# Patient Record
Sex: FEMALE | Race: WHITE | NOT HISPANIC OR LATINO | Employment: UNEMPLOYED | URBAN - METROPOLITAN AREA
[De-identification: names, ages, dates, MRNs, and addresses within clinical notes are randomized per-mention and may not be internally consistent; named-entity substitution may affect disease eponyms.]

---

## 2018-01-01 ENCOUNTER — TELEPHONE (OUTPATIENT)
Dept: TRANSPLANT | Facility: CLINIC | Age: 68
End: 2018-01-01

## 2018-01-01 ENCOUNTER — HOSPITAL ENCOUNTER (OUTPATIENT)
Dept: RADIOLOGY | Facility: HOSPITAL | Age: 68
Discharge: HOME | End: 2018-09-06
Attending: INTERNAL MEDICINE
Payer: COMMERCIAL

## 2018-01-01 ENCOUNTER — PATIENT MESSAGE (OUTPATIENT)
Dept: TRANSPLANT | Facility: CLINIC | Age: 68
End: 2018-01-01

## 2018-01-01 ENCOUNTER — OFFICE VISIT (OUTPATIENT)
Dept: TRANSPLANT | Facility: CLINIC | Age: 68
End: 2018-01-01
Payer: COMMERCIAL

## 2018-01-01 ENCOUNTER — TELEPHONE (OUTPATIENT)
Dept: TRANSPLANT | Facility: HOSPITAL | Age: 68
End: 2018-01-01

## 2018-01-01 ENCOUNTER — ANESTHESIA (OUTPATIENT)
Dept: SURGERY | Facility: HOSPITAL | Age: 68
DRG: 004 | End: 2018-01-01
Payer: COMMERCIAL

## 2018-01-01 ENCOUNTER — HOSPITAL ENCOUNTER (OUTPATIENT)
Dept: RADIOLOGY | Facility: CLINIC | Age: 68
Discharge: HOME | End: 2018-10-30
Attending: INTERNAL MEDICINE
Payer: COMMERCIAL

## 2018-01-01 ENCOUNTER — CONFERENCE (OUTPATIENT)
Dept: TRANSPLANT | Facility: CLINIC | Age: 68
End: 2018-01-01

## 2018-01-01 ENCOUNTER — OFFICE VISIT (OUTPATIENT)
Dept: OBSTETRICS AND GYNECOLOGY | Facility: CLINIC | Age: 68
End: 2018-01-01
Payer: COMMERCIAL

## 2018-01-01 ENCOUNTER — HOSPITAL ENCOUNTER (INPATIENT)
Facility: HOSPITAL | Age: 68
LOS: 3 days | Discharge: HOME HEALTH | DRG: 683 | End: 2018-11-02
Attending: INTERNAL MEDICINE | Admitting: SURGERY
Payer: COMMERCIAL

## 2018-01-01 ENCOUNTER — CLINICAL SUPPORT (OUTPATIENT)
Dept: TRANSPLANT | Facility: CLINIC | Age: 68
End: 2018-01-01
Payer: MEDICARE

## 2018-01-01 ENCOUNTER — ANESTHESIA (OUTPATIENT)
Dept: TRANSPLANT | Facility: HOSPITAL | Age: 68
DRG: 004 | End: 2018-01-01
Payer: COMMERCIAL

## 2018-01-01 ENCOUNTER — ANESTHESIA EVENT (OUTPATIENT)
Dept: SURGERY | Facility: HOSPITAL | Age: 68
DRG: 004 | End: 2018-01-01
Payer: COMMERCIAL

## 2018-01-01 ENCOUNTER — NURSE TRIAGE (OUTPATIENT)
Dept: ADMINISTRATIVE | Facility: CLINIC | Age: 68
End: 2018-01-01

## 2018-01-01 ENCOUNTER — DOCUMENTATION ONLY (OUTPATIENT)
Dept: TRANSPLANT | Facility: CLINIC | Age: 68
End: 2018-01-01

## 2018-01-01 ENCOUNTER — OFFICE VISIT (OUTPATIENT)
Dept: TRANSPLANT | Facility: CLINIC | Age: 68
End: 2018-01-01
Payer: MEDICARE

## 2018-01-01 ENCOUNTER — TELEPHONE (OUTPATIENT)
Dept: HEPATOLOGY | Facility: HOSPITAL | Age: 68
End: 2018-01-01

## 2018-01-01 ENCOUNTER — COMMITTEE REVIEW (OUTPATIENT)
Dept: TRANSPLANT | Facility: CLINIC | Age: 68
End: 2018-01-01

## 2018-01-01 ENCOUNTER — HOSPITAL ENCOUNTER (INPATIENT)
Facility: HOSPITAL | Age: 68
LOS: 36 days | DRG: 004 | End: 2018-12-16
Attending: TRANSPLANT SURGERY | Admitting: TRANSPLANT SURGERY
Payer: COMMERCIAL

## 2018-01-01 ENCOUNTER — ANESTHESIA EVENT (OUTPATIENT)
Dept: TRANSPLANT | Facility: HOSPITAL | Age: 68
DRG: 004 | End: 2018-01-01
Payer: COMMERCIAL

## 2018-01-01 ENCOUNTER — HOSPITAL ENCOUNTER (OUTPATIENT)
Dept: RADIOLOGY | Facility: HOSPITAL | Age: 68
Discharge: HOME | End: 2018-09-07
Attending: INTERNAL MEDICINE
Payer: COMMERCIAL

## 2018-01-01 ENCOUNTER — HOSPITAL ENCOUNTER (OUTPATIENT)
Dept: CARDIOLOGY | Facility: CLINIC | Age: 68
Discharge: HOME | End: 2018-09-07
Attending: INTERNAL MEDICINE
Payer: COMMERCIAL

## 2018-01-01 ENCOUNTER — INITIAL CONSULT (OUTPATIENT)
Dept: TRANSPLANT | Facility: CLINIC | Age: 68
End: 2018-01-01
Payer: MEDICARE

## 2018-01-01 VITALS
DIASTOLIC BLOOD PRESSURE: 52 MMHG | OXYGEN SATURATION: 99 % | TEMPERATURE: 98 F | TEMPERATURE: 98 F | HEIGHT: 58 IN | DIASTOLIC BLOOD PRESSURE: 52 MMHG | BODY MASS INDEX: 23.24 KG/M2 | SYSTOLIC BLOOD PRESSURE: 126 MMHG | SYSTOLIC BLOOD PRESSURE: 126 MMHG | HEART RATE: 82 BPM | RESPIRATION RATE: 18 BRPM | BODY MASS INDEX: 23.24 KG/M2 | HEIGHT: 58 IN | RESPIRATION RATE: 18 BRPM | WEIGHT: 110.69 LBS | HEART RATE: 82 BPM | WEIGHT: 110.69 LBS | OXYGEN SATURATION: 99 %

## 2018-01-01 VITALS
HEIGHT: 58 IN | SYSTOLIC BLOOD PRESSURE: 112 MMHG | BODY MASS INDEX: 23.64 KG/M2 | WEIGHT: 112.63 LBS | DIASTOLIC BLOOD PRESSURE: 52 MMHG

## 2018-01-01 VITALS
OXYGEN SATURATION: 99 % | DIASTOLIC BLOOD PRESSURE: 54 MMHG | TEMPERATURE: 99 F | BODY MASS INDEX: 22.58 KG/M2 | RESPIRATION RATE: 18 BRPM | SYSTOLIC BLOOD PRESSURE: 124 MMHG | HEART RATE: 79 BPM | WEIGHT: 112 LBS | HEIGHT: 59 IN

## 2018-01-01 VITALS
BODY MASS INDEX: 25.69 KG/M2 | OXYGEN SATURATION: 45 % | TEMPERATURE: 99 F | SYSTOLIC BLOOD PRESSURE: 87 MMHG | HEIGHT: 58 IN | DIASTOLIC BLOOD PRESSURE: 47 MMHG | WEIGHT: 122.38 LBS

## 2018-01-01 VITALS
BODY MASS INDEX: 22.39 KG/M2 | RESPIRATION RATE: 18 BRPM | TEMPERATURE: 98 F | DIASTOLIC BLOOD PRESSURE: 46 MMHG | OXYGEN SATURATION: 98 % | HEART RATE: 81 BPM | WEIGHT: 106.69 LBS | SYSTOLIC BLOOD PRESSURE: 119 MMHG | HEIGHT: 58 IN

## 2018-01-01 VITALS
OXYGEN SATURATION: 99 % | SYSTOLIC BLOOD PRESSURE: 127 MMHG | BODY MASS INDEX: 20.78 KG/M2 | DIASTOLIC BLOOD PRESSURE: 70 MMHG | RESPIRATION RATE: 21 BRPM | WEIGHT: 99 LBS | TEMPERATURE: 99 F | HEART RATE: 82 BPM | HEIGHT: 58 IN

## 2018-01-01 VITALS
TEMPERATURE: 99 F | BODY MASS INDEX: 20 KG/M2 | OXYGEN SATURATION: 100 % | WEIGHT: 99.19 LBS | SYSTOLIC BLOOD PRESSURE: 112 MMHG | HEART RATE: 64 BPM | RESPIRATION RATE: 16 BRPM | DIASTOLIC BLOOD PRESSURE: 49 MMHG | HEIGHT: 59 IN

## 2018-01-01 DIAGNOSIS — K72.90 DECOMPENSATED HEPATIC CIRRHOSIS: ICD-10-CM

## 2018-01-01 DIAGNOSIS — K72.90 DECOMPENSATED HEPATIC CIRRHOSIS: Primary | ICD-10-CM

## 2018-01-01 DIAGNOSIS — Z01.818 PRE-TRANSPLANT EVALUATION FOR LIVER TRANSPLANT: Primary | ICD-10-CM

## 2018-01-01 DIAGNOSIS — Z76.82 ORGAN TRANSPLANT CANDIDATE: ICD-10-CM

## 2018-01-01 DIAGNOSIS — K74.60 DECOMPENSATED HEPATIC CIRRHOSIS: ICD-10-CM

## 2018-01-01 DIAGNOSIS — N17.9 AKI (ACUTE KIDNEY INJURY): Primary | ICD-10-CM

## 2018-01-01 DIAGNOSIS — Z99.11: ICD-10-CM

## 2018-01-01 DIAGNOSIS — E44.0 MODERATE PROTEIN-CALORIE MALNUTRITION: ICD-10-CM

## 2018-01-01 DIAGNOSIS — K76.9 CHRONIC LIVER DISEASE: ICD-10-CM

## 2018-01-01 DIAGNOSIS — E44.0 PROTEIN-CALORIE MALNUTRITION, MODERATE: ICD-10-CM

## 2018-01-01 DIAGNOSIS — Z76.89 ENCOUNTER FOR EVALUATION OF ASCITES: ICD-10-CM

## 2018-01-01 DIAGNOSIS — R00.0 TACHYCARDIA: ICD-10-CM

## 2018-01-01 DIAGNOSIS — J18.9 PNEUMONIA OF RIGHT LUNG DUE TO INFECTIOUS ORGANISM, UNSPECIFIED PART OF LUNG: ICD-10-CM

## 2018-01-01 DIAGNOSIS — K72.10 END STAGE LIVER DISEASE: ICD-10-CM

## 2018-01-01 DIAGNOSIS — I48.91 A-FIB: ICD-10-CM

## 2018-01-01 DIAGNOSIS — R53.81 PHYSICAL DECONDITIONING: ICD-10-CM

## 2018-01-01 DIAGNOSIS — D69.59 THROMBOCYTOPENIA DUE TO HYPERSPLENISM: ICD-10-CM

## 2018-01-01 DIAGNOSIS — D73.1 THROMBOCYTOPENIA DUE TO HYPERSPLENISM: ICD-10-CM

## 2018-01-01 DIAGNOSIS — K42.9 UMBILICAL HERNIA WITHOUT OBSTRUCTION AND WITHOUT GANGRENE: ICD-10-CM

## 2018-01-01 DIAGNOSIS — Z79.4 TYPE 2 DIABETES MELLITUS WITH HYPERGLYCEMIA, WITH LONG-TERM CURRENT USE OF INSULIN: ICD-10-CM

## 2018-01-01 DIAGNOSIS — K74.60 DECOMPENSATED HEPATIC CIRRHOSIS: Primary | ICD-10-CM

## 2018-01-01 DIAGNOSIS — I48.91 ATRIAL FIBRILLATION WITH RAPID VENTRICULAR RESPONSE: ICD-10-CM

## 2018-01-01 DIAGNOSIS — R06.02 SHORTNESS OF BREATH: ICD-10-CM

## 2018-01-01 DIAGNOSIS — Z76.82 ORGAN TRANSPLANT CANDIDATE: Primary | ICD-10-CM

## 2018-01-01 DIAGNOSIS — I48.0 PAROXYSMAL ATRIAL FIBRILLATION: ICD-10-CM

## 2018-01-01 DIAGNOSIS — D72.819 LEUKOPENIA, UNSPECIFIED TYPE: ICD-10-CM

## 2018-01-01 DIAGNOSIS — D63.8 ANEMIA OF CHRONIC DISEASE: ICD-10-CM

## 2018-01-01 DIAGNOSIS — I48.91 ATRIAL FIBRILLATION: ICD-10-CM

## 2018-01-01 DIAGNOSIS — N17.9 ACUTE KIDNEY INJURY SUPERIMPOSED ON CKD: ICD-10-CM

## 2018-01-01 DIAGNOSIS — Z85.3 PERSONAL HISTORY OF BREAST CANCER: ICD-10-CM

## 2018-01-01 DIAGNOSIS — Z78.9 ON ENTERAL NUTRITION: ICD-10-CM

## 2018-01-01 DIAGNOSIS — J96.01 ACUTE RESPIRATORY FAILURE WITH HYPOXIA: ICD-10-CM

## 2018-01-01 DIAGNOSIS — D69.6 THROMBOCYTOPENIA, UNSPECIFIED: ICD-10-CM

## 2018-01-01 DIAGNOSIS — K76.82 HEPATIC ENCEPHALOPATHY: ICD-10-CM

## 2018-01-01 DIAGNOSIS — N18.9 ACUTE KIDNEY INJURY SUPERIMPOSED ON CKD: ICD-10-CM

## 2018-01-01 DIAGNOSIS — K76.6 PORTAL HYPERTENSION: ICD-10-CM

## 2018-01-01 DIAGNOSIS — I48.91 NEW ONSET A-FIB: ICD-10-CM

## 2018-01-01 DIAGNOSIS — R00.0 SINUS TACHYCARDIA: ICD-10-CM

## 2018-01-01 DIAGNOSIS — R16.0 LIVER MASS: ICD-10-CM

## 2018-01-01 DIAGNOSIS — H92.09 OTALGIA, UNSPECIFIED LATERALITY: ICD-10-CM

## 2018-01-01 DIAGNOSIS — K72.90 LIVER FAILURE: ICD-10-CM

## 2018-01-01 DIAGNOSIS — D64.9 ANEMIA REQUIRING TRANSFUSIONS: ICD-10-CM

## 2018-01-01 DIAGNOSIS — M79.89 SWELLING IN RIGHT ARMPIT: ICD-10-CM

## 2018-01-01 DIAGNOSIS — J96.02 ACUTE HYPERCAPNIC RESPIRATORY FAILURE: Primary | ICD-10-CM

## 2018-01-01 DIAGNOSIS — Z01.818 ENCOUNTER FOR PRE-TRANSPLANT EVALUATION FOR CHRONIC LIVER DISEASE: Primary | ICD-10-CM

## 2018-01-01 DIAGNOSIS — E87.1 HYPONATREMIA WITH DECREASED SERUM OSMOLALITY: ICD-10-CM

## 2018-01-01 DIAGNOSIS — I48.91 ATRIAL FIBRILLATION, UNSPECIFIED TYPE: ICD-10-CM

## 2018-01-01 DIAGNOSIS — Z01.419 ENCOUNTER FOR ANNUAL ROUTINE GYNECOLOGICAL EXAMINATION: Primary | ICD-10-CM

## 2018-01-01 DIAGNOSIS — K74.60 LIVER CIRRHOSIS SECONDARY TO NASH: ICD-10-CM

## 2018-01-01 DIAGNOSIS — N17.0 ACUTE KIDNEY FAILURE WITH LESION OF TUBULAR NECROSIS: ICD-10-CM

## 2018-01-01 DIAGNOSIS — N18.30 CKD (CHRONIC KIDNEY DISEASE) STAGE 3, GFR 30-59 ML/MIN: ICD-10-CM

## 2018-01-01 DIAGNOSIS — E11.65 TYPE 2 DIABETES MELLITUS WITH HYPERGLYCEMIA, WITH LONG-TERM CURRENT USE OF INSULIN: ICD-10-CM

## 2018-01-01 DIAGNOSIS — E43 SEVERE MALNUTRITION: ICD-10-CM

## 2018-01-01 DIAGNOSIS — E87.5 HYPERKALEMIA: ICD-10-CM

## 2018-01-01 DIAGNOSIS — I35.9 NONRHEUMATIC AORTIC VALVE DISORDER: ICD-10-CM

## 2018-01-01 DIAGNOSIS — E87.6 HYPOKALEMIA: ICD-10-CM

## 2018-01-01 DIAGNOSIS — K75.81 LIVER CIRRHOSIS SECONDARY TO NASH: ICD-10-CM

## 2018-01-01 DIAGNOSIS — E87.70 HYPERVOLEMIA, UNSPECIFIED HYPERVOLEMIA TYPE: ICD-10-CM

## 2018-01-01 DIAGNOSIS — M62.50 MUSCULAR ATROPHY, UNSPECIFIED SITE: ICD-10-CM

## 2018-01-01 DIAGNOSIS — E87.70 HYPERVOLEMIA: ICD-10-CM

## 2018-01-01 DIAGNOSIS — Z12.4 PAP SMEAR FOR CERVICAL CANCER SCREENING: ICD-10-CM

## 2018-01-01 DIAGNOSIS — M25.421 SWELLING OF JOINT OF UPPER ARM, RIGHT: ICD-10-CM

## 2018-01-01 LAB
ABO + RH BLD: NORMAL
ALBUMIN SERPL BCP-MCNC: 1.4 G/DL
ALBUMIN SERPL BCP-MCNC: 1.4 G/DL
ALBUMIN SERPL BCP-MCNC: 1.5 G/DL
ALBUMIN SERPL BCP-MCNC: 1.5 G/DL
ALBUMIN SERPL BCP-MCNC: 1.6 G/DL
ALBUMIN SERPL BCP-MCNC: 1.7 G/DL
ALBUMIN SERPL BCP-MCNC: 1.8 G/DL
ALBUMIN SERPL BCP-MCNC: 1.9 G/DL
ALBUMIN SERPL BCP-MCNC: 2 G/DL
ALBUMIN SERPL BCP-MCNC: 2.1 G/DL
ALBUMIN SERPL BCP-MCNC: 2.2 G/DL
ALBUMIN SERPL BCP-MCNC: 2.3 G/DL
ALBUMIN SERPL BCP-MCNC: 2.4 G/DL
ALBUMIN SERPL BCP-MCNC: 2.5 G/DL
ALBUMIN SERPL BCP-MCNC: 2.6 G/DL
ALBUMIN SERPL BCP-MCNC: 2.7 G/DL
ALBUMIN SERPL BCP-MCNC: 2.7 G/DL
ALBUMIN SERPL BCP-MCNC: 2.8 G/DL
ALBUMIN SERPL BCP-MCNC: 2.9 G/DL
ALBUMIN SERPL BCP-MCNC: 3 G/DL
ALBUMIN SERPL BCP-MCNC: 3 G/DL
ALBUMIN SERPL BCP-MCNC: 3.1 G/DL
ALBUMIN SERPL BCP-MCNC: 3.2 G/DL
ALBUMIN SERPL BCP-MCNC: 3.2 G/DL
ALBUMIN SERPL BCP-MCNC: 3.4 G/DL
ALBUMIN SERPL BCP-MCNC: 3.4 G/DL
ALBUMIN SERPL BCP-MCNC: 3.5 G/DL
ALBUMIN SERPL BCP-MCNC: 3.6 G/DL
ALBUMIN SERPL BCP-MCNC: 3.6 G/DL
ALBUMIN SERPL BCP-MCNC: 3.7 G/DL
ALBUMIN SERPL BCP-MCNC: 3.7 G/DL
ALBUMIN SERPL BCP-MCNC: 3.8 G/DL
ALBUMIN SERPL BCP-MCNC: 3.9 G/DL
ALBUMIN SERPL BCP-MCNC: 4 G/DL
ALLENS TEST: ABNORMAL
ALLENS TEST: NORMAL
ALLENS TEST: NORMAL
ALP SERPL-CCNC: 107 U/L
ALP SERPL-CCNC: 110 U/L
ALP SERPL-CCNC: 112 U/L
ALP SERPL-CCNC: 115 U/L
ALP SERPL-CCNC: 121 U/L
ALP SERPL-CCNC: 123 U/L
ALP SERPL-CCNC: 124 U/L
ALP SERPL-CCNC: 124 U/L
ALP SERPL-CCNC: 125 U/L
ALP SERPL-CCNC: 126 U/L
ALP SERPL-CCNC: 128 U/L
ALP SERPL-CCNC: 130 U/L
ALP SERPL-CCNC: 132 U/L
ALP SERPL-CCNC: 132 U/L
ALP SERPL-CCNC: 138 U/L
ALP SERPL-CCNC: 143 U/L
ALP SERPL-CCNC: 144 U/L
ALP SERPL-CCNC: 145 U/L
ALP SERPL-CCNC: 146 U/L
ALP SERPL-CCNC: 147 U/L
ALP SERPL-CCNC: 148 U/L
ALP SERPL-CCNC: 149 U/L
ALP SERPL-CCNC: 152 U/L
ALP SERPL-CCNC: 155 U/L
ALP SERPL-CCNC: 157 U/L
ALP SERPL-CCNC: 162 U/L
ALP SERPL-CCNC: 163 U/L
ALP SERPL-CCNC: 166 U/L
ALP SERPL-CCNC: 166 U/L
ALP SERPL-CCNC: 176 U/L
ALP SERPL-CCNC: 176 U/L
ALP SERPL-CCNC: 182 U/L
ALP SERPL-CCNC: 187 U/L
ALP SERPL-CCNC: 205 U/L
ALP SERPL-CCNC: 209 U/L
ALP SERPL-CCNC: 210 U/L
ALP SERPL-CCNC: 217 U/L
ALP SERPL-CCNC: 219 U/L
ALP SERPL-CCNC: 226 U/L
ALP SERPL-CCNC: 246 U/L
ALP SERPL-CCNC: 247 U/L
ALP SERPL-CCNC: 252 U/L
ALP SERPL-CCNC: 256 U/L
ALP SERPL-CCNC: 268 U/L
ALP SERPL-CCNC: 97 U/L
ALT SERPL W/O P-5'-P-CCNC: 16 U/L
ALT SERPL W/O P-5'-P-CCNC: 16 U/L
ALT SERPL W/O P-5'-P-CCNC: 17 U/L
ALT SERPL W/O P-5'-P-CCNC: 18 U/L
ALT SERPL W/O P-5'-P-CCNC: 19 U/L
ALT SERPL W/O P-5'-P-CCNC: 20 U/L
ALT SERPL W/O P-5'-P-CCNC: 20 U/L
ALT SERPL W/O P-5'-P-CCNC: 21 U/L
ALT SERPL W/O P-5'-P-CCNC: 21 U/L
ALT SERPL W/O P-5'-P-CCNC: 22 U/L
ALT SERPL W/O P-5'-P-CCNC: 22 U/L
ALT SERPL W/O P-5'-P-CCNC: 23 U/L
ALT SERPL W/O P-5'-P-CCNC: 24 U/L
ALT SERPL W/O P-5'-P-CCNC: 25 U/L
ALT SERPL W/O P-5'-P-CCNC: 25 U/L
ALT SERPL W/O P-5'-P-CCNC: 26 U/L
ALT SERPL W/O P-5'-P-CCNC: 29 U/L
ALT SERPL W/O P-5'-P-CCNC: 29 U/L
ALT SERPL W/O P-5'-P-CCNC: 31 U/L
ALT SERPL W/O P-5'-P-CCNC: 31 U/L
ALT SERPL W/O P-5'-P-CCNC: 32 U/L
ALT SERPL W/O P-5'-P-CCNC: 32 U/L
ALT SERPL W/O P-5'-P-CCNC: 33 U/L
ALT SERPL W/O P-5'-P-CCNC: 34 U/L
ALT SERPL W/O P-5'-P-CCNC: 35 U/L
ALT SERPL W/O P-5'-P-CCNC: 36 U/L
ALT SERPL W/O P-5'-P-CCNC: 37 U/L
ALT SERPL W/O P-5'-P-CCNC: 38 U/L
ALT SERPL W/O P-5'-P-CCNC: 38 U/L
ALT SERPL W/O P-5'-P-CCNC: 39 U/L
ALT SERPL W/O P-5'-P-CCNC: 41 U/L
ALT SERPL W/O P-5'-P-CCNC: 44 U/L
ALT SERPL W/O P-5'-P-CCNC: 49 U/L
ALT SERPL W/O P-5'-P-CCNC: 57 U/L
ALT SERPL W/O P-5'-P-CCNC: 59 U/L
ALT SERPL W/O P-5'-P-CCNC: 66 U/L
ALT SERPL W/O P-5'-P-CCNC: 66 U/L
ALT SERPL W/O P-5'-P-CCNC: 73 U/L
AMMONIA PLAS-SCNC: 32 UMOL/L
AMMONIA PLAS-SCNC: 42 UMOL/L
AMMONIA PLAS-SCNC: 44 UMOL/L
AMMONIA PLAS-SCNC: 50 UMOL/L
AMMONIA PLAS-SCNC: 51 UMOL/L
AMMONIA PLAS-SCNC: 61 UMOL/L
AMMONIA PLAS-SCNC: 61 UMOL/L
AMMONIA PLAS-SCNC: 65 UMOL/L
AMMONIA PLAS-SCNC: 71 UMOL/L
AMMONIA PLAS-SCNC: 79 UMOL/L
AMMONIA PLAS-SCNC: 89 UMOL/L
AMMONIA PLAS-SCNC: 93 UMOL/L
AMPHET+METHAMPHET UR QL: NEGATIVE
AMPHET+METHAMPHET UR QL: NEGATIVE
ANION GAP SERPL CALC-SCNC: 10 MMOL/L
ANION GAP SERPL CALC-SCNC: 11 MMOL/L
ANION GAP SERPL CALC-SCNC: 12 MMOL/L
ANION GAP SERPL CALC-SCNC: 13 MMOL/L
ANION GAP SERPL CALC-SCNC: 14 MMOL/L
ANION GAP SERPL CALC-SCNC: 14 MMOL/L
ANION GAP SERPL CALC-SCNC: 15 MMOL/L
ANION GAP SERPL CALC-SCNC: 3 MMOL/L
ANION GAP SERPL CALC-SCNC: 4 MMOL/L
ANION GAP SERPL CALC-SCNC: 5 MMOL/L
ANION GAP SERPL CALC-SCNC: 6 MMOL/L
ANION GAP SERPL CALC-SCNC: 7 MMOL/L
ANION GAP SERPL CALC-SCNC: 8 MMOL/L
ANION GAP SERPL CALC-SCNC: 9 MMOL/L
ANISOCYTOSIS BLD QL SMEAR: ABNORMAL
ANISOCYTOSIS BLD QL SMEAR: SLIGHT
APTT BLDCRRT: 26.4 SEC
APTT BLDCRRT: 27.8 SEC
APTT BLDCRRT: 29.5 SEC
APTT BLDCRRT: 30.7 SEC
APTT BLDCRRT: 31.1 SEC
ASCENDING AORTA: 2.49 CM
ASCENDING AORTA: 2.51 CM
AST SERPL-CCNC: 110 U/L
AST SERPL-CCNC: 31 U/L
AST SERPL-CCNC: 34 U/L
AST SERPL-CCNC: 35 U/L
AST SERPL-CCNC: 35 U/L
AST SERPL-CCNC: 36 U/L
AST SERPL-CCNC: 36 U/L
AST SERPL-CCNC: 37 U/L
AST SERPL-CCNC: 38 U/L
AST SERPL-CCNC: 39 U/L
AST SERPL-CCNC: 39 U/L
AST SERPL-CCNC: 40 U/L
AST SERPL-CCNC: 40 U/L
AST SERPL-CCNC: 41 U/L
AST SERPL-CCNC: 41 U/L
AST SERPL-CCNC: 42 U/L
AST SERPL-CCNC: 44 U/L
AST SERPL-CCNC: 47 U/L
AST SERPL-CCNC: 48 U/L
AST SERPL-CCNC: 48 U/L
AST SERPL-CCNC: 49 U/L
AST SERPL-CCNC: 49 U/L
AST SERPL-CCNC: 51 U/L
AST SERPL-CCNC: 51 U/L
AST SERPL-CCNC: 52 U/L
AST SERPL-CCNC: 54 U/L
AST SERPL-CCNC: 54 U/L
AST SERPL-CCNC: 56 U/L
AST SERPL-CCNC: 57 U/L
AST SERPL-CCNC: 57 U/L
AST SERPL-CCNC: 63 U/L
AST SERPL-CCNC: 64 U/L
AST SERPL-CCNC: 65 U/L
AST SERPL-CCNC: 65 U/L
AST SERPL-CCNC: 68 U/L
AST SERPL-CCNC: 69 U/L
AST SERPL-CCNC: 72 U/L
AST SERPL-CCNC: 72 U/L
AST SERPL-CCNC: 73 U/L
AST SERPL-CCNC: 73 U/L
AST SERPL-CCNC: 74 U/L
AST SERPL-CCNC: 74 U/L
AST SERPL-CCNC: 75 U/L
AST SERPL-CCNC: 80 U/L
AST SERPL-CCNC: 82 U/L
AST SERPL-CCNC: 94 U/L
AST SERPL-CCNC: 95 U/L
AV MEAN GRADIENT: 2.01 MMHG
AV MEAN GRADIENT: 3.21 MMHG
AV PEAK GRADIENT: 4.58 MMHG
AV PEAK GRADIENT: 5.95 MMHG
AV VALVE AREA: 2.19 CM2
AV VALVE AREA: 2.86 CM2
BACTERIA #/AREA URNS AUTO: ABNORMAL /HPF
BACTERIA BLD CULT: NORMAL
BACTERIA SPEC AEROBE CULT: NORMAL
BACTERIA UR CULT: NO GROWTH
BACTERIA UR CULT: NO GROWTH
BACTERIA UR CULT: NORMAL
BACTERIA UR CULT: NORMAL
BARBITURATES UR QL SCN>200 NG/ML: NEGATIVE
BARBITURATES UR QL SCN>200 NG/ML: NEGATIVE
BASO STIPL BLD QL SMEAR: ABNORMAL
BASOPHILS # BLD AUTO: 0 K/UL
BASOPHILS # BLD AUTO: 0.01 K/UL
BASOPHILS # BLD AUTO: 0.02 K/UL
BASOPHILS # BLD AUTO: 0.03 K/UL
BASOPHILS # BLD AUTO: 0.04 K/UL
BASOPHILS NFR BLD: 0 %
BASOPHILS NFR BLD: 0.1 %
BASOPHILS NFR BLD: 0.2 %
BASOPHILS NFR BLD: 0.3 %
BASOPHILS NFR BLD: 0.4 %
BASOPHILS NFR BLD: 0.5 %
BASOPHILS NFR BLD: 0.6 %
BENZODIAZ UR QL SCN>200 NG/ML: NEGATIVE
BENZODIAZ UR QL SCN>200 NG/ML: NEGATIVE
BILIRUB SERPL-MCNC: 1.7 MG/DL
BILIRUB SERPL-MCNC: 1.8 MG/DL
BILIRUB SERPL-MCNC: 10.1 MG/DL
BILIRUB SERPL-MCNC: 10.3 MG/DL
BILIRUB SERPL-MCNC: 10.4 MG/DL
BILIRUB SERPL-MCNC: 10.4 MG/DL
BILIRUB SERPL-MCNC: 10.5 MG/DL
BILIRUB SERPL-MCNC: 10.5 MG/DL
BILIRUB SERPL-MCNC: 10.7 MG/DL
BILIRUB SERPL-MCNC: 11.4 MG/DL
BILIRUB SERPL-MCNC: 11.4 MG/DL
BILIRUB SERPL-MCNC: 11.5 MG/DL
BILIRUB SERPL-MCNC: 11.9 MG/DL
BILIRUB SERPL-MCNC: 12.1 MG/DL
BILIRUB SERPL-MCNC: 12.2 MG/DL
BILIRUB SERPL-MCNC: 12.3 MG/DL
BILIRUB SERPL-MCNC: 15.3 MG/DL
BILIRUB SERPL-MCNC: 2.5 MG/DL
BILIRUB SERPL-MCNC: 2.5 MG/DL
BILIRUB SERPL-MCNC: 2.6 MG/DL
BILIRUB SERPL-MCNC: 2.6 MG/DL
BILIRUB SERPL-MCNC: 2.8 MG/DL
BILIRUB SERPL-MCNC: 3.1 MG/DL
BILIRUB SERPL-MCNC: 3.9 MG/DL
BILIRUB SERPL-MCNC: 4.2 MG/DL
BILIRUB SERPL-MCNC: 4.3 MG/DL
BILIRUB SERPL-MCNC: 4.4 MG/DL
BILIRUB SERPL-MCNC: 4.6 MG/DL
BILIRUB SERPL-MCNC: 5.5 MG/DL
BILIRUB SERPL-MCNC: 5.9 MG/DL
BILIRUB SERPL-MCNC: 6 MG/DL
BILIRUB SERPL-MCNC: 6.1 MG/DL
BILIRUB SERPL-MCNC: 6.2 MG/DL
BILIRUB SERPL-MCNC: 6.4 MG/DL
BILIRUB SERPL-MCNC: 6.7 MG/DL
BILIRUB SERPL-MCNC: 7.2 MG/DL
BILIRUB SERPL-MCNC: 7.3 MG/DL
BILIRUB SERPL-MCNC: 7.4 MG/DL
BILIRUB SERPL-MCNC: 7.9 MG/DL
BILIRUB SERPL-MCNC: 8.1 MG/DL
BILIRUB SERPL-MCNC: 8.1 MG/DL
BILIRUB SERPL-MCNC: 8.3 MG/DL
BILIRUB SERPL-MCNC: 8.3 MG/DL
BILIRUB SERPL-MCNC: 8.9 MG/DL
BILIRUB SERPL-MCNC: 9 MG/DL
BILIRUB SERPL-MCNC: 9.2 MG/DL
BILIRUB SERPL-MCNC: 9.8 MG/DL
BILIRUB UR QL STRIP: NEGATIVE
BLD GP AB SCN CELLS X3 SERPL QL: NORMAL
BLD PROD TYP BPU: NORMAL
BLOOD UNIT EXPIRATION DATE: NORMAL
BLOOD UNIT TYPE CODE: 5100
BLOOD UNIT TYPE CODE: 6200
BLOOD UNIT TYPE: NORMAL
BNP SERPL-MCNC: 1892 PG/ML
BNP SERPL-MCNC: 2152 PG/ML
BSA FOR ECHO PROCEDURE: 1.49 M2
BSA FOR ECHO PROCEDURE: 1.49 M2
BUN SERPL-MCNC: 10 MG/DL
BUN SERPL-MCNC: 12 MG/DL
BUN SERPL-MCNC: 13 MG/DL
BUN SERPL-MCNC: 14 MG/DL
BUN SERPL-MCNC: 15 MG/DL
BUN SERPL-MCNC: 15 MG/DL
BUN SERPL-MCNC: 16 MG/DL
BUN SERPL-MCNC: 17 MG/DL
BUN SERPL-MCNC: 18 MG/DL
BUN SERPL-MCNC: 18 MG/DL
BUN SERPL-MCNC: 19 MG/DL
BUN SERPL-MCNC: 20 MG/DL
BUN SERPL-MCNC: 20 MG/DL
BUN SERPL-MCNC: 21 MG/DL
BUN SERPL-MCNC: 22 MG/DL
BUN SERPL-MCNC: 22 MG/DL
BUN SERPL-MCNC: 23 MG/DL
BUN SERPL-MCNC: 23 MG/DL
BUN SERPL-MCNC: 25 MG/DL
BUN SERPL-MCNC: 25 MG/DL
BUN SERPL-MCNC: 26 MG/DL
BUN SERPL-MCNC: 27 MG/DL
BUN SERPL-MCNC: 28 MG/DL
BUN SERPL-MCNC: 28 MG/DL
BUN SERPL-MCNC: 3 MG/DL
BUN SERPL-MCNC: 31 MG/DL
BUN SERPL-MCNC: 32 MG/DL
BUN SERPL-MCNC: 32 MG/DL
BUN SERPL-MCNC: 34 MG/DL
BUN SERPL-MCNC: 34 MG/DL
BUN SERPL-MCNC: 35 MG/DL
BUN SERPL-MCNC: 39 MG/DL
BUN SERPL-MCNC: 4 MG/DL
BUN SERPL-MCNC: 46 MG/DL
BUN SERPL-MCNC: 47 MG/DL
BUN SERPL-MCNC: 50 MG/DL
BUN SERPL-MCNC: 52 MG/DL
BUN SERPL-MCNC: 53 MG/DL
BUN SERPL-MCNC: 53 MG/DL
BUN SERPL-MCNC: 54 MG/DL
BUN SERPL-MCNC: 58 MG/DL
BUN SERPL-MCNC: 6 MG/DL
BUN SERPL-MCNC: 61 MG/DL
BUN SERPL-MCNC: 62 MG/DL
BUN SERPL-MCNC: 63 MG/DL
BUN SERPL-MCNC: 69 MG/DL
BUN SERPL-MCNC: 7 MG/DL
BUN SERPL-MCNC: 72 MG/DL
BUN SERPL-MCNC: 72 MG/DL
BUN SERPL-MCNC: 73 MG/DL
BUN SERPL-MCNC: 77 MG/DL
BUN SERPL-MCNC: 8 MG/DL
BUN SERPL-MCNC: 81 MG/DL
BUN SERPL-MCNC: 82 MG/DL
BUN SERPL-MCNC: 84 MG/DL
BUN SERPL-MCNC: 84 MG/DL
BUN SERPL-MCNC: 85 MG/DL
BUN SERPL-MCNC: 85 MG/DL
BUN SERPL-MCNC: 87 MG/DL
BUN SERPL-MCNC: 89 MG/DL
BUN SERPL-MCNC: 90 MG/DL
BUN SERPL-MCNC: 92 MG/DL
BUN SERPL-MCNC: 93 MG/DL
BURR CELLS BLD QL SMEAR: ABNORMAL
BZE UR QL SCN: NEGATIVE
BZE UR QL SCN: NEGATIVE
CA-I BLDV-SCNC: 1.08 MMOL/L
CALCIUM SERPL-MCNC: 7.2 MG/DL
CALCIUM SERPL-MCNC: 7.3 MG/DL
CALCIUM SERPL-MCNC: 7.4 MG/DL
CALCIUM SERPL-MCNC: 7.5 MG/DL
CALCIUM SERPL-MCNC: 7.6 MG/DL
CALCIUM SERPL-MCNC: 7.7 MG/DL
CALCIUM SERPL-MCNC: 7.8 MG/DL
CALCIUM SERPL-MCNC: 7.9 MG/DL
CALCIUM SERPL-MCNC: 8 MG/DL
CALCIUM SERPL-MCNC: 8.1 MG/DL
CALCIUM SERPL-MCNC: 8.2 MG/DL
CALCIUM SERPL-MCNC: 8.3 MG/DL
CALCIUM SERPL-MCNC: 8.4 MG/DL
CALCIUM SERPL-MCNC: 8.5 MG/DL
CALCIUM SERPL-MCNC: 8.6 MG/DL
CALCIUM SERPL-MCNC: 8.7 MG/DL
CALCIUM SERPL-MCNC: 8.8 MG/DL
CALCIUM SERPL-MCNC: 8.9 MG/DL
CALCIUM SERPL-MCNC: 9 MG/DL
CALCIUM SERPL-MCNC: 9 MG/DL
CALCIUM SERPL-MCNC: 9.1 MG/DL
CALCIUM SERPL-MCNC: 9.1 MG/DL
CALCIUM SERPL-MCNC: 9.3 MG/DL
CALCIUM SERPL-MCNC: 9.4 MG/DL
CALCIUM SERPL-MCNC: 9.4 MG/DL
CANNABINOIDS UR QL SCN: NEGATIVE
CANNABINOIDS UR QL SCN: NEGATIVE
CHLORIDE SERPL-SCNC: 100 MMOL/L
CHLORIDE SERPL-SCNC: 101 MMOL/L
CHLORIDE SERPL-SCNC: 102 MMOL/L
CHLORIDE SERPL-SCNC: 103 MMOL/L
CHLORIDE SERPL-SCNC: 104 MMOL/L
CHLORIDE SERPL-SCNC: 105 MMOL/L
CHLORIDE SERPL-SCNC: 106 MMOL/L
CHLORIDE SERPL-SCNC: 107 MMOL/L
CHLORIDE SERPL-SCNC: 108 MMOL/L
CHLORIDE SERPL-SCNC: 109 MMOL/L
CHLORIDE SERPL-SCNC: 110 MMOL/L
CHLORIDE SERPL-SCNC: 111 MMOL/L
CHLORIDE SERPL-SCNC: 112 MMOL/L
CHLORIDE SERPL-SCNC: 113 MMOL/L
CHLORIDE UR-SCNC: 20 MMOL/L
CHLORIDE UR-SCNC: <20 MMOL/L
CLARITY UR REFRACT.AUTO: ABNORMAL
CLARITY UR REFRACT.AUTO: CLEAR
CLARITY UR REFRACT.AUTO: CLEAR
CO2 SERPL-SCNC: 16 MMOL/L
CO2 SERPL-SCNC: 17 MMOL/L
CO2 SERPL-SCNC: 17 MMOL/L
CO2 SERPL-SCNC: 18 MMOL/L
CO2 SERPL-SCNC: 19 MMOL/L
CO2 SERPL-SCNC: 19 MMOL/L
CO2 SERPL-SCNC: 20 MMOL/L
CO2 SERPL-SCNC: 21 MMOL/L
CO2 SERPL-SCNC: 22 MMOL/L
CO2 SERPL-SCNC: 23 MMOL/L
CO2 SERPL-SCNC: 24 MMOL/L
CO2 SERPL-SCNC: 25 MMOL/L
CO2 SERPL-SCNC: 26 MMOL/L
CO2 SERPL-SCNC: 27 MMOL/L
CODING SYSTEM: NORMAL
COLOR UR AUTO: ABNORMAL
COLOR UR AUTO: ABNORMAL
COLOR UR AUTO: YELLOW
CREAT SERPL-MCNC: 0.4 MG/DL
CREAT SERPL-MCNC: 0.5 MG/DL
CREAT SERPL-MCNC: 0.6 MG/DL
CREAT SERPL-MCNC: 0.7 MG/DL
CREAT SERPL-MCNC: 0.8 MG/DL
CREAT SERPL-MCNC: 0.9 MG/DL
CREAT SERPL-MCNC: 1 MG/DL
CREAT SERPL-MCNC: 1.1 MG/DL
CREAT SERPL-MCNC: 1.2 MG/DL
CREAT SERPL-MCNC: 1.3 MG/DL
CREAT SERPL-MCNC: 1.4 MG/DL
CREAT SERPL-MCNC: 1.5 MG/DL
CREAT SERPL-MCNC: 1.6 MG/DL
CREAT SERPL-MCNC: 1.7 MG/DL
CREAT SERPL-MCNC: 1.8 MG/DL
CREAT SERPL-MCNC: 1.9 MG/DL
CREAT SERPL-MCNC: 2.1 MG/DL
CREAT SERPL-MCNC: 2.1 MG/DL
CREAT SERPL-MCNC: 2.2 MG/DL
CREAT SERPL-MCNC: 2.5 MG/DL
CREAT SERPL-MCNC: 2.6 MG/DL
CREAT SERPL-MCNC: 2.7 MG/DL
CREAT SERPL-MCNC: 2.8 MG/DL
CREAT UR-MCNC: 141 MG/DL
CREAT UR-MCNC: 29 MG/DL
CREAT UR-MCNC: 79 MG/DL
CV ECHO LV RWT: 0.32 CM
CV ECHO LV RWT: 0.41 CM
DACRYOCYTES BLD QL SMEAR: ABNORMAL
DELSYS: ABNORMAL
DELSYS: NORMAL
DELSYS: NORMAL
DIFFERENTIAL METHOD: ABNORMAL
DISPENSE STATUS: NORMAL
DOP CALC AO PEAK VEL: 1.07 M/S
DOP CALC AO PEAK VEL: 1.22 M/S
DOP CALC AO VTI: 22.35 CM
DOP CALC AO VTI: 27.13 CM
DOP CALC LVOT AREA: 2.35 CM2
DOP CALC LVOT AREA: 2.92 CM2
DOP CALC LVOT DIAMETER: 1.73 CM
DOP CALC LVOT DIAMETER: 1.93 CM
DOP CALC LVOT STROKE VOLUME: 59.3 CM3
DOP CALC LVOT STROKE VOLUME: 63.83 CM3
DOP CALCLVOT PEAK VEL VTI: 21.83 CM
DOP CALCLVOT PEAK VEL VTI: 25.24 CM
E WAVE DECELERATION TIME: 152.8 MSEC
E WAVE DECELERATION TIME: 185.88 MSEC
E/A RATIO: 1.9
E/A RATIO: 2.1
E/E' RATIO: 19.47
E/E' RATIO: 25.5
ECHO LV POSTERIOR WALL: 0.62 CM (ref 0.6–1.1)
ECHO LV POSTERIOR WALL: 0.82 CM (ref 0.6–1.1)
ENTEROVIRUS: NOT DETECTED
EOSINOPHIL # BLD AUTO: 0 K/UL
EOSINOPHIL # BLD AUTO: 0.1 K/UL
EOSINOPHIL # BLD AUTO: 0.2 K/UL
EOSINOPHIL # BLD AUTO: 0.3 K/UL
EOSINOPHIL # BLD AUTO: 0.4 K/UL
EOSINOPHIL NFR BLD: 0 %
EOSINOPHIL NFR BLD: 0 %
EOSINOPHIL NFR BLD: 0.2 %
EOSINOPHIL NFR BLD: 0.4 %
EOSINOPHIL NFR BLD: 0.4 %
EOSINOPHIL NFR BLD: 0.6 %
EOSINOPHIL NFR BLD: 0.7 %
EOSINOPHIL NFR BLD: 0.7 %
EOSINOPHIL NFR BLD: 0.8 %
EOSINOPHIL NFR BLD: 0.8 %
EOSINOPHIL NFR BLD: 0.9 %
EOSINOPHIL NFR BLD: 0.9 %
EOSINOPHIL NFR BLD: 1 %
EOSINOPHIL NFR BLD: 1.1 %
EOSINOPHIL NFR BLD: 1.2 %
EOSINOPHIL NFR BLD: 1.2 %
EOSINOPHIL NFR BLD: 1.3 %
EOSINOPHIL NFR BLD: 1.5 %
EOSINOPHIL NFR BLD: 1.6 %
EOSINOPHIL NFR BLD: 1.7 %
EOSINOPHIL NFR BLD: 1.7 %
EOSINOPHIL NFR BLD: 1.8 %
EOSINOPHIL NFR BLD: 1.8 %
EOSINOPHIL NFR BLD: 1.9 %
EOSINOPHIL NFR BLD: 2 %
EOSINOPHIL NFR BLD: 2 %
EOSINOPHIL NFR BLD: 2.1 %
EOSINOPHIL NFR BLD: 2.1 %
EOSINOPHIL NFR BLD: 2.2 %
EOSINOPHIL NFR BLD: 2.3 %
EOSINOPHIL NFR BLD: 2.4 %
EOSINOPHIL NFR BLD: 2.5 %
EOSINOPHIL NFR BLD: 2.6 %
EOSINOPHIL NFR BLD: 2.6 %
EOSINOPHIL NFR BLD: 2.7 %
EOSINOPHIL NFR BLD: 2.7 %
EOSINOPHIL NFR BLD: 2.8 %
EOSINOPHIL NFR BLD: 2.9 %
EOSINOPHIL NFR BLD: 2.9 %
EOSINOPHIL NFR BLD: 3 %
EOSINOPHIL NFR BLD: 3.2 %
EOSINOPHIL NFR BLD: 3.2 %
EOSINOPHIL NFR BLD: 3.3 %
EOSINOPHIL NFR BLD: 3.4 %
EOSINOPHIL NFR BLD: 3.4 %
EOSINOPHIL NFR BLD: 3.5 %
EOSINOPHIL NFR BLD: 3.5 %
EOSINOPHIL NFR BLD: 3.7 %
EOSINOPHIL NFR BLD: 3.8 %
EOSINOPHIL NFR BLD: 3.9 %
EOSINOPHIL NFR BLD: 4 %
EOSINOPHIL NFR BLD: 4.2 %
ERYTHROCYTE [DISTWIDTH] IN BLOOD BY AUTOMATED COUNT: 14.6 %
ERYTHROCYTE [DISTWIDTH] IN BLOOD BY AUTOMATED COUNT: 14.9 %
ERYTHROCYTE [DISTWIDTH] IN BLOOD BY AUTOMATED COUNT: 15.5 %
ERYTHROCYTE [DISTWIDTH] IN BLOOD BY AUTOMATED COUNT: 15.9 %
ERYTHROCYTE [DISTWIDTH] IN BLOOD BY AUTOMATED COUNT: 15.9 %
ERYTHROCYTE [DISTWIDTH] IN BLOOD BY AUTOMATED COUNT: 16 %
ERYTHROCYTE [DISTWIDTH] IN BLOOD BY AUTOMATED COUNT: 16 %
ERYTHROCYTE [DISTWIDTH] IN BLOOD BY AUTOMATED COUNT: 16.3 %
ERYTHROCYTE [DISTWIDTH] IN BLOOD BY AUTOMATED COUNT: 16.7 %
ERYTHROCYTE [DISTWIDTH] IN BLOOD BY AUTOMATED COUNT: 17 %
ERYTHROCYTE [DISTWIDTH] IN BLOOD BY AUTOMATED COUNT: 17.2 %
ERYTHROCYTE [DISTWIDTH] IN BLOOD BY AUTOMATED COUNT: 17.4 %
ERYTHROCYTE [DISTWIDTH] IN BLOOD BY AUTOMATED COUNT: 17.8 %
ERYTHROCYTE [DISTWIDTH] IN BLOOD BY AUTOMATED COUNT: 18.4 %
ERYTHROCYTE [DISTWIDTH] IN BLOOD BY AUTOMATED COUNT: 18.7 %
ERYTHROCYTE [DISTWIDTH] IN BLOOD BY AUTOMATED COUNT: 19.5 %
ERYTHROCYTE [DISTWIDTH] IN BLOOD BY AUTOMATED COUNT: 21.2 %
ERYTHROCYTE [DISTWIDTH] IN BLOOD BY AUTOMATED COUNT: 21.2 %
ERYTHROCYTE [DISTWIDTH] IN BLOOD BY AUTOMATED COUNT: 21.5 %
ERYTHROCYTE [DISTWIDTH] IN BLOOD BY AUTOMATED COUNT: 22.2 %
ERYTHROCYTE [DISTWIDTH] IN BLOOD BY AUTOMATED COUNT: 22.5 %
ERYTHROCYTE [DISTWIDTH] IN BLOOD BY AUTOMATED COUNT: 22.5 %
ERYTHROCYTE [DISTWIDTH] IN BLOOD BY AUTOMATED COUNT: 22.7 %
ERYTHROCYTE [DISTWIDTH] IN BLOOD BY AUTOMATED COUNT: 22.8 %
ERYTHROCYTE [DISTWIDTH] IN BLOOD BY AUTOMATED COUNT: 22.9 %
ERYTHROCYTE [DISTWIDTH] IN BLOOD BY AUTOMATED COUNT: 23 %
ERYTHROCYTE [DISTWIDTH] IN BLOOD BY AUTOMATED COUNT: 23.1 %
ERYTHROCYTE [DISTWIDTH] IN BLOOD BY AUTOMATED COUNT: 23.4 %
ERYTHROCYTE [DISTWIDTH] IN BLOOD BY AUTOMATED COUNT: 23.7 %
ERYTHROCYTE [DISTWIDTH] IN BLOOD BY AUTOMATED COUNT: 23.8 %
ERYTHROCYTE [DISTWIDTH] IN BLOOD BY AUTOMATED COUNT: 23.9 %
ERYTHROCYTE [DISTWIDTH] IN BLOOD BY AUTOMATED COUNT: 23.9 %
ERYTHROCYTE [DISTWIDTH] IN BLOOD BY AUTOMATED COUNT: 24.1 %
ERYTHROCYTE [DISTWIDTH] IN BLOOD BY AUTOMATED COUNT: 24.2 %
ERYTHROCYTE [DISTWIDTH] IN BLOOD BY AUTOMATED COUNT: 24.4 %
ERYTHROCYTE [DISTWIDTH] IN BLOOD BY AUTOMATED COUNT: 24.6 %
ERYTHROCYTE [DISTWIDTH] IN BLOOD BY AUTOMATED COUNT: 24.6 %
ERYTHROCYTE [DISTWIDTH] IN BLOOD BY AUTOMATED COUNT: 24.7 %
ERYTHROCYTE [DISTWIDTH] IN BLOOD BY AUTOMATED COUNT: 24.8 %
ERYTHROCYTE [DISTWIDTH] IN BLOOD BY AUTOMATED COUNT: 24.9 %
ERYTHROCYTE [DISTWIDTH] IN BLOOD BY AUTOMATED COUNT: 24.9 %
ERYTHROCYTE [DISTWIDTH] IN BLOOD BY AUTOMATED COUNT: 25 %
ERYTHROCYTE [DISTWIDTH] IN BLOOD BY AUTOMATED COUNT: 25 %
ERYTHROCYTE [DISTWIDTH] IN BLOOD BY AUTOMATED COUNT: 25.1 %
ERYTHROCYTE [DISTWIDTH] IN BLOOD BY AUTOMATED COUNT: 25.1 %
ERYTHROCYTE [DISTWIDTH] IN BLOOD BY AUTOMATED COUNT: 25.2 %
ERYTHROCYTE [DISTWIDTH] IN BLOOD BY AUTOMATED COUNT: 25.3 %
ERYTHROCYTE [DISTWIDTH] IN BLOOD BY AUTOMATED COUNT: 25.3 %
ERYTHROCYTE [DISTWIDTH] IN BLOOD BY AUTOMATED COUNT: 25.4 %
ERYTHROCYTE [DISTWIDTH] IN BLOOD BY AUTOMATED COUNT: 25.4 %
ERYTHROCYTE [DISTWIDTH] IN BLOOD BY AUTOMATED COUNT: 25.5 %
ERYTHROCYTE [DISTWIDTH] IN BLOOD BY AUTOMATED COUNT: 25.6 %
ERYTHROCYTE [DISTWIDTH] IN BLOOD BY AUTOMATED COUNT: 25.6 %
ERYTHROCYTE [DISTWIDTH] IN BLOOD BY AUTOMATED COUNT: 25.8 %
ERYTHROCYTE [DISTWIDTH] IN BLOOD BY AUTOMATED COUNT: 25.9 %
ERYTHROCYTE [DISTWIDTH] IN BLOOD BY AUTOMATED COUNT: 26 %
ERYTHROCYTE [DISTWIDTH] IN BLOOD BY AUTOMATED COUNT: 26.5 %
ERYTHROCYTE [DISTWIDTH] IN BLOOD BY AUTOMATED COUNT: 26.7 %
ERYTHROCYTE [DISTWIDTH] IN BLOOD BY AUTOMATED COUNT: 26.9 %
ERYTHROCYTE [DISTWIDTH] IN BLOOD BY AUTOMATED COUNT: 26.9 %
ERYTHROCYTE [DISTWIDTH] IN BLOOD BY AUTOMATED COUNT: 27.3 %
ERYTHROCYTE [DISTWIDTH] IN BLOOD BY AUTOMATED COUNT: 27.5 %
ERYTHROCYTE [DISTWIDTH] IN BLOOD BY AUTOMATED COUNT: 27.8 %
ERYTHROCYTE [DISTWIDTH] IN BLOOD BY AUTOMATED COUNT: 27.8 %
ERYTHROCYTE [DISTWIDTH] IN BLOOD BY AUTOMATED COUNT: 27.9 %
ERYTHROCYTE [DISTWIDTH] IN BLOOD BY AUTOMATED COUNT: 28.3 %
ERYTHROCYTE [DISTWIDTH] IN BLOOD BY AUTOMATED COUNT: ABNORMAL %
ERYTHROCYTE [SEDIMENTATION RATE] IN BLOOD BY WESTERGREN METHOD: 10 MM/H
ERYTHROCYTE [SEDIMENTATION RATE] IN BLOOD BY WESTERGREN METHOD: 12 MM/H
ERYTHROCYTE [SEDIMENTATION RATE] IN BLOOD BY WESTERGREN METHOD: 12 MM/H
ERYTHROCYTE [SEDIMENTATION RATE] IN BLOOD BY WESTERGREN METHOD: 14 MM/H
ERYTHROCYTE [SEDIMENTATION RATE] IN BLOOD BY WESTERGREN METHOD: 16 MM/H
ERYTHROCYTE [SEDIMENTATION RATE] IN BLOOD BY WESTERGREN METHOD: 17 MM/H
ERYTHROCYTE [SEDIMENTATION RATE] IN BLOOD BY WESTERGREN METHOD: 18 MM/H
ERYTHROCYTE [SEDIMENTATION RATE] IN BLOOD BY WESTERGREN METHOD: 22 MM/H
EST. GFR  (AFRICAN AMERICAN): 19.4 ML/MIN/1.73 M^2
EST. GFR  (AFRICAN AMERICAN): 20.3 ML/MIN/1.73 M^2
EST. GFR  (AFRICAN AMERICAN): 21.2 ML/MIN/1.73 M^2
EST. GFR  (AFRICAN AMERICAN): 22.2 ML/MIN/1.73 M^2
EST. GFR  (AFRICAN AMERICAN): 25.8 ML/MIN/1.73 M^2
EST. GFR  (AFRICAN AMERICAN): 26 ML/MIN/1.73 M^2
EST. GFR  (AFRICAN AMERICAN): 26 ML/MIN/1.73 M^2
EST. GFR  (AFRICAN AMERICAN): 27.5 ML/MIN/1.73 M^2
EST. GFR  (AFRICAN AMERICAN): 27.5 ML/MIN/1.73 M^2
EST. GFR  (AFRICAN AMERICAN): 30.8 ML/MIN/1.73 M^2
EST. GFR  (AFRICAN AMERICAN): 31 ML/MIN/1.73 M^2
EST. GFR  (AFRICAN AMERICAN): 31 ML/MIN/1.73 M^2
EST. GFR  (AFRICAN AMERICAN): 32.9 ML/MIN/1.73 M^2
EST. GFR  (AFRICAN AMERICAN): 33.1 ML/MIN/1.73 M^2
EST. GFR  (AFRICAN AMERICAN): 35.5 ML/MIN/1.73 M^2
EST. GFR  (AFRICAN AMERICAN): 37.9 ML/MIN/1.73 M^2
EST. GFR  (AFRICAN AMERICAN): 38.2 ML/MIN/1.73 M^2
EST. GFR  (AFRICAN AMERICAN): 41.3 ML/MIN/1.73 M^2
EST. GFR  (AFRICAN AMERICAN): 44.5 ML/MIN/1.73 M^2
EST. GFR  (AFRICAN AMERICAN): 44.9 ML/MIN/1.73 M^2
EST. GFR  (AFRICAN AMERICAN): 44.9 ML/MIN/1.73 M^2
EST. GFR  (AFRICAN AMERICAN): 48.7 ML/MIN/1.73 M^2
EST. GFR  (AFRICAN AMERICAN): 49.1 ML/MIN/1.73 M^2
EST. GFR  (AFRICAN AMERICAN): 53.7 ML/MIN/1.73 M^2
EST. GFR  (AFRICAN AMERICAN): 54 ML/MIN/1.73 M^2
EST. GFR  (AFRICAN AMERICAN): 59.6 ML/MIN/1.73 M^2
EST. GFR  (AFRICAN AMERICAN): >60 ML/MIN/1.73 M^2
EST. GFR  (NON AFRICAN AMERICAN): 16.8 ML/MIN/1.73 M^2
EST. GFR  (NON AFRICAN AMERICAN): 17.6 ML/MIN/1.73 M^2
EST. GFR  (NON AFRICAN AMERICAN): 18.4 ML/MIN/1.73 M^2
EST. GFR  (NON AFRICAN AMERICAN): 19.3 ML/MIN/1.73 M^2
EST. GFR  (NON AFRICAN AMERICAN): 22.4 ML/MIN/1.73 M^2
EST. GFR  (NON AFRICAN AMERICAN): 22.5 ML/MIN/1.73 M^2
EST. GFR  (NON AFRICAN AMERICAN): 22.5 ML/MIN/1.73 M^2
EST. GFR  (NON AFRICAN AMERICAN): 23.8 ML/MIN/1.73 M^2
EST. GFR  (NON AFRICAN AMERICAN): 23.8 ML/MIN/1.73 M^2
EST. GFR  (NON AFRICAN AMERICAN): 26.7 ML/MIN/1.73 M^2
EST. GFR  (NON AFRICAN AMERICAN): 26.9 ML/MIN/1.73 M^2
EST. GFR  (NON AFRICAN AMERICAN): 26.9 ML/MIN/1.73 M^2
EST. GFR  (NON AFRICAN AMERICAN): 28.5 ML/MIN/1.73 M^2
EST. GFR  (NON AFRICAN AMERICAN): 28.7 ML/MIN/1.73 M^2
EST. GFR  (NON AFRICAN AMERICAN): 30.8 ML/MIN/1.73 M^2
EST. GFR  (NON AFRICAN AMERICAN): 32.9 ML/MIN/1.73 M^2
EST. GFR  (NON AFRICAN AMERICAN): 33.1 ML/MIN/1.73 M^2
EST. GFR  (NON AFRICAN AMERICAN): 35.8 ML/MIN/1.73 M^2
EST. GFR  (NON AFRICAN AMERICAN): 38.6 ML/MIN/1.73 M^2
EST. GFR  (NON AFRICAN AMERICAN): 38.9 ML/MIN/1.73 M^2
EST. GFR  (NON AFRICAN AMERICAN): 38.9 ML/MIN/1.73 M^2
EST. GFR  (NON AFRICAN AMERICAN): 42.3 ML/MIN/1.73 M^2
EST. GFR  (NON AFRICAN AMERICAN): 42.6 ML/MIN/1.73 M^2
EST. GFR  (NON AFRICAN AMERICAN): 46.5 ML/MIN/1.73 M^2
EST. GFR  (NON AFRICAN AMERICAN): 46.9 ML/MIN/1.73 M^2
EST. GFR  (NON AFRICAN AMERICAN): 51.7 ML/MIN/1.73 M^2
EST. GFR  (NON AFRICAN AMERICAN): 58 ML/MIN/1.73 M^2
EST. GFR  (NON AFRICAN AMERICAN): >60 ML/MIN/1.73 M^2
ESTIMATED AVG GLUCOSE: 120 MG/DL
ESTIMATED PA SYSTOLIC PRESSURE: 29.01
ETHANOL UR-MCNC: <10 MG/DL
ETHANOL UR-MCNC: <10 MG/DL
EXT ALBUMIN: 3.2
EXT ALKALINE PHOSPHATASE: 138
EXT ALT: 24
EXT AST: 55
EXT BASOPHIL%: 0.6
EXT BILIRUBIN DIRECT: 0.9 MG/DL
EXT BILIRUBIN TOTAL: 2.9
EXT BUN: 37
EXT CALCIUM: 8.9
EXT CHLORIDE: 110
EXT CO2: 19
EXT CREATININE: 1.4 MG/DL
EXT EOSINOPHIL%: 2.6
EXT GLUCOSE: 190 (ref 70–99)
EXT HEMATOCRIT: 28.5 (ref 35–45)
EXT HEMOGLOBIN: 9.7 (ref 12–15)
EXT INR: 1.5
EXT LYMPH%: 14.2
EXT MONOCYTES%: 8.2
EXT PLATELETS: 116 (ref 150–400)
EXT POTASSIUM: 5.3
EXT PROTEIN TOTAL: 6.4
EXT PT: 18.6
EXT SEGS%: 74.4
EXT SODIUM: 137 MMOL/L
EXT WBC: 4.2
FIO2: 100
FIO2: 30
FIO2: 35
FIO2: 35
FIO2: 40
FIO2: 50
FLOW: 4
FRACTIONAL SHORTENING: 31 % (ref 28–44)
FRACTIONAL SHORTENING: 32 % (ref 28–44)
GLUCOSE SERPL-MCNC: 100 MG/DL
GLUCOSE SERPL-MCNC: 102 MG/DL
GLUCOSE SERPL-MCNC: 110 MG/DL
GLUCOSE SERPL-MCNC: 113 MG/DL
GLUCOSE SERPL-MCNC: 113 MG/DL
GLUCOSE SERPL-MCNC: 123 MG/DL
GLUCOSE SERPL-MCNC: 127 MG/DL
GLUCOSE SERPL-MCNC: 128 MG/DL
GLUCOSE SERPL-MCNC: 141 MG/DL
GLUCOSE SERPL-MCNC: 146 MG/DL
GLUCOSE SERPL-MCNC: 147 MG/DL
GLUCOSE SERPL-MCNC: 150 MG/DL
GLUCOSE SERPL-MCNC: 152 MG/DL
GLUCOSE SERPL-MCNC: 159 MG/DL
GLUCOSE SERPL-MCNC: 159 MG/DL
GLUCOSE SERPL-MCNC: 161 MG/DL
GLUCOSE SERPL-MCNC: 162 MG/DL
GLUCOSE SERPL-MCNC: 163 MG/DL
GLUCOSE SERPL-MCNC: 163 MG/DL
GLUCOSE SERPL-MCNC: 166 MG/DL
GLUCOSE SERPL-MCNC: 166 MG/DL
GLUCOSE SERPL-MCNC: 170 MG/DL
GLUCOSE SERPL-MCNC: 171 MG/DL
GLUCOSE SERPL-MCNC: 172 MG/DL
GLUCOSE SERPL-MCNC: 173 MG/DL
GLUCOSE SERPL-MCNC: 174 MG/DL
GLUCOSE SERPL-MCNC: 174 MG/DL
GLUCOSE SERPL-MCNC: 175 MG/DL
GLUCOSE SERPL-MCNC: 175 MG/DL
GLUCOSE SERPL-MCNC: 177 MG/DL
GLUCOSE SERPL-MCNC: 178 MG/DL
GLUCOSE SERPL-MCNC: 180 MG/DL
GLUCOSE SERPL-MCNC: 181 MG/DL
GLUCOSE SERPL-MCNC: 182 MG/DL
GLUCOSE SERPL-MCNC: 183 MG/DL
GLUCOSE SERPL-MCNC: 183 MG/DL
GLUCOSE SERPL-MCNC: 184 MG/DL
GLUCOSE SERPL-MCNC: 187 MG/DL
GLUCOSE SERPL-MCNC: 191 MG/DL
GLUCOSE SERPL-MCNC: 195 MG/DL
GLUCOSE SERPL-MCNC: 197 MG/DL
GLUCOSE SERPL-MCNC: 198 MG/DL
GLUCOSE SERPL-MCNC: 198 MG/DL
GLUCOSE SERPL-MCNC: 201 MG/DL
GLUCOSE SERPL-MCNC: 201 MG/DL
GLUCOSE SERPL-MCNC: 202 MG/DL
GLUCOSE SERPL-MCNC: 203 MG/DL
GLUCOSE SERPL-MCNC: 205 MG/DL
GLUCOSE SERPL-MCNC: 205 MG/DL
GLUCOSE SERPL-MCNC: 207 MG/DL
GLUCOSE SERPL-MCNC: 211 MG/DL
GLUCOSE SERPL-MCNC: 214 MG/DL
GLUCOSE SERPL-MCNC: 215 MG/DL
GLUCOSE SERPL-MCNC: 218 MG/DL
GLUCOSE SERPL-MCNC: 222 MG/DL
GLUCOSE SERPL-MCNC: 224 MG/DL
GLUCOSE SERPL-MCNC: 224 MG/DL
GLUCOSE SERPL-MCNC: 226 MG/DL
GLUCOSE SERPL-MCNC: 226 MG/DL
GLUCOSE SERPL-MCNC: 227 MG/DL
GLUCOSE SERPL-MCNC: 231 MG/DL
GLUCOSE SERPL-MCNC: 232 MG/DL
GLUCOSE SERPL-MCNC: 238 MG/DL
GLUCOSE SERPL-MCNC: 244 MG/DL
GLUCOSE SERPL-MCNC: 244 MG/DL
GLUCOSE SERPL-MCNC: 247 MG/DL
GLUCOSE SERPL-MCNC: 250 MG/DL
GLUCOSE SERPL-MCNC: 254 MG/DL
GLUCOSE SERPL-MCNC: 254 MG/DL
GLUCOSE SERPL-MCNC: 257 MG/DL
GLUCOSE SERPL-MCNC: 265 MG/DL
GLUCOSE SERPL-MCNC: 268 MG/DL
GLUCOSE SERPL-MCNC: 269 MG/DL
GLUCOSE SERPL-MCNC: 272 MG/DL
GLUCOSE SERPL-MCNC: 286 MG/DL
GLUCOSE SERPL-MCNC: 289 MG/DL
GLUCOSE SERPL-MCNC: 308 MG/DL
GLUCOSE SERPL-MCNC: 313 MG/DL
GLUCOSE SERPL-MCNC: 313 MG/DL
GLUCOSE SERPL-MCNC: 336 MG/DL
GLUCOSE SERPL-MCNC: 404 MG/DL
GLUCOSE SERPL-MCNC: 72 MG/DL
GLUCOSE SERPL-MCNC: 8 MG/DL
GLUCOSE SERPL-MCNC: 86 MG/DL
GLUCOSE UR QL STRIP: ABNORMAL
GLUCOSE UR QL STRIP: NEGATIVE
GRAM STN SPEC: NORMAL
HBA1C MFR BLD HPLC: 5.8 %
HCO3 UR-SCNC: 19.8 MMOL/L (ref 24–28)
HCO3 UR-SCNC: 20.2 MMOL/L (ref 24–28)
HCO3 UR-SCNC: 20.3 MMOL/L (ref 24–28)
HCO3 UR-SCNC: 20.6 MMOL/L (ref 24–28)
HCO3 UR-SCNC: 20.8 MMOL/L (ref 24–28)
HCO3 UR-SCNC: 21.2 MMOL/L (ref 24–28)
HCO3 UR-SCNC: 21.3 MMOL/L (ref 24–28)
HCO3 UR-SCNC: 21.4 MMOL/L (ref 24–28)
HCO3 UR-SCNC: 21.5 MMOL/L (ref 24–28)
HCO3 UR-SCNC: 21.8 MMOL/L (ref 24–28)
HCO3 UR-SCNC: 21.9 MMOL/L (ref 24–28)
HCO3 UR-SCNC: 22 MMOL/L (ref 24–28)
HCO3 UR-SCNC: 22.2 MMOL/L (ref 24–28)
HCO3 UR-SCNC: 23.6 MMOL/L (ref 24–28)
HCO3 UR-SCNC: 23.8 MMOL/L (ref 24–28)
HCO3 UR-SCNC: 23.8 MMOL/L (ref 24–28)
HCO3 UR-SCNC: 23.9 MMOL/L (ref 24–28)
HCO3 UR-SCNC: 24.2 MMOL/L (ref 24–28)
HCO3 UR-SCNC: 24.6 MMOL/L (ref 24–28)
HCO3 UR-SCNC: 24.7 MMOL/L (ref 24–28)
HCO3 UR-SCNC: 24.8 MMOL/L (ref 24–28)
HCO3 UR-SCNC: 25.1 MMOL/L (ref 24–28)
HCO3 UR-SCNC: 25.2 MMOL/L (ref 24–28)
HCO3 UR-SCNC: 25.3 MMOL/L (ref 24–28)
HCO3 UR-SCNC: 26.1 MMOL/L (ref 24–28)
HCO3 UR-SCNC: 26.2 MMOL/L (ref 24–28)
HCO3 UR-SCNC: 26.7 MMOL/L (ref 24–28)
HCO3 UR-SCNC: 26.9 MMOL/L (ref 24–28)
HCO3 UR-SCNC: 26.9 MMOL/L (ref 24–28)
HCO3 UR-SCNC: 27.4 MMOL/L (ref 24–28)
HCO3 UR-SCNC: 27.5 MMOL/L (ref 24–28)
HCO3 UR-SCNC: 28.4 MMOL/L (ref 24–28)
HCT VFR BLD AUTO: 19.8 %
HCT VFR BLD AUTO: 20.3 %
HCT VFR BLD AUTO: 20.4 %
HCT VFR BLD AUTO: 20.5 %
HCT VFR BLD AUTO: 20.5 %
HCT VFR BLD AUTO: 21 %
HCT VFR BLD AUTO: 21.3 %
HCT VFR BLD AUTO: 21.8 %
HCT VFR BLD AUTO: 21.9 %
HCT VFR BLD AUTO: 22.2 %
HCT VFR BLD AUTO: 22.4 %
HCT VFR BLD AUTO: 22.5 %
HCT VFR BLD AUTO: 22.6 %
HCT VFR BLD AUTO: 22.6 %
HCT VFR BLD AUTO: 22.7 %
HCT VFR BLD AUTO: 22.7 %
HCT VFR BLD AUTO: 22.9 %
HCT VFR BLD AUTO: 23 %
HCT VFR BLD AUTO: 23.1 %
HCT VFR BLD AUTO: 23.1 %
HCT VFR BLD AUTO: 23.2 %
HCT VFR BLD AUTO: 23.4 %
HCT VFR BLD AUTO: 23.5 %
HCT VFR BLD AUTO: 23.6 %
HCT VFR BLD AUTO: 23.6 %
HCT VFR BLD AUTO: 23.7 %
HCT VFR BLD AUTO: 23.7 %
HCT VFR BLD AUTO: 23.8 %
HCT VFR BLD AUTO: 23.9 %
HCT VFR BLD AUTO: 23.9 %
HCT VFR BLD AUTO: 24.1 %
HCT VFR BLD AUTO: 24.1 %
HCT VFR BLD AUTO: 24.2 %
HCT VFR BLD AUTO: 24.3 %
HCT VFR BLD AUTO: 24.4 %
HCT VFR BLD AUTO: 24.5 %
HCT VFR BLD AUTO: 24.7 %
HCT VFR BLD AUTO: 24.9 %
HCT VFR BLD AUTO: 25 %
HCT VFR BLD AUTO: 25.1 %
HCT VFR BLD AUTO: 25.1 %
HCT VFR BLD AUTO: 25.2 %
HCT VFR BLD AUTO: 25.2 %
HCT VFR BLD AUTO: 25.3 %
HCT VFR BLD AUTO: 25.3 %
HCT VFR BLD AUTO: 25.4 %
HCT VFR BLD AUTO: 25.6 %
HCT VFR BLD AUTO: 25.7 %
HCT VFR BLD AUTO: 25.9 %
HCT VFR BLD AUTO: 25.9 %
HCT VFR BLD AUTO: 26 %
HCT VFR BLD AUTO: 26.4 %
HCT VFR BLD AUTO: 26.4 %
HCT VFR BLD AUTO: 26.5 %
HCT VFR BLD AUTO: 26.6 %
HCT VFR BLD AUTO: 26.7 %
HCT VFR BLD AUTO: 26.7 %
HCT VFR BLD AUTO: 26.8 %
HCT VFR BLD AUTO: 27.1 %
HCT VFR BLD AUTO: 27.4 %
HCT VFR BLD AUTO: 27.5 %
HCT VFR BLD AUTO: 27.7 %
HCT VFR BLD AUTO: 28.3 %
HCT VFR BLD AUTO: 28.9 %
HCT VFR BLD AUTO: 29.1 %
HCT VFR BLD AUTO: 29.6 %
HCT VFR BLD AUTO: 31 %
HCT VFR BLD AUTO: 32.3 %
HCT VFR BLD AUTO: 32.5 %
HCT VFR BLD AUTO: 32.6 %
HGB BLD-MCNC: 10 G/DL
HGB BLD-MCNC: 10.6 G/DL
HGB BLD-MCNC: 10.8 G/DL
HGB BLD-MCNC: 6.4 G/DL
HGB BLD-MCNC: 6.5 G/DL
HGB BLD-MCNC: 6.5 G/DL
HGB BLD-MCNC: 6.6 G/DL
HGB BLD-MCNC: 6.7 G/DL
HGB BLD-MCNC: 6.7 G/DL
HGB BLD-MCNC: 6.9 G/DL
HGB BLD-MCNC: 6.9 G/DL
HGB BLD-MCNC: 7 G/DL
HGB BLD-MCNC: 7 G/DL
HGB BLD-MCNC: 7.1 G/DL
HGB BLD-MCNC: 7.2 G/DL
HGB BLD-MCNC: 7.3 G/DL
HGB BLD-MCNC: 7.4 G/DL
HGB BLD-MCNC: 7.5 G/DL
HGB BLD-MCNC: 7.6 G/DL
HGB BLD-MCNC: 7.7 G/DL
HGB BLD-MCNC: 7.8 G/DL
HGB BLD-MCNC: 7.9 G/DL
HGB BLD-MCNC: 8 G/DL
HGB BLD-MCNC: 8.1 G/DL
HGB BLD-MCNC: 8.2 G/DL
HGB BLD-MCNC: 8.3 G/DL
HGB BLD-MCNC: 8.4 G/DL
HGB BLD-MCNC: 8.4 G/DL
HGB BLD-MCNC: 8.5 G/DL
HGB BLD-MCNC: 8.6 G/DL
HGB BLD-MCNC: 8.7 G/DL
HGB BLD-MCNC: 8.8 G/DL
HGB BLD-MCNC: 8.9 G/DL
HGB BLD-MCNC: 9 G/DL
HGB BLD-MCNC: 9.1 G/DL
HGB BLD-MCNC: 9.5 G/DL
HGB BLD-MCNC: 9.6 G/DL
HGB BLD-MCNC: 9.7 G/DL
HGB BLD-MCNC: 9.8 G/DL
HGB UR QL STRIP: ABNORMAL
HGB UR QL STRIP: NEGATIVE
HGB UR QL STRIP: NEGATIVE
HPV HR 12 DNA CVX QL NAA+PROBE: NEGATIVE
HPV16 AG SPEC QL: NEGATIVE
HPV18 DNA SPEC QL NAA+PROBE: NEGATIVE
HUMAN BOCAVIRUS: NOT DETECTED
HUMAN CORONAVIRUS, COMMON COLD VIRUS: NOT DETECTED
HYALINE CASTS UR QL AUTO: 0 /LPF
HYALINE CASTS UR QL AUTO: 2 /LPF
HYALINE CASTS UR QL AUTO: 5 /LPF
HYALINE CASTS UR QL AUTO: 56 /LPF
HYPOCHROMIA BLD QL SMEAR: ABNORMAL
IMM GRANULOCYTES # BLD AUTO: 0.01 K/UL
IMM GRANULOCYTES # BLD AUTO: 0.01 K/UL
IMM GRANULOCYTES # BLD AUTO: 0.02 K/UL
IMM GRANULOCYTES # BLD AUTO: 0.02 K/UL
IMM GRANULOCYTES # BLD AUTO: 0.03 K/UL
IMM GRANULOCYTES # BLD AUTO: 0.03 K/UL
IMM GRANULOCYTES # BLD AUTO: 0.04 K/UL
IMM GRANULOCYTES # BLD AUTO: 0.05 K/UL
IMM GRANULOCYTES # BLD AUTO: 0.06 K/UL
IMM GRANULOCYTES # BLD AUTO: 0.07 K/UL
IMM GRANULOCYTES # BLD AUTO: 0.08 K/UL
IMM GRANULOCYTES # BLD AUTO: 0.09 K/UL
IMM GRANULOCYTES # BLD AUTO: 0.1 K/UL
IMM GRANULOCYTES # BLD AUTO: 0.11 K/UL
IMM GRANULOCYTES # BLD AUTO: 0.12 K/UL
IMM GRANULOCYTES # BLD AUTO: 0.12 K/UL
IMM GRANULOCYTES # BLD AUTO: 0.13 K/UL
IMM GRANULOCYTES # BLD AUTO: 0.14 K/UL
IMM GRANULOCYTES # BLD AUTO: 0.14 K/UL
IMM GRANULOCYTES # BLD AUTO: 0.15 K/UL
IMM GRANULOCYTES # BLD AUTO: 0.15 K/UL
IMM GRANULOCYTES # BLD AUTO: 0.16 K/UL
IMM GRANULOCYTES # BLD AUTO: 0.17 K/UL
IMM GRANULOCYTES # BLD AUTO: 0.17 K/UL
IMM GRANULOCYTES # BLD AUTO: 0.25 K/UL
IMM GRANULOCYTES # BLD AUTO: 0.28 K/UL
IMM GRANULOCYTES # BLD AUTO: 0.3 K/UL
IMM GRANULOCYTES # BLD AUTO: 0.32 K/UL
IMM GRANULOCYTES # BLD AUTO: 0.35 K/UL
IMM GRANULOCYTES # BLD AUTO: 0.36 K/UL
IMM GRANULOCYTES # BLD AUTO: 0.38 K/UL
IMM GRANULOCYTES # BLD AUTO: 0.4 K/UL
IMM GRANULOCYTES # BLD AUTO: 0.54 K/UL
IMM GRANULOCYTES # BLD AUTO: 0.56 K/UL
IMM GRANULOCYTES # BLD AUTO: ABNORMAL K/UL
IMM GRANULOCYTES NFR BLD AUTO: 0.2 %
IMM GRANULOCYTES NFR BLD AUTO: 0.3 %
IMM GRANULOCYTES NFR BLD AUTO: 0.3 %
IMM GRANULOCYTES NFR BLD AUTO: 0.4 %
IMM GRANULOCYTES NFR BLD AUTO: 0.4 %
IMM GRANULOCYTES NFR BLD AUTO: 0.5 %
IMM GRANULOCYTES NFR BLD AUTO: 0.5 %
IMM GRANULOCYTES NFR BLD AUTO: 0.6 %
IMM GRANULOCYTES NFR BLD AUTO: 0.7 %
IMM GRANULOCYTES NFR BLD AUTO: 0.8 %
IMM GRANULOCYTES NFR BLD AUTO: 0.9 %
IMM GRANULOCYTES NFR BLD AUTO: 1 %
IMM GRANULOCYTES NFR BLD AUTO: 1.1 %
IMM GRANULOCYTES NFR BLD AUTO: 1.2 %
IMM GRANULOCYTES NFR BLD AUTO: 1.3 %
IMM GRANULOCYTES NFR BLD AUTO: 1.4 %
IMM GRANULOCYTES NFR BLD AUTO: 1.4 %
IMM GRANULOCYTES NFR BLD AUTO: 1.5 %
IMM GRANULOCYTES NFR BLD AUTO: 1.9 %
IMM GRANULOCYTES NFR BLD AUTO: 2 %
IMM GRANULOCYTES NFR BLD AUTO: 2 %
IMM GRANULOCYTES NFR BLD AUTO: 2.2 %
IMM GRANULOCYTES NFR BLD AUTO: 2.5 %
IMM GRANULOCYTES NFR BLD AUTO: 2.5 %
IMM GRANULOCYTES NFR BLD AUTO: 2.7 %
IMM GRANULOCYTES NFR BLD AUTO: 3 %
IMM GRANULOCYTES NFR BLD AUTO: 3.4 %
IMM GRANULOCYTES NFR BLD AUTO: ABNORMAL %
INFLUENZA A - H1N1-09: NOT DETECTED
INR PPP: 1.1
INR PPP: 1.2
INR PPP: 1.3
INR PPP: 1.4
INR PPP: 1.5
INR PPP: 1.6
INR PPP: 1.7
INR PPP: 1.8
INR PPP: 1.9
INR PPP: 2
INR PPP: 2.1
INR PPP: 2.2
INR PPP: 2.2
INR PPP: 2.3
INR PPP: 2.3
INR PPP: 2.4
INR PPP: 2.4
INR PPP: 2.7
INR PPP: 3.2
INTERVENTRICULAR SEPTUM: 0.61 CM (ref 0.6–1.1)
INTERVENTRICULAR SEPTUM: 0.87 CM (ref 0.6–1.1)
IP: 15
IT: 0.85
IVRT: 0.05 MSEC
IVRT: 0.08 MSEC
KETONES UR QL STRIP: ABNORMAL
KETONES UR QL STRIP: NEGATIVE
LA MAJOR: 4.18 CM
LA MAJOR: 4.7 CM
LA MINOR: 3.93 CM
LA MINOR: 5.2 CM
LA WIDTH: 3.5 CM
LA WIDTH: 4.13 CM
LACTATE SERPL-SCNC: 0.9 MMOL/L
LACTATE SERPL-SCNC: 1 MMOL/L
LACTATE SERPL-SCNC: 1.1 MMOL/L
LACTATE SERPL-SCNC: 1.2 MMOL/L
LACTATE SERPL-SCNC: 1.2 MMOL/L
LACTATE SERPL-SCNC: 1.3 MMOL/L
LACTATE SERPL-SCNC: 1.4 MMOL/L
LACTATE SERPL-SCNC: 1.5 MMOL/L
LACTATE SERPL-SCNC: 1.6 MMOL/L
LACTATE SERPL-SCNC: 1.7 MMOL/L
LACTATE SERPL-SCNC: 1.8 MMOL/L
LACTATE SERPL-SCNC: 1.9 MMOL/L
LACTATE SERPL-SCNC: 2 MMOL/L
LACTATE SERPL-SCNC: 2 MMOL/L
LACTATE SERPL-SCNC: 2.1 MMOL/L
LACTATE SERPL-SCNC: 2.4 MMOL/L
LACTATE SERPL-SCNC: 2.4 MMOL/L
LACTATE SERPL-SCNC: 2.5 MMOL/L
LACTATE SERPL-SCNC: 2.7 MMOL/L
LACTATE SERPL-SCNC: 2.7 MMOL/L
LACTATE SERPL-SCNC: 2.9 MMOL/L
LACTATE SERPL-SCNC: 3.3 MMOL/L
LACTATE SERPL-SCNC: 3.7 MMOL/L
LEFT ATRIUM SIZE: 3.28 CM
LEFT ATRIUM SIZE: 3.8 CM
LEFT ATRIUM VOLUME INDEX: 31.3 ML/M2
LEFT ATRIUM VOLUME INDEX: 37.5 ML/M2
LEFT ATRIUM VOLUME: 46.65 CM3
LEFT ATRIUM VOLUME: 55.82 CM3
LEFT INTERNAL DIMENSION IN SYSTOLE: 2.68 CM (ref 2.1–4)
LEFT INTERNAL DIMENSION IN SYSTOLE: 2.74 CM (ref 2.1–4)
LEFT VENTRICLE DIASTOLIC VOLUME INDEX: 43.33 ML/M2
LEFT VENTRICLE DIASTOLIC VOLUME INDEX: 47.15 ML/M2
LEFT VENTRICLE DIASTOLIC VOLUME: 64.56 ML
LEFT VENTRICLE DIASTOLIC VOLUME: 70.26 ML
LEFT VENTRICLE MASS INDEX: 42.1 G/M2
LEFT VENTRICLE MASS INDEX: 67.8 G/M2
LEFT VENTRICLE SYSTOLIC VOLUME INDEX: 17.8 ML/M2
LEFT VENTRICLE SYSTOLIC VOLUME INDEX: 18.9 ML/M2
LEFT VENTRICLE SYSTOLIC VOLUME: 26.56 ML
LEFT VENTRICLE SYSTOLIC VOLUME: 28.1 ML
LEFT VENTRICULAR INTERNAL DIMENSION IN DIASTOLE: 3.87 CM (ref 3.5–6)
LEFT VENTRICULAR INTERNAL DIMENSION IN DIASTOLE: 4.01 CM (ref 3.5–6)
LEFT VENTRICULAR MASS: 101.03 G
LEFT VENTRICULAR MASS: 62.74 G
LEUKOCYTE ESTERASE UR QL STRIP: ABNORMAL
LEUKOCYTE ESTERASE UR QL STRIP: NEGATIVE
LEUKOCYTE ESTERASE UR QL STRIP: NEGATIVE
LV LATERAL E/E' RATIO: 20.86
LV LATERAL E/E' RATIO: 21.86
LV SEPTAL E/E' RATIO: 18.25
LV SEPTAL E/E' RATIO: 30.6
LYMPHOCYTES # BLD AUTO: 0.5 K/UL
LYMPHOCYTES # BLD AUTO: 0.5 K/UL
LYMPHOCYTES # BLD AUTO: 0.6 K/UL
LYMPHOCYTES # BLD AUTO: 0.7 K/UL
LYMPHOCYTES # BLD AUTO: 0.8 K/UL
LYMPHOCYTES # BLD AUTO: 0.9 K/UL
LYMPHOCYTES # BLD AUTO: 1 K/UL
LYMPHOCYTES # BLD AUTO: 1.1 K/UL
LYMPHOCYTES # BLD AUTO: 1.2 K/UL
LYMPHOCYTES # BLD AUTO: 1.6 K/UL
LYMPHOCYTES # BLD AUTO: 1.9 K/UL
LYMPHOCYTES NFR BLD: 0 %
LYMPHOCYTES NFR BLD: 10 %
LYMPHOCYTES NFR BLD: 10 %
LYMPHOCYTES NFR BLD: 10.1 %
LYMPHOCYTES NFR BLD: 10.3 %
LYMPHOCYTES NFR BLD: 11.2 %
LYMPHOCYTES NFR BLD: 11.4 %
LYMPHOCYTES NFR BLD: 11.4 %
LYMPHOCYTES NFR BLD: 11.5 %
LYMPHOCYTES NFR BLD: 11.6 %
LYMPHOCYTES NFR BLD: 11.9 %
LYMPHOCYTES NFR BLD: 12.3 %
LYMPHOCYTES NFR BLD: 12.8 %
LYMPHOCYTES NFR BLD: 12.9 %
LYMPHOCYTES NFR BLD: 13 %
LYMPHOCYTES NFR BLD: 14.2 %
LYMPHOCYTES NFR BLD: 15.2 %
LYMPHOCYTES NFR BLD: 16.5 %
LYMPHOCYTES NFR BLD: 24.1 %
LYMPHOCYTES NFR BLD: 26.1 %
LYMPHOCYTES NFR BLD: 26.9 %
LYMPHOCYTES NFR BLD: 29.7 %
LYMPHOCYTES NFR BLD: 4.4 %
LYMPHOCYTES NFR BLD: 4.8 %
LYMPHOCYTES NFR BLD: 4.9 %
LYMPHOCYTES NFR BLD: 4.9 %
LYMPHOCYTES NFR BLD: 5 %
LYMPHOCYTES NFR BLD: 5.1 %
LYMPHOCYTES NFR BLD: 5.2 %
LYMPHOCYTES NFR BLD: 5.4 %
LYMPHOCYTES NFR BLD: 5.7 %
LYMPHOCYTES NFR BLD: 5.7 %
LYMPHOCYTES NFR BLD: 5.8 %
LYMPHOCYTES NFR BLD: 5.9 %
LYMPHOCYTES NFR BLD: 5.9 %
LYMPHOCYTES NFR BLD: 6.1 %
LYMPHOCYTES NFR BLD: 6.5 %
LYMPHOCYTES NFR BLD: 6.7 %
LYMPHOCYTES NFR BLD: 6.8 %
LYMPHOCYTES NFR BLD: 7 %
LYMPHOCYTES NFR BLD: 7.3 %
LYMPHOCYTES NFR BLD: 7.4 %
LYMPHOCYTES NFR BLD: 7.6 %
LYMPHOCYTES NFR BLD: 7.7 %
LYMPHOCYTES NFR BLD: 7.8 %
LYMPHOCYTES NFR BLD: 7.9 %
LYMPHOCYTES NFR BLD: 8.2 %
LYMPHOCYTES NFR BLD: 8.3 %
LYMPHOCYTES NFR BLD: 8.4 %
LYMPHOCYTES NFR BLD: 8.5 %
LYMPHOCYTES NFR BLD: 8.5 %
LYMPHOCYTES NFR BLD: 8.6 %
LYMPHOCYTES NFR BLD: 8.6 %
LYMPHOCYTES NFR BLD: 8.7 %
LYMPHOCYTES NFR BLD: 8.8 %
LYMPHOCYTES NFR BLD: 9.2 %
LYMPHOCYTES NFR BLD: 9.3 %
LYMPHOCYTES NFR BLD: 9.6 %
LYMPHOCYTES NFR BLD: 9.8 %
LYMPHOCYTES NFR BLD: 9.9 %
MAGNESIUM SERPL-MCNC: 1.4 MG/DL
MAGNESIUM SERPL-MCNC: 1.5 MG/DL
MAGNESIUM SERPL-MCNC: 1.5 MG/DL
MAGNESIUM SERPL-MCNC: 1.6 MG/DL
MAGNESIUM SERPL-MCNC: 1.7 MG/DL
MAGNESIUM SERPL-MCNC: 1.8 MG/DL
MAGNESIUM SERPL-MCNC: 1.9 MG/DL
MAGNESIUM SERPL-MCNC: 2 MG/DL
MAGNESIUM SERPL-MCNC: 2.1 MG/DL
MAGNESIUM SERPL-MCNC: 2.2 MG/DL
MAGNESIUM SERPL-MCNC: 2.3 MG/DL
MAGNESIUM SERPL-MCNC: 2.4 MG/DL
MAGNESIUM SERPL-MCNC: 2.5 MG/DL
MAGNESIUM SERPL-MCNC: 2.6 MG/DL
MAGNESIUM SERPL-MCNC: 2.7 MG/DL
MAGNESIUM SERPL-MCNC: 2.8 MG/DL
MAGNESIUM SERPL-MCNC: 2.9 MG/DL
MAGNESIUM SERPL-MCNC: 3 MG/DL
MAP: 9.7
MCH RBC QN AUTO: 29.5 PG
MCH RBC QN AUTO: 29.6 PG
MCH RBC QN AUTO: 29.7 PG
MCH RBC QN AUTO: 29.8 PG
MCH RBC QN AUTO: 30 PG
MCH RBC QN AUTO: 30.1 PG
MCH RBC QN AUTO: 30.2 PG
MCH RBC QN AUTO: 30.2 PG
MCH RBC QN AUTO: 30.3 PG
MCH RBC QN AUTO: 30.4 PG
MCH RBC QN AUTO: 30.5 PG
MCH RBC QN AUTO: 30.5 PG
MCH RBC QN AUTO: 30.6 PG
MCH RBC QN AUTO: 30.7 PG
MCH RBC QN AUTO: 30.7 PG
MCH RBC QN AUTO: 30.8 PG
MCH RBC QN AUTO: 30.9 PG
MCH RBC QN AUTO: 31 PG
MCH RBC QN AUTO: 31.1 PG
MCH RBC QN AUTO: 31.2 PG
MCH RBC QN AUTO: 31.2 PG
MCH RBC QN AUTO: 31.3 PG
MCH RBC QN AUTO: 31.3 PG
MCH RBC QN AUTO: 31.4 PG
MCH RBC QN AUTO: 31.5 PG
MCH RBC QN AUTO: 31.6 PG
MCH RBC QN AUTO: 31.7 PG
MCH RBC QN AUTO: 31.8 PG
MCH RBC QN AUTO: 31.9 PG
MCH RBC QN AUTO: 32 PG
MCH RBC QN AUTO: 32 PG
MCH RBC QN AUTO: 32.2 PG
MCH RBC QN AUTO: 32.2 PG
MCH RBC QN AUTO: 32.3 PG
MCH RBC QN AUTO: 32.3 PG
MCH RBC QN AUTO: 32.4 PG
MCH RBC QN AUTO: 32.5 PG
MCHC RBC AUTO-ENTMCNC: 30.1 G/DL
MCHC RBC AUTO-ENTMCNC: 30.5 G/DL
MCHC RBC AUTO-ENTMCNC: 30.5 G/DL
MCHC RBC AUTO-ENTMCNC: 30.6 G/DL
MCHC RBC AUTO-ENTMCNC: 30.6 G/DL
MCHC RBC AUTO-ENTMCNC: 30.7 G/DL
MCHC RBC AUTO-ENTMCNC: 30.7 G/DL
MCHC RBC AUTO-ENTMCNC: 30.8 G/DL
MCHC RBC AUTO-ENTMCNC: 30.9 G/DL
MCHC RBC AUTO-ENTMCNC: 31.1 G/DL
MCHC RBC AUTO-ENTMCNC: 31.2 G/DL
MCHC RBC AUTO-ENTMCNC: 31.3 G/DL
MCHC RBC AUTO-ENTMCNC: 31.3 G/DL
MCHC RBC AUTO-ENTMCNC: 31.4 G/DL
MCHC RBC AUTO-ENTMCNC: 31.5 G/DL
MCHC RBC AUTO-ENTMCNC: 31.6 G/DL
MCHC RBC AUTO-ENTMCNC: 31.7 G/DL
MCHC RBC AUTO-ENTMCNC: 31.8 G/DL
MCHC RBC AUTO-ENTMCNC: 31.9 G/DL
MCHC RBC AUTO-ENTMCNC: 32 G/DL
MCHC RBC AUTO-ENTMCNC: 32.1 G/DL
MCHC RBC AUTO-ENTMCNC: 32.1 G/DL
MCHC RBC AUTO-ENTMCNC: 32.2 G/DL
MCHC RBC AUTO-ENTMCNC: 32.3 G/DL
MCHC RBC AUTO-ENTMCNC: 32.3 G/DL
MCHC RBC AUTO-ENTMCNC: 32.4 G/DL
MCHC RBC AUTO-ENTMCNC: 32.5 G/DL
MCHC RBC AUTO-ENTMCNC: 32.5 G/DL
MCHC RBC AUTO-ENTMCNC: 32.6 G/DL
MCHC RBC AUTO-ENTMCNC: 32.7 G/DL
MCHC RBC AUTO-ENTMCNC: 32.8 G/DL
MCHC RBC AUTO-ENTMCNC: 32.9 G/DL
MCHC RBC AUTO-ENTMCNC: 33 G/DL
MCHC RBC AUTO-ENTMCNC: 33.1 G/DL
MCHC RBC AUTO-ENTMCNC: 33.1 G/DL
MCHC RBC AUTO-ENTMCNC: 33.2 G/DL
MCHC RBC AUTO-ENTMCNC: 33.3 G/DL
MCHC RBC AUTO-ENTMCNC: 34 G/DL
MCV RBC AUTO: 100 FL
MCV RBC AUTO: 101 FL
MCV RBC AUTO: 102 FL
MCV RBC AUTO: 103 FL
MCV RBC AUTO: 92 FL
MCV RBC AUTO: 93 FL
MCV RBC AUTO: 94 FL
MCV RBC AUTO: 95 FL
MCV RBC AUTO: 96 FL
MCV RBC AUTO: 97 FL
MCV RBC AUTO: 98 FL
MCV RBC AUTO: 99 FL
METHADONE UR QL SCN>300 NG/ML: NEGATIVE
METHADONE UR QL SCN>300 NG/ML: NEGATIVE
MICROSCOPIC COMMENT: ABNORMAL
MIN VOL: 10.8
MIN VOL: 12
MIN VOL: 5.32
MIN VOL: 5.6
MIN VOL: 6.45
MIN VOL: 6.51
MIN VOL: 6.8
MIN VOL: 7
MIN VOL: 7
MIN VOL: 7.25
MIN VOL: 7.5
MIN VOL: 7.79
MIN VOL: 7.84
MIN VOL: 8
MIN VOL: 8.02
MIN VOL: 8.3
MIN VOL: 8.81
MIN VOL: 9.2
MITRAL VALVE MOBILITY: NORMAL
MODE: ABNORMAL
MODE: NORMAL
MODE: NORMAL
MONOCYTES # BLD AUTO: 0.3 K/UL
MONOCYTES # BLD AUTO: 0.3 K/UL
MONOCYTES # BLD AUTO: 0.4 K/UL
MONOCYTES # BLD AUTO: 0.4 K/UL
MONOCYTES # BLD AUTO: 0.5 K/UL
MONOCYTES # BLD AUTO: 0.6 K/UL
MONOCYTES # BLD AUTO: 0.7 K/UL
MONOCYTES # BLD AUTO: 0.8 K/UL
MONOCYTES # BLD AUTO: 0.9 K/UL
MONOCYTES # BLD AUTO: 1 K/UL
MONOCYTES # BLD AUTO: 1 K/UL
MONOCYTES # BLD AUTO: 1.1 K/UL
MONOCYTES # BLD AUTO: 1.2 K/UL
MONOCYTES # BLD AUTO: 1.3 K/UL
MONOCYTES # BLD AUTO: 1.4 K/UL
MONOCYTES # BLD AUTO: 1.5 K/UL
MONOCYTES # BLD AUTO: 1.6 K/UL
MONOCYTES # BLD AUTO: 1.7 K/UL
MONOCYTES # BLD AUTO: 1.8 K/UL
MONOCYTES # BLD AUTO: 2 K/UL
MONOCYTES # BLD AUTO: 2.2 K/UL
MONOCYTES # BLD AUTO: 2.2 K/UL
MONOCYTES # BLD AUTO: 2.4 K/UL
MONOCYTES # BLD AUTO: 2.5 K/UL
MONOCYTES # BLD AUTO: 2.6 K/UL
MONOCYTES # BLD AUTO: 3.1 K/UL
MONOCYTES NFR BLD: 0 %
MONOCYTES NFR BLD: 10.2 %
MONOCYTES NFR BLD: 10.3 %
MONOCYTES NFR BLD: 10.3 %
MONOCYTES NFR BLD: 10.5 %
MONOCYTES NFR BLD: 11.1 %
MONOCYTES NFR BLD: 11.1 %
MONOCYTES NFR BLD: 11.3 %
MONOCYTES NFR BLD: 11.6 %
MONOCYTES NFR BLD: 11.9 %
MONOCYTES NFR BLD: 12 %
MONOCYTES NFR BLD: 12.1 %
MONOCYTES NFR BLD: 12.1 %
MONOCYTES NFR BLD: 12.3 %
MONOCYTES NFR BLD: 12.3 %
MONOCYTES NFR BLD: 12.4 %
MONOCYTES NFR BLD: 12.7 %
MONOCYTES NFR BLD: 12.9 %
MONOCYTES NFR BLD: 13 %
MONOCYTES NFR BLD: 13.1 %
MONOCYTES NFR BLD: 13.3 %
MONOCYTES NFR BLD: 13.4 %
MONOCYTES NFR BLD: 13.5 %
MONOCYTES NFR BLD: 13.5 %
MONOCYTES NFR BLD: 13.6 %
MONOCYTES NFR BLD: 13.7 %
MONOCYTES NFR BLD: 14 %
MONOCYTES NFR BLD: 14.1 %
MONOCYTES NFR BLD: 14.2 %
MONOCYTES NFR BLD: 14.2 %
MONOCYTES NFR BLD: 14.4 %
MONOCYTES NFR BLD: 14.4 %
MONOCYTES NFR BLD: 14.8 %
MONOCYTES NFR BLD: 14.9 %
MONOCYTES NFR BLD: 15.4 %
MONOCYTES NFR BLD: 15.5 %
MONOCYTES NFR BLD: 15.6 %
MONOCYTES NFR BLD: 15.7 %
MONOCYTES NFR BLD: 15.8 %
MONOCYTES NFR BLD: 15.9 %
MONOCYTES NFR BLD: 15.9 %
MONOCYTES NFR BLD: 16.1 %
MONOCYTES NFR BLD: 16.2 %
MONOCYTES NFR BLD: 16.2 %
MONOCYTES NFR BLD: 16.4 %
MONOCYTES NFR BLD: 16.5 %
MONOCYTES NFR BLD: 16.5 %
MONOCYTES NFR BLD: 16.7 %
MONOCYTES NFR BLD: 17.2 %
MONOCYTES NFR BLD: 17.2 %
MONOCYTES NFR BLD: 17.7 %
MONOCYTES NFR BLD: 18 %
MONOCYTES NFR BLD: 18.1 %
MONOCYTES NFR BLD: 5.6 %
MONOCYTES NFR BLD: 5.6 %
MONOCYTES NFR BLD: 5.9 %
MONOCYTES NFR BLD: 6 %
MONOCYTES NFR BLD: 6.1 %
MONOCYTES NFR BLD: 6.6 %
MONOCYTES NFR BLD: 6.7 %
MONOCYTES NFR BLD: 7.2 %
MONOCYTES NFR BLD: 7.5 %
MONOCYTES NFR BLD: 8.6 %
MONOCYTES NFR BLD: 8.7 %
MONOCYTES NFR BLD: 8.8 %
MONOCYTES NFR BLD: 8.8 %
MONOCYTES NFR BLD: 9 %
MONOCYTES NFR BLD: 9.1 %
MONOCYTES NFR BLD: 9.1 %
MONOCYTES NFR BLD: 9.3 %
MONOCYTES NFR BLD: 9.4 %
MONOCYTES NFR BLD: 9.6 %
MONOCYTES NFR BLD: 9.6 %
MV PEAK A VEL: 0.73 M/S
MV PEAK A VEL: 0.77 M/S
MV PEAK E VEL: 1.46 M/S
MV PEAK E VEL: 1.53 M/S
NEUTROPHILS # BLD AUTO: 1.4 K/UL
NEUTROPHILS # BLD AUTO: 10.3 K/UL
NEUTROPHILS # BLD AUTO: 10.4 K/UL
NEUTROPHILS # BLD AUTO: 10.6 K/UL
NEUTROPHILS # BLD AUTO: 10.8 K/UL
NEUTROPHILS # BLD AUTO: 11.3 K/UL
NEUTROPHILS # BLD AUTO: 11.5 K/UL
NEUTROPHILS # BLD AUTO: 11.6 K/UL
NEUTROPHILS # BLD AUTO: 12.5 K/UL
NEUTROPHILS # BLD AUTO: 13.5 K/UL
NEUTROPHILS # BLD AUTO: 14 K/UL
NEUTROPHILS # BLD AUTO: 14.2 K/UL
NEUTROPHILS # BLD AUTO: 2.1 K/UL
NEUTROPHILS # BLD AUTO: 2.6 K/UL
NEUTROPHILS # BLD AUTO: 3.3 K/UL
NEUTROPHILS # BLD AUTO: 3.3 K/UL
NEUTROPHILS # BLD AUTO: 3.9 K/UL
NEUTROPHILS # BLD AUTO: 4 K/UL
NEUTROPHILS # BLD AUTO: 4.3 K/UL
NEUTROPHILS # BLD AUTO: 4.3 K/UL
NEUTROPHILS # BLD AUTO: 4.5 K/UL
NEUTROPHILS # BLD AUTO: 4.6 K/UL
NEUTROPHILS # BLD AUTO: 4.6 K/UL
NEUTROPHILS # BLD AUTO: 4.7 K/UL
NEUTROPHILS # BLD AUTO: 4.7 K/UL
NEUTROPHILS # BLD AUTO: 4.9 K/UL
NEUTROPHILS # BLD AUTO: 5 K/UL
NEUTROPHILS # BLD AUTO: 5 K/UL
NEUTROPHILS # BLD AUTO: 5.1 K/UL
NEUTROPHILS # BLD AUTO: 5.2 K/UL
NEUTROPHILS # BLD AUTO: 5.3 K/UL
NEUTROPHILS # BLD AUTO: 6 K/UL
NEUTROPHILS # BLD AUTO: 6.2 K/UL
NEUTROPHILS # BLD AUTO: 6.2 K/UL
NEUTROPHILS # BLD AUTO: 6.7 K/UL
NEUTROPHILS # BLD AUTO: 6.7 K/UL
NEUTROPHILS # BLD AUTO: 6.8 K/UL
NEUTROPHILS # BLD AUTO: 7 K/UL
NEUTROPHILS # BLD AUTO: 7.1 K/UL
NEUTROPHILS # BLD AUTO: 7.1 K/UL
NEUTROPHILS # BLD AUTO: 7.4 K/UL
NEUTROPHILS # BLD AUTO: 7.4 K/UL
NEUTROPHILS # BLD AUTO: 7.5 K/UL
NEUTROPHILS # BLD AUTO: 7.6 K/UL
NEUTROPHILS # BLD AUTO: 7.6 K/UL
NEUTROPHILS # BLD AUTO: 7.7 K/UL
NEUTROPHILS # BLD AUTO: 7.9 K/UL
NEUTROPHILS # BLD AUTO: 7.9 K/UL
NEUTROPHILS # BLD AUTO: 8 K/UL
NEUTROPHILS # BLD AUTO: 8.1 K/UL
NEUTROPHILS # BLD AUTO: 8.2 K/UL
NEUTROPHILS # BLD AUTO: 8.3 K/UL
NEUTROPHILS # BLD AUTO: 8.4 K/UL
NEUTROPHILS # BLD AUTO: 8.4 K/UL
NEUTROPHILS # BLD AUTO: 8.5 K/UL
NEUTROPHILS # BLD AUTO: 8.8 K/UL
NEUTROPHILS # BLD AUTO: 8.9 K/UL
NEUTROPHILS # BLD AUTO: 9.2 K/UL
NEUTROPHILS # BLD AUTO: 9.2 K/UL
NEUTROPHILS # BLD AUTO: 9.3 K/UL
NEUTROPHILS # BLD AUTO: 9.3 K/UL
NEUTROPHILS # BLD AUTO: 9.4 K/UL
NEUTROPHILS # BLD AUTO: 9.5 K/UL
NEUTROPHILS # BLD AUTO: 9.5 K/UL
NEUTROPHILS # BLD AUTO: 9.7 K/UL
NEUTROPHILS # BLD AUTO: 9.9 K/UL
NEUTROPHILS NFR BLD: 100 %
NEUTROPHILS NFR BLD: 54.1 %
NEUTROPHILS NFR BLD: 56.9 %
NEUTROPHILS NFR BLD: 58.1 %
NEUTROPHILS NFR BLD: 60.3 %
NEUTROPHILS NFR BLD: 65.6 %
NEUTROPHILS NFR BLD: 66.5 %
NEUTROPHILS NFR BLD: 66.5 %
NEUTROPHILS NFR BLD: 67.5 %
NEUTROPHILS NFR BLD: 67.6 %
NEUTROPHILS NFR BLD: 68.5 %
NEUTROPHILS NFR BLD: 68.6 %
NEUTROPHILS NFR BLD: 70.2 %
NEUTROPHILS NFR BLD: 71.2 %
NEUTROPHILS NFR BLD: 71.3 %
NEUTROPHILS NFR BLD: 71.5 %
NEUTROPHILS NFR BLD: 71.7 %
NEUTROPHILS NFR BLD: 71.8 %
NEUTROPHILS NFR BLD: 71.9 %
NEUTROPHILS NFR BLD: 72.1 %
NEUTROPHILS NFR BLD: 72.2 %
NEUTROPHILS NFR BLD: 72.4 %
NEUTROPHILS NFR BLD: 72.5 %
NEUTROPHILS NFR BLD: 72.6 %
NEUTROPHILS NFR BLD: 72.7 %
NEUTROPHILS NFR BLD: 73 %
NEUTROPHILS NFR BLD: 73.2 %
NEUTROPHILS NFR BLD: 73.9 %
NEUTROPHILS NFR BLD: 74.1 %
NEUTROPHILS NFR BLD: 74.1 %
NEUTROPHILS NFR BLD: 74.3 %
NEUTROPHILS NFR BLD: 74.4 %
NEUTROPHILS NFR BLD: 74.6 %
NEUTROPHILS NFR BLD: 74.6 %
NEUTROPHILS NFR BLD: 74.7 %
NEUTROPHILS NFR BLD: 74.8 %
NEUTROPHILS NFR BLD: 74.8 %
NEUTROPHILS NFR BLD: 75.3 %
NEUTROPHILS NFR BLD: 75.5 %
NEUTROPHILS NFR BLD: 75.5 %
NEUTROPHILS NFR BLD: 75.6 %
NEUTROPHILS NFR BLD: 76.2 %
NEUTROPHILS NFR BLD: 76.2 %
NEUTROPHILS NFR BLD: 76.4 %
NEUTROPHILS NFR BLD: 76.9 %
NEUTROPHILS NFR BLD: 77.1 %
NEUTROPHILS NFR BLD: 77.2 %
NEUTROPHILS NFR BLD: 77.3 %
NEUTROPHILS NFR BLD: 77.3 %
NEUTROPHILS NFR BLD: 77.7 %
NEUTROPHILS NFR BLD: 78 %
NEUTROPHILS NFR BLD: 78.4 %
NEUTROPHILS NFR BLD: 78.6 %
NEUTROPHILS NFR BLD: 78.8 %
NEUTROPHILS NFR BLD: 78.9 %
NEUTROPHILS NFR BLD: 79.1 %
NEUTROPHILS NFR BLD: 79.1 %
NEUTROPHILS NFR BLD: 79.5 %
NEUTROPHILS NFR BLD: 79.6 %
NEUTROPHILS NFR BLD: 79.9 %
NEUTROPHILS NFR BLD: 80 %
NEUTROPHILS NFR BLD: 80.4 %
NEUTROPHILS NFR BLD: 80.8 %
NEUTROPHILS NFR BLD: 80.8 %
NEUTROPHILS NFR BLD: 80.9 %
NEUTROPHILS NFR BLD: 81 %
NEUTROPHILS NFR BLD: 81.7 %
NEUTROPHILS NFR BLD: 81.9 %
NEUTROPHILS NFR BLD: 82.6 %
NEUTROPHILS NFR BLD: 83.5 %
NEUTROPHILS NFR BLD: 84.2 %
NEUTROPHILS NFR BLD: 84.8 %
NEUTROPHILS NFR BLD: 85.3 %
NEUTROPHILS NFR BLD: 85.4 %
NEUTROPHILS NFR BLD: 88.2 %
NITRITE UR QL STRIP: NEGATIVE
NON-SQ EPI CELLS #/AREA URNS AUTO: 1 /HPF
NRBC BLD-RTO: 0 /100 WBC
NRBC BLD-RTO: 1 /100 WBC
NUM UNITS TRANS FFP: NORMAL
NUM UNITS TRANS PACKED RBC: NORMAL
OPIATES UR QL SCN: NEGATIVE
OPIATES UR QL SCN: NEGATIVE
OSMOLALITY UR: 371 MOSM/KG
OSMOLALITY UR: 480 MOSM/KG
OSMOLALITY UR: 574 MOSM/KG
OVALOCYTES BLD QL SMEAR: ABNORMAL
PARAINFLUENZA: NOT DETECTED
PCO2 BLDA: 29.1 MMHG (ref 35–45)
PCO2 BLDA: 29.7 MMHG (ref 35–45)
PCO2 BLDA: 30 MMHG (ref 35–45)
PCO2 BLDA: 31.9 MMHG (ref 35–45)
PCO2 BLDA: 32.3 MMHG (ref 35–45)
PCO2 BLDA: 32.7 MMHG (ref 35–45)
PCO2 BLDA: 33.2 MMHG (ref 35–45)
PCO2 BLDA: 34.8 MMHG (ref 35–45)
PCO2 BLDA: 35.5 MMHG (ref 35–45)
PCO2 BLDA: 35.5 MMHG (ref 35–45)
PCO2 BLDA: 36.7 MMHG (ref 35–45)
PCO2 BLDA: 36.8 MMHG (ref 35–45)
PCO2 BLDA: 37 MMHG (ref 35–45)
PCO2 BLDA: 37.2 MMHG (ref 35–45)
PCO2 BLDA: 37.7 MMHG (ref 35–45)
PCO2 BLDA: 38.2 MMHG (ref 35–45)
PCO2 BLDA: 38.8 MMHG (ref 35–45)
PCO2 BLDA: 38.9 MMHG (ref 35–45)
PCO2 BLDA: 39 MMHG (ref 35–45)
PCO2 BLDA: 39 MMHG (ref 35–45)
PCO2 BLDA: 39.6 MMHG (ref 35–45)
PCO2 BLDA: 39.8 MMHG (ref 35–45)
PCO2 BLDA: 39.8 MMHG (ref 35–45)
PCO2 BLDA: 40 MMHG (ref 35–45)
PCO2 BLDA: 40.4 MMHG (ref 35–45)
PCO2 BLDA: 42.6 MMHG (ref 35–45)
PCO2 BLDA: 43.1 MMHG (ref 35–45)
PCO2 BLDA: 43.5 MMHG (ref 35–45)
PCO2 BLDA: 44 MMHG (ref 35–45)
PCO2 BLDA: 45.1 MMHG (ref 35–45)
PCO2 BLDA: 45.8 MMHG (ref 35–45)
PCO2 BLDA: 52.8 MMHG (ref 35–45)
PCO2 BLDA: 60.1 MMHG (ref 35–45)
PCO2 BLDA: 70 MMHG (ref 35–45)
PCP UR QL SCN>25 NG/ML: NEGATIVE
PCP UR QL SCN>25 NG/ML: NEGATIVE
PEEP: 5
PEEP: 8
PEEP: 8
PH SMN: 7.14 [PH] (ref 7.35–7.45)
PH SMN: 7.22 [PH] (ref 7.35–7.45)
PH SMN: 7.26 [PH] (ref 7.35–7.45)
PH SMN: 7.3 [PH] (ref 7.35–7.45)
PH SMN: 7.31 [PH] (ref 7.35–7.45)
PH SMN: 7.31 [PH] (ref 7.35–7.45)
PH SMN: 7.33 [PH] (ref 7.35–7.45)
PH SMN: 7.33 [PH] (ref 7.35–7.45)
PH SMN: 7.34 [PH] (ref 7.35–7.45)
PH SMN: 7.37 [PH] (ref 7.35–7.45)
PH SMN: 7.38 [PH] (ref 7.35–7.45)
PH SMN: 7.4 [PH] (ref 7.35–7.45)
PH SMN: 7.41 [PH] (ref 7.35–7.45)
PH SMN: 7.41 [PH] (ref 7.35–7.45)
PH SMN: 7.42 [PH] (ref 7.35–7.45)
PH SMN: 7.44 [PH] (ref 7.35–7.45)
PH SMN: 7.45 [PH] (ref 7.35–7.45)
PH SMN: 7.45 [PH] (ref 7.35–7.45)
PH SMN: 7.46 [PH] (ref 7.35–7.45)
PH SMN: 7.46 [PH] (ref 7.35–7.45)
PH SMN: 7.47 [PH] (ref 7.35–7.45)
PH SMN: 7.48 [PH] (ref 7.35–7.45)
PH SMN: 7.5 [PH] (ref 7.35–7.45)
PH SMN: 7.52 [PH] (ref 7.35–7.45)
PH UR STRIP: 5 [PH] (ref 5–8)
PHOSPHATE SERPL-MCNC: 1.3 MG/DL
PHOSPHATE SERPL-MCNC: 1.3 MG/DL
PHOSPHATE SERPL-MCNC: 1.5 MG/DL
PHOSPHATE SERPL-MCNC: 1.6 MG/DL
PHOSPHATE SERPL-MCNC: 1.7 MG/DL
PHOSPHATE SERPL-MCNC: 1.8 MG/DL
PHOSPHATE SERPL-MCNC: 1.9 MG/DL
PHOSPHATE SERPL-MCNC: 2 MG/DL
PHOSPHATE SERPL-MCNC: 2.1 MG/DL
PHOSPHATE SERPL-MCNC: 2.2 MG/DL
PHOSPHATE SERPL-MCNC: 2.4 MG/DL
PHOSPHATE SERPL-MCNC: 2.5 MG/DL
PHOSPHATE SERPL-MCNC: 2.6 MG/DL
PHOSPHATE SERPL-MCNC: 2.7 MG/DL
PHOSPHATE SERPL-MCNC: 2.8 MG/DL
PHOSPHATE SERPL-MCNC: 2.9 MG/DL
PHOSPHATE SERPL-MCNC: 3 MG/DL
PHOSPHATE SERPL-MCNC: 3.2 MG/DL
PHOSPHATE SERPL-MCNC: 3.3 MG/DL
PHOSPHATE SERPL-MCNC: 3.4 MG/DL
PHOSPHATE SERPL-MCNC: 3.5 MG/DL
PHOSPHATE SERPL-MCNC: 3.5 MG/DL
PHOSPHATE SERPL-MCNC: 3.6 MG/DL
PHOSPHATE SERPL-MCNC: 3.7 MG/DL
PHOSPHATE SERPL-MCNC: 3.8 MG/DL
PHOSPHATE SERPL-MCNC: 4 MG/DL
PHOSPHATE SERPL-MCNC: 4.1 MG/DL
PHOSPHATE SERPL-MCNC: 4.1 MG/DL
PHOSPHATE SERPL-MCNC: 4.2 MG/DL
PHOSPHATE SERPL-MCNC: 4.2 MG/DL
PHOSPHATE SERPL-MCNC: 4.3 MG/DL
PHOSPHATE SERPL-MCNC: 4.5 MG/DL
PHOSPHATE SERPL-MCNC: 4.7 MG/DL
PHOSPHATE SERPL-MCNC: 4.8 MG/DL
PHOSPHATE SERPL-MCNC: 5 MG/DL
PHOSPHATE SERPL-MCNC: 5.1 MG/DL
PHOSPHATE SERPL-MCNC: 5.1 MG/DL
PHOSPHATE SERPL-MCNC: 5.2 MG/DL
PHOSPHATE SERPL-MCNC: 5.2 MG/DL
PHOSPHATE SERPL-MCNC: 5.3 MG/DL
PHOSPHATE SERPL-MCNC: 5.4 MG/DL
PHOSPHATE SERPL-MCNC: 5.6 MG/DL
PHOSPHATE SERPL-MCNC: 6.1 MG/DL
PHOSPHATE SERPL-MCNC: 6.8 MG/DL
PHOSPHATE SERPL-MCNC: 7 MG/DL
PHOSPHATE SERPL-MCNC: 7 MG/DL
PIP: 19
PIP: 19
PIP: 20
PIP: 21
PIP: 21
PIP: 22
PIP: 22
PIP: 23
PIP: 24
PIP: 25
PIP: 26
PIP: 26
PIP: 28
PIP: 28
PIP: 29
PIP: 31
PIP: 33
PIP: 34
PISA TR MAX VEL: 3.29 M/S
PISA TR MAX VEL: 3.7 M/S
PLATELET # BLD AUTO: 104 K/UL
PLATELET # BLD AUTO: 116 K/UL
PLATELET # BLD AUTO: 116 K/UL
PLATELET # BLD AUTO: 120 K/UL
PLATELET # BLD AUTO: 132 K/UL
PLATELET # BLD AUTO: 132 K/UL
PLATELET # BLD AUTO: 163 K/UL
PLATELET # BLD AUTO: 17 K/UL
PLATELET # BLD AUTO: 17 K/UL
PLATELET # BLD AUTO: 19 K/UL
PLATELET # BLD AUTO: 20 K/UL
PLATELET # BLD AUTO: 23 K/UL
PLATELET # BLD AUTO: 28 K/UL
PLATELET # BLD AUTO: 29 K/UL
PLATELET # BLD AUTO: 30 K/UL
PLATELET # BLD AUTO: 32 K/UL
PLATELET # BLD AUTO: 34 K/UL
PLATELET # BLD AUTO: 34 K/UL
PLATELET # BLD AUTO: 36 K/UL
PLATELET # BLD AUTO: 37 K/UL
PLATELET # BLD AUTO: 38 K/UL
PLATELET # BLD AUTO: 39 K/UL
PLATELET # BLD AUTO: 40 K/UL
PLATELET # BLD AUTO: 40 K/UL
PLATELET # BLD AUTO: 41 K/UL
PLATELET # BLD AUTO: 42 K/UL
PLATELET # BLD AUTO: 45 K/UL
PLATELET # BLD AUTO: 45 K/UL
PLATELET # BLD AUTO: 46 K/UL
PLATELET # BLD AUTO: 51 K/UL
PLATELET # BLD AUTO: 51 K/UL
PLATELET # BLD AUTO: 53 K/UL
PLATELET # BLD AUTO: 54 K/UL
PLATELET # BLD AUTO: 57 K/UL
PLATELET # BLD AUTO: 59 K/UL
PLATELET # BLD AUTO: 60 K/UL
PLATELET # BLD AUTO: 60 K/UL
PLATELET # BLD AUTO: 62 K/UL
PLATELET # BLD AUTO: 62 K/UL
PLATELET # BLD AUTO: 65 K/UL
PLATELET # BLD AUTO: 66 K/UL
PLATELET # BLD AUTO: 66 K/UL
PLATELET # BLD AUTO: 67 K/UL
PLATELET # BLD AUTO: 67 K/UL
PLATELET # BLD AUTO: 69 K/UL
PLATELET # BLD AUTO: 71 K/UL
PLATELET # BLD AUTO: 71 K/UL
PLATELET # BLD AUTO: 73 K/UL
PLATELET # BLD AUTO: 75 K/UL
PLATELET # BLD AUTO: 76 K/UL
PLATELET # BLD AUTO: 77 K/UL
PLATELET # BLD AUTO: 78 K/UL
PLATELET # BLD AUTO: 78 K/UL
PLATELET # BLD AUTO: 79 K/UL
PLATELET # BLD AUTO: 79 K/UL
PLATELET # BLD AUTO: 80 K/UL
PLATELET # BLD AUTO: 80 K/UL
PLATELET # BLD AUTO: 83 K/UL
PLATELET # BLD AUTO: 85 K/UL
PLATELET # BLD AUTO: 85 K/UL
PLATELET # BLD AUTO: 86 K/UL
PLATELET # BLD AUTO: 87 K/UL
PLATELET # BLD AUTO: 88 K/UL
PLATELET # BLD AUTO: 90 K/UL
PLATELET # BLD AUTO: 90 K/UL
PLATELET # BLD AUTO: 94 K/UL
PLATELET # BLD AUTO: 97 K/UL
PLATELET # BLD AUTO: 97 K/UL
PLATELET # BLD AUTO: 98 K/UL
PLATELET BLD QL SMEAR: ABNORMAL
PMV BLD AUTO: 10.5 FL
PMV BLD AUTO: 10.6 FL
PMV BLD AUTO: 10.8 FL
PMV BLD AUTO: 10.9 FL
PMV BLD AUTO: 11 FL
PMV BLD AUTO: 11.2 FL
PMV BLD AUTO: 11.2 FL
PMV BLD AUTO: 11.3 FL
PMV BLD AUTO: 11.4 FL
PMV BLD AUTO: 11.5 FL
PMV BLD AUTO: 11.6 FL
PMV BLD AUTO: 11.7 FL
PMV BLD AUTO: 11.8 FL
PMV BLD AUTO: 11.9 FL
PMV BLD AUTO: 12 FL
PMV BLD AUTO: 12.1 FL
PMV BLD AUTO: 12.2 FL
PMV BLD AUTO: 12.2 FL
PMV BLD AUTO: 12.3 FL
PMV BLD AUTO: 12.3 FL
PMV BLD AUTO: 12.4 FL
PMV BLD AUTO: 12.4 FL
PMV BLD AUTO: 12.5 FL
PMV BLD AUTO: 12.6 FL
PMV BLD AUTO: 12.7 FL
PMV BLD AUTO: 12.8 FL
PMV BLD AUTO: 12.9 FL
PMV BLD AUTO: 13 FL
PMV BLD AUTO: 13.1 FL
PMV BLD AUTO: 13.9 FL
PMV BLD AUTO: 14.2 FL
PMV BLD AUTO: ABNORMAL FL
PO2 BLDA: 101 MMHG (ref 80–100)
PO2 BLDA: 101 MMHG (ref 80–100)
PO2 BLDA: 103 MMHG (ref 80–100)
PO2 BLDA: 110 MMHG (ref 80–100)
PO2 BLDA: 114 MMHG (ref 80–100)
PO2 BLDA: 115 MMHG (ref 80–100)
PO2 BLDA: 115 MMHG (ref 80–100)
PO2 BLDA: 119 MMHG (ref 80–100)
PO2 BLDA: 123 MMHG (ref 80–100)
PO2 BLDA: 124 MMHG (ref 80–100)
PO2 BLDA: 133 MMHG (ref 80–100)
PO2 BLDA: 135 MMHG (ref 80–100)
PO2 BLDA: 139 MMHG (ref 80–100)
PO2 BLDA: 145 MMHG (ref 80–100)
PO2 BLDA: 35 MMHG (ref 40–60)
PO2 BLDA: 375 MMHG (ref 80–100)
PO2 BLDA: 41 MMHG (ref 80–100)
PO2 BLDA: 51 MMHG (ref 80–100)
PO2 BLDA: 54 MMHG (ref 80–100)
PO2 BLDA: 57 MMHG (ref 80–100)
PO2 BLDA: 65 MMHG (ref 80–100)
PO2 BLDA: 66 MMHG (ref 80–100)
PO2 BLDA: 68 MMHG (ref 80–100)
PO2 BLDA: 70 MMHG (ref 80–100)
PO2 BLDA: 70 MMHG (ref 80–100)
PO2 BLDA: 71 MMHG (ref 80–100)
PO2 BLDA: 74 MMHG (ref 80–100)
PO2 BLDA: 76 MMHG (ref 80–100)
PO2 BLDA: 84 MMHG (ref 80–100)
PO2 BLDA: 87 MMHG (ref 80–100)
PO2 BLDA: 90 MMHG (ref 80–100)
PO2 BLDA: 91 MMHG (ref 80–100)
PO2 BLDA: 92 MMHG (ref 80–100)
PO2 BLDA: 95 MMHG (ref 80–100)
POC BE: -1 MMOL/L
POC BE: -2 MMOL/L
POC BE: -3 MMOL/L
POC BE: -4 MMOL/L
POC BE: -5 MMOL/L
POC BE: -6 MMOL/L
POC BE: 0 MMOL/L
POC BE: 0 MMOL/L
POC BE: 1 MMOL/L
POC BE: 2 MMOL/L
POC BE: 2 MMOL/L
POC BE: 3 MMOL/L
POC BE: 3 MMOL/L
POC BE: 4 MMOL/L
POC BE: 4 MMOL/L
POC BE: 5 MMOL/L
POC SATURATED O2: 100 % (ref 95–100)
POC SATURATED O2: 64 % (ref 95–100)
POC SATURATED O2: 73 % (ref 95–100)
POC SATURATED O2: 79 % (ref 95–100)
POC SATURATED O2: 91 % (ref 95–100)
POC SATURATED O2: 92 % (ref 95–100)
POC SATURATED O2: 92 % (ref 95–100)
POC SATURATED O2: 93 % (ref 95–100)
POC SATURATED O2: 94 % (ref 95–100)
POC SATURATED O2: 95 % (ref 95–100)
POC SATURATED O2: 96 % (ref 95–100)
POC SATURATED O2: 97 % (ref 95–100)
POC SATURATED O2: 98 % (ref 95–100)
POC SATURATED O2: 99 % (ref 95–100)
POC TCO2: 21 MMOL/L (ref 23–27)
POC TCO2: 22 MMOL/L (ref 23–27)
POC TCO2: 23 MMOL/L (ref 23–27)
POC TCO2: 24 MMOL/L (ref 23–27)
POC TCO2: 25 MMOL/L (ref 23–27)
POC TCO2: 25 MMOL/L (ref 24–29)
POC TCO2: 26 MMOL/L (ref 23–27)
POC TCO2: 27 MMOL/L (ref 23–27)
POC TCO2: 27 MMOL/L (ref 23–27)
POC TCO2: 28 MMOL/L (ref 23–27)
POC TCO2: 29 MMOL/L (ref 23–27)
POCT GLUCOSE: 103 MG/DL (ref 70–110)
POCT GLUCOSE: 105 MG/DL (ref 70–110)
POCT GLUCOSE: 106 MG/DL (ref 70–110)
POCT GLUCOSE: 108 MG/DL (ref 70–110)
POCT GLUCOSE: 112 MG/DL (ref 70–110)
POCT GLUCOSE: 115 MG/DL (ref 70–110)
POCT GLUCOSE: 116 MG/DL (ref 70–110)
POCT GLUCOSE: 122 MG/DL (ref 70–110)
POCT GLUCOSE: 123 MG/DL (ref 70–110)
POCT GLUCOSE: 123 MG/DL (ref 70–110)
POCT GLUCOSE: 128 MG/DL (ref 70–110)
POCT GLUCOSE: 129 MG/DL (ref 70–110)
POCT GLUCOSE: 130 MG/DL (ref 70–110)
POCT GLUCOSE: 136 MG/DL (ref 70–110)
POCT GLUCOSE: 136 MG/DL (ref 70–110)
POCT GLUCOSE: 137 MG/DL (ref 70–110)
POCT GLUCOSE: 138 MG/DL (ref 70–110)
POCT GLUCOSE: 146 MG/DL (ref 70–110)
POCT GLUCOSE: 147 MG/DL (ref 70–110)
POCT GLUCOSE: 147 MG/DL (ref 70–110)
POCT GLUCOSE: 148 MG/DL (ref 70–110)
POCT GLUCOSE: 150 MG/DL (ref 70–110)
POCT GLUCOSE: 150 MG/DL (ref 70–110)
POCT GLUCOSE: 151 MG/DL (ref 70–110)
POCT GLUCOSE: 152 MG/DL (ref 70–110)
POCT GLUCOSE: 156 MG/DL (ref 70–110)
POCT GLUCOSE: 156 MG/DL (ref 70–110)
POCT GLUCOSE: 159 MG/DL (ref 70–110)
POCT GLUCOSE: 159 MG/DL (ref 70–110)
POCT GLUCOSE: 160 MG/DL (ref 70–110)
POCT GLUCOSE: 161 MG/DL (ref 70–110)
POCT GLUCOSE: 162 MG/DL (ref 70–110)
POCT GLUCOSE: 165 MG/DL (ref 70–110)
POCT GLUCOSE: 166 MG/DL (ref 70–110)
POCT GLUCOSE: 167 MG/DL (ref 70–110)
POCT GLUCOSE: 169 MG/DL (ref 70–110)
POCT GLUCOSE: 170 MG/DL (ref 70–110)
POCT GLUCOSE: 171 MG/DL (ref 70–110)
POCT GLUCOSE: 172 MG/DL (ref 70–110)
POCT GLUCOSE: 172 MG/DL (ref 70–110)
POCT GLUCOSE: 173 MG/DL (ref 70–110)
POCT GLUCOSE: 175 MG/DL (ref 70–110)
POCT GLUCOSE: 176 MG/DL (ref 70–110)
POCT GLUCOSE: 176 MG/DL (ref 70–110)
POCT GLUCOSE: 177 MG/DL (ref 70–110)
POCT GLUCOSE: 177 MG/DL (ref 70–110)
POCT GLUCOSE: 178 MG/DL (ref 70–110)
POCT GLUCOSE: 179 MG/DL (ref 70–110)
POCT GLUCOSE: 180 MG/DL (ref 70–110)
POCT GLUCOSE: 180 MG/DL (ref 70–110)
POCT GLUCOSE: 181 MG/DL (ref 70–110)
POCT GLUCOSE: 183 MG/DL (ref 70–110)
POCT GLUCOSE: 185 MG/DL (ref 70–110)
POCT GLUCOSE: 186 MG/DL (ref 70–110)
POCT GLUCOSE: 186 MG/DL (ref 70–110)
POCT GLUCOSE: 187 MG/DL (ref 70–110)
POCT GLUCOSE: 187 MG/DL (ref 70–110)
POCT GLUCOSE: 189 MG/DL (ref 70–110)
POCT GLUCOSE: 189 MG/DL (ref 70–110)
POCT GLUCOSE: 190 MG/DL (ref 70–110)
POCT GLUCOSE: 191 MG/DL (ref 70–110)
POCT GLUCOSE: 192 MG/DL (ref 70–110)
POCT GLUCOSE: 193 MG/DL (ref 70–110)
POCT GLUCOSE: 194 MG/DL (ref 70–110)
POCT GLUCOSE: 194 MG/DL (ref 70–110)
POCT GLUCOSE: 195 MG/DL (ref 70–110)
POCT GLUCOSE: 196 MG/DL (ref 70–110)
POCT GLUCOSE: 196 MG/DL (ref 70–110)
POCT GLUCOSE: 197 MG/DL (ref 70–110)
POCT GLUCOSE: 198 MG/DL (ref 70–110)
POCT GLUCOSE: 198 MG/DL (ref 70–110)
POCT GLUCOSE: 200 MG/DL (ref 70–110)
POCT GLUCOSE: 200 MG/DL (ref 70–110)
POCT GLUCOSE: 201 MG/DL (ref 70–110)
POCT GLUCOSE: 202 MG/DL (ref 70–110)
POCT GLUCOSE: 205 MG/DL (ref 70–110)
POCT GLUCOSE: 206 MG/DL (ref 70–110)
POCT GLUCOSE: 207 MG/DL (ref 70–110)
POCT GLUCOSE: 208 MG/DL (ref 70–110)
POCT GLUCOSE: 208 MG/DL (ref 70–110)
POCT GLUCOSE: 209 MG/DL (ref 70–110)
POCT GLUCOSE: 211 MG/DL (ref 70–110)
POCT GLUCOSE: 212 MG/DL (ref 70–110)
POCT GLUCOSE: 213 MG/DL (ref 70–110)
POCT GLUCOSE: 216 MG/DL (ref 70–110)
POCT GLUCOSE: 216 MG/DL (ref 70–110)
POCT GLUCOSE: 217 MG/DL (ref 70–110)
POCT GLUCOSE: 218 MG/DL (ref 70–110)
POCT GLUCOSE: 219 MG/DL (ref 70–110)
POCT GLUCOSE: 220 MG/DL (ref 70–110)
POCT GLUCOSE: 221 MG/DL (ref 70–110)
POCT GLUCOSE: 222 MG/DL (ref 70–110)
POCT GLUCOSE: 224 MG/DL (ref 70–110)
POCT GLUCOSE: 225 MG/DL (ref 70–110)
POCT GLUCOSE: 225 MG/DL (ref 70–110)
POCT GLUCOSE: 226 MG/DL (ref 70–110)
POCT GLUCOSE: 226 MG/DL (ref 70–110)
POCT GLUCOSE: 227 MG/DL (ref 70–110)
POCT GLUCOSE: 227 MG/DL (ref 70–110)
POCT GLUCOSE: 228 MG/DL (ref 70–110)
POCT GLUCOSE: 229 MG/DL (ref 70–110)
POCT GLUCOSE: 229 MG/DL (ref 70–110)
POCT GLUCOSE: 230 MG/DL (ref 70–110)
POCT GLUCOSE: 231 MG/DL (ref 70–110)
POCT GLUCOSE: 232 MG/DL (ref 70–110)
POCT GLUCOSE: 232 MG/DL (ref 70–110)
POCT GLUCOSE: 234 MG/DL (ref 70–110)
POCT GLUCOSE: 235 MG/DL (ref 70–110)
POCT GLUCOSE: 236 MG/DL (ref 70–110)
POCT GLUCOSE: 237 MG/DL (ref 70–110)
POCT GLUCOSE: 240 MG/DL (ref 70–110)
POCT GLUCOSE: 242 MG/DL (ref 70–110)
POCT GLUCOSE: 244 MG/DL (ref 70–110)
POCT GLUCOSE: 247 MG/DL (ref 70–110)
POCT GLUCOSE: 249 MG/DL (ref 70–110)
POCT GLUCOSE: 250 MG/DL (ref 70–110)
POCT GLUCOSE: 251 MG/DL (ref 70–110)
POCT GLUCOSE: 253 MG/DL (ref 70–110)
POCT GLUCOSE: 255 MG/DL (ref 70–110)
POCT GLUCOSE: 256 MG/DL (ref 70–110)
POCT GLUCOSE: 256 MG/DL (ref 70–110)
POCT GLUCOSE: 257 MG/DL (ref 70–110)
POCT GLUCOSE: 258 MG/DL (ref 70–110)
POCT GLUCOSE: 259 MG/DL (ref 70–110)
POCT GLUCOSE: 261 MG/DL (ref 70–110)
POCT GLUCOSE: 262 MG/DL (ref 70–110)
POCT GLUCOSE: 263 MG/DL (ref 70–110)
POCT GLUCOSE: 263 MG/DL (ref 70–110)
POCT GLUCOSE: 270 MG/DL (ref 70–110)
POCT GLUCOSE: 274 MG/DL (ref 70–110)
POCT GLUCOSE: 276 MG/DL (ref 70–110)
POCT GLUCOSE: 278 MG/DL (ref 70–110)
POCT GLUCOSE: 281 MG/DL (ref 70–110)
POCT GLUCOSE: 283 MG/DL (ref 70–110)
POCT GLUCOSE: 284 MG/DL (ref 70–110)
POCT GLUCOSE: 284 MG/DL (ref 70–110)
POCT GLUCOSE: 287 MG/DL (ref 70–110)
POCT GLUCOSE: 289 MG/DL (ref 70–110)
POCT GLUCOSE: 29 MG/DL (ref 70–110)
POCT GLUCOSE: 290 MG/DL (ref 70–110)
POCT GLUCOSE: 293 MG/DL (ref 70–110)
POCT GLUCOSE: 297 MG/DL (ref 70–110)
POCT GLUCOSE: 297 MG/DL (ref 70–110)
POCT GLUCOSE: 299 MG/DL (ref 70–110)
POCT GLUCOSE: 299 MG/DL (ref 70–110)
POCT GLUCOSE: 30 MG/DL (ref 70–110)
POCT GLUCOSE: 305 MG/DL (ref 70–110)
POCT GLUCOSE: 310 MG/DL (ref 70–110)
POCT GLUCOSE: 313 MG/DL (ref 70–110)
POCT GLUCOSE: 315 MG/DL (ref 70–110)
POCT GLUCOSE: 320 MG/DL (ref 70–110)
POCT GLUCOSE: 322 MG/DL (ref 70–110)
POCT GLUCOSE: 338 MG/DL (ref 70–110)
POCT GLUCOSE: 340 MG/DL (ref 70–110)
POCT GLUCOSE: 341 MG/DL (ref 70–110)
POCT GLUCOSE: 346 MG/DL (ref 70–110)
POCT GLUCOSE: 348 MG/DL (ref 70–110)
POCT GLUCOSE: 350 MG/DL (ref 70–110)
POCT GLUCOSE: 354 MG/DL (ref 70–110)
POCT GLUCOSE: 365 MG/DL (ref 70–110)
POCT GLUCOSE: 376 MG/DL (ref 70–110)
POCT GLUCOSE: 401 MG/DL (ref 70–110)
POCT GLUCOSE: 401 MG/DL (ref 70–110)
POCT GLUCOSE: 403 MG/DL (ref 70–110)
POCT GLUCOSE: 403 MG/DL (ref 70–110)
POCT GLUCOSE: 408 MG/DL (ref 70–110)
POCT GLUCOSE: 415 MG/DL (ref 70–110)
POCT GLUCOSE: 433 MG/DL (ref 70–110)
POCT GLUCOSE: 435 MG/DL (ref 70–110)
POCT GLUCOSE: 447 MG/DL (ref 70–110)
POCT GLUCOSE: 477 MG/DL (ref 70–110)
POCT GLUCOSE: 481 MG/DL (ref 70–110)
POCT GLUCOSE: 64 MG/DL (ref 70–110)
POCT GLUCOSE: 69 MG/DL (ref 70–110)
POCT GLUCOSE: 74 MG/DL (ref 70–110)
POCT GLUCOSE: 90 MG/DL (ref 70–110)
POCT GLUCOSE: <20 MG/DL (ref 70–110)
POCT GLUCOSE: >500 MG/DL (ref 70–110)
POIKILOCYTOSIS BLD QL SMEAR: ABNORMAL
POIKILOCYTOSIS BLD QL SMEAR: SLIGHT
POLYCHROMASIA BLD QL SMEAR: ABNORMAL
POTASSIUM SERPL-SCNC: 3.1 MMOL/L
POTASSIUM SERPL-SCNC: 3.1 MMOL/L
POTASSIUM SERPL-SCNC: 3.2 MMOL/L
POTASSIUM SERPL-SCNC: 3.2 MMOL/L
POTASSIUM SERPL-SCNC: 3.3 MMOL/L
POTASSIUM SERPL-SCNC: 3.4 MMOL/L
POTASSIUM SERPL-SCNC: 3.4 MMOL/L
POTASSIUM SERPL-SCNC: 3.5 MMOL/L
POTASSIUM SERPL-SCNC: 3.6 MMOL/L
POTASSIUM SERPL-SCNC: 3.7 MMOL/L
POTASSIUM SERPL-SCNC: 3.8 MMOL/L
POTASSIUM SERPL-SCNC: 3.9 MMOL/L
POTASSIUM SERPL-SCNC: 4 MMOL/L
POTASSIUM SERPL-SCNC: 4.1 MMOL/L
POTASSIUM SERPL-SCNC: 4.2 MMOL/L
POTASSIUM SERPL-SCNC: 4.3 MMOL/L
POTASSIUM SERPL-SCNC: 4.4 MMOL/L
POTASSIUM SERPL-SCNC: 4.5 MMOL/L
POTASSIUM SERPL-SCNC: 4.6 MMOL/L
POTASSIUM SERPL-SCNC: 4.6 MMOL/L
POTASSIUM SERPL-SCNC: 4.7 MMOL/L
POTASSIUM SERPL-SCNC: 4.8 MMOL/L
POTASSIUM SERPL-SCNC: 4.9 MMOL/L
POTASSIUM SERPL-SCNC: 5.1 MMOL/L
POTASSIUM SERPL-SCNC: 5.2 MMOL/L
POTASSIUM SERPL-SCNC: 5.2 MMOL/L
POTASSIUM SERPL-SCNC: 5.4 MMOL/L
POTASSIUM SERPL-SCNC: 5.5 MMOL/L
POTASSIUM SERPL-SCNC: 5.6 MMOL/L
POTASSIUM UR-SCNC: 33 MMOL/L
POTASSIUM UR-SCNC: 36 MMOL/L
PROCALCITONIN SERPL IA-MCNC: 0.57 NG/ML
PROT SERPL-MCNC: 4.4 G/DL
PROT SERPL-MCNC: 4.7 G/DL
PROT SERPL-MCNC: 4.8 G/DL
PROT SERPL-MCNC: 4.9 G/DL
PROT SERPL-MCNC: 5 G/DL
PROT SERPL-MCNC: 5.1 G/DL
PROT SERPL-MCNC: 5.2 G/DL
PROT SERPL-MCNC: 5.3 G/DL
PROT SERPL-MCNC: 5.4 G/DL
PROT SERPL-MCNC: 5.5 G/DL
PROT SERPL-MCNC: 5.5 G/DL
PROT SERPL-MCNC: 5.6 G/DL
PROT SERPL-MCNC: 5.6 G/DL
PROT SERPL-MCNC: 5.7 G/DL
PROT SERPL-MCNC: 5.8 G/DL
PROT SERPL-MCNC: 5.8 G/DL
PROT SERPL-MCNC: 5.9 G/DL
PROT SERPL-MCNC: 6.1 G/DL
PROT SERPL-MCNC: 6.1 G/DL
PROT SERPL-MCNC: 6.3 G/DL
PROT SERPL-MCNC: 6.7 G/DL
PROT UR QL STRIP: ABNORMAL
PROT UR QL STRIP: ABNORMAL
PROT UR QL STRIP: NEGATIVE
PROT UR-MCNC: 43 MG/DL
PROT/CREAT UR: 0.54 MG/G{CREAT}
PROTHROMBIN TIME: 11.7 SEC
PROTHROMBIN TIME: 12.1 SEC
PROTHROMBIN TIME: 13.6 SEC
PROTHROMBIN TIME: 13.8 SEC
PROTHROMBIN TIME: 15.2 SEC
PROTHROMBIN TIME: 15.7 SEC
PROTHROMBIN TIME: 15.9 SEC
PROTHROMBIN TIME: 16 SEC
PROTHROMBIN TIME: 16.1 SEC
PROTHROMBIN TIME: 16.3 SEC
PROTHROMBIN TIME: 16.4 SEC
PROTHROMBIN TIME: 16.4 SEC
PROTHROMBIN TIME: 16.5 SEC
PROTHROMBIN TIME: 16.6 SEC
PROTHROMBIN TIME: 16.7 SEC
PROTHROMBIN TIME: 16.7 SEC
PROTHROMBIN TIME: 16.9 SEC
PROTHROMBIN TIME: 17 SEC
PROTHROMBIN TIME: 17.1 SEC
PROTHROMBIN TIME: 17.2 SEC
PROTHROMBIN TIME: 17.5 SEC
PROTHROMBIN TIME: 17.6 SEC
PROTHROMBIN TIME: 18.1 SEC
PROTHROMBIN TIME: 18.3 SEC
PROTHROMBIN TIME: 18.4 SEC
PROTHROMBIN TIME: 18.4 SEC
PROTHROMBIN TIME: 18.6 SEC
PROTHROMBIN TIME: 18.6 SEC
PROTHROMBIN TIME: 19 SEC
PROTHROMBIN TIME: 19.1 SEC
PROTHROMBIN TIME: 19.5 SEC
PROTHROMBIN TIME: 19.5 SEC
PROTHROMBIN TIME: 19.7 SEC
PROTHROMBIN TIME: 19.7 SEC
PROTHROMBIN TIME: 20.1 SEC
PROTHROMBIN TIME: 21.5 SEC
PROTHROMBIN TIME: 21.6 SEC
PROTHROMBIN TIME: 22.4 SEC
PROTHROMBIN TIME: 22.6 SEC
PROTHROMBIN TIME: 23.4 SEC
PROTHROMBIN TIME: 23.6 SEC
PROTHROMBIN TIME: 25.8 SEC
PROTHROMBIN TIME: 31.3 SEC
PULM VEIN S/D RATIO: 1
PULM VEIN S/D RATIO: 1.35
PV PEAK D VEL: 0.57 M/S
PV PEAK D VEL: 0.71 M/S
PV PEAK S VEL: 0.71 M/S
PV PEAK S VEL: 0.77 M/S
RA MAJOR: 3.86 CM
RA MAJOR: 4.56 CM
RA PRESSURE: 3 MMHG
RA WIDTH: 3.86 CM
RA WIDTH: 4.3 CM
RBC # BLD AUTO: 1.98 M/UL
RBC # BLD AUTO: 2.02 M/UL
RBC # BLD AUTO: 2.05 M/UL
RBC # BLD AUTO: 2.06 M/UL
RBC # BLD AUTO: 2.09 M/UL
RBC # BLD AUTO: 2.19 M/UL
RBC # BLD AUTO: 2.2 M/UL
RBC # BLD AUTO: 2.22 M/UL
RBC # BLD AUTO: 2.26 M/UL
RBC # BLD AUTO: 2.26 M/UL
RBC # BLD AUTO: 2.29 M/UL
RBC # BLD AUTO: 2.29 M/UL
RBC # BLD AUTO: 2.3 M/UL
RBC # BLD AUTO: 2.3 M/UL
RBC # BLD AUTO: 2.32 M/UL
RBC # BLD AUTO: 2.34 M/UL
RBC # BLD AUTO: 2.35 M/UL
RBC # BLD AUTO: 2.35 M/UL
RBC # BLD AUTO: 2.37 M/UL
RBC # BLD AUTO: 2.38 M/UL
RBC # BLD AUTO: 2.38 M/UL
RBC # BLD AUTO: 2.39 M/UL
RBC # BLD AUTO: 2.41 M/UL
RBC # BLD AUTO: 2.43 M/UL
RBC # BLD AUTO: 2.43 M/UL
RBC # BLD AUTO: 2.44 M/UL
RBC # BLD AUTO: 2.45 M/UL
RBC # BLD AUTO: 2.46 M/UL
RBC # BLD AUTO: 2.48 M/UL
RBC # BLD AUTO: 2.49 M/UL
RBC # BLD AUTO: 2.5 M/UL
RBC # BLD AUTO: 2.5 M/UL
RBC # BLD AUTO: 2.51 M/UL
RBC # BLD AUTO: 2.52 M/UL
RBC # BLD AUTO: 2.53 M/UL
RBC # BLD AUTO: 2.56 M/UL
RBC # BLD AUTO: 2.57 M/UL
RBC # BLD AUTO: 2.57 M/UL
RBC # BLD AUTO: 2.58 M/UL
RBC # BLD AUTO: 2.59 M/UL
RBC # BLD AUTO: 2.6 M/UL
RBC # BLD AUTO: 2.62 M/UL
RBC # BLD AUTO: 2.62 M/UL
RBC # BLD AUTO: 2.63 M/UL
RBC # BLD AUTO: 2.65 M/UL
RBC # BLD AUTO: 2.69 M/UL
RBC # BLD AUTO: 2.7 M/UL
RBC # BLD AUTO: 2.71 M/UL
RBC # BLD AUTO: 2.72 M/UL
RBC # BLD AUTO: 2.73 M/UL
RBC # BLD AUTO: 2.76 M/UL
RBC # BLD AUTO: 2.77 M/UL
RBC # BLD AUTO: 2.78 M/UL
RBC # BLD AUTO: 2.78 M/UL
RBC # BLD AUTO: 2.79 M/UL
RBC # BLD AUTO: 2.8 M/UL
RBC # BLD AUTO: 2.8 M/UL
RBC # BLD AUTO: 2.85 M/UL
RBC # BLD AUTO: 2.86 M/UL
RBC # BLD AUTO: 2.89 M/UL
RBC # BLD AUTO: 2.92 M/UL
RBC # BLD AUTO: 3.02 M/UL
RBC # BLD AUTO: 3.12 M/UL
RBC # BLD AUTO: 3.14 M/UL
RBC # BLD AUTO: 3.26 M/UL
RBC # BLD AUTO: 3.3 M/UL
RBC # BLD AUTO: 3.39 M/UL
RBC # BLD AUTO: 3.55 M/UL
RBC #/AREA URNS AUTO: 0 /HPF (ref 0–4)
RBC #/AREA URNS AUTO: 1 /HPF (ref 0–4)
RBC #/AREA URNS AUTO: 1 /HPF (ref 0–4)
RBC #/AREA URNS AUTO: 7 /HPF (ref 0–4)
RBC #/AREA URNS AUTO: >100 /HPF (ref 0–4)
RETIRED EF AND QEF - SEE NOTES: 65 (ref 55–65)
RIGHT VENTRICULAR END-DIASTOLIC DIMENSION: 3.21 CM
RIGHT VENTRICULAR END-DIASTOLIC DIMENSION: 3.4 CM
RV TISSUE DOPPLER FREE WALL SYSTOLIC VELOCITY 1 (APICAL 4 CHAMBER VIEW): 12.41 M/S
RV TISSUE DOPPLER FREE WALL SYSTOLIC VELOCITY 1 (APICAL 4 CHAMBER VIEW): 13.76 M/S
RVP - ADENOVIRUS: NOT DETECTED
RVP - HUMAN METAPNEUMOVIRUS (HMPV): NOT DETECTED
RVP - INFLUENZA A: NOT DETECTED
RVP - INFLUENZA B: NOT DETECTED
RVP - RESPIRATORY SYNCTIAL VIRUS (RSV) A: NOT DETECTED
RVP - RESPIRATORY VIRAL PANEL, SOURCE: NORMAL
RVP - RHINOVIRUS: NOT DETECTED
SAMPLE: ABNORMAL
SAMPLE: NORMAL
SAMPLE: NORMAL
SINUS: 2.92 CM
SINUS: 3.13 CM
SITE: ABNORMAL
SITE: NORMAL
SITE: NORMAL
SODIUM SERPL-SCNC: 131 MMOL/L
SODIUM SERPL-SCNC: 131 MMOL/L
SODIUM SERPL-SCNC: 132 MMOL/L
SODIUM SERPL-SCNC: 132 MMOL/L
SODIUM SERPL-SCNC: 133 MMOL/L
SODIUM SERPL-SCNC: 134 MMOL/L
SODIUM SERPL-SCNC: 135 MMOL/L
SODIUM SERPL-SCNC: 136 MMOL/L
SODIUM SERPL-SCNC: 137 MMOL/L
SODIUM SERPL-SCNC: 138 MMOL/L
SODIUM SERPL-SCNC: 139 MMOL/L
SODIUM SERPL-SCNC: 140 MMOL/L
SODIUM SERPL-SCNC: 141 MMOL/L
SODIUM SERPL-SCNC: 141 MMOL/L
SODIUM SERPL-SCNC: 142 MMOL/L
SODIUM SERPL-SCNC: 143 MMOL/L
SODIUM SERPL-SCNC: 144 MMOL/L
SODIUM SERPL-SCNC: 145 MMOL/L
SODIUM SERPL-SCNC: 145 MMOL/L
SODIUM SERPL-SCNC: 146 MMOL/L
SODIUM UR-SCNC: 31 MMOL/L
SODIUM UR-SCNC: 34 MMOL/L
SODIUM UR-SCNC: <20 MMOL/L
SP GR UR STRIP: 1.01 (ref 1–1.03)
SP GR UR STRIP: 1.02 (ref 1–1.03)
SP02: 100
SP02: 88
SP02: 90
SP02: 93
SP02: 94
SP02: 95
SP02: 96
SP02: 97
SP02: 98
SP02: 99
SQUAMOUS #/AREA URNS AUTO: 1 /HPF
SQUAMOUS #/AREA URNS AUTO: 12 /HPF
SQUAMOUS #/AREA URNS AUTO: 3 /HPF
SQUAMOUS #/AREA URNS AUTO: 6 /HPF
STJ: 2.11 CM
STJ: 2.22 CM
T4 FREE SERPL-MCNC: 1.09 NG/DL
TARGETS BLD QL SMEAR: ABNORMAL
TDI LATERAL: 0.07
TDI LATERAL: 0.07
TDI SEPTAL: 0.05
TDI SEPTAL: 0.08
TDI: 0.06
TDI: 0.08
TOXICOLOGY INFORMATION: NORMAL
TOXICOLOGY INFORMATION: NORMAL
TR MAX PG: 43.3 MMHG
TR MAX PG: 54.76 MMHG
TRANS ERYTHROCYTES VOL PATIENT: NORMAL ML
TRANS PLATPHERESIS VOL PATIENT: NORMAL ML
TRICUSPID ANNULAR PLANE SYSTOLIC EXCURSION: 2.13 CM
TRICUSPID ANNULAR PLANE SYSTOLIC EXCURSION: 3.03 CM
TRICUSPID VALVE REGURGITATION: NORMAL
TSH SERPL DL<=0.005 MIU/L-ACNC: 0.21 UIU/ML
TV REST PULMONARY ARTERY PRESSURE: 46.3 MMHG
URN SPEC COLLECT METH UR: ABNORMAL
UROBILINOGEN UR STRIP-ACNC: NEGATIVE EU/DL
UROBILINOGEN UR STRIP-ACNC: NEGATIVE EU/DL
VANCOMYCIN SERPL-MCNC: 15.4 UG/ML
VANCOMYCIN SERPL-MCNC: 15.8 UG/ML
VANCOMYCIN SERPL-MCNC: 18.7 UG/ML
VANCOMYCIN SERPL-MCNC: 21.8 UG/ML
VANCOMYCIN SERPL-MCNC: 7.1 UG/ML
VANCOMYCIN SERPL-MCNC: 9.6 UG/ML
VANCOMYCIN SERPL-MCNC: 9.9 UG/ML
VT: 300
VT: 302
VT: 310
VT: 315
VT: 330
WBC # BLD AUTO: 10.09 K/UL
WBC # BLD AUTO: 10.1 K/UL
WBC # BLD AUTO: 10.11 K/UL
WBC # BLD AUTO: 10.13 K/UL
WBC # BLD AUTO: 10.26 K/UL
WBC # BLD AUTO: 10.31 K/UL
WBC # BLD AUTO: 10.43 K/UL
WBC # BLD AUTO: 10.54 K/UL
WBC # BLD AUTO: 10.63 K/UL
WBC # BLD AUTO: 10.7 K/UL
WBC # BLD AUTO: 10.72 K/UL
WBC # BLD AUTO: 10.82 K/UL
WBC # BLD AUTO: 10.87 K/UL
WBC # BLD AUTO: 10.96 K/UL
WBC # BLD AUTO: 11.01 K/UL
WBC # BLD AUTO: 11.08 K/UL
WBC # BLD AUTO: 11.25 K/UL
WBC # BLD AUTO: 11.3 K/UL
WBC # BLD AUTO: 11.59 K/UL
WBC # BLD AUTO: 11.83 K/UL
WBC # BLD AUTO: 11.91 K/UL
WBC # BLD AUTO: 11.96 K/UL
WBC # BLD AUTO: 12.04 K/UL
WBC # BLD AUTO: 12.53 K/UL
WBC # BLD AUTO: 12.66 K/UL
WBC # BLD AUTO: 12.76 K/UL
WBC # BLD AUTO: 12.81 K/UL
WBC # BLD AUTO: 12.82 K/UL
WBC # BLD AUTO: 13.9 K/UL
WBC # BLD AUTO: 14.04 K/UL
WBC # BLD AUTO: 14.19 K/UL
WBC # BLD AUTO: 14.27 K/UL
WBC # BLD AUTO: 14.33 K/UL
WBC # BLD AUTO: 14.45 K/UL
WBC # BLD AUTO: 15.2 K/UL
WBC # BLD AUTO: 15.78 K/UL
WBC # BLD AUTO: 16.02 K/UL
WBC # BLD AUTO: 17.95 K/UL
WBC # BLD AUTO: 17.98 K/UL
WBC # BLD AUTO: 18.79 K/UL
WBC # BLD AUTO: 2.59 K/UL
WBC # BLD AUTO: 3.4 K/UL
WBC # BLD AUTO: 4.54 K/UL
WBC # BLD AUTO: 4.9 K/UL
WBC # BLD AUTO: 4.98 K/UL
WBC # BLD AUTO: 5.14 K/UL
WBC # BLD AUTO: 5.17 K/UL
WBC # BLD AUTO: 5.34 K/UL
WBC # BLD AUTO: 5.61 K/UL
WBC # BLD AUTO: 6.04 K/UL
WBC # BLD AUTO: 6.17 K/UL
WBC # BLD AUTO: 6.26 K/UL
WBC # BLD AUTO: 6.39 K/UL
WBC # BLD AUTO: 6.55 K/UL
WBC # BLD AUTO: 6.59 K/UL
WBC # BLD AUTO: 6.65 K/UL
WBC # BLD AUTO: 6.68 K/UL
WBC # BLD AUTO: 6.79 K/UL
WBC # BLD AUTO: 6.79 K/UL
WBC # BLD AUTO: 6.98 K/UL
WBC # BLD AUTO: 7.22 K/UL
WBC # BLD AUTO: 7.42 K/UL
WBC # BLD AUTO: 7.55 K/UL
WBC # BLD AUTO: 7.66 K/UL
WBC # BLD AUTO: 8.31 K/UL
WBC # BLD AUTO: 8.35 K/UL
WBC # BLD AUTO: 8.56 K/UL
WBC # BLD AUTO: 8.57 K/UL
WBC # BLD AUTO: 9.05 K/UL
WBC # BLD AUTO: 9.05 K/UL
WBC # BLD AUTO: 9.06 K/UL
WBC # BLD AUTO: 9.19 K/UL
WBC # BLD AUTO: 9.24 K/UL
WBC # BLD AUTO: 9.4 K/UL
WBC # BLD AUTO: 9.41 K/UL
WBC # BLD AUTO: 9.46 K/UL
WBC # BLD AUTO: 9.53 K/UL
WBC #/AREA URNS AUTO: 1 /HPF (ref 0–5)
WBC #/AREA URNS AUTO: 1 /HPF (ref 0–5)
WBC #/AREA URNS AUTO: 13 /HPF (ref 0–5)
WBC #/AREA URNS AUTO: 45 /HPF (ref 0–5)
WBC #/AREA URNS AUTO: 7 /HPF (ref 0–5)
YEAST UR QL AUTO: ABNORMAL

## 2018-01-01 PROCEDURE — 25000242 PHARM REV CODE 250 ALT 637 W/ HCPCS: Mod: NTX | Performed by: TRANSPLANT SURGERY

## 2018-01-01 PROCEDURE — 83935 ASSAY OF URINE OSMOLALITY: CPT | Mod: NTX

## 2018-01-01 PROCEDURE — 85610 PROTHROMBIN TIME: CPT | Mod: NTX

## 2018-01-01 PROCEDURE — 99233 SBSQ HOSP IP/OBS HIGH 50: CPT | Mod: NTX,,, | Performed by: PHYSICIAN ASSISTANT

## 2018-01-01 PROCEDURE — 27000221 HC OXYGEN, UP TO 24 HOURS: Mod: NTX

## 2018-01-01 PROCEDURE — 80069 RENAL FUNCTION PANEL: CPT

## 2018-01-01 PROCEDURE — 83735 ASSAY OF MAGNESIUM: CPT | Mod: 91

## 2018-01-01 PROCEDURE — 25000242 PHARM REV CODE 250 ALT 637 W/ HCPCS: Mod: NTX | Performed by: STUDENT IN AN ORGANIZED HEALTH CARE EDUCATION/TRAINING PROGRAM

## 2018-01-01 PROCEDURE — 94761 N-INVAS EAR/PLS OXIMETRY MLT: CPT | Mod: NTX

## 2018-01-01 PROCEDURE — 94761 N-INVAS EAR/PLS OXIMETRY MLT: CPT

## 2018-01-01 PROCEDURE — 99900026 HC AIRWAY MAINTENANCE (STAT): Mod: NTX

## 2018-01-01 PROCEDURE — 27200188 HC TRANSDUCER, ART ADULT/PEDS: Mod: NTX

## 2018-01-01 PROCEDURE — 81001 URINALYSIS AUTO W/SCOPE: CPT | Mod: TXP

## 2018-01-01 PROCEDURE — 63600175 PHARM REV CODE 636 W HCPCS: Mod: JG | Performed by: STUDENT IN AN ORGANIZED HEALTH CARE EDUCATION/TRAINING PROGRAM

## 2018-01-01 PROCEDURE — 86920 COMPATIBILITY TEST SPIN: CPT | Mod: NTX

## 2018-01-01 PROCEDURE — 83605 ASSAY OF LACTIC ACID: CPT

## 2018-01-01 PROCEDURE — 94003 VENT MGMT INPAT SUBQ DAY: CPT | Mod: NTX

## 2018-01-01 PROCEDURE — 99232 SBSQ HOSP IP/OBS MODERATE 35: CPT | Mod: ,,, | Performed by: NURSE PRACTITIONER

## 2018-01-01 PROCEDURE — 90945 DIALYSIS ONE EVALUATION: CPT | Mod: NTX

## 2018-01-01 PROCEDURE — 85025 COMPLETE CBC W/AUTO DIFF WBC: CPT | Mod: NTX

## 2018-01-01 PROCEDURE — 85610 PROTHROMBIN TIME: CPT | Mod: 91,NTX

## 2018-01-01 PROCEDURE — 25000003 PHARM REV CODE 250: Performed by: PHYSICIAN ASSISTANT

## 2018-01-01 PROCEDURE — 99900026 HC AIRWAY MAINTENANCE (STAT)

## 2018-01-01 PROCEDURE — 85730 THROMBOPLASTIN TIME PARTIAL: CPT | Mod: NTX

## 2018-01-01 PROCEDURE — 82803 BLOOD GASES ANY COMBINATION: CPT | Mod: NTX

## 2018-01-01 PROCEDURE — 94640 AIRWAY INHALATION TREATMENT: CPT | Mod: NTX

## 2018-01-01 PROCEDURE — 83735 ASSAY OF MAGNESIUM: CPT

## 2018-01-01 PROCEDURE — 0BH17EZ INSERTION OF ENDOTRACHEAL AIRWAY INTO TRACHEA, VIA NATURAL OR ARTIFICIAL OPENING: ICD-10-PCS | Performed by: SURGERY

## 2018-01-01 PROCEDURE — 20600001 HC STEP DOWN PRIVATE ROOM: Mod: NTX

## 2018-01-01 PROCEDURE — 63600175 PHARM REV CODE 636 W HCPCS: Mod: NTX | Performed by: STUDENT IN AN ORGANIZED HEALTH CARE EDUCATION/TRAINING PROGRAM

## 2018-01-01 PROCEDURE — 86850 RBC ANTIBODY SCREEN: CPT | Mod: NTX

## 2018-01-01 PROCEDURE — 25000003 PHARM REV CODE 250: Mod: NTX | Performed by: NURSE PRACTITIONER

## 2018-01-01 PROCEDURE — 25000003 PHARM REV CODE 250: Mod: NTX | Performed by: STUDENT IN AN ORGANIZED HEALTH CARE EDUCATION/TRAINING PROGRAM

## 2018-01-01 PROCEDURE — S5571 INSULIN DISPOS PEN 3 ML: HCPCS | Mod: NTX | Performed by: NURSE PRACTITIONER

## 2018-01-01 PROCEDURE — 82140 ASSAY OF AMMONIA: CPT | Mod: 91,NTX

## 2018-01-01 PROCEDURE — 37799 UNLISTED PX VASCULAR SURGERY: CPT | Mod: NTX

## 2018-01-01 PROCEDURE — 25000003 PHARM REV CODE 250: Mod: NTX

## 2018-01-01 PROCEDURE — P9047 ALBUMIN (HUMAN), 25%, 50ML: HCPCS | Mod: JG,NTX | Performed by: INTERNAL MEDICINE

## 2018-01-01 PROCEDURE — 94668 MNPJ CHEST WALL SBSQ: CPT | Mod: NTX

## 2018-01-01 PROCEDURE — 36415 COLL VENOUS BLD VENIPUNCTURE: CPT | Mod: NTX

## 2018-01-01 PROCEDURE — 87106 FUNGI IDENTIFICATION YEAST: CPT | Mod: NTX

## 2018-01-01 PROCEDURE — 85610 PROTHROMBIN TIME: CPT

## 2018-01-01 PROCEDURE — 90945 DIALYSIS ONE EVALUATION: CPT

## 2018-01-01 PROCEDURE — 74183 MRI ABD W/O CNTR FLWD CNTR: CPT | Mod: 26,TXP,, | Performed by: RADIOLOGY

## 2018-01-01 PROCEDURE — 63600175 PHARM REV CODE 636 W HCPCS: Mod: NTX | Performed by: NURSE PRACTITIONER

## 2018-01-01 PROCEDURE — 94003 VENT MGMT INPAT SUBQ DAY: CPT

## 2018-01-01 PROCEDURE — 99232 SBSQ HOSP IP/OBS MODERATE 35: CPT | Mod: NTX,,, | Performed by: INTERNAL MEDICINE

## 2018-01-01 PROCEDURE — 99233 SBSQ HOSP IP/OBS HIGH 50: CPT | Mod: NTX,,, | Performed by: ANESTHESIOLOGY

## 2018-01-01 PROCEDURE — 99900022: Mod: NTX

## 2018-01-01 PROCEDURE — 99233 SBSQ HOSP IP/OBS HIGH 50: CPT | Mod: ,,, | Performed by: INTERNAL MEDICINE

## 2018-01-01 PROCEDURE — 99223 1ST HOSP IP/OBS HIGH 75: CPT | Mod: NTX,,, | Performed by: INTERNAL MEDICINE

## 2018-01-01 PROCEDURE — D9220A PRA ANESTHESIA: Mod: CRNA,,, | Performed by: NURSE ANESTHETIST, CERTIFIED REGISTERED

## 2018-01-01 PROCEDURE — 99232 SBSQ HOSP IP/OBS MODERATE 35: CPT | Mod: NTX,,, | Performed by: NURSE PRACTITIONER

## 2018-01-01 PROCEDURE — 85610 PROTHROMBIN TIME: CPT | Mod: 91

## 2018-01-01 PROCEDURE — 85025 COMPLETE CBC W/AUTO DIFF WBC: CPT | Mod: 91

## 2018-01-01 PROCEDURE — 94664 DEMO&/EVAL PT USE INHALER: CPT | Mod: NTX

## 2018-01-01 PROCEDURE — 20000000 HC ICU ROOM: Mod: NTX

## 2018-01-01 PROCEDURE — 99999 PR PBB SHADOW E&M-EST. PATIENT-LVL III: CPT | Mod: PBBFAC,TXP,, | Performed by: SURGERY

## 2018-01-01 PROCEDURE — 87070 CULTURE OTHR SPECIMN AEROBIC: CPT | Mod: NTX

## 2018-01-01 PROCEDURE — 86920 COMPATIBILITY TEST SPIN: CPT

## 2018-01-01 PROCEDURE — 99291 CRITICAL CARE FIRST HOUR: CPT | Mod: NTX,,, | Performed by: ANESTHESIOLOGY

## 2018-01-01 PROCEDURE — 71046 X-RAY EXAM CHEST 2 VIEWS: CPT | Mod: 26,TXP,, | Performed by: RADIOLOGY

## 2018-01-01 PROCEDURE — 99233 SBSQ HOSP IP/OBS HIGH 50: CPT | Mod: NTX,,, | Performed by: SURGERY

## 2018-01-01 PROCEDURE — 25000003 PHARM REV CODE 250: Performed by: HOSPITALIST

## 2018-01-01 PROCEDURE — 25000003 PHARM REV CODE 250: Performed by: STUDENT IN AN ORGANIZED HEALTH CARE EDUCATION/TRAINING PROGRAM

## 2018-01-01 PROCEDURE — 80100008 HC CRRT DAILY MAINTENANCE: Mod: NTX

## 2018-01-01 PROCEDURE — 80053 COMPREHEN METABOLIC PANEL: CPT | Mod: 91,NTX

## 2018-01-01 PROCEDURE — 37799 UNLISTED PX VASCULAR SURGERY: CPT

## 2018-01-01 PROCEDURE — 1101F PT FALLS ASSESS-DOCD LE1/YR: CPT | Mod: CPTII,TXP,, | Performed by: INTERNAL MEDICINE

## 2018-01-01 PROCEDURE — 27201423 OPTIME MED/SURG SUP & DEVICES STERILE SUPPLY: Performed by: SURGERY

## 2018-01-01 PROCEDURE — 82800 BLOOD PH: CPT

## 2018-01-01 PROCEDURE — 99233 SBSQ HOSP IP/OBS HIGH 50: CPT | Mod: NTX,,, | Performed by: NURSE PRACTITIONER

## 2018-01-01 PROCEDURE — 63600175 PHARM REV CODE 636 W HCPCS: Mod: JG

## 2018-01-01 PROCEDURE — 93005 ELECTROCARDIOGRAM TRACING: CPT | Mod: NTX

## 2018-01-01 PROCEDURE — 99900035 HC TECH TIME PER 15 MIN (STAT): Mod: NTX

## 2018-01-01 PROCEDURE — G8987 SELF CARE CURRENT STATUS: HCPCS | Mod: CK,NTX

## 2018-01-01 PROCEDURE — 93010 ELECTROCARDIOGRAM REPORT: CPT | Mod: ,,, | Performed by: INTERNAL MEDICINE

## 2018-01-01 PROCEDURE — 97116 GAIT TRAINING THERAPY: CPT | Mod: NTX

## 2018-01-01 PROCEDURE — 83605 ASSAY OF LACTIC ACID: CPT | Mod: 91,NTX

## 2018-01-01 PROCEDURE — 99232 SBSQ HOSP IP/OBS MODERATE 35: CPT | Mod: ,,, | Performed by: INTERNAL MEDICINE

## 2018-01-01 PROCEDURE — 99233 SBSQ HOSP IP/OBS HIGH 50: CPT | Mod: NTX,,, | Performed by: INTERNAL MEDICINE

## 2018-01-01 PROCEDURE — P9017 PLASMA 1 DONOR FRZ W/IN 8 HR: HCPCS

## 2018-01-01 PROCEDURE — 63600175 PHARM REV CODE 636 W HCPCS: Mod: JG,NTX | Performed by: STUDENT IN AN ORGANIZED HEALTH CARE EDUCATION/TRAINING PROGRAM

## 2018-01-01 PROCEDURE — P9021 RED BLOOD CELLS UNIT: HCPCS | Mod: NTX

## 2018-01-01 PROCEDURE — 84100 ASSAY OF PHOSPHORUS: CPT

## 2018-01-01 PROCEDURE — 80053 COMPREHEN METABOLIC PANEL: CPT | Mod: NTX

## 2018-01-01 PROCEDURE — 63600175 PHARM REV CODE 636 W HCPCS: Performed by: SURGERY

## 2018-01-01 PROCEDURE — 94668 MNPJ CHEST WALL SBSQ: CPT

## 2018-01-01 PROCEDURE — 83735 ASSAY OF MAGNESIUM: CPT | Mod: 91,NTX

## 2018-01-01 PROCEDURE — 84133 ASSAY OF URINE POTASSIUM: CPT | Mod: NTX

## 2018-01-01 PROCEDURE — 99999 PR PBB SHADOW E&M-EST. PATIENT-LVL IV: CPT | Mod: PBBFAC,TXP,, | Performed by: INTERNAL MEDICINE

## 2018-01-01 PROCEDURE — 25500020 PHARM REV CODE 255: Mod: NTX | Performed by: STUDENT IN AN ORGANIZED HEALTH CARE EDUCATION/TRAINING PROGRAM

## 2018-01-01 PROCEDURE — P9045 ALBUMIN (HUMAN), 5%, 250 ML: HCPCS | Mod: JG | Performed by: STUDENT IN AN ORGANIZED HEALTH CARE EDUCATION/TRAINING PROGRAM

## 2018-01-01 PROCEDURE — 82330 ASSAY OF CALCIUM: CPT

## 2018-01-01 PROCEDURE — 27000646 HC AEROBIKA DEVICE: Mod: NTX

## 2018-01-01 PROCEDURE — 84100 ASSAY OF PHOSPHORUS: CPT | Mod: NTX

## 2018-01-01 PROCEDURE — 20000000 HC ICU ROOM

## 2018-01-01 PROCEDURE — 63600175 PHARM REV CODE 636 W HCPCS: Mod: NTX

## 2018-01-01 PROCEDURE — 87040 BLOOD CULTURE FOR BACTERIA: CPT | Mod: 59,NTX

## 2018-01-01 PROCEDURE — 72197 MRI PELVIS W/O & W/DYE: CPT | Mod: 26,TXP,, | Performed by: RADIOLOGY

## 2018-01-01 PROCEDURE — 82140 ASSAY OF AMMONIA: CPT | Mod: NTX

## 2018-01-01 PROCEDURE — 93010 ELECTROCARDIOGRAM REPORT: CPT | Mod: NTX,,, | Performed by: INTERNAL MEDICINE

## 2018-01-01 PROCEDURE — 99214 OFFICE O/P EST MOD 30 MIN: CPT | Mod: PBBFAC,25,TXP | Performed by: INTERNAL MEDICINE

## 2018-01-01 PROCEDURE — 25000003 PHARM REV CODE 250: Mod: NTX | Performed by: PHYSICIAN ASSISTANT

## 2018-01-01 PROCEDURE — 80202 ASSAY OF VANCOMYCIN: CPT | Mod: NTX

## 2018-01-01 PROCEDURE — 82570 ASSAY OF URINE CREATININE: CPT | Mod: NTX

## 2018-01-01 PROCEDURE — 83735 ASSAY OF MAGNESIUM: CPT | Mod: NTX

## 2018-01-01 PROCEDURE — 25000003 PHARM REV CODE 250: Performed by: NURSE ANESTHETIST, CERTIFIED REGISTERED

## 2018-01-01 PROCEDURE — 36430 TRANSFUSION BLD/BLD COMPNT: CPT | Mod: NTX

## 2018-01-01 PROCEDURE — 85025 COMPLETE CBC W/AUTO DIFF WBC: CPT

## 2018-01-01 PROCEDURE — 36600 WITHDRAWAL OF ARTERIAL BLOOD: CPT | Mod: NTX

## 2018-01-01 PROCEDURE — 99231 SBSQ HOSP IP/OBS SF/LOW 25: CPT | Mod: ,,, | Performed by: NURSE PRACTITIONER

## 2018-01-01 PROCEDURE — 97530 THERAPEUTIC ACTIVITIES: CPT | Mod: NTX

## 2018-01-01 PROCEDURE — 63600175 PHARM REV CODE 636 W HCPCS: Mod: NTX | Performed by: HOSPITALIST

## 2018-01-01 PROCEDURE — 99204 OFFICE O/P NEW MOD 45 MIN: CPT | Mod: S$GLB,,, | Performed by: PHYSICIAN ASSISTANT

## 2018-01-01 PROCEDURE — 82140 ASSAY OF AMMONIA: CPT

## 2018-01-01 PROCEDURE — 63600175 PHARM REV CODE 636 W HCPCS: Mod: NTX | Performed by: INTERNAL MEDICINE

## 2018-01-01 PROCEDURE — 99233 SBSQ HOSP IP/OBS HIGH 50: CPT | Mod: 24,,, | Performed by: SURGERY

## 2018-01-01 PROCEDURE — 36430 TRANSFUSION BLD/BLD COMPNT: CPT

## 2018-01-01 PROCEDURE — 84132 ASSAY OF SERUM POTASSIUM: CPT | Mod: NTX

## 2018-01-01 PROCEDURE — 25000242 PHARM REV CODE 250 ALT 637 W/ HCPCS: Mod: NTX | Performed by: NURSE PRACTITIONER

## 2018-01-01 PROCEDURE — 80069 RENAL FUNCTION PANEL: CPT | Mod: 91,NTX

## 2018-01-01 PROCEDURE — P9016 RBC LEUKOCYTES REDUCED: HCPCS | Mod: NTX

## 2018-01-01 PROCEDURE — 77080 DXA BONE DENSITY AXIAL: CPT | Mod: TC,TXP

## 2018-01-01 PROCEDURE — 87632 RESP VIRUS 6-11 TARGETS: CPT | Mod: NTX

## 2018-01-01 PROCEDURE — 87205 SMEAR GRAM STAIN: CPT | Mod: NTX

## 2018-01-01 PROCEDURE — 63600175 PHARM REV CODE 636 W HCPCS: Performed by: HOSPITALIST

## 2018-01-01 PROCEDURE — P9017 PLASMA 1 DONOR FRZ W/IN 8 HR: HCPCS | Mod: NTX

## 2018-01-01 PROCEDURE — 93975 VASCULAR STUDY: CPT | Mod: 26,TXP,, | Performed by: RADIOLOGY

## 2018-01-01 PROCEDURE — 99233 SBSQ HOSP IP/OBS HIGH 50: CPT | Mod: ,,, | Performed by: SURGERY

## 2018-01-01 PROCEDURE — 99900035 HC TECH TIME PER 15 MIN (STAT)

## 2018-01-01 PROCEDURE — P9021 RED BLOOD CELLS UNIT: HCPCS

## 2018-01-01 PROCEDURE — 99233 SBSQ HOSP IP/OBS HIGH 50: CPT | Mod: GC,NTX,, | Performed by: INTERNAL MEDICINE

## 2018-01-01 PROCEDURE — 27200966 HC CLOSED SUCTION SYSTEM

## 2018-01-01 PROCEDURE — D9220A PRA ANESTHESIA: Mod: ANES,,, | Performed by: ANESTHESIOLOGY

## 2018-01-01 PROCEDURE — 80307 DRUG TEST PRSMV CHEM ANLYZR: CPT | Mod: TXP

## 2018-01-01 PROCEDURE — 80069 RENAL FUNCTION PANEL: CPT | Mod: NTX

## 2018-01-01 PROCEDURE — 80069 RENAL FUNCTION PANEL: CPT | Mod: 91

## 2018-01-01 PROCEDURE — 83605 ASSAY OF LACTIC ACID: CPT | Mod: NTX

## 2018-01-01 PROCEDURE — 27000221 HC OXYGEN, UP TO 24 HOURS

## 2018-01-01 PROCEDURE — 99999 PR PBB SHADOW E&M-EST. PATIENT-LVL III: CPT | Mod: PBBFAC,TXP,,

## 2018-01-01 PROCEDURE — 25000003 PHARM REV CODE 250: Mod: NTX | Performed by: HOSPITALIST

## 2018-01-01 PROCEDURE — 99213 OFFICE O/P EST LOW 20 MIN: CPT | Mod: PBBFAC,25,27,TXP

## 2018-01-01 PROCEDURE — 25000003 PHARM REV CODE 250: Mod: NTX | Performed by: INTERNAL MEDICINE

## 2018-01-01 PROCEDURE — 0B110F4 BYPASS TRACHEA TO CUTANEOUS WITH TRACHEOSTOMY DEVICE, OPEN APPROACH: ICD-10-PCS | Performed by: SURGERY

## 2018-01-01 PROCEDURE — 80100008 HC CRRT DAILY MAINTENANCE

## 2018-01-01 PROCEDURE — 90945 DIALYSIS ONE EVALUATION: CPT | Mod: GC,NTX,, | Performed by: INTERNAL MEDICINE

## 2018-01-01 PROCEDURE — 25000242 PHARM REV CODE 250 ALT 637 W/ HCPCS: Performed by: TRANSPLANT SURGERY

## 2018-01-01 PROCEDURE — 25000003 PHARM REV CODE 250: Mod: NTX | Performed by: SURGERY

## 2018-01-01 PROCEDURE — 63600175 PHARM REV CODE 636 W HCPCS: Mod: NTX | Performed by: PHYSICIAN ASSISTANT

## 2018-01-01 PROCEDURE — 82803 BLOOD GASES ANY COMBINATION: CPT

## 2018-01-01 PROCEDURE — S5571 INSULIN DISPOS PEN 3 ML: HCPCS | Performed by: NURSE PRACTITIONER

## 2018-01-01 PROCEDURE — G0101 CA SCREEN;PELVIC/BREAST EXAM: HCPCS | Mod: PBBFAC

## 2018-01-01 PROCEDURE — 83036 HEMOGLOBIN GLYCOSYLATED A1C: CPT | Mod: NTX

## 2018-01-01 PROCEDURE — 87086 URINE CULTURE/COLONY COUNT: CPT | Mod: NTX

## 2018-01-01 PROCEDURE — 25000003 PHARM REV CODE 250: Performed by: SURGERY

## 2018-01-01 PROCEDURE — P9045 ALBUMIN (HUMAN), 5%, 250 ML: HCPCS | Mod: JG,NTX

## 2018-01-01 PROCEDURE — 87186 SC STD MICRODIL/AGAR DIL: CPT

## 2018-01-01 PROCEDURE — 63600175 PHARM REV CODE 636 W HCPCS: Mod: JG,NTX

## 2018-01-01 PROCEDURE — 84145 PROCALCITONIN (PCT): CPT | Mod: NTX

## 2018-01-01 PROCEDURE — 85025 COMPLETE CBC W/AUTO DIFF WBC: CPT | Mod: 91,NTX

## 2018-01-01 PROCEDURE — 86901 BLOOD TYPING SEROLOGIC RH(D): CPT | Mod: NTX

## 2018-01-01 PROCEDURE — 84439 ASSAY OF FREE THYROXINE: CPT | Mod: NTX

## 2018-01-01 PROCEDURE — 87077 CULTURE AEROBIC IDENTIFY: CPT | Mod: 59,NTX

## 2018-01-01 PROCEDURE — 87040 BLOOD CULTURE FOR BACTERIA: CPT | Mod: NTX

## 2018-01-01 PROCEDURE — 80053 COMPREHEN METABOLIC PANEL: CPT

## 2018-01-01 PROCEDURE — 99239 HOSP IP/OBS DSCHRG MGMT >30: CPT | Mod: NTX,,, | Performed by: PHYSICIAN ASSISTANT

## 2018-01-01 PROCEDURE — 99999 PR PBB SHADOW E&M-EST. PATIENT-LVL IV: CPT | Mod: PBBFAC,,, | Performed by: NURSE PRACTITIONER

## 2018-01-01 PROCEDURE — 93325 DOPPLER ECHO COLOR FLOW MAPG: CPT | Mod: PBBFAC,TXP | Performed by: INTERNAL MEDICINE

## 2018-01-01 PROCEDURE — 84443 ASSAY THYROID STIM HORMONE: CPT | Mod: NTX

## 2018-01-01 PROCEDURE — 97802 MEDICAL NUTRITION INDIV IN: CPT | Mod: S$GLB,,, | Performed by: DIETITIAN, REGISTERED

## 2018-01-01 PROCEDURE — 63600175 PHARM REV CODE 636 W HCPCS: Mod: JG,NTX | Performed by: HOSPITALIST

## 2018-01-01 PROCEDURE — 99213 OFFICE O/P EST LOW 20 MIN: CPT | Mod: S$GLB,TXP,, | Performed by: SURGERY

## 2018-01-01 PROCEDURE — 25000003 PHARM REV CODE 250: Mod: NTX | Performed by: GENERAL PRACTICE

## 2018-01-01 PROCEDURE — 63600175 PHARM REV CODE 636 W HCPCS: Performed by: NURSE PRACTITIONER

## 2018-01-01 PROCEDURE — P9034 PLATELETS, PHERESIS: HCPCS

## 2018-01-01 PROCEDURE — 25000003 PHARM REV CODE 250

## 2018-01-01 PROCEDURE — 5A1945Z RESPIRATORY VENTILATION, 24-96 CONSECUTIVE HOURS: ICD-10-PCS | Performed by: SURGERY

## 2018-01-01 PROCEDURE — 25000003 PHARM REV CODE 250: Performed by: NURSE PRACTITIONER

## 2018-01-01 PROCEDURE — 82436 ASSAY OF URINE CHLORIDE: CPT | Mod: NTX

## 2018-01-01 PROCEDURE — 31500 INSERT EMERGENCY AIRWAY: CPT | Mod: ,,, | Performed by: ANESTHESIOLOGY

## 2018-01-01 PROCEDURE — 97165 OT EVAL LOW COMPLEX 30 MIN: CPT | Mod: NTX

## 2018-01-01 PROCEDURE — 83880 ASSAY OF NATRIURETIC PEPTIDE: CPT | Mod: NTX

## 2018-01-01 PROCEDURE — 25000003 PHARM REV CODE 250: Mod: NTX | Performed by: UROLOGY

## 2018-01-01 PROCEDURE — P9047 ALBUMIN (HUMAN), 25%, 50ML: HCPCS | Mod: JG,NTX | Performed by: HOSPITALIST

## 2018-01-01 PROCEDURE — 63600175 PHARM REV CODE 636 W HCPCS: Performed by: PHYSICIAN ASSISTANT

## 2018-01-01 PROCEDURE — 71046 X-RAY EXAM CHEST 2 VIEWS: CPT | Mod: TC,FY,TXP

## 2018-01-01 PROCEDURE — G8988 SELF CARE GOAL STATUS: HCPCS | Mod: CJ,NTX

## 2018-01-01 PROCEDURE — 87088 URINE BACTERIA CULTURE: CPT | Mod: NTX

## 2018-01-01 PROCEDURE — 84300 ASSAY OF URINE SODIUM: CPT | Mod: NTX

## 2018-01-01 PROCEDURE — 37000008 HC ANESTHESIA 1ST 15 MINUTES: Performed by: SURGERY

## 2018-01-01 PROCEDURE — 27201113

## 2018-01-01 PROCEDURE — P9047 ALBUMIN (HUMAN), 25%, 50ML: HCPCS | Mod: JG,NTX | Performed by: NURSE PRACTITIONER

## 2018-01-01 PROCEDURE — 99204 OFFICE O/P NEW MOD 45 MIN: CPT | Mod: S$PBB,TXP,, | Performed by: SURGERY

## 2018-01-01 PROCEDURE — 02HV33Z INSERTION OF INFUSION DEVICE INTO SUPERIOR VENA CAVA, PERCUTANEOUS APPROACH: ICD-10-PCS | Performed by: SURGERY

## 2018-01-01 PROCEDURE — 1101F PT FALLS ASSESS-DOCD LE1/YR: CPT | Mod: CPTII,TXP,, | Performed by: SURGERY

## 2018-01-01 PROCEDURE — 94640 AIRWAY INHALATION TREATMENT: CPT

## 2018-01-01 PROCEDURE — 63600175 PHARM REV CODE 636 W HCPCS: Mod: JG,NTX | Performed by: INTERNAL MEDICINE

## 2018-01-01 PROCEDURE — 88175 CYTOPATH C/V AUTO FLUID REDO: CPT

## 2018-01-01 PROCEDURE — 63600175 PHARM REV CODE 636 W HCPCS: Mod: JG,NTX | Performed by: PHYSICIAN ASSISTANT

## 2018-01-01 PROCEDURE — 36000707: Performed by: SURGERY

## 2018-01-01 PROCEDURE — 99213 OFFICE O/P EST LOW 20 MIN: CPT | Mod: PBBFAC,25,TXP

## 2018-01-01 PROCEDURE — 36415 COLL VENOUS BLD VENIPUNCTURE: CPT

## 2018-01-01 PROCEDURE — G0101 CA SCREEN;PELVIC/BREAST EXAM: HCPCS | Mod: S$PBB,,, | Performed by: NURSE PRACTITIONER

## 2018-01-01 PROCEDURE — 99213 OFFICE O/P EST LOW 20 MIN: CPT | Mod: PBBFAC,25,27,TXP | Performed by: SURGERY

## 2018-01-01 PROCEDURE — 99231 SBSQ HOSP IP/OBS SF/LOW 25: CPT | Mod: NTX,,, | Performed by: NURSE PRACTITIONER

## 2018-01-01 PROCEDURE — P9045 ALBUMIN (HUMAN), 5%, 250 ML: HCPCS | Mod: JG

## 2018-01-01 PROCEDURE — 86901 BLOOD TYPING SEROLOGIC RH(D): CPT

## 2018-01-01 PROCEDURE — 77080 DXA BONE DENSITY AXIAL: CPT | Mod: 26,TXP,, | Performed by: INTERNAL MEDICINE

## 2018-01-01 PROCEDURE — 27100081 HC BAG, FECAL MANAGEMENT: Mod: NTX

## 2018-01-01 PROCEDURE — 99214 OFFICE O/P EST MOD 30 MIN: CPT | Mod: PBBFAC,25,27 | Performed by: NURSE PRACTITIONER

## 2018-01-01 PROCEDURE — 99223 1ST HOSP IP/OBS HIGH 75: CPT | Mod: NTX,,, | Performed by: SURGERY

## 2018-01-01 PROCEDURE — P9047 ALBUMIN (HUMAN), 25%, 50ML: HCPCS | Mod: JG,NTX | Performed by: PHYSICIAN ASSISTANT

## 2018-01-01 PROCEDURE — 25000003 PHARM REV CODE 250: Performed by: INTERNAL MEDICINE

## 2018-01-01 PROCEDURE — 97164 PT RE-EVAL EST PLAN CARE: CPT

## 2018-01-01 PROCEDURE — 37000009 HC ANESTHESIA EA ADD 15 MINS: Performed by: SURGERY

## 2018-01-01 PROCEDURE — 97110 THERAPEUTIC EXERCISES: CPT

## 2018-01-01 PROCEDURE — 99233 SBSQ HOSP IP/OBS HIGH 50: CPT | Mod: 24,NTX,, | Performed by: NURSE PRACTITIONER

## 2018-01-01 PROCEDURE — 94667 MNPJ CHEST WALL 1ST: CPT | Mod: NTX

## 2018-01-01 PROCEDURE — 87624 HPV HI-RISK TYP POOLED RSLT: CPT

## 2018-01-01 PROCEDURE — 63600175 PHARM REV CODE 636 W HCPCS: Mod: JG,NTX | Performed by: NURSE PRACTITIONER

## 2018-01-01 PROCEDURE — 99232 SBSQ HOSP IP/OBS MODERATE 35: CPT | Mod: GC,NTX,, | Performed by: INTERNAL MEDICINE

## 2018-01-01 PROCEDURE — 87070 CULTURE OTHR SPECIMN AEROBIC: CPT

## 2018-01-01 PROCEDURE — 81001 URINALYSIS AUTO W/SCOPE: CPT | Mod: NTX

## 2018-01-01 PROCEDURE — 27200966 HC CLOSED SUCTION SYSTEM: Mod: NTX

## 2018-01-01 PROCEDURE — 36000706: Performed by: SURGERY

## 2018-01-01 PROCEDURE — 93351 STRESS TTE COMPLETE: CPT | Mod: 26,S$PBB,TXP, | Performed by: INTERNAL MEDICINE

## 2018-01-01 PROCEDURE — 99215 OFFICE O/P EST HI 40 MIN: CPT | Mod: S$PBB,TXP,, | Performed by: INTERNAL MEDICINE

## 2018-01-01 PROCEDURE — 87205 SMEAR GRAM STAIN: CPT

## 2018-01-01 PROCEDURE — 63600175 PHARM REV CODE 636 W HCPCS: Performed by: NURSE ANESTHETIST, CERTIFIED REGISTERED

## 2018-01-01 PROCEDURE — 97530 THERAPEUTIC ACTIVITIES: CPT

## 2018-01-01 PROCEDURE — 93005 ELECTROCARDIOGRAM TRACING: CPT

## 2018-01-01 PROCEDURE — 31600 PLANNED TRACHEOSTOMY: CPT | Mod: ,,, | Performed by: SURGERY

## 2018-01-01 PROCEDURE — 87186 SC STD MICRODIL/AGAR DIL: CPT | Mod: 59,NTX

## 2018-01-01 PROCEDURE — 87077 CULTURE AEROBIC IDENTIFY: CPT

## 2018-01-01 PROCEDURE — 76700 US EXAM ABDOM COMPLETE: CPT | Mod: 26,TXP,, | Performed by: RADIOLOGY

## 2018-01-01 PROCEDURE — 97162 PT EVAL MOD COMPLEX 30 MIN: CPT | Mod: NTX

## 2018-01-01 PROCEDURE — 72197 MRI PELVIS W/O & W/DYE: CPT | Mod: TC,TXP

## 2018-01-01 PROCEDURE — 63600175 PHARM REV CODE 636 W HCPCS: Mod: NTX | Performed by: SURGERY

## 2018-01-01 PROCEDURE — A9585 GADOBUTROL INJECTION: HCPCS | Mod: TXP | Performed by: INTERNAL MEDICINE

## 2018-01-01 PROCEDURE — 97168 OT RE-EVAL EST PLAN CARE: CPT

## 2018-01-01 PROCEDURE — 93975 VASCULAR STUDY: CPT | Mod: TC,TXP

## 2018-01-01 PROCEDURE — 25500020 PHARM REV CODE 255: Mod: TXP | Performed by: INTERNAL MEDICINE

## 2018-01-01 PROCEDURE — 97802 MEDICAL NUTRITION INDIV IN: CPT | Mod: PBBFAC,TXP | Performed by: DIETITIAN, REGISTERED

## 2018-01-01 PROCEDURE — 99254 IP/OBS CNSLTJ NEW/EST MOD 60: CPT | Mod: NTX,,, | Performed by: INTERNAL MEDICINE

## 2018-01-01 PROCEDURE — 90945 DIALYSIS ONE EVALUATION: CPT | Mod: ,,, | Performed by: INTERNAL MEDICINE

## 2018-01-01 PROCEDURE — 94002 VENT MGMT INPAT INIT DAY: CPT | Mod: NTX

## 2018-01-01 PROCEDURE — 93320 DOPPLER ECHO COMPLETE: CPT | Mod: 26,S$PBB,TXP, | Performed by: INTERNAL MEDICINE

## 2018-01-01 PROCEDURE — 99223 1ST HOSP IP/OBS HIGH 75: CPT | Mod: NTX,,, | Performed by: NURSE PRACTITIONER

## 2018-01-01 PROCEDURE — 80053 COMPREHEN METABOLIC PANEL: CPT | Mod: 91

## 2018-01-01 PROCEDURE — 63600175 PHARM REV CODE 636 W HCPCS: Mod: NTX | Performed by: TRANSPLANT SURGERY

## 2018-01-01 PROCEDURE — 99215 OFFICE O/P EST HI 40 MIN: CPT | Mod: S$GLB,TXP,, | Performed by: INTERNAL MEDICINE

## 2018-01-01 PROCEDURE — 99205 OFFICE O/P NEW HI 60 MIN: CPT | Mod: S$GLB,TXP,, | Performed by: INTERNAL MEDICINE

## 2018-01-01 PROCEDURE — 63600175 PHARM REV CODE 636 W HCPCS: Performed by: STUDENT IN AN ORGANIZED HEALTH CARE EDUCATION/TRAINING PROGRAM

## 2018-01-01 RX ORDER — SODIUM CHLORIDE 0.9 % (FLUSH) 0.9 %
3 SYRINGE (ML) INJECTION
Status: DISCONTINUED | OUTPATIENT
Start: 2018-01-01 | End: 2018-01-01 | Stop reason: HOSPADM

## 2018-01-01 RX ORDER — METOPROLOL TARTRATE 1 MG/ML
5 INJECTION, SOLUTION INTRAVENOUS ONCE
Status: COMPLETED | OUTPATIENT
Start: 2018-01-01 | End: 2018-01-01

## 2018-01-01 RX ORDER — INSULIN ASPART 100 [IU]/ML
2 INJECTION, SOLUTION INTRAVENOUS; SUBCUTANEOUS
Status: DISCONTINUED | OUTPATIENT
Start: 2018-01-01 | End: 2018-01-01

## 2018-01-01 RX ORDER — INSULIN ASPART 100 [IU]/ML
3 INJECTION, SOLUTION INTRAVENOUS; SUBCUTANEOUS
Status: DISCONTINUED | OUTPATIENT
Start: 2018-01-01 | End: 2018-01-01

## 2018-01-01 RX ORDER — CEFEPIME HYDROCHLORIDE 1 G/1
1 INJECTION, POWDER, FOR SOLUTION INTRAMUSCULAR; INTRAVENOUS
Status: DISCONTINUED | OUTPATIENT
Start: 2018-01-01 | End: 2018-01-01

## 2018-01-01 RX ORDER — INSULIN ASPART 100 [IU]/ML
10 INJECTION, SOLUTION INTRAVENOUS; SUBCUTANEOUS
Status: DISCONTINUED | OUTPATIENT
Start: 2018-01-01 | End: 2018-01-01

## 2018-01-01 RX ORDER — FLUTICASONE FUROATE AND VILANTEROL TRIFENATATE 100; 25 UG/1; UG/1
POWDER RESPIRATORY (INHALATION)
Refills: 5 | Status: ON HOLD | COMMUNITY
Start: 2018-01-01 | End: 2018-01-01 | Stop reason: HOSPADM

## 2018-01-01 RX ORDER — INSULIN ASPART 100 [IU]/ML
14 INJECTION, SOLUTION INTRAVENOUS; SUBCUTANEOUS
Status: DISCONTINUED | OUTPATIENT
Start: 2018-01-01 | End: 2018-01-01

## 2018-01-01 RX ORDER — MAGNESIUM SULFATE HEPTAHYDRATE 40 MG/ML
2 INJECTION, SOLUTION INTRAVENOUS
Status: DISCONTINUED | OUTPATIENT
Start: 2018-01-01 | End: 2018-01-01

## 2018-01-01 RX ORDER — INSULIN ASPART 100 [IU]/ML
6-12 INJECTION, SOLUTION INTRAVENOUS; SUBCUTANEOUS
Status: DISCONTINUED | OUTPATIENT
Start: 2018-01-01 | End: 2018-01-01

## 2018-01-01 RX ORDER — FUROSEMIDE 10 MG/ML
100 INJECTION INTRAMUSCULAR; INTRAVENOUS ONCE
Status: COMPLETED | OUTPATIENT
Start: 2018-01-01 | End: 2018-01-01

## 2018-01-01 RX ORDER — HYDROCODONE BITARTRATE AND ACETAMINOPHEN 500; 5 MG/1; MG/1
TABLET ORAL CONTINUOUS
Status: DISCONTINUED | OUTPATIENT
Start: 2018-01-01 | End: 2018-01-01

## 2018-01-01 RX ORDER — MAGNESIUM SULFATE HEPTAHYDRATE 40 MG/ML
2 INJECTION, SOLUTION INTRAVENOUS
Status: ACTIVE | OUTPATIENT
Start: 2018-01-01 | End: 2018-01-01

## 2018-01-01 RX ORDER — ROCURONIUM BROMIDE 10 MG/ML
INJECTION, SOLUTION INTRAVENOUS
Status: COMPLETED
Start: 2018-01-01 | End: 2018-01-01

## 2018-01-01 RX ORDER — METOPROLOL TARTRATE 25 MG/1
25 TABLET, FILM COATED ORAL EVERY 6 HOURS
Status: DISCONTINUED | OUTPATIENT
Start: 2018-01-01 | End: 2018-01-01

## 2018-01-01 RX ORDER — ALBUMIN HUMAN 250 G/1000ML
25 SOLUTION INTRAVENOUS ONCE
Status: DISCONTINUED | OUTPATIENT
Start: 2018-01-01 | End: 2018-01-01

## 2018-01-01 RX ORDER — HYDROCODONE BITARTRATE AND ACETAMINOPHEN 500; 5 MG/1; MG/1
TABLET ORAL CONTINUOUS
Status: ACTIVE | OUTPATIENT
Start: 2018-01-01 | End: 2018-01-01

## 2018-01-01 RX ORDER — ALBUMIN HUMAN 250 G/1000ML
25 SOLUTION INTRAVENOUS EVERY 8 HOURS
Status: COMPLETED | OUTPATIENT
Start: 2018-01-01 | End: 2018-01-01

## 2018-01-01 RX ORDER — FUROSEMIDE 40 MG/1
40 TABLET ORAL DAILY
Status: ON HOLD | COMMUNITY
End: 2018-01-01 | Stop reason: HOSPADM

## 2018-01-01 RX ORDER — INSULIN ASPART 100 [IU]/ML
5 INJECTION, SOLUTION INTRAVENOUS; SUBCUTANEOUS
Status: DISCONTINUED | OUTPATIENT
Start: 2018-01-01 | End: 2018-01-01

## 2018-01-01 RX ORDER — INSULIN ASPART 100 [IU]/ML
6 INJECTION, SOLUTION INTRAVENOUS; SUBCUTANEOUS
Status: DISCONTINUED | OUTPATIENT
Start: 2018-01-01 | End: 2018-01-01 | Stop reason: HOSPADM

## 2018-01-01 RX ORDER — INSULIN ASPART 100 [IU]/ML
1-10 INJECTION, SOLUTION INTRAVENOUS; SUBCUTANEOUS
Status: DISCONTINUED | OUTPATIENT
Start: 2018-01-01 | End: 2018-01-01

## 2018-01-01 RX ORDER — HYDROCODONE BITARTRATE AND ACETAMINOPHEN 500; 5 MG/1; MG/1
TABLET ORAL
Status: DISCONTINUED | OUTPATIENT
Start: 2018-01-01 | End: 2018-01-01

## 2018-01-01 RX ORDER — ALBUMIN HUMAN 250 G/1000ML
25 SOLUTION INTRAVENOUS EVERY 6 HOURS
Status: COMPLETED | OUTPATIENT
Start: 2018-01-01 | End: 2018-01-01

## 2018-01-01 RX ORDER — FUROSEMIDE 10 MG/ML
INJECTION INTRAMUSCULAR; INTRAVENOUS
Status: COMPLETED
Start: 2018-01-01 | End: 2018-01-01

## 2018-01-01 RX ORDER — ALBUMIN HUMAN 250 G/1000ML
25 SOLUTION INTRAVENOUS ONCE
Status: COMPLETED | OUTPATIENT
Start: 2018-01-01 | End: 2018-01-01

## 2018-01-01 RX ORDER — DILTIAZEM HYDROCHLORIDE 30 MG/1
30 TABLET, FILM COATED ORAL EVERY 8 HOURS
Status: DISCONTINUED | OUTPATIENT
Start: 2018-01-01 | End: 2018-01-01 | Stop reason: HOSPADM

## 2018-01-01 RX ORDER — LACTULOSE 10 G/15ML
15 SOLUTION ORAL 2 TIMES DAILY
Status: DISCONTINUED | OUTPATIENT
Start: 2018-01-01 | End: 2018-01-01

## 2018-01-01 RX ORDER — METOPROLOL TARTRATE 1 MG/ML
5 INJECTION, SOLUTION INTRAVENOUS EVERY 5 MIN PRN
Status: COMPLETED | OUTPATIENT
Start: 2018-01-01 | End: 2018-01-01

## 2018-01-01 RX ORDER — PROPOFOL 10 MG/ML
5 INJECTION, EMULSION INTRAVENOUS CONTINUOUS
Status: DISCONTINUED | OUTPATIENT
Start: 2018-01-01 | End: 2018-01-01

## 2018-01-01 RX ORDER — MIDAZOLAM HYDROCHLORIDE 1 MG/ML
INJECTION, SOLUTION INTRAMUSCULAR; INTRAVENOUS
Status: DISCONTINUED | OUTPATIENT
Start: 2018-01-01 | End: 2018-01-01

## 2018-01-01 RX ORDER — INSULIN ASPART 100 [IU]/ML
3 INJECTION, SOLUTION INTRAVENOUS; SUBCUTANEOUS ONCE
Status: COMPLETED | OUTPATIENT
Start: 2018-01-01 | End: 2018-01-01

## 2018-01-01 RX ORDER — INSULIN ASPART 100 [IU]/ML
4-6 INJECTION, SOLUTION INTRAVENOUS; SUBCUTANEOUS
Status: DISCONTINUED | OUTPATIENT
Start: 2018-01-01 | End: 2018-01-01

## 2018-01-01 RX ORDER — LEVALBUTEROL 1.25 MG/.5ML
SOLUTION, CONCENTRATE RESPIRATORY (INHALATION)
Status: DISPENSED
Start: 2018-01-01 | End: 2018-01-01

## 2018-01-01 RX ORDER — INSULIN ASPART 100 [IU]/ML
0-5 INJECTION, SOLUTION INTRAVENOUS; SUBCUTANEOUS EVERY 6 HOURS PRN
Status: DISCONTINUED | OUTPATIENT
Start: 2018-01-01 | End: 2018-01-01

## 2018-01-01 RX ORDER — ALBUMIN HUMAN 250 G/1000ML
25 SOLUTION INTRAVENOUS 2 TIMES DAILY
Status: COMPLETED | OUTPATIENT
Start: 2018-01-01 | End: 2018-01-01

## 2018-01-01 RX ORDER — LACTULOSE 10 G/15ML
200 SOLUTION ORAL; RECTAL ONCE
Status: DISCONTINUED | OUTPATIENT
Start: 2018-01-01 | End: 2018-01-01

## 2018-01-01 RX ORDER — INSULIN ASPART 100 [IU]/ML
4 INJECTION, SOLUTION INTRAVENOUS; SUBCUTANEOUS
Status: DISCONTINUED | OUTPATIENT
Start: 2018-01-01 | End: 2018-01-01

## 2018-01-01 RX ORDER — ROCURONIUM BROMIDE 10 MG/ML
INJECTION, SOLUTION INTRAVENOUS
Status: DISCONTINUED | OUTPATIENT
Start: 2018-01-01 | End: 2018-01-01

## 2018-01-01 RX ORDER — ETOMIDATE 2 MG/ML
INJECTION INTRAVENOUS
Status: COMPLETED
Start: 2018-01-01 | End: 2018-01-01

## 2018-01-01 RX ORDER — FUROSEMIDE 10 MG/ML
40 INJECTION INTRAMUSCULAR; INTRAVENOUS ONCE
Status: COMPLETED | OUTPATIENT
Start: 2018-01-01 | End: 2018-01-01

## 2018-01-01 RX ORDER — GLUCAGON 1 MG
1 KIT INJECTION
Status: DISCONTINUED | OUTPATIENT
Start: 2018-01-01 | End: 2018-01-01

## 2018-01-01 RX ORDER — OCTREOTIDE ACETATE 100 UG/ML
100 INJECTION, SOLUTION INTRAVENOUS; SUBCUTANEOUS EVERY 8 HOURS
Status: DISCONTINUED | OUTPATIENT
Start: 2018-01-01 | End: 2018-01-01

## 2018-01-01 RX ORDER — PHENYLEPHRINE HCL IN 0.9% NACL 1 MG/10 ML
SYRINGE (ML) INTRAVENOUS
Status: COMPLETED
Start: 2018-01-01 | End: 2018-01-01

## 2018-01-01 RX ORDER — HYDROCODONE BITARTRATE AND ACETAMINOPHEN 500; 5 MG/1; MG/1
TABLET ORAL
Status: DISCONTINUED | OUTPATIENT
Start: 2018-01-01 | End: 2018-01-01 | Stop reason: HOSPADM

## 2018-01-01 RX ORDER — IBUPROFEN 200 MG
24 TABLET ORAL
Status: DISCONTINUED | OUTPATIENT
Start: 2018-01-01 | End: 2018-01-01

## 2018-01-01 RX ORDER — SPIRONOLACTONE 100 MG/1
TABLET, FILM COATED ORAL
Refills: 5 | Status: ON HOLD | COMMUNITY
Start: 2018-01-01 | End: 2018-01-01 | Stop reason: HOSPADM

## 2018-01-01 RX ORDER — SUCCINYLCHOLINE CHLORIDE 20 MG/ML
INJECTION INTRAMUSCULAR; INTRAVENOUS
Status: COMPLETED
Start: 2018-01-01 | End: 2018-01-01

## 2018-01-01 RX ORDER — LACTULOSE 10 G/15ML
30 SOLUTION ORAL ONCE
Status: COMPLETED | OUTPATIENT
Start: 2018-01-01 | End: 2018-01-01

## 2018-01-01 RX ORDER — FUROSEMIDE 10 MG/ML
60 INJECTION INTRAMUSCULAR; INTRAVENOUS 2 TIMES DAILY
Status: DISCONTINUED | OUTPATIENT
Start: 2018-01-01 | End: 2018-01-01

## 2018-01-01 RX ORDER — HYDRALAZINE HYDROCHLORIDE 20 MG/ML
10 INJECTION INTRAMUSCULAR; INTRAVENOUS EVERY 6 HOURS PRN
Status: DISCONTINUED | OUTPATIENT
Start: 2018-01-01 | End: 2018-01-01

## 2018-01-01 RX ORDER — OXYCODONE HYDROCHLORIDE 5 MG/1
5 TABLET ORAL EVERY 4 HOURS PRN
Status: DISCONTINUED | OUTPATIENT
Start: 2018-01-01 | End: 2018-01-01

## 2018-01-01 RX ORDER — INSULIN LISPRO 100 [IU]/ML
INJECTION, SOLUTION INTRAVENOUS; SUBCUTANEOUS
Status: ON HOLD | COMMUNITY
End: 2018-01-01 | Stop reason: HOSPADM

## 2018-01-01 RX ORDER — GADOBUTROL 604.72 MG/ML
10 INJECTION INTRAVENOUS
Status: COMPLETED | OUTPATIENT
Start: 2018-01-01 | End: 2018-01-01

## 2018-01-01 RX ORDER — MAGNESIUM SULFATE HEPTAHYDRATE 40 MG/ML
2 INJECTION, SOLUTION INTRAVENOUS
Status: DISPENSED | OUTPATIENT
Start: 2018-01-01 | End: 2018-01-01

## 2018-01-01 RX ORDER — INSULIN ASPART 100 [IU]/ML
2 INJECTION, SOLUTION INTRAVENOUS; SUBCUTANEOUS ONCE
Status: COMPLETED | OUTPATIENT
Start: 2018-01-01 | End: 2018-01-01

## 2018-01-01 RX ORDER — INSULIN ASPART 100 [IU]/ML
3-5 INJECTION, SOLUTION INTRAVENOUS; SUBCUTANEOUS
Status: DISCONTINUED | OUTPATIENT
Start: 2018-01-01 | End: 2018-01-01

## 2018-01-01 RX ORDER — SODIUM CHLORIDE 9 MG/ML
INJECTION, SOLUTION INTRAVENOUS CONTINUOUS PRN
Status: DISCONTINUED | OUTPATIENT
Start: 2018-01-01 | End: 2018-01-01

## 2018-01-01 RX ORDER — FENTANYL CITRATE 50 UG/ML
INJECTION, SOLUTION INTRAMUSCULAR; INTRAVENOUS
Status: DISCONTINUED | OUTPATIENT
Start: 2018-01-01 | End: 2018-01-01

## 2018-01-01 RX ORDER — SUCCINYLCHOLINE CHLORIDE 20 MG/ML
120 INJECTION INTRAMUSCULAR; INTRAVENOUS ONCE
Status: COMPLETED | OUTPATIENT
Start: 2018-01-01 | End: 2018-01-01

## 2018-01-01 RX ORDER — PHENYLEPHRINE HCL IN 0.9% NACL 1 MG/10 ML
250 SYRINGE (ML) INTRAVENOUS ONCE
Status: COMPLETED | OUTPATIENT
Start: 2018-01-01 | End: 2018-01-01

## 2018-01-01 RX ORDER — FLUTICASONE FUROATE AND VILANTEROL 100; 25 UG/1; UG/1
1 POWDER RESPIRATORY (INHALATION) DAILY
Status: DISCONTINUED | OUTPATIENT
Start: 2018-01-01 | End: 2018-01-01 | Stop reason: HOSPADM

## 2018-01-01 RX ORDER — METOPROLOL TARTRATE 1 MG/ML
INJECTION, SOLUTION INTRAVENOUS
Status: COMPLETED
Start: 2018-01-01 | End: 2018-01-01

## 2018-01-01 RX ORDER — DILTIAZEM HCL 1 MG/ML
5 INJECTION, SOLUTION INTRAVENOUS CONTINUOUS
Status: DISCONTINUED | OUTPATIENT
Start: 2018-01-01 | End: 2018-01-01

## 2018-01-01 RX ORDER — INSULIN ASPART 100 [IU]/ML
0-5 INJECTION, SOLUTION INTRAVENOUS; SUBCUTANEOUS
Status: DISCONTINUED | OUTPATIENT
Start: 2018-01-01 | End: 2018-01-01

## 2018-01-01 RX ORDER — ONDANSETRON 8 MG/1
8 TABLET, ORALLY DISINTEGRATING ORAL EVERY 8 HOURS PRN
Status: DISCONTINUED | OUTPATIENT
Start: 2018-01-01 | End: 2018-01-01 | Stop reason: HOSPADM

## 2018-01-01 RX ORDER — BENZONATATE 100 MG/1
100 CAPSULE ORAL 3 TIMES DAILY PRN
Status: DISCONTINUED | OUTPATIENT
Start: 2018-01-01 | End: 2018-01-01

## 2018-01-01 RX ORDER — POTASSIUM CHLORIDE 20 MEQ/15ML
40 SOLUTION ORAL ONCE
Status: COMPLETED | OUTPATIENT
Start: 2018-01-01 | End: 2018-01-01

## 2018-01-01 RX ORDER — METOPROLOL TARTRATE 50 MG/1
50 TABLET ORAL EVERY 6 HOURS
Status: DISCONTINUED | OUTPATIENT
Start: 2018-01-01 | End: 2018-01-01

## 2018-01-01 RX ORDER — INSULIN ASPART 100 [IU]/ML
8 INJECTION, SOLUTION INTRAVENOUS; SUBCUTANEOUS
Status: DISCONTINUED | OUTPATIENT
Start: 2018-01-01 | End: 2018-01-01

## 2018-01-01 RX ORDER — LACTULOSE 10 G/15ML
200 SOLUTION ORAL; RECTAL 3 TIMES DAILY
Status: DISCONTINUED | OUTPATIENT
Start: 2018-01-01 | End: 2018-01-01

## 2018-01-01 RX ORDER — METOPROLOL TARTRATE 1 MG/ML
2.5 INJECTION, SOLUTION INTRAVENOUS EVERY 5 MIN PRN
Status: COMPLETED | OUTPATIENT
Start: 2018-01-01 | End: 2018-01-01

## 2018-01-01 RX ORDER — FLUCONAZOLE 200 MG/1
200 TABLET ORAL DAILY
Status: DISCONTINUED | OUTPATIENT
Start: 2018-01-01 | End: 2018-01-01 | Stop reason: HOSPADM

## 2018-01-01 RX ORDER — FUROSEMIDE 10 MG/ML
40 INJECTION INTRAMUSCULAR; INTRAVENOUS ONCE
Status: DISCONTINUED | OUTPATIENT
Start: 2018-01-01 | End: 2018-01-01

## 2018-01-01 RX ORDER — SODIUM BICARBONATE 650 MG/1
1300 TABLET ORAL 3 TIMES DAILY
Qty: 180 TABLET | Refills: 11 | Status: ON HOLD | OUTPATIENT
Start: 2018-01-01 | End: 2018-01-01 | Stop reason: HOSPADM

## 2018-01-01 RX ORDER — ALBUMIN HUMAN 50 G/1000ML
25 SOLUTION INTRAVENOUS ONCE
Status: COMPLETED | OUTPATIENT
Start: 2018-01-01 | End: 2018-01-01

## 2018-01-01 RX ORDER — PROPOFOL 10 MG/ML
80 VIAL (ML) INTRAVENOUS ONCE
Status: COMPLETED | OUTPATIENT
Start: 2018-01-01 | End: 2018-01-01

## 2018-01-01 RX ORDER — INSULIN ASPART 100 [IU]/ML
5-7 INJECTION, SOLUTION INTRAVENOUS; SUBCUTANEOUS
Status: DISCONTINUED | OUTPATIENT
Start: 2018-01-01 | End: 2018-01-01

## 2018-01-01 RX ORDER — MIDODRINE HYDROCHLORIDE 5 MG/1
5 TABLET ORAL 3 TIMES DAILY PRN
Status: DISCONTINUED | OUTPATIENT
Start: 2018-01-01 | End: 2018-01-01

## 2018-01-01 RX ORDER — POTASSIUM CHLORIDE 7.46 G/1000ML
10 INJECTION, SOLUTION INTRAVENOUS
Status: COMPLETED | OUTPATIENT
Start: 2018-01-01 | End: 2018-01-01

## 2018-01-01 RX ORDER — GLUCAGON 1 MG
1 KIT INJECTION
Status: DISCONTINUED | OUTPATIENT
Start: 2018-01-01 | End: 2018-01-01 | Stop reason: HOSPADM

## 2018-01-01 RX ORDER — HYDROXYZINE HYDROCHLORIDE 25 MG/1
25 TABLET, FILM COATED ORAL NIGHTLY PRN
Status: DISCONTINUED | OUTPATIENT
Start: 2018-01-01 | End: 2018-01-01

## 2018-01-01 RX ORDER — FUROSEMIDE 10 MG/ML
60 INJECTION INTRAMUSCULAR; INTRAVENOUS ONCE
Status: COMPLETED | OUTPATIENT
Start: 2018-01-01 | End: 2018-01-01

## 2018-01-01 RX ORDER — ZINC GLUCONATE 50 MG
50 TABLET ORAL DAILY
Status: ON HOLD | COMMUNITY
End: 2018-01-01 | Stop reason: HOSPADM

## 2018-01-01 RX ORDER — PANTOPRAZOLE SODIUM 40 MG/1
40 TABLET, DELAYED RELEASE ORAL DAILY
Status: DISCONTINUED | OUTPATIENT
Start: 2018-01-01 | End: 2018-01-01 | Stop reason: HOSPADM

## 2018-01-01 RX ORDER — INSULIN ASPART 100 [IU]/ML
0-5 INJECTION, SOLUTION INTRAVENOUS; SUBCUTANEOUS EVERY 4 HOURS PRN
Status: DISCONTINUED | OUTPATIENT
Start: 2018-01-01 | End: 2018-01-01

## 2018-01-01 RX ORDER — FUROSEMIDE 10 MG/ML
100 INJECTION INTRAMUSCULAR; INTRAVENOUS ONCE
Status: DISCONTINUED | OUTPATIENT
Start: 2018-01-01 | End: 2018-01-01

## 2018-01-01 RX ORDER — IPRATROPIUM BROMIDE AND ALBUTEROL SULFATE 2.5; .5 MG/3ML; MG/3ML
3 SOLUTION RESPIRATORY (INHALATION)
Status: DISCONTINUED | OUTPATIENT
Start: 2018-01-01 | End: 2018-01-01

## 2018-01-01 RX ORDER — NOREPINEPHRINE BITARTRATE/D5W 4MG/250ML
PLASTIC BAG, INJECTION (ML) INTRAVENOUS
Status: COMPLETED
Start: 2018-01-01 | End: 2018-01-01

## 2018-01-01 RX ORDER — DEXTROSE MONOHYDRATE 100 MG/ML
INJECTION, SOLUTION INTRAVENOUS CONTINUOUS
Status: DISCONTINUED | OUTPATIENT
Start: 2018-01-01 | End: 2018-01-01

## 2018-01-01 RX ORDER — HYDROXYZINE HYDROCHLORIDE 25 MG/1
25 TABLET, FILM COATED ORAL 3 TIMES DAILY PRN
Status: ON HOLD | COMMUNITY
End: 2018-01-01 | Stop reason: HOSPADM

## 2018-01-01 RX ORDER — SIMETHICONE 80 MG
1 TABLET,CHEWABLE ORAL 3 TIMES DAILY PRN
Status: DISCONTINUED | OUTPATIENT
Start: 2018-01-01 | End: 2018-01-01 | Stop reason: HOSPADM

## 2018-01-01 RX ORDER — PHENYLEPHRINE HCL IN 0.9% NACL 1 MG/10 ML
100 SYRINGE (ML) INTRAVENOUS ONCE
Status: COMPLETED | OUTPATIENT
Start: 2018-01-01 | End: 2018-01-01

## 2018-01-01 RX ORDER — SODIUM CHLORIDE 9 MG/ML
INJECTION, SOLUTION INTRAVENOUS CONTINUOUS
Status: DISCONTINUED | OUTPATIENT
Start: 2018-01-01 | End: 2018-01-01

## 2018-01-01 RX ORDER — HYDROXYZINE HYDROCHLORIDE 25 MG/1
25 TABLET, FILM COATED ORAL ONCE
Status: COMPLETED | OUTPATIENT
Start: 2018-01-01 | End: 2018-01-01

## 2018-01-01 RX ORDER — LACTULOSE 10 G/15ML
200 SOLUTION ORAL; RECTAL 3 TIMES DAILY PRN
Status: DISCONTINUED | OUTPATIENT
Start: 2018-01-01 | End: 2018-01-01 | Stop reason: HOSPADM

## 2018-01-01 RX ORDER — FUROSEMIDE 10 MG/ML
40 INJECTION INTRAMUSCULAR; INTRAVENOUS 2 TIMES DAILY
Status: DISCONTINUED | OUTPATIENT
Start: 2018-01-01 | End: 2018-01-01

## 2018-01-01 RX ORDER — HEPARIN SODIUM 5000 [USP'U]/ML
5000 INJECTION, SOLUTION INTRAVENOUS; SUBCUTANEOUS EVERY 12 HOURS
Status: DISCONTINUED | OUTPATIENT
Start: 2018-01-01 | End: 2018-01-01 | Stop reason: HOSPADM

## 2018-01-01 RX ORDER — LANOLIN ALCOHOL/MO/W.PET/CERES
400 CREAM (GRAM) TOPICAL 2 TIMES DAILY
Status: DISCONTINUED | OUTPATIENT
Start: 2018-01-01 | End: 2018-01-01

## 2018-01-01 RX ORDER — METOPROLOL TARTRATE 1 MG/ML
2.5 INJECTION, SOLUTION INTRAVENOUS EVERY 5 MIN PRN
Status: DISCONTINUED | OUTPATIENT
Start: 2018-01-01 | End: 2018-01-01 | Stop reason: HOSPADM

## 2018-01-01 RX ORDER — NOREPINEPHRINE BITARTRATE/D5W 4MG/250ML
0.02 PLASTIC BAG, INJECTION (ML) INTRAVENOUS CONTINUOUS
Status: DISCONTINUED | OUTPATIENT
Start: 2018-01-01 | End: 2018-01-01

## 2018-01-01 RX ORDER — MORPHINE SULFATE/0.9% NACL/PF 1 MG/ML
1 PLASTIC BAG, INJECTION (ML) INTRAVENOUS CONTINUOUS
Status: DISCONTINUED | OUTPATIENT
Start: 2018-01-01 | End: 2018-01-01 | Stop reason: HOSPADM

## 2018-01-01 RX ORDER — HEPARIN SODIUM 5000 [USP'U]/ML
5000 INJECTION, SOLUTION INTRAVENOUS; SUBCUTANEOUS EVERY 8 HOURS
Status: DISCONTINUED | OUTPATIENT
Start: 2018-01-01 | End: 2018-01-01

## 2018-01-01 RX ORDER — LACTULOSE 10 G/15ML
30 SOLUTION ORAL 3 TIMES DAILY
Status: DISCONTINUED | OUTPATIENT
Start: 2018-01-01 | End: 2018-01-01

## 2018-01-01 RX ORDER — PROPRANOLOL HYDROCHLORIDE 20 MG/5ML
5 SOLUTION ORAL 2 TIMES DAILY
Status: DISCONTINUED | OUTPATIENT
Start: 2018-01-01 | End: 2018-01-01 | Stop reason: HOSPADM

## 2018-01-01 RX ORDER — LACTULOSE 10 G/15ML
30 SOLUTION ORAL; RECTAL 3 TIMES DAILY
Status: ON HOLD | COMMUNITY
End: 2018-01-01 | Stop reason: SDUPTHER

## 2018-01-01 RX ORDER — INSULIN ASPART 100 [IU]/ML
0-5 INJECTION, SOLUTION INTRAVENOUS; SUBCUTANEOUS
Status: DISCONTINUED | OUTPATIENT
Start: 2018-01-01 | End: 2018-01-01 | Stop reason: HOSPADM

## 2018-01-01 RX ORDER — LACTULOSE 10 G/15ML
15 SOLUTION ORAL 3 TIMES DAILY
Status: DISCONTINUED | OUTPATIENT
Start: 2018-01-01 | End: 2018-01-01 | Stop reason: HOSPADM

## 2018-01-01 RX ORDER — ACETAMINOPHEN 325 MG/1
650 TABLET ORAL EVERY 8 HOURS PRN
Status: DISCONTINUED | OUTPATIENT
Start: 2018-01-01 | End: 2018-01-01 | Stop reason: HOSPADM

## 2018-01-01 RX ORDER — PROPOFOL 10 MG/ML
INJECTION, EMULSION INTRAVENOUS
Status: DISPENSED
Start: 2018-01-01 | End: 2018-01-01

## 2018-01-01 RX ORDER — POTASSIUM CHLORIDE 750 MG/1
30 CAPSULE, EXTENDED RELEASE ORAL ONCE
Status: COMPLETED | OUTPATIENT
Start: 2018-01-01 | End: 2018-01-01

## 2018-01-01 RX ORDER — ONDANSETRON 4 MG/1
4 TABLET, FILM COATED ORAL EVERY 8 HOURS PRN
Status: ON HOLD | COMMUNITY
End: 2018-01-01 | Stop reason: HOSPADM

## 2018-01-01 RX ORDER — ZOSTER VACCINE RECOMBINANT, ADJUVANTED 50 MCG/0.5
KIT INTRAMUSCULAR
Status: ON HOLD | COMMUNITY
Start: 2018-01-01 | End: 2018-01-01 | Stop reason: HOSPADM

## 2018-01-01 RX ORDER — FERROUS SULFATE 325(65) MG
325 TABLET ORAL DAILY
Status: ON HOLD | COMMUNITY
End: 2018-01-01 | Stop reason: HOSPADM

## 2018-01-01 RX ORDER — POTASSIUM CHLORIDE 14.9 MG/ML
20 INJECTION INTRAVENOUS ONCE
Status: COMPLETED | OUTPATIENT
Start: 2018-01-01 | End: 2018-01-01

## 2018-01-01 RX ORDER — BENZONATATE 100 MG/1
100 CAPSULE ORAL 2 TIMES DAILY PRN
Status: ON HOLD | COMMUNITY
End: 2018-01-01 | Stop reason: HOSPADM

## 2018-01-01 RX ORDER — ERGOCALCIFEROL 1.25 MG/1
50000 CAPSULE ORAL
Status: ON HOLD | COMMUNITY
End: 2018-01-01 | Stop reason: HOSPADM

## 2018-01-01 RX ORDER — GUAIFENESIN 600 MG/1
600 TABLET, EXTENDED RELEASE ORAL 2 TIMES DAILY
Status: DISCONTINUED | OUTPATIENT
Start: 2018-01-01 | End: 2018-01-01

## 2018-01-01 RX ORDER — MOXIFLOXACIN HYDROCHLORIDE 400 MG/1
400 TABLET ORAL DAILY
Status: DISCONTINUED | OUTPATIENT
Start: 2018-01-01 | End: 2018-01-01

## 2018-01-01 RX ORDER — PHENYLEPHRINE HCL IN 0.9% NACL 1 MG/10 ML
150 SYRINGE (ML) INTRAVENOUS ONCE
Status: COMPLETED | OUTPATIENT
Start: 2018-01-01 | End: 2018-01-01

## 2018-01-01 RX ORDER — MAGNESIUM SULFATE HEPTAHYDRATE 40 MG/ML
2 INJECTION, SOLUTION INTRAVENOUS ONCE
Status: COMPLETED | OUTPATIENT
Start: 2018-01-01 | End: 2018-01-01

## 2018-01-01 RX ORDER — ONDANSETRON 4 MG/1
4 TABLET, FILM COATED ORAL EVERY 6 HOURS PRN
Status: DISCONTINUED | OUTPATIENT
Start: 2018-01-01 | End: 2018-01-01 | Stop reason: HOSPADM

## 2018-01-01 RX ORDER — DILTIAZEM HCL/D5W 125 MG/125
5 PLASTIC BAG, INJECTION (ML) INTRAVENOUS CONTINUOUS
Status: DISCONTINUED | OUTPATIENT
Start: 2018-01-01 | End: 2018-01-01

## 2018-01-01 RX ORDER — VANCOMYCIN HCL IN 5 % DEXTROSE 1G/250ML
1000 PLASTIC BAG, INJECTION (ML) INTRAVENOUS ONCE
Status: COMPLETED | OUTPATIENT
Start: 2018-01-01 | End: 2018-01-01

## 2018-01-01 RX ORDER — ALBUMIN HUMAN 50 G/1000ML
SOLUTION INTRAVENOUS
Status: COMPLETED
Start: 2018-01-01 | End: 2018-01-01

## 2018-01-01 RX ORDER — ALBUMIN HUMAN 50 G/1000ML
12.5 SOLUTION INTRAVENOUS ONCE
Status: COMPLETED | OUTPATIENT
Start: 2018-01-01 | End: 2018-01-01

## 2018-01-01 RX ORDER — LEVALBUTEROL 1.25 MG/.5ML
1.25 SOLUTION, CONCENTRATE RESPIRATORY (INHALATION) EVERY 6 HOURS PRN
Status: DISCONTINUED | OUTPATIENT
Start: 2018-01-01 | End: 2018-01-01

## 2018-01-01 RX ORDER — PROPRANOLOL HYDROCHLORIDE 10 MG/1
5 TABLET ORAL 2 TIMES DAILY
Status: ON HOLD | COMMUNITY
End: 2018-01-01 | Stop reason: HOSPADM

## 2018-01-01 RX ORDER — CEFEPIME HYDROCHLORIDE 2 G/1
2 INJECTION, POWDER, FOR SOLUTION INTRAVENOUS
Status: DISCONTINUED | OUTPATIENT
Start: 2018-01-01 | End: 2018-01-01

## 2018-01-01 RX ORDER — HYDROXYZINE HYDROCHLORIDE 10 MG/1
10 TABLET, FILM COATED ORAL NIGHTLY PRN
Status: COMPLETED | OUTPATIENT
Start: 2018-01-01 | End: 2018-01-01

## 2018-01-01 RX ORDER — POTASSIUM CHLORIDE 20 MEQ/1
40 TABLET, EXTENDED RELEASE ORAL ONCE
Status: COMPLETED | OUTPATIENT
Start: 2018-01-01 | End: 2018-01-01

## 2018-01-01 RX ORDER — IBUPROFEN 200 MG
16 TABLET ORAL
Status: DISCONTINUED | OUTPATIENT
Start: 2018-01-01 | End: 2018-01-01 | Stop reason: HOSPADM

## 2018-01-01 RX ORDER — IBUPROFEN 200 MG
24 TABLET ORAL
Status: DISCONTINUED | OUTPATIENT
Start: 2018-01-01 | End: 2018-01-01 | Stop reason: HOSPADM

## 2018-01-01 RX ORDER — HYDROXYZINE HYDROCHLORIDE 10 MG/1
10 TABLET, FILM COATED ORAL ONCE
Status: COMPLETED | OUTPATIENT
Start: 2018-01-01 | End: 2018-01-01

## 2018-01-01 RX ORDER — METOPROLOL TARTRATE 25 MG/1
25 TABLET, FILM COATED ORAL 2 TIMES DAILY
Status: DISCONTINUED | OUTPATIENT
Start: 2018-01-01 | End: 2018-01-01

## 2018-01-01 RX ORDER — HYDROMORPHONE HYDROCHLORIDE 1 MG/ML
0.2 INJECTION, SOLUTION INTRAMUSCULAR; INTRAVENOUS; SUBCUTANEOUS
Status: DISCONTINUED | OUTPATIENT
Start: 2018-01-01 | End: 2018-01-01 | Stop reason: HOSPADM

## 2018-01-01 RX ORDER — FAMOTIDINE 20 MG/1
20 TABLET, FILM COATED ORAL NIGHTLY
Status: DISCONTINUED | OUTPATIENT
Start: 2018-01-01 | End: 2018-01-01 | Stop reason: HOSPADM

## 2018-01-01 RX ORDER — GUAIFENESIN 100 MG/5ML
200 SOLUTION ORAL EVERY 6 HOURS
Status: DISCONTINUED | OUTPATIENT
Start: 2018-01-01 | End: 2018-01-01 | Stop reason: HOSPADM

## 2018-01-01 RX ORDER — AZITHROMYCIN 250 MG/1
500 TABLET, FILM COATED ORAL DAILY
Status: DISCONTINUED | OUTPATIENT
Start: 2018-01-01 | End: 2018-01-01

## 2018-01-01 RX ORDER — LACTULOSE 10 G/15ML
15 SOLUTION ORAL; RECTAL 3 TIMES DAILY
Qty: 2500 ML | Refills: 1 | Status: ON HOLD | OUTPATIENT
Start: 2018-01-01 | End: 2018-01-01 | Stop reason: HOSPADM

## 2018-01-01 RX ORDER — IBUPROFEN 200 MG
16 TABLET ORAL
Status: DISCONTINUED | OUTPATIENT
Start: 2018-01-01 | End: 2018-01-01

## 2018-01-01 RX ORDER — DILTIAZEM HCL 1 MG/ML
5 INJECTION, SOLUTION INTRAVENOUS CONTINUOUS
Status: DISCONTINUED | OUTPATIENT
Start: 2018-01-01 | End: 2018-01-01 | Stop reason: HOSPADM

## 2018-01-01 RX ORDER — LACTULOSE 10 G/15ML
30 SOLUTION ORAL EVERY 6 HOURS PRN
Status: DISCONTINUED | OUTPATIENT
Start: 2018-01-01 | End: 2018-01-01 | Stop reason: HOSPADM

## 2018-01-01 RX ORDER — ALBUMIN HUMAN 250 G/1000ML
25 SOLUTION INTRAVENOUS
Status: COMPLETED | OUTPATIENT
Start: 2018-01-01 | End: 2018-01-01

## 2018-01-01 RX ORDER — LORAZEPAM 2 MG/ML
1 INJECTION INTRAMUSCULAR EVERY 30 MIN PRN
Status: DISCONTINUED | OUTPATIENT
Start: 2018-01-01 | End: 2018-01-01 | Stop reason: HOSPADM

## 2018-01-01 RX ORDER — VANCOMYCIN HCL IN 5 % DEXTROSE 1G/250ML
1000 PLASTIC BAG, INJECTION (ML) INTRAVENOUS
Status: DISCONTINUED | OUTPATIENT
Start: 2018-01-01 | End: 2018-01-01

## 2018-01-01 RX ORDER — HYDROMORPHONE HYDROCHLORIDE 1 MG/ML
0.5 INJECTION, SOLUTION INTRAMUSCULAR; INTRAVENOUS; SUBCUTANEOUS
Status: DISCONTINUED | OUTPATIENT
Start: 2018-01-01 | End: 2018-01-01

## 2018-01-01 RX ORDER — GUAIFENESIN 100 MG/5ML
200 SOLUTION ORAL EVERY 4 HOURS PRN
Status: DISCONTINUED | OUTPATIENT
Start: 2018-01-01 | End: 2018-01-01

## 2018-01-01 RX ORDER — LEVALBUTEROL 1.25 MG/.5ML
1.25 SOLUTION, CONCENTRATE RESPIRATORY (INHALATION)
Status: DISCONTINUED | OUTPATIENT
Start: 2018-01-01 | End: 2018-01-01

## 2018-01-01 RX ORDER — DILTIAZEM HCL 1 MG/ML
5 INJECTION, SOLUTION INTRAVENOUS CONTINUOUS
Status: DISCONTINUED | OUTPATIENT
Start: 2018-01-01 | End: 2018-01-01 | Stop reason: ALTCHOICE

## 2018-01-01 RX ORDER — ALBUMIN HUMAN 250 G/1000ML
25 SOLUTION INTRAVENOUS EVERY 8 HOURS
Status: DISCONTINUED | OUTPATIENT
Start: 2018-01-01 | End: 2018-01-01

## 2018-01-01 RX ORDER — FUROSEMIDE 10 MG/ML
INJECTION INTRAMUSCULAR; INTRAVENOUS
Status: DISPENSED
Start: 2018-01-01 | End: 2018-01-01

## 2018-01-01 RX ORDER — MOXIFLOXACIN HYDROCHLORIDE 400 MG/1
400 TABLET ORAL DAILY
Status: COMPLETED | OUTPATIENT
Start: 2018-01-01 | End: 2018-01-01

## 2018-01-01 RX ORDER — OMEPRAZOLE 40 MG/1
40 CAPSULE, DELAYED RELEASE ORAL EVERY MORNING
Status: ON HOLD | COMMUNITY
End: 2018-01-01 | Stop reason: HOSPADM

## 2018-01-01 RX ORDER — INSULIN ASPART 100 [IU]/ML
0-5 INJECTION, SOLUTION INTRAVENOUS; SUBCUTANEOUS EVERY 4 HOURS PRN
Status: DISCONTINUED | OUTPATIENT
Start: 2018-01-01 | End: 2018-01-01 | Stop reason: HOSPADM

## 2018-01-01 RX ORDER — IPRATROPIUM BROMIDE AND ALBUTEROL SULFATE 2.5; .5 MG/3ML; MG/3ML
3 SOLUTION RESPIRATORY (INHALATION) EVERY 6 HOURS
Status: DISCONTINUED | OUTPATIENT
Start: 2018-01-01 | End: 2018-01-01

## 2018-01-01 RX ORDER — PROPOFOL 10 MG/ML
INJECTION, EMULSION INTRAVENOUS
Status: COMPLETED
Start: 2018-01-01 | End: 2018-01-01

## 2018-01-01 RX ORDER — FLUCONAZOLE 200 MG/1
400 TABLET ORAL ONCE
Status: COMPLETED | OUTPATIENT
Start: 2018-01-01 | End: 2018-01-01

## 2018-01-01 RX ORDER — LANOLIN ALCOHOL/MO/W.PET/CERES
400 CREAM (GRAM) TOPICAL 2 TIMES DAILY
Status: CANCELLED | OUTPATIENT
Start: 2018-01-01 | End: 2018-01-01

## 2018-01-01 RX ORDER — TRAMADOL HYDROCHLORIDE 50 MG/1
50 TABLET ORAL EVERY 8 HOURS PRN
Status: DISCONTINUED | OUTPATIENT
Start: 2018-01-01 | End: 2018-01-01 | Stop reason: HOSPADM

## 2018-01-01 RX ORDER — OXYCODONE HYDROCHLORIDE 10 MG/1
10 TABLET ORAL EVERY 4 HOURS PRN
Status: DISCONTINUED | OUTPATIENT
Start: 2018-01-01 | End: 2018-01-01

## 2018-01-01 RX ORDER — SODIUM BICARBONATE 650 MG/1
1300 TABLET ORAL 3 TIMES DAILY
Status: DISCONTINUED | OUTPATIENT
Start: 2018-01-01 | End: 2018-01-01 | Stop reason: HOSPADM

## 2018-01-01 RX ORDER — LACTULOSE 10 G/15ML
15 SOLUTION ORAL 3 TIMES DAILY
Status: DISCONTINUED | OUTPATIENT
Start: 2018-01-01 | End: 2018-01-01

## 2018-01-01 RX ORDER — LACTULOSE 10 G/15ML
200 SOLUTION ORAL; RECTAL ONCE
Status: COMPLETED | OUTPATIENT
Start: 2018-01-01 | End: 2018-01-01

## 2018-01-01 RX ORDER — SODIUM,POTASSIUM PHOSPHATES 280-250MG
2 POWDER IN PACKET (EA) ORAL 4 TIMES DAILY
Status: DISCONTINUED | OUTPATIENT
Start: 2018-01-01 | End: 2018-01-01

## 2018-01-01 RX ORDER — IPRATROPIUM BROMIDE AND ALBUTEROL SULFATE 2.5; .5 MG/3ML; MG/3ML
3 SOLUTION RESPIRATORY (INHALATION) EVERY 6 HOURS PRN
Status: DISCONTINUED | OUTPATIENT
Start: 2018-01-01 | End: 2018-01-01 | Stop reason: HOSPADM

## 2018-01-01 RX ORDER — INSULIN ASPART 100 [IU]/ML
2 INJECTION, SOLUTION INTRAVENOUS; SUBCUTANEOUS
Status: DISCONTINUED | OUTPATIENT
Start: 2018-01-01 | End: 2018-01-01 | Stop reason: HOSPADM

## 2018-01-01 RX ORDER — INSULIN ASPART 100 [IU]/ML
6 INJECTION, SOLUTION INTRAVENOUS; SUBCUTANEOUS
Status: DISCONTINUED | OUTPATIENT
Start: 2018-01-01 | End: 2018-01-01

## 2018-01-01 RX ADMIN — LEVALBUTEROL 1.25 MG: 1.25 SOLUTION, CONCENTRATE RESPIRATORY (INHALATION) at 02:12

## 2018-01-01 RX ADMIN — INSULIN ASPART 3 UNITS: 100 INJECTION, SOLUTION INTRAVENOUS; SUBCUTANEOUS at 06:11

## 2018-01-01 RX ADMIN — FUROSEMIDE 60 MG: 10 INJECTION, SOLUTION INTRAMUSCULAR; INTRAVENOUS at 10:11

## 2018-01-01 RX ADMIN — ALBUMIN HUMAN 25 G: 0.25 SOLUTION INTRAVENOUS at 12:11

## 2018-01-01 RX ADMIN — RIFAXIMIN 550 MG: 550 TABLET ORAL at 09:12

## 2018-01-01 RX ADMIN — RIFAXIMIN 550 MG: 550 TABLET ORAL at 08:12

## 2018-01-01 RX ADMIN — HEPARIN SODIUM 5000 UNITS: 5000 INJECTION, SOLUTION INTRAVENOUS; SUBCUTANEOUS at 05:11

## 2018-01-01 RX ADMIN — LEVALBUTEROL 1.25 MG: 1.25 SOLUTION, CONCENTRATE RESPIRATORY (INHALATION) at 08:11

## 2018-01-01 RX ADMIN — LEVALBUTEROL 1.25 MG: 1.25 SOLUTION, CONCENTRATE RESPIRATORY (INHALATION) at 01:11

## 2018-01-01 RX ADMIN — METOPROLOL TARTRATE 25 MG: 25 TABLET ORAL at 09:11

## 2018-01-01 RX ADMIN — GUAIFENESIN 200 MG: 100 SOLUTION ORAL at 11:12

## 2018-01-01 RX ADMIN — METOPROLOL TARTRATE 5 MG: 1 INJECTION, SOLUTION INTRAVENOUS at 08:11

## 2018-01-01 RX ADMIN — RIFAXIMIN 550 MG: 550 TABLET ORAL at 08:11

## 2018-01-01 RX ADMIN — BENZONATATE 100 MG: 100 CAPSULE ORAL at 05:11

## 2018-01-01 RX ADMIN — GUAIFENESIN 200 MG: 100 SOLUTION ORAL at 05:11

## 2018-01-01 RX ADMIN — METOPROLOL TARTRATE 5 MG: 5 INJECTION, SOLUTION INTRAVENOUS at 08:12

## 2018-01-01 RX ADMIN — FAMOTIDINE 20 MG: 20 TABLET ORAL at 09:11

## 2018-01-01 RX ADMIN — DEXTROSE MONOHYDRATE 25 G: 500 INJECTION PARENTERAL at 10:11

## 2018-01-01 RX ADMIN — Medication 0.02 MCG/KG/MIN: at 06:11

## 2018-01-01 RX ADMIN — FAMOTIDINE 20 MG: 20 TABLET ORAL at 08:12

## 2018-01-01 RX ADMIN — INSULIN ASPART 10 UNITS: 100 INJECTION, SOLUTION INTRAVENOUS; SUBCUTANEOUS at 05:11

## 2018-01-01 RX ADMIN — GUAIFENESIN 200 MG: 100 SOLUTION ORAL at 05:12

## 2018-01-01 RX ADMIN — ALBUMIN HUMAN 25 G: 0.25 SOLUTION INTRAVENOUS at 11:11

## 2018-01-01 RX ADMIN — SODIUM CHLORIDE: 0.9 INJECTION, SOLUTION INTRAVENOUS at 06:12

## 2018-01-01 RX ADMIN — DILTIAZEM HYDROCHLORIDE 5 MG/HR: 5 INJECTION INTRAVENOUS at 06:12

## 2018-01-01 RX ADMIN — INSULIN ASPART 2 UNITS: 100 INJECTION, SOLUTION INTRAVENOUS; SUBCUTANEOUS at 08:12

## 2018-01-01 RX ADMIN — INSULIN ASPART 5 UNITS: 100 INJECTION, SOLUTION INTRAVENOUS; SUBCUTANEOUS at 06:11

## 2018-01-01 RX ADMIN — SODIUM CHLORIDE: 0.9 INJECTION, SOLUTION INTRAVENOUS at 08:12

## 2018-01-01 RX ADMIN — PIPERACILLIN AND TAZOBACTAM 4.5 G: 4; .5 INJECTION, POWDER, LYOPHILIZED, FOR SOLUTION INTRAVENOUS; PARENTERAL at 12:11

## 2018-01-01 RX ADMIN — IPRATROPIUM BROMIDE AND ALBUTEROL SULFATE 3 ML: .5; 3 SOLUTION RESPIRATORY (INHALATION) at 08:11

## 2018-01-01 RX ADMIN — MAGNESIUM OXIDE TAB 400 MG (241.3 MG ELEMENTAL MG) 400 MG: 400 (241.3 MG) TAB at 08:11

## 2018-01-01 RX ADMIN — HEPARIN SODIUM 5000 UNITS: 5000 INJECTION, SOLUTION INTRAVENOUS; SUBCUTANEOUS at 08:12

## 2018-01-01 RX ADMIN — GUAIFENESIN 200 MG: 100 SOLUTION ORAL at 11:11

## 2018-01-01 RX ADMIN — INSULIN ASPART 10 UNITS: 100 INJECTION, SOLUTION INTRAVENOUS; SUBCUTANEOUS at 12:11

## 2018-01-01 RX ADMIN — INSULIN ASPART 3 UNITS: 100 INJECTION, SOLUTION INTRAVENOUS; SUBCUTANEOUS at 05:12

## 2018-01-01 RX ADMIN — LACTULOSE 15 G: 20 SOLUTION ORAL at 09:12

## 2018-01-01 RX ADMIN — LACTULOSE 20 G: 20 SOLUTION ORAL at 09:11

## 2018-01-01 RX ADMIN — CEFEPIME 1 G: 1 INJECTION, POWDER, FOR SOLUTION INTRAMUSCULAR; INTRAVENOUS at 09:11

## 2018-01-01 RX ADMIN — RIFAXIMIN 550 MG: 550 TABLET ORAL at 10:11

## 2018-01-01 RX ADMIN — FUROSEMIDE 40 MG: 10 INJECTION, SOLUTION INTRAMUSCULAR; INTRAVENOUS at 04:11

## 2018-01-01 RX ADMIN — INSULIN DETEMIR 5 UNITS: 100 INJECTION, SOLUTION SUBCUTANEOUS at 08:12

## 2018-01-01 RX ADMIN — INSULIN ASPART 2 UNITS: 100 INJECTION, SOLUTION INTRAVENOUS; SUBCUTANEOUS at 05:11

## 2018-01-01 RX ADMIN — INSULIN ASPART 1 UNITS: 100 INJECTION, SOLUTION INTRAVENOUS; SUBCUTANEOUS at 09:10

## 2018-01-01 RX ADMIN — PIPERACILLIN AND TAZOBACTAM 4.5 G: 4; .5 INJECTION, POWDER, LYOPHILIZED, FOR SOLUTION INTRAVENOUS; PARENTERAL at 03:11

## 2018-01-01 RX ADMIN — HEPARIN SODIUM 5000 UNITS: 5000 INJECTION, SOLUTION INTRAVENOUS; SUBCUTANEOUS at 03:11

## 2018-01-01 RX ADMIN — GADOBUTROL 10 ML: 604.72 INJECTION INTRAVENOUS at 06:09

## 2018-01-01 RX ADMIN — SODIUM CHLORIDE: 0.9 INJECTION, SOLUTION INTRAVENOUS at 10:11

## 2018-01-01 RX ADMIN — INSULIN ASPART 4 UNITS: 100 INJECTION, SOLUTION INTRAVENOUS; SUBCUTANEOUS at 01:11

## 2018-01-01 RX ADMIN — METOPROLOL TARTRATE 25 MG: 25 TABLET ORAL at 08:11

## 2018-01-01 RX ADMIN — Medication 0.02 MCG/KG/MIN: at 02:11

## 2018-01-01 RX ADMIN — SODIUM CHLORIDE: 0.9 INJECTION, SOLUTION INTRAVENOUS at 04:11

## 2018-01-01 RX ADMIN — MIDAZOLAM HYDROCHLORIDE 2 MG: 1 INJECTION, SOLUTION INTRAMUSCULAR; INTRAVENOUS at 01:12

## 2018-01-01 RX ADMIN — GUAIFENESIN 200 MG: 100 SOLUTION ORAL at 01:12

## 2018-01-01 RX ADMIN — LACTULOSE 30 G: 20 SOLUTION ORAL at 08:11

## 2018-01-01 RX ADMIN — DIBASIC SODIUM PHOSPHATE, MONOBASIC POTASSIUM PHOSPHATE AND MONOBASIC SODIUM PHOSPHATE 2 TABLET: 852; 155; 130 TABLET ORAL at 08:12

## 2018-01-01 RX ADMIN — FLUCONAZOLE 200 MG: 200 TABLET ORAL at 09:11

## 2018-01-01 RX ADMIN — GUAIFENESIN 600 MG: 600 TABLET, EXTENDED RELEASE ORAL at 09:11

## 2018-01-01 RX ADMIN — Medication 250 MCG: at 07:11

## 2018-01-01 RX ADMIN — LEVALBUTEROL 1.25 MG: 1.25 SOLUTION, CONCENTRATE RESPIRATORY (INHALATION) at 07:11

## 2018-01-01 RX ADMIN — INSULIN DETEMIR 5 UNITS: 100 INJECTION, SOLUTION SUBCUTANEOUS at 09:12

## 2018-01-01 RX ADMIN — INSULIN ASPART 1 UNITS: 100 INJECTION, SOLUTION INTRAVENOUS; SUBCUTANEOUS at 12:12

## 2018-01-01 RX ADMIN — INSULIN ASPART 2 UNITS: 100 INJECTION, SOLUTION INTRAVENOUS; SUBCUTANEOUS at 08:11

## 2018-01-01 RX ADMIN — INSULIN ASPART 2 UNITS: 100 INJECTION, SOLUTION INTRAVENOUS; SUBCUTANEOUS at 12:12

## 2018-01-01 RX ADMIN — MAGNESIUM SULFATE HEPTAHYDRATE 3 G: 500 INJECTION, SOLUTION INTRAMUSCULAR; INTRAVENOUS at 12:11

## 2018-01-01 RX ADMIN — VANCOMYCIN HYDROCHLORIDE 1000 MG: 1 INJECTION, POWDER, LYOPHILIZED, FOR SOLUTION INTRAVENOUS at 05:11

## 2018-01-01 RX ADMIN — ONDANSETRON 8 MG: 8 TABLET, ORALLY DISINTEGRATING ORAL at 05:11

## 2018-01-01 RX ADMIN — INSULIN ASPART 2 UNITS: 100 INJECTION, SOLUTION INTRAVENOUS; SUBCUTANEOUS at 09:11

## 2018-01-01 RX ADMIN — FLUCONAZOLE 200 MG: 200 TABLET ORAL at 09:12

## 2018-01-01 RX ADMIN — LACTULOSE 30 G: 20 SOLUTION ORAL at 03:11

## 2018-01-01 RX ADMIN — HEPARIN SODIUM 5000 UNITS: 5000 INJECTION, SOLUTION INTRAVENOUS; SUBCUTANEOUS at 01:11

## 2018-01-01 RX ADMIN — HEPARIN SODIUM 5000 UNITS: 5000 INJECTION, SOLUTION INTRAVENOUS; SUBCUTANEOUS at 02:11

## 2018-01-01 RX ADMIN — MAGNESIUM SULFATE IN WATER 2 G: 40 INJECTION, SOLUTION INTRAVENOUS at 11:12

## 2018-01-01 RX ADMIN — CEFEPIME 1 G: 1 INJECTION, POWDER, FOR SOLUTION INTRAMUSCULAR; INTRAVENOUS at 11:11

## 2018-01-01 RX ADMIN — LACTULOSE 200 G: 10 SOLUTION ORAL at 09:11

## 2018-01-01 RX ADMIN — INSULIN ASPART 4 UNITS: 100 INJECTION, SOLUTION INTRAVENOUS; SUBCUTANEOUS at 04:12

## 2018-01-01 RX ADMIN — DILTIAZEM HYDROCHLORIDE 30 MG: 30 TABLET, FILM COATED ORAL at 02:12

## 2018-01-01 RX ADMIN — INSULIN ASPART 2 UNITS: 100 INJECTION, SOLUTION INTRAVENOUS; SUBCUTANEOUS at 04:11

## 2018-01-01 RX ADMIN — FUROSEMIDE 40 MG: 10 INJECTION, SOLUTION INTRAMUSCULAR; INTRAVENOUS at 01:11

## 2018-01-01 RX ADMIN — DILTIAZEM HYDROCHLORIDE 30 MG: 30 TABLET, FILM COATED ORAL at 09:12

## 2018-01-01 RX ADMIN — FAMOTIDINE 20 MG: 20 TABLET ORAL at 08:11

## 2018-01-01 RX ADMIN — HEPARIN SODIUM 5000 UNITS: 5000 INJECTION, SOLUTION INTRAVENOUS; SUBCUTANEOUS at 08:11

## 2018-01-01 RX ADMIN — LACTULOSE 15 G: 20 SOLUTION ORAL at 08:12

## 2018-01-01 RX ADMIN — LACTULOSE 15 G: 20 SOLUTION ORAL at 03:12

## 2018-01-01 RX ADMIN — INSULIN ASPART 5 UNITS: 100 INJECTION, SOLUTION INTRAVENOUS; SUBCUTANEOUS at 05:11

## 2018-01-01 RX ADMIN — FUROSEMIDE 40 MG: 10 INJECTION, SOLUTION INTRAMUSCULAR; INTRAVENOUS at 05:11

## 2018-01-01 RX ADMIN — PROPOFOL 15 MCG/KG/MIN: 10 INJECTION, EMULSION INTRAVENOUS at 01:11

## 2018-01-01 RX ADMIN — GUAIFENESIN 200 MG: 200 SOLUTION ORAL at 03:11

## 2018-01-01 RX ADMIN — LEVALBUTEROL 1.25 MG: 1.25 SOLUTION, CONCENTRATE RESPIRATORY (INHALATION) at 11:11

## 2018-01-01 RX ADMIN — BENZONATATE 100 MG: 100 CAPSULE ORAL at 11:11

## 2018-01-01 RX ADMIN — SODIUM CHLORIDE: 0.9 INJECTION, SOLUTION INTRAVENOUS at 11:12

## 2018-01-01 RX ADMIN — MAGNESIUM SULFATE IN WATER 2 G: 40 INJECTION, SOLUTION INTRAVENOUS at 01:12

## 2018-01-01 RX ADMIN — GUAIFENESIN 200 MG: 100 SOLUTION ORAL at 06:12

## 2018-01-01 RX ADMIN — METOPROLOL TARTRATE 5 MG: 5 INJECTION, SOLUTION INTRAVENOUS at 02:12

## 2018-01-01 RX ADMIN — FUROSEMIDE 40 MG: 10 INJECTION, SOLUTION INTRAMUSCULAR; INTRAVENOUS at 12:11

## 2018-01-01 RX ADMIN — SODIUM BICARBONATE 650 MG TABLET 1300 MG: at 11:11

## 2018-01-01 RX ADMIN — SODIUM CHLORIDE 1000 ML: 0.9 INJECTION, SOLUTION INTRAVENOUS at 08:10

## 2018-01-01 RX ADMIN — LEVALBUTEROL 1.25 MG: 1.25 SOLUTION, CONCENTRATE RESPIRATORY (INHALATION) at 12:11

## 2018-01-01 RX ADMIN — HEPARIN SODIUM 5000 UNITS: 5000 INJECTION, SOLUTION INTRAVENOUS; SUBCUTANEOUS at 06:11

## 2018-01-01 RX ADMIN — INSULIN ASPART 2 UNITS: 100 INJECTION, SOLUTION INTRAVENOUS; SUBCUTANEOUS at 04:12

## 2018-01-01 RX ADMIN — INSULIN ASPART 4 UNITS: 100 INJECTION, SOLUTION INTRAVENOUS; SUBCUTANEOUS at 08:12

## 2018-01-01 RX ADMIN — DILTIAZEM HYDROCHLORIDE 5 MG/HR: 5 INJECTION INTRAVENOUS at 03:11

## 2018-01-01 RX ADMIN — HEPARIN SODIUM 5000 UNITS: 5000 INJECTION, SOLUTION INTRAVENOUS; SUBCUTANEOUS at 09:11

## 2018-01-01 RX ADMIN — POTASSIUM CHLORIDE 20 MEQ: 14.9 INJECTION, SOLUTION INTRAVENOUS at 05:11

## 2018-01-01 RX ADMIN — RIFAXIMIN 550 MG: 550 TABLET ORAL at 09:11

## 2018-01-01 RX ADMIN — ALBUMIN (HUMAN) 25 G: 12.5 SOLUTION INTRAVENOUS at 12:12

## 2018-01-01 RX ADMIN — INSULIN ASPART 3 UNITS: 100 INJECTION, SOLUTION INTRAVENOUS; SUBCUTANEOUS at 09:11

## 2018-01-01 RX ADMIN — INSULIN ASPART 3 UNITS: 100 INJECTION, SOLUTION INTRAVENOUS; SUBCUTANEOUS at 04:12

## 2018-01-01 RX ADMIN — PSYLLIUM HUSK 1 PACKET: 3.4 POWDER ORAL at 09:12

## 2018-01-01 RX ADMIN — DILTIAZEM HYDROCHLORIDE 30 MG: 30 TABLET, FILM COATED ORAL at 10:12

## 2018-01-01 RX ADMIN — INSULIN ASPART 14 UNITS: 100 INJECTION, SOLUTION INTRAVENOUS; SUBCUTANEOUS at 08:11

## 2018-01-01 RX ADMIN — INSULIN DETEMIR 6 UNITS: 100 INJECTION, SOLUTION SUBCUTANEOUS at 09:11

## 2018-01-01 RX ADMIN — FLUCONAZOLE 200 MG: 200 TABLET ORAL at 08:11

## 2018-01-01 RX ADMIN — INSULIN ASPART 2 UNITS: 100 INJECTION, SOLUTION INTRAVENOUS; SUBCUTANEOUS at 12:11

## 2018-01-01 RX ADMIN — HYDROXYZINE HYDROCHLORIDE 10 MG: 10 TABLET ORAL at 08:11

## 2018-01-01 RX ADMIN — VANCOMYCIN HYDROCHLORIDE 750 MG: 750 INJECTION, POWDER, LYOPHILIZED, FOR SOLUTION INTRAVENOUS at 12:11

## 2018-01-01 RX ADMIN — GUAIFENESIN 200 MG: 100 SOLUTION ORAL at 12:12

## 2018-01-01 RX ADMIN — HYDROXYZINE HYDROCHLORIDE 25 MG: 25 TABLET, FILM COATED ORAL at 09:11

## 2018-01-01 RX ADMIN — INSULIN ASPART 2 UNITS: 100 INJECTION, SOLUTION INTRAVENOUS; SUBCUTANEOUS at 05:10

## 2018-01-01 RX ADMIN — FLUCONAZOLE 200 MG: 200 TABLET ORAL at 08:12

## 2018-01-01 RX ADMIN — RIFAXIMIN 550 MG: 550 TABLET ORAL at 08:10

## 2018-01-01 RX ADMIN — ALBUMIN HUMAN 25 G: 0.25 SOLUTION INTRAVENOUS at 08:10

## 2018-01-01 RX ADMIN — VANCOMYCIN HYDROCHLORIDE 750 MG: 750 INJECTION, POWDER, LYOPHILIZED, FOR SOLUTION INTRAVENOUS at 04:10

## 2018-01-01 RX ADMIN — ALBUMIN HUMAN 25 G: 0.25 SOLUTION INTRAVENOUS at 08:11

## 2018-01-01 RX ADMIN — BENZONATATE 100 MG: 100 CAPSULE ORAL at 01:11

## 2018-01-01 RX ADMIN — LEVALBUTEROL 1.25 MG: 1.25 SOLUTION, CONCENTRATE RESPIRATORY (INHALATION) at 07:12

## 2018-01-01 RX ADMIN — INSULIN ASPART 2 UNITS: 100 INJECTION, SOLUTION INTRAVENOUS; SUBCUTANEOUS at 01:12

## 2018-01-01 RX ADMIN — PROPOFOL 20 MCG/KG/MIN: 10 INJECTION, EMULSION INTRAVENOUS at 01:11

## 2018-01-01 RX ADMIN — INSULIN ASPART 2 UNITS: 100 INJECTION, SOLUTION INTRAVENOUS; SUBCUTANEOUS at 06:11

## 2018-01-01 RX ADMIN — ALBUMIN HUMAN 25 G: 0.25 SOLUTION INTRAVENOUS at 11:10

## 2018-01-01 RX ADMIN — HEPARIN SODIUM 5000 UNITS: 5000 INJECTION, SOLUTION INTRAVENOUS; SUBCUTANEOUS at 10:11

## 2018-01-01 RX ADMIN — INSULIN ASPART 2 UNITS: 100 INJECTION, SOLUTION INTRAVENOUS; SUBCUTANEOUS at 06:12

## 2018-01-01 RX ADMIN — INSULIN ASPART 4 UNITS: 100 INJECTION, SOLUTION INTRAVENOUS; SUBCUTANEOUS at 05:11

## 2018-01-01 RX ADMIN — INSULIN ASPART 2 UNITS: 100 INJECTION, SOLUTION INTRAVENOUS; SUBCUTANEOUS at 05:12

## 2018-01-01 RX ADMIN — FUROSEMIDE 40 MG: 10 INJECTION, SOLUTION INTRAMUSCULAR; INTRAVENOUS at 11:11

## 2018-01-01 RX ADMIN — GUAIFENESIN 200 MG: 200 SOLUTION ORAL at 02:11

## 2018-01-01 RX ADMIN — DILTIAZEM HYDROCHLORIDE 30 MG: 30 TABLET, FILM COATED ORAL at 03:12

## 2018-01-01 RX ADMIN — BENZONATATE 100 MG: 100 CAPSULE ORAL at 08:11

## 2018-01-01 RX ADMIN — INSULIN ASPART 1 UNITS: 100 INJECTION, SOLUTION INTRAVENOUS; SUBCUTANEOUS at 11:11

## 2018-01-01 RX ADMIN — INSULIN ASPART 1 UNITS: 100 INJECTION, SOLUTION INTRAVENOUS; SUBCUTANEOUS at 04:12

## 2018-01-01 RX ADMIN — DILTIAZEM HYDROCHLORIDE 30 MG: 30 TABLET, FILM COATED ORAL at 05:12

## 2018-01-01 RX ADMIN — SODIUM BICARBONATE 650 MG TABLET 1300 MG: at 08:11

## 2018-01-01 RX ADMIN — ACETAMINOPHEN 650 MG: 325 TABLET, FILM COATED ORAL at 04:11

## 2018-01-01 RX ADMIN — VANCOMYCIN 1000 MG: 1 INJECTION, SOLUTION INTRAVENOUS at 08:11

## 2018-01-01 RX ADMIN — PIPERACILLIN AND TAZOBACTAM 4.5 G: 4; .5 INJECTION, POWDER, LYOPHILIZED, FOR SOLUTION INTRAVENOUS; PARENTERAL at 04:11

## 2018-01-01 RX ADMIN — DEXTROSE: 10 SOLUTION INTRAVENOUS at 11:11

## 2018-01-01 RX ADMIN — DILTIAZEM HYDROCHLORIDE 5 MG/HR: 5 INJECTION INTRAVENOUS at 12:11

## 2018-01-01 RX ADMIN — SODIUM BICARBONATE 650 MG TABLET 1300 MG: at 03:11

## 2018-01-01 RX ADMIN — INSULIN ASPART 7 UNITS: 100 INJECTION, SOLUTION INTRAVENOUS; SUBCUTANEOUS at 02:11

## 2018-01-01 RX ADMIN — SODIUM CHLORIDE: 0.9 INJECTION, SOLUTION INTRAVENOUS at 05:12

## 2018-01-01 RX ADMIN — GUAIFENESIN 600 MG: 600 TABLET, EXTENDED RELEASE ORAL at 08:11

## 2018-01-01 RX ADMIN — INSULIN ASPART 2 UNITS: 100 INJECTION, SOLUTION INTRAVENOUS; SUBCUTANEOUS at 11:11

## 2018-01-01 RX ADMIN — INSULIN ASPART 4 UNITS: 100 INJECTION, SOLUTION INTRAVENOUS; SUBCUTANEOUS at 04:11

## 2018-01-01 RX ADMIN — Medication 0.15 MCG/KG/MIN: at 09:11

## 2018-01-01 RX ADMIN — LACTULOSE 30 G: 20 SOLUTION ORAL at 09:11

## 2018-01-01 RX ADMIN — INSULIN ASPART 1 UNITS: 100 INJECTION, SOLUTION INTRAVENOUS; SUBCUTANEOUS at 10:11

## 2018-01-01 RX ADMIN — GUAIFENESIN 200 MG: 200 SOLUTION ORAL at 08:11

## 2018-01-01 RX ADMIN — HYDROMORPHONE HYDROCHLORIDE 0.2 MG: 1 INJECTION, SOLUTION INTRAMUSCULAR; INTRAVENOUS; SUBCUTANEOUS at 02:12

## 2018-01-01 RX ADMIN — VANCOMYCIN HYDROCHLORIDE 750 MG: 750 INJECTION, POWDER, LYOPHILIZED, FOR SOLUTION INTRAVENOUS at 01:11

## 2018-01-01 RX ADMIN — HYDROMORPHONE HYDROCHLORIDE 0.2 MG: 1 INJECTION, SOLUTION INTRAMUSCULAR; INTRAVENOUS; SUBCUTANEOUS at 09:12

## 2018-01-01 RX ADMIN — DILTIAZEM HYDROCHLORIDE 5 MG/HR: 5 INJECTION INTRAVENOUS at 12:12

## 2018-01-01 RX ADMIN — SODIUM CHLORIDE: 0.9 INJECTION, SOLUTION INTRAVENOUS at 03:11

## 2018-01-01 RX ADMIN — DILTIAZEM HYDROCHLORIDE 30 MG: 30 TABLET, FILM COATED ORAL at 01:12

## 2018-01-01 RX ADMIN — DIBASIC SODIUM PHOSPHATE, MONOBASIC POTASSIUM PHOSPHATE AND MONOBASIC SODIUM PHOSPHATE 2 TABLET: 852; 155; 130 TABLET ORAL at 06:12

## 2018-01-01 RX ADMIN — DILTIAZEM HYDROCHLORIDE 5 MG/HR: 5 INJECTION INTRAVENOUS at 10:12

## 2018-01-01 RX ADMIN — Medication 1 MG/HR: at 05:12

## 2018-01-01 RX ADMIN — SODIUM CHLORIDE: 0.9 INJECTION, SOLUTION INTRAVENOUS at 02:11

## 2018-01-01 RX ADMIN — LACTULOSE 15 G: 20 SOLUTION ORAL at 01:12

## 2018-01-01 RX ADMIN — INSULIN ASPART 3 UNITS: 100 INJECTION, SOLUTION INTRAVENOUS; SUBCUTANEOUS at 11:12

## 2018-01-01 RX ADMIN — IPRATROPIUM BROMIDE AND ALBUTEROL SULFATE 3 ML: .5; 3 SOLUTION RESPIRATORY (INHALATION) at 02:11

## 2018-01-01 RX ADMIN — METOPROLOL TARTRATE 5 MG: 5 INJECTION, SOLUTION INTRAVENOUS at 10:11

## 2018-01-01 RX ADMIN — LACTULOSE 30 G: 20 SOLUTION ORAL at 02:11

## 2018-01-01 RX ADMIN — DILTIAZEM HYDROCHLORIDE 5 MG/HR: 5 INJECTION INTRAVENOUS at 10:11

## 2018-01-01 RX ADMIN — INSULIN ASPART 3 UNITS: 100 INJECTION, SOLUTION INTRAVENOUS; SUBCUTANEOUS at 09:12

## 2018-01-01 RX ADMIN — MICONAZOLE NITRATE: 20 OINTMENT TOPICAL at 09:12

## 2018-01-01 RX ADMIN — FUROSEMIDE 40 MG: 10 INJECTION, SOLUTION INTRAMUSCULAR; INTRAVENOUS at 02:11

## 2018-01-01 RX ADMIN — METOPROLOL TARTRATE 2.5 MG: 5 INJECTION, SOLUTION INTRAVENOUS at 10:12

## 2018-01-01 RX ADMIN — INSULIN ASPART 3 UNITS: 100 INJECTION, SOLUTION INTRAVENOUS; SUBCUTANEOUS at 12:12

## 2018-01-01 RX ADMIN — Medication 0.04 MCG/KG/MIN: at 09:11

## 2018-01-01 RX ADMIN — PANTOPRAZOLE SODIUM 40 MG: 40 TABLET, DELAYED RELEASE ORAL at 09:10

## 2018-01-01 RX ADMIN — INSULIN ASPART 3 UNITS: 100 INJECTION, SOLUTION INTRAVENOUS; SUBCUTANEOUS at 08:12

## 2018-01-01 RX ADMIN — INSULIN ASPART 5 UNITS: 100 INJECTION, SOLUTION INTRAVENOUS; SUBCUTANEOUS at 11:11

## 2018-01-01 RX ADMIN — ALBUMIN HUMAN 12.5 G: 0.05 INJECTION, SOLUTION INTRAVENOUS at 09:12

## 2018-01-01 RX ADMIN — METOPROLOL TARTRATE 5 MG: 1 INJECTION, SOLUTION INTRAVENOUS at 10:11

## 2018-01-01 RX ADMIN — INSULIN ASPART 5 UNITS: 100 INJECTION, SOLUTION INTRAVENOUS; SUBCUTANEOUS at 10:11

## 2018-01-01 RX ADMIN — METOPROLOL TARTRATE 5 MG: 1 INJECTION, SOLUTION INTRAVENOUS at 11:11

## 2018-01-01 RX ADMIN — MOXIFLOXACIN HYDROCHLORIDE 400 MG: 400 TABLET, FILM COATED ORAL at 08:11

## 2018-01-01 RX ADMIN — INSULIN ASPART 4 UNITS: 100 INJECTION, SOLUTION INTRAVENOUS; SUBCUTANEOUS at 12:12

## 2018-01-01 RX ADMIN — INSULIN ASPART 2 UNITS: 100 INJECTION, SOLUTION INTRAVENOUS; SUBCUTANEOUS at 11:12

## 2018-01-01 RX ADMIN — INSULIN ASPART 2 UNITS: 100 INJECTION, SOLUTION INTRAVENOUS; SUBCUTANEOUS at 01:11

## 2018-01-01 RX ADMIN — RIFAXIMIN 550 MG: 550 TABLET ORAL at 11:10

## 2018-01-01 RX ADMIN — HEPARIN SODIUM 5000 UNITS: 5000 INJECTION, SOLUTION INTRAVENOUS; SUBCUTANEOUS at 09:12

## 2018-01-01 RX ADMIN — LEVALBUTEROL 1.25 MG: 1.25 SOLUTION, CONCENTRATE RESPIRATORY (INHALATION) at 02:11

## 2018-01-01 RX ADMIN — PROPOFOL 15 MCG/KG/MIN: 10 INJECTION, EMULSION INTRAVENOUS at 02:11

## 2018-01-01 RX ADMIN — GUAIFENESIN 200 MG: 100 SOLUTION ORAL at 12:11

## 2018-01-01 RX ADMIN — SODIUM CHLORIDE: 0.9 INJECTION, SOLUTION INTRAVENOUS at 03:12

## 2018-01-01 RX ADMIN — PIPERACILLIN AND TAZOBACTAM 4.5 G: 4; .5 INJECTION, POWDER, LYOPHILIZED, FOR SOLUTION INTRAVENOUS; PARENTERAL at 08:11

## 2018-01-01 RX ADMIN — FUROSEMIDE 60 MG: 10 INJECTION, SOLUTION INTRAMUSCULAR; INTRAVENOUS at 12:11

## 2018-01-01 RX ADMIN — VANCOMYCIN HYDROCHLORIDE 500 MG: 500 INJECTION, POWDER, LYOPHILIZED, FOR SOLUTION INTRAVENOUS at 11:11

## 2018-01-01 RX ADMIN — MAGNESIUM OXIDE TAB 400 MG (241.3 MG ELEMENTAL MG) 400 MG: 400 (241.3 MG) TAB at 11:11

## 2018-01-01 RX ADMIN — INSULIN ASPART 3 UNITS: 100 INJECTION, SOLUTION INTRAVENOUS; SUBCUTANEOUS at 01:11

## 2018-01-01 RX ADMIN — METOPROLOL TARTRATE 50 MG: 50 TABLET ORAL at 11:11

## 2018-01-01 RX ADMIN — DIBASIC SODIUM PHOSPHATE, MONOBASIC POTASSIUM PHOSPHATE AND MONOBASIC SODIUM PHOSPHATE 2 TABLET: 852; 155; 130 TABLET ORAL at 05:12

## 2018-01-01 RX ADMIN — MOXIFLOXACIN HYDROCHLORIDE 400 MG: 400 TABLET, FILM COATED ORAL at 11:11

## 2018-01-01 RX ADMIN — FAMOTIDINE 20 MG: 20 TABLET ORAL at 09:12

## 2018-01-01 RX ADMIN — INSULIN DETEMIR 12 UNITS: 100 INJECTION, SOLUTION SUBCUTANEOUS at 08:11

## 2018-01-01 RX ADMIN — METOPROLOL TARTRATE 5 MG: 1 INJECTION, SOLUTION INTRAVENOUS at 06:11

## 2018-01-01 RX ADMIN — INSULIN ASPART 1 UNITS: 100 INJECTION, SOLUTION INTRAVENOUS; SUBCUTANEOUS at 01:11

## 2018-01-01 RX ADMIN — BENZONATATE 100 MG: 100 CAPSULE ORAL at 04:11

## 2018-01-01 RX ADMIN — DIBASIC SODIUM PHOSPHATE, MONOBASIC POTASSIUM PHOSPHATE AND MONOBASIC SODIUM PHOSPHATE 2 TABLET: 852; 155; 130 TABLET ORAL at 10:11

## 2018-01-01 RX ADMIN — HEPARIN SODIUM 5000 UNITS: 5000 INJECTION, SOLUTION INTRAVENOUS; SUBCUTANEOUS at 06:12

## 2018-01-01 RX ADMIN — MICONAZOLE NITRATE: 20 OINTMENT TOPICAL at 08:12

## 2018-01-01 RX ADMIN — HYDROMORPHONE HYDROCHLORIDE 0.2 MG: 1 INJECTION, SOLUTION INTRAMUSCULAR; INTRAVENOUS; SUBCUTANEOUS at 05:12

## 2018-01-01 RX ADMIN — PSYLLIUM HUSK 1 PACKET: 3.4 POWDER ORAL at 08:12

## 2018-01-01 RX ADMIN — PIPERACILLIN AND TAZOBACTAM 4.5 G: 4; .5 INJECTION, POWDER, LYOPHILIZED, FOR SOLUTION INTRAVENOUS; PARENTERAL at 02:10

## 2018-01-01 RX ADMIN — SUCCINYLCHOLINE CHLORIDE 120 MG: 20 INJECTION, SOLUTION INTRAMUSCULAR; INTRAVENOUS at 06:11

## 2018-01-01 RX ADMIN — METOPROLOL TARTRATE 2.5 MG: 5 INJECTION, SOLUTION INTRAVENOUS at 05:12

## 2018-01-01 RX ADMIN — SODIUM CHLORIDE: 0.9 INJECTION, SOLUTION INTRAVENOUS at 06:11

## 2018-01-01 RX ADMIN — PROPOFOL 80 MG: 10 INJECTION, EMULSION INTRAVENOUS at 06:11

## 2018-01-01 RX ADMIN — METOPROLOL TARTRATE 5 MG: 1 INJECTION, SOLUTION INTRAVENOUS at 09:11

## 2018-01-01 RX ADMIN — METOPROLOL TARTRATE 25 MG: 25 TABLET ORAL at 10:11

## 2018-01-01 RX ADMIN — ALBUMIN HUMAN 25 G: 50 SOLUTION INTRAVENOUS at 09:11

## 2018-01-01 RX ADMIN — INSULIN ASPART 5 UNITS: 100 INJECTION, SOLUTION INTRAVENOUS; SUBCUTANEOUS at 09:11

## 2018-01-01 RX ADMIN — POTASSIUM CHLORIDE 40 MEQ: 1500 TABLET, EXTENDED RELEASE ORAL at 10:11

## 2018-01-01 RX ADMIN — DILTIAZEM HYDROCHLORIDE 30 MG: 30 TABLET, FILM COATED ORAL at 06:12

## 2018-01-01 RX ADMIN — VANCOMYCIN HYDROCHLORIDE 1000 MG: 1 INJECTION, POWDER, LYOPHILIZED, FOR SOLUTION INTRAVENOUS at 01:11

## 2018-01-01 RX ADMIN — Medication 100 MCG: at 06:11

## 2018-01-01 RX ADMIN — HYDROMORPHONE HYDROCHLORIDE 0.2 MG: 1 INJECTION, SOLUTION INTRAMUSCULAR; INTRAVENOUS; SUBCUTANEOUS at 06:12

## 2018-01-01 RX ADMIN — INSULIN ASPART 2 UNITS: 100 INJECTION, SOLUTION INTRAVENOUS; SUBCUTANEOUS at 09:12

## 2018-01-01 RX ADMIN — Medication 0.16 MCG/KG/MIN: at 04:11

## 2018-01-01 RX ADMIN — INSULIN ASPART 3 UNITS: 100 INJECTION, SOLUTION INTRAVENOUS; SUBCUTANEOUS at 11:11

## 2018-01-01 RX ADMIN — INSULIN DETEMIR 10 UNITS: 100 INJECTION, SOLUTION SUBCUTANEOUS at 09:11

## 2018-01-01 RX ADMIN — HYDROXYZINE HYDROCHLORIDE 10 MG: 10 TABLET ORAL at 09:11

## 2018-01-01 RX ADMIN — LEVALBUTEROL 1.25 MG: 1.25 SOLUTION, CONCENTRATE RESPIRATORY (INHALATION) at 10:11

## 2018-01-01 RX ADMIN — LACTULOSE 30 G: 20 SOLUTION ORAL at 11:11

## 2018-01-01 RX ADMIN — MAGNESIUM OXIDE TAB 400 MG (241.3 MG ELEMENTAL MG) 400 MG: 400 (241.3 MG) TAB at 09:11

## 2018-01-01 RX ADMIN — CHLOROTHIAZIDE SODIUM 500 MG: 500 INJECTION, POWDER, LYOPHILIZED, FOR SOLUTION INTRAVENOUS at 07:11

## 2018-01-01 RX ADMIN — OCTREOTIDE ACETATE 100 MCG: 100 INJECTION, SOLUTION INTRAVENOUS; SUBCUTANEOUS at 02:11

## 2018-01-01 RX ADMIN — CEFEPIME 2 G: 2 INJECTION, POWDER, FOR SOLUTION INTRAVENOUS at 05:11

## 2018-01-01 RX ADMIN — ROCURONIUM BROMIDE 20 MG: 10 INJECTION, SOLUTION INTRAVENOUS at 02:12

## 2018-01-01 RX ADMIN — INSULIN ASPART 5 UNITS: 100 INJECTION, SOLUTION INTRAVENOUS; SUBCUTANEOUS at 04:11

## 2018-01-01 RX ADMIN — ALBUMIN HUMAN 25 G: 0.25 SOLUTION INTRAVENOUS at 09:11

## 2018-01-01 RX ADMIN — IPRATROPIUM BROMIDE AND ALBUTEROL SULFATE 3 ML: .5; 3 SOLUTION RESPIRATORY (INHALATION) at 07:11

## 2018-01-01 RX ADMIN — METOPROLOL TARTRATE 2.5 MG: 5 INJECTION, SOLUTION INTRAVENOUS at 08:12

## 2018-01-01 RX ADMIN — INSULIN ASPART 5 UNITS: 100 INJECTION, SOLUTION INTRAVENOUS; SUBCUTANEOUS at 08:12

## 2018-01-01 RX ADMIN — GUAIFENESIN 200 MG: 200 SOLUTION ORAL at 11:11

## 2018-01-01 RX ADMIN — GUAIFENESIN 200 MG: 100 SOLUTION ORAL at 06:11

## 2018-01-01 RX ADMIN — BENZONATATE 100 MG: 100 CAPSULE ORAL at 07:11

## 2018-01-01 RX ADMIN — BENZONATATE 100 MG: 100 CAPSULE ORAL at 09:11

## 2018-01-01 RX ADMIN — OCTREOTIDE ACETATE 100 MCG: 100 INJECTION, SOLUTION INTRAVENOUS; SUBCUTANEOUS at 06:11

## 2018-01-01 RX ADMIN — INSULIN ASPART 4 UNITS: 100 INJECTION, SOLUTION INTRAVENOUS; SUBCUTANEOUS at 08:11

## 2018-01-01 RX ADMIN — LEVALBUTEROL 1.25 MG: 1.25 SOLUTION, CONCENTRATE RESPIRATORY (INHALATION) at 03:12

## 2018-01-01 RX ADMIN — PHYTONADIONE 10 MG: 10 INJECTION, EMULSION INTRAMUSCULAR; INTRAVENOUS; SUBCUTANEOUS at 11:12

## 2018-01-01 RX ADMIN — INSULIN ASPART 3 UNITS: 100 INJECTION, SOLUTION INTRAVENOUS; SUBCUTANEOUS at 02:11

## 2018-01-01 RX ADMIN — ALBUMIN HUMAN 25 G: 0.25 SOLUTION INTRAVENOUS at 10:11

## 2018-01-01 RX ADMIN — ACETAMINOPHEN 650 MG: 325 TABLET, FILM COATED ORAL at 12:12

## 2018-01-01 RX ADMIN — DILTIAZEM HYDROCHLORIDE 10 MG/HR: 5 INJECTION INTRAVENOUS at 02:12

## 2018-01-01 RX ADMIN — SODIUM CHLORIDE: 0.9 INJECTION, SOLUTION INTRAVENOUS at 01:12

## 2018-01-01 RX ADMIN — ALBUMIN HUMAN 25 G: 0.25 SOLUTION INTRAVENOUS at 06:11

## 2018-01-01 RX ADMIN — INSULIN ASPART 2 UNITS: 100 INJECTION, SOLUTION INTRAVENOUS; SUBCUTANEOUS at 03:11

## 2018-01-01 RX ADMIN — SODIUM CHLORIDE: 0.9 INJECTION, SOLUTION INTRAVENOUS at 09:12

## 2018-01-01 RX ADMIN — INSULIN ASPART 4 UNITS: 100 INJECTION, SOLUTION INTRAVENOUS; SUBCUTANEOUS at 11:11

## 2018-01-01 RX ADMIN — PROPRANOLOL HYDROCHLORIDE 5 MG: 20 SOLUTION ORAL at 08:11

## 2018-01-01 RX ADMIN — POTASSIUM CHLORIDE 10 MEQ: 149 INJECTION, SOLUTION, CONCENTRATE INTRAVENOUS at 11:11

## 2018-01-01 RX ADMIN — INSULIN ASPART 6 UNITS: 100 INJECTION, SOLUTION INTRAVENOUS; SUBCUTANEOUS at 04:12

## 2018-01-01 RX ADMIN — INSULIN ASPART 4 UNITS: 100 INJECTION, SOLUTION INTRAVENOUS; SUBCUTANEOUS at 11:12

## 2018-01-01 RX ADMIN — DEXTROSE MONOHYDRATE 25 G: 500 INJECTION PARENTERAL at 11:11

## 2018-01-01 RX ADMIN — Medication 16 G: at 10:11

## 2018-01-01 RX ADMIN — FENTANYL CITRATE 25 MCG: 50 INJECTION, SOLUTION INTRAMUSCULAR; INTRAVENOUS at 02:12

## 2018-01-01 RX ADMIN — INSULIN ASPART 5 UNITS: 100 INJECTION, SOLUTION INTRAVENOUS; SUBCUTANEOUS at 08:11

## 2018-01-01 RX ADMIN — INSULIN ASPART 3 UNITS: 100 INJECTION, SOLUTION INTRAVENOUS; SUBCUTANEOUS at 05:11

## 2018-01-01 RX ADMIN — DIBASIC SODIUM PHOSPHATE, MONOBASIC POTASSIUM PHOSPHATE AND MONOBASIC SODIUM PHOSPHATE 2 TABLET: 852; 155; 130 TABLET ORAL at 03:11

## 2018-01-01 RX ADMIN — HEPARIN SODIUM 5000 UNITS: 5000 INJECTION, SOLUTION INTRAVENOUS; SUBCUTANEOUS at 11:11

## 2018-01-01 RX ADMIN — POTASSIUM CHLORIDE 30 MEQ: 750 CAPSULE, EXTENDED RELEASE ORAL at 11:11

## 2018-01-01 RX ADMIN — LEVALBUTEROL 1.25 MG: 1.25 SOLUTION, CONCENTRATE RESPIRATORY (INHALATION) at 09:11

## 2018-01-01 RX ADMIN — HYDROMORPHONE HYDROCHLORIDE 0.2 MG: 1 INJECTION, SOLUTION INTRAMUSCULAR; INTRAVENOUS; SUBCUTANEOUS at 11:12

## 2018-01-01 RX ADMIN — FUROSEMIDE 60 MG: 10 INJECTION, SOLUTION INTRAMUSCULAR; INTRAVENOUS at 11:11

## 2018-01-01 RX ADMIN — GUAIFENESIN 600 MG: 600 TABLET, EXTENDED RELEASE ORAL at 10:11

## 2018-01-01 RX ADMIN — SODIUM CHLORIDE: 0.9 INJECTION, SOLUTION INTRAVENOUS at 08:10

## 2018-01-01 RX ADMIN — INSULIN ASPART 8 UNITS: 100 INJECTION, SOLUTION INTRAVENOUS; SUBCUTANEOUS at 05:11

## 2018-01-01 RX ADMIN — MAGNESIUM SULFATE IN WATER 2 G: 40 INJECTION, SOLUTION INTRAVENOUS at 03:11

## 2018-01-01 RX ADMIN — HYDROMORPHONE HYDROCHLORIDE 0.2 MG: 1 INJECTION, SOLUTION INTRAMUSCULAR; INTRAVENOUS; SUBCUTANEOUS at 04:12

## 2018-01-01 RX ADMIN — LACTULOSE 30 G: 20 SOLUTION ORAL at 03:10

## 2018-01-01 RX ADMIN — INSULIN ASPART 6 UNITS: 100 INJECTION, SOLUTION INTRAVENOUS; SUBCUTANEOUS at 10:11

## 2018-01-01 RX ADMIN — PROPOFOL 15 MCG/KG/MIN: 10 INJECTION, EMULSION INTRAVENOUS at 06:11

## 2018-01-01 RX ADMIN — SIMETHICONE CHEW TAB 80 MG 80 MG: 80 TABLET ORAL at 12:11

## 2018-01-01 RX ADMIN — INSULIN ASPART 5 UNITS: 100 INJECTION, SOLUTION INTRAVENOUS; SUBCUTANEOUS at 12:12

## 2018-01-01 RX ADMIN — FUROSEMIDE 40 MG: 10 INJECTION, SOLUTION INTRAMUSCULAR; INTRAVENOUS at 10:11

## 2018-01-01 RX ADMIN — AZITHROMYCIN 500 MG: 250 TABLET, FILM COATED ORAL at 11:11

## 2018-01-01 RX ADMIN — INSULIN ASPART 3 UNITS: 100 INJECTION, SOLUTION INTRAVENOUS; SUBCUTANEOUS at 12:11

## 2018-01-01 RX ADMIN — LORAZEPAM 1 MG: 2 INJECTION INTRAMUSCULAR; INTRAVENOUS at 05:12

## 2018-01-01 RX ADMIN — INSULIN ASPART 6 UNITS: 100 INJECTION, SOLUTION INTRAVENOUS; SUBCUTANEOUS at 08:11

## 2018-01-01 RX ADMIN — DILTIAZEM HYDROCHLORIDE 5 MG/HR: 5 INJECTION INTRAVENOUS at 02:12

## 2018-01-01 RX ADMIN — ALBUMIN (HUMAN) 25 G: 12.5 SOLUTION INTRAVENOUS at 10:12

## 2018-01-01 RX ADMIN — ACETAMINOPHEN 650 MG: 325 TABLET, FILM COATED ORAL at 11:12

## 2018-01-01 RX ADMIN — INSULIN ASPART 3 UNITS: 100 INJECTION, SOLUTION INTRAVENOUS; SUBCUTANEOUS at 08:11

## 2018-01-01 RX ADMIN — LEVALBUTEROL 1.25 MG: 1.25 SOLUTION, CONCENTRATE RESPIRATORY (INHALATION) at 01:12

## 2018-01-01 RX ADMIN — DILTIAZEM HYDROCHLORIDE 7.5 MG/HR: 5 INJECTION INTRAVENOUS at 11:12

## 2018-01-01 RX ADMIN — INSULIN ASPART 2 UNITS: 100 INJECTION, SOLUTION INTRAVENOUS; SUBCUTANEOUS at 07:12

## 2018-01-01 RX ADMIN — METOPROLOL TARTRATE 2.5 MG: 5 INJECTION, SOLUTION INTRAVENOUS at 09:12

## 2018-01-01 RX ADMIN — MAGNESIUM SULFATE IN WATER 2 G: 40 INJECTION, SOLUTION INTRAVENOUS at 04:11

## 2018-01-01 RX ADMIN — FUROSEMIDE 100 MG: 10 INJECTION, SOLUTION INTRAMUSCULAR; INTRAVENOUS at 01:11

## 2018-01-01 RX ADMIN — INSULIN ASPART 6 UNITS: 100 INJECTION, SOLUTION INTRAVENOUS; SUBCUTANEOUS at 01:11

## 2018-01-01 RX ADMIN — PROPOFOL 20 MCG/KG/MIN: 10 INJECTION, EMULSION INTRAVENOUS at 04:11

## 2018-01-01 RX ADMIN — SUCCINYLCHOLINE CHLORIDE 120 MG: 20 INJECTION INTRAMUSCULAR; INTRAVENOUS at 06:11

## 2018-01-01 RX ADMIN — INSULIN ASPART 3 UNITS: 100 INJECTION, SOLUTION INTRAVENOUS; SUBCUTANEOUS at 04:11

## 2018-01-01 RX ADMIN — FUROSEMIDE 60 MG: 10 INJECTION, SOLUTION INTRAMUSCULAR; INTRAVENOUS at 07:11

## 2018-01-01 RX ADMIN — INSULIN ASPART 6 UNITS: 100 INJECTION, SOLUTION INTRAVENOUS; SUBCUTANEOUS at 04:11

## 2018-01-01 RX ADMIN — HYDROXYZINE HYDROCHLORIDE 25 MG: 25 TABLET, FILM COATED ORAL at 08:11

## 2018-01-01 RX ADMIN — DIBASIC SODIUM PHOSPHATE, MONOBASIC POTASSIUM PHOSPHATE AND MONOBASIC SODIUM PHOSPHATE 2 TABLET: 852; 155; 130 TABLET ORAL at 05:11

## 2018-01-01 RX ADMIN — INSULIN ASPART 14 UNITS: 100 INJECTION, SOLUTION INTRAVENOUS; SUBCUTANEOUS at 04:11

## 2018-01-01 RX ADMIN — ACETAMINOPHEN 650 MG: 325 TABLET, FILM COATED ORAL at 08:12

## 2018-01-01 RX ADMIN — INSULIN ASPART 1 UNITS: 100 INJECTION, SOLUTION INTRAVENOUS; SUBCUTANEOUS at 08:11

## 2018-01-01 RX ADMIN — ROCURONIUM BROMIDE 10 MG: 10 INJECTION, SOLUTION INTRAVENOUS at 02:12

## 2018-01-01 RX ADMIN — Medication 80 MG: at 06:11

## 2018-01-01 RX ADMIN — SODIUM CHLORIDE: 0.9 INJECTION, SOLUTION INTRAVENOUS at 01:11

## 2018-01-01 RX ADMIN — ACETAMINOPHEN 650 MG: 325 TABLET, FILM COATED ORAL at 01:11

## 2018-01-01 RX ADMIN — HEPARIN SODIUM 5000 UNITS: 5000 INJECTION, SOLUTION INTRAVENOUS; SUBCUTANEOUS at 10:12

## 2018-01-01 RX ADMIN — MAGNESIUM SULFATE IN WATER 2 G: 40 INJECTION, SOLUTION INTRAVENOUS at 10:11

## 2018-01-01 RX ADMIN — LACTULOSE 30 G: 20 SOLUTION ORAL at 10:11

## 2018-01-01 RX ADMIN — GUAIFENESIN 200 MG: 100 SOLUTION ORAL at 01:11

## 2018-01-01 RX ADMIN — Medication 150 MCG: at 06:11

## 2018-01-01 RX ADMIN — INSULIN DETEMIR 18 UNITS: 100 INJECTION, SOLUTION SUBCUTANEOUS at 09:11

## 2018-01-01 RX ADMIN — LACTULOSE 20 G: 20 SOLUTION ORAL at 02:11

## 2018-01-01 RX ADMIN — IOHEXOL 15 ML: 350 INJECTION, SOLUTION INTRAVENOUS at 10:10

## 2018-01-01 RX ADMIN — METOPROLOL TARTRATE 2.5 MG: 5 INJECTION, SOLUTION INTRAVENOUS at 09:11

## 2018-01-01 RX ADMIN — DIBASIC SODIUM PHOSPHATE, MONOBASIC POTASSIUM PHOSPHATE AND MONOBASIC SODIUM PHOSPHATE 2 TABLET: 852; 155; 130 TABLET ORAL at 11:12

## 2018-01-01 RX ADMIN — METOPROLOL TARTRATE 2.5 MG: 5 INJECTION, SOLUTION INTRAVENOUS at 06:12

## 2018-01-01 RX ADMIN — DIBASIC SODIUM PHOSPHATE, MONOBASIC POTASSIUM PHOSPHATE AND MONOBASIC SODIUM PHOSPHATE 2 TABLET: 852; 155; 130 TABLET ORAL at 02:12

## 2018-01-01 RX ADMIN — MAGNESIUM SULFATE IN WATER 2 G: 40 INJECTION, SOLUTION INTRAVENOUS at 02:11

## 2018-01-01 RX ADMIN — IPRATROPIUM BROMIDE AND ALBUTEROL SULFATE 3 ML: .5; 3 SOLUTION RESPIRATORY (INHALATION) at 03:11

## 2018-01-01 RX ADMIN — FLUCONAZOLE 400 MG: 200 TABLET ORAL at 11:11

## 2018-01-01 RX ADMIN — INSULIN ASPART 2 UNITS: 100 INJECTION, SOLUTION INTRAVENOUS; SUBCUTANEOUS at 03:12

## 2018-01-01 RX ADMIN — METOPROLOL TARTRATE 25 MG: 25 TABLET ORAL at 11:11

## 2018-01-01 RX ADMIN — ALBUMIN HUMAN 25 G: 0.25 SOLUTION INTRAVENOUS at 02:11

## 2018-01-01 RX ADMIN — POTASSIUM CHLORIDE 40 MEQ: 20 SOLUTION ORAL at 12:11

## 2018-01-01 RX ADMIN — ACETAMINOPHEN 650 MG: 325 TABLET, FILM COATED ORAL at 06:11

## 2018-01-01 RX ADMIN — SODIUM CHLORIDE: 0.9 INJECTION, SOLUTION INTRAVENOUS at 10:12

## 2018-01-01 RX ADMIN — POTASSIUM & SODIUM PHOSPHATES POWDER PACK 280-160-250 MG 2 PACKET: 280-160-250 PACK at 06:12

## 2018-01-01 RX ADMIN — POTASSIUM CHLORIDE 40 MEQ: 20 SOLUTION ORAL at 09:11

## 2018-01-01 RX ADMIN — METOPROLOL TARTRATE 2.5 MG: 1 INJECTION, SOLUTION INTRAVENOUS at 04:11

## 2018-01-01 RX ADMIN — INSULIN ASPART 1 UNITS: 100 INJECTION, SOLUTION INTRAVENOUS; SUBCUTANEOUS at 03:11

## 2018-01-01 RX ADMIN — LEVALBUTEROL 1.25 MG: 1.25 SOLUTION, CONCENTRATE RESPIRATORY (INHALATION) at 06:11

## 2018-01-01 RX ADMIN — DILTIAZEM HYDROCHLORIDE 5 MG/HR: 5 INJECTION INTRAVENOUS at 08:11

## 2018-01-01 RX ADMIN — POTASSIUM CHLORIDE 10 MEQ: 149 INJECTION, SOLUTION, CONCENTRATE INTRAVENOUS at 01:11

## 2018-01-01 RX ADMIN — METOPROLOL TARTRATE 2.5 MG: 5 INJECTION, SOLUTION INTRAVENOUS at 01:11

## 2018-01-01 RX ADMIN — MAGNESIUM OXIDE TAB 400 MG (241.3 MG ELEMENTAL MG) 400 MG: 400 (241.3 MG) TAB at 10:11

## 2018-01-01 RX ADMIN — INSULIN DETEMIR 10 UNITS: 100 INJECTION, SOLUTION SUBCUTANEOUS at 10:11

## 2018-01-01 RX ADMIN — INSULIN ASPART 1 UNITS: 100 INJECTION, SOLUTION INTRAVENOUS; SUBCUTANEOUS at 02:11

## 2018-01-01 RX ADMIN — METOPROLOL TARTRATE 5 MG: 5 INJECTION, SOLUTION INTRAVENOUS at 09:11

## 2018-01-01 RX ADMIN — SODIUM GLYCOLATE 30 MMOL: 216 INJECTION, SOLUTION INTRAVENOUS at 05:11

## 2018-01-01 RX ADMIN — ALBUMIN HUMAN 25 G: 0.25 SOLUTION INTRAVENOUS at 05:11

## 2018-01-01 RX ADMIN — OCTREOTIDE ACETATE 100 MCG: 100 INJECTION, SOLUTION INTRAVENOUS; SUBCUTANEOUS at 05:11

## 2018-01-01 RX ADMIN — FAMOTIDINE 20 MG: 20 TABLET ORAL at 10:11

## 2018-01-01 RX ADMIN — FUROSEMIDE 60 MG: 10 INJECTION INTRAMUSCULAR; INTRAVENOUS at 11:11

## 2018-01-01 RX ADMIN — MAGNESIUM SULFATE IN WATER 2 G: 40 INJECTION, SOLUTION INTRAVENOUS at 06:12

## 2018-01-01 RX ADMIN — METOPROLOL TARTRATE 5 MG: 5 INJECTION, SOLUTION INTRAVENOUS at 12:12

## 2018-01-01 RX ADMIN — DILTIAZEM HYDROCHLORIDE 15 MG/HR: 5 INJECTION INTRAVENOUS at 08:11

## 2018-01-01 RX ADMIN — METOPROLOL TARTRATE 25 MG: 25 TABLET ORAL at 05:11

## 2018-01-01 RX ADMIN — INSULIN ASPART 1 UNITS: 100 INJECTION, SOLUTION INTRAVENOUS; SUBCUTANEOUS at 09:11

## 2018-01-01 RX ADMIN — INSULIN ASPART 5 UNITS: 100 INJECTION, SOLUTION INTRAVENOUS; SUBCUTANEOUS at 11:12

## 2018-01-01 RX ADMIN — PANTOPRAZOLE SODIUM 40 MG: 40 TABLET, DELAYED RELEASE ORAL at 08:11

## 2018-01-01 RX ADMIN — MAGNESIUM SULFATE IN WATER 2 G: 40 INJECTION, SOLUTION INTRAVENOUS at 08:11

## 2018-01-01 RX ADMIN — PROPRANOLOL HYDROCHLORIDE 5 MG: 20 SOLUTION ORAL at 08:10

## 2018-01-01 RX ADMIN — MAGNESIUM SULFATE IN WATER 2 G: 40 INJECTION, SOLUTION INTRAVENOUS at 05:12

## 2018-01-01 RX ADMIN — INSULIN ASPART 5 UNITS: 100 INJECTION, SOLUTION INTRAVENOUS; SUBCUTANEOUS at 12:11

## 2018-01-01 RX ADMIN — METOPROLOL TARTRATE 2.5 MG: 5 INJECTION, SOLUTION INTRAVENOUS at 04:11

## 2018-01-01 RX ADMIN — PROPOFOL 10 MCG/KG/MIN: 10 INJECTION, EMULSION INTRAVENOUS at 11:11

## 2018-01-01 RX ADMIN — METOPROLOL TARTRATE 5 MG: 5 INJECTION INTRAVENOUS at 10:11

## 2018-01-01 RX ADMIN — FUROSEMIDE 60 MG: 10 INJECTION, SOLUTION INTRAMUSCULAR; INTRAVENOUS at 08:11

## 2018-01-01 RX ADMIN — ALBUMIN HUMAN 25 G: 0.05 INJECTION, SOLUTION INTRAVENOUS at 09:11

## 2018-01-01 RX ADMIN — PIPERACILLIN AND TAZOBACTAM 4.5 G: 4; .5 INJECTION, POWDER, LYOPHILIZED, FOR SOLUTION INTRAVENOUS; PARENTERAL at 01:11

## 2018-01-01 RX ADMIN — DILTIAZEM HYDROCHLORIDE 5 MG/HR: 5 INJECTION INTRAVENOUS at 05:11

## 2018-01-01 RX ADMIN — GUAIFENESIN 200 MG: 100 SOLUTION ORAL at 09:11

## 2018-01-01 RX ADMIN — FUROSEMIDE 60 MG: 10 INJECTION, SOLUTION INTRAMUSCULAR; INTRAVENOUS at 05:11

## 2018-01-01 RX ADMIN — INSULIN DETEMIR 4 UNITS: 100 INJECTION, SOLUTION SUBCUTANEOUS at 09:11

## 2018-01-01 RX ADMIN — LACTULOSE 200 G: 10 SOLUTION ORAL at 08:11

## 2018-01-01 RX ADMIN — INSULIN ASPART 14 UNITS: 100 INJECTION, SOLUTION INTRAVENOUS; SUBCUTANEOUS at 11:11

## 2018-01-01 RX ADMIN — LACTULOSE 200 G: 10 SOLUTION ORAL at 01:11

## 2018-01-01 RX ADMIN — LACTULOSE 30 G: 20 SOLUTION ORAL at 12:11

## 2018-01-01 RX ADMIN — ALTEPLASE 2 MG: 2.2 INJECTION, POWDER, LYOPHILIZED, FOR SOLUTION INTRAVENOUS at 08:12

## 2018-01-01 RX ADMIN — OCTREOTIDE ACETATE 100 MCG: 100 INJECTION, SOLUTION INTRAVENOUS; SUBCUTANEOUS at 08:11

## 2018-01-01 RX ADMIN — METOPROLOL TARTRATE 50 MG: 50 TABLET ORAL at 05:11

## 2018-01-01 RX ADMIN — INSULIN ASPART 2 UNITS: 100 INJECTION, SOLUTION INTRAVENOUS; SUBCUTANEOUS at 07:11

## 2018-01-01 RX ADMIN — Medication 0.06 MCG/KG/MIN: at 02:11

## 2018-05-31 NOTE — TELEPHONE ENCOUNTER
----- Message from Felipe Asif sent at 5/31/2018 10:58 AM CDT -----  We have the pt recorders and they are now pending review by the referral nurse.  By:Felipe Asif

## 2018-06-07 NOTE — TELEPHONE ENCOUNTER
Initial referral received from Dr Denzel Huizar  Patient with cirrhosis with MELD  17    Referred for liver transplant for EVALUATION.    Referral completed and forwarded to [facility].      Insurance: Medicare complete [employer]  SUbscriber  [id number]  [facility]  [id number]    Contact #

## 2018-06-12 NOTE — TELEPHONE ENCOUNTER
----- Message from Felipe Asif sent at 6/12/2018  2:33 PM CDT -----  Contact: pt  Returned pt call, but there was no answer. LVM for pt to call back. Will call her back in a few.     Called pt back and sp with her  and offered appt on 7/3 7AM/8:30AM, but he wanted an appt in there afternoon. Booked pt for 7/23 2:30PM/4PM. Will mail out appt info    ----- Message -----  From: Rhea Hollins  Sent: 6/12/2018   2:30 PM  To: [facility] Liver Referral Pool    Patient Returning Call from [facility]    Who Left Message for Patient: Felipe VÁSQUEZ.   Communication Preference: [phone]  Additional Information: n/a

## 2018-06-12 NOTE — TELEPHONE ENCOUNTER
----- Message from Felipe Asif sent at 6/12/2018  2:22 PM CDT -----  Called pt to greg an appt, but there was no answer. LVM for pt to call back

## 2018-07-23 NOTE — LETTER
July 25, 2018        Denzel Huizar  [address]  [address]  [facility] [address]  Phone: [phone]  Fax: [phone]             [facility]  [address]  [facility] [address]  Phone: [phone]   Patient: Krystal Hobson   MR Number: [id number]   YOB: 1950   Date of Visit: 7/23/2018       Dear Dr. Denzel Huizar    Thank you for referring Krystal Hobson to me for evaluation. Attached you will find relevant portions of my assessment and plan of care.    If you have questions, please do not hesitate to call me. I look forward to following Krystal Hobson along with you.    Sincerely,    Yovana Santos MD    Enclosure    If you would like to receive this communication electronically, please contact [email or web address] or [phone] to request [employer] Link access.    [employer] Link is a tool which provides read-only access to select patient information with whom you have a relationship. Its easy to use and provides real time access to review your patients record including encounter summaries, notes, results, and demographic information.    If you feel you have received this communication in error or would no longer like to receive these types of communications, please e-mail [email or web address]

## 2018-07-23 NOTE — PROGRESS NOTES
Transplant Hepatology (Transplant Evaluation) Consult Note    Referring provider: Denzel Huizar MD    Chief complaint:   Chief Complaint   Patient presents with    Cirrhosis       HPI:  Krystal Hobson is a 67 y.o. female that presents to Trans[facility] for consultation of had no chief complaint listed for this encounter..  She  is accompanied by her  and sister.    Background  Oct 2017 - diagnosis HE, hospitalization x 6 times for HE and 1 x dehydration.  She was told that she had cirrhosis.    Alcohol: no, occasional prior to cirrhosis diagnosis.  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  , 1 daughter 48 y/o and 1 son 39 y/o.    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    MELD-Na:                         17  Child-Coker Class:             B    Transplant status:             not under evaluation    Cirrhosis is decompensated with:    Ascites:                                                       no  Spontaneous bacterial peritonitis:              no  Hepatic Encephalopathy:                           yes  Portal bleeding:                                          no  Hepatocellular carcinoma:                          no    Hepatorenal syndrome:                              no  Hyponatremia:                                            yes  Muscle wasting:                                          yes   Portal vein thrombosis:                                no      Screening:  Last EGD:  Last imaging study:     There is no problem list on file for this patient.      Past Medical History:   Diagnosis Date    Cancer     breat Cancer    Cirrhosis     Diabetes mellitus     GERD (gastroesophageal reflux disease)     Rheumatoid arthritis        Past Surgical History:   Procedure Laterality Date    CARPAL TUNNEL RELEASE      left wrist    CHOLECYSTECTOMY      MASTECTOMY      left breast 25  years ago       Family History   Problem Relation Age of Onset    Heart disease Mother     Liver disease Father     COPD Sister     Liver disease Sister        Social History     Social History    Marital status:      Spouse name: N/A    Number of children: N/A    Years of education: N/A     Social History Main Topics    Smoking status: Never Smoker    Smokeless tobacco: Not on file      Comment: patient denies    Alcohol use No      Comment: stopped 10/17    Drug use: No      Comment: patient denies    Sexual activity: Not on file     Other Topics Concern    Not on file     Social History Narrative    No narrative on file       Current Outpatient Prescriptions   Medication Sig Dispense Refill    benzonatate (TESSALON) 100 MG capsule Take 100 mg by mouth 2 (two) times daily as needed for Cough.      ferrous sulfate 325 mg (65 mg iron) Tab tablet Take 325 mg by mouth once daily.      fluticasone/vilanterol (BREO ELLIPTA INHL) Inhale into the lungs.      furosemide (LASIX) 40 MG tablet Take 40 mg by mouth once daily.      hydrOXYzine HCl (ATARAX) 25 MG tablet Take 25 mg by mouth 3 (three) times daily as needed for Itching.      lactulose (CHRONULAC) 10 gram/15 mL solution Take 30 g by mouth 2 (two) times daily.      omeprazole (PRILOSEC) 40 MG capsule Take 40 mg by mouth every morning.      ondansetron (ZOFRAN) 4 MG tablet Take 4 mg by mouth every 8 (eight) hours as needed for Nausea.      propranolol (INDERAL) 10 MG tablet Take 10  "mg by mouth once daily.      rifAXImin (XIFAXAN) 200 mg Tab Take 550 mg by mouth 2 (two) times daily.      spironolactone (ALDACTONE) 50 MG tablet Take 50 mg by mouth once daily.      zinc gluconate 50 mg tablet Take 50 mg by mouth once daily.       No current facility-administered medications for this visit.        Review of patient's allergies indicates:  No Known Allergies         Vitals:    07/23/18 1555   BP: (!) 124/54   Pulse: 79   Resp: 18   Temp: 98.5 °F (36.9 °C)   TempSrc: Oral   SpO2: 99%   Weight: 50.8 kg (111 lb 15.9 oz)   Height: 4' 11" (1.499 m)       Physical Exam    LABS: I personally reviewed pertinent laboratory findings.    Lab Results   Component Value Date    ALT 18 07/23/2018    AST 43 (H) 07/23/2018     (H) 07/23/2018    ALKPHOS 218 (H) 07/23/2018    BILITOT 2.2 (H) 07/23/2018       Lab Results   Component Value Date    WBC 5.22 07/23/2018    HGB 9.6 (L) 07/23/2018    HCT 29.8 (L) 07/23/2018     (H) 07/23/2018     (L) 07/23/2018       Lab Results   Component Value Date     (L) 07/23/2018    K 4.8 07/23/2018     07/23/2018    CO2 20 (L) 07/23/2018    BUN 31 (H) 07/23/2018    CREATININE 1.5 (H) 07/23/2018    CALCIUM 8.5 (L) 07/23/2018    ANIONGAP 6 (L) 07/23/2018    ESTGFRAFRICA 41.3 (A) 07/23/2018    EGFRNONAA 35.8 (A) 07/23/2018       Lab Results   Component Value Date    INR 1.2 07/23/2018       No results found for: SMOOTHMUSCAB, MITOAB  No results found for: IRON, TIBC, FERRITIN, SATURATEDIRO  No results found for: HAV, HEPAIGM, HEPBIGM, HEPBCAB, HBEAG, HEPCAB  No results found for: TSH  No results found for: CADEN    No results found for: ABORH        No results found for: LABA1C, HGBA1C  No results found for: CHOL  No results found for: HDL  No results found for: LDLCALC  No results found for: TRIG  No results found for: CHOLHDL        Imaging:       I personally reviewed recent cardiology studies available on the chart and from outside medical " records.    I personally reviewed recent imaging studies available on the chart and from outside medical records.        Assessment:  67 y.o. female presenting with     1. Decompensated hepatic cirrhosis    2. Organ transplant candidate    3. Hepatic encephalopathy    4. Portal hypertension    5. Moderate protein-calorie malnutrition        MELD-Na score: 17 at 7/23/2018  1:37 PM  MELD score: 15 at 7/23/2018  1:37 PM  Calculated from:  Serum Creatinine: 1.5 mg/dL at 7/23/2018  1:37 PM  Serum Sodium: 135 mmol/L at 7/23/2018  1:37 PM  Total Bilirubin: 2.2 mg/dL at 7/23/2018  1:37 PM  INR(ratio): 1.2 at 7/23/2018  1:37 PM  Age: 67 years    Functional Status: 60% - Requires occasional assistance but is able to care for needs  Physical Capacity: No Limitations    Transplant Candidacy: Patient is a 67 y.o. female with end-stage liver disease secondary to Cirrhosis: likel HAMEED/BLAYNE with MELD-Na: 17 and Child-Coker Class:B  here for evaluation for possible OLT. Based on available information, patient is a potential liver transplant candidate. Patient will undergo liver transplant evaluation after financial approval is obtained. Patient will be presented to Liver Transplant Selection Committee after all tests and evaluations are complete.    Recommendation(s):   Your MELD Score today is 17.  [facility] will call you to schedule your appointments for liver transplant evaluation.    Cirrhosis Counseling  - strict abstinence of alcohol use  - compliance with lactulose and rifaximin if you have developed hepatic encephalopathy (confusion due to high ammonia level)  - avoid non-steroidal anti-inflammatory drugs (NSAIDs) such as ibuprofen, Motrin, naprosyn, Alleve due to the risk of kidney damage  - can take acetaminophen (Tylenol), no more than 2000 mg per day  - low sodium (salt) 2 gram per day diet  - nutrition: 25-30kcal (calorie per body body weight in kilogram) per day- 2000 calorie day  - no need to restrict  protein in diet  - high protein diet: 1.2-1.5 gram/kg (protein per body weight in kilogram) per day to prevent muscle mass loss - 50 grams of protein  - avoid fasting  - Late night snack with 50 gram of complex carbohydrates and protein  - resistance exercises for muscle strength  - avoid raw seafoods due to the risk of fatal Vibrio vulnificus infection  - ultrasound or MRI of the liver every 6 months for liver cancer screening (you are at risk of developing liver cancer due to scar tissue in the liver)  - Upper endoscopy every 1-2 years to screen for varices in the stomach and foodpipe which can burst and cause fatal bleeding      Patient was advised, in presence of caregiver, not to consume any contents which contain alcohol:  Not to use any mouthwash which contains alcohol  To avoid any alcohol-containing OTC medications use  Not to consume Non-Alcoholic Beer as there is a small amount of alcohol in those beverages  Not to cook with any wine or alcohol.  Advised if patients tested positive for alcohol, they would be denied for liver transplant.  Advised all patients are randomly screen for illegal drugs and alcohol. Failure of compliance may result in denial for liver transplant.    I have sent communication to the referring physician and/or primary care provider.    A total of 60 minutes were spent face-to-face with the patient during this encounter and over half of that time was spent on counseling and coordination of care.  We discussed in depth the nature of the patient's liver disease, the management plan and liver transplant evaluation in details. I also educated the patient about lifestyle modifications which may improve hepatic steatosis, overweight/obesity, insulin resistance and high blood pressure issues. I have provided the patient with an opportunity to ask questions and have all questions answered to his satisfaction.       Yovana Santos MD  Staff Physician  Hepatology and Liver Transplant  [facility]  [facility] [place]

## 2018-07-23 NOTE — PATIENT INSTRUCTIONS
Your MELD Score today is 17.  [facility] will call you to schedule your appointments for liver transplant evaluation.    Cirrhosis Counseling  - strict abstinence of alcohol use  - compliance with lactulose and rifaximin if you have developed hepatic encephalopathy (confusion due to high ammonia level)  - avoid non-steroidal anti-inflammatory drugs (NSAIDs) such as ibuprofen, Motrin, naprosyn, Alleve due to the risk of kidney damage  - can take acetaminophen (Tylenol), no more than 2000 mg per day  - low sodium (salt) 2 gram per day diet  - nutrition: 25-30kcal (calorie per body body weight in kilogram) per day- 2000 calorie day  - no need to restrict protein in diet  - high protein diet: 1.2-1.5 gram/kg (protein per body weight in kilogram) per day to prevent muscle mass loss - 50 grams of protein  - avoid fasting  - Late night snack with 50 gram of complex carbohydrates and protein  - resistance exercises for muscle strength  - avoid raw seafoods due to the risk of fatal Vibrio vulnificus infection  - ultrasound or MRI of the liver every 6 months for liver cancer screening (you are at risk of developing liver cancer due to scar tissue in the liver)  - Upper endoscopy every 1-2 years to screen for varices in the stomach and foodpipe which can burst and cause fatal bleeding      Patient was advised, in presence of caregiver, not to consume any contents which contain alcohol:  Not to use any mouthwash which contains alcohol  To avoid any alcohol-containing OTC medications use  Not to consume Non-Alcoholic Beer as there is a small amount of alcohol in those beverages  Not to cook with any wine or alcohol.  Advised if patients tested positive for alcohol, they would be denied for liver transplant.  Advised all patients are randomly screen for illegal drugs and alcohol. Failure of compliance may result in denial for liver transplant.

## 2018-07-29 PROBLEM — K74.60 DECOMPENSATED HEPATIC CIRRHOSIS: Status: ACTIVE | Noted: 2018-01-01

## 2018-07-29 PROBLEM — Z76.82 ORGAN TRANSPLANT CANDIDATE: Status: ACTIVE | Noted: 2018-01-01

## 2018-07-29 PROBLEM — K72.90 DECOMPENSATED HEPATIC CIRRHOSIS: Status: ACTIVE | Noted: 2018-01-01

## 2018-08-07 NOTE — TELEPHONE ENCOUNTER
Phone call returned to patient's .  Discussed liver transplant evaluation.  Informed him that evaluation will take approx 2 to 5 days to complete depending on rather standard or Fast pass evaluation scheduled.  Informed him that all testing will be done outpatient.  He voiced understanding of the need for pt to have her caregiver present for the  and surgeon consult, as well as for the patient education.  All questions/ concerns regarding liver transplant evaluation answered/ addressed.  Requesting to schedule Fast Pass evaluation.   Orders for liver transplant evaluation entered and submitted to  for scheduling.  Will schedule appts September 6 and 7 (next available Fast Pass dates).

## 2018-08-07 NOTE — TELEPHONE ENCOUNTER
Called patient to discuss liver transplant eval and her availability.  No answer at this time.  Message left on VM.  Return phone call requested.  Contact info provided.  Awaiting callback.

## 2018-08-07 NOTE — TELEPHONE ENCOUNTER
----- Message from Rhea Hollins sent at 8/7/2018  1:39 PM CDT -----  Contact: patient  Needs Advice    Reason for call:   Returning coordinator phone call  Communication Preference: [phone]  Additional Information: n/a

## 2018-09-05 NOTE — TELEPHONE ENCOUNTER
Patient called to confirm that she will be attending scheduled appointments for Liver Transplant Fast Pass Evaluation on 9/6/18 at 0730.  Patient  confirms that he will be accompanying the patient to her appointments.  Patient appointments reviewed along with special instructions.  Questions answered and concerns addressed.

## 2018-09-06 NOTE — PROGRESS NOTES
Pre Transplant Infectious Diseases Consult  Liver Transplant Recipient Evaluation    Requesting Physician: Denzel Huizar MD    Reason for Visit:  No chief complaint on file.    History of Present Illness  Krystal Hobson is a 67 y.o. year old White female with advanced Liver disease currently being evaluated for Liver transplant. 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 hepatitic encpehalopathy x 6. Last admission was in last week.  In [place]. Currently taking lactulose. Denies paracentesis.  Patient denies any recent fever, chills, or infective illnesses.    1) Do you have a history of:   YES NO   Diabetes      [x] []     Diabetic Foot Infection/Bone Infection  []        [x]     Surgical Removal of Spleen   []  [x]               2) Have you had recurrent infections involving:             YES NO  Sinus infections  []         [x]   Sore Throat   []         [x]                 Prostate Infections  []         [x]              Bladder Infections  [x]         []                     Kidney Infections  []         [x]                               Intestinal Infections  []         [x]      Skin Infections   []         [x]       Reproductive Infections          []  [x]   Periodontal Disease  []         [x] all tooth extracted.        Pneumonia 2018 in the hospital .    3)Have you ever had: YES     NO UNKNOWN      Chicken Pox   [x]         []  []   Shingles   [x]         []  [] vaccinated   Orolabial Herpes             []  [x]  []   Genital Herpes  []         [x]  []   Cytomegalovirus  []         [x]  []   Pablo-Barr Virus  []         [x]  []   Genital Warts   []         [x]  []   Hepatitis A   []         [x]  []   Hepatitis B   []         [x]  []   Hepatitis C   []         [x]  []   Syphilis   []         [x]  []   Gonorrhea   []         [x]  []   Pelvic Inflammatory   Disease   []         [x]  []   Chlamydia Infection  []         [x]  []   Intestinal parasites   or worms   []         [x]  []   Fungal  Infections  []         [x]  []   Blood Infections  []         [x]  []       Comment:     4) Have you ever been exposed   YES NO  To someone with tuberculosis?  []   [x]   If yes, what treatment did you receive:     5) What states have you lived in?  [place], [place]     6) What countries have you visited for more than 2 weeks?  none                       YES NO  7) Did you have any associated infections?  []  [x]       8) Are you planning to travel outside the    []  [x]   [place] after your transplant?    9) Household                   YES NO  Do you have pets living in your house?    [x]         []   If yes, describe:  Indoor cat, vaccinated     Do you spend time or live on a farm or     []         [x]   have livestock or other farm animals?  If yes, which ones:    Do you have a fish tank?          []  [x]       Do you have a litter box?      [x]         []     Do you fish or hunt?       []         [x]     Do you clean or skin fish or animals?    []         [x]     Do you consume raw or undercooked    []         [x]   meat, fish, or shellfish?      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           12)Do you garden or otherwise  YES NO   work in the soil?    []         [x]   13)Do you hike, camp, or spend     time in wooded areas?   []         [x]        14) The patient's immunization history was reviewed.    Have you ever received:  YES NO UNKNOWN DATES   Routine Childhood vaccines  [x]         []  []      Influenza vaccine   [x]  []  [] 2018    Pneumovax    [x]  []  []  prevnar in 2 months.     Tetanus-diptheria   [x]         []  [] 2018   Hepatitis A vaccine series       []  [x]  []    Hepatitis B vaccine series         []  [x]  []    Meningitis vaccine   []         [x]  []    Varicella vaccine   []         [x]  []        Based on the patients immunization history and serologies, immunizations were ordered:         Ordered  Not Ordered  Influenza Vaccine     []    [x]   Hepatitis A series at 0,  6  months   []    [x]   Hepatitis B seriesat 0, 1, and 6 months  []    [x]   Hepatitis B High Dose 0,1, and 6 months  []    [x]   Prevnar      []    [x]   Pneumovax      []    [x]    TDap       []    [x]    Zoster       []    [x]   Menactra      []    [x]            The patient was encouraged to contact us about any problems that may develop after immunization and possible side effects were reviewed.      Previous Transplant: no    Etiology of Liver Disease: cirrhosis     Allergies: Patient has no known allergies.    There is no immunization history on file for this patient.  Past Medical History:   Diagnosis Date    Cancer     breat Cancer    Cirrhosis     Diabetes mellitus     GERD (gastroesophageal reflux disease)     Rheumatoid arthritis      Past Surgical History:   Procedure Laterality Date    CARPAL TUNNEL RELEASE      left wrist    CHOLECYSTECTOMY      MASTECTOMY      left breast 25  years ago      Social History     Socioeconomic History    Marital status:      Spouse name: Not on file    Number of children: Not on file    Years of education: Not on file    Highest education level: Not on file   Social Needs    Financial resource strain: Not on file    Food insecurity - worry: Not on file    Food insecurity - inability: Not on file    Transportation needs - medical: Not on file    Transportation needs - non-medical: Not on file   Occupational History    Not on file   Tobacco Use    Smoking status: Never Smoker    Tobacco comment: patient denies   Substance and Sexual Activity    Alcohol use: No     Comment: stopped 10/17    Drug use: No     Comment: patient denies    Sexual activity: Not on file   Other Topics Concern    Not on file   Social History Narrative    Not on file       Review of Systems   Constitution: Negative for chills, decreased appetite, fever, weakness, malaise/fatigue, night sweats, weight gain and weight loss.   HENT: Negative for congestion, ear pain, hearing  loss, hoarse voice, sore throat and tinnitus.    Eyes: Negative for blurred vision, pain, vision loss in left eye, vision loss in right eye and visual disturbance.   Cardiovascular: Negative for chest pain, dyspnea on exertion, leg swelling and palpitations.   Respiratory: Negative for cough, shortness of breath, sputum production and wheezing.    Skin: Negative for dry skin, itching, rash and suspicious lesions.   Musculoskeletal: Negative for back pain, joint pain, myalgias and neck pain.   Gastrointestinal: Negative for abdominal pain, constipation, diarrhea, heartburn, nausea and vomiting.   Genitourinary: Negative for dysuria, flank pain, frequency, hematuria, hesitancy and urgency.   Neurological: Negative for dizziness, headaches, numbness and paresthesias.   Psychiatric/Behavioral: Negative for depression and memory loss. The patient does not have insomnia and is not nervous/anxious.      Physical Exam   Constitutional: She is oriented to person, place, and time. She appears well-developed. No distress.   HENT:   Head: Normocephalic and atraumatic.   Eyes: EOM are normal. Pupils are equal, round, and reactive to light.   Neck: Normal range of motion. Neck supple.   Cardiovascular: Normal rate, regular rhythm, normal heart sounds and intact distal pulses. Exam reveals no gallop and no friction rub.   No murmur heard.  Pulmonary/Chest: Effort normal and breath sounds normal. No respiratory distress. She has no wheezes. She has no rales. She exhibits no tenderness.   Abdominal: Soft. Bowel sounds are normal. She exhibits no distension and no mass. There is no tenderness. There is no rebound and no guarding. No hernia.   Musculoskeletal: Normal range of motion. She exhibits no edema, tenderness or deformity.   Neurological: She is alert and oriented to person, place, and time. No cranial nerve deficit. Coordination normal.   Skin: Skin is warm and dry. She is not diaphoretic. No erythema. No pallor.    Psychiatric: She has a normal mood and affect. Her behavior is normal. Thought content normal.   Nursing note and vitals reviewed.    Diagnostics: No results found for: RPR  No results found for: CMVANTIBODIE  No results found for: HIV1X2  No results found for: HTLVIIIANTIB  No results found for: HEPBSAG  No results found for: HEPBCAB  No results found for: HEPCAB  No results found for: TOXOIGG  No components found for: TOXOIGGINTER  No results found for: SCJ4FGK  No results found for: MRR8IPQ  No results found for: VARICELLAZOS  No results found for: VARICELLAINT  No results found for: STRONGANTIGG  No results found for: EPSTEINBARRV  No results found for: HEPBSAB  No results found for: QUANTIFERON  No results found for: HEPAIGM  No results found for: PPD  No results found for this or any previous visit.         Transplant Infectious Diseases - Candidacy    Assessment/Plan:     Transplant Candidacy: Based on available information, there are no identified significant barriers to transplantation from an infectious disease standpoint pending acceptable serologies.  Final determination of transplant candidacy will be made once evaluation is complete and reviewed by the Transplant Selection Committee.    Quantiferon gold, HIV, Strongyloides, and RPR pending. If positive, please refer to ID clinic.    Vaccines recommended: rx given   Hepatitis A and B series if negative for immunity. rx given to patient.     Amanda Sung PA-C         Counseling:I discussed with Krystal the risk for increased susceptibility to infections following transplantation including increased risk for infection right after transplant and if rejection should occur.  The patients has been counseled on the importance of vaccinations including but not limited to a yearly flu vaccine.  Specific guidance has been provided to the patient regarding the patients occupation, hobbies and activities to avoid future infectious complications including but  not limited to avoiding undercooked meats and seafood, proper hygiene, and contact with animals.

## 2018-09-06 NOTE — PROGRESS NOTES
Patient seen in clinic with caregiver.  Consents reviewed and signatures obtained.  Patient appointments reviewed along with special instructions.  Patient questions answered and concerns addressed.

## 2018-09-06 NOTE — PROGRESS NOTES
PHARM.D. PRE-TRANSPLANT NOTE:    This patient's medication therapy was evaluated as part of her pre-transplant evaluation.    The following pharmacologic concerns were noted: None    This patient's medication profile was reviewed for contraindications for DAA Hepatitis C therapy:    [X]  No current inducers of CYP 3A4 or PGP  [X]  No amiodarone on this patient's EMR profile in the last 24 months  [X]  No past or current atrial fibrillation on this patient's EMR profile       Current Outpatient Medications   Medication Sig Dispense Refill    ALBUTEROL INHL Inhale 1 puff into the lungs 4 (four) times daily as needed.      benzonatate (TESSALON) 100 MG capsule Take 100 mg by mouth 2 (two) times daily as needed for Cough.      ergocalciferol (ERGOCALCIFEROL) 50,000 unit Cap Take 50,000 Units by mouth every 7 days.      ferrous sulfate 325 mg (65 mg iron) Tab tablet Take 325 mg by mouth once daily.      fluticasone/vilanterol (BREO ELLIPTA INHL) Inhale into the lungs.      furosemide (LASIX) 40 MG tablet Take 40 mg by mouth once daily.      hydrOXYzine HCl (ATARAX) 25 MG tablet Take 25 mg by mouth 3 (three) times daily as needed for Itching.      insulin detemir U-100 (LEVEMIR) 100 unit/mL injection Inject 10 Units into the skin 2 (two) times daily.      insulin lispro (HUMALOG) 100 unit/mL injection Sliding scale      lactulose (CHRONULAC) 10 gram/15 mL solution Take 30 g by mouth 2 (two) times daily.      omeprazole (PRILOSEC) 40 MG capsule Take 40 mg by mouth every morning.      ondansetron (ZOFRAN) 4 MG tablet Take 4 mg by mouth every 8 (eight) hours as needed for Nausea.      propranolol (INDERAL) 10 MG tablet Take 5 mg by mouth 2 (two) times daily.       rifAXIMin (XIFAXAN) 550 mg Tab Take 550 mg by mouth 2 (two) times daily.      spironolactone (ALDACTONE) 12.5 MG tablet Take 12.5 mg by mouth once daily.       zinc gluconate 50 mg tablet Take 50 mg by mouth 2 (two) times daily.        No current  facility-administered medications for this visit.          Currently he is responsible for preparing / administering this patient's medications on a daily basis.  I am available for consultation and can be contacted, as needed by the other members of the Liver Transplant team.

## 2018-09-06 NOTE — PROGRESS NOTES
PRE EDUCATION TEACHING NOTE    Krystal Hobson was seen in clinic today.  Handbook on pre-liver transplant information (see outline below) was given to the patient and time was allowed for questions.  Patient's , Shahzad accompanied her.  Informed consent signed and written information given on selection criteria.    LIVER TRANSPLANT WORK-UP EDUCATION   I. UNDERSTANDING THE TRANSPLANT PROCESS     A. Transplant team      B. MELD score      C. Balancing urgency and outcome     D. Liver Transplant Options         1.  Donor         2. Living Donor--rationale, benefits     E. Transplant Work-up         1. Medical         2. Psychological and Social--lifetime commitment, life changes, personal plan/ goal         3. Financial--fundraising     F.  Completed work-up and Next Steps    G. Wait Time         1.  Can be listed at more than one center         2.  Can transfer wait time     H. The Call       I. Possible donor options         1. DCD         2. Hep B Core and Hep C Positive         3. Increased Risk     J.  Liver Transplant Surgery         1. Length         2. Transfusions, cell saver         3. Surgical risks         4. What to expect after sugery--Central lines, drains, Mendoza catheter, incision, endotracheal              tube, NG tube, length of stay in ICU/ TSU  II.  HOW TO BEST CARE FOR YOURSELF (Take Five To Thrive)  III. UNDERSTANDING LIFE AFTER TRANSPLANT  A. Medicines after transplant      1. Immunosuppression--infection and rejection  B. Labs   IV. ADULT LIVER SURVIVAL RATES

## 2018-09-06 NOTE — PROGRESS NOTES
TRANSPLANT NUTRITIONAL ASSESSMENT    Referring Provider: Yovana Santos MD    Reason for Visit: Pre-liver transplant work-up    Age: 67 y.o.  Sex: female    Patient Active Problem List   Diagnosis    Decompensated hepatic cirrhosis    Organ transplant candidate     Past Medical History:   Diagnosis Date    Cancer     breat Cancer    Cirrhosis     Diabetes mellitus     GERD (gastroesophageal reflux disease)     Rheumatoid arthritis      Lab Results   Component Value Date     (H) 09/06/2018    K 4.5 09/06/2018    ALBUMIN 2.3 (L) 09/06/2018    CALCIUM 8.7 09/06/2018     Other Pertinent Labs: none    Current Outpatient Medications   Medication Sig    ALBUTEROL INHL Inhale 1 puff into the lungs 4 (four) times daily as needed.    benzonatate (TESSALON) 100 MG capsule Take 100 mg by mouth 2 (two) times daily as needed for Cough.    ergocalciferol (ERGOCALCIFEROL) 50,000 unit Cap Take 50,000 Units by mouth every 7 days.    ferrous sulfate 325 mg (65 mg iron) Tab tablet Take 325 mg by mouth once daily.    fluticasone/vilanterol (BREO ELLIPTA INHL) Inhale into the lungs.    furosemide (LASIX) 40 MG tablet Take 40 mg by mouth once daily.    hydrOXYzine HCl (ATARAX) 25 MG tablet Take 25 mg by mouth 3 (three) times daily as needed for Itching.    insulin detemir U-100 (LEVEMIR) 100 unit/mL injection Inject 10 Units into the skin 2 (two) times daily.    insulin lispro (HUMALOG) 100 unit/mL injection Sliding scale    lactulose (CHRONULAC) 10 gram/15 mL solution Take 30 g by mouth 2 (two) times daily.    omeprazole (PRILOSEC) 40 MG capsule Take 40 mg by mouth every morning.    ondansetron (ZOFRAN) 4 MG tablet Take 4 mg by mouth every 8 (eight) hours as needed for Nausea.    propranolol (INDERAL) 10 MG tablet Take 5 mg by mouth 2 (two) times daily.     rifAXIMin (XIFAXAN) 550 mg Tab Take 550 mg by mouth 2 (two) times daily.    spironolactone (ALDACTONE) 12.5 MG tablet Take 12.5 mg by mouth once daily.      "zinc gluconate 50 mg tablet Take 50 mg by mouth 2 (two) times daily.      No current facility-administered medications for this visit.      Allergies: Patient has no known allergies.    Ht Readings from Last 1 Encounters:   09/06/18 4' 10.39" (1.483 m)     Wt Readings from Last 1 Encounters:   09/06/18 50.2 kg (110 lb 10.7 oz)      BMI: Body mass index is 22.83 kg/m².    Usual Weight: 106-110 lb  Weight Change/Time: 130 lb in Oct 2017  Current Diet: regular  Appetite/Current Intake: fair   Exercise/Physical Activity: functional in ADL's, no exercise, can walk through grocery store  Nutritional/Herbal Supplements: Glucerna (occsional)  Chewing/Swallowing Problems: none  Symptoms: none     Estimated Kcal Need: 7780-1097 kcal (30-35 kcal/kg)  Estimated Protein Need: 50-75 gm (1-1.5 gm/kg)    Nutritional History:   Pt present with her spouse today. Pt reports fair/poor appetite, no chronic n/v/abd pain. Pt reports and shows visible muscle loss. Pt does not use any salt in cooking for added, she and spouse use Mrs Curry. They use fresh or frozen vegetables, no canned. Pt reported the following diet recall:  B: occasional eggs, cereal w/ 2% milk, orange juice  L/snack: nachos (made at home; jar cheese + chips + jalapeno) or popcorn or 1 slice of pizza (out), low sodium meat w/ cheese (no bread) or peanut butter sandwich  D: grilled chicken/pork chops/burgers, baked chicken, various vegetables, cucumbers & tomato  Snack: banana/canteloupe/watermelon, Glucerna   Beverages: sweet & unsweetened tea, Diet Root Beer, occasional Pepsi, water    Nutritional Diagnoses  Problem: food- and nutrition-related knowledge deficit  Etiology: r/t no prior edu on adequate protein intake  Symptoms: aeb diet recall, questions    Educational Need? yes  Barriers: none identified  Discussed with: patient and spouse  Interventions: Patient taught nutrition information regarding Pre-liver transplant work-up.    Provided education on protein content " in foods, goal intake per day, suggestions for ways to reach protein intake goal; nutrition supplements, snacks.   Reviewed Low Sodium packet (low Na diet, foods recommended/not recommended, food label strategies, flavoring tips, & sample menu).   Include low carb nutrition supplements daily 2-3 per day.   Include evening snack before bed, protein + carb source, discussed options/suggestions for this.  Continue to monitor and control BG with diet and medication.   Goals/Recommendations: diet adherence, small frequent meals and snacks, consumption of whitley nutritional supplements and increase protein intake  Actions Taken: instruct/provide written information  Patient and/or family comprehend instructions: yes  Outcome: Verbalizes understanding  Monitoring: Follow up in clinic if needed. RD contact info provided.     Counseling Time: 15 minutes

## 2018-09-06 NOTE — PROGRESS NOTES
Transplant Surgery Liver Transplant Recipient Evaluation    Referring Physician: Denzel Huizar  Corresponding Physician: Denzel Huizar    Subjective:     Reason for Visit: evaluate liver transplant candidacy    History of Present Illness: Krystal Hobson is a 67 y.o. year old female who is being evaluated for liver transplantation.    Transplant History: N/A    Native Liver Disease (UNOS terminology): Cirrhosis: Alcoholic steatohepatitis  (based on medical records from referral).    Manifestations of liver disease: encephalopathy, esophageal or gastric varices and fatigue  MELD-Na score: 17 at 9/6/2018  7:59 AM  MELD score: 17 at 9/6/2018  7:59 AM  Calculated from:  Serum Creatinine: 1.6 mg/dL at 9/6/2018  7:59 AM  Serum Sodium: 137 mmol/L at 9/6/2018  7:59 AM  Total Bilirubin: 2.9 mg/dL at 9/6/2018  7:59 AM  INR(ratio): 1.2 at 9/6/2018  7:59 AM  Age: 67 years    PMH: reviewed  PSH: reviewed    Review of Systems   Constitutional: Positive for fatigue.   HENT: Negative for drooling, postnasal drip and sore throat.    Eyes: Negative for discharge and itching.   Respiratory: Negative for choking and stridor.    Gastrointestinal: Negative for rectal pain.   Endocrine: Negative for polydipsia.   Genitourinary: Negative for enuresis and genital sores.   Musculoskeletal: Positive for back pain. Negative for neck pain and neck stiffness.   Allergic/Immunologic: Negative for immunocompromised state.   Neurological: Negative for facial asymmetry and numbness.   Hematological: Negative for adenopathy.   Psychiatric/Behavioral: Negative for behavioral problems, self-injury and suicidal ideas.         Objective:     Physical Exam:  Constitutional:   Vitals reviewed: no   Well-nourished and well-groomed: no  Eyes:   Sclerae icteric: no   Extraocular movements intact: yes  GI:    Bowel sounds normal: yes   Tenderness: no    If yes, quadrant/location: not applicable   Palpable masses: no    If yes, quadrant/location: not  applicable   Hepatosplenomegaly: no   Ascites: minimal   Hernia: yes. Location: umbilical    If yes, type/location: umbilical hernia, not applicable   Surgical scars: yes    If yes, type/location: RUQ Kocher and left mastectomy scar    Resp:   Effort normal: yes   Breath sounds normal: yes    CV:   Regular rate and rhythm: yes   Heart sounds normal: yes   Femoral pulses normal: yes   Extremities edematous: no  Skin:   Rashes or lesions present: no    If yes, describe:not applicable   Jaundice:: no    Musculoskeletal:   Gait normal: yes   Strength normal: no  Psych:   Oriented to person, place, and time: yes   Affect and mood normal: yes    Additional comments: Previous Surgery - Open Cholecystectomy, left mastectomy         Transplant Surgery - Candidacy   Assessment/Plan:   I see no surgical contraindication to liver transplantation. Based on available information, Krystal Hobson is a suitable liver transplant candidate. Final determination of transplant candidacy will be made once evaluation is complete and reviewed by the Liver Transplant Selection Committee.    Cisco Lackey MD         Counseling: We provided Krystal Hobson with a group education session today.  We discussed liver transplantation at length with her, including risks, potential complications, and alternatives in the management of her liver disease.  The discussion included complications related to anesthesia, bleeding, infection, primary nonfunction, and vascular thrombosis.  I discussed the typical postoperative course, length of hospitalization, the need for long-term immunosuppression, and the need for long-term routine follow-up.  I discussed living-donor and -donor transplantation and the relative advantages and disadvantages of each.  I also discussed average waiting times for both living donation and  donation.  I discussed national and center-specific survival rates.  I also mentioned the potential benefit of multicenter  listing to candidates listed with centers within more than one organ procurement organization.  All questions were answered.    PHS: I discussed the use of organs from donors with PHS increased-risk behavior, including the testing protocols utilized, as well as data from the literature regarding the likelihood of transmission of hepatitis or HIV.  The patient that she is willing to consider such grafts.  DCD: I discussed the use of organs recovered by donation after cardiac death (DCD), including slightly decreased graft survival and greater risk of arterial and biliary complications. The potential advantage to the recipient is possibly receiving a transplant sooner by accepting such an organ. The patient that she is willing to consider such grafts.  HBcAb: I discussed the use of organs from donors with HBcAb in conjunction with long term use of HBV antiviral drugs, such as lamivudine. The small but measurable risk of hepatitis B seroconversion was discussed as well as the potentially life long need to continue antiviral drugs. The patient that she is willing to consider such grafts.  HCV Non-viremic recipient: I discussed the use of HCV-positive organs in naive recipients, including the risk of viral transmission to the patients or others, potential insurance barriers for antiviral medication coverage, risk for fibrosing cholestatic hepatitis, death or graft loss. The potential advantage to the recipient is the possibility of receiving a transplant sooner with decreased mortality risk by accepting such an organ. The patient that she is willing to consider such grafts.

## 2018-09-06 NOTE — LETTER
September 7, 2018        Denzel Huizar  [address]  [address]  [facility] [address]  Phone: [phone]  Fax: [phone]             [facility]  [address]  [facility] [address]  Phone: [phone]   Patient: Krystal Hobson   MR Number: [id number]   YOB: 1950   Date of Visit: 9/6/2018       Dear Dr. Denzel Huizar    Thank you for referring Krystal Hobson to me for evaluation. Attached you will find relevant portions of my assessment and plan of care.    If you have questions, please do not hesitate to call me. I look forward to following Krystal Hobson along with you.    Sincerely,    Gera Bryan MD    Enclosure    If you would like to receive this communication electronically, please contact [email or web address] or [phone] to request [employer] access.    [employer] Link is a tool which provides read-only access to select patient information with whom you have a relationship. Its easy to use and provides real time access to review your patients record including encounter summaries, notes, results, and demographic information.    If you feel you have received this communication in error or would no longer like to receive these types of communications, please e-mail [email or web address]

## 2018-09-06 NOTE — PROGRESS NOTES
Transplant Recipient Adult Psychosocial Assessment    Krystal Hobson  [address] [facility] [address]    Telephone Information:   Mobile [phone]   Home  [phone] (home)  Work  There is no work phone number on file.  E-mail  [email or web address]    Sex: female  YOB: 1950  Age: 67 y.o.    Encounter Date: 2018  [place] Citizen: yes  Primary Language: English   Needed: no    Emergency Contact:  Name: Shahzad Hobson  Relationship:   Address: same as patient   Phone Numbers:   [phone] (mobile)    Family/Social Support:   Number of dependents/: britta  Marital history:  for 49 years in October this year  Other family dynamics: Pt and  have two adult children who live in 's home town in [place], [place], [place] (47) and [place] (40).  Patient has one adult grandson who lives in [place], [place].  Pt has two sisters living, they are next door.  Pt parents are .  Patient and her  moved to [place] about 11-12 years ago.    Household Composition:  Name: Shahzad Hobson  Age: 68  Relationship:   Does person drive? yes    Name: Krystal Hobson  Age: 67  Relationship: patient  Does person drive? no    Do you and your caregivers have access to reliable transportation? yes  PRIMARY CAREGIVER: Shahzad Hobson, pts , will be primary caregiver, phone number [phone].      provided in-depth information to patient and caregiver regarding pre- and post-transplant caregiver role.   strongly encourages patient and caregiver to have concrete plan regarding post-transplant care giving, including back-up caregiver(s) to ensure care giving needs are met as needed.    Patient and Caregiver states understanding all aspects of caregiver role/commitment and is able/willing/committed to being caregiver to the fullest extent necessary.    Patient and Caregiver verbalizes understanding of the education provided today and  caregiver responsibilities.         remains available. Patient and Caregiver agree to contact  in a timely manner if concerns arise.      Able to take time off work without financial concerns: yes , retired.     Additional Significant Others who will Assist with Transplant:  Name: Enma Cano  Age: 64  City: [place] State: [place]  Relationship: sister  Does person drive? yes    Name: Amy Cano  Age: 68  City: [place] State: [place]  Relationship: sister  Does person drive? yes    Living Will: no  Healthcare Power of : no  Advance Directives on file: <<no information> per medical record.  Verbally reviewed LW/HCPA information.   provided patient with copy of LW/HCPA documents and provided education on completion of forms.    Living Donors: No.    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    Spouse/Significant Other Employment: Retired  at a [employer], he retired in 2017.    Disabled: no    Monthly Income:   Reitrement: $788 (patient) and 1761 ()  Able to afford all costs now and if transplanted, including medications: yes; they are worried about valcyte costs;  will look into this with insurance; they will look into fundraising.  Patient and Caregiver verbalizes understanding of personal responsibilities related to transplant costs and the importance of having a financial plan to ensure that patients transplant costs are fully covered.       provided  fundraising information/education. Patient and Caregiververbalizes understanding.   remains available.    Insurance:   Payor/Plan Subscr  Sex Relation Sub. Ins. ID Effective Group Num   1. [facility]* PENNIE HUGHES 1950 Female  [id number]                                    [address] [address]   2. [facility]* PENNIE HUGHES 1950 Female  [id number]                                   [address]     Primary Insurance (for UNOS reporting): Public Insurance - Medicare FFS (Fee For Service)  Secondary Insurance (for UNOS reporting): None  Patient and Caregiver verbalizes clear understanding that patient may experience difficulty obtaining and/or be denied insurance coverage post-surgery. This includes and is not limited to disability insurance, life insurance, health insurance, burial insurance, long term care insurance, and other insurances.      Patient and Caregiver also reports understanding that future health concerns related to or unrelated to transplantation may not be covered by patient's insurance.  Resources and information provided and reviewed.     Patient and Caregiver provides verbal permission to release any necessary information to outside resources for patient care and discharge planning.  Resources and information provided are reviewed.      Dialysis Adherence: Patient and Caregiver reports none.      Infusion Service: patient utilizing? no  Home Health: patient utilizing? no  DME: no  Pulmonary/Cardiac Rehab: none   ADLS:  Patient reports independent with all ADL's.  She is not driving because of encephalopathy episodes.  She does report trouble eating because of nausea.    Adherence:   Patient reports following all healthcare recommendations.    Adherence education and counseling provided.     Per History Section:  Past Medical History:   Diagnosis Date    Cancer     breat Cancer    Cirrhosis     Diabetes mellitus     GERD (gastroesophageal reflux  disease)     Rheumatoid arthritis      Social History     Tobacco Use    Smoking status: Never Smoker    Tobacco comment: patient denies   Substance Use Topics    Alcohol use: No     Comment: stopped 10/17     Social History     Substance and Sexual Activity   Drug Use No    Comment: patient denies     Social History     Substance and Sexual Activity   Sexual Activity Not on file       Per Today's Psychosocial:  Tobacco: none, patient denies any use.  Alcohol: none, patient denies any use.  Last use 10/2017 with cirrhosis diagnosis.  Pt reports since moving to [place] they would have margaritas 2 on Sat and 2 on Sunday.  She denies any heavy alcohol use and rarely drank when they lived in [place].  No legal issues.    Illicit Drugs/Non-prescribed Medications: none, patient denies any use.    Patient and Caregiver states clear understanding of the potential impact of substance use as it relates to transplant candidacy and is aware of possible random substance screening.  Substance abstinence/cessation counseling, education and resources provided and reviewed.     Arrests/DWI/Treatment/Rehab: patient denies    Psychiatric History:    Mental Health: Patient denies any mental health problems  Psychiatrist/Counselor: none  Medications:  none  Suicide/Homicide Issues: Patient denies any current and/or past SI/HI.    Safety at home: none identified    Knowledge: Patient and Caregiver states having clear understanding and realistic expectations regarding the potential risks and potential benefits of organ transplantation and organ donation and agrees to discuss with health care team members and support system members, as well as to utilize available resources and express questions and/or concerns in order to further facilitate the pt informed decision-making.  Resources and information provided and reviewed.    Patient and Caregiver is aware of [facility]'s affiliation and/or partnership with agencies in home health care, LTAC, SNF,  [facility], and other hospitals and clinics.    Understanding: Patient and Caregiver reports having a clear understanding of the many lifetime commitments involved with being a transplant recipient, including costs, compliance, medications, lab work, procedures, appointments, concrete and financial planning, preparedness, timely and appropriate communication of concerns, abstinence (ETOH, tobacco, illicit non-prescribed drugs), adherence to all health care team recommendations, support system and caregiver involvement, appropriate and timely resource utilization and follow-through, mental health counseling as needed/recommended, and patient and caregiver responsibilities.  Social Service Handbook, resources and detailed educational information provided and reviewed.  Educational information provided.    Patient and Caregiver also reports current and expected compliance with health care regime and states having a clear understanding of the importance of compliance.      Patient and Caregiver reports a clear understanding that risks and benefits may be involved with organ transplantation and with organ donation.       Patient and Caregiver also reports clear understanding that psychosocial risk factors may affect patient, and include but are not limited to feelings of depression, generalized anxiety, anxiety regarding dependence on others, post traumatic stress disorder, feelings of guilt and other emotional and/or mental concerns, and/or exacerbation of existing mental health concerns.  Detailed resources provided and discussed.      Patient and Caregiver agrees to access appropriate resources in a timely manner as needed and/or as recommended, and to communicate concerns appropriately.  Patient and Caregiver also reports a clear understanding of treatment options available.     Patient and Caregiver received education in a group setting.   reviewed education, provided additional information, and answered  questions.    Feelings or Concerns: Patient worried about transplant, finds it scary, but wants to feel better    Coping: Patient reports some coping issues of moodiness that are manageable with prayer.    Goals: Work outside in yard; travel; see family in [place].  Patient referred to Vocational Rehabilitation.    Interview Behavior: Patient and Caregiver presents as alert and oriented x 4, pleasant, good eye contact, well groomed, recall good, concentration/judgement good, average intelligence, calm, communicative, cooperative and asking and answering questions appropriately.          Transplant Social Work - Candidacy  Assessment/Plan:     Psychosocial Suitability: Patient presents as a suitable candidate for liver transplant at this time. Based on psychosocial risk factors, patient presents as low risk, due to adequate caregiver plan/support system; adequate insurance and fiances and willing to fundraise for potential valcyte costs; lack of substance abuse issues or mental health concerns. .    Recommendations/Additional Comments:   -local lodging post transplant at [facility] [address]  -fundraising    Sade Anna

## 2018-09-07 PROBLEM — R16.0 LIVER MASS: Status: ACTIVE | Noted: 2018-01-01

## 2018-09-07 PROBLEM — K76.6 PORTAL HYPERTENSION: Status: ACTIVE | Noted: 2018-01-01

## 2018-09-07 PROBLEM — E87.1 HYPONATREMIA WITH DECREASED SERUM OSMOLALITY: Status: ACTIVE | Noted: 2018-01-01

## 2018-09-07 PROBLEM — D73.1 THROMBOCYTOPENIA DUE TO HYPERSPLENISM: Status: ACTIVE | Noted: 2018-01-01

## 2018-09-07 PROBLEM — E44.0 PROTEIN-CALORIE MALNUTRITION, MODERATE: Status: ACTIVE | Noted: 2018-01-01

## 2018-09-07 PROBLEM — Z01.818 ENCOUNTER FOR PRE-TRANSPLANT EVALUATION FOR CHRONIC LIVER DISEASE: Status: ACTIVE | Noted: 2018-01-01

## 2018-09-07 PROBLEM — Z85.3 PERSONAL HISTORY OF BREAST CANCER: Status: ACTIVE | Noted: 2018-01-01

## 2018-09-07 PROBLEM — D69.59 THROMBOCYTOPENIA DUE TO HYPERSPLENISM: Status: ACTIVE | Noted: 2018-01-01

## 2018-09-07 NOTE — PROGRESS NOTES
Transplant Hepatology (Transplant Evaluation) Consult Note    Referring provider: Denzel Huizar MD    Chief complaint:   Chief Complaint   Patient presents with    Liver Transplant Pre-evaluation       HPI:  Krystal Hobson is a 67 y.o. female that presents to Transplant Hepatology Clinic for consultation of had concerns including Liver Transplant Pre-evaluation.  She  is accompanied by her .    9/7/18: Feels ok today except fatigue, feeling cold.     8/28-29/18: admitted to [facility] in [place], [place] with lethargy - hepatic encephalopathy.. Lactulose increased and resolved.          MELD-Na score: 17 at 9/6/2018  7:59 AM  MELD score: 17 at 9/6/2018  7:59 AM  Calculated from:  Serum Creatinine: 1.6 mg/dL at 9/6/2018  7:59 AM  Serum Sodium: 137 mmol/L at 9/6/2018  7:59 AM  Total Bilirubin: 2.9 mg/dL at 9/6/2018  7:59 AM  INR(ratio): 1.2 at 9/6/2018  7:59 AM  Age: 67 years    Liver disease:                   cryptogenic, likely HAMEED    MELD-Na:                         17  Child-Coker Class:             B    Transplant status:             not under evaluation    Cirrhosis is decompensated with:    Ascites:                                                       no  Spontaneous bacterial peritonitis:              no  Hepatic Encephalopathy:                           Yes, grade 2  Portal bleeding:                                          no  Hepatocellular carcinoma:                          no    Hepatorenal syndrome:                              no  Hyponatremia:                                            yes  Muscle wasting:                                          yes   Portal vein thrombosis:                               no      Screening:  Last EGD:  Recent May 2018 (Dr. Santiago)    Colonoscopy 2017 - [place].    Last imaging study:   MRI scheduled 9/7/18    Background  Oct 2017 - diagnosis HE, hospitalization x 6 times for HE and 1 x dehydration.  She was told that she had cirrhosis.    Alcohol: no,  occasional prior to cirrhosis diagnosis.  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  , 1 daughter 46 y/o and 1 son 39 y/o.    Patient Active Problem List   Diagnosis    Decompensated hepatic cirrhosis    Organ transplant candidate       Past Medical History:   Diagnosis Date    Cancer     breat Cancer    Cirrhosis     Diabetes mellitus     GERD (gastroesophageal reflux disease)     Rheumatoid arthritis        Past Surgical History:   Procedure Laterality Date    CARPAL TUNNEL RELEASE      left wrist    CHOLECYSTECTOMY      MASTECTOMY      left breast 25  years ago       Family History   Problem Relation Age of Onset    Heart disease Mother     Liver disease Father     COPD Sister     Liver disease Sister        Social History     Socioeconomic History    Marital status:      Spouse name: None    Number of children: None    Years of education: None    Highest education level: None   Social Needs    Financial resource strain: None    Food insecurity - worry: None    Food insecurity - inability: None    Transportation needs - medical: None    Transportation needs - non-medical: None   Occupational History    None   Tobacco Use    Smoking status: Never Smoker    Tobacco comment: patient denies   Substance and Sexual Activity    Alcohol use: No     Comment: stopped 10/17    Drug use: No     Comment: patient denies    Sexual activity: None   Other Topics Concern    None   Social History Narrative    None       Current Outpatient Medications   Medication Sig Dispense Refill    ALBUTEROL INHL Inhale 1 puff into the lungs 4 (four) times daily as needed.      benzonatate (TESSALON) 100 MG capsule Take 100 mg by mouth 2 (two) times  daily as needed for Cough.      ergocalciferol (ERGOCALCIFEROL) 50,000 unit Cap Take 50,000 Units by mouth every 7 days.      ferrous sulfate 325 mg (65 mg iron) Tab tablet Take 325 mg by mouth once daily.      fluticasone/vilanterol (BREO ELLIPTA INHL) Inhale into the lungs.      furosemide (LASIX) 40 MG tablet Take 40 mg by mouth once daily.      hydrOXYzine HCl (ATARAX) 25 MG tablet Take 25 mg by mouth 3 (three) times daily as needed for Itching.      insulin detemir U-100 (LEVEMIR) 100 unit/mL injection Inject 10 Units into the skin 2 (two) times daily.      insulin lispro (HUMALOG) 100 unit/mL injection Sliding scale      lactulose (CHRONULAC) 10 gram/15 mL solution Take 30 g by mouth 2 (two) times daily.      omeprazole (PRILOSEC) 40 MG capsule Take 40 mg by mouth every morning.      ondansetron (ZOFRAN) 4 MG tablet Take 4 mg by mouth every 8 (eight) hours as needed for Nausea.      propranolol (INDERAL) 10 MG tablet Take 5 mg by mouth 2 (two) times daily.       rifAXIMin (XIFAXAN) 550 mg Tab Take 550 mg by mouth 2 (two) times daily.      spironolactone (ALDACTONE) 12.5 MG tablet Take 12.5 mg by mouth once daily.       zinc gluconate 50 mg tablet Take 50 mg by mouth 2 (two) times daily.        No current facility-administered medications for this visit.        Review of patient's allergies indicates:  No Known Allergies    Review of Systems   Constitutional: Positive for malaise/fatigue. Negative for chills, fever and weight loss.   HENT: Negative for congestion, nosebleeds and sore throat.    Eyes: Negative for blurred vision, double vision and photophobia.   Respiratory: Negative for cough and shortness of breath.    Cardiovascular: Negative for chest pain, palpitations, orthopnea and leg swelling.   Gastrointestinal: Negative for abdominal pain, blood in stool, constipation, diarrhea, melena and vomiting.   Genitourinary: Negative for dysuria.   Musculoskeletal: Negative for falls and joint  "pain.   Skin: Negative for itching and rash.   Neurological: Negative for dizziness, tremors and weakness.   Endo/Heme/Allergies: Does not bruise/bleed easily.   Psychiatric/Behavioral: Negative for depression and substance abuse. The patient has insomnia. The patient is not nervous/anxious.        Vitals:    09/07/18 1116   BP: (!) 119/46   Pulse: 81   Resp: 18   Temp: 98.3 °F (36.8 °C)   TempSrc: Oral   SpO2: 98%   Weight: 48.4 kg (106 lb 11.2 oz)   Height: 4' 10.27" (1.48 m)       Physical Exam   Constitutional: She is oriented to person, place, and time. She appears well-developed.   Chronically ill-appearing. Malnourished. Temporal wasting.     HENT:   Head: Normocephalic and atraumatic.   Mouth/Throat: Oropharynx is clear and moist.   Eyes: Conjunctivae are normal. Scleral icterus is present.   Neck: Normal range of motion.   Cardiovascular: Normal rate, regular rhythm and normal heart sounds.   Pulmonary/Chest: Effort normal and breath sounds normal. No respiratory distress. She has no rales.   Abdominal: Soft. Bowel sounds are normal. She exhibits no distension. There is no tenderness. There is no guarding. No hernia.   Musculoskeletal: She exhibits no edema.   Lymphadenopathy:     She has no cervical adenopathy.   Neurological: She is alert and oriented to person, place, and time.   Skin: Skin is warm and dry. No rash noted.   Scattered spider angiomata on upper chest     Psychiatric: She has a normal mood and affect. Her behavior is normal. Her mood appears not anxious.   Nursing note and vitals reviewed.      LABS: I personally reviewed pertinent laboratory findings.    Lab Results   Component Value Date    ALT 24 09/06/2018    AST 56 (H) 09/06/2018     (H) 07/23/2018    ALKPHOS 204 (H) 09/06/2018    BILITOT 2.9 (H) 09/06/2018       Lab Results   Component Value Date    WBC 4.86 09/06/2018    HGB 9.1 (L) 09/06/2018    HCT 28.2 (L) 09/06/2018     (H) 09/06/2018     (L) 09/06/2018 "       Lab Results   Component Value Date     09/06/2018    K 4.5 09/06/2018     09/06/2018    CO2 22 (L) 09/06/2018    BUN 34 (H) 09/06/2018    CREATININE 1.6 (H) 09/06/2018    CALCIUM 8.7 09/06/2018    ANIONGAP 6 (L) 09/06/2018    ESTGFRAFRICA 38.2 (A) 09/06/2018    EGFRNONAA 33.1 (A) 09/06/2018       Lab Results   Component Value Date    INR 1.2 09/06/2018    INR 1.2 07/23/2018       No results found for: SMOOTHMUSCAB, MITOAB  Lab Results   Component Value Date    IRON 180 (H) 09/06/2018    TIBC 283 09/06/2018    FERRITIN 126 09/06/2018     Lab Results   Component Value Date    HEPBCAB Negative 09/06/2018    HEPCAB Negative 09/06/2018     Lab Results   Component Value Date    TSH 3.581 09/06/2018     No results found for: CADEN    No results found for: ABORH        No results found for: LABA1C, HGBA1C  No results found for: CHOL  No results found for: HDL  No results found for: LDLCALC  No results found for: TRIG  No results found for: CHOLHDL        Imaging:   I personally reviewed recent cardiology studies available on the chart and from outside medical records.    I personally reviewed recent imaging studies available on the chart and from outside medical records.        Assessment:  67 y.o. female presenting with     1. Encounter for pre-transplant evaluation for chronic liver disease    2. Decompensated hepatic cirrhosis    3. Liver mass    4. Hyponatremia with decreased serum osmolality    5. Thrombocytopenia due to hypersplenism    6. Portal hypertension    7. Protein-calorie malnutrition, moderate        MELD-Na score: 17 at 9/6/2018  7:59 AM  MELD score: 17 at 9/6/2018  7:59 AM  Calculated from:  Serum Creatinine: 1.6 mg/dL at 9/6/2018  7:59 AM  Serum Sodium: 137 mmol/L at 9/6/2018  7:59 AM  Total Bilirubin: 2.9 mg/dL at 9/6/2018  7:59 AM  INR(ratio): 1.2 at 9/6/2018  7:59 AM  Age: 67 years    CPT: C    Functional Status: 60% - Requires occasional assistance but is able to care for  needs  Physical Capacity: No Limitations    Transplant Candidacy: Patient is a 67 y.o. female with end-stage liver disease secondary to Cirrhosis: likel HAMEED/BLAYNE with MELD-Na: 17 and Child-Coker Class:B  here for evaluation for possible OLT. Based on available information, patient is a potential liver transplant candidate. Patient will undergo liver transplant evaluation after financial approval is obtained. Patient will be presented to Liver Transplant Selection Committee after all tests and evaluations are complete.    Recommendation(s):   - complete OLT eval  - no change in medications  - will discuss at LT Selection Committee    Cirrhosis Counseling  - strict abstinence of alcohol use  - compliance with lactulose and rifaximin if you have developed hepatic encephalopathy (confusion due to high ammonia level)  - avoid non-steroidal anti-inflammatory drugs (NSAIDs) such as ibuprofen, Motrin, naprosyn, Alleve due to the risk of kidney damage  - can take acetaminophen (Tylenol), no more than 2000 mg per day  - low sodium (salt) 2 gram per day diet  - nutrition: 25-30kcal (calorie per body body weight in kilogram) per day- 2000 calorie day  - no need to restrict protein in diet  - high protein diet: 1.2-1.5 gram/kg (protein per body weight in kilogram) per day to prevent muscle mass loss - 50 grams of protein  - avoid fasting  - Late night snack with 50 gram of complex carbohydrates and protein  - resistance exercises for muscle strength  - avoid raw seafoods due to the risk of fatal Vibrio vulnificus infection  - ultrasound or MRI of the liver every 6 months for liver cancer screening (you are at risk of developing liver cancer due to scar tissue in the liver)  - Upper endoscopy every 1-2 years to screen for varices in the stomach and foodpipe which can burst and cause fatal bleeding    I have sent communication to the referring physician and/or primary care provider.    A total of 40 minutes were spent face-to-face  with the patient during this encounter and over half of that time was spent on counseling and coordination of care.  We discussed in depth the nature of the patient's liver disease, the management plan and liver transplant evaluation in details. I also educated the patient about lifestyle modifications which may improve hepatic steatosis, overweight/obesity, insulin resistance and high blood pressure issues. I have provided the patient with an opportunity to ask questions and have all questions answered to his satisfaction.       Yovana Santos MD  Staff Physician  Hepatology and Liver Transplant  [facility]  [facility]

## 2018-09-07 NOTE — PROGRESS NOTES
CC: Annual  HPI: Pt is a 67 y.o.  female who presents for routine annual exam. She is a new patient to me. She has not been to a GYN in years. She is currently on the liver transplant list and needs a pap smear. She is postmenopausal. Personal hx of breast cancer with a left mastectomy. Reports being cancer free for 27 years now. Last mmg was 3-4 months ago and WNL. She does not smoke cigarettes. Denies family hx of breast cancer. Her son recently got  but she was unable to attend d/t health issues. She has an MRI scheduled today after this visit.    ROS:  GENERAL: Feeling well overall. Denies fever or chills.   SKIN: Denies rash or lesions.   HEAD: Denies head injury or headache.   NODES: Denies enlarged lymph nodes.   CHEST: Denies chest pain or shortness of breath.   CARDIOVASCULAR: Denies palpitations or left sided chest pain.   ABDOMEN: No abdominal pain, constipation, diarrhea, nausea, vomiting or rectal bleeding.   URINARY: No dysuria, hematuria, or burning on urination.  REPRODUCTIVE: See HPI.   BREASTS: Denies pain, lumps, or nipple discharge.   HEMATOLOGIC: No easy bruisability or excessive bleeding.   MUSCULOSKELETAL: Denies joint pain or swelling.   NEUROLOGIC: Denies syncope or weakness.   PSYCHIATRIC: Denies depression, anxiety or mood swings.    PE:   APPEARANCE: Well nourished, well developed, White female in no acute distress.  NODES: no cervical, supraclavicular, or inguinal lymphadenopathy  BREASTS: left breast s/p mastectomy without reconstruction. no skin changes or visible lesions. No palpable masses, nipple discharge or adenopathy bilaterally.  ABDOMEN: Soft. No tenderness or masses. No distention. No hernias palpated. No CVA tenderness.  VULVA: No lesions. Normal external female genitalia.  URETHRAL MEATUS: Normal size and location, no lesions, no prolapse.  URETHRA: No masses, tenderness, or prolapse.  VAGINA: atrophic. No lesions or lacerations noted. No abnormal discharge present.  No odor present.   CERVIX: No lesions or discharge. No cervical motion tenderness.   UTERUS: Normal size, regular shape, mobile, non-tender.  ADNEXA: No tenderness. No fullness or masses palpated in the adnexal regions.   ANUS PERINEUM: Normal.      Diagnosis:  1. Encounter for annual routine gynecological examination    2. Pap smear for cervical cancer screening    3. Personal history of breast cancer        Plan:     Orders Placed This Encounter    HPV High Risk Genotypes, PCR    Liquid-based pap smear, screening     Pap updated, d/w pt if normal she no longer requires cervical cancer screening  MMG current    Patient was counseled today on the new ACS guidelines for cervical cytology screening as well as the current recommendations for breast cancer screening. She was counseled to follow up with her PCP for other routine health maintenance. Counseling session lasted approximately 10 minutes, and all her questions were answered.    Follow-up with me in 1 year for routine exam; pap in 3 years.

## 2018-09-07 NOTE — LETTER
September 7, 2018        Denzel Huizar  [address]  [address]  [facility] [address]  Phone: [phone]  Fax: [phone]             [facility]  [address]  [facility] [address]  Phone: [phone]   Patient: Krystal Hobson   MR Number: [id number]   YOB: 1950   Date of Visit: 9/7/2018       Dear Dr. Denzel Huizar    Thank you for referring Krystal Hobson to me for evaluation. Attached you will find relevant portions of my assessment and plan of care.    If you have questions, please do not hesitate to call me. I look forward to following Krystal Hobson along with you.    Sincerely,    Yovana Santos MD    Enclosure    If you would like to receive this communication electronically, please contact [email or web address] or [phone] to request [employer] [facility] Link access.    [employer] Link is a tool which provides read-only access to select patient information with whom you have a relationship. Its easy to use and provides real time access to review your patients record including encounter summaries, notes, results, and demographic information.    If you feel you have received this communication in error or would no longer like to receive these types of communications, please e-mail [email or web address]

## 2018-09-07 NOTE — LETTER
September 7, 2018      Yovana Santos MD  [address]  [facility] [address]           [facility] [address]  [address]  [facility] [address]  Phone: [phone]  Fax: [phone]          Patient: Krystal Hobson   MR Number: [id number]   YOB: 1950   Date of Visit: 9/7/2018       Dear Dr. Yovana Santos:    Thank you for referring Krystal Hobson to me for evaluation. Attached you will find relevant portions of my assessment and plan of care.    If you have questions, please do not hesitate to call me. I look forward to following Krystal Hobson along with you.    Sincerely,    Louise Rachel, NP    Enclosure  CC:  No Recipients    If you would like to receive this communication electronically, please contact [email or web address] or [phone] to request more information on [email or web address] Link access.    For providers and/or their staff who would like to refer a patient to [facility], please contact us through our one-stop-shop provider referral line, [facility], at [phone].    If you feel you have received this communication in error or would no longer like to receive these types of communications, please e-mail [email or web address]

## 2018-09-12 NOTE — COMMITTEE REVIEW
Krystal Hobson's case presented to selection committee.  Patient has been accepted for liver transplant due to Cirrhosis: Cryptogenic (Idiopathic) and complications of end stage liver disease including hyperbilirubinemia, hypoalbuminemia, ascites, severe malnutrition, encephalopathy, portal hypertension, splenomegaly, thrombocytopenia and esophageal varices with a MELD score of 17.  Patient has no absolute contraindications for liver transplant.  Patient will be listed pending financial approval.    Patient will accept HBcAb positive livers.  Patient will accept HCVAB positive livers.  Patient will accept DCD livers.  Patient will accept HCV MARY positive livers  Patient will accept HBV MARY positive livers  I was present and agree with the committee's conclusions.

## 2018-09-12 NOTE — TELEPHONE ENCOUNTER
Called patient to inform her that her case was presented in liver selection com meeting on today.  Spoke w/ her .  Informed him that she was approved for transplant and will be listed once financial clearance obtained from insurance company.  He voiced understanding.  He denies any questions/ concerns at this time.

## 2018-09-17 NOTE — TELEPHONE ENCOUNTER
Called patient to inform her that she has been placed on the liver transplant waiting list effective Friday, 9/14 w/ a MELD score of 17.  Spoke w/ her .  He voiced understanding.  He denies any questions/ concerns at this time.

## 2018-10-30 PROBLEM — N17.9 AKI (ACUTE KIDNEY INJURY): Status: ACTIVE | Noted: 2018-01-01

## 2018-10-30 NOTE — LETTER
October 30, 2018        Denzel Huizar  [address]  [address]  [facility] [address]  Phone: [phone]  Fax: [phone]             [facility]  [address]  [facility] [address]  Phone: [phone]   Patient: Krystal Hobson   MR Number: [id number]   YOB: 1950   Date of Visit: 10/30/2018       Dear Dr. Denzel Huizar    Thank you for referring Krystal Hobson to me for evaluation. Attached you will find relevant portions of my assessment and plan of care.    If you have questions, please do not hesitate to call me. I look forward to following Krystal Hobson along with you.    Sincerely,    Yovana Santos MD    Enclosure    If you would like to receive this communication electronically, please contact [email or web address] or [phone] to request [email or web address] Link access.    [email or web address] Link is a tool which provides read-only access to select patient information with whom you have a relationship. Its easy to use and provides real time access to review your patients record including encounter summaries, notes, results, and demographic information.    If you feel you have received this communication in error or would no longer like to receive these types of communications, please e-mail [email or web address]

## 2018-10-30 NOTE — TELEPHONE ENCOUNTER
PATIENT NAME: Krystal Hobson  [facility] #: [id number]    Lab Results   Component Value Date    CREATININE 3.0 (H) 10/30/2018     (L) 10/30/2018    BILITOT 1.8 (H) 10/30/2018    ALBUMIN 2.6 (L) 10/30/2018    INR 1.1 10/30/2018       Encephalopathy: 1 - 2  Ascites: slight  Dialysis: no    MELD 23    Recertification requestor: Hazel Mondragon

## 2018-10-30 NOTE — PROGRESS NOTES
Transplant Hepatology Consult Note    Referring provider: Denzel Huizar MD    Chief complaint:   Chief Complaint   Patient presents with    Cirrhosis       HPI:  Krystal Hobson is a 67 y.o. female that presents to Transplant Hepatology Clinic for consultation of had concerns including Cirrhosis.  She  is accompanied by her .    Follow up:  10/30/18: feels tired and fatigued. No confusion, bleeding or ascites. Sometimes pain from umbilical hernia. MELD: 23, Cr: jumped to 3 from 1.6. Will admit to LTS for ENZO.    9/17/18: placed on LT wait list.    9/7/18: Feels ok today except fatigue, feeling cold.     8/28-29/18: admitted to [facility] in [place], [place] with lethargy - hepatic encephalopathy.. Lactulose increased and resolved.      MELD-Na score: 23 at 10/30/2018  9:19 AM  MELD score: 20 at 10/30/2018  9:19 AM  Calculated from:  Serum Creatinine: 3 mg/dL at 10/30/2018  9:19 AM  Serum Sodium: 133 mmol/L at 10/30/2018  9:19 AM  Total Bilirubin: 1.8 mg/dL at 10/30/2018  9:19 AM  INR(ratio): 1.1 at 10/30/2018  9:19 AM  Age: 67 years    Liver disease:                   cryptogenic, likely HAMEED    MELD-Na:                         23  Child-Coker Class:             B    Transplant status:             listed    Cirrhosis is decompensated with:    Ascites:                                                       no  Spontaneous bacterial peritonitis:              no  Hepatic Encephalopathy:                           Yes, grade 2  Portal bleeding:                                          no  Hepatocellular carcinoma:                          no    Hepatorenal syndrome:                              no  Hyponatremia:                                            yes  Muscle wasting:                                          yes   Portal vein thrombosis:                               no      Screening:  Last EGD:  Recent May 2018 (Dr. Santiago)    Colonoscopy 2017 - [place].    Last imaging study:   MRI scheduled  9/7/18    Background  Oct 2017 - diagnosis HE, hospitalization x 6 times for HE and 1 x dehydration.  She was told that she had cirrhosis.    Alcohol: no, occasional prior to cirrhosis diagnosis.  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  , 1 daughter 46 y/o and 1 son 39 y/o.    Patient Active Problem List   Diagnosis    Decompensated hepatic cirrhosis    Organ transplant candidate    Protein-calorie malnutrition, moderate    Thrombocytopenia due to hypersplenism    Hyponatremia with decreased serum osmolality    Portal hypertension    Encounter for pre-transplant evaluation for chronic liver disease    Liver mass    Personal history of breast cancer       Past Medical History:   Diagnosis Date    Cancer     breat Cancer    Cirrhosis     Diabetes mellitus     GERD (gastroesophageal reflux disease)     Rheumatoid arthritis        Past Surgical History:   Procedure Laterality Date    CARPAL TUNNEL RELEASE      left wrist    CHOLECYSTECTOMY      MASTECTOMY      left breast 25  years ago       Family History   Problem Relation Age of Onset    Heart disease Mother     Liver disease Father     COPD Sister     Liver disease Sister        Social History     Socioeconomic History    Marital status:      Spouse name: None    Number of children: None    Years of education: None    Highest education level: None   Social Needs    Financial resource strain: None    Food insecurity - worry: None    Food insecurity - inability: None    Transportation needs - medical: None    Transportation needs - non-medical: None   Occupational History    None   Tobacco Use    Smoking status: Never Smoker    Tobacco comment: patient denies   Substance  and Sexual Activity    Alcohol use: No     Comment: stopped 10/17    Drug use: No     Comment: patient denies    Sexual activity: None   Other Topics Concern    None   Social History Narrative    None       Current Outpatient Medications   Medication Sig Dispense Refill    benzonatate (TESSALON) 100 MG capsule Take 100 mg by mouth 2 (two) times daily as needed for Cough.      BREO ELLIPTA 100-25 mcg/dose diskus inhaler   5    ergocalciferol (ERGOCALCIFEROL) 50,000 unit Cap Take 50,000 Units by mouth every 7 days.      furosemide (LASIX) 40 MG tablet Take 40 mg by mouth once daily.      hydrOXYzine HCl (ATARAX) 25 MG tablet Take 25 mg by mouth 3 (three) times daily as needed for Itching.      insulin detemir U-100 (LEVEMIR) 100 unit/mL injection Inject 12 Units into the skin 2 (two) times daily.       insulin lispro (HUMALOG) 100 unit/mL injection Sliding scale       lactulose (CHRONULAC) 10 gram/15 mL solution Take 30 g by mouth 3 (three) times daily.       omeprazole (PRILOSEC) 40 MG capsule Take 40 mg by mouth every morning.      ondansetron (ZOFRAN) 4 MG tablet Take 4 mg by mouth every 8 (eight) hours as needed for Nausea.      ONETOUCH ULTRA BLUE TEST STRIP Strp       propranolol (INDERAL) 10 MG tablet Take 5 mg by mouth 2 (two) times daily.       rifAXIMin (XIFAXAN) 550 mg Tab Take 550 mg by mouth 2 (two) times daily.      SHINGRIX, PF, 50 mcg/0.5 mL injection       spironolactone (ALDACTONE) 100 MG tablet TK 1 T PO D  5    zinc gluconate 50 mg tablet Take 50 mg by mouth once daily.       ALBUTEROL INHL Inhale 1 puff into the lungs 4 (four) times daily as needed.      ferrous sulfate 325 mg (65 mg iron) Tab tablet Take 325 mg by mouth once daily.      fluticasone/vilanterol (BREO ELLIPTA INHL) Inhale into the lungs.      spironolactone (ALDACTONE) 12.5 MG tablet Take 12.5 mg by mouth once daily.        No current facility-administered medications for this visit.        Review of  "patient's allergies indicates:  No Known Allergies    Review of Systems   Constitutional: Positive for malaise/fatigue. Negative for chills, fever and weight loss.   HENT: Negative for congestion, nosebleeds and sore throat.    Eyes: Negative for blurred vision, double vision and photophobia.   Respiratory: Negative for cough and shortness of breath.    Cardiovascular: Negative for chest pain, palpitations, orthopnea and leg swelling.   Gastrointestinal: Negative for abdominal pain, blood in stool, constipation, diarrhea, melena and vomiting.   Genitourinary: Negative for dysuria.   Musculoskeletal: Negative for falls and joint pain.   Skin: Negative for itching and rash.   Neurological: Negative for dizziness, tremors and weakness.   Endo/Heme/Allergies: Does not bruise/bleed easily.   Psychiatric/Behavioral: Negative for depression and substance abuse. The patient has insomnia. The patient is not nervous/anxious.        Vitals:    10/30/18 1041   BP: (!) 112/49   Pulse: 64   Resp: 16   Temp: 98.8 °F (37.1 °C)   TempSrc: Oral   SpO2: 100%   Weight: 45 kg (99 lb 3.3 oz)   Height: 4' 10.5" (1.486 m)       Physical Exam   Constitutional: She is oriented to person, place, and time. She appears well-developed.   Chronically ill-appearing. Malnourished. Temporal wasting.     HENT:   Head: Normocephalic and atraumatic.   Mouth/Throat: Oropharynx is clear and moist.   Eyes: Conjunctivae are normal. Scleral icterus is present.   Neck: Normal range of motion.   Cardiovascular: Normal rate, regular rhythm and normal heart sounds.   Pulmonary/Chest: Effort normal and breath sounds normal. No stridor. No respiratory distress. She has no rales.   Abdominal: Soft. Bowel sounds are normal. She exhibits no distension. There is no tenderness. There is no guarding. A hernia is present.   Musculoskeletal: She exhibits no edema.   Lymphadenopathy:     She has no cervical adenopathy.   Neurological: She is alert and oriented to person, " place, and time.   Skin: Skin is warm and dry. No rash noted.   Scattered spider angiomata on upper chest     Psychiatric: She has a normal mood and affect. Her behavior is normal. Her mood appears not anxious.   Nursing note and vitals reviewed.      LABS: I personally reviewed pertinent laboratory findings.    Lab Results   Component Value Date    ALT 29 10/30/2018    AST 54 (H) 10/30/2018     (H) 07/23/2018    ALKPHOS 214 (H) 10/30/2018    BILITOT 1.8 (H) 10/30/2018       Lab Results   Component Value Date    WBC 5.98 10/30/2018    HGB 9.4 (L) 10/30/2018    HCT 29.1 (L) 10/30/2018    MCV 96 10/30/2018     (L) 10/30/2018       Lab Results   Component Value Date     (L) 10/30/2018    K 5.6 (H) 10/30/2018     10/30/2018    CO2 21 (L) 10/30/2018    BUN 51 (H) 10/30/2018    CREATININE 3.0 (H) 10/30/2018    CALCIUM 9.3 10/30/2018    ANIONGAP 6 (L) 10/30/2018    ESTGFRAFRICA 17.8 (A) 10/30/2018    EGFRNONAA 15.5 (A) 10/30/2018       Lab Results   Component Value Date    INR 1.1 10/30/2018    INR 1.2 09/06/2018    INR 1.2 07/23/2018       No results found for: SMOOTHMUSCAB, MITOAB  Lab Results   Component Value Date    IRON 180 (H) 09/06/2018    TIBC 283 09/06/2018    FERRITIN 126 09/06/2018     Lab Results   Component Value Date    HEPBCAB Negative 09/06/2018    HEPCAB Negative 09/06/2018     Lab Results   Component Value Date    TSH 3.581 09/06/2018     No results found for: CADEN    No results found for: ABORH        No results found for: LABA1C, HGBA1C  No results found for: CHOL  No results found for: HDL  No results found for: LDLCALC  No results found for: TRIG  No results found for: CHOLHDL        Imaging:   I personally reviewed recent cardiology studies available on the chart and from outside medical records.    I personally reviewed recent imaging studies available on the chart and from outside medical records.        Assessment:  67 y.o. female presenting with     1. ENZO (acute kidney  injury)    2. Liver cirrhosis secondary to HAMEED    3. Portal hypertension    4. Thrombocytopenia due to hypersplenism    5. Hyponatremia with decreased serum osmolality    6. Protein-calorie malnutrition, moderate        MELD-Na score: 23 at 10/30/2018  9:19 AM  MELD score: 20 at 10/30/2018  9:19 AM  Calculated from:  Serum Creatinine: 3 mg/dL at 10/30/2018  9:19 AM  Serum Sodium: 133 mmol/L at 10/30/2018  9:19 AM  Total Bilirubin: 1.8 mg/dL at 10/30/2018  9:19 AM  INR(ratio): 1.1 at 10/30/2018  9:19 AM  Age: 67 years    CPT: C    Functional Status: 60% - Requires occasional assistance but is able to care for needs  Physical Capacity: No Limitations    Transplant Candidacy: listed    ENZO - Cr: 3 (jumped from 1.6) eGFR 16.     Recommendation(s):   - will admit to LTS  - will need to hold diuretics/propranolol  - urine lytes, renal ultrasound  - hydration with albumin, BP optimization with midodrine, if no improvement - consider HRS protocol with octreotide  - Nephrology consult      Cirrhosis Counseling  - strict abstinence of alcohol use  - compliance with lactulose and rifaximin if you have developed hepatic encephalopathy (confusion due to high ammonia level)  - avoid non-steroidal anti-inflammatory drugs (NSAIDs) such as ibuprofen, Motrin, naprosyn, Alleve due to the risk of kidney damage  - can take acetaminophen (Tylenol), no more than 2000 mg per day  - low sodium (salt) 2 gram per day diet  - nutrition: 25-30kcal (calorie per body body weight in kilogram) per day- 2000 calorie day  - no need to restrict protein in diet  - high protein diet: 1.2-1.5 gram/kg (protein per body weight in kilogram) per day to prevent muscle mass loss - 50 grams of protein  - avoid fasting  - Late night snack with 50 gram of complex carbohydrates and protein  - resistance exercises for muscle strength  - avoid raw seafoods due to the risk of fatal Vibrio vulnificus infection  - ultrasound or MRI of the liver every 6 months for  liver cancer screening (you are at risk of developing liver cancer due to scar tissue in the liver)  - Upper endoscopy every 1-2 years to screen for varices in the stomach and foodpipe which can burst and cause fatal bleeding    I have sent communication to the referring physician and/or primary care provider.    A total of 40 minutes were spent face-to-face with the patient during this encounter and over half of that time was spent on counseling and coordination of care.  We discussed in depth the nature of the patient's liver disease, the management plan and liver transplant evaluation in details. I also educated the patient about lifestyle modifications which may improve hepatic steatosis, overweight/obesity, insulin resistance and high blood pressure issues. I have provided the patient with an opportunity to ask questions and have all questions answered to his satisfaction.       Yovana Santos MD  Staff Physician  Hepatology and Liver Transplant  [facility]  [facility] [place]

## 2018-10-31 PROBLEM — K42.9 UMBILICAL HERNIA: Status: ACTIVE | Noted: 2018-01-01

## 2018-10-31 PROBLEM — E87.5 HYPERKALEMIA: Status: ACTIVE | Noted: 2018-01-01

## 2018-10-31 PROBLEM — K76.82 HEPATIC ENCEPHALOPATHY: Status: ACTIVE | Noted: 2018-01-01

## 2018-10-31 PROBLEM — K72.10 END STAGE LIVER DISEASE: Status: ACTIVE | Noted: 2018-01-01

## 2018-10-31 NOTE — HPI
Mrs. Hobson is a 67 yr F w/ hx of cryptogenic cirrhosis, listed for liver tx 9/14/18 (MELD 23), who was admitted from outpatient clinic visit on 10/30 after found to have elevated Cr (3.0, baseline 1.6) in addition to umbilical hernia pain. She reported 1 episode of emesis 2 days prior to presentation. Denied fever, chills, sick contacts. Concerns for incarcerated hernia. Pt started on empiric IV Zosyn/Vanc on admit (dcd 10/31). No incarceration based on cross sectional imaging. CT scan with findings of abnormal thickening of the cecum.  Low concern for colonic malignancy based on recent colonoscopy. Diuretics held at this time for ENZO. She was hydrated w/ IVF and albumin, w/ noted improvements in Cr levels. Urine lytes WNL. Relatively stable UOP. On day of discharge Cr 1.7. In addition, On H/H dropped (6.4/19.8) on 11/1, requiring transfusion 1u pRBC. Pt responded appropriately with H/H stable at 8.1/25.3 on discharge.     Ms. Hobson is stable for discharge today. Pt to cont on lactulose/rifaximin, with goal of 3-4 BM/day. Home diuretics not restarted at discharge. Will reassess further need in clinic. She will have  nursing weekly, orders placed. Pt returning to [place] where she will get labs next Tuesday 11/6 at [place]. She will follow-up in clinic on 11/16. Pt verbalized understanding to all discharge instructions.

## 2018-10-31 NOTE — PLAN OF CARE
Problem: Spiritual Distress, Risk/Actual (Adult,Obstetrics,Pediatric)  Intervention: Facilitate Personal Exploration/Expression of Spirituality  Provided follow-up visit. Referred by  who visited patient previously. Introduced and offered pastoral support with reflective listening. Pt and family ( present) made aware of 's presence as needed. No further spiritual needs expressed at this time.

## 2018-10-31 NOTE — ASSESSMENT & PLAN NOTE
-Presented to clinic 10/30 w/ Cr of 3.0  -Diuretics held  -Given IVF, Cr trending down  -Albumin x 3

## 2018-10-31 NOTE — ASSESSMENT & PLAN NOTE
hx of cryptogenic cirrhosis, listed for liver tx 9/14/18    MELD-Na score: 23 at 10/31/2018  7:29 AM  MELD score: 19 at 10/31/2018  7:29 AM  Calculated from:  Serum Creatinine: 2.2 mg/dL at 10/31/2018  7:29 AM  Serum Sodium: 131 mmol/L at 10/31/2018  7:29 AM  Total Bilirubin: 2.5 mg/dL at 10/31/2018  7:29 AM  INR(ratio): 1.2 at 10/31/2018  7:29 AM  Age: 67 years

## 2018-10-31 NOTE — ASSESSMENT & PLAN NOTE
Patient is 66 yo F with history of cirrhosis and is listed for liver txp who presents today with new ENZO and concerns for incarcerated hernia.    Admit to transplant  CT abd/pelv non con ordered stat.  Initial labs suggest she does not have ischemic bowel  NPO  IVF bolus and maintenance  PRN pain meds  Holding diuretics  Starting vanc and zosyn

## 2018-10-31 NOTE — SUBJECTIVE & OBJECTIVE
Past Medical History:   Diagnosis Date    Cancer     breat Cancer    Cirrhosis     Diabetes mellitus     GERD (gastroesophageal reflux disease)     Rheumatoid arthritis        Past Surgical History:   Procedure Laterality Date    CARPAL TUNNEL RELEASE      left wrist    CHOLECYSTECTOMY      MASTECTOMY      left breast 25  years ago       Review of patient's allergies indicates:  No Known Allergies    Family History     Problem Relation (Age of Onset)    COPD Sister    Heart disease Mother    Liver disease Father, Sister        Tobacco Use    Smoking status: Never Smoker    Tobacco comment: patient denies   Substance and Sexual Activity    Alcohol use: No     Comment: stopped 10/17    Drug use: No     Comment: patient denies    Sexual activity: Not on file       PTA Medications   Medication Sig    ALBUTEROL INHL Inhale 1 puff into the lungs 4 (four) times daily as needed.    benzonatate (TESSALON) 100 MG capsule Take 100 mg by mouth 2 (two) times daily as needed for Cough.    BREO ELLIPTA 100-25 mcg/dose diskus inhaler     ergocalciferol (ERGOCALCIFEROL) 50,000 unit Cap Take 50,000 Units by mouth every 7 days.    ferrous sulfate 325 mg (65 mg iron) Tab tablet Take 325 mg by mouth once daily.    fluticasone/vilanterol (BREO ELLIPTA INHL) Inhale into the lungs.    furosemide (LASIX) 40 MG tablet Take 40 mg by mouth once daily.    hydrOXYzine HCl (ATARAX) 25 MG tablet Take 25 mg by mouth 3 (three) times daily as needed for Itching.    insulin detemir U-100 (LEVEMIR) 100 unit/mL injection Inject 12 Units into the skin 2 (two) times daily.     insulin lispro (HUMALOG) 100 unit/mL injection Sliding scale     lactulose (CHRONULAC) 10 gram/15 mL solution Take 30 g by mouth 3 (three) times daily.     omeprazole (PRILOSEC) 40 MG capsule Take 40 mg by mouth every morning.    ondansetron (ZOFRAN) 4 MG tablet Take 4 mg by mouth every 8 (eight) hours as needed for Nausea.    ONETOUCH ULTRA BLUE TEST  STRIP Strp     propranolol (INDERAL) 10 MG tablet Take 5 mg by mouth 2 (two) times daily.     rifAXIMin (XIFAXAN) 550 mg Tab Take 550 mg by mouth 2 (two) times daily.    SHINGRIX, PF, 50 mcg/0.5 mL injection     spironolactone (ALDACTONE) 100 MG tablet TK 1 T PO D    spironolactone (ALDACTONE) 12.5 MG tablet Take 12.5 mg by mouth once daily.     zinc gluconate 50 mg tablet Take 50 mg by mouth once daily.        Review of Systems   Constitutional: Positive for appetite change. Negative for activity change, chills and fever.   HENT: Negative for trouble swallowing.    Respiratory: Negative for cough, chest tightness and shortness of breath.    Cardiovascular: Negative for chest pain.   Gastrointestinal: Positive for abdominal pain, nausea and vomiting. Negative for abdominal distention, blood in stool, constipation and diarrhea.   Genitourinary: Negative for difficulty urinating.   Musculoskeletal: Negative for arthralgias and back pain.   Skin: Negative for color change.   Allergic/Immunologic: Negative for immunocompromised state.   Hematological: Negative for adenopathy.     Objective:     Vital Signs (Most Recent):  Temp: 98.1 °F (36.7 °C) (10/30/18 1915)  Pulse: 72 (10/31/18 0015)  Resp: 14 (10/31/18 0015)  BP: 132/74 (10/31/18 0015)  SpO2: 100 % (10/31/18 0015) Vital Signs (24h Range):  Temp:  [98.1 °F (36.7 °C)-98.8 °F (37.1 °C)] 98.1 °F (36.7 °C)  Pulse:  [64-72] 72  Resp:  [14-17] 14  SpO2:  [100 %] 100 %  BP: (112-137)/(49-74) 132/74        Body mass index is 20.73 kg/m².    No intake or output data in the 24 hours ending 10/31/18 0022    Physical Exam   Constitutional: She is oriented to person, place, and time. She appears well-developed and well-nourished. No distress.   HENT:   Head: Normocephalic and atraumatic.   Eyes: EOM are normal. Pupils are equal, round, and reactive to light.   Neck: Normal range of motion. Neck supple.   Cardiovascular: Normal rate and regular rhythm.   Pulmonary/Chest:  Effort normal. No respiratory distress.   Abdominal: Soft. She exhibits no distension. There is tenderness. There is no guarding.   Small 3 cm umbilical hernia present, hard, mildly tender, non-reducible  Some overlying skin changes including erythema and swelling   Musculoskeletal: Normal range of motion.   Neurological: She is alert and oriented to person, place, and time.   Skin: Skin is warm and dry. She is not diaphoretic.   Psychiatric: She has a normal mood and affect. Her behavior is normal. Judgment and thought content normal.   Nursing note and vitals reviewed.      Laboratory:  CBC:   Recent Labs   Lab 10/30/18  1928   WBC 7.42   RBC 2.92*   HGB 9.1*   HCT 28.3*      MCV 97   MCH 31.2*   MCHC 32.2     CMP:   Recent Labs   Lab 10/30/18  1928   GLU 86   CALCIUM 9.4   ALBUMIN 2.5*   PROT 6.7   *   K 5.5*   CO2 23      BUN 58*   CREATININE 2.8*   ALKPHOS 219*   ALT 29   AST 57*   BILITOT 1.8*     Coagulation:   Recent Labs   Lab 10/30/18  1928   INR 1.1   APTT 26.4     Labs within the past 24 hours have been reviewed.    Diagnostic Results:  I have personally reviewed all pertinent imaging studies.

## 2018-10-31 NOTE — PLAN OF CARE
"Problem: Patient Care Overview  Goal: Plan of Care Review  Outcome: Ongoing (interventions implemented as appropriate)    Pt AAOx4. Pt spouse initially at the bedside but left for the night to get some sleep at a hotel. Will return this AM.  Pt is from [place] [employer], [place]. Was admitted from clinic visit when was found to have elevated Cr of 3 and umbilical hernia pain.  Resident on call for LTS concerned for ischemic bowel. Pt made NPO. Abdominal xray was negative. CT of abdomen showed no bowel involvement, but did show "abnormal thickening of the cecum worrisome for primary colonic malignancy.". Labs also showed no evidence of ischemic bowel. Pt started on empiric IV Zosyn and IV Vanc. Blood cultures drawn before hanging antibiotics.  Pt given 1L bolus of NS and started on continuous NS @ 100 mL/hr.   VSS. Pt satting 100% on room air. BP 1teens-130's/60's-70's. HR 60's-70's.  Accuchecks Q6. BG this .  Pt steady on her feet but needs assistance with IV pole. Non skid socks when oob.  Pt remained free from falls or injury thus far. Bed is in low/ locked position, side rails are up x 2, call light is in reach.   Will continue to monitor.              "

## 2018-10-31 NOTE — TELEPHONE ENCOUNTER
PATIENT NAME: Krystal Hobson  [facility] #: [id number]    Lab Results   Component Value Date    CREATININE 2.2 (H) 10/31/2018     (L) 10/31/2018    BILITOT 2.5 (H) 10/31/2018    ALBUMIN 2.2 (L) 10/31/2018    INR 1.2 10/31/2018     MELD 23  Encephalopathy: 1 - 2  Ascites: slight  Dialysis: no     Recertification requestor: Julia Herrera

## 2018-10-31 NOTE — ASSESSMENT & PLAN NOTE
-Concern for incarceration   -Abd XR and CT w/ no evidence of obstruction or ischemic bowel  -Started on Vanc/Zosyn on admit--> dc'd 10/31

## 2018-10-31 NOTE — SUBJECTIVE & OBJECTIVE
Scheduled Meds:   fluticasone-vilanterol  1 puff Inhalation Daily    pantoprazole  40 mg Oral Daily    piperacillin-tazobactam (ZOSYN) IVPB  4.5 g Intravenous Q12H    propranolol  5 mg Oral BID    vancomycin (VANCOCIN) IVPB  750 mg Intravenous Q48H     Continuous Infusions:   sodium chloride 0.9% 75 mL/hr at 10/31/18 0756     PRN Meds:albuterol-ipratropium, dextrose 50%, glucagon (human recombinant), insulin aspart U-100, omnipaque, ondansetron, ondansetron, oxyCODONE, oxyCODONE, sodium chloride 0.9%    Review of Systems   Constitutional: Positive for activity change and appetite change. Negative for fever.   Respiratory: Negative for cough and shortness of breath.    Cardiovascular: Negative for chest pain and leg swelling.   Gastrointestinal: Positive for abdominal distention and abdominal pain. Negative for constipation, diarrhea and vomiting.   Genitourinary: Negative for decreased urine volume, difficulty urinating and dysuria.   Musculoskeletal: Negative for arthralgias and back pain.   Allergic/Immunologic: Positive for immunocompromised state.   Neurological: Positive for weakness. Negative for dizziness and tremors.   Psychiatric/Behavioral: Negative for confusion.     Objective:     Vital Signs (Most Recent):  Temp: 98.1 °F (36.7 °C) (10/30/18 1915)  Pulse: 71 (10/31/18 0430)  Resp: 17 (10/31/18 0430)  BP: (!) 118/59 (10/31/18 0430)  SpO2: 100 % (10/31/18 0430) Vital Signs (24h Range):  Temp:  [98.1 °F (36.7 °C)-98.8 °F (37.1 °C)] 98.1 °F (36.7 °C)  Pulse:  [64-72] 71  Resp:  [14-17] 17  SpO2:  [100 %] 100 %  BP: (112-137)/(49-74) 118/59     Weight: 45 kg (99 lb 3.3 oz)  Body mass index is 20.73 kg/m².    Intake/Output - Last 3 Shifts       10/29 0700 - 10/30 0659 10/30 0700 - 10/31 0659 10/31 0700 - 11/01 0659    P.O.  900     I.V. (mL/kg)  1000 (22.2)     Total Intake(mL/kg)  1900 (42.2)     Urine (mL/kg/hr)  500     Stool  0     Total Output  500     Net  +1400            Stool Occurrence  0 x            Physical Exam   Constitutional: She is oriented to person, place, and time.   Temporal and distal extremity muscle wasting   Eyes: EOM are normal. Pupils are equal, round, and reactive to light.   Neck: Normal range of motion.   Cardiovascular: Normal rate and regular rhythm.   No murmur heard.  Pulmonary/Chest: Effort normal and breath sounds normal. No respiratory distress. She has no wheezes.   Abdominal: Soft. Bowel sounds are normal. She exhibits distension. There is tenderness. There is no guarding. A hernia (umbilical) is present.   Small 3 cm umbilical hernia present, hard, mildly tender, non-reducible  Some overlying skin changes including erythema and swelling      Musculoskeletal: Normal range of motion. She exhibits edema (minimal).   Neurological: She is alert and oriented to person, place, and time.   Skin: Skin is warm and dry.   Nursing note and vitals reviewed.      Laboratory:  Immunosuppressants     None        CBC:   Recent Labs   Lab 10/31/18  0729   WBC 4.54   RBC 2.62*   HGB 8.3*   HCT 25.6*   *   MCV 98   MCH 31.7*   MCHC 32.4     CMP:   Recent Labs   Lab 10/31/18  0729   *   CALCIUM 8.0*   ALBUMIN 2.2*   PROT 5.8*   *   K 5.2*   CO2 19*      BUN 50*   CREATININE 2.2*   ALKPHOS 176*   ALT 25   AST 52*   BILITOT 2.5*     Labs within the past 24 hours have been reviewed.    Diagnostic Results:  I have personally reviewed all pertinent imaging studies.

## 2018-10-31 NOTE — PROGRESS NOTES
Admit Note     Met with patient and spouse to assess needs. Patient is a 67 y.o.  female, admitted for acute kidney injury.      Patient admitted from ED on 10/30/2018 .  At this time, patient presents as alert and oriented x 4, pleasant, good eye contact, well groomed, recall good, concentration/judgement good, average intelligence, calm, communicative, cooperative and asking and answering questions appropriately.  At this time, patients caregiver presents as alert and oriented x 4, pleasant, good eye contact, well groomed, recall good, concentration/judgement good, average intelligence, calm, communicative, cooperative and asking and answering questions appropriately.    Household/Family Systems     Patient [facility]'s [facility] [address] [address].  Support system includes patient's spouse, and two sisters.  Patient does not have dependents that are need of being cared for.     Patients primary caregiver is Shahzad Hobson, patients , phone number [phone].  Confirmed patients contact information is [phone] (home);   Telephone Information:   Mobile [phone]       During admission, patient's caregiver plans to stay in patient's room.  Confirmed patient and patients caregivers do have access to reliable transportation.    Cognitive Status/Learning     Patient reports reading ability as 12th grade and states patient does have difficulty with seeing, hearing, comprehension and memory.  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 Patient reports patient learns best by written and demonstration.   Needed: No.   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    Adherence     Patient reports a high level of adherence to  patients health care regimen.  Adherence counseling and education provided. Patient verbalizes understanding.    Substance Use    Patient reports the following substance usage.    Tobacco: none, patient denies any use.  Alcohol: none, patient denies any use. Last ETOH was 10/2017 due to cirrhosis diagnosis.    Illicit Drugs/Non-prescribed Medications: none, patient denies any use.  Patient states clear understanding of the potential impact of substance use.  Substance abstinence/cessation counseling, education and resources provided and reviewed.     Services Utilizing/ADLS    Infusion Service: Prior to admission, patient utilizing? no  Home Health: Prior to admission, patient utilizing? yes; patient had recently started HH services with [facility] in [place]. Patient was seen once a week for vital check.  DME: Prior to admission, no  Pulmonary/Cardiac Rehab: Prior to admission, no  Dialysis:  Prior to admission, no  Transplant Specialty Pharmacy:  Prior to admission, no.    Prior to admission, patient reports patient was independent with ADLS and was not driving. Patient has not driven in a year.  Patient reports patient is able to care for self at this time.  Patient indicates a willingness to care for self once medically cleared to do so.    Insurance/Medications    Insured by   Payor/Plan Subscr  Sex Relation Sub. Ins. ID Effective Group Num   1. [facility] PENNIE HUGHES 1950 Female  [id number]                                    [address] [address]   2. [facility]* PENNIE HUGHES 1950 Female  [id number]                                   [address]      Primary Insurance (for UNOS reporting): Public Insurance - Medicare FFS (Fee For Service)  Secondary Insurance (for UNOS reporting): None    Patient reports patient is able to obtain and afford medications at this time and at time of discharge.    Living Will/Healthcare Power of     Patient states patient does not  have a LW and/or HCPA.   provided education regarding LW and HCPA and the completion of forms.    Coping/Mental Health    Patient is coping adequately with the aid of  family members and friends. Patient denies mental health difficulties.     Discharge Planning    At time of discharge, patient plans to return to patient's home under the care of spouse, Shahzad.  Patients spouse will transport patient.  Per rounds today, expected discharge date has not been medically determined at this time. Patient and patients caregiver  verbalize understanding and are involved in treatment planning and discharge process.    Additional Concerns    Patient's caretaker denies additional needs and/or concerns at this time. Patient is being followed for needs, education, resources, information, emotional support, supportive counseling, and for supportive and skilled discharge plan of care.  providing ongoing psychosocial support, education, resources and d/c planning as needed.  SW remains available.  provided resource list, patient choice, psychosocial and supportive counseling, resources, education, assistance and discharge planning with patient and caregiver involvement, ongoing SW availability and services as appropriate. Patient's caregiver verbalizes understanding and agreement with information reviewed,  availability and how to access available resources as needed. Patient denies additional needs and/or concerns at this time. Patient verbalizes understanding and agreement with information reviewed, social work availability, and how to access available resources as needed.

## 2018-10-31 NOTE — PROGRESS NOTES
[facility]  Liver Transplant  Progress Note    Patient Name: Krystal Hobson  MRN: [id number]  Admission Date: 10/30/2018  Hospital Length of Stay: 1 days  Code Status: Full Code  Primary Care Provider: Courtney Joe MD    Subjective:     History of Present Illness:  Mrs. Hobson is a 67 yr F w/ hx of cryptogenic cirrhosis, listed for liver tx 9/14/18, who was admitted from outpatient clinic visit on 10/30 after found to have elevated Cr (3.0, baseline 1.6) in addition to umbilical hernia pain. She reported 1 episode of emesis 2 days prior to presentation. Denied fever, chills, sick contacts. Concerns for incarcerated hernia. Abdominal X ray and lab work w/ no evidence of obstruction or ischemic bowel. CT scan showed no bowel involvement but had findings of abnormal thickening of the cecum, likely 2/2 cirrhosis. Previous colonoscopy in August 2018 w/ no concerned findings. On admit to LTS, pt placed NPO and started on empiric IV Zosyn/Vanc (10/30) due to concern for incarcerated hernia. Diuretics held at this time for ENZO         Hospital Course:  No acute events overnight. No evidence for incarcerated hernia or obstruction. Antibiotics dc'd. Elevated Cr likely 2/2 dehydration. Cr trended down today w/ overnight IVF. Pt to receive Albumin x 3.  Cont lactulose/rifaximin. Tolerating diet. No BM this am. Passing gas. Afebrile, VSS. No further complaints at this time. Cont to monitor.     Scheduled Meds:   fluticasone-vilanterol  1 puff Inhalation Daily    pantoprazole  40 mg Oral Daily    piperacillin-tazobactam (ZOSYN) IVPB  4.5 g Intravenous Q12H    propranolol  5 mg Oral BID    vancomycin (VANCOCIN) IVPB  750 mg Intravenous Q48H     Continuous Infusions:   sodium chloride 0.9% 75 mL/hr at 10/31/18 0756     PRN Meds:albuterol-ipratropium, dextrose 50%, glucagon (human recombinant), insulin aspart U-100, omnipaque, ondansetron, ondansetron, oxyCODONE, oxyCODONE, sodium chloride 0.9%    Review of  Systems   Constitutional: Positive for activity change and appetite change. Negative for fever.   Respiratory: Negative for cough and shortness of breath.    Cardiovascular: Negative for chest pain and leg swelling.   Gastrointestinal: Positive for abdominal distention and abdominal pain. Negative for constipation, diarrhea and vomiting.   Genitourinary: Negative for decreased urine volume, difficulty urinating and dysuria.   Musculoskeletal: Negative for arthralgias and back pain.   Allergic/Immunologic: Positive for immunocompromised state.   Neurological: Positive for weakness. Negative for dizziness and tremors.   Psychiatric/Behavioral: Negative for confusion.     Objective:     Vital Signs (Most Recent):  Temp: 98.1 °F (36.7 °C) (10/30/18 1915)  Pulse: 71 (10/31/18 0430)  Resp: 17 (10/31/18 0430)  BP: (!) 118/59 (10/31/18 0430)  SpO2: 100 % (10/31/18 0430) Vital Signs (24h Range):  Temp:  [98.1 °F (36.7 °C)-98.8 °F (37.1 °C)] 98.1 °F (36.7 °C)  Pulse:  [64-72] 71  Resp:  [14-17] 17  SpO2:  [100 %] 100 %  BP: (112-137)/(49-74) 118/59     Weight: 45 kg (99 lb 3.3 oz)  Body mass index is 20.73 kg/m².    Intake/Output - Last 3 Shifts       10/29 0700 - 10/30 0659 10/30 0700 - 10/31 0659 10/31 0700 - 11/01 0659    P.O.  900     I.V. (mL/kg)  1000 (22.2)     Total Intake(mL/kg)  1900 (42.2)     Urine (mL/kg/hr)  500     Stool  0     Total Output  500     Net  +1400            Stool Occurrence  0 x           Physical Exam   Constitutional: She is oriented to person, place, and time.   Temporal and distal extremity muscle wasting   Eyes: EOM are normal. Pupils are equal, round, and reactive to light.   Neck: Normal range of motion.   Cardiovascular: Normal rate and regular rhythm.   No murmur heard.  Pulmonary/Chest: Effort normal and breath sounds normal. No respiratory distress. She has no wheezes.   Abdominal: Soft. Bowel sounds are normal. She exhibits distension. There is tenderness. There is no guarding. A  hernia (umbilical) is present.   Small 3 cm umbilical hernia present, hard, mildly tender, non-reducible  Some overlying skin changes including erythema and swelling      Musculoskeletal: Normal range of motion. She exhibits edema (minimal).   Neurological: She is alert and oriented to person, place, and time.   Skin: Skin is warm and dry.   Nursing note and vitals reviewed.      Laboratory:  Immunosuppressants     None        CBC:   Recent Labs   Lab 10/31/18  0729   WBC 4.54   RBC 2.62*   HGB 8.3*   HCT 25.6*   *   MCV 98   MCH 31.7*   MCHC 32.4     CMP:   Recent Labs   Lab 10/31/18  0729   *   CALCIUM 8.0*   ALBUMIN 2.2*   PROT 5.8*   *   K 5.2*   CO2 19*      BUN 50*   CREATININE 2.2*   ALKPHOS 176*   ALT 25   AST 52*   BILITOT 2.5*     Labs within the past 24 hours have been reviewed.    Diagnostic Results:  I have personally reviewed all pertinent imaging studies.    Assessment/Plan:     * ENZO (acute kidney injury)    -Presented to clinic 10/30 w/ Cr of 3.0  -Diuretics held  -Given IVF, Cr trending down  -Albumin x 3       Hyperkalemia    Stable. Cont to monitor       Umbilical hernia    -Concern for incarceration   -Abd XR and CT w/ no evidence of obstruction or ischemic bowel  -Started on Vanc/Zosyn on admit--> dc'd 10/31       Hepatic encephalopathy    Stable  Remains on lactulose/rifaximin       Organ transplant candidate    hx of cryptogenic cirrhosis, listed for liver tx 9/14/18    MELD-Na score: 23 at 10/31/2018  7:29 AM  MELD score: 19 at 10/31/2018  7:29 AM  Calculated from:  Serum Creatinine: 2.2 mg/dL at 10/31/2018  7:29 AM  Serum Sodium: 131 mmol/L at 10/31/2018  7:29 AM  Total Bilirubin: 2.5 mg/dL at 10/31/2018  7:29 AM  INR(ratio): 1.2 at 10/31/2018  7:29 AM  Age: 67 years         VTE Risk Mitigation (From admission, onward)        Ordered     IP VTE LOW RISK PATIENT  Once      10/30/18 1912     Place sequential compression device  Until discontinued      10/30/18 1912           The patients clinical status was discussed at multidisplinary rounds, involving transplant surgery, transplant medicine, pharmacy, nursing, nutrition, and social work    Discharge Planning:  No Patient Care Coordination Note on file.      Kourtney Mancia PA-C  Liver Transplant  [facility]

## 2018-10-31 NOTE — H&P
[facility]  Liver Transplant  History & Physical    Patient Name: Krystal Hobson  MRN: [id number]  Admission Date: 10/30/2018  Code Status: Full Code  Primary Care Provider: Courtney Joe MD    Subjective:     History of Present Illness:  Krystal Hobson is a 67 y.o. female that presents to Transplant [facility] Clinic for consultation of had concerns including Cirrhosis.  She  is accompanied by her .    Interval hx:  Patient was seen in outpatient clinic today and sent to [facility] for concerns about her creatinine that doubled. She denies any fever, chills, or sick contacts. She reports one episode of emesis on Sunday, and only her baseline nausea and diarrhea 2/2 lactulose. She reports some new abd pain at her umbilicus that started yesterday.     Follow up:  10/30/18: feels tired and fatigued. No confusion, bleeding or ascites. Sometimes pain from umbilical hernia. MELD: 23, Cr: jumped to 3 from 1.6. Will admit to LTS for ENZO.     9/17/18: placed on LT wait list.     9/7/18: Feels ok today except fatigue, feeling cold.      8/28-29/18: admitted to [facility] in [place], [place] with lethargy - hepatic encephalopathy.. Lactulose increased and resolved.      MELD-Na score: 23 at 10/30/2018  9:19 AM  MELD score: 20 at 10/30/2018  9:19 AM  Calculated from:  Serum Creatinine: 3 mg/dL at 10/30/2018  9:19 AM  Serum Sodium: 133 mmol/L at 10/30/2018  9:19 AM  Total Bilirubin: 1.8 mg/dL at 10/30/2018  9:19 AM  INR(ratio): 1.1 at 10/30/2018  9:19 AM  Age: 67 years     Liver disease:                   cryptogenic, likely HAMEED     MELD-Na:                         23  Child-Coker Class:             B     Transplant status:             listed     Cirrhosis is decompensated with:     Ascites:                                                       no  Spontaneous bacterial peritonitis:              no  Hepatic Encephalopathy:                           Yes, grade 2  Portal bleeding:                                           no  Hepatocellular carcinoma:                          no    Hepatorenal syndrome:                              no  Hyponatremia:                                            yes  Muscle wasting:                                          yes   Portal vein thrombosis:                               no        Screening:  Last EGD:  Recent May 2018 (Dr. Santiago)     Colonoscopy 2017 - [place].     Last imaging study:   MRI scheduled 9/7/18     Background  Oct 2017 - diagnosis HE, hospitalization x 6 times for HE and 1 x dehydration.  She was told that she had cirrhosis.     Alcohol: no, occasional prior to cirrhosis diagnosis.  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  , 1 daughter 48 y/o and 1 son 39 y/o.    Past Medical History:   Diagnosis Date    Cancer     breat Cancer    Cirrhosis     Diabetes mellitus     GERD (gastroesophageal reflux disease)     Rheumatoid arthritis        Past Surgical History:   Procedure Laterality Date    CARPAL TUNNEL RELEASE      left wrist    CHOLECYSTECTOMY      MASTECTOMY      left breast 25  years ago       Review of patient's allergies indicates:  No Known Allergies    Family History     Problem Relation (Age of Onset)    COPD Sister    Heart disease Mother    Liver disease Father, Sister        Tobacco Use    Smoking status: Never Smoker    Tobacco comment: patient denies   Substance and Sexual Activity    Alcohol use: No     Comment: stopped 10/17    Drug use: No     Comment: patient denies    Sexual activity: Not on file       PTA Medications   Medication Sig    ALBUTEROL INHL Inhale 1 puff into the lungs 4 (four) times daily as needed.    benzonatate (TESSALON) 100 MG capsule Take 100 mg by  mouth 2 (two) times daily as needed for Cough.    BREO ELLIPTA 100-25 mcg/dose diskus inhaler     ergocalciferol (ERGOCALCIFEROL) 50,000 unit Cap Take 50,000 Units by mouth every 7 days.    ferrous sulfate 325 mg (65 mg iron) Tab tablet Take 325 mg by mouth once daily.    fluticasone/vilanterol (BREO ELLIPTA INHL) Inhale into the lungs.    furosemide (LASIX) 40 MG tablet Take 40 mg by mouth once daily.    hydrOXYzine HCl (ATARAX) 25 MG tablet Take 25 mg by mouth 3 (three) times daily as needed for Itching.    insulin detemir U-100 (LEVEMIR) 100 unit/mL injection Inject 12 Units into the skin 2 (two) times daily.     insulin lispro (HUMALOG) 100 unit/mL injection Sliding scale     lactulose (CHRONULAC) 10 gram/15 mL solution Take 30 g by mouth 3 (three) times daily.     omeprazole (PRILOSEC) 40 MG capsule Take 40 mg by mouth every morning.    ondansetron (ZOFRAN) 4 MG tablet Take 4 mg by mouth every 8 (eight) hours as needed for Nausea.    ONETOUCH ULTRA BLUE TEST STRIP Strp     propranolol (INDERAL) 10 MG tablet Take 5 mg by mouth 2 (two) times daily.     rifAXIMin (XIFAXAN) 550 mg Tab Take 550 mg by mouth 2 (two) times daily.    SHINGRIX, PF, 50 mcg/0.5 mL injection     spironolactone (ALDACTONE) 100 MG tablet TK 1 T PO D    spironolactone (ALDACTONE) 12.5 MG tablet Take 12.5 mg by mouth once daily.     zinc gluconate 50 mg tablet Take 50 mg by mouth once daily.        Review of Systems   Constitutional: Positive for appetite change. Negative for activity change, chills and fever.   HENT: Negative for trouble swallowing.    Respiratory: Negative for cough, chest tightness and shortness of breath.    Cardiovascular: Negative for chest pain.   Gastrointestinal: Positive for abdominal pain, nausea and vomiting. Negative for abdominal distention, blood in stool, constipation and diarrhea.   Genitourinary: Negative for difficulty urinating.   Musculoskeletal: Negative for arthralgias and back pain.    Skin: Negative for color change.   Allergic/Immunologic: Negative for immunocompromised state.   Hematological: Negative for adenopathy.     Objective:     Vital Signs (Most Recent):  Temp: 98.1 °F (36.7 °C) (10/30/18 1915)  Pulse: 72 (10/31/18 0015)  Resp: 14 (10/31/18 0015)  BP: 132/74 (10/31/18 0015)  SpO2: 100 % (10/31/18 0015) Vital Signs (24h Range):  Temp:  [98.1 °F (36.7 °C)-98.8 °F (37.1 °C)] 98.1 °F (36.7 °C)  Pulse:  [64-72] 72  Resp:  [14-17] 14  SpO2:  [100 %] 100 %  BP: (112-137)/(49-74) 132/74        Body mass index is 20.73 kg/m².    No intake or output data in the 24 hours ending 10/31/18 0022    Physical Exam   Constitutional: She is oriented to person, place, and time. She appears well-developed and well-nourished. No distress.   HENT:   Head: Normocephalic and atraumatic.   Eyes: EOM are normal. Pupils are equal, round, and reactive to light.   Neck: Normal range of motion. Neck supple.   Cardiovascular: Normal rate and regular rhythm.   Pulmonary/Chest: Effort normal. No respiratory distress.   Abdominal: Soft. She exhibits no distension. There is tenderness. There is no guarding.   Small 3 cm umbilical hernia present, hard, mildly tender, non-reducible  Some overlying skin changes including erythema and swelling   Musculoskeletal: Normal range of motion.   Neurological: She is alert and oriented to person, place, and time.   Skin: Skin is warm and dry. She is not diaphoretic.   Psychiatric: She has a normal mood and affect. Her behavior is normal. Judgment and thought content normal.   Nursing note and vitals reviewed.      Laboratory:  CBC:   Recent Labs   Lab 10/30/18  1928   WBC 7.42   RBC 2.92*   HGB 9.1*   HCT 28.3*      MCV 97   MCH 31.2*   MCHC 32.2     CMP:   Recent Labs   Lab 10/30/18  1928   GLU 86   CALCIUM 9.4   ALBUMIN 2.5*   PROT 6.7   *   K 5.5*   CO2 23      BUN 58*   CREATININE 2.8*   ALKPHOS 219*   ALT 29   AST 57*   BILITOT 1.8*     Coagulation:   Recent  Labs   Lab 10/30/18  1928   INR 1.1   APTT 26.4     Labs within the past 24 hours have been reviewed.    Diagnostic Results:  I have personally reviewed all pertinent imaging studies.    Assessment/Plan:     * ENZO (acute kidney injury)    Patient is 66 yo F with history of cirrhosis and is listed for liver txp who presents today with new ENZO and concerns for incarcerated hernia.    Admit to transplant  CT abd/pelv non con ordered stat.  Initial labs suggest she does not have ischemic bowel  NPO  IVF bolus and maintenance  PRN pain meds  Holding diuretics  Starting vanc and zosyn           MELD-Na score: 22 at 10/30/2018  7:28 PM  MELD score: 20 at 10/30/2018  7:28 PM  Calculated from:  Serum Creatinine: 2.8 mg/dL at 10/30/2018  7:28 PM  Serum Sodium: 134 mmol/L at 10/30/2018  7:28 PM  Total Bilirubin: 1.8 mg/dL at 10/30/2018  7:28 PM  INR(ratio): 1.1 at 10/30/2018  7:28 PM  Age: 67 years      A complete discussion of the transplant procedure, including risks, complications, and alternatives, as well as any donor-specific risk factors requiring specific disclosure, will be carried out by the responsible staff surgeon prior to the procedure.     Discharge Planning:  No Patient Care Coordination Note on file.      Sergio Ferreira MD  Liver Transplant  [facility]

## 2018-11-01 PROBLEM — D69.6 THROMBOCYTOPENIA, UNSPECIFIED: Status: ACTIVE | Noted: 2018-01-01

## 2018-11-01 PROBLEM — Z79.4 TYPE 2 DIABETES MELLITUS WITH HYPERGLYCEMIA, WITH LONG-TERM CURRENT USE OF INSULIN: Status: ACTIVE | Noted: 2018-01-01

## 2018-11-01 PROBLEM — E11.65 TYPE 2 DIABETES MELLITUS WITH HYPERGLYCEMIA, WITH LONG-TERM CURRENT USE OF INSULIN: Status: ACTIVE | Noted: 2018-01-01

## 2018-11-01 PROBLEM — D72.819 LEUKOPENIA: Status: ACTIVE | Noted: 2018-01-01

## 2018-11-01 PROBLEM — D64.9 ANEMIA REQUIRING TRANSFUSIONS: Status: ACTIVE | Noted: 2018-01-01

## 2018-11-01 PROBLEM — D63.8 ANEMIA OF CHRONIC DISEASE: Status: ACTIVE | Noted: 2018-01-01

## 2018-11-01 NOTE — SUBJECTIVE & OBJECTIVE
Scheduled Meds:   fluticasone-vilanterol  1 puff Inhalation Daily    lactulose  30 g Oral TID    pantoprazole  40 mg Oral Daily    propranolol  5 mg Oral BID    rifAXImin  550 mg Oral BID    sodium bicarbonate  1,300 mg Oral TID     Continuous Infusions:  PRN Meds:sodium chloride, albuterol-ipratropium, dextrose 50%, glucagon (human recombinant), insulin aspart U-100, omnipaque, ondansetron, ondansetron, sodium chloride 0.9%, traMADol    Review of Systems   Constitutional: Positive for activity change and appetite change. Negative for fever.   Respiratory: Negative for cough and shortness of breath.    Cardiovascular: Negative for chest pain and leg swelling.   Gastrointestinal: Positive for abdominal distention. Negative for abdominal pain, constipation, diarrhea and vomiting.   Genitourinary: Negative for decreased urine volume, difficulty urinating and dysuria.   Musculoskeletal: Negative for arthralgias and back pain.   Allergic/Immunologic: Positive for immunocompromised state.   Neurological: Positive for weakness. Negative for dizziness and tremors.   Psychiatric/Behavioral: Negative for confusion.     Objective:     Vital Signs (Most Recent):  Temp: 98.2 °F (36.8 °C) (11/01/18 1147)  Pulse: 81 (11/01/18 0800)  Resp: 16 (11/01/18 0800)  BP: 119/63 (11/01/18 0800)  SpO2: 100 % (11/01/18 0800) Vital Signs (24h Range):  Temp:  [98.2 °F (36.8 °C)-98.7 °F (37.1 °C)] 98.2 °F (36.8 °C)  Pulse:  [79-88] 81  Resp:  [14-20] 16  SpO2:  [97 %-100 %] 100 %  BP: (118-135)/(63-67) 119/63     Weight: 44.9 kg (98 lb 15.8 oz)  Body mass index is 20.69 kg/m².    Intake/Output - Last 3 Shifts       10/30 0700 - 10/31 0659 10/31 0700 - 11/01 0659 11/01 0700 - 11/02 0659    P.O. 900 690     I.V. (mL/kg) 1000 (22.2)      Total Intake(mL/kg) 1900 (42.2) 690 (15.4)     Urine (mL/kg/hr) 500 600 (0.6)     Stool 0 0     Total Output 500 600     Net +1400 +90            Urine Occurrence  1 x     Stool Occurrence 0 x 1 x            Physical Exam   Constitutional: She is oriented to person, place, and time.   Temporal and distal extremity muscle wasting   Eyes: EOM are normal. Pupils are equal, round, and reactive to light.   Neck: Normal range of motion.   Cardiovascular: Normal rate and regular rhythm.   No murmur heard.  Pulmonary/Chest: Effort normal and breath sounds normal. No respiratory distress. She has no wheezes.   Abdominal: Soft. Bowel sounds are normal. She exhibits distension. There is no tenderness. There is no guarding. A hernia (umbilical) is present.   Small 3 cm umbilical hernia present, hard, mildly tender, non-reducible  Some overlying skin changes including erythema and swelling      Musculoskeletal: Normal range of motion. She exhibits edema (minimal).   Neurological: She is alert and oriented to person, place, and time.   Skin: Skin is warm and dry.   Nursing note and vitals reviewed.      Laboratory:  Immunosuppressants     None        CBC:   Recent Labs   Lab 11/01/18  0646   WBC 2.59*   RBC 1.98*   HGB 6.4*   HCT 19.8*   PLT 71*   *   MCH 32.3*   MCHC 32.3     CMP:   Recent Labs   Lab 11/01/18  0646   *   CALCIUM 8.1*   ALBUMIN 3.2*   PROT 5.5*      K 4.7   CO2 16*   *   BUN 50*   CREATININE 2.2*   ALKPHOS 144*   ALT 18   AST 38   BILITOT 1.7*     Labs within the past 24 hours have been reviewed.    Diagnostic Results:  I have personally reviewed all pertinent imaging studies.

## 2018-11-01 NOTE — ASSESSMENT & PLAN NOTE
hx of cryptogenic cirrhosis, listed for liver tx 9/14/18    MELD-Na score: 19 at 11/1/2018  6:46 AM  MELD score: 19 at 11/1/2018  6:46 AM  Calculated from:  Serum Creatinine: 2.2 mg/dL at 11/1/2018  6:46 AM  Serum Sodium: 137 mmol/L at 11/1/2018  6:46 AM  Total Bilirubin: 1.7 mg/dL at 11/1/2018  6:46 AM  INR(ratio): 1.3 at 11/1/2018  6:46 AM  Age: 67 years

## 2018-11-01 NOTE — ASSESSMENT & PLAN NOTE
WBC on admit:  Decreased to 2.59 today  Pt receiving blood transfusion, will cont to monitor and assess in the a.m.

## 2018-11-01 NOTE — ASSESSMENT & PLAN NOTE
-Presented to clinic 10/30 w/ Cr of 3.0  -Diuretics held. Hydration w/ IVF on admit and Albumin x 3 on 10/31  -Cr trending down  -urine lytes pending

## 2018-11-01 NOTE — PROGRESS NOTES
[facility]  Liver Transplant  Progress Note    Patient Name: Krystal Hobson  MRN: [id number]  Admission Date: 10/30/2018  Hospital Length of Stay: 2 days  Code Status: Full Code  Primary Care Provider: Courtney Joe MD    Subjective:     History of Present Illness:  Mrs. Hobson is a 67 yr F w/ hx of cryptogenic cirrhosis, listed for liver tx 9/14/18, who was admitted from outpatient clinic visit on 10/30 after found to have elevated Cr (3.0, baseline 1.6) in addition to umbilical hernia pain. She reported 1 episode of emesis 2 days prior to presentation. Denied fever, chills, sick contacts. Concerns for incarcerated hernia. No incarceration based on cross sectional imaging. CT scan with findings of abnormal thickening of the cecum.  Low concern for colonic malignancy based on recent colonoscopy. On admit to LTS, pt placed NPO and started on empiric IV Zosyn/Vanc (10/30) due to concern for incarcerated hernia. Diuretics held at this time for ENZO. MELD 23 on admit.         Hospital Course:  No acute events overnight. Hydrated pt on admit w/ IVF and yesterday w/ Albumin x 3. Immediate trend down in Cr but no changes noted this morning from yesterday. Urine lytes pending.  H/H dropped (6.4/19.8). Transfusing 1u pRBC, consent obtained. Cont lactulose/rifaximin. +BM, passing gas, tolerating diet. Afebrile, VSS. No further complaints at this time. MELD today 19. Cont to monitor.     No new subjective & objective note has been filed under this hospital service since the last note was generated.    Assessment/Plan:     * ENZO (acute kidney injury)    -Presented to clinic 10/30 w/ Cr of 3.0  -Diuretics held. Hydration w/ IVF on admit and Albumin x 3 on 10/31  -Cr trending down  -urine lytes pending         Leukopenia    WBC on admit:  Decreased to 2.59 today  Pt receiving blood transfusion, will cont to monitor and assess in the a.m.       Anemia of chronic disease    Pt w/ hx of ESLD 2/2 cryptogenic  cirrhosis  H/H stable on admit (9.1/28.3)  H/H decreased 11/1 (6.4/19.8), requiring 1 upRBC       Anemia requiring transfusions    H/H decreased (6.4/19.8)   1 u pRBC transfused 11/1  Monitor       Hyperkalemia    Stable. Cont to monitor       Umbilical hernia    -Concern for incarceration   -Abd XR and CT w/ no evidence of obstruction or ischemic bowel  -Started on Vanc/Zosyn on admit--> dc'd 10/31       Hepatic encephalopathy    Chronic. Stable at this time.  Remains on lactulose/rifaximin, w/ goal of 3-4 BM/day       Organ transplant candidate    hx of cryptogenic cirrhosis, listed for liver tx 9/14/18    MELD-Na score: 19 at 11/1/2018  6:46 AM  MELD score: 19 at 11/1/2018  6:46 AM  Calculated from:  Serum Creatinine: 2.2 mg/dL at 11/1/2018  6:46 AM  Serum Sodium: 137 mmol/L at 11/1/2018  6:46 AM  Total Bilirubin: 1.7 mg/dL at 11/1/2018  6:46 AM  INR(ratio): 1.3 at 11/1/2018  6:46 AM  Age: 67 years         VTE Risk Mitigation (From admission, onward)        Ordered     IP VTE LOW RISK PATIENT  Once      10/30/18 1912     Place sequential compression device  Until discontinued      10/30/18 1912          The patients clinical status was discussed at multidisplinary rounds, involving transplant surgery, transplant medicine, pharmacy, nursing, nutrition, and social work    Discharge Planning: Possible d/c tomorrow      Kourtney Mancia PA-C  Liver Transplant  [facility]

## 2018-11-01 NOTE — SUBJECTIVE & OBJECTIVE
Interval HPI:   Overnight events: Remains on TSU, NAEON.  BG trending up throughout yesterday, requiring correction scale novolog.  To receive 1 unit PRBCs today.  Eatin% - admits to snacking on sugary snacks  Nausea: No  Hypoglycemia and intervention: No  Fever: No  TPN and/or TF: No      PMH, PSH, FH, SH reviewed       Review of Systems     Constitutional: 9 lb weight loss over past month  Eyes: Negative for visual disturbance, wears reading glasses  Respiratory: Positive for cough, recently finished antibiotic course for bronchitis  Cardiovascular: Negative for chest pain.  Gastrointestinal: Positive for diarrhea, takes lactulose  Endocrine: Negative for polyuria, polydipsia.  Musculoskeletal: Arthritic pain in bilateral shoulders  Skin: Negative for rash.  Neurological: Negative for syncope.  Psychiatric/Behavioral: Negative for depression.      Current Medications and/or Treatments Impacting Glycemic Control  Immunotherapy:    Immunosuppressants     None        Steroids:   Hormones (From admission, onward)    None        Pressors:    Autonomic Drugs (From admission, onward)    None        Hyperglycemia/Diabetes Medications:   Antihyperglycemics (From admission, onward)    Start     Stop Route Frequency Ordered    10/31/18 0134  insulin aspart U-100 pen 0-5 Units      -- SubQ Every 6 hours PRN 10/31/18 0037             PHYSICAL EXAMINATION:  Vitals:    18 1330   BP: 120/64   Pulse: 76   Resp: 19   Temp: 98.4 °F (36.9 °C)     Body mass index is 20.69 kg/m².    Physical Exam     Constitutional: Well developed, well nourished, NAD.  ENT: External ears no masses with nose patent; normal hearing.  Neck: Supple; trachea midline  Cardiovascular: Normal heart sounds, 1+ LE edema bilaterally. DP +2 bilaterally.  Lungs: Normal effort; lungs anterior bilaterally clear to auscultation.  Abdomen: Soft, hyperactive BS noted.  Umbilical hernia present.  MS: No clubbing or cyanosis of nails noted; unable to assess  gait.  Skin: No rashes, lesions, or ulcers; no nodules. Lipo hypertropthy to RLE.  Psychiatric: Good judgement and insight; normal mood and affect.  Neurological: Cranial nerves are grossly intact.  Peripheral neuropathy in bilateral lower extremities noted.  Foot: Nails in good condition, no amputations noted.

## 2018-11-01 NOTE — ASSESSMENT & PLAN NOTE
Pt w/ hx of ESLD 2/2 cryptogenic cirrhosis  H/H stable on admit (9.1/28.3)  H/H decreased 11/1 (6.4/19.8), requiring 1 upRBC

## 2018-11-01 NOTE — PLAN OF CARE
Problem: Patient Care Overview  Goal: Plan of Care Review  Outcome: Ongoing (interventions implemented as appropriate)  Pt AAOx4. Pt free from falls. Pt wears non slip socks when ambulating. Pt bed low and locked position. Pt afebrile. Pt IV site without redness or edema. Educated pt on importance of hand washing. Pt has denied any pain or discomfort this shift.

## 2018-11-01 NOTE — CONSULTS
[facility]  Endocrinology  Diabetes Consult Note    Consult Requested by: Abrahan Montelongo MD   Reason for admit: ENZO (acute kidney injury)    HISTORY OF PRESENT ILLNESS:  Reason for Consult: Management of T2DM, Hyperglycemia     Surgical Procedure and Date: N/A    Diabetes diagnosis year:     Home Diabetes Medications: Levemir (vial) 12 units BID, Humalog (pen) moderate correction scale    How often checking glucose at home? 4x day   BG readings on regimen: AM - 150s-220s, pre meal 200s-300s  Hypoglycemia on the regimen?  Yes - 2 within the past month.  One for BG of 27 which required hospitalization  Missed doses on regimen?  Yes about once weekly    Diabetes Complications include:     Hyperglycemia, Hypoglycemia  and Diabetic peripheral neuropathy     Complicating diabetes co morbidities:   ESLD    HPI:   Patient is a 67 y.o. female with a diagnosis of GERD, cirrhosis, RA, and DM who is admitted on 10/30/18 for ENZO.  No family history of DM.  Patient has had DM for past 17 years, insulin for the past 6 years.  Managed by her PCP.  No A1C on file at time of consult and patient receiving PRBCs.  Endocrine consulted for DM management.             Interval HPI:   Overnight events: Remains on TSU, NAEON.  BG trending up throughout yesterday, requiring correction scale novolog.  To receive 1 unit PRBCs today.  Eatin% - admits to snacking on sugary snacks  Nausea: No  Hypoglycemia and intervention: No  Fever: No  TPN and/or TF: No      PMH, PSH, FH, SH reviewed       Review of Systems     Constitutional: 9 lb weight loss over past month  Eyes: Negative for visual disturbance, wears reading glasses  Respiratory: Positive for cough, recently finished antibiotic course for bronchitis  Cardiovascular: Negative for chest pain.  Gastrointestinal: Positive for diarrhea, takes lactulose  Endocrine: Negative for polyuria, polydipsia.  Musculoskeletal: Arthritic pain in bilateral shoulders  Skin:  Negative for rash.  Neurological: Negative for syncope.  Psychiatric/Behavioral: Negative for depression.      Current Medications and/or Treatments Impacting Glycemic Control  Immunotherapy:    Immunosuppressants     None        Steroids:   Hormones (From admission, onward)    None        Pressors:    Autonomic Drugs (From admission, onward)    None        Hyperglycemia/Diabetes Medications:   Antihyperglycemics (From admission, onward)    Start     Stop Route Frequency Ordered    10/31/18 0134  insulin aspart U-100 pen 0-5 Units      -- SubQ Every 6 hours PRN 10/31/18 0037             PHYSICAL EXAMINATION:  Vitals:    11/01/18 1330   BP: 120/64   Pulse: 76   Resp: 19   Temp: 98.4 °F (36.9 °C)     Body mass index is 20.69 kg/m².    Physical Exam     Constitutional: Well developed, well nourished, NAD.  ENT: External ears no masses with nose patent; normal hearing.  Neck: Supple; trachea midline  Cardiovascular: Normal heart sounds, 1+ LE edema bilaterally. DP +2 bilaterally.  Lungs: Normal effort; lungs anterior bilaterally clear to auscultation.  Abdomen: Soft, hyperactive BS noted.  Umbilical hernia present.  MS: No clubbing or cyanosis of nails noted; unable to assess gait.  Skin: No rashes, lesions, or ulcers; no nodules. Lipo hypertropthy to RLE.  Psychiatric: Good judgement and insight; normal mood and affect.  Neurological: Cranial nerves are grossly intact.  Peripheral neuropathy in bilateral lower extremities noted.  Foot: Nails in good condition, no amputations noted.      Labs Reviewed and Include   Recent Labs   Lab 11/01/18  0646   *   CALCIUM 8.1*   ALBUMIN 3.2*   PROT 5.5*      K 4.7   CO2 16*   *   BUN 50*   CREATININE 2.2*   ALKPHOS 144*   ALT 18   AST 38   BILITOT 1.7*     Lab Results   Component Value Date    WBC 2.59 (L) 11/01/2018    HGB 6.4 (L) 11/01/2018    HCT 19.8 (LL) 11/01/2018     (H) 11/01/2018    PLT 71 (L) 11/01/2018     No results for input(s): TSH, FREET4  in the last 168 hours.  No results found for: HGBA1C    Nutritional status:   Body mass index is 20.69 kg/m².  Lab Results   Component Value Date    ALBUMIN 3.2 (L) 11/01/2018    ALBUMIN 2.2 (L) 10/31/2018    ALBUMIN 2.5 (L) 10/30/2018     No results found for: PREALBUMIN    Estimated Creatinine Clearance: 17.6 mL/min (A) (based on SCr of 2.2 mg/dL (H)).    Accu-Checks  Recent Labs     10/31/18  0453 10/31/18  1126 10/31/18  1737 10/31/18  2117 11/01/18  0832   POCTGLUCOSE 108 137* 205* 231* 192*        ASSESSMENT and PLAN    * ENZO (acute kidney injury)    Caution with insulin stacking  Estimated Creatinine Clearance: 17.6 mL/min (A) (based on SCr of 2.2 mg/dL (H)).         Type 2 diabetes mellitus with hyperglycemia, with long-term current use of insulin    BG goal 140-180    Novolog 2 units with meals  Low dose correction scale  BG monitoring AC/HS    Discharge planning: TBD       Decompensated hepatic cirrhosis    Liver listed  Managed per primary team  Avoid hypoglycemia           Plan discussed with patient, family, and RN at bedside.     Tristan Hwang NP  Endocrinology  [facility]

## 2018-11-01 NOTE — ASSESSMENT & PLAN NOTE
BG goal 140-180    Novolog 2 units with meals  Low dose correction scale  BG monitoring AC/HS    Discharge planning: TBD

## 2018-11-01 NOTE — ASSESSMENT & PLAN NOTE
Caution with insulin stacking  Estimated Creatinine Clearance: 17.6 mL/min (A) (based on SCr of 2.2 mg/dL (H)).

## 2018-11-01 NOTE — PLAN OF CARE
Pt AAOx4, independent,  at bedside. On renal diet, good appetite, 1 BM today. Albumin given today. No c/o pain. Cr trending down.

## 2018-11-01 NOTE — HPI
Reason for Consult: Management of T2DM, Hyperglycemia     Surgical Procedure and Date: N/A    Diabetes diagnosis year: 2001    Home Diabetes Medications: Levemir (vial) 12 units BID, Humalog (pen) moderate correction scale    How often checking glucose at home? 4x day   BG readings on regimen: AM - 150s-220s, pre meal 200s-300s  Hypoglycemia on the regimen?  Yes - 2 within the past month.  One for BG of 27 which required hospitalization  Missed doses on regimen?  Yes about once weekly    Diabetes Complications include:     Hyperglycemia, Hypoglycemia  and Diabetic peripheral neuropathy     Complicating diabetes co morbidities:   ESLD    HPI:   Patient is a 67 y.o. female with a diagnosis of GERD, cirrhosis, RA, and DM who is admitted on 10/30/18 for ENZO.  No family history of DM.  Patient has had DM for past 17 years, insulin for the past 6 years.  Managed by her PCP.  No A1C on file at time of consult and patient receiving PRBCs.  Endocrine consulted for DM management.

## 2018-11-01 NOTE — PLAN OF CARE
Pt AAOx4, independent, VSS, no c/o pain today. H/H 6.4 and 19.8 this AM, 1 unit of blood given today. Blood sugar this afternoon taken post-prandial by ERICA lobo NP aware. Cr 2.2 today, fluids encouraged. Urine unmeasured with BMs.  at bedside and attentive to pt and participating in care.

## 2018-11-01 NOTE — PHYSICIAN QUERY
PT Name: Krystal Hobson  MR #: [id number]     Physician Query Form - Documentation Clarification      CDS/: Ally Samson               Contact information: [email or web address]     This form is a permanent document in the medical record.     Query Date: November 1, 2018    By submitting this query, we are merely seeking further clarification of documentation. Please utilize your independent clinical judgment when addressing the question(s) below.    The Medical record reflects the following:    Supporting Clinical Findings Location in Medical Record   Hepatic encephalopathy  Stable  Remains on lactulose/rifaximin    Psychiatric/Behavioral: Negative for confusion           Transplant PN 10/31        Transplant PN 10/31                                                                                      Doctor, Please specify diagnosis or diagnoses associated with above clinical findings.    Provider Use Only    ( x ) Chronic hepatic encephalopathy     (  ) Other____________________                                                                                                            Clinically Undetermined

## 2018-11-02 PROBLEM — E87.5 HYPERKALEMIA: Status: RESOLVED | Noted: 2018-01-01 | Resolved: 2018-01-01

## 2018-11-02 PROBLEM — D64.9 ANEMIA REQUIRING TRANSFUSIONS: Status: RESOLVED | Noted: 2018-01-01 | Resolved: 2018-01-01

## 2018-11-02 NOTE — ASSESSMENT & PLAN NOTE
Caution with insulin stacking  Estimated Creatinine Clearance: 22.8 mL/min (A) (based on SCr of 1.7 mg/dL (H)).

## 2018-11-02 NOTE — ASSESSMENT & PLAN NOTE
BG goal 140-180    Start Levemir 4 units BID  Novolog 2 units with meals  Low dose correction scale  BG monitoring AC/HS    Discharge planning: Given improving kidney function, increasing insulin requirements, and BG control prior to admission - recommend patient resume home medication reduced by approximately 20% (Levemir 8 units BID plus Novolog correction scale).  If BG above goal, resume home dosing regimen of Levemir 12 units BID plus Novolog correction and follow up with PCP.  Patient to send BG log to PCP within 1 week.  Discussed plan in detail with patient and spouse at bedside, in agreement.

## 2018-11-02 NOTE — PROGRESS NOTES
Met with patient and her her  in preparation for discharge today.  Reviewed [facility] booklet and outpatient appointments.  Allowed time for questions and answers.

## 2018-11-02 NOTE — DISCHARGE SUMMARY
[facility]  Liver Transplant  Discharge Summary      Patient Name: Krystal Hobson  MRN: [id number]  Admission Date: 10/30/2018  Hospital Length of Stay: 3 days  Discharge Date and Time:  11/02/2018 1:35 PM  Attending Physician: Arbahan Montelongo MD   Discharging Provider: Kourtney Mancia PA-C  Primary Care Provider: Courtney Joe MD  HPI:     Mrs. Hobson is a 67 yr F w/ hx of cryptogenic cirrhosis, listed for liver tx 9/14/18 (MELD 23), who was admitted from outpatient clinic visit on 10/30 after found to have elevated Cr (3.0, baseline 1.6) in addition to umbilical hernia pain. She reported 1 episode of emesis 2 days prior to presentation. Denied fever, chills, sick contacts. Concerns for incarcerated hernia. Pt started on empiric IV Zosyn/Vanc on admit (dcd 10/31). No incarceration based on cross sectional imaging. CT scan with findings of abnormal thickening of the cecum.  Low concern for colonic malignancy based on recent colonoscopy. Diuretics held at this time for ENZO. She was hydrated w/ IVF and albumin, w/ noted improvements in Cr levels. Urine lytes WNL. Relatively stable UOP. On day of discharge Cr 1.7. In addition, On H/H dropped (6.4/19.8) on 11/1, requiring transfusion 1u pRBC. Pt responded appropriately with H/H stable at 8.1/25.3 on discharge.     Ms. Hobson is stable for discharge today. Pt to cont on lactulose/rifaximin, with goal of 3-4 BM/day. Home diuretics not restarted at discharge. Will reassess further need in clinic. She will have  nursing weekly, orders placed. Pt returning to [place] where she will get labs next Tuesday 11/6 at [place]. She will follow-up in clinic on 11/16. Pt verbalized understanding to all discharge instructions.         * No surgery found *     Hospital Course:    No notes on file    Final Active Diagnoses:    Diagnosis Date Noted POA    PRINCIPAL PROBLEM:  ENZO (acute kidney injury) [N17.9] 10/30/2018 Yes    Anemia of chronic disease [D63.8]  11/01/2018 Yes    Leukopenia [D72.819] 11/01/2018 No    Thrombocytopenia, unspecified [D69.6] 11/01/2018 No    Type 2 diabetes mellitus with hyperglycemia, with long-term current use of insulin [E11.65, Z79.4] 11/01/2018 Not Applicable    Hepatic encephalopathy [K72.90] 10/31/2018 No    End stage liver disease [K72.90] 10/31/2018 Yes    Umbilical hernia [K42.9] 10/31/2018 Yes    Hyponatremia with decreased serum osmolality [E87.1] 09/07/2018 Yes    Organ transplant candidate [Z76.82] 07/29/2018 Not Applicable    Decompensated hepatic cirrhosis [K72.90] 07/29/2018 Yes      Problems Resolved During this Admission:    Diagnosis Date Noted Date Resolved POA    Anemia requiring transfusions [D64.9] 11/01/2018 11/02/2018 No    Hyperkalemia [E87.5] 10/31/2018 11/02/2018 Yes       Consults (From admission, onward)        Status Ordering Provider     Inpatient consult to Endocrinology  Once     Provider:  (Not yet assigned)    Completed ZORAIDA ISABEL          Pending Diagnostic Studies:     None        Significant Diagnostic Studies: Labs:   CMP   Recent Labs   Lab 11/01/18  0646 11/02/18  0700    134*   K 4.7 4.5   * 110   CO2 16* 18*   * 218*   BUN 50* 47*   CREATININE 2.2* 1.7*   CALCIUM 8.1* 8.2*   PROT 5.5* 5.7*   ALBUMIN 3.2* 3.1*   BILITOT 1.7* 2.6*   ALKPHOS 144* 148*   AST 38 40   ALT 18 19   ANIONGAP 8 6*   ESTGFRAFRICA 26.0* 35.5*   EGFRNONAA 22.5* 30.8*   , CBC   Recent Labs   Lab 11/01/18  0646 11/02/18  0700   WBC 2.59* 3.40*   HGB 6.4* 8.1*   HCT 19.8* 25.3*   PLT 71* 78*    and All labs within the past 24 hours have been reviewed    The patients clinical status was discussed at multidisplinary rounds, involving transplant surgery, transplant medicine, pharmacy, nursing, nutrition, and social work    Discharged Condition: good    Disposition: Home or Self Care    Follow Up:    Patient Instructions:      Referral to Home health   Referral Priority: Routine Referral Type:  Home Health   Referral Reason: Specialty Services Required   Requested Specialty: Home Health Services   Number of Visits Requested: 1     Diet diabetic     Notify your health care provider if you experience any of the following:  temperature >100.4     Notify your health care provider if you experience any of the following:  persistent nausea and vomiting or diarrhea     Notify your health care provider if you experience any of the following:  severe uncontrolled pain     Notify your health care provider if you experience any of the following:  redness, tenderness, or signs of infection (pain, swelling, redness, odor or green/yellow discharge around incision site)     Notify your health care provider if you experience any of the following:  difficulty breathing or increased cough     Notify your health care provider if you experience any of the following:  severe persistent headache     Notify your health care provider if you experience any of the following:  worsening rash     Notify your health care provider if you experience any of the following:  persistent dizziness, light-headedness, or visual disturbances     Notify your health care provider if you experience any of the following:  increased confusion or weakness     Activity as tolerated     Medications:  Reconciled Home Medications:      Medication List      START taking these medications    sodium bicarbonate 650 MG tablet  Take 2 tablets (1,300 mg total) by mouth 3 (three) times daily.        CHANGE how you take these medications    BREO ELLIPTA INHL  Inhale into the lungs.  What changed:  Another medication with the same name was removed. Continue taking this medication, and follow the directions you see here.     lactulose 10 gram/15 mL solution  Commonly known as:  CHRONULAC  Take 23 mLs (15.3333 g total) by mouth 3 (three) times daily. Titrate to 3 BM daily  What changed:    · how much to take  · additional instructions        CONTINUE taking these  medications    ALBUTEROL INHL  Inhale 1 puff into the lungs 4 (four) times daily as needed.     ergocalciferol 50,000 unit Cap  Commonly known as:  ERGOCALCIFEROL  Take 50,000 Units by mouth every 7 days.     hydrOXYzine HCl 25 MG tablet  Commonly known as:  ATARAX  Take 25 mg by mouth 3 (three) times daily as needed for Itching.     insulin detemir U-100 100 unit/mL injection  Commonly known as:  LEVEMIR  Inject 12 Units into the skin 2 (two) times daily.     insulin lispro 100 unit/mL injection  Commonly known as:  HUMALOG  Sliding scale     omeprazole 40 MG capsule  Commonly known as:  PRILOSEC  Take 40 mg by mouth every morning.     ondansetron 4 MG tablet  Commonly known as:  ZOFRAN  Take 4 mg by mouth every 8 (eight) hours as needed for Nausea.     ONETOUCH ULTRA BLUE TEST STRIP Strp  Generic drug:  blood sugar diagnostic     propranolol 10 MG tablet  Commonly known as:  INDERAL  Take 5 mg by mouth 2 (two) times daily.     rifAXIMin 550 mg Tab  Commonly known as:  XIFAXAN  Take 550 mg by mouth 2 (two) times daily.     SHINGRIX (PF) 50 mcg/0.5 mL injection  Generic drug:  varicella-zoster [id number] (PF)     zinc gluconate 50 mg tablet  Take 50 mg by mouth once daily.        STOP taking these medications    benzonatate 100 MG capsule  Commonly known as:  TESSALON     ferrous sulfate 325 mg (65 mg iron) Tab tablet  Commonly known as:  FEOSOL     furosemide 40 MG tablet  Commonly known as:  LASIX     spironolactone 100 MG tablet  Commonly known as:  ALDACTONE     spironolactone 12.5 MG tablet  Commonly known as:  ALDACTONE          Time spent caring for patient (Greater than 1/2 spent in direct face-to-face contact): > 30 minutes    Kourtney Mancia PA-C  Liver Transplant  [facility]

## 2018-11-02 NOTE — PROGRESS NOTES
Discharge Medication Note:    Hospital Course:  Ms Hobson is a pre transplant patient admitted for hernia pain.  Patient started on zosyn/vanc x1 day, found no evidence of incarceration and abx were stopped.  At time of discharge lactulose was decreased and patient was instructed to titrate to 3 BM/day.  Home diuretics were also held - will reassess outpatient.    Met with Krystal Hobson at discharge to review discharge medications and to update the blue medication card.       Krystal Hobson   Home Medication Instructions PROSPER:[id number]    Printed on:11/02/18 6616   Medication Information                      ALBUTEROL INHL  Inhale 1 puff into the lungs 4 (four) times daily as needed.             ergocalciferol (ERGOCALCIFEROL) 50,000 unit Cap  Take 50,000 Units by mouth every 7 days.             fluticasone/vilanterol (BREO ELLIPTA INHL)  Inhale into the lungs.             hydrOXYzine HCl (ATARAX) 25 MG tablet  Take 25 mg by mouth 3 (three) times daily as needed for Itching.             insulin detemir U-100 (LEVEMIR) 100 unit/mL injection  Inject 12 Units into the skin 2 (two) times daily.              insulin lispro (HUMALOG) 100 unit/mL injection  Sliding scale              lactulose (CHRONULAC) 10 gram/15 mL solution  Take 23 mLs (15.3333 g total) by mouth 3 (three) times daily. Titrate to 3 BM daily             omeprazole (PRILOSEC) 40 MG capsule  Take 40 mg by mouth every morning.             ondansetron (ZOFRAN) 4 MG tablet  Take 4 mg by mouth every 8 (eight) hours as needed for Nausea.             ONETOUCH ULTRA BLUE TEST STRIP Strp               propranolol (INDERAL) 10 MG tablet  Take 5 mg by mouth 2 (two) times daily.              rifAXIMin (XIFAXAN) 550 mg Tab  Take 550 mg by mouth 2 (two) times daily.             SHINGRIX, PF, 50 mcg/0.5 mL injection               sodium bicarbonate 650 MG tablet  Take 2 tablets (1,300 mg total) by mouth 3 (three) times daily.             zinc gluconate 50 mg  tablet  Take 50 mg by mouth once daily.                  Pt was provided with prescriptions for the following meds:  E-rx: sodium bicarb  Printed rx: none  Phone-in or faxed rx: none to none pharmacy per pt request.    The following medications have been placed on HOLD and should be restarted in the outpatient setting (when appropriate): home diuretics (lasix/spironolactone)    Krystal Hobson verbalized understanding and had the opportunity to ask questions.

## 2018-11-02 NOTE — PROGRESS NOTES
Discharge:    Patient scheduled to discharge today, 11/2/18. SW met with patient and patient's spouse/caregiver, Shahzad, in patient's room. Patient was observed sitting upright in bed. Patient and caregiver were AAOx4, and communicative. Patient will discharge via car to home, and will resume [facility] health needs through [facility] for vital checks. The patient reported adequate coping and denies any current mental health difficulties. SW will continue to follow patient for resources, support and discharge planning as appropriate. SW remains available.

## 2018-11-02 NOTE — PROGRESS NOTES
"[facility]  Endocrinology  Progress Note    Admit Date: 10/30/2018     Reason for Consult: Management of T2DM, Hyperglycemia     Surgical Procedure and Date: N/A    Diabetes diagnosis year:     Home Diabetes Medications: Levemir (vial) 12 units BID, Humalog (pen) moderate correction scale    How often checking glucose at home? 4x day   BG readings on regimen: AM - 150s-220s, pre meal 200s-300s  Hypoglycemia on the regimen?  Yes - 2 within the past month.  One for BG of 27 which required hospitalization  Missed doses on regimen?  Yes about once weekly    Diabetes Complications include:     Hyperglycemia, Hypoglycemia  and Diabetic peripheral neuropathy     Complicating diabetes co morbidities:   ESLD    HPI:   Patient is a 67 y.o. female with a diagnosis of GERD, cirrhosis, RA, and DM who is admitted on 10/30/18 for ENZO.  No family history of DM.  Patient has had DM for past 17 years, insulin for the past 6 years.  Managed by her PCP.  No A1C on file at time of consult and patient receiving PRBCs.  Endocrine consulted for DM management.             Interval HPI:   Overnight events: Remains on TSU, NAEON.  BG trending down since yesterday.  Received 1 unit PRBCs yesterday for low H&H.  Eatin%  Nausea: No  Hypoglycemia and intervention: No  Fever: No  TPN and/or TF: No    /72   Pulse 76   Temp 98.7 °F (37.1 °C) (Oral)   Resp 20   Ht 4' 10" (1.473 m)   Wt 44.9 kg (98 lb 15.8 oz)   SpO2 100%   Breastfeeding? No   BMI 20.69 kg/m²      Labs Reviewed and Include    Recent Labs   Lab 18  0700   *   CALCIUM 8.2*   ALBUMIN 3.1*   PROT 5.7*   *   K 4.5   CO2 18*      BUN 47*   CREATININE 1.7*   ALKPHOS 148*   ALT 19   AST 40   BILITOT 2.6*     Lab Results   Component Value Date    WBC 2.59 (L) 2018    HGB 6.4 (L) 2018    HCT 19.8 (LL) 2018     (H) 2018    PLT 71 (L) 2018     No results for input(s): TSH, FREET4 in the " last 168 hours.  No results found for: HGBA1C    Nutritional status:   Body mass index is 20.69 kg/m².  Lab Results   Component Value Date    ALBUMIN 3.1 (L) 11/02/2018    ALBUMIN 3.2 (L) 11/01/2018    ALBUMIN 2.2 (L) 10/31/2018     No results found for: PREALBUMIN    Estimated Creatinine Clearance: 22.8 mL/min (A) (based on SCr of 1.7 mg/dL (H)).    Accu-Checks  Recent Labs     10/31/18  0453 10/31/18  1126 10/31/18  1737 10/31/18  2117 11/01/18  0832 11/01/18  1321 11/01/18  1717 11/01/18  2137   POCTGLUCOSE 108 137* 205* 231* 192* 305* 299* 219*       Current Medications and/or Treatments Impacting Glycemic Control  Immunotherapy:    Immunosuppressants     None        Steroids:   Hormones (From admission, onward)    None        Pressors:    Autonomic Drugs (From admission, onward)    None        Hyperglycemia/Diabetes Medications:   Antihyperglycemics (From admission, onward)    Start     Stop Route Frequency Ordered    11/01/18 1645  insulin aspart U-100 pen 2 Units      -- SubQ 3 times daily with meals 11/01/18 1435    11/01/18 1535  insulin aspart U-100 pen 0-5 Units      -- SubQ Before meals & nightly PRN 11/01/18 1435          ASSESSMENT and PLAN    * ENZO (acute kidney injury)    Caution with insulin stacking  Estimated Creatinine Clearance: 22.8 mL/min (A) (based on SCr of 1.7 mg/dL (H)).         Type 2 diabetes mellitus with hyperglycemia, with long-term current use of insulin    BG goal 140-180    Start Levemir 4 units BID  Novolog 2 units with meals  Low dose correction scale  BG monitoring AC/HS    Discharge planning: Given improving kidney function, increasing insulin requirements, and BG control prior to admission - recommend patient resume home medication reduced by approximately 20% (Levemir 8 units BID plus Novolog correction scale).  If BG above goal, resume home dosing regimen of Levemir 12 units BID plus Novolog correction and follow up with PCP.  Patient to send BG log to PCP within 1 week.   Discussed plan in detail with patient and spouse at bedside, in agreement.       Decompensated hepatic cirrhosis    Liver listed  Managed per primary team  Avoid hypoglycemia           Tristan Hwang NP  Endocrinology  [facility]

## 2018-11-02 NOTE — SUBJECTIVE & OBJECTIVE
"Interval HPI:   Overnight events: Remains on TSU, NAHERMINIOON.  BG trending down since yesterday.  Received 1 unit PRBCs yesterday for low H&H.  Eatin%  Nausea: No  Hypoglycemia and intervention: No  Fever: No  TPN and/or TF: No    /72   Pulse 76   Temp 98.7 °F (37.1 °C) (Oral)   Resp 20   Ht 4' 10" (1.473 m)   Wt 44.9 kg (98 lb 15.8 oz)   SpO2 100%   Breastfeeding? No   BMI 20.69 kg/m²     Labs Reviewed and Include    Recent Labs   Lab 18  0700   *   CALCIUM 8.2*   ALBUMIN 3.1*   PROT 5.7*   *   K 4.5   CO2 18*      BUN 47*   CREATININE 1.7*   ALKPHOS 148*   ALT 19   AST 40   BILITOT 2.6*     Lab Results   Component Value Date    WBC 2.59 (L) 2018    HGB 6.4 (L) 2018    HCT 19.8 (LL) 2018     (H) 2018    PLT 71 (L) 2018     No results for input(s): TSH, FREET4 in the last 168 hours.  No results found for: HGBA1C    Nutritional status:   Body mass index is 20.69 kg/m².  Lab Results   Component Value Date    ALBUMIN 3.1 (L) 2018    ALBUMIN 3.2 (L) 2018    ALBUMIN 2.2 (L) 10/31/2018     No results found for: PREALBUMIN    Estimated Creatinine Clearance: 22.8 mL/min (A) (based on SCr of 1.7 mg/dL (H)).    Accu-Checks  Recent Labs     10/31/18  0453 10/31/18  1126 10/31/18  1737 10/31/18  2117 18  0832 18  1321 18  1717 18  2137   POCTGLUCOSE 108 137* 205* 231* 192* 305* 299* 219*       Current Medications and/or Treatments Impacting Glycemic Control  Immunotherapy:    Immunosuppressants     None        Steroids:   Hormones (From admission, onward)    None        Pressors:    Autonomic Drugs (From admission, onward)    None        Hyperglycemia/Diabetes Medications:   Antihyperglycemics (From admission, onward)    Start     Stop Route Frequency Ordered    18 1645  insulin aspart U-100 pen 2 Units      -- SubQ 3 times daily with meals 18 1435    18 1535  insulin aspart U-100 pen 0-5 Units   "    -- SubQ Before meals & nightly PRN 11/01/18 3820

## 2018-11-04 NOTE — TELEPHONE ENCOUNTER
Called to check on patient post discharge from the hospital 11/2/18.   reports patient is doing okay.  No questions re: meds or f/u appointments.

## 2018-11-05 NOTE — TELEPHONE ENCOUNTER
"Listed for liver 9/2018  BPA 16   CC; admitted at [facility] tues this week - DC'd fri throwing up all day today.     Reason for Disposition   [1] MODERATE vomiting (e.g., 3 - 5 times/day) AND [2] age > 60    Answer Assessment - Initial Assessment Questions  1. VOMITING SEVERITY: "How many times have you vomited in the past 24 hours?"      - MILD:  1 - 2 times/day     - MODERATE: 3 - 5 times/day, decreased oral intake without significant weight loss or symptoms of dehydration     - SEVERE: 6 or more times/day, vomits everything or nearly everything, with significant weight loss, symptoms of dehydration      Vx4 since 12 noon, no blood , T99, no diarrhea. Feels like she needs to pass gas. Ate lunch at noon, had glucernia this evening then vomited that   2. ONSET: "When did the vomiting begin?"       12 noon   3. FLUIDS: "What fluids or food have you vomited up today?" "Have you been able to keep any fluids down?"     Normal reg diet until 12 noon. Last uop 8pm - clear yellow  4. ABDOMINAL PAIN: "Are your having any abdominal pain?" If yes : "How bad is it and what does it feel like?" (e.g., crampy, dull, intermittent, constant)     Last BM at 0900, also vomited up lactulose this evening. abd pain getting better. Gas easing off  5. DIARRHEA: "Is there any diarrhea?" If so, ask: "How many times today?"      Denies   6. CONTACTS: "Is there anyone else in the family with the same symptoms?"      No   7. CAUSE: "What do you think is causing your vomiting?"     sodium bicab 1300 mg dose started wed or thurs   8. HYDRATION STATUS: "Any signs of dehydration?" (e.g., dry mouth [not only dry lips], too weak to stand) "When did you last urinate?"     Not too weak to stand.   9. OTHER SYMPTOMS: "Do you have any other symptoms?" (e.g., fever, headache, vertigo, vomiting blood or coffee grounds)     No HA , cont nausea last vomit at 8pm    Protocols used: ST VOMITING-A-AH  BS at 6pm 339 - normal range for pt. Taking SSI - no insulin " this evening   Had levimir this evening  Spoke with dr ki Tobar sodium bicarb and in am and this evening, sips of fluids. [place] rec OV in am with hep or PCP. Call coord in am. Family notified. Call back with questions.

## 2018-11-07 NOTE — TELEPHONE ENCOUNTER
Received message from patient's  w/ reports that patient seen in the ER at [facility] x2 for gas pains and nausea.  She was unable to tolerate any fluids, food, or meds. Patient dc'd from ED the first visit but admitted the second time. She had a severe cough and ammonia level 159.....combative and argumentative. Called [facility] to get additional information.  Patient admitted to  537.  Spoke w/  her nurse.  She reports that patient has been admitted to Dr. Skinner for HE.  VSS, but BP and HR slightly elevated.  Imaging performed.  Right pleural effusion noted.  Thoracentesis not yet recommended.  Unable to take oral medications at this time d/t combative behavior.  Spoke w/ Susanne Calvo regarding above.  She reports above, but also states that patient has a SBO and right sided abdominal pain.    Dr. Santos informed of above and will contact MD @ [phone]

## 2018-11-09 PROBLEM — K76.9 CHRONIC LIVER DISEASE: Status: ACTIVE | Noted: 2018-01-01

## 2018-11-09 NOTE — TELEPHONE ENCOUNTER
Krystal Hobson is a 67 year old patient who has been active on liver transplant list at [facility]. The patient has been admitted at [facility] in [place], [place] with ENZO and hepatic encephalopathy.     After discussion with Dr. Skinner, Hospital Medicine from [place], we decided to transfer [place] [facility].    Plan:  - please admit to Liver Transplant Service (LTS)  - Dr. Jabier Fritz, LTS attending has been notified.  - please obtain pan-culture, CXR, urine electrolytes  - please start her on HRS protocol: octreotide, albumin, may need midodrine if her MAP < 65  - monitor  - I will be seeing her over the weekends 11/10-11/18 if she gets transferred.    Transfer center has been notified and they are working on the transfer process.    Yovana Santos MD  Staff Physician  Hepatology and Liver Transplant  [facility]  [facility]

## 2018-11-10 PROBLEM — I48.91 NEW ONSET A-FIB: Status: ACTIVE | Noted: 2018-01-01

## 2018-11-10 PROBLEM — N18.9 ACUTE KIDNEY INJURY SUPERIMPOSED ON CKD: Status: ACTIVE | Noted: 2018-01-01

## 2018-11-10 PROBLEM — R00.0 SINUS TACHYCARDIA: Status: ACTIVE | Noted: 2018-01-01

## 2018-11-10 PROBLEM — N18.30 CKD (CHRONIC KIDNEY DISEASE) STAGE 3, GFR 30-59 ML/MIN: Status: ACTIVE | Noted: 2018-01-01

## 2018-11-10 NOTE — ASSESSMENT & PLAN NOTE
Listed for liver transplant with MELD 23. Per hepatologist, will place on internal hold. Continue to monitor.

## 2018-11-10 NOTE — ASSESSMENT & PLAN NOTE
"Admitted to [facility] as transfer from [facility] for ENZO and worsening hepatic encephalopathy    Per Dr Santos's note from today  "Plan:  - please admit to Liver Transplant Service (LTS)  - Dr. Jabier Fritz, LTS attending has been notified.  - please obtain pan-culture, CXR, urine electrolytes  - please start her on HRS protocol: octreotide, albumin, may need midodrine if her MAP < 65  - monitor  - I will be seeing her over the weekends 11/10-11/18 if she gets transferred."    Will add respiratory treatments for cough  Continue Dilt for now  Lactulose enemas     D/w Dr Fritz       "

## 2018-11-10 NOTE — HPI
Reason for Consult: Management of type 2 DM, Hyperglycemia     Surgical Procedure and Date: n/a    Diabetes diagnosis year: 2001    Home Diabetes Medications: Levemir 12 units BID (vial) and Humalog SSI with meals   Lab Results   Component Value Date    HGBA1C 5.8 (H) 11/10/2018         How often checking glucose at home? 1-3  BG readings on regimen: 150-300  Hypoglycemia on the regimen? once 27; required visit to ED; gave Levemir and humalog and patient did not eat  Missed doses on regimen?  No    Diabetes Complications include:     Hyperglycemia, Hypoglycemia  and Diabetic peripheral neuropathy     Complicating diabetes co morbidities:   CIRRHOSIS      HPI:   Patient is a 67 y.o. female with a diagnosis of type 2 DM; well controlled on MDI. Also with   Cryptogenic cirrhosis, rheumatoid arthritis, and GERD. Patient was listed for liver transplant on 9/14/18. Patient presented to ED of [facility] in [place] with AMS and ENZO. Endocrinology consulted for BG/ DM management.   Of note, profound hypoglycemic event (BG < 20) the night of 11/14/18.

## 2018-11-10 NOTE — ASSESSMENT & PLAN NOTE
Patient with episode of Afib with RVR at OSH and was placed on diltiazem gtt which she remains on.   Cardiology consulted for further help with management, awaiting final recommendations. Appreciate Cardiology assistance.   Per hepatologist, will likely need repeat 2D Echo on Monday. Monitor.

## 2018-11-10 NOTE — ASSESSMENT & PLAN NOTE
Acute on chronic  Lactulose enema this AM  PO lactulose ordered, continue rifaximin.   PRN lactulose enema TID for worsening confusion  Titrate for 3-4 BM a day.

## 2018-11-10 NOTE — PROGRESS NOTES
[facility]  Liver Transplant  Progress Note    Patient Name: Krystal Hobson  MRN: [id number]  Admission Date: 11/10/2018  Hospital Length of Stay: 0 days  Code Status: Full Code  Primary Care Provider: Courtney Joe MD    Subjective:     History of Present Illness:  Ms. Krystal Hobson is a 67 yr F w/ hx of cryptogenic cirrhosis, listed for liver tx 9/14/18 (MELD 23) who presented to ER at outside hospital in [place], [place] with AMS and ENZO.  Per the , she was extremely disoriented a few days ago and her ammonia got as high as 200.  Her Cr increased to 2.0 at the OSH. She also went into A fib with RVR and was started on a diltiazem infusion at OSH.  She remains confused on admission here with complaint of a cough which she feels is worsening.  She was transferred to [facility] for escalation in care.            Hospital Course:  Interval History: Patient admitted overnight from OSH with HE, ENZO. Infectious work up initiated this AM. She was placed on albumin and octreotide for HRS protocol. This AM, she remains encephalopathic, able to state name but otherwise not oriented. Lactulose enema to be given this AM. Oral lactulose reordered. Of significant, patient with episode of Afib with RVR at OSH and was placed on diltiazem gtt which she remains on. Cardiology consulted for further help with management, awaiting final recommendations. Per hepatologist, will likely need repeat 2D Echo on Monday. Monitor.     Scheduled Meds:   albumin human 25%  25 g Intravenous Q8H    albuterol-ipratropium  3 mL Nebulization Q6H    heparin (porcine)  5,000 Units Subcutaneous Q8H    lactulose  30 g Oral TID    octreotide  100 mcg Subcutaneous Q8H    rifAXImin  550 mg Oral BID     Continuous Infusions:   dilTIAZem 5 mg/hr (11/10/18 0540)     PRN Meds:acetaminophen, dextrose 50%, dextrose 50%, glucagon (human recombinant), glucose, glucose, insulin aspart U-100, lactulose, ondansetron, sodium chloride 0.9%    Review of  Systems   Constitutional: Positive for activity change. Negative for chills and fever.   Respiratory: Positive for cough. Negative for choking and wheezing.    Cardiovascular: Negative for leg swelling.   Gastrointestinal: Positive for nausea. Negative for abdominal distention and abdominal pain.   Genitourinary: Negative for hematuria.   Neurological: Positive for weakness.   Psychiatric/Behavioral: Positive for confusion and decreased concentration.     Objective:     Vital Signs (Most Recent):  Temp: 97.8 °F (36.6 °C) (11/10/18 0745)  Pulse: 70 (11/10/18 0837)  Resp: 17 (11/10/18 0837)  BP: 116/67 (11/10/18 0745)  SpO2: 95 % (11/10/18 0837) Vital Signs (24h Range):  Temp:  [97 °F (36.1 °C)-98.1 °F (36.7 °C)] 97.8 °F (36.6 °C)  Pulse:  [] 70  Resp:  [12-18] 17  SpO2:  [95 %-99 %] 95 %  BP: (116-154)/(61-77) 116/67     Weight: 51.1 kg (112 lb 10.5 oz)  Body mass index is 23.54 kg/m².    Intake/Output - Last 3 Shifts       11/08 0700 - 11/09 0659 11/09 0700 - 11/10 0659 11/10 0700 - 11/11 0659    P.O.  0     I.V. (mL/kg)  0 (0)     Blood  100     Total Intake(mL/kg)  100 (2)     Urine (mL/kg/hr)  75     Total Output  75     Net  +25                  Physical Exam   Constitutional: She is oriented to person, place, and time. No distress.   Hand and temporal muscle wasting noted   Cardiovascular: Normal rate. Exam reveals no friction rub.   No murmur heard.  Pulmonary/Chest: Effort normal. She has decreased breath sounds in the right middle field and the right lower field.   Abdominal: Soft. She exhibits no distension. There is no tenderness. There is no rebound and no guarding.   Neurological: She is alert and oriented to person, place, and time.   Skin: She is not diaphoretic.   Psychiatric: She has a normal mood and affect. Her behavior is normal. Judgment and thought content normal.   Nursing note and vitals reviewed.      Laboratory:  Immunosuppressants     None        CBC:   Recent Labs   Lab  11/10/18  0800   WBC 5.17  5.14   RBC 2.39*  2.38*   HGB 7.7*  7.7*   HCT 24.4*  24.2*   PLT 79*  77*   *  102*   MCH 32.2*  32.4*   MCHC 31.6*  31.8*     CMP:   Recent Labs   Lab 11/10/18  0801   *   CALCIUM 8.9   ALBUMIN 3.5   PROT 6.1      K 3.7   CO2 24      BUN 52*   CREATININE 1.8*   ALKPHOS 112   ALT 22   AST 39   BILITOT 2.6*     Labs within the past 24 hours have been reviewed.    Diagnostic Results:  None    Assessment/Plan:     * Hepatic encephalopathy    Acute on chronic  Lactulose enema this AM  PO lactulose ordered, continue rifaximin.   PRN lactulose enema TID for worsening confusion  Titrate for 3-4 BM a day.        New onset a-fib    Patient with episode of Afib with RVR at OSH and was placed on diltiazem gtt which she remains on.   Cardiology consulted for further help with management, awaiting final recommendations. Appreciate Cardiology assistance.   Per hepatologist, will likely need repeat 2D Echo on Monday. Monitor.          CKD (chronic kidney disease) stage 3, GFR 30-59 ml/min    See ENZO.       Chronic liver disease           Type 2 diabetes mellitus with hyperglycemia, with long-term current use of insulin    Sliding scale insulin for now. Continue to monitor.        Anemia of chronic disease    H/H stable. Continue to monitor with daily cbc.        End stage liver disease    Listed for liver transplant with MELD 23. Per hepatologist, will place on internal hold. Continue to monitor.        Acute kidney injury superimposed on CKD    Appears to have CKD3/4 at baseline creatinine  Urine electrolytes ordered  Etiology possibly due to hepatorenal syndrome, awaiting final urine electrolytes    HRS protocol started, albumin and octreotide. No midodrine as with Afib.        Thrombocytopenia due to hypersplenism    No s/s of bleeding.  Continue to monitor.            VTE Risk Mitigation (From admission, onward)        Ordered     IP VTE HIGH RISK PATIENT  Once       11/10/18 0213     heparin (porcine) injection 5,000 Units  Every 8 hours      11/10/18 0213          The patients clinical status was discussed at multidisplinary rounds, involving transplant surgery, transplant medicine, pharmacy, nursing, nutrition, and social work    Discharge Planning: Not a candidate for discharge at this time.   No Patient Care Coordination Note on file.      Ashley Quesada PA-C  Liver Transplant  [facility]

## 2018-11-10 NOTE — TELEPHONE ENCOUNTER
MELD-Na score: 23 at 11/10/2018  8:01 AM  MELD score: 23 at 11/10/2018  8:01 AM  Calculated from:  Serum Creatinine: 1.8 mg/dL at 11/10/2018  8:01 AM  Serum Sodium: 145 mmol/L (Rounded to 137 mmol/L) at 11/10/2018  8:01 AM  Total Bilirubin: 2.6 mg/dL at 11/10/2018  8:01 AM  INR(ratio): 2 at 11/10/2018  8:01 AM  Age: 67 years    Encephalopathy: yes, grade 3-4  Ascites: yes, slight  Dialysis:no      Functional Status (Karnofsky Score): 30% - Severely disabled: hospitalization is indicated, death not imminent  Physical Capacity: No Limitations      Recertification form sent on 11/10/2018.    Recertification requestor: Yovana Santos MD

## 2018-11-10 NOTE — SUBJECTIVE & OBJECTIVE
Past Medical History:   Diagnosis Date    Cancer     breat Cancer    Cirrhosis     Diabetes mellitus     GERD (gastroesophageal reflux disease)     Rheumatoid arthritis        Past Surgical History:   Procedure Laterality Date    CARPAL TUNNEL RELEASE      left wrist    CHOLECYSTECTOMY      MASTECTOMY      left breast 25  years ago       Review of patient's allergies indicates:  No Known Allergies    Family History     Problem Relation (Age of Onset)    COPD Sister    Heart disease Mother    Liver disease Father, Sister        Tobacco Use    Smoking status: Never Smoker    Tobacco comment: patient denies   Substance and Sexual Activity    Alcohol use: No     Comment: stopped 10/17    Drug use: No     Comment: patient denies    Sexual activity: Not on file       PTA Medications   Medication Sig    ALBUTEROL INHL Inhale 1 puff into the lungs 4 (four) times daily as needed.    ergocalciferol (ERGOCALCIFEROL) 50,000 unit Cap Take 50,000 Units by mouth every 7 days.    fluticasone/vilanterol (BREO ELLIPTA INHL) Inhale into the lungs.    hydrOXYzine HCl (ATARAX) 25 MG tablet Take 25 mg by mouth 3 (three) times daily as needed for Itching.    insulin detemir U-100 (LEVEMIR) 100 unit/mL injection Inject 12 Units into the skin 2 (two) times daily.     insulin lispro (HUMALOG) 100 unit/mL injection Sliding scale     lactulose (CHRONULAC) 10 gram/15 mL solution Take 23 mLs (15.3333 g total) by mouth 3 (three) times daily. Titrate to 3 BM daily    omeprazole (PRILOSEC) 40 MG capsule Take 40 mg by mouth every morning.    ondansetron (ZOFRAN) 4 MG tablet Take 4 mg by mouth every 8 (eight) hours as needed for Nausea.    ONETOUCH ULTRA BLUE TEST STRIP Strp     propranolol (INDERAL) 10 MG tablet Take 5 mg by mouth 2 (two) times daily.     rifAXIMin (XIFAXAN) 550 mg Tab Take 550 mg by mouth 2 (two) times daily.    SHINGRIX, PF, 50 mcg/0.5 mL injection     sodium bicarbonate 650 MG tablet Take 2 tablets  (1,300 mg total) by mouth 3 (three) times daily.    zinc gluconate 50 mg tablet Take 50 mg by mouth once daily.        Review of Systems   Constitutional: Negative for chills and fever.   Respiratory: Positive for cough.    Cardiovascular: Negative for chest pain, palpitations and leg swelling.   Gastrointestinal: Positive for diarrhea. Negative for abdominal distention, abdominal pain, blood in stool, nausea and vomiting.   Genitourinary: Negative for difficulty urinating and dysuria.   Neurological: Negative for syncope and light-headedness.   Psychiatric/Behavioral: Positive for confusion.     Objective:     Vital Signs (Most Recent):  Temp: 98.1 °F (36.7 °C) (11/10/18 0229)  Pulse: 66 (11/10/18 0230)  Resp: 16 (11/10/18 0230)  BP: (!) 150/75 (11/10/18 0230)  SpO2: 99 % (11/10/18 0230) Vital Signs (24h Range):  Temp:  [97 °F (36.1 °C)-98.1 °F (36.7 °C)] 98.1 °F (36.7 °C)  Pulse:  [] 66  Resp:  [16-17] 16  SpO2:  [99 %] 99 %  BP: (140-150)/(61-75) 150/75     Weight: 51.1 kg (112 lb 10.5 oz)  Body mass index is 23.54 kg/m².    No intake or output data in the 24 hours ending 11/10/18 0310    Physical Exam   Constitutional: No distress.   Pleasant, cooperative    Eyes: Scleral icterus is present.   Cardiovascular: Normal rate and intact distal pulses.   Pulmonary/Chest: Effort normal.   Coarse breath sounds bilaterally, coughing periodically    Abdominal: Soft. She exhibits no distension. There is no tenderness.   Genitourinary:   Genitourinary Comments: Mendoza and BMS in place    Neurological:   Alert, Grade 1-2 encephalopathy        Laboratory:  Admission labs pending     Diagnostic Results:  CXR pending

## 2018-11-10 NOTE — HPI
Ms. Krystal Hobson is a 67 yr F w/ hx of cryptogenic cirrhosis, listed for liver tx 9/14/18 who presented to ER at outside hospital in [place], [place] with increased abdominal pain and then noted to have AMS on arrival and ENZO on lab work. Per the , she was extremely disoriented and her ammonia reached as high as 200. Her Cr level increased to 2.0 at the OSH. She also went into A fib with RVR and was started on a diltiazem infusion at OSH. She was then transferred to [facility] for further care. On arrival pt was noted with confusion which has now resolved with lactulose. She also was noted with a cough and fatigue.

## 2018-11-10 NOTE — PLAN OF CARE
Problem: Patient Care Overview  Goal: Plan of Care Review  Outcome: Ongoing (interventions implemented as appropriate)  Pt is awake, alert, and oriented to person, time, and place but not situation. Spouse present at bedside and involved in plan of care.  SSI administered. FMS d/c, transitioned to PO Lactulose instead of enemas. Mendoza d/c, purewick placed, pt is incontinent of stool and urine. Pt has frequent productive cough with sputum. PRN Tessalon pearls given which provide temporary relief. This RN instructed the pt on the importance of hand hygiene, pt verbalized understanding. Pt turns self in bed independently no signs of skin breakdown noted. Fall precautions in place, bed alarm on, side rails upx3, non slip footwear applied, pt instructed to call for assistance before attempting to ambulate, pt verbalized understanding. Call bell within reach, family present at bedside.

## 2018-11-10 NOTE — H&P
[facility]  Liver Transplant  History & Physical    Patient Name: Krystal Hobson  MRN: [id number]  Admission Date: 11/10/2018  Code Status: Full Code  Primary Care Provider: Courtney Joe MD    Subjective:     History of Present Illness:  Krystal Hobson is a 67 yr F w/ hx of cryptogenic cirrhosis, listed for liver tx 9/14/18 (MELD 23) who presented to ER at outside hospital in [place], [place] with AMS and ENZO.  Per the , she was extremely disoriented a few days ago and her ammonia got as high as 200, but was in the 50s today with lactulose enemas.  Her Cr bumped at 2.0 at the OSH.  Earlier today she went into A fib with RVR and was started on a diltiazem infusion.  She currently has no complaints other than a cough which she feels is worsening.  She was transferred to [facility] for escalation in care.            Past Medical History:   Diagnosis Date    Cancer     breat Cancer    Cirrhosis     Diabetes mellitus     GERD (gastroesophageal reflux disease)     Rheumatoid arthritis        Past Surgical History:   Procedure Laterality Date    CARPAL TUNNEL RELEASE      left wrist    CHOLECYSTECTOMY      MASTECTOMY      left breast 25  years ago       Review of patient's allergies indicates:  No Known Allergies    Family History     Problem Relation (Age of Onset)    COPD Sister    Heart disease Mother    Liver disease Father, Sister        Tobacco Use    Smoking status: Never Smoker    Tobacco comment: patient denies   Substance and Sexual Activity    Alcohol use: No     Comment: stopped 10/17    Drug use: No     Comment: patient denies    Sexual activity: Not on file       PTA Medications   Medication Sig    ALBUTEROL INHL Inhale 1 puff into the lungs 4 (four) times daily as needed.    ergocalciferol (ERGOCALCIFEROL) 50,000 unit Cap Take 50,000 Units by mouth every 7 days.    fluticasone/vilanterol (BREO ELLIPTA INHL) Inhale into the lungs.    hydrOXYzine HCl (ATARAX) 25 MG tablet Take 25  mg by mouth 3 (three) times daily as needed for Itching.    insulin detemir U-100 (LEVEMIR) 100 unit/mL injection Inject 12 Units into the skin 2 (two) times daily.     insulin lispro (HUMALOG) 100 unit/mL injection Sliding scale     lactulose (CHRONULAC) 10 gram/15 mL solution Take 23 mLs (15.3333 g total) by mouth 3 (three) times daily. Titrate to 3 BM daily    omeprazole (PRILOSEC) 40 MG capsule Take 40 mg by mouth every morning.    ondansetron (ZOFRAN) 4 MG tablet Take 4 mg by mouth every 8 (eight) hours as needed for Nausea.    ONETOUCH ULTRA BLUE TEST STRIP Strp     propranolol (INDERAL) 10 MG tablet Take 5 mg by mouth 2 (two) times daily.     rifAXIMin (XIFAXAN) 550 mg Tab Take 550 mg by mouth 2 (two) times daily.    SHINGRIX, PF, 50 mcg/0.5 mL injection     sodium bicarbonate 650 MG tablet Take 2 tablets (1,300 mg total) by mouth 3 (three) times daily.    zinc gluconate 50 mg tablet Take 50 mg by mouth once daily.        Review of Systems   Constitutional: Negative for chills and fever.   Respiratory: Positive for cough.    Cardiovascular: Negative for chest pain, palpitations and leg swelling.   Gastrointestinal: Positive for diarrhea. Negative for abdominal distention, abdominal pain, blood in stool, nausea and vomiting.   Genitourinary: Negative for difficulty urinating and dysuria.   Neurological: Negative for syncope and light-headedness.   Psychiatric/Behavioral: Positive for confusion.     Objective:     Vital Signs (Most Recent):  Temp: 98.1 °F (36.7 °C) (11/10/18 0229)  Pulse: 66 (11/10/18 0230)  Resp: 16 (11/10/18 0230)  BP: (!) 150/75 (11/10/18 0230)  SpO2: 99 % (11/10/18 0230) Vital Signs (24h Range):  Temp:  [97 °F (36.1 °C)-98.1 °F (36.7 °C)] 98.1 °F (36.7 °C)  Pulse:  [] 66  Resp:  [16-17] 16  SpO2:  [99 %] 99 %  BP: (140-150)/(61-75) 150/75     Weight: 51.1 kg (112 lb 10.5 oz)  Body mass index is 23.54 kg/m².    No intake or output data in the 24 hours ending 11/10/18  "0310    Physical Exam   Constitutional: No distress.   Pleasant, cooperative    Eyes: Scleral icterus is present.   Cardiovascular: Normal rate and intact distal pulses.   Pulmonary/Chest: Effort normal.   Coarse breath sounds bilaterally, coughing periodically    Abdominal: Soft. She exhibits no distension. There is no tenderness.   Genitourinary:   Genitourinary Comments: Mendoza and BMS in place    Neurological:   Alert, Grade 1-2 encephalopathy        Laboratory:  Admission labs pending     Diagnostic Results:  CXR pending     Assessment/Plan:     Chronic liver disease    Admitted to [facility] [place] for ENZO and worsening hepatic encephalopathy    Per Dr Santos's note from today  "Plan:  - please admit to Liver Transplant Service (LTS)  - Dr. Jabier Fritz, LTS attending has been notified.  - please obtain pan-culture, CXR, urine electrolytes  - please start her on HRS protocol: octreotide, albumin, may need midodrine if her MAP < 65  - monitor  - I will be seeing her over the weekends 11/10-11/18 if she gets transferred."    Will add respiratory treatments for cough  Continue Dilt for now  Lactulose enemas     D/w Dr Fritz                  MELD-Na score: 21 at 11/2/2018  7:00 AM  MELD score: 19 at 11/2/2018  7:00 AM  Calculated from:  Serum Creatinine: 1.7 mg/dL at 11/2/2018  7:00 AM  Serum Sodium: 134 mmol/L at 11/2/2018  7:00 AM  Total Bilirubin: 2.6 mg/dL at 11/2/2018  7:00 AM  INR(ratio): 1.4 at 11/2/2018  7:00 AM  Age: 67 years      Discharge Planning:  No Patient Care Coordination Note on file.      Dakota Sol MD  Liver Transplant  [facility]      "

## 2018-11-10 NOTE — SUBJECTIVE & OBJECTIVE
Interval HPI:   Admitted to TSU. Afib today. BG above goal overnight; ate outside food and drink for lunch. BG below goal this AM. Difficulty voiding overnight; creatinine remains elevated at 1.8. Family at bedside.  Eatin-100%   Nausea: No  Hypoglycemia and intervention: No  Fever: No  TPN and/or TF: No    PMH, PSH, FH, SH reviewed       Review of Systems   Constitutional: + for weight changes.  Eyes: Negative for visual disturbance.  Respiratory: + for cough.   Cardiovascular: Negative for chest pain.  Gastrointestinal: Negative for nausea.  Endocrine: Negative for polyuria, polydipsia.  Musculoskeletal: Negative for back pain.  Skin: Negative for rash.  Neurological: Negative for syncope.  Psychiatric/Behavioral: Negative for depression.      Current Medications and/or Treatments Impacting Glycemic Control  Immunotherapy:    Immunosuppressants     None        Steroids:   Hormones (From admission, onward)    None        Pressors:    Autonomic Drugs (From admission, onward)    None        Hyperglycemia/Diabetes Medications:   Antihyperglycemics (From admission, onward)    Start     Stop Route Frequency Ordered    11/10/18 2100  insulin detemir U-100 pen 10 Units      -- SubQ Nightly 11/10/18 1708    11/10/18 0948  insulin aspart U-100 pen 0-5 Units      -- SubQ Before meals & nightly PRN 11/10/18 0848             PHYSICAL EXAMINATION:  Vitals:    18 0855   BP: (!) 142/67   Pulse:    Resp:    Temp:      Body mass index is 23.82 kg/m².    Physical Exam   Constitutional: Well developed, well nourished, NAD.  ENT: External ears no masses with nose patent; normal hearing.  Neck: Supple; trachea midline.  Cardiovascular: Normal heart sounds, no LE edema. DP +2 bilaterally.  Lungs: Normal effort; lungs anterior bilaterally clear to auscultation.  Abdomen:  no masses, no hernias. + distension.   MS: No clubbing or cyanosis of nails noted; unable to assess gait.  Skin: No rashes, lesions, or ulcers; no nodules.  Injection sites are ok. No lipo hypertropthy or atrophy.  Psychiatric: Good judgement and insight; normal mood and affect.  Neurological: Cranial nerves are grossly intact.  Foot: nails in good condition, no amputations noted.

## 2018-11-10 NOTE — HOSPITAL COURSE
Interval History: no acute events overnight. More lethargic in early evening yest, responded well to lactulose enema. 2D echo yest with PA pressure 46, HTS consulted for RHC end of the week at the earliest. BNP 1892. Plan to diurese, dose lasix daily based on Cr as just recovering from ENZO, lasix 40mg IV x 2 doses today. UOP decreased yesterday, Cr mildly elevated today. Cough and breathing continue to improve, repeat CXR this AM as required 2L O2 overnight, persistent JUAN opacification. Pulmonology consulted. Start treatment for HCAP with vanc/zosyn. RUE edema improving. Remains on internal hold 2/2 frailty and elevated PA pressure. MELD 30 today. Continue lactulose to titrate for 3-4 stools/day. Repeat labs 4pm.

## 2018-11-10 NOTE — ASSESSMENT & PLAN NOTE
BG goal 140-180    Hold Levemir. Will re-evaluate in AM. Likely Levemir 4 units BID.   Will given in AM tomorrow if needed given kidney function.   BG monitoring ac/hs and low  dose correction scale.   Patient only eating broth and some juice currently; does not want other food at this time.

## 2018-11-10 NOTE — ASSESSMENT & PLAN NOTE
Avoid insulin stacking and hypoglycemia.  Lab Results   Component Value Date    CREATININE 1.8 (H) 11/10/2018

## 2018-11-10 NOTE — SUBJECTIVE & OBJECTIVE
Scheduled Meds:   albumin human 25%  25 g Intravenous Q8H    albuterol-ipratropium  3 mL Nebulization Q6H    heparin (porcine)  5,000 Units Subcutaneous Q8H    lactulose  30 g Oral TID    octreotide  100 mcg Subcutaneous Q8H    rifAXImin  550 mg Oral BID     Continuous Infusions:   dilTIAZem 5 mg/hr (11/10/18 0540)     PRN Meds:acetaminophen, dextrose 50%, dextrose 50%, glucagon (human recombinant), glucose, glucose, insulin aspart U-100, lactulose, ondansetron, sodium chloride 0.9%    Review of Systems   Constitutional: Positive for activity change. Negative for chills and fever.   Respiratory: Positive for cough. Negative for choking and wheezing.    Cardiovascular: Negative for leg swelling.   Gastrointestinal: Positive for nausea. Negative for abdominal distention and abdominal pain.   Genitourinary: Negative for hematuria.   Neurological: Positive for weakness.   Psychiatric/Behavioral: Positive for confusion and decreased concentration.     Objective:     Vital Signs (Most Recent):  Temp: 97.8 °F (36.6 °C) (11/10/18 0745)  Pulse: 70 (11/10/18 0837)  Resp: 17 (11/10/18 0837)  BP: 116/67 (11/10/18 0745)  SpO2: 95 % (11/10/18 0837) Vital Signs (24h Range):  Temp:  [97 °F (36.1 °C)-98.1 °F (36.7 °C)] 97.8 °F (36.6 °C)  Pulse:  [] 70  Resp:  [12-18] 17  SpO2:  [95 %-99 %] 95 %  BP: (116-154)/(61-77) 116/67     Weight: 51.1 kg (112 lb 10.5 oz)  Body mass index is 23.54 kg/m².    Intake/Output - Last 3 Shifts       11/08 0700 - 11/09 0659 11/09 0700 - 11/10 0659 11/10 0700 - 11/11 0659    P.O.  0     I.V. (mL/kg)  0 (0)     Blood  100     Total Intake(mL/kg)  100 (2)     Urine (mL/kg/hr)  75     Total Output  75     Net  +25                  Physical Exam   Constitutional: She is oriented to person, place, and time. No distress.   Hand and temporal muscle wasting noted   Cardiovascular: Normal rate. Exam reveals no friction rub.   No murmur heard.  Pulmonary/Chest: Effort normal. She has decreased breath  sounds in the right middle field and the right lower field.   Abdominal: Soft. She exhibits no distension. There is no tenderness. There is no rebound and no guarding.   Neurological: She is alert and oriented to person, place, and time.   Skin: She is not diaphoretic.   Psychiatric: She has a normal mood and affect. Her behavior is normal. Judgment and thought content normal.   Nursing note and vitals reviewed.      Laboratory:  Immunosuppressants     None        CBC:   Recent Labs   Lab 11/10/18  0800   WBC 5.17  5.14   RBC 2.39*  2.38*   HGB 7.7*  7.7*   HCT 24.4*  24.2*   PLT 79*  77*   *  102*   MCH 32.2*  32.4*   MCHC 31.6*  31.8*     CMP:   Recent Labs   Lab 11/10/18  0801   *   CALCIUM 8.9   ALBUMIN 3.5   PROT 6.1      K 3.7   CO2 24      BUN 52*   CREATININE 1.8*   ALKPHOS 112   ALT 22   AST 39   BILITOT 2.6*     Labs within the past 24 hours have been reviewed.    Diagnostic Results:  None

## 2018-11-10 NOTE — NURSING
Pt arrived to unit via EMS from [facility] in [place], [place] on Cardizem gtt at 5 ml/hr and Na+ acetate at 100 ml/hr. Cardizem gtt continued. Mendoza and FMS in place.   No skin breakdown noted during assessment.   Telemetry placed d/t report of a-fib with RVR during transport and cardiac gtt.   Oriented to self only.   Called previous hospital for report. Notified MD Sol of pt arrival.   Fall precautions maintained. Bed wheels locked, bed in lowest position, upper SR up x 2, call light in reach, non-skid socks when OOB. Instructed pt to call for assistance as needed. Bed alarm ON. Will cont to monitor.

## 2018-11-11 PROBLEM — E43 SEVERE MALNUTRITION: Status: ACTIVE | Noted: 2018-01-01

## 2018-11-11 NOTE — ASSESSMENT & PLAN NOTE
Severe Hand and temporal muscle wasting  Decreased energy  Frail appearing  Boost supplements ordered  Dietician consulted.

## 2018-11-11 NOTE — NURSING
Started 1 unit of blood during Labs downtime. Notified lab of the blood not scanning. They advised to keep track with paper charting during this time. Confirmation was given by charge nurse. Rate time and vitals were hand recorded. Blood was triple checked with 3 RN's. Blood administered with no complications. Downtime form is in the chart.

## 2018-11-11 NOTE — PROGRESS NOTES
Notified Dr. Ferreira on call for LTS of patient's difficulty urinating. Patient had very small amount of unmeasured urine at very beginning of shift but has been unable to urinate since. Bladder scan shows 250cc in bladder but patient states she does not have urge to urinate and when placed on bed pan was unable to urinate. Instructed to straight cath patient.

## 2018-11-11 NOTE — CONSULTS
[facility]  Endocrinology  Diabetes Consult Note    Consult Requested by: Jabier Fritz MD   Reason for admit: Hepatic encephalopathy    HISTORY OF PRESENT ILLNESS:  Reason for Consult: Management of type 2 DM, Hyperglycemia     Surgical Procedure and Date: n/a    Diabetes diagnosis year:     Home Diabetes Medications: Levemir 12 units BID (vial) and Humalog SSI with meals   Lab Results   Component Value Date    HGBA1C 5.8 (H) 11/10/2018         How often checking glucose at home? 1-3  BG readings on regimen: 150-300  Hypoglycemia on the regimen? once 27; required visit to ED; gave Levemir and humalog and patient did not eat  Missed doses on regimen?  No    Diabetes Complications include:     Hyperglycemia, Hypoglycemia  and Diabetic peripheral neuropathy     Complicating diabetes co morbidities:   CIRRHOSIS      HPI:   Patient is a 67 y.o. female with a diagnosis of type 2 DM; well controlled on MDI. Also with   Cryptogenic cirrhosis, rheumatoid arthritis, and GERD. Patient was listed for liver transplant on 18. Patient presented to ED of hospital in [place] with AMS and ENZO. Endocrinology consulted for BG/ DM management.             Interval HPI:   Admitted to TSU. Afib today. BG above goal overnight; ate outside food and drink for lunch. BG below goal this AM. Difficulty voiding overnight; creatinine remains elevated at 1.8. Family at bedside.  Eatin-100%   Nausea: No  Hypoglycemia and intervention: No  Fever: No  TPN and/or TF: No    PMH, PSH, FH, SH reviewed       Review of Systems   Constitutional: + for weight changes.  Eyes: Negative for visual disturbance.  Respiratory: + for cough.   Cardiovascular: Negative for chest pain.  Gastrointestinal: Negative for nausea.  Endocrine: Negative for polyuria, polydipsia.  Musculoskeletal: Negative for back pain.  Skin: Negative for rash.  Neurological: Negative for syncope.  Psychiatric/Behavioral: Negative for  depression.      Current Medications and/or Treatments Impacting Glycemic Control  Immunotherapy:    Immunosuppressants     None        Steroids:   Hormones (From admission, onward)    None        Pressors:    Autonomic Drugs (From admission, onward)    None        Hyperglycemia/Diabetes Medications:   Antihyperglycemics (From admission, onward)    Start     Stop Route Frequency Ordered    11/10/18 2100  insulin detemir U-100 pen 10 Units      -- SubQ Nightly 11/10/18 1708    11/10/18 0948  insulin aspart U-100 pen 0-5 Units      -- SubQ Before meals & nightly PRN 11/10/18 0848             PHYSICAL EXAMINATION:  Vitals:    11/11/18 0855   BP: (!) 142/67   Pulse:    Resp:    Temp:      Body mass index is 23.82 kg/m².    Physical Exam   Constitutional: Well developed, well nourished, NAD.  ENT: External ears no masses with nose patent; normal hearing.  Neck: Supple; trachea midline.  Cardiovascular: Normal heart sounds, no LE edema. DP +2 bilaterally.  Lungs: Normal effort; lungs anterior bilaterally clear to auscultation.  Abdomen:  no masses, no hernias. + distension.   MS: No clubbing or cyanosis of nails noted; unable to assess gait.  Skin: No rashes, lesions, or ulcers; no nodules. Injection sites are ok. No lipo hypertropthy or atrophy.  Psychiatric: Good judgement and insight; normal mood and affect.  Neurological: Cranial nerves are grossly intact.  Foot: nails in good condition, no amputations noted.          Labs Reviewed and Include   Recent Labs   Lab 11/11/18  0622   GLU 72   CALCIUM 8.9   ALBUMIN 3.7   PROT 5.8*      K 3.5   CO2 25      BUN 50*   CREATININE 1.8*   ALKPHOS 97   ALT 19   AST 36   BILITOT 2.8*     Lab Results   Component Value Date    WBC 4.98 11/11/2018    HGB 7.1 (L) 11/11/2018    HCT 22.6 (L) 11/11/2018     (H) 11/11/2018    PLT 66 (L) 11/11/2018     No results for input(s): TSH, FREET4 in the last 168 hours.  Lab Results   Component Value Date    HGBA1C 5.8 (H)  11/10/2018       Nutritional status:   Body mass index is 23.82 kg/m².  Lab Results   Component Value Date    ALBUMIN 3.7 11/11/2018    ALBUMIN 3.5 11/10/2018    ALBUMIN 3.4 (L) 11/10/2018     No results found for: PREALBUMIN    Estimated Creatinine Clearance: 24.8 mL/min (A) (based on SCr of 1.8 mg/dL (H)).    Accu-Checks  Recent Labs     11/10/18  0747 11/10/18  1138 11/10/18  1647 11/10/18  1651 11/10/18  2103 11/11/18  0738 11/11/18  1120   POCTGLUCOSE 219* 236* 401* 435* 354* 74 179*        ASSESSMENT and PLAN    * Hepatic encephalopathy    Managed per primary.        Type 2 diabetes mellitus with hyperglycemia, with long-term current use of insulin    BG goal 140-180    Hold Levemir. Will re-evaluate in AM. Likely Levemir 4 units BID.   Will given in AM tomorrow if needed given kidney function.   BG monitoring ac/hs and low  dose correction scale.   Patient only eating broth and some juice currently; does not want other food at this time.        Decompensated hepatic cirrhosis    Managed per primary.   Listed for liver transplant.  May impact BG readings       Acute kidney injury superimposed on CKD    Avoid insulin stacking and hypoglycemia.  Lab Results   Component Value Date    CREATININE 1.8 (H) 11/10/2018                Plan discussed with patient, family, and RN at bedside.     Jadyn Hall NP  Endocrinology  [facility]

## 2018-11-11 NOTE — CONSULTS
"[facility]  Cardiology  Consult Note    Patient Name: Krystal Hobson  MRN: [id number]  Admission Date: 11/10/2018  Hospital Length of Stay: 0 days  Code Status: Full Code   Attending Provider: Jabier Fritz MD   Consulting Provider: Angel Gunn MD  Primary Care Physician: Courtney Joe MD  Principal Problem:Hepatic encephalopathy    Patient information was obtained from patient, relative(s), past medical records and ER records.     Inpatient consult to Cardiology  Consult performed by: Angel Gunn MD  Consult ordered by: Ashley Quesada PA-C        Subjective:     Chief Complaint:  Encephalopathy and tachycardia     HPI:   Reason for Consult: "Newly diagnosed Afib"    HPI:  66 YO F w hx of cryogenic cirrhosis, liver transplant candidate who presented to OS in [place], [place] for encephalopathy and acute renal dysfunction, found to have ammonia on the 200s. Received lactulose enemas and was agitated during her initial stay there. Per family member, her HR got to 200 at some point and was diagnosed w Afib w RVR and started on diltiazem drip. She was then transferred to [facility].    Here, her HR has been mostly under 100s, and on telemetry we evidenced a regular sinus rhythm with very frequent PACs followed by a delay on the sinus beat. We did not evidence Afib. She also got an EKG earlier today that showed regular R-R intervals reported by our attending as sinus rhythm as well. She has been hemodynamically stable, although presumably hypovolemic, reason why primary team has been administering albumin.    Has been coughing frequently today.    Denies cardiovascular diagnosis in the past, also denies angina, dyspnea, or lightheadedness.    Had recent stress TTE in September:    1 - Normal left ventricular systolic function (EF 60-65%).     2 - No wall motion abnormalities.     3 - Indeterminate LV diastolic function.     4 - Normal right ventricular systolic function . "     5 - Moderate left atrial enlargement.     6 - Trivial tricuspid regurgitation.     7 - Trivial to mild pulmonic regurgitation.     8 - The estimated PA systolic pressure is 29 mmHg.     No evidence of stress induced myocardial ischemia.     Past Medical History:   Diagnosis Date    Cancer     breat Cancer    Cirrhosis     Diabetes mellitus     GERD (gastroesophageal reflux disease)     Rheumatoid arthritis        Past Surgical History:   Procedure Laterality Date    CARPAL TUNNEL RELEASE      left wrist    CHOLECYSTECTOMY      MASTECTOMY      left breast 25  years ago       Review of patient's allergies indicates:  No Known Allergies    No current facility-administered medications on file prior to encounter.      Current Outpatient Medications on File Prior to Encounter   Medication Sig    ALBUTEROL INHL Inhale 1 puff into the lungs 4 (four) times daily as needed.    ergocalciferol (ERGOCALCIFEROL) 50,000 unit Cap Take 50,000 Units by mouth every 7 days.    fluticasone/vilanterol (BREO ELLIPTA INHL) Inhale into the lungs.    hydrOXYzine HCl (ATARAX) 25 MG tablet Take 25 mg by mouth 3 (three) times daily as needed for Itching.    insulin detemir U-100 (LEVEMIR) 100 unit/mL injection Inject 12 Units into the skin 2 (two) times daily.     insulin lispro (HUMALOG) 100 unit/mL injection Sliding scale     lactulose (CHRONULAC) 10 gram/15 mL solution Take 23 mLs (15.3333 g total) by mouth 3 (three) times daily. Titrate to 3 BM daily    omeprazole (PRILOSEC) 40 MG capsule Take 40 mg by mouth every morning.    ondansetron (ZOFRAN) 4 MG tablet Take 4 mg by mouth every 8 (eight) hours as needed for Nausea.    ONETOUCH ULTRA BLUE TEST STRIP Strp     propranolol (INDERAL) 10 MG tablet Take 5 mg by mouth 2 (two) times daily.     rifAXIMin (XIFAXAN) 550 mg Tab Take 550 mg by mouth 2 (two) times daily.    SHINGRIX, PF, 50 mcg/0.5 mL injection     sodium bicarbonate 650 MG tablet Take 2 tablets (1,300 mg  total) by mouth 3 (three) times daily.    zinc gluconate 50 mg tablet Take 50 mg by mouth once daily.      Family History     Problem Relation (Age of Onset)    COPD Sister    Heart disease Mother    Liver disease Father, Sister        Tobacco Use    Smoking status: Never Smoker    Tobacco comment: patient denies   Substance and Sexual Activity    Alcohol use: No     Comment: stopped 10/17    Drug use: No     Comment: patient denies    Sexual activity: Not on file     Review of Systems   Constitution: Negative for chills and decreased appetite.   HENT: Negative for congestion, ear discharge and ear pain.    Eyes: Negative for blurred vision and discharge.   Cardiovascular: Positive for irregular heartbeat. Negative for chest pain, claudication, cyanosis and dyspnea on exertion.   Respiratory: Positive for cough. Negative for hemoptysis.    Endocrine: Negative for cold intolerance and heat intolerance.   Skin: Negative for color change and nail changes.   Musculoskeletal: Negative for arthritis and back pain.   Gastrointestinal: Negative for bloating and abdominal pain.   Genitourinary: Negative for bladder incontinence and decreased libido.   Neurological: Negative for aphonia and brief paralysis.   Psychiatric/Behavioral: Positive for altered mental status.     Objective:     Vital Signs (Most Recent):  Temp: 98.4 °F (36.9 °C) (11/10/18 1627)  Pulse: 69 (11/10/18 1500)  Resp: 19 (11/10/18 1413)  BP: 116/67 (11/10/18 0745)  SpO2: 97 % (11/10/18 1413) Vital Signs (24h Range):  Temp:  [97.8 °F (36.6 °C)-98.4 °F (36.9 °C)] 98.4 °F (36.9 °C)  Pulse:  [62-72] 69  Resp:  [12-19] 19  SpO2:  [95 %-99 %] 97 %  BP: (116-154)/(67-77) 116/67     Weight: 51.1 kg (112 lb 10.5 oz)  Body mass index is 23.54 kg/m².    SpO2: 97 %  O2 Device (Oxygen Therapy): room air      Intake/Output Summary (Last 24 hours) at 11/10/2018 1950  Last data filed at 11/10/2018 1506  Gross per 24 hour   Intake 920 ml   Output 500 ml   Net 420 ml        Lines/Drains/Airways     Peripheral Intravenous Line                 Midline Catheter Insertion/Assessment  - Single Lumen Right median cubital vein (antecubital fossa) -- days         Peripheral IV - Single Lumen Left;Posterior Forearm -- days                Physical Exam   Constitutional: She is oriented to person, place, and time. She appears well-developed and well-nourished.   HENT:   Head: Normocephalic and atraumatic.   Nose: Nose normal.   Mouth/Throat: No oropharyngeal exudate.   Eyes: Right eye exhibits no discharge. Left eye exhibits no discharge. No scleral icterus.   Neck: Normal range of motion. Neck supple. No JVD present.   Cardiovascular: Normal rate, regular rhythm, S1 normal and S2 normal. Exam reveals no gallop, no S3, no S4, no distant heart sounds, no friction rub, no midsystolic click and no opening snap.   No murmur heard.  Pulses:       Radial pulses are 2+ on the right side, and 2+ on the left side.        Femoral pulses are 2+ on the right side, and 2+ on the left side.  Pulmonary/Chest: Effort normal and breath sounds normal. No respiratory distress. She has no wheezes. She has no rales. She exhibits no tenderness.   Abdominal: Soft. Bowel sounds are normal. She exhibits distension. There is no tenderness. There is no rebound.   Musculoskeletal: Normal range of motion. She exhibits no edema, tenderness or deformity.   Lymphadenopathy:     She has no cervical adenopathy.   Neurological: She is alert and oriented to person, place, and time. No cranial nerve deficit.   Skin: Skin is warm and dry.   Psychiatric: She has a normal mood and affect. Her behavior is normal.       Significant Labs:   BMP:   Recent Labs   Lab 11/10/18  0800 11/10/18  0801   * 211*   * 145   K 3.9 3.7    108   CO2 23 24   BUN 54* 52*   CREATININE 1.8* 1.8*   CALCIUM 8.9 8.9   MG 2.6 2.7*   , CMP   Recent Labs   Lab 11/10/18  0800 11/10/18  0801   * 145   K 3.9 3.7    108   CO2 23  24   * 211*   BUN 54* 52*   CREATININE 1.8* 1.8*   CALCIUM 8.9 8.9   PROT 5.9* 6.1   ALBUMIN 3.4* 3.5   BILITOT 2.5* 2.6*   ALKPHOS 107 112   AST 39 39   ALT 20 22   ANIONGAP 14 13   ESTGFRAFRICA 33.1* 33.1*   EGFRNONAA 28.7* 28.7*    and CBC   Recent Labs   Lab 11/10/18  0800   WBC 5.17  5.14   HGB 7.7*  7.7*   HCT 24.4*  24.2*   PLT 79*  77*       Significant Imaging: EKGs, Telemetry    Assessment and Plan:     Sinus tachycardia    Sinus tachycardia w very frequent PACs w/o evidence of Afib  Recent encephalopathy, anemia, coughing, hypovolemia, renal dysfunction  -Discontinue diltiazem drip (communicated to team earlier during rounds)  -Treat underlying pathophysiologic stressors of sinus tachycardia    We will sign off.    Case discussed w Dr Abbott         VTE Risk Mitigation (From admission, onward)        Ordered     IP VTE HIGH RISK PATIENT  Once      11/10/18 0213     heparin (porcine) injection 5,000 Units  Every 8 hours      11/10/18 0213          Thank you for your consult. I will sign off. Please contact us if you have any additional questions.    Angel Pineda MD  Cardiology   [facility]

## 2018-11-11 NOTE — HPI
"Reason for Consult: "Newly diagnosed Afib"    HPI:  66 YO F w hx of cryogenic cirrhosis, liver transplant candidate who presented to OSH in [place], [place] for encephalopathy and acute renal dysfunction, found to have ammonia on the 200s. Received lactulose enemas and was agitated during her initial stay there. Per family member, her HR got to 200 at some point and was diagnosed w Afib w RVR and started on diltiazem drip. She was then transferred to [facility].    Here, her HR has been mostly under 100s, and on telemetry we evidenced a regular sinus rhythm with very frequent PACs followed by a delay on the sinus beat. We did not evidence Afib. She also got an EKG earlier today that showed regular R-R intervals reported by our attending as sinus rhythm as well. She has been hemodynamically stable, although presumably hypovolemic, reason why primary team has been administering albumin.    Has been coughing frequently today.    Denies cardiovascular diagnosis in the past, also denies angina, dyspnea, or lightheadedness.    Had recent stress TTE in September:    1 - Normal left ventricular systolic function (EF 60-65%).     2 - No wall motion abnormalities.     3 - Indeterminate LV diastolic function.     4 - Normal right ventricular systolic function .     5 - Moderate left atrial enlargement.     6 - Trivial tricuspid regurgitation.     7 - Trivial to mild pulmonic regurgitation.     8 - The estimated PA systolic pressure is 29 mmHg.     No evidence of stress induced myocardial ischemia.   "

## 2018-11-11 NOTE — SUBJECTIVE & OBJECTIVE
Past Medical History:   Diagnosis Date    Cancer     breat Cancer    Cirrhosis     Diabetes mellitus     GERD (gastroesophageal reflux disease)     Rheumatoid arthritis        Past Surgical History:   Procedure Laterality Date    CARPAL TUNNEL RELEASE      left wrist    CHOLECYSTECTOMY      MASTECTOMY      left breast 25  years ago       Review of patient's allergies indicates:  No Known Allergies    No current facility-administered medications on file prior to encounter.      Current Outpatient Medications on File Prior to Encounter   Medication Sig    ALBUTEROL INHL Inhale 1 puff into the lungs 4 (four) times daily as needed.    ergocalciferol (ERGOCALCIFEROL) 50,000 unit Cap Take 50,000 Units by mouth every 7 days.    fluticasone/vilanterol (BREO ELLIPTA INHL) Inhale into the lungs.    hydrOXYzine HCl (ATARAX) 25 MG tablet Take 25 mg by mouth 3 (three) times daily as needed for Itching.    insulin detemir U-100 (LEVEMIR) 100 unit/mL injection Inject 12 Units into the skin 2 (two) times daily.     insulin lispro (HUMALOG) 100 unit/mL injection Sliding scale     lactulose (CHRONULAC) 10 gram/15 mL solution Take 23 mLs (15.3333 g total) by mouth 3 (three) times daily. Titrate to 3 BM daily    omeprazole (PRILOSEC) 40 MG capsule Take 40 mg by mouth every morning.    ondansetron (ZOFRAN) 4 MG tablet Take 4 mg by mouth every 8 (eight) hours as needed for Nausea.    ONETOUCH ULTRA BLUE TEST STRIP Strp     propranolol (INDERAL) 10 MG tablet Take 5 mg by mouth 2 (two) times daily.     rifAXIMin (XIFAXAN) 550 mg Tab Take 550 mg by mouth 2 (two) times daily.    SHINGRIX, PF, 50 mcg/0.5 mL injection     sodium bicarbonate 650 MG tablet Take 2 tablets (1,300 mg total) by mouth 3 (three) times daily.    zinc gluconate 50 mg tablet Take 50 mg by mouth once daily.      Family History     Problem Relation (Age of Onset)    COPD Sister    Heart disease Mother    Liver disease Father, Sister        Tobacco  Use    Smoking status: Never Smoker    Tobacco comment: patient denies   Substance and Sexual Activity    Alcohol use: No     Comment: stopped 10/17    Drug use: No     Comment: patient denies    Sexual activity: Not on file     Review of Systems   Constitution: Negative for chills and decreased appetite.   HENT: Negative for congestion, ear discharge and ear pain.    Eyes: Negative for blurred vision and discharge.   Cardiovascular: Positive for irregular heartbeat. Negative for chest pain, claudication, cyanosis and dyspnea on exertion.   Respiratory: Positive for cough. Negative for hemoptysis.    Endocrine: Negative for cold intolerance and heat intolerance.   Skin: Negative for color change and nail changes.   Musculoskeletal: Negative for arthritis and back pain.   Gastrointestinal: Negative for bloating and abdominal pain.   Genitourinary: Negative for bladder incontinence and decreased libido.   Neurological: Negative for aphonia and brief paralysis.   Psychiatric/Behavioral: Positive for altered mental status.     Objective:     Vital Signs (Most Recent):  Temp: 98.4 °F (36.9 °C) (11/10/18 1627)  Pulse: 69 (11/10/18 1500)  Resp: 19 (11/10/18 1413)  BP: 116/67 (11/10/18 0745)  SpO2: 97 % (11/10/18 1413) Vital Signs (24h Range):  Temp:  [97.8 °F (36.6 °C)-98.4 °F (36.9 °C)] 98.4 °F (36.9 °C)  Pulse:  [62-72] 69  Resp:  [12-19] 19  SpO2:  [95 %-99 %] 97 %  BP: (116-154)/(67-77) 116/67     Weight: 51.1 kg (112 lb 10.5 oz)  Body mass index is 23.54 kg/m².    SpO2: 97 %  O2 Device (Oxygen Therapy): room air      Intake/Output Summary (Last 24 hours) at 11/10/2018 1950  Last data filed at 11/10/2018 1506  Gross per 24 hour   Intake 920 ml   Output 500 ml   Net 420 ml       Lines/Drains/Airways     Peripheral Intravenous Line                 Midline Catheter Insertion/Assessment  - Single Lumen Right median cubital vein (antecubital fossa) -- days         Peripheral IV - Single Lumen Left;Posterior Forearm --  days                Physical Exam   Constitutional: She is oriented to person, place, and time. She appears well-developed and well-nourished.   HENT:   Head: Normocephalic and atraumatic.   Nose: Nose normal.   Mouth/Throat: No oropharyngeal exudate.   Eyes: Right eye exhibits no discharge. Left eye exhibits no discharge. No scleral icterus.   Neck: Normal range of motion. Neck supple. No JVD present.   Cardiovascular: Normal rate, regular rhythm, S1 normal and S2 normal. Exam reveals no gallop, no S3, no S4, no distant heart sounds, no friction rub, no midsystolic click and no opening snap.   No murmur heard.  Pulses:       Radial pulses are 2+ on the right side, and 2+ on the left side.        Femoral pulses are 2+ on the right side, and 2+ on the left side.  Pulmonary/Chest: Effort normal and breath sounds normal. No respiratory distress. She has no wheezes. She has no rales. She exhibits no tenderness.   Abdominal: Soft. Bowel sounds are normal. She exhibits distension. There is no tenderness. There is no rebound.   Musculoskeletal: Normal range of motion. She exhibits no edema, tenderness or deformity.   Lymphadenopathy:     She has no cervical adenopathy.   Neurological: She is alert and oriented to person, place, and time. No cranial nerve deficit.   Skin: Skin is warm and dry.   Psychiatric: She has a normal mood and affect. Her behavior is normal.       Significant Labs:   BMP:   Recent Labs   Lab 11/10/18  0800 11/10/18  0801   * 211*   * 145   K 3.9 3.7    108   CO2 23 24   BUN 54* 52*   CREATININE 1.8* 1.8*   CALCIUM 8.9 8.9   MG 2.6 2.7*   , CMP   Recent Labs   Lab 11/10/18  0800 11/10/18  0801   * 145   K 3.9 3.7    108   CO2 23 24   * 211*   BUN 54* 52*   CREATININE 1.8* 1.8*   CALCIUM 8.9 8.9   PROT 5.9* 6.1   ALBUMIN 3.4* 3.5   BILITOT 2.5* 2.6*   ALKPHOS 107 112   AST 39 39   ALT 20 22   ANIONGAP 14 13   ESTGFRAFRICA 33.1* 33.1*   EGFRNONAA 28.7* 28.7*    and  CBC   Recent Labs   Lab 11/10/18  0800   WBC 5.17  5.14   HGB 7.7*  7.7*   HCT 24.4*  24.2*   PLT 79*  77*       Significant Imaging: EKGs, Telemetry

## 2018-11-11 NOTE — CARE UPDATE
..  RN Proactive Rounding Note  Time of Visit: 1415    Admit Date: 11/10/2018  LOS: 1  Code Status: Full Code   Date of Visit: 2018  : 1950  Age: 67 y.o.  Sex: female  Race: White  Bed: 64502/87887 A:   MRN: [id number]  Was the patient discharged from an ICU this admission? no   Was the patient discharged from a PACU within last 24 hours?  no  Did the patient receive conscious sedation/general anesthesia in last 24 hours?  no  Was the patient in the ED within the past 24 hours?  no  Was the patient started on NIPPV within the past 24 hours?  no  Attending Physician: Jabier Fritz MD  Primary Service: [facility] LIVER TRANSPLANT      ASSESSMENT:     Abnormal Vital Signs: Nurse concerned about pt  Clinical Issues: Liver Failure awaiting Liver Tx/ Afib     INTERVENTIONS/ RECOMMENDATIONS:     Proactive rounding done on the pt per charge RN request. Pt w/ hx of cryptogenic cirrhosis, listed for liver tx 18 (MELD 23) who presented to ER at outside hospital in [place], [place] with AMS and ENZO.  The pt was transferred to [facility] yesterday in Afib and on a cardizem drip. Currently pt in NSR in the 70's and the cardizem drip is at 5mg/hr. The pt is more alert today and able to appropriately answer questions. Her ammonia was 42 on admit. She is receiving 1 unit of PRBC for an H&H of 22.6/7.1. VS are stable at this time. Will continue to monitor pt and follow up.     Discussed plan of care with Polina MEJÍA    PHYSICIAN ESCALATION:     Yes/No  no    Case discussed with charge ALFONZO Thomas and Bs ALFONZO Fish    Disposition: Remain in room 18734.    FOLLOW-UP/CONTINGENCY:     Call back the Rapid Response Nurse at [phone] for additional questions or concerns.

## 2018-11-11 NOTE — PROGRESS NOTES
[facility]  Liver Transplant  Progress Note    Patient Name: Krystal Hobson  MRN: [id number]  Admission Date: 11/10/2018  Hospital Length of Stay: 1 days  Code Status: Full Code  Primary Care Provider: Courtney Joe MD    Subjective:     History of Present Illness:  Ms. Krystal Hobson is a 67 yr F w/ hx of cryptogenic cirrhosis, listed for liver tx 9/14/18 (MELD 23) who presented to ER at outside hospital in [place], [place] with AMS and ENZO.  Per the , she was extremely disoriented a few days ago and her ammonia got as high as 200.  Her Cr increased to 2.0 at the OSH. She also went into A fib with RVR and was started on a diltiazem infusion at OSH.  She remains confused on admission here with complaint of a cough which she feels is worsening.  She was transferred to [facility] for escalation in care.            Hospital Course:  Interval History: Patient developed atrial fibrillation with RV, HR in 130s today. Diltiazem drip was stopped yesterday as she was in NSR yesterday. Cardiology consult appreciated - await final recs, high SHY-VASc but due to coagulopathy - would not give anticoagulation, can start on aspirin. Will need repeat 2-d Echocardiogram. MELD 24, will recertify today, but on hold and we need to discuss her with Transplant team and Anesthesiology from cardiac risk perspective. She appears frail as well, continue nutrition and aggressive PT.    Scheduled Meds:   albuterol-ipratropium  3 mL Nebulization Q6H    heparin (porcine)  5,000 Units Subcutaneous Q8H    lactulose  30 g Oral TID    metoprolol  5 mg Intravenous Once    rifAXImin  550 mg Oral BID     Continuous Infusions:  PRN Meds:sodium chloride, acetaminophen, benzonatate, dextrose 50%, dextrose 50%, glucagon (human recombinant), glucose, glucose, insulin aspart U-100, lactulose, ondansetron, sodium chloride 0.9%    Review of Systems   Constitutional: Positive for activity change, appetite change and fatigue. Negative for chills  and fever.   HENT: Negative for congestion, ear discharge, ear pain and sinus pressure.    Respiratory: Negative for apnea, cough, chest tightness and shortness of breath.    Cardiovascular: Positive for leg swelling. Negative for chest pain and palpitations.   Gastrointestinal: Negative for abdominal distention, abdominal pain, anal bleeding, blood in stool, diarrhea, nausea and vomiting.   Endocrine: Negative for cold intolerance and heat intolerance.   Genitourinary: Positive for decreased urine volume. Negative for dysuria, menstrual problem and urgency.   Musculoskeletal: Negative for arthralgias and back pain.   Skin: Negative for pallor.   Neurological: Positive for weakness. Negative for dizziness and headaches.   Hematological: Bruises/bleeds easily.   Psychiatric/Behavioral: Positive for confusion and decreased concentration. Negative for agitation, behavioral problems and sleep disturbance.     Objective:     Vital Signs (Most Recent):  Temp: 99.4 °F (37.4 °C) (11/11/18 0735)  Pulse: (!) 134 (11/11/18 0845)  Resp: (!) 23 (11/11/18 0845)  BP: (!) 142/67 (11/11/18 0855)  SpO2: (!) 92 % (11/11/18 0845) Vital Signs (24h Range):  Temp:  [97.9 °F (36.6 °C)-99.8 °F (37.7 °C)] 99.4 °F (37.4 °C)  Pulse:  [] 134  Resp:  [14-23] 23  SpO2:  [90 %-97 %] 92 %  BP: (117-158)/(64-70) 142/67     Weight: 51.7 kg (113 lb 15.7 oz)  Body mass index is 23.82 kg/m².    Intake/Output - Last 3 Shifts       11/09 0700 - 11/10 0659 11/10 0700 - 11/11 0659 11/11 0700 - 11/12 0659    P.O. 0 720     I.V. (mL/kg) 0 (0) 100 (1.9)     Blood 100      Total Intake(mL/kg) 100 (2) 820 (15.9)     Urine (mL/kg/hr) 75 675 (0.5)     Stool  0     Total Output 75 675     Net +25 +145            Urine Occurrence  1 x     Stool Occurrence  9 x           Physical Exam   Constitutional: She is oriented to person, place, and time. She appears well-developed.   Chronically ill-appearing. Malnourished. Temporal wasting.     HENT:   Head:  CARDIOLOGY ATTENDING    Patient seen and examined. Agree with above. 63 year old Male w/ hx of CAD s/p CABG in 2015, s/p  PCI 2016, who just had a prox LAD LEE ANN 4 days ago now readmitted with recurrent chest pain. EKG without any changes, and cardiac markers negative. VQ scan negative    -continue aspirin and plavix  -GI consult pending Normocephalic and atraumatic.   Mouth/Throat: Oropharynx is clear and moist.   Eyes: Conjunctivae are normal. No scleral icterus.   Neck: Normal range of motion.   Cardiovascular: Normal rate, regular rhythm and normal heart sounds.   Pulmonary/Chest: Effort normal and breath sounds normal. No respiratory distress. She has no rales.   Abdominal: Soft. Bowel sounds are normal. She exhibits distension. There is no tenderness. No hernia.   Small ascites   Musculoskeletal: She exhibits edema (trace).   Lymphadenopathy:     She has no cervical adenopathy.   Neurological: She is alert and oriented to person, place, and time.   Mild asterixis   Skin: Skin is warm and dry. No rash noted.   Psychiatric: She has a normal mood and affect. Her behavior is normal. Her mood appears not anxious.   Nursing note and vitals reviewed.      Laboratory:  Immunosuppressants     None        CBC:   Recent Labs   Lab 11/11/18  0622   WBC 4.98   RBC 2.26*   HGB 7.1*   HCT 22.6*   PLT 66*   *   MCH 31.4*   MCHC 31.4*     CMP:   Recent Labs   Lab 11/11/18  0622   GLU 72   CALCIUM 8.9   ALBUMIN 3.7   PROT 5.8*      K 3.5   CO2 25      BUN 50*   CREATININE 1.8*   ALKPHOS 97   ALT 19   AST 36   BILITOT 2.8*     Coagulation:   Recent Labs   Lab 11/10/18  0800  11/11/18  0622   INR 2.0*   < > 2.0*   APTT 30.7  --   --     < > = values in this interval not displayed.     Labs within the past 24 hours have been reviewed.    Diagnostic Results:  I have personally reviewed all pertinent imaging studies.    Assessment/Plan:     * Hepatic encephalopathy    Acute on chronic  Lactulose enema this AM  PO lactulose ordered, continue rifaximin.   PRN lactulose enema TID for worsening confusion  Titrate for 3-4 BM a day.        Severe malnutrition    Severe Hand and temporal muscle wasting  Decreased energy  Frail appearing  Boost supplements ordered  Dietician consulted.     New onset a-fib    Patient with episode of Afib with RVR at OSH  Patient seen and examined.  Agree with above.   -Admitted with non-anginal cp  -gi eval appreciated  -check tte given recent intervention    Gary Grace MD Patient seen and examined.  Agree with above.   -check tte  -gi follow up    Gary Grace MD Patient seen and examined.  Agree with above.   -admitted with non-anginal cp  -gi follow up for GERD - pt. describes pain as burning worse with eating  -rib xrays given point tenderness on exam  -dc planning pending above    Gary Grace MD and was placed on diltiazem gtt which was discontinued 11/10 as she reverted to NSR.  Cardiology consulted for further help with management, awaiting final recommendations. Appreciate Cardiology assistance.   Will need Echocardiogram and discussion with Transplant Surgery and Anesthesiology for cardiac risk.         CKD (chronic kidney disease) stage 3, GFR 30-59 ml/min    See ENZO.       Chronic liver disease           Type 2 diabetes mellitus with hyperglycemia, with long-term current use of insulin    Sliding scale insulin for now. Continue to monitor.        Anemia of chronic disease    H/H stable. Continue to monitor with daily cbc.        End stage liver disease    Listed for liver transplant with MELD 23. Per hepatologist, will place on internal hold. Continue to monitor.        Acute kidney injury superimposed on CKD    Appears to have CKD3/4 at baseline creatinine  Urine electrolytes ordered  Etiology possibly due to hepatorenal syndrome, awaiting final urine electrolytes    HRS protocol started, albumin and octreotide. No midodrine as with Afib.        Thrombocytopenia due to hypersplenism    No s/s of bleeding.  Continue to monitor.            VTE Risk Mitigation (From admission, onward)        Ordered     IP VTE HIGH RISK PATIENT  Once      11/10/18 0213     heparin (porcine) injection 5,000 Units  Every 8 hours      11/10/18 0213          The patients clinical status was discussed at multidisplinary rounds, involving transplant surgery, transplant medicine, pharmacy, nursing, nutrition, and social work    Discharge Planning:  No Patient Care Coordination Note on file.      Yovana Santos MD  Liver Transplant  [facility]   Advanced care planning was discussed with patient and family.  Advanced care planning forms were reviewed and discussed.  Risks, benefits and alternatives of gastroenterologic procedures were discussed in detail and all questions were answered.    25 minutes spent.

## 2018-11-11 NOTE — ASSESSMENT & PLAN NOTE
Patient with episode of Afib with RVR at OSH and was placed on diltiazem gtt which was discontinued 11/10 as she reverted to NSR.  Cardiology consulted for further help with management, awaiting final recommendations. Appreciate Cardiology assistance.   Will need Echocardiogram and discussion with Transplant Surgery and Anesthesiology for cardiac risk.

## 2018-11-11 NOTE — SUBJECTIVE & OBJECTIVE
Scheduled Meds:   albuterol-ipratropium  3 mL Nebulization Q6H    heparin (porcine)  5,000 Units Subcutaneous Q8H    lactulose  30 g Oral TID    metoprolol  5 mg Intravenous Once    rifAXImin  550 mg Oral BID     Continuous Infusions:  PRN Meds:sodium chloride, acetaminophen, benzonatate, dextrose 50%, dextrose 50%, glucagon (human recombinant), glucose, glucose, insulin aspart U-100, lactulose, ondansetron, sodium chloride 0.9%    Review of Systems   Constitutional: Positive for activity change, appetite change and fatigue. Negative for chills and fever.   HENT: Negative for congestion, ear discharge, ear pain and sinus pressure.    Respiratory: Negative for apnea, cough, chest tightness and shortness of breath.    Cardiovascular: Positive for leg swelling. Negative for chest pain and palpitations.   Gastrointestinal: Negative for abdominal distention, abdominal pain, anal bleeding, blood in stool, diarrhea, nausea and vomiting.   Endocrine: Negative for cold intolerance and heat intolerance.   Genitourinary: Positive for decreased urine volume. Negative for dysuria, menstrual problem and urgency.   Musculoskeletal: Negative for arthralgias and back pain.   Skin: Negative for pallor.   Neurological: Positive for weakness. Negative for dizziness and headaches.   Hematological: Bruises/bleeds easily.   Psychiatric/Behavioral: Positive for confusion and decreased concentration. Negative for agitation, behavioral problems and sleep disturbance.     Objective:     Vital Signs (Most Recent):  Temp: 99.4 °F (37.4 °C) (11/11/18 0735)  Pulse: (!) 134 (11/11/18 0845)  Resp: (!) 23 (11/11/18 0845)  BP: (!) 142/67 (11/11/18 0855)  SpO2: (!) 92 % (11/11/18 0845) Vital Signs (24h Range):  Temp:  [97.9 °F (36.6 °C)-99.8 °F (37.7 °C)] 99.4 °F (37.4 °C)  Pulse:  [] 134  Resp:  [14-23] 23  SpO2:  [90 %-97 %] 92 %  BP: (117-158)/(64-70) 142/67     Weight: 51.7 kg (113 lb 15.7 oz)  Body mass index is 23.82  kg/m².    Intake/Output - Last 3 Shifts       11/09 0700 - 11/10 0659 11/10 0700 - 11/11 0659 11/11 0700 - 11/12 0659    P.O. 0 720     I.V. (mL/kg) 0 (0) 100 (1.9)     Blood 100      Total Intake(mL/kg) 100 (2) 820 (15.9)     Urine (mL/kg/hr) 75 675 (0.5)     Stool  0     Total Output 75 675     Net +25 +145            Urine Occurrence  1 x     Stool Occurrence  9 x           Physical Exam   Constitutional: She is oriented to person, place, and time. She appears well-developed.   Chronically ill-appearing. Malnourished. Temporal wasting.     HENT:   Head: Normocephalic and atraumatic.   Mouth/Throat: Oropharynx is clear and moist.   Eyes: Conjunctivae are normal. No scleral icterus.   Neck: Normal range of motion.   Cardiovascular: Normal rate, regular rhythm and normal heart sounds.   Pulmonary/Chest: Effort normal and breath sounds normal. No respiratory distress. She has no rales.   Abdominal: Soft. Bowel sounds are normal. She exhibits distension. There is no tenderness. No hernia.   Small ascites   Musculoskeletal: She exhibits edema (trace).   Lymphadenopathy:     She has no cervical adenopathy.   Neurological: She is alert and oriented to person, place, and time.   Mild asterixis   Skin: Skin is warm and dry. No rash noted.   Psychiatric: She has a normal mood and affect. Her behavior is normal. Her mood appears not anxious.   Nursing note and vitals reviewed.      Laboratory:  Immunosuppressants     None        CBC:   Recent Labs   Lab 11/11/18 0622   WBC 4.98   RBC 2.26*   HGB 7.1*   HCT 22.6*   PLT 66*   *   MCH 31.4*   MCHC 31.4*     CMP:   Recent Labs   Lab 11/11/18 0622   GLU 72   CALCIUM 8.9   ALBUMIN 3.7   PROT 5.8*      K 3.5   CO2 25      BUN 50*   CREATININE 1.8*   ALKPHOS 97   ALT 19   AST 36   BILITOT 2.8*     Coagulation:   Recent Labs   Lab 11/10/18  0800  11/11/18 0622   INR 2.0*   < > 2.0*   APTT 30.7  --   --     < > = values in this interval not displayed.     Labs  within the past 24 hours have been reviewed.    Diagnostic Results:  I have personally reviewed all pertinent imaging studies.

## 2018-11-11 NOTE — ASSESSMENT & PLAN NOTE
Sinus tachycardia w very frequent PACs w/o evidence of Afib  Recent encephalopathy, anemia, coughing, hypovolemia, renal dysfunction  -Discontinue diltiazem drip (communicated to team earlier during rounds)  -Treat underlying pathophysiologic stressors of sinus tachycardia    We will sign off.    Case discussed w Dr Abbott

## 2018-11-11 NOTE — ASSESSMENT & PLAN NOTE
Appears to have CKD3/4 at baseline creatinine  Urine electrolytes ordered  Etiology possibly due to hepatorenal syndrome, awaiting final urine electrolytes    HRS protocol started, albumin and octreotide. No midodrine as with Afib.

## 2018-11-11 NOTE — TELEPHONE ENCOUNTER
MELD-Na score: 24 at 11/11/2018  6:22 AM  MELD score: 24 at 11/11/2018  6:22 AM  Calculated from:  Serum Creatinine: 1.8 mg/dL at 11/11/2018  6:22 AM  Serum Sodium: 141 mmol/L (Rounded to 137 mmol/L) at 11/11/2018  6:22 AM  Total Bilirubin: 2.8 mg/dL at 11/11/2018  6:22 AM  INR(ratio): 2 at 11/11/2018  6:22 AM  Age: 67 years    Lab Results   Component Value Date    ALBUMIN 3.7 11/11/2018       Encephalopathy: yes - grade grad 3-4  Ascites: yes - slight   Dialysis: no      Functional Status (Karnofsky Score): 30% - Severely disabled: hospitalization is indicated, death not imminent  Physical Capacity: No Limitations      Recertification form sent on 11/11/2018 to  on-call for [facility] recertification.    Recertification requestor: Yovana Santos MD

## 2018-11-11 NOTE — PLAN OF CARE
Problem: Patient Care Overview  Goal: Plan of Care Review  Outcome: Ongoing (interventions implemented as appropriate)  Pt is oriented to person and place but intermittently disoriented to time and situation. Patient reoriented as needed. Patient is in bed wearing non-skid footwear, bed in low/locked position and with call bell within reach. Pt reminded to use call bell to call for assistance, pt verbalizes understanding.  at bedside. Bed alarm in use. Pt is afebrile at this time. Proper hand hygiene performed before and after pt care activities. Unable to utilize purewick for accurate urine output due to large volumes of liquid stool being sucked up by purewick suction. Patient has had 4 large liquid BMs so far this shift. Skin is CDI with no signs of breakdown noted. Able to turn and move in bed independently. Bedtime BG of 354, scheduled Levemir and sliding scale Novolog administered. Denies any pain or discomfort at this time.

## 2018-11-12 PROBLEM — R06.02 SHORTNESS OF BREATH: Status: ACTIVE | Noted: 2018-01-01

## 2018-11-12 NOTE — SUBJECTIVE & OBJECTIVE
Interval History: Doing well rate controlled after diltiazem ggt and Metoprolol PO    Review of Systems   Constitution: Negative for chills, decreased appetite and diaphoresis.   HENT: Positive for congestion. Negative for ear discharge.    Eyes: Negative for blurred vision and discharge.   Cardiovascular: Negative for chest pain, dyspnea on exertion, irregular heartbeat, leg swelling and paroxysmal nocturnal dyspnea.   Respiratory: Positive for cough and shortness of breath. Negative for hemoptysis.    Gastrointestinal: Negative for abdominal pain.     Objective:     Vital Signs (Most Recent):  Temp: 98 °F (36.7 °C) (11/11/18 2310)  Pulse: (!) 57 (11/12/18 0055)  Resp: 19 (11/12/18 0055)  BP: (!) 151/67 (11/11/18 2310)  SpO2: 96 % (11/12/18 0055) Vital Signs (24h Range):  Temp:  [98 °F (36.7 °C)-100 °F (37.8 °C)] 98 °F (36.7 °C)  Pulse:  [] 57  Resp:  [14-23] 19  SpO2:  [88 %-96 %] 96 %  BP: (114-158)/(60-78) 151/67     Weight: 51.7 kg (113 lb 15.7 oz)  Body mass index is 23.82 kg/m².     SpO2: 96 %  O2 Device (Oxygen Therapy): nasal cannula w/ humidification      Intake/Output Summary (Last 24 hours) at 11/12/2018 0158  Last data filed at 11/11/2018 1718  Gross per 24 hour   Intake 521.67 ml   Output 500 ml   Net 21.67 ml       Lines/Drains/Airways     Drain                 Urethral Catheter 11/11/18 1842 less than 1 day          Peripheral Intravenous Line                 Midline Catheter Insertion/Assessment  - Single Lumen Right median cubital vein (antecubital fossa) -- days         Peripheral IV - Single Lumen 11/11/18 1617 Anterior;Left Forearm less than 1 day                Physical Exam   Constitutional: She is oriented to person, place, and time. She appears well-developed and well-nourished. No distress.   Eyes: Conjunctivae are normal. Pupils are equal, round, and reactive to light.   Neck: No tracheal deviation present. No thyromegaly present.   Cardiovascular: Normal rate, normal heart sounds  and intact distal pulses. An irregularly irregular rhythm present. Exam reveals no gallop and no friction rub.   No murmur heard.  Pulmonary/Chest: Effort normal. No respiratory distress. She has wheezes. She has no rales.   Abdominal: Soft. Bowel sounds are normal. She exhibits no distension. There is no tenderness.   Musculoskeletal: She exhibits no edema or deformity.   Neurological: She is alert and oriented to person, place, and time. No cranial nerve deficit. Coordination normal.   Skin: Skin is warm and dry. She is not diaphoretic.   Psychiatric: She has a normal mood and affect. Her behavior is normal.       Significant Labs:   BMP:   Recent Labs   Lab 11/10/18  0800 11/10/18  0801 11/11/18 0622   * 211* 72   * 145 141   K 3.9 3.7 3.5    108 104   CO2 23 24 25   BUN 54* 52* 50*   CREATININE 1.8* 1.8* 1.8*   CALCIUM 8.9 8.9 8.9   MG 2.6 2.7* 2.5   , CMP   Recent Labs   Lab 11/10/18  0800 11/10/18  0801 11/11/18  0622   * 145 141   K 3.9 3.7 3.5    108 104   CO2 23 24 25   * 211* 72   BUN 54* 52* 50*   CREATININE 1.8* 1.8* 1.8*   CALCIUM 8.9 8.9 8.9   PROT 5.9* 6.1 5.8*   ALBUMIN 3.4* 3.5 3.7   BILITOT 2.5* 2.6* 2.8*   ALKPHOS 107 112 97   AST 39 39 36   ALT 20 22 19   ANIONGAP 14 13 12   ESTGFRAFRICA 33.1* 33.1* 33.1*   EGFRNONAA 28.7* 28.7* 28.7*   , CBC   Recent Labs   Lab 11/10/18  0800 11/11/18  0622   WBC 5.17  5.14 4.98   HGB 7.7*  7.7* 7.1*   HCT 24.4*  24.2* 22.6*   PLT 79*  77* 66*    and Lipid Panel No results for input(s): CHOL, HDL, LDLCALC, TRIG, CHOLHDL in the last 48 hours.    Significant Imaging: Echocardiogram: 2D echo with color flow doppler: No results found for this or any previous visit. and EKG:

## 2018-11-12 NOTE — TELEPHONE ENCOUNTER
PATIENT NAME: Krystal Hobson  [facility] #: [id number]    Lab Results   Component Value Date    CREATININE 1.6 (H) 11/12/2018     (L) 11/12/2018    BILITOT 5.5 (H) 11/12/2018    ALBUMIN 3.6 11/12/2018    INR 1.9 (H) 11/12/2018     MELD 26  cephalopathy: 3 - 4  Ascites: slight  Dialysis: no     Recertification requestor: Julia Herrera

## 2018-11-12 NOTE — CARE UPDATE
RN Proactive Rounding Note  Time of Visit: 823    Admit Date: 11/10/2018  LOS: 2  Code Status: Full Code   Date of Visit: 2018  : 1950  Age: 67 y.o.  Sex: female  Race: White  Bed: 96676/76672 A:   MRN: [id number]  Was the patient discharged from an ICU this admission? no   Was the patient discharged from a PACU within last 24 hours?  no  Did the patient receive conscious sedation/general anesthesia in last 24 hours?  no  Was the patient in the ED within the past 24 hours?  no  Was the patient started on NIPPV within the past 24 hours?  no  Attending Physician: Jabier Fritz MD  Primary Service: [facility]      ASSESSMENT:     Abnormal Vital Signs:  Clinical Issues: Respiratory     INTERVENTIONS/ RECOMMENDATIONS:     Upon arrival to bedside, pt noted to be awake, alert, and confused. Pt confused at baseline per charge RN. 4L NC noted sats 93-94%. Pt breathing noted to be slightly labored, audible wheezes heard bilaterally upon auscultation. 40 IV lasix given earlier resulted in 500 mL urine output. BNP >2000. Plan for echo today. No complaints of SOB per pt. VSS. Will continue to follow closely.    Discussed plan of care with RNRadha [id number].    PHYSICIAN ESCALATION:     Yes/No  no    Orders received and case discussed with Dr. GAMBLE/TRUE .    Disposition: Remain in room 96408.    FOLLOW-UP/CONTINGENCY:     Call back the Rapid Response Nurse at [phone] for additional questions or concerns.

## 2018-11-12 NOTE — SUBJECTIVE & OBJECTIVE
Scheduled Meds:   ceFEPime (MAXIPIME) IVPB  2 g Intravenous Q24H    heparin (porcine)  5,000 Units Subcutaneous Q8H    insulin aspart U-100  4 Units Subcutaneous TIDWM    [START ON 11/13/2018] insulin detemir U-100  6 Units Subcutaneous Daily    lactulose  30 g Oral TID    metoprolol tartrate  25 mg Oral Q6H    rifAXImin  550 mg Oral BID     Continuous Infusions:  PRN Meds:sodium chloride, acetaminophen, benzonatate, dextrose 50%, dextrose 50%, glucagon (human recombinant), glucose, glucose, guaifenesin 100 mg/5 ml, insulin aspart U-100, lactulose, levalbuterol, ondansetron, sodium chloride 0.9%    Review of Systems   Constitutional: Positive for activity change, appetite change and fatigue. Negative for chills and fever.   HENT: Negative for congestion, ear discharge, ear pain and sinus pressure.    Respiratory: Negative for apnea, cough, chest tightness and shortness of breath.    Cardiovascular: Positive for leg swelling. Negative for chest pain and palpitations.   Gastrointestinal: Negative for abdominal distention, abdominal pain, anal bleeding, blood in stool, diarrhea, nausea and vomiting.   Endocrine: Negative for cold intolerance and heat intolerance.   Genitourinary: Positive for decreased urine volume. Negative for dysuria, menstrual problem and urgency.   Musculoskeletal: Negative for arthralgias and back pain.   Skin: Negative for pallor.   Neurological: Positive for weakness. Negative for dizziness and headaches.   Hematological: Bruises/bleeds easily.   Psychiatric/Behavioral: Positive for confusion and decreased concentration. Negative for agitation, behavioral problems and sleep disturbance.     Objective:     Vital Signs (Most Recent):  Temp: 98.3 °F (36.8 °C) (11/12/18 0759)  Pulse: (!) 57 (11/12/18 0759)  Resp: 18 (11/12/18 0759)  BP: (!) 155/68 (11/12/18 0759)  SpO2: 96 % (11/12/18 0759) Vital Signs (24h Range):  Temp:  [98 °F (36.7 °C)-100 °F (37.8 °C)] 98.3 °F (36.8 °C)  Pulse:   [] 57  Resp:  [18-23] 18  SpO2:  [88 %-96 %] 96 %  BP: (114-155)/(60-78) 155/68     Weight: 54.4 kg (119 lb 14.9 oz)  Body mass index is 25.07 kg/m².    Intake/Output - Last 3 Shifts       11/10 0700 - 11/11 0659 11/11 0700 - 11/12 0659 11/12 0700 - 11/13 0659    P.O. 720 420     I.V. (mL/kg) 100 (1.9) 109.3 (2)     Blood  101.7     Total Intake(mL/kg) 820 (15.9) 630.9 (11.6)     Urine (mL/kg/hr) 675 (0.5) 775 (0.6)     Stool 0 0     Total Output 675 775     Net +145 -144.1            Urine Occurrence 1 x      Stool Occurrence 9 x 10 x           Physical Exam   Constitutional: She is oriented to person, place, and time. She appears well-developed.   Chronically ill-appearing. Malnourished. Temporal wasting.     HENT:   Head: Normocephalic and atraumatic.   Mouth/Throat: Oropharynx is clear and moist.   Eyes: Conjunctivae are normal. No scleral icterus.   Neck: Normal range of motion.   Cardiovascular: Normal rate, regular rhythm and normal heart sounds.   Pulmonary/Chest: Effort normal and breath sounds normal. No respiratory distress. She has no rales.   Abdominal: Soft. Bowel sounds are normal. She exhibits distension. There is no tenderness. No hernia.   Small ascites   Musculoskeletal: She exhibits edema (trace).   Lymphadenopathy:     She has no cervical adenopathy.   Neurological: She is alert and oriented to person, place, and time.   Mild asterixis   Skin: Skin is warm and dry. No rash noted.   Psychiatric: She has a normal mood and affect. Her behavior is normal. Her mood appears not anxious.   Nursing note and vitals reviewed.      Laboratory:  Immunosuppressants     None        CBC:   Recent Labs   Lab 11/12/18  0619   WBC 8.57   RBC 3.30*   HGB 10.0*   HCT 32.6*   PLT 90*   MCV 99*   MCH 30.3   MCHC 30.7*     CMP:   Recent Labs   Lab 11/12/18  0619   *   CALCIUM 8.4*   ALBUMIN 3.6   PROT 6.1   *   K 4.0   CO2 20*      BUN 50*   CREATININE 1.6*   ALKPHOS 121   ALT 24   AST 42*    BILITOT 5.5*     Coagulation:   Recent Labs   Lab 11/10/18  0800  11/12/18  0619   INR 2.0*   < > 1.9*   APTT 30.7  --   --     < > = values in this interval not displayed.     Labs within the past 24 hours have been reviewed.    Diagnostic Results:  I have personally reviewed all pertinent imaging studies.

## 2018-11-12 NOTE — ASSESSMENT & PLAN NOTE
- Converted back to NSR yesterday afternoon  - Continue Lopressor  - Repeat TTE, noted normal EF few months ago  - CHADS VASC 3 (or 4 if HTN added) hence patient is a candidate for AC however with thrombocytopenia and liver failure would need to have thorough discussion of risks and benefits   - Cardiology to sign off  - Please have patient follow-up with us in clinic to determine need for long-term AF management

## 2018-11-12 NOTE — SUBJECTIVE & OBJECTIVE
"Interval HPI:   Overnight events: Remains in TSU. BG labile but markedly elevated yesterday and throughout the night.  Creatinine trending down, 1.4 this am.  On IV antibiotics.  Eatin%  Nausea: No  Hypoglycemia and intervention: No  Fever: No  TPN and/or TF: No    BP (!) 144/68   Pulse 67   Temp 98.8 °F (37.1 °C) (Oral)   Resp 20   Ht 4' 10" (1.473 m)   Wt 54.4 kg (120 lb)   SpO2 (!) 94%   Breastfeeding? No   BMI 25.08 kg/m²     Labs Reviewed and Include    Recent Labs   Lab 18  0619   *   CALCIUM 8.4*   ALBUMIN 3.6   PROT 6.1   *   K 4.0   CO2 20*      BUN 50*   CREATININE 1.6*   ALKPHOS 121   ALT 24   AST 42*   BILITOT 5.5*     Lab Results   Component Value Date    WBC 8.57 2018    HGB 10.0 (L) 2018    HCT 32.6 (L) 2018    MCV 99 (H) 2018    PLT 90 (L) 2018     Recent Labs   Lab 18  0901   TSH 0.212*   FREET4 1.09     Lab Results   Component Value Date    HGBA1C 5.8 (H) 11/10/2018       Nutritional status:   Body mass index is 25.08 kg/m².  Lab Results   Component Value Date    ALBUMIN 3.6 2018    ALBUMIN 3.7 2018    ALBUMIN 3.5 11/10/2018     No results found for: PREALBUMIN    Estimated Creatinine Clearance: 29.3 mL/min (A) (based on SCr of 1.6 mg/dL (H)).    Accu-Checks  Recent Labs     11/10/18  1138 11/10/18  1647 11/10/18  1651 11/10/18  2103 18  0738 18  1120 18  1613 18  2048 18  0755 18  1141   POCTGLUCOSE 236* 401* 435* 354* 74 179* 225* 341* 263* 408*       Current Medications and/or Treatments Impacting Glycemic Control  Immunotherapy:    Immunosuppressants     None        Steroids:   Hormones (From admission, onward)    None        Pressors:    Autonomic Drugs (From admission, onward)    None        Hyperglycemia/Diabetes Medications:   Antihyperglycemics (From admission, onward)    Start     Stop Route Frequency Ordered    18 2100  insulin detemir U-100 pen 6 Units      " -- SubQ Nightly 11/12/18 1452    11/12/18 1645  insulin aspart U-100 pen 6 Units      -- SubQ 3 times daily with meals 11/12/18 1642    11/10/18 0948  insulin aspart U-100 pen 0-5 Units      -- SubQ Before meals & nightly PRN 11/10/18 0848

## 2018-11-12 NOTE — ASSESSMENT & PLAN NOTE
Appears to have CKD3/4 at baseline creatinine  Urine electrolytes ordered  Etiology possibly due to hepatorenal syndrome, awaiting final urine electrolytes    HRS protocol started, albumin and octreotide. No midodrine as with Afib.   Cr level slowly improving at this time.

## 2018-11-12 NOTE — CARE UPDATE
Rapid Response Follow-up Note    Followed up with patient for proactive rounding.   Attempted to call Valeriy NP to discuss continued wheezes and work of breathing observed upon assessment, waiting for callback. Reviewed plan of care with primary RN, Radha [phone]. Please call Rapid Response RN with any questions or concerns at  [phone].

## 2018-11-12 NOTE — PROGRESS NOTES
"[facility]  Endocrinology  Progress Note    Admit Date: 11/10/2018     Reason for Consult: Management of type 2 DM, Hyperglycemia     Surgical Procedure and Date: n/a    Diabetes diagnosis year:     Home Diabetes Medications: Levemir 12 units BID (vial) and Humalog SSI with meals   Lab Results   Component Value Date    HGBA1C 5.8 (H) 11/10/2018         How often checking glucose at home? 1-3  BG readings on regimen: 150-300  Hypoglycemia on the regimen? once 27; required visit to ED; gave Levemir and humalog and patient did not eat  Missed doses on regimen?  No    Diabetes Complications include:     Hyperglycemia, Hypoglycemia  and Diabetic peripheral neuropathy     Complicating diabetes co morbidities:   CIRRHOSIS      HPI:   Patient is a 67 y.o. female with a diagnosis of type 2 DM; well controlled on MDI. Also with   Cryptogenic cirrhosis, rheumatoid arthritis, and GERD. Patient was listed for liver transplant on 18. Patient presented to ED of hospital in [place] with AMS and ENZO. Endocrinology consulted for BG/ DM management.             Interval HPI:   Overnight events: remains in TSU. BG variable yesterday; trending up; no correction scale given until evening. Creatinine remains elevated in 1.6.    Eatin -50%; eating more than broth and juice today per patient  Nausea: No  Hypoglycemia and intervention: No  Fever: No  TPN and/or TF: No    BP (!) 155/68 (BP Location: Left arm, Patient Position: Lying)   Pulse 61   Temp 98.3 °F (36.8 °C) (Oral)   Resp 18   Ht 4' 10" (1.473 m)   Wt 54.4 kg (119 lb 14.9 oz)   SpO2 96%   Breastfeeding? No   BMI 25.07 kg/m²      Labs Reviewed and Include    Recent Labs   Lab 18  0619   *   CALCIUM 8.4*   ALBUMIN 3.6   PROT 6.1   *   K 4.0   CO2 20*      BUN 50*   CREATININE 1.6*   ALKPHOS 121   ALT 24   AST 42*   BILITOT 5.5*     Lab Results   Component Value Date    WBC 8.57 2018    HGB 10.0 (L) 2018 "    HCT 32.6 (L) 11/12/2018    MCV 99 (H) 11/12/2018    PLT 90 (L) 11/12/2018     Recent Labs   Lab 11/11/18  0901   TSH 0.212*   FREET4 1.09     Lab Results   Component Value Date    HGBA1C 5.8 (H) 11/10/2018       Nutritional status:   Body mass index is 25.07 kg/m².  Lab Results   Component Value Date    ALBUMIN 3.6 11/12/2018    ALBUMIN 3.7 11/11/2018    ALBUMIN 3.5 11/10/2018     No results found for: PREALBUMIN    Estimated Creatinine Clearance: 29.3 mL/min (A) (based on SCr of 1.6 mg/dL (H)).    Accu-Checks  Recent Labs     11/10/18  0747 11/10/18  1138 11/10/18  1647 11/10/18  1651 11/10/18  2103 11/11/18  0738 11/11/18  1120 11/11/18  1613 11/11/18  2048 11/12/18  0755   POCTGLUCOSE 219* 236* 401* 435* 354* 74 179* 225* 341* 263*       Current Medications and/or Treatments Impacting Glycemic Control  Immunotherapy:    Immunosuppressants     None        Steroids:   Hormones (From admission, onward)    None        Pressors:    Autonomic Drugs (From admission, onward)    None        Hyperglycemia/Diabetes Medications:   Antihyperglycemics (From admission, onward)    Start     Stop Route Frequency Ordered    11/12/18 1130  insulin aspart U-100 pen 4 Units      -- SubQ 3 times daily with meals 11/12/18 0829    11/12/18 0930  insulin detemir U-100 pen 8 Units      -- SubQ Daily 11/12/18 0827    11/10/18 0948  insulin aspart U-100 pen 0-5 Units      -- SubQ Before meals & nightly PRN 11/10/18 0848          ASSESSMENT and PLAN    * Hepatic encephalopathy    Managed per primary.        Type 2 diabetes mellitus with hyperglycemia, with long-term current use of insulin    BG goal 140-180    Levemir 6 units HS; will evaluate in AM for BID dosing.   BG monitoring ac/hs and low dose correction scale.   novolog 4 units with meals.        Decompensated hepatic cirrhosis    Managed per primary.   Listed for liver transplant.  May impact BG readings       Acute kidney injury superimposed on CKD    Avoid insulin stacking and  hypoglycemia.  Lab Results   Component Value Date    CREATININE 1.6 (H) 11/12/2018                Jadyn Hall NP  Endocrinology  [facility]

## 2018-11-12 NOTE — PT/OT/SLP EVAL
Physical Therapy Evaluation    Patient Name:  Krystal Hobson   MRN:  [id number]    Recommendations:     Discharge Recommendations:  home health PT   Discharge Equipment Recommendations: (TBD closer to d/c)   Barriers to discharge: Inaccessible home    Assessment:     Krystal Hobson is a 67 y.o. female admitted with a medical diagnosis of Hepatic encephalopathy.  She presents with the following impairments/functional limitations:  weakness, gait instability, impaired endurance, impaired balance, impaired cardiopulmonary response to activity, impaired self care skills, impaired functional mobilty. Pt completed functional mobility with Messi. Unable to ambulate this date due to generalized weakness and multiple incontinent bowel episodes. Tolerated sitting unsupported EOB and multiple sit<>stand trials for hygiene to be performed. Pt would benefit from skilled acute PT in order to address current deficits and progress functional mobility.      Rehab Prognosis:  good; patient would benefit from acute skilled PT services to address these deficits and reach maximum level of function.      Recent Surgery: * No surgery found *      Plan:     During this hospitalization, patient to be seen 4 x/week to address the above listed problems via gait training, therapeutic activities, therapeutic exercises, neuromuscular re-education  · Plan of Care Expires:  12/11/18   Plan of Care Reviewed with: patient, spouse    Subjective     Communicated with RN prior to session.  Patient found supine upon PT entry to room, agreeable to evaluation.      Chief Complaint: none noted   Patient comments/goals: return home   Pain/Comfort:  · Pain Rating 1: 0/10    Patients cultural, spiritual, Judaism conflicts given the current situation: none noted     Living Environment:  Pt lives with her  in a mobile home with 7 ANTHON[address], B handrails.   Prior to admission, patients level of function was indep.  Patient has the following equipment: none.  DME  owned (not currently used): none.  Upon discharge, patient will have assistance from .    Objective:     Patient found with: telemetry, pulse ox (continuous), cuadra catheter, oxygen     General Precautions: Standard, fall   Orthopedic Precautions:N/A   Braces: N/A     Exams:  · Cognitive Exam:  Patient is oriented to Person, Place, Time and Situation  · Sensation:    · -       Intact  · RUE shoulder flexion limited to 90 deg  · B  strength WFL   · RLE ROM: WFL  · RLE Strength: WFL  · LLE ROM: WFL  · LLE Strength: WFL    Functional Mobility:  · Bed Mobility:     · Scooting: moderate assistance and of 2 persons (drawsheet to HOB)  · Supine to Sit: minimum assistance  · Sit to Supine: contact guard assistance  · Transfers:     · Sit to Stand:  minimum assistance with hand-held assist and x5 reps  · Toilet Transfer: minimum assistance with BSC and hand-held assist  using  Stand Pivot  · Gait: limited to stand pivot transfers to/from BSC 2* incontinent bowel episodes    AM-PAC 6 CLICK MOBILITY  Total Score:16        Therapeutic Activities and Exercises:   Pt and  educated on role of PT and PT POC. Pt and  verbalized understanding.   Upon standing, pt found to be soiled. Returned to sit EOB to allow PT to prepare for andrews-care. Upon standing for andrews care, pt then with another incontinent bowel episode. RNs to bedside to assist with hygiene and changing bed linens. Pt transferred to BSC to void again. Bed linens, gowns, socks changed. RN and PCT notified.   Pt encouraged to use call bell when needing to void to avoid sitting in stool. Pt verbalized understanding. Oriented to call bell with pt able to correctly identify call button.       Patient left supine with all lines intact, call button in reach, RN notified and pt's  present.    GOALS:   Multidisciplinary Problems     Physical Therapy Goals        Problem: Physical Therapy Goal    Goal Priority Disciplines Outcome Goal Variances  Interventions   Physical Therapy Goal     PT, PT/OT Ongoing (interventions implemented as appropriate)     Description:  Goals to be met by: 18     Patient will increase functional independence with mobility by performin. Supine to sit with Stand-by Assistance  2. Sit to stand transfer with Stand-by Assistance  3. Gait  x 150 feet with Contact Guard Assistance using AD as needed.   4. Lower extremity exercise program x15 reps, with supervision, in order to increase LE strength and (I) with functional mobility.                       History:     Past Medical History:   Diagnosis Date    Cancer     breat Cancer    Cirrhosis     Diabetes mellitus     GERD (gastroesophageal reflux disease)     Rheumatoid arthritis        Past Surgical History:   Procedure Laterality Date    CARPAL TUNNEL RELEASE      left wrist    CHOLECYSTECTOMY      MASTECTOMY      left breast 25  years ago       Clinical Decision Making:     History  Co-morbidities and personal factors that may impact the plan of care Examination  Body Structures and Functions, activity limitations and participation restrictions that may impact the plan of care Clinical Presentation   Decision Making/ Complexity Score   Co-morbidities:   [x] Time since onset of injury / illness / exacerbation  [x] Status of current condition  []Patient's cognitive status and safety concerns    [] Multiple Medical Problems (see med hx)  Personal Factors:   [x] Patient's age  [] Prior Level of function   [] Patient's home situation (environment and family support)  [] Patient's level of motivation  [] Expected progression of patient      HISTORY:(criteria)    [] 73528 - no personal factors/history    [] 82609 - has 1-2 personal factor/comorbidity     [x] 22648 - has >3 personal factor/comorbidity     Body Regions:  [x] Objective examination findings  [] Head     []  Neck  [] Trunk   [] Upper Extremity  [] Lower Extremity    Body Systems:  [] For communication  ability, affect, cognition, language, and learning style: the assessment of the ability to make needs known, consciousness, orientation (person, place, and time), expected emotional /behavioral responses, and learning preferences (eg, learning barriers, education  needs)  [x] For the neuromuscular system: a general assessment of gross coordinated movement (eg, balance, gait, locomotion, transfers, and transitions) and motor function  (motor control and motor learning)  [x] For the musculoskeletal system: the assessment of gross symmetry, gross range of motion, gross strength, height, and weight  [] For the integumentary system: the assessment of pliability(texture), presence of scar formation, skin color, and skin integrity  [x] For cardiovascular/pulmonary system: the assessment of heart rate, respiratory rate, blood pressure, and edema     Activity limitations:    [] Patient's cognitive status and saf ety concerns          [x] Status of current condition      [] Weight bearing restriction  [x] Cardiopulmunary Restriction    Participation Restrictions:   [] Goals and goal agreement with the patient     [] Rehab potential (prognosis) and probable outcome      Examination of Body System: (criteria)    [] 09194 - addressing 1-2 elements    [] 36813 - addressing a total of 3 or more elements     [x] 51729 -  Addressing a total of 4 or more elements         Clinical Presentation: (criteria)  Evolving - 08684     On examination of body system using standardized tests and measures patient presents with 4 or more elements from any of the following: body structures and functions, activity limitations, and/or participation restrictions.  Leading to a clinical presentation that is considered evolving with changing characteristics                              Clinical Decision Making  (Eval Complexity):  Moderate - 86420     Time Tracking:     PT Received On: 11/12/18  PT Start Time: 1432     PT Stop Time: 1510  PT Total Time  (min): 38 min     Billable Minutes: Evaluation 20 and Therapeutic Activity 18    Citlali Mallory, PT, DPT   11/12/2018  [phone]

## 2018-11-12 NOTE — ASSESSMENT & PLAN NOTE
BG goal 140-180    Levemir 6 units HS; will evaluate in AM for BID dosing.   BG monitoring ac/hs and low dose correction scale.   novolog 4 units with meals.

## 2018-11-12 NOTE — SUBJECTIVE & OBJECTIVE
"Interval HPI:   Overnight events: remains in TSU. BG variable yesterday; trending up; no correction scale given until evening. Creatinine remains elevated in 1.6.    Eatin -50%; eating more than broth and juice today per patient  Nausea: No  Hypoglycemia and intervention: No  Fever: No  TPN and/or TF: No    BP (!) 155/68 (BP Location: Left arm, Patient Position: Lying)   Pulse 61   Temp 98.3 °F (36.8 °C) (Oral)   Resp 18   Ht 4' 10" (1.473 m)   Wt 54.4 kg (119 lb 14.9 oz)   SpO2 96%   Breastfeeding? No   BMI 25.07 kg/m²     Labs Reviewed and Include    Recent Labs   Lab 18  0619   *   CALCIUM 8.4*   ALBUMIN 3.6   PROT 6.1   *   K 4.0   CO2 20*      BUN 50*   CREATININE 1.6*   ALKPHOS 121   ALT 24   AST 42*   BILITOT 5.5*     Lab Results   Component Value Date    WBC 8.57 2018    HGB 10.0 (L) 2018    HCT 32.6 (L) 2018    MCV 99 (H) 2018    PLT 90 (L) 2018     Recent Labs   Lab 18  0901   TSH 0.212*   FREET4 1.09     Lab Results   Component Value Date    HGBA1C 5.8 (H) 11/10/2018       Nutritional status:   Body mass index is 25.07 kg/m².  Lab Results   Component Value Date    ALBUMIN 3.6 2018    ALBUMIN 3.7 2018    ALBUMIN 3.5 11/10/2018     No results found for: PREALBUMIN    Estimated Creatinine Clearance: 29.3 mL/min (A) (based on SCr of 1.6 mg/dL (H)).    Accu-Checks  Recent Labs     11/10/18  0747 11/10/18  1138 11/10/18  1647 11/10/18  1651 11/10/18  2103 18  0738 18  1120 18  1613 18  2048 18  0755   POCTGLUCOSE 219* 236* 401* 435* 354* 74 179* 225* 341* 263*       Current Medications and/or Treatments Impacting Glycemic Control  Immunotherapy:    Immunosuppressants     None        Steroids:   Hormones (From admission, onward)    None        Pressors:    Autonomic Drugs (From admission, onward)    None        Hyperglycemia/Diabetes Medications:   Antihyperglycemics (From admission, onward)    " Start     Stop Route Frequency Ordered    11/12/18 1130  insulin aspart U-100 pen 4 Units      -- SubQ 3 times daily with meals 11/12/18 0829    11/12/18 0930  insulin detemir U-100 pen 8 Units      -- SubQ Daily 11/12/18 0827    11/10/18 0948  insulin aspart U-100 pen 0-5 Units      -- SubQ Before meals & nightly PRN 11/10/18 0848

## 2018-11-12 NOTE — NURSING
Rapid Response Follow-up Note    Followed up with patient for proactive rounding.   Spoke with charge RN. Results of CXR flagged for cardiology per radiology. 40 mg lasix ordered without appropriate diuresis. BNP grossly elevated. Anticipate 2D ECHO this AM per cardiology. Suggested nephrology consult.   Reviewed plan of care with primary RN, Latoya and Maritza charge RN.  Will continue to monitor patient closely.    Please call Rapid Response RN with any questions or concerns at  [phone].

## 2018-11-12 NOTE — NURSING
Pt reports shortness of breath. Audible wheezing noted and incessant cough unimproved after PRN antitussives. SpO2 87% on 1L NC, humidification added and O2 increased to 6L for noticeable improvement in SpO2.     RRT RN at bedside. MD Goldberg notified of above. New order for Xopenex PRN and STAT CXR.     CPT performed and Xopenex given by RT. Improvement in SOB reported by pt, cough persists. MD and Charge RN aware. Will cont to monitor.

## 2018-11-12 NOTE — NURSING
NP said to leave in the cuadra if BS was >or equal to  250ml. Bladder scan showed 250ml. Cuadra was placed and 250ML was left in for retention.

## 2018-11-12 NOTE — ASSESSMENT & PLAN NOTE
Acute on chronic  PO lactulose ordered, continue rifaximin.   PRN lactulose enema TID for worsening confusion  Titrate for 3-4 BM a day.   AAOx3 and reports feeling well.

## 2018-11-12 NOTE — NURSING
RN Proactive Rounding Note  Time of Visit: 5    Admit Date: 11/10/2018  LOS: 2  Code Status: Full Code   Date of Visit: 2018  : 1950  Age: 67 y.o.  Sex: female  Race: White  Bed: 08233/01135 A:   MRN: [id number]  Was the patient discharged from an ICU this admission? no   Was the patient discharged from a PACU within last 24 hours?  no  Did the patient receive conscious sedation/general anesthesia in last 24 hours?  no  Was the patient in the ED within the past 24 hours?  no  Was the patient started on NIPPV within the past 24 hours?  no  Attending Physician: Jabier Fritz MD  Primary Service: [facility] LIVER TRANSPLANT      ASSESSMENT:     Abnormal Vital Signs:  Clinical Issues: Dysrythmia: on nicardipine gtt for Afib/ cough wheezing     INTERVENTIONS/ RECOMMENDATIONS:   Patient seen during proactive rounds per charge RN request. Patient was transferred from outside facility for AMS, and ENZO. Patient is listed for liver transplant. Currently on cardizem gtt for A.fib. HR now controlled with rate in the 60s. BP pressure stable. O2 sats > 92% on NC. Patient seen on previous shift by Rapid nurse. Now being seen for respiratory status.  Upon assessment patient is oriented to self, place and year.  at bedside. Patient has audible wheezing and frequent cough. Denies SOB. Intermittently restless. RN gave dose of guaifenesin. Patient states cough is better but symptoms persist. Neb txtms discontinued per MD s/t concerns of worsening a. Fib.  Patient is afebrile.   Will suggest Xopemex treatments and Xray. Respiratory cx if able to get sputum sample.  Continue to monitor patient and follow up as necessary.    Discussed plan of care with Latoya MEJÍA.    PHYSICIAN ESCALATION:     Yes/No  no    Orders received from Dr. Goldberg per nurse for CXR and Xopemex txmts    Disposition: Remain in room 52778.    FOLLOW-UP/CONTINGENCY:     Call back the Rapid Response Nurse at [phone] for additional questions or  concerns.

## 2018-11-12 NOTE — ASSESSMENT & PLAN NOTE
- Patient went into Afib today on floor -150s  - Started on Diltiazem ggt @ 5 + 50 q6 metoprolol after failed to control with 5 IV X 2 Lopressor  - Now rate controlled and slightly sinan, reduce BB to 25 q6 lopressor, stop Dilt.  - Repeat TTE, noted normal EF few months ago  - CHADS VASC 3 (or 4 if HTN added) hence patient is a candidate for AC however with thrombocytopenia and liver failure would need to have thorough discussion of risks and benefits with us, liver team and family  - Will continue to follow

## 2018-11-12 NOTE — NURSING
Call received from MD Zarco (cardiac fellow) and new orders placed after discussing chart with MD and events from this shift.   Order from MD Zarco carried out and this RN notified MD Goldberg of updates. No new orders, will cont to monitor.

## 2018-11-12 NOTE — PROGRESS NOTES
[facility]  Cardiology  Progress Note    Patient Name: Krystal Hobson  MRN: [id number]  Admission Date: 11/10/2018  Hospital Length of Stay: 2 days  Code Status: Full Code   Attending Physician: Jabier Fritz MD   Primary Care Physician: Courtney Joe MD  Expected Discharge Date:   Principal Problem:Hepatic encephalopathy    Subjective:     Hospital Course:   11/11: Re consulted for Afib with -150s, otherwise asymptomatic. No chest pain or SOB.    Interval History: Doing well rate controlled after diltiazem ggt and Metoprolol PO    Review of Systems   Constitution: Negative for chills, decreased appetite and diaphoresis.   HENT: Positive for congestion. Negative for ear discharge.    Eyes: Negative for blurred vision and discharge.   Cardiovascular: Negative for chest pain, dyspnea on exertion, irregular heartbeat, leg swelling and paroxysmal nocturnal dyspnea.   Respiratory: Positive for cough and shortness of breath. Negative for hemoptysis.    Gastrointestinal: Negative for abdominal pain.     Objective:     Vital Signs (Most Recent):  Temp: 98 °F (36.7 °C) (11/11/18 2310)  Pulse: (!) 57 (11/12/18 0055)  Resp: 19 (11/12/18 0055)  BP: (!) 151/67 (11/11/18 2310)  SpO2: 96 % (11/12/18 0055) Vital Signs (24h Range):  Temp:  [98 °F (36.7 °C)-100 °F (37.8 °C)] 98 °F (36.7 °C)  Pulse:  [] 57  Resp:  [14-23] 19  SpO2:  [88 %-96 %] 96 %  BP: (114-158)/(60-78) 151/67     Weight: 51.7 kg (113 lb 15.7 oz)  Body mass index is 23.82 kg/m².     SpO2: 96 %  O2 Device (Oxygen Therapy): nasal cannula w/ humidification      Intake/Output Summary (Last 24 hours) at 11/12/2018 0158  Last data filed at 11/11/2018 1718  Gross per 24 hour   Intake 521.67 ml   Output 500 ml   Net 21.67 ml       Lines/Drains/Airways     Drain                 Urethral Catheter 11/11/18 1842 less than 1 day          Peripheral Intravenous Line                 Midline Catheter Insertion/Assessment  - Single Lumen Right median  cubital vein (antecubital fossa) -- days         Peripheral IV - Single Lumen 11/11/18 1617 Anterior;Left Forearm less than 1 day                Physical Exam   Constitutional: She is oriented to person, place, and time. She appears well-developed and well-nourished. No distress.   Eyes: Conjunctivae are normal. Pupils are equal, round, and reactive to light.   Neck: No tracheal deviation present. No thyromegaly present.   Cardiovascular: Normal rate, normal heart sounds and intact distal pulses. An irregularly irregular rhythm present. Exam reveals no gallop and no friction rub.   No murmur heard.  Pulmonary/Chest: Effort normal. No respiratory distress. She has wheezes. She has no rales.   Abdominal: Soft. Bowel sounds are normal. She exhibits no distension. There is no tenderness.   Musculoskeletal: She exhibits no edema or deformity.   Neurological: She is alert and oriented to person, place, and time. No cranial nerve deficit. Coordination normal.   Skin: Skin is warm and dry. She is not diaphoretic.   Psychiatric: She has a normal mood and affect. Her behavior is normal.       Significant Labs:   BMP:   Recent Labs   Lab 11/10/18  0800 11/10/18  0801 11/11/18  0622   * 211* 72   * 145 141   K 3.9 3.7 3.5    108 104   CO2 23 24 25   BUN 54* 52* 50*   CREATININE 1.8* 1.8* 1.8*   CALCIUM 8.9 8.9 8.9   MG 2.6 2.7* 2.5   , CMP   Recent Labs   Lab 11/10/18  0800 11/10/18  0801 11/11/18  0622   * 145 141   K 3.9 3.7 3.5    108 104   CO2 23 24 25   * 211* 72   BUN 54* 52* 50*   CREATININE 1.8* 1.8* 1.8*   CALCIUM 8.9 8.9 8.9   PROT 5.9* 6.1 5.8*   ALBUMIN 3.4* 3.5 3.7   BILITOT 2.5* 2.6* 2.8*   ALKPHOS 107 112 97   AST 39 39 36   ALT 20 22 19   ANIONGAP 14 13 12   ESTGFRAFRICA 33.1* 33.1* 33.1*   EGFRNONAA 28.7* 28.7* 28.7*   , CBC   Recent Labs   Lab 11/10/18  0800 11/11/18  0622   WBC 5.17  5.14 4.98   HGB 7.7*  7.7* 7.1*   HCT 24.4*  24.2* 22.6*   PLT 79*  77* 66*    and  Lipid Panel No results for input(s): CHOL, HDL, LDLCALC, TRIG, CHOLHDL in the last 48 hours.    Significant Imaging: Echocardiogram: 2D echo with color flow doppler: No results found for this or any previous visit. and EKG:     Assessment and Plan:     Brief HPI: 66 YO F w hx of cryptogenic cirrhosis+ alcohlic cirrhosis p/w encephalopathy and now in Afib w RVR    Shortness of breath    - Worsening SOB, exam with rales > wheezing now  - CXR with diffuse infiltrates and worsening L pleural effusion  - Await TTE, Get BNP  - Give 40 IV Lasix              Atrial fibrillation with rapid ventricular response    - Patient went into Afib today on floor -150s  - Started on Diltiazem ggt @ 5 + 50 q6 metoprolol after failed to control with 5 IV X 2 Lopressor  - Now rate controlled and slightly sinan, reduce BB to 25 q6 lopressor, stop Dilt.  - Keep K>4, Mg >2  - Repeat TTE, noted normal EF few months ago  - CHADS VASC 3 (or 4 if HTN added) hence patient is a candidate for AC however with thrombocytopenia and liver failure would need to have thorough discussion of risks and benefits with us, liver team and family  - Will continue to follow           VTE Risk Mitigation (From admission, onward)        Ordered     IP VTE HIGH RISK PATIENT  Once      11/10/18 0213     heparin (porcine) injection 5,000 Units  Every 8 hours      11/10/18 0213          Marcia Zarco MD  Cardiology  [facility]

## 2018-11-12 NOTE — PROGRESS NOTES
[facility]  Cardiology  Progress Note    Patient Name: Krystal Hobson  MRN: [id number]  Admission Date: 11/10/2018  Hospital Length of Stay: 2 days  Code Status: Full Code   Attending Physician: Jabier Fritz MD   Primary Care Physician: Courtney Joe MD  Expected Discharge Date: 11/30/2018  Principal Problem:Hepatic encephalopathy    Subjective:     Hospital Course:   Patient converted to NSR yesterday afternoon. HR in the 60s overnight.    Interval History: Converted to NSR yesterday afternnon. HRs in the 60s.    Review of Systems   Constitution: Negative for chills, decreased appetite and diaphoresis.   HENT: Positive for congestion. Negative for ear discharge.    Eyes: Negative for blurred vision and discharge.   Cardiovascular: Negative for chest pain, dyspnea on exertion, irregular heartbeat, leg swelling and paroxysmal nocturnal dyspnea.   Respiratory: Positive for cough and shortness of breath. Negative for hemoptysis.    Gastrointestinal: Negative for abdominal pain.     Objective:     Vital Signs (Most Recent):  Temp: 98.3 °F (36.8 °C) (11/12/18 0759)  Pulse: 61 (11/12/18 1112)  Resp: 18 (11/12/18 0759)  BP: (!) 144/68 (11/12/18 1112)  SpO2: 96 % (11/12/18 0759) Vital Signs (24h Range):  Temp:  [98 °F (36.7 °C)-100 °F (37.8 °C)] 98.3 °F (36.8 °C)  Pulse:  [57-80] 61  Resp:  [18-23] 18  SpO2:  [88 %-96 %] 96 %  BP: (115-155)/(60-78) 144/68     Weight: 54.4 kg (120 lb)  Body mass index is 25.08 kg/m².     SpO2: 96 %  O2 Device (Oxygen Therapy): nasal cannula      Intake/Output Summary (Last 24 hours) at 11/12/2018 1138  Last data filed at 11/12/2018 0550  Gross per 24 hour   Intake 530.92 ml   Output 775 ml   Net -244.08 ml       Lines/Drains/Airways     Drain                 Urethral Catheter 11/11/18 1842 less than 1 day          Peripheral Intravenous Line                 Midline Catheter Insertion/Assessment  - Single Lumen Right median cubital vein (antecubital fossa) -- days          Peripheral IV - Single Lumen 11/11/18 1617 Anterior;Left Forearm less than 1 day                Physical Exam   Constitutional: She is oriented to person, place, and time. She appears well-developed and well-nourished. No distress.   Eyes: Conjunctivae are normal. Pupils are equal, round, and reactive to light.   Neck: No tracheal deviation present. No thyromegaly present.   Cardiovascular: Normal rate, regular rhythm, normal heart sounds and intact distal pulses. Exam reveals no gallop and no friction rub.   No murmur heard.  Pulmonary/Chest: Effort normal. No respiratory distress. She has wheezes. She has no rales.   Abdominal: Soft. Bowel sounds are normal. She exhibits no distension. There is no tenderness.   Musculoskeletal: She exhibits no edema or deformity.   Neurological: She is alert and oriented to person, place, and time. No cranial nerve deficit. Coordination normal.   Skin: Skin is warm and dry. She is not diaphoretic.   Psychiatric: She has a normal mood and affect. Her behavior is normal.       Significant Labs:   CMP   Recent Labs   Lab 11/11/18 0622 11/12/18  0619    135*   K 3.5 4.0    103   CO2 25 20*   GLU 72 289*   BUN 50* 50*   CREATININE 1.8* 1.6*   CALCIUM 8.9 8.4*   PROT 5.8* 6.1   ALBUMIN 3.7 3.6   BILITOT 2.8* 5.5*   ALKPHOS 97 121   AST 36 42*   ALT 19 24   ANIONGAP 12 12   ESTGFRAFRICA 33.1* 38.2*   EGFRNONAA 28.7* 33.1*    and CBC   Recent Labs   Lab 11/11/18 0622 11/12/18  0619   WBC 4.98 8.57   HGB 7.1* 10.0*   HCT 22.6* 32.6*   PLT 66* 90*           Assessment and Plan:     Atrial fibrillation with rapid ventricular response    - Converted back to NSR yesterday afternoon  - Continue Lopressor  - Repeat TTE, noted normal EF few months ago  - CHADS VASC 3 (or 4 if HTN added) hence patient is a candidate for AC however with thrombocytopenia and liver failure would need to have thorough discussion of risks and benefits   - Cardiology to sign off  - Please have patient  follow-up with us in clinic to determine need for long-term AF management           VTE Risk Mitigation (From admission, onward)        Ordered     IP VTE HIGH RISK PATIENT  Once      11/10/18 0213     heparin (porcine) injection 5,000 Units  Every 8 hours      11/10/18 0213          Terry Funez MD  Cardiology  [facility]

## 2018-11-12 NOTE — ASSESSMENT & PLAN NOTE
Avoid insulin stacking and hypoglycemia.  Lab Results   Component Value Date    CREATININE 1.6 (H) 11/12/2018

## 2018-11-12 NOTE — ASSESSMENT & PLAN NOTE
- Worsening SOB, exam with rales > wheezing now  - CXR with diffuse infiltrates and worsening L pleural effusion  - Await TTE, Get BNP  - Give 40 IV Lasix

## 2018-11-12 NOTE — PROGRESS NOTES
[facility]  Liver Transplant  Progress Note    Patient Name: Krystal Hobson  MRN: [id number]  Admission Date: 11/10/2018  Hospital Length of Stay: 2 days  Code Status: Full Code  Primary Care Provider: Courtney Joe MD    Subjective:     History of Present Illness:  Ms. Krystal Hobson is a 67 yr F w/ hx of cryptogenic cirrhosis, listed for liver tx 9/14/18 who presented to ER at outside hospital in [place], [place] with increased abdominal pain and then noted to have AMS on arrival and ENZO on lab work. Per the , she was extremely disoriented and her ammonia reached as high as 200. Her Cr level increased to 2.0 at the OSH. She also went into A fib with RVR and was started on a diltiazem infusion at OSH. [place] [facility] [place] On arrival pt was noted with confusion which has now resolved with lactulose. She also was noted with a cough and fatigue.             Hospital Course:  Interval History: Pt developed A fib with RVR again yesterday afternoon. Pt placed back on Diltiazem gtt which has now been d/c'd since around 2AM today. Pt is rate controlled with lopressor at this time. Pt is AAOx3 and reports feeling well. Cardiology consulted for management and IV lasix given for diuresis overnight per cards recs. BNP significantly elevated and chest xray reviewed this am. High SHY-VASc but due to coagulopathy - would not give anticoagulation, can start on aspirin. Plan for 2D echo today to assess cardiac function in the setting of possible liver transplant. Currently on internal hold and will need to discuss when pt may become active again. MELD 26 today. She appears frail and will need to be aggressive with nutrition and PT.     Scheduled Meds:   ceFEPime (MAXIPIME) IVPB  2 g Intravenous Q24H    heparin (porcine)  5,000 Units Subcutaneous Q8H    insulin aspart U-100  4 Units Subcutaneous TIDWM    [START ON 11/13/2018] insulin detemir U-100  6 Units Subcutaneous Daily     lactulose  30 g Oral TID    metoprolol tartrate  25 mg Oral Q6H    rifAXImin  550 mg Oral BID     Continuous Infusions:  PRN Meds:sodium chloride, acetaminophen, benzonatate, dextrose 50%, dextrose 50%, glucagon (human recombinant), glucose, glucose, guaifenesin 100 mg/5 ml, insulin aspart U-100, lactulose, levalbuterol, ondansetron, sodium chloride 0.9%    Review of Systems   Constitutional: Positive for activity change, appetite change and fatigue. Negative for chills and fever.   HENT: Negative for congestion, ear discharge, ear pain and sinus pressure.    Respiratory: Negative for apnea, cough, chest tightness and shortness of breath.    Cardiovascular: Positive for leg swelling. Negative for chest pain and palpitations.   Gastrointestinal: Negative for abdominal distention, abdominal pain, anal bleeding, blood in stool, diarrhea, nausea and vomiting.   Endocrine: Negative for cold intolerance and heat intolerance.   Genitourinary: Positive for decreased urine volume. Negative for dysuria, menstrual problem and urgency.   Musculoskeletal: Negative for arthralgias and back pain.   Skin: Negative for pallor.   Neurological: Positive for weakness. Negative for dizziness and headaches.   Hematological: Bruises/bleeds easily.   Psychiatric/Behavioral: Positive for confusion and decreased concentration. Negative for agitation, behavioral problems and sleep disturbance.     Objective:     Vital Signs (Most Recent):  Temp: 98.3 °F (36.8 °C) (11/12/18 0759)  Pulse: (!) 57 (11/12/18 0759)  Resp: 18 (11/12/18 0759)  BP: (!) 155/68 (11/12/18 0759)  SpO2: 96 % (11/12/18 0759) Vital Signs (24h Range):  Temp:  [98 °F (36.7 °C)-100 °F (37.8 °C)] 98.3 °F (36.8 °C)  Pulse:  [] 57  Resp:  [18-23] 18  SpO2:  [88 %-96 %] 96 %  BP: (114-155)/(60-78) 155/68     Weight: 54.4 kg (119 lb 14.9 oz)  Body mass index is 25.07 kg/m².    Intake/Output - Last 3 Shifts       11/10 0700 - 11/11 0659 11/11 0700 - 11/12 0659 11/12 0700 -  11/13 0659    P.O. 720 420     I.V. (mL/kg) 100 (1.9) 109.3 (2)     Blood  101.7     Total Intake(mL/kg) 820 (15.9) 630.9 (11.6)     Urine (mL/kg/hr) 675 (0.5) 775 (0.6)     Stool 0 0     Total Output 675 775     Net +145 -144.1            Urine Occurrence 1 x      Stool Occurrence 9 x 10 x           Physical Exam   Constitutional: She is oriented to person, place, and time. She appears well-developed.   Chronically ill-appearing. Malnourished. Temporal wasting.     HENT:   Head: Normocephalic and atraumatic.   Mouth/Throat: Oropharynx is clear and moist.   Eyes: Conjunctivae are normal. No scleral icterus.   Neck: Normal range of motion.   Cardiovascular: Normal rate, regular rhythm and normal heart sounds.   Pulmonary/Chest: increased effort. No respiratory distress. She has rales and rhonchi. Decreased breath sounds at bases bilateral.   Abdominal: Soft. Bowel sounds are normal. She exhibits distension. There is no tenderness. No hernia.   Small ascites   Musculoskeletal: She exhibits edema (trace).   Lymphadenopathy:     She has no cervical adenopathy.   Neurological: She is alert and oriented to person, place, and time.   Mild asterixis   Skin: Skin is warm and dry. No rash noted.   Psychiatric: She has a normal mood and affect. Her behavior is normal. Her mood appears not anxious.   Nursing note and vitals reviewed.      Laboratory:  Immunosuppressants     None        CBC:   Recent Labs   Lab 11/12/18  0619   WBC 8.57   RBC 3.30*   HGB 10.0*   HCT 32.6*   PLT 90*   MCV 99*   MCH 30.3   MCHC 30.7*     CMP:   Recent Labs   Lab 11/12/18  0619   *   CALCIUM 8.4*   ALBUMIN 3.6   PROT 6.1   *   K 4.0   CO2 20*      BUN 50*   CREATININE 1.6*   ALKPHOS 121   ALT 24   AST 42*   BILITOT 5.5*     Coagulation:   Recent Labs   Lab 11/10/18  0800  11/12/18  0619   INR 2.0*   < > 1.9*   APTT 30.7  --   --     < > = values in this interval not displayed.     Labs within the past 24 hours have been  reviewed.    Diagnostic Results:  I have personally reviewed all pertinent imaging studies.    Assessment/Plan:     * Hepatic encephalopathy    Acute on chronic  PO lactulose ordered, continue rifaximin.   PRN lactulose enema TID for worsening confusion  Titrate for 3-4 BM a day.   AAOx3 and reports feeling well.        End stage liver disease    Listed for liver transplant with MELD 26. Per hepatologist, will place on internal hold. Continue to monitor.   Plan to discuss pt this week about candidacy     MELD-Na score: 26 at 11/12/2018  6:19 AM  MELD score: 25 at 11/12/2018  6:19 AM  Calculated from:  Serum Creatinine: 1.6 mg/dL at 11/12/2018  6:19 AM  Serum Sodium: 135 mmol/L at 11/12/2018  6:19 AM  Total Bilirubin: 5.5 mg/dL at 11/12/2018  6:19 AM  INR(ratio): 1.9 at 11/12/2018  6:19 AM  Age: 67 years         Acute kidney injury superimposed on CKD    Appears to have CKD3/4 at baseline creatinine  Urine electrolytes ordered  Etiology possibly due to hepatorenal syndrome, awaiting final urine electrolytes    HRS protocol started, albumin and octreotide. No midodrine as with Afib.   Cr level slowly improving at this time.      Atrial fibrillation with rapid ventricular response    Patient with episode of Afib with RVR at OSH and was placed on diltiazem gtt which was discontinued 11/10 as she reverted to NSR.  Cardiology consulted for management.   Pt then returned back into Afib w/ RVR on the afternoon of 11/11 and pt placed back on diltiazem for rate control.   Pt now rated controlled and diltiazem d/c'd on 11/12 at 0200. Cont with lopressor at this time.   2D echo performed today to assess cardiac risk and for discussion of candidacy with Transplant Surgery and Anesthesiology.   PA pressure on 2D echo noted elevated. Plan to diuresis.          Severe malnutrition    Severe Hand and temporal muscle wasting  Decreased energy  Frail appearing  Boost supplements ordered  Dietician consulted.     Sinus tachycardia    -  Improved with BB.        CKD (chronic kidney disease) stage 3, GFR 30-59 ml/min    See ENZO.       Chronic liver disease           Type 2 diabetes mellitus with hyperglycemia, with long-term current use of insulin    Sliding scale insulin  Consult endocrine for management.        Anemia of chronic disease    H/H stable. Continue to monitor with daily cbc.        Thrombocytopenia due to hypersplenism    No s/s of bleeding.  Continue to monitor.        Decompensated hepatic cirrhosis               VTE Risk Mitigation (From admission, onward)        Ordered     IP VTE HIGH RISK PATIENT  Once      11/10/18 0213     heparin (porcine) injection 5,000 Units  Every 8 hours      11/10/18 0213          The patients clinical status was discussed at multidisplinary rounds, involving transplant surgery, transplant medicine, pharmacy, nursing, nutrition, and social work    Discharge Planning:  Not a candidate for d/c at this time.       Jacky Crooks NP  Liver Transplant  [facility]

## 2018-11-12 NOTE — ASSESSMENT & PLAN NOTE
Patient with episode of Afib with RVR at OSH and was placed on diltiazem gtt which was discontinued 11/10 as she reverted to NSR.  Cardiology consulted for management.   Pt then returned back into Afib w/ RVR on the afternoon of 11/11 and pt placed back on diltiazem for rate control.   Pt now rated controlled and diltiazem d/c'd on 11/12 at 0200. Cont with lopressor at this time.   2D echo performed today to assess cardiac risk and for discussion of candidacy with Transplant Surgery and Anesthesiology.   PA pressure on 2D echo noted elevated. Plan to diuresis.

## 2018-11-12 NOTE — PLAN OF CARE
Problem: Patient Care Overview  Goal: Plan of Care Review  Outcome: Ongoing (interventions implemented as appropriate)  Patient acknowledges understanding with pain scale. Appropriate pain medications have been dispensed. Non slip footwear in place. Bed lowered. Rails up x 2. Call bell in easy reach. Pt is afebrile. Hand washing per nursing guidelines. No pressure sores detected, pt encouraged to turn frequently. Pt sat up to chair for 4 hours. Non-productive cough noted. Pt has prn cough suppressants. Sputum sample still needed. Lungs are coarse with crackles, wheezing is noted. Pt on 2L O2. Poor appetite noted. Pt encouraged to eat and/or to drink supplements. Pt is incontinent to watery stool x 5 today. Clear yellow urine noted via cuadra. Echo is complete. Per cxr shows pneumonia, abx started. Pt is on hold for liver transplant at this time.  remains at bedside. Daughter drove back home to [place] to today.

## 2018-11-12 NOTE — SUBJECTIVE & OBJECTIVE
Interval History: Converted to NSR yesterday afternnon. HRs in the 60s.    Review of Systems   Constitution: Negative for chills, decreased appetite and diaphoresis.   HENT: Positive for congestion. Negative for ear discharge.    Eyes: Negative for blurred vision and discharge.   Cardiovascular: Negative for chest pain, dyspnea on exertion, irregular heartbeat, leg swelling and paroxysmal nocturnal dyspnea.   Respiratory: Positive for cough and shortness of breath. Negative for hemoptysis.    Gastrointestinal: Negative for abdominal pain.     Objective:     Vital Signs (Most Recent):  Temp: 98.3 °F (36.8 °C) (11/12/18 0759)  Pulse: 61 (11/12/18 1112)  Resp: 18 (11/12/18 0759)  BP: (!) 144/68 (11/12/18 1112)  SpO2: 96 % (11/12/18 0759) Vital Signs (24h Range):  Temp:  [98 °F (36.7 °C)-100 °F (37.8 °C)] 98.3 °F (36.8 °C)  Pulse:  [57-80] 61  Resp:  [18-23] 18  SpO2:  [88 %-96 %] 96 %  BP: (115-155)/(60-78) 144/68     Weight: 54.4 kg (120 lb)  Body mass index is 25.08 kg/m².     SpO2: 96 %  O2 Device (Oxygen Therapy): nasal cannula      Intake/Output Summary (Last 24 hours) at 11/12/2018 1138  Last data filed at 11/12/2018 0550  Gross per 24 hour   Intake 530.92 ml   Output 775 ml   Net -244.08 ml       Lines/Drains/Airways     Drain                 Urethral Catheter 11/11/18 1842 less than 1 day          Peripheral Intravenous Line                 Midline Catheter Insertion/Assessment  - Single Lumen Right median cubital vein (antecubital fossa) -- days         Peripheral IV - Single Lumen 11/11/18 1617 Anterior;Left Forearm less than 1 day                Physical Exam   Constitutional: She is oriented to person, place, and time. She appears well-developed and well-nourished. No distress.   Eyes: Conjunctivae are normal. Pupils are equal, round, and reactive to light.   Neck: No tracheal deviation present. No thyromegaly present.   Cardiovascular: Normal rate, regular rhythm, normal heart sounds and intact distal  pulses. Exam reveals no gallop and no friction rub.   No murmur heard.  Pulmonary/Chest: Effort normal. No respiratory distress. She has wheezes. She has no rales.   Abdominal: Soft. Bowel sounds are normal. She exhibits no distension. There is no tenderness.   Musculoskeletal: She exhibits no edema or deformity.   Neurological: She is alert and oriented to person, place, and time. No cranial nerve deficit. Coordination normal.   Skin: Skin is warm and dry. She is not diaphoretic.   Psychiatric: She has a normal mood and affect. Her behavior is normal.       Significant Labs:   CMP   Recent Labs   Lab 11/11/18  0622 11/12/18  0619    135*   K 3.5 4.0    103   CO2 25 20*   GLU 72 289*   BUN 50* 50*   CREATININE 1.8* 1.6*   CALCIUM 8.9 8.4*   PROT 5.8* 6.1   ALBUMIN 3.7 3.6   BILITOT 2.8* 5.5*   ALKPHOS 97 121   AST 36 42*   ALT 19 24   ANIONGAP 12 12   ESTGFRAFRICA 33.1* 38.2*   EGFRNONAA 28.7* 33.1*    and CBC   Recent Labs   Lab 11/11/18 0622 11/12/18  0619   WBC 4.98 8.57   HGB 7.1* 10.0*   HCT 22.6* 32.6*   PLT 66* 90*

## 2018-11-12 NOTE — PROGRESS NOTES
Admit Note     Met with patient and  to assess needs. Patient is a 67 y.o.  female, admitted for altered mental status and acute kidney injury.      Patient admitted from home/ER at outside hospital in [place] on 11/10/2018 .  At this time, patient presents as alert and oriented x 4, pleasant, good eye contact, recall good, concentration/judgement good, average intelligence, calm, communicative, cooperative and asking and answering questions appropriately.  At this time, patients caregiver presents as alert and oriented x 4, pleasant, good eye contact, well groomed, recall good, concentration/judgement good, average intelligence, calm, communicative, cooperative and asking and answering questions appropriately.    Household/Family Systems     Patient resides with patient's , at [address] [place] [address].  Support system includes  (Shahzad Hobson) and two sisters (Enma and Amy).  Patient does not have dependents that are need of being cared for.     Patients primary caregiver is Shahzad Hobson, patients , phone number [phone].  Confirmed patients contact information is [phone] (home);   Telephone Information:   Mobile [phone]   .    During admission, patient's caregiver plans to stay in patient's room.  Confirmed patient and patients caregivers do have access to reliable transportation.    Cognitive Status/Learning     Patient reports reading ability as 12th grade and states patient does have difficulty with seeing, hearing, comprehension and memory.  Patient reports patient learns best by written and demonstration.   Needed: No.   Highest education level: High School (9-12) or GED    Vocation/Disability   .  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    Adherence     Patient reports a high level of adherence  to patients health care regimen.  Adherence counseling and education provided. Patient verbalizes understanding.    Substance Use    Patient reports the following substance usage.    Tobacco: none, patient denies any use.  Alcohol: none, patient denies any use.  Illicit Drugs/Non-prescribed Medications: none, patient denies any use.  Patient states clear understanding of the potential impact of substance use.  Substance abstinence/cessation counseling, education and resources provided and reviewed.     Services Utilizing/ADLS    Infusion Service: Prior to admission, patient utilizing? no  Home Health: Prior to admission, patient utilizing? yes - Pt recently began receiving home health services through [facility] in [place].  Pt was being seen 2x/week and was seen for eval by PT/OT prior to hospital admission.  DME: Prior to admission, no  Pulmonary/Cardiac Rehab: Prior to admission, no  Dialysis:  Prior to admission, no  Transplant Specialty Pharmacy:  Prior to admission, no.    Prior to admission, patient reports patient was independent with ADLS and was not driving.  Patient reports patient is able to care for self at this time..  Patient indicates a willingness to care for self once medically cleared to do so.    Insurance/Medications    Insured by   Payor/Plan Subscr  Sex Relation Sub. Ins. ID Effective Group Num   1. [facility]* PENNIE HUGHES 1950 Female  [id number]                                    [address] [address]   2. [facility]* PENNIE HUGHES 1950 Female  [id number]                                   [address]      Primary Insurance (for UNOS reporting): Public Insurance - Medicare & Choice  Secondary Insurance (for UNOS reporting): None    Patient reports patient is able to obtain and afford medications at this time and at time of discharge.    Living Will/Healthcare Power of     Patient states patient has a LW and/or HCPA.   provided  education regarding LW and HCPA and the completion of forms.  Pt/spouse report having submitted LW and HCPA paperwork to Release of Information office upon current admission.    Coping/Mental Health    Patient is coping adequately with the aid of  family members. Pt denies any issues or concerns regarding coping at this time.  Patient denies mental health difficulties.    Discharge Planning    At time of discharge, patient plans to return to patient's home under the care of spouse/primary caregiver (Shahzad Hobson).  Patients  will transport patient.  Per rounds today, expected discharge date has not been medically determined at this time. Patient and patients caregiver  verbalize understanding and are involved in treatment planning and discharge process.    Additional Concerns  Pt's spouse/primary caregiver Shahzad Hobson present in room during transplant SW admit visit.  Shahzad reports difficulty with pt's dietary recommendations and limited food options.  Shahzad requested consult for dietician to discuss and identify additional/more desirable food options for pt.  Dietician (Elsa) not present on unit at time of SW visit.  SW notified [facility] nurse  Beatris of request for dietician consult.  Patient is being followed for needs, education, resources, information, emotional support, supportive counseling, and for supportive and skilled discharge plan of care.  providing ongoing psychosocial support, education, resources and d/c planning as needed.  SW remains available.  provided resource list, patient choice, psychosocial and supportive counseling, resources, education, assistance and discharge planning with patient and caregiver involvement, ongoing SW availability and services as appropriate.  remains available. Patient's caregiver verbalizes understanding and agreement with information reviewed,  availability and how to access available  resources as needed. Patient denies additional needs and/or concerns at this time.

## 2018-11-13 PROBLEM — J18.9 PNEUMONIA OF RIGHT LUNG DUE TO INFECTIOUS ORGANISM: Status: ACTIVE | Noted: 2018-01-01

## 2018-11-13 NOTE — ASSESSMENT & PLAN NOTE
- Noted on recent chest xray.   - Placed on BS antibxs for treatment.   - Transition to PO Moxi (11/13) to complete a 7 day course.

## 2018-11-13 NOTE — PROGRESS NOTES
"[facility]  Endocrinology  Progress Note    Admit Date: 11/10/2018     Reason for Consult: Management of type 2 DM, Hyperglycemia     Surgical Procedure and Date: n/a    Diabetes diagnosis year:     Home Diabetes Medications: Levemir 12 units BID (vial) and Humalog SSI with meals   Lab Results   Component Value Date    HGBA1C 5.8 (H) 11/10/2018         How often checking glucose at home? 1-3  BG readings on regimen: 150-300  Hypoglycemia on the regimen? once 27; required visit to ED; gave Levemir and humalog and patient did not eat  Missed doses on regimen?  No    Diabetes Complications include:     Hyperglycemia, Hypoglycemia  and Diabetic peripheral neuropathy     Complicating diabetes co morbidities:   CIRRHOSIS      HPI:   Patient is a 67 y.o. female with a diagnosis of type 2 DM; well controlled on MDI. Also with   Cryptogenic cirrhosis, rheumatoid arthritis, and GERD. Patient was listed for liver transplant on 18. Patient presented to ED of hospital in [place] with AMS and ENZO. Endocrinology consulted for BG/ DM management.             Interval HPI:   Overnight events: Remains in TSU. BG labile but markedly elevated yesterday and throughout the night.  Creatinine trending down, 1.4 this am.  On IV antibiotics.  Eatin%  Nausea: No  Hypoglycemia and intervention: No  Fever: No  TPN and/or TF: No    BP (!) 144/68   Pulse 67   Temp 98.8 °F (37.1 °C) (Oral)   Resp 20   Ht 4' 10" (1.473 m)   Wt 54.4 kg (120 lb)   SpO2 (!) 94%   Breastfeeding? No   BMI 25.08 kg/m²      Labs Reviewed and Include    Recent Labs   Lab 18  0619   *   CALCIUM 8.4*   ALBUMIN 3.6   PROT 6.1   *   K 4.0   CO2 20*      BUN 50*   CREATININE 1.6*   ALKPHOS 121   ALT 24   AST 42*   BILITOT 5.5*     Lab Results   Component Value Date    WBC 8.57 2018    HGB 10.0 (L) 2018    HCT 32.6 (L) 2018    MCV 99 (H) 2018    PLT 90 (L) 2018     Recent Labs "   Lab 11/11/18  0901   TSH 0.212*   FREET4 1.09     Lab Results   Component Value Date    HGBA1C 5.8 (H) 11/10/2018       Nutritional status:   Body mass index is 25.08 kg/m².  Lab Results   Component Value Date    ALBUMIN 3.6 11/12/2018    ALBUMIN 3.7 11/11/2018    ALBUMIN 3.5 11/10/2018     No results found for: PREALBUMIN    Estimated Creatinine Clearance: 29.3 mL/min (A) (based on SCr of 1.6 mg/dL (H)).    Accu-Checks  Recent Labs     11/10/18  1138 11/10/18  1647 11/10/18  1651 11/10/18  2103 11/11/18  0738 11/11/18  1120 11/11/18  1613 11/11/18  2048 11/12/18  0755 11/12/18  1141   POCTGLUCOSE 236* 401* 435* 354* 74 179* 225* 341* 263* 408*       Current Medications and/or Treatments Impacting Glycemic Control  Immunotherapy:    Immunosuppressants     None        Steroids:   Hormones (From admission, onward)    None        Pressors:    Autonomic Drugs (From admission, onward)    None        Hyperglycemia/Diabetes Medications:   Antihyperglycemics (From admission, onward)    Start     Stop Route Frequency Ordered    11/12/18 2100  insulin detemir U-100 pen 6 Units      -- SubQ Nightly 11/12/18 1452    11/12/18 1645  insulin aspart U-100 pen 6 Units      -- SubQ 3 times daily with meals 11/12/18 1642    11/10/18 0948  insulin aspart U-100 pen 0-5 Units      -- SubQ Before meals & nightly PRN 11/10/18 0848          ASSESSMENT and PLAN    * Hepatic encephalopathy    Managed per primary.        Type 2 diabetes mellitus with hyperglycemia, with long-term current use of insulin    BG goal 140-180    Increase Levemir to 6 units BID  Increase Novolog to 10 units with meals  Low dose correction scale given previous renal function  BG monitoring AC/HS    Discharge planning: TBD       Acute kidney injury superimposed on CKD    Avoid insulin stacking and hypoglycemia.  Lab Results   Component Value Date    CREATININE 1.4 11/13/2018            Decompensated hepatic cirrhosis    Managed per primary.   Listed for liver  transplant.  May impact BG readings           Tristan Hwang NP  Endocrinology  [facility]

## 2018-11-13 NOTE — ASSESSMENT & PLAN NOTE
Nutrition Problem  Malnutrition in the context of Chronic Illness/Injury    Related to (etiology):  Cryptogenic cirrhosis    Signs and Symptoms (as evidenced by):  Energy Intake: <50% of estimated energy requirement for at least 1 month  Body Fat Depletion: moderate and severe depletion of orbitals, triceps and thoracic and lumbar region   Muscle Mass Depletion: moderate and severe depletion of temples, clavicle region, interosseous muscle and lower extremities   Weight Loss: unable to determine if any weight loss 2/2 fluid present  Fluid Accumulation: mild and moderate    Interventions/Recommendations (treatment strategy):  Full assessment completed, see RD Note 11/27/2018.    Nutrition Diagnosis Status:  New

## 2018-11-13 NOTE — PROGRESS NOTES
[facility]  Liver Transplant  Progress Note    Patient Name: Krystal Hobson  MRN: [id number]  Admission Date: 11/10/2018  Hospital Length of Stay: 3 days  Code Status: Full Code  Primary Care Provider: Courtney Joe MD    Subjective:     History of Present Illness:  Ms. Krystal Hobson is a 67 yr F w/ hx of cryptogenic cirrhosis, listed for liver tx 9/14/18 who presented to ER at outside hospital in [place], [place] with increased abdominal pain and then noted to have AMS on arrival and ENZO on lab work. Per the , she was extremely disoriented and her ammonia reached as high as 200. Her Cr level increased to 2.0 at the OSH. She also went into A fib with RVR and was started on a diltiazem infusion at OSH. She was then transferred [place] [facility] [place]. On arrival pt was noted with confusion which has now resolved with lactulose. She also was noted with a cough and fatigue.             Hospital Course:  Interval History: no acute events overnight. Pt reports feeling better this am and resp status noted with some improvement. Pt recently with Afib w/ RVR but now controlled with lopressor. Pt is AAOx3 and looks more alert this am. Cardiology consulted for management and IV lasix given for diuresis yesterday. Will plan to cont with IV lasix again today. BNP significantly elevated and chest xray reviewed. Plan to repeat chest xray in am. High SYH-VASc but due to coagulopathy - would not give anticoagulation, can start on aspirin. 2D echo with 65% EF but elevated PA pressure. Currently on internal hold and will need to discuss when pt may become active again. MELD 26 today. She appears frail and will need to be aggressive with nutrition and PT.     Scheduled Meds:   furosemide  60 mg Intravenous BID    guaiFENesin  600 mg Oral BID    heparin (porcine)  5,000 Units Subcutaneous Q8H    insulin aspart U-100  10 Units Subcutaneous TIDWM    insulin detemir U-100  6 Units Subcutaneous BID    lactulose   30 g Oral TID    levalbuterol  1.25 mg Nebulization Q6H WAKE    metoprolol tartrate  25 mg Oral BID    moxifloxacin  400 mg Oral Daily    rifAXImin  550 mg Oral BID     Continuous Infusions:  PRN Meds:sodium chloride, acetaminophen, benzonatate, dextrose 50%, dextrose 50%, glucagon (human recombinant), glucose, glucose, guaifenesin 100 mg/5 ml, insulin aspart U-100, lactulose, ondansetron, sodium chloride 0.9%    Review of Systems   Constitutional: Positive for activity change, appetite change and fatigue. Negative for chills and fever.   HENT: Negative for congestion, ear discharge, ear pain and sinus pressure.    Respiratory: Positive for cough, shortness of breath and wheezing. Negative for apnea and chest tightness.    Cardiovascular: Positive for leg swelling. Negative for chest pain and palpitations.   Gastrointestinal: Negative for abdominal distention, abdominal pain, anal bleeding, blood in stool, diarrhea, nausea and vomiting.   Endocrine: Negative for cold intolerance and heat intolerance.   Genitourinary: Positive for decreased urine volume. Negative for dysuria, menstrual problem and urgency.   Musculoskeletal: Negative for arthralgias and back pain.   Skin: Negative for pallor.   Neurological: Positive for weakness. Negative for dizziness and headaches.   Hematological: Bruises/bleeds easily.   Psychiatric/Behavioral: Positive for confusion and decreased concentration. Negative for agitation, behavioral problems and sleep disturbance.     Objective:     Vital Signs (Most Recent):  Temp: 98.5 °F (36.9 °C) (11/13/18 1138)  Pulse: 71 (11/13/18 1340)  Resp: (!) 21 (11/13/18 1340)  BP: 122/60 (11/13/18 1230)  SpO2: 100 % (11/13/18 1340) Vital Signs (24h Range):  Temp:  [98.5 °F (36.9 °C)-98.8 °F (37.1 °C)] 98.5 °F (36.9 °C)  Pulse:  [59-73] 71  Resp:  [18-22] 21  SpO2:  [94 %-100 %] 100 %  BP: (122-152)/(60-72) 122/60     Weight: 54 kg (119 lb 0.8 oz)  Body mass index is 24.88 kg/m².    Intake/Output -  Last 3 Shifts       11/11 0700 - 11/12 0659 11/12 0700 - 11/13 0659 11/13 0700 - 11/14 0659    P.O. 420 900     I.V. (mL/kg) 109.3 (2)      Blood 101.7      Total Intake(mL/kg) 630.9 (11.6) 900 (16.5)     Urine (mL/kg/hr) 775 (0.6) 1450 (1.1) 175 (0.4)    Stool 0 0 0    Total Output 775 1450 175    Net -144.1 -550 -175           Stool Occurrence 10 x 10 x 4 x          Physical Exam   Constitutional: She is oriented to person, place, and time. She appears well-developed.   Chronically ill-appearing. Malnourished. Temporal wasting.     HENT:   Head: Normocephalic and atraumatic.   Mouth/Throat: Oropharynx is clear and moist.   Eyes: Conjunctivae are normal. No scleral icterus.   Neck: Normal range of motion.   Cardiovascular: Normal rate, regular rhythm and normal heart sounds.   Pulmonary/Chest: Effort normal. No respiratory distress. She has decreased breath sounds in the right lower field and the left lower field. She has wheezes. She has rales.   Abdominal: Soft. Bowel sounds are normal. She exhibits distension. There is no tenderness. No hernia.   Small ascites   Musculoskeletal: She exhibits edema (trace).   Lymphadenopathy:     She has no cervical adenopathy.   Neurological: She is alert and oriented to person, place, and time.   Mild asterixis   Skin: Skin is warm and dry. No rash noted.   Psychiatric: She has a normal mood and affect. Her behavior is normal. Her mood appears not anxious.   Nursing note and vitals reviewed.      Laboratory:  Immunosuppressants     None        CBC:   Recent Labs   Lab 11/13/18  0629   WBC 9.53   RBC 3.39*   HGB 10.6*   HCT 32.3*   PLT 69*   MCV 95   MCH 31.3*   MCHC 32.8     CMP:   Recent Labs   Lab 11/13/18  0629   *   CALCIUM 8.4*   ALBUMIN 2.8*   PROT 5.3*   *   K 3.6   CO2 23      BUN 53*   CREATININE 1.4   ALKPHOS 110   ALT 18   AST 31   BILITOT 3.9*     Coagulation:   Recent Labs   Lab 11/10/18  0800  11/13/18  0629   INR 2.0*   < > 1.9*   APTT 30.7  --    --     < > = values in this interval not displayed.     Labs within the past 24 hours have been reviewed.    Diagnostic Results:  I have personally reviewed all pertinent imaging studies.    Assessment/Plan:     * Hepatic encephalopathy    Acute on chronic  PO lactulose ordered, continue rifaximin.   PRN lactulose enema TID for worsening confusion  Titrate for 3-4 BM a day.   AAOx3 and reports feeling well.        End stage liver disease    Listed for liver transplant with MELD 26. Per hepatologist, will place on internal hold. Continue to monitor.   Plan to discuss pt this week about candidacy     MELD-Na score: 26 at 11/13/2018  6:29 AM  MELD score: 22 at 11/13/2018  6:29 AM  Calculated from:  Serum Creatinine: 1.4 mg/dL at 11/13/2018  6:29 AM  Serum Sodium: 131 mmol/L at 11/13/2018  6:29 AM  Total Bilirubin: 3.9 mg/dL at 11/13/2018  6:29 AM  INR(ratio): 1.9 at 11/13/2018  6:29 AM  Age: 67 years         Acute kidney injury superimposed on CKD    Appears to have CKD3/4 at baseline creatinine  Urine electrolytes ordered  Etiology possibly due to hepatorenal syndrome.   HRS protocol started, albumin and octreotide. No midodrine as with Afib.   Cr level slowly improving at this time.      Atrial fibrillation    Patient with episode of Afib with RVR at OSH and was placed on diltiazem gtt which was discontinued 11/10 as she reverted to NSR.  Cardiology consulted for management.   Pt then returned back into Afib w/ RVR on the afternoon of 11/11 and pt placed back on diltiazem for rate control.   Pt now rated controlled and diltiazem d/c'd on 11/12 at 0200. Cont with lopressor at this time.   2D echo performed today to assess cardiac risk and for discussion of candidacy with Transplant Surgery and Anesthesiology.   PA pressure on 2D echo noted elevated. Plan to diuresis.          Severe malnutrition    Severe Hand and temporal muscle wasting  Decreased energy  Frail appearing  Boost supplements ordered  Dietician  consulted.     Pneumonia of right lung due to infectious organism    - Noted on recent chest xray.   - Placed on BS antibxs for treatment.   - Transition to PO Moxi (11/13) to complete a 7 day course.        Sinus tachycardia    - Improved with BB.        CKD (chronic kidney disease) stage 3, GFR 30-59 ml/min    See ENZO.       Chronic liver disease           Type 2 diabetes mellitus with hyperglycemia, with long-term current use of insulin    Sliding scale insulin  Consult endocrine for management.        Anemia of chronic disease    H/H stable. Continue to monitor with daily cbc.        Thrombocytopenia due to hypersplenism    No s/s of bleeding.  Continue to monitor.        Decompensated hepatic cirrhosis               VTE Risk Mitigation (From admission, onward)        Ordered     IP VTE HIGH RISK PATIENT  Once      11/10/18 0213     heparin (porcine) injection 5,000 Units  Every 8 hours      11/10/18 0213          The patients clinical status was discussed at multidisplinary rounds, involving transplant surgery, transplant medicine, pharmacy, nursing, nutrition, and social work    Discharge Planning:  Not a candidate for d/c at this time.       Jacky Crooks NP  Liver Transplant  [facility]

## 2018-11-13 NOTE — PROGRESS NOTES
2nd Note:  CYNDEE met with pt and pt's  (Shahzad Hobson) in room this morning to f/u on previous visit yesterday for continuity of care.  Pt found seated in recliner at bedside with O2 NC in use.  Shahzad reports pt was not comfortable lying in bed and got better rest sleeping in recliner last night.  Pt and Shahzad deny any coping issues at this time.  CYNDEE informed pt and Shahzad of caregiver support group meeting being held tomorrow in transplant clinic.  Shahzad expressed understanding and stated plans of attending.  Shahzad inquired about any pre-transplant local lodging options available for pt/caregiver.  CYNDEE informed Shahzad that [place] transplant apartments are only available to pts/caregivers post-transplant and discussed other extended local stay options such as hotels and Air B&B.  Shahzad expressed understanding and stated plans of researching options further.  CYNDEE offered Shahzad list of local hotels near the hospital but Shahzad stated that he already has list to refer to if needed.  Pt and Shahzad deny having any further questions or concerns at this time.  CYNDEE remains available for further psychosocial support and stated plans of f/u as needed.

## 2018-11-13 NOTE — ASSESSMENT & PLAN NOTE
Appears to have CKD3/4 at baseline creatinine  Urine electrolytes ordered  Etiology possibly due to hepatorenal syndrome.   HRS protocol started, albumin and octreotide. No midodrine as with Afib.   Cr level slowly improving at this time.

## 2018-11-13 NOTE — ASSESSMENT & PLAN NOTE
BG goal 140-180    Increase Levemir to 6 units BID  Increase Novolog to 10 units with meals  Low dose correction scale given previous renal function  BG monitoring AC/HS    Discharge planning: VEGA

## 2018-11-13 NOTE — TELEPHONE ENCOUNTER
PATIENT NAME: rKystal Hobson  [facility] #: [id number]    Lab Results   Component Value Date    CREATININE 1.4 11/13/2018     (L) 11/13/2018    BILITOT 3.9 (H) 11/13/2018    ALBUMIN 2.8 (L) 11/13/2018    INR 1.9 (H) 11/13/2018     MELD 26  Encephalopathy: 3 - 4  Ascites: slight  Dialysis: no     Recertification requestor: Julia Herrera

## 2018-11-13 NOTE — PLAN OF CARE
Problem: Patient Care Overview  Goal: Plan of Care Review  Outcome: Ongoing (interventions implemented as appropriate)  Recommendation/Intervention:   1. Continue calorie count per order   2. Change Boost Glucose Control flavor to chocolate only related to patient preference   3. Encourage increased PO intake and OS intake as tolerated.   4. Dietitian Following    Goals:   Patient to meet >85% EEN/EPN  Nutrition Goal Status: new    NFPE 11/13/18  Severe Malnutrition in the context of Chronic Illness/Injury    Related to (etiology):  cryptogenic cirrhosis    Signs and Symptoms (as evidenced by):  Energy Intake: <50% of estimated energy requirement for greater than 1 month  Body Fat Depletion: severe depletion of orbitals and triceps   Muscle Mass Depletion: severe depletion of temples, clavicle region and interosseous muscle     Nutrition Diagnosis Status:  New    Full assessment completed, see Dietitian Note 11/13/2018.

## 2018-11-13 NOTE — CONSULTS
" [facility]  Adult Nutrition  Progress Note     SUMMARY       Recommendations  Recommendation/Intervention:   1. Continue calorie count per order   2. Change Boost Glucose Control flavor to chocolate only related to patient preference   3. Encourage increased PO intake and OS intake as tolerated.   4. Dietitian Following  Patient with Severe Malnutrition in the context of Chronic Illness/Injury    Goals:   Patient to meet >85% EEN/EPN  Nutrition Goal Status: new  Communication of RD Recs: (POC)    General Information Comments: Consult received- "severe malnutrition". Patient with Hepatic encephalopathy, hx of cryptogenic cirrhosis listed for liver. Caregiver present. Reports decreased intake for over a month. States she seems disinterested in food an requires much encouragement with meals. Until about 6 months ago they ate alot of fast food but now they eat home cooked meals (chicken, pork and sometimes beef, no canned vegetables only frozen). Consumes Glucerna, Chocolate, 4-5 x week. States weight dropped to 98lbs last month but has sense gained it back. Chart endorses weight of 99lbs 10/30/18. Discussed options to increase caloric and protein intake. 5-6 small frequent meals, supplement options and recipes with supplements such as Glucerna milk shakes. Patient with good po intake while inpatient, calorie count started 11/12/18. Caregiver discussed preparing meals like the food she ate while in the hospital. NFPE completed- Noted significant muscle and fat depletions to upper extremities.     11/12/18 Calorie Count   Percent Intake  Calories   Breakfast  No Breakfast recorded  Lunch  Pan Seared Fish   [place] Red Bean (1/2 cup)  White Rice (1/2 cup)  Steamed Broccoli (1 cup)  Novasource Renal  Cornbread  [place] Short Cake   Grape Juice (2-4oz)  Dinner  Rotisserie Chicken  Chicken Gravy  Mashed Potatoes (1/2 cup)  Steamed Carrots (1 cup)  Novasource Renal  Garden Salad (1/2 cup)  Light " "Italian Dressing (1 oz)  Applesauce (1/2 cup)  Grape Juice (2-4oz)       100%  50%  50%  75%  0%  25%  0%  100%    90%  100%  75%  50%  0%  25%    100%  100%       155  44  59  35  0  49  0  152    233  9  100  67  0  3.5    49  152     Nutrition Discharge Planning: Patient to meet optimal nutrition     Reason for Assessment  Reason for Assessment: consult  Diagnosis: transplant/postoperative complications(Hepatic encephalopathy, Pre- Liver tx)  Relevant Medical History: cryptogenic cirrhosis, DM, GERD  Interdisciplinary Rounds: attended    Nutrition Risk Screen  Nutrition Risk Screen: no indicators present    Nutrition/Diet History  Patient Reported Diet/Restrictions/Preferences: general, low salt  Typical Food/Fluid Intake: Decreased po intake  Food Preferences: noted  Do you have any cultural, spiritual, Christianity conflicts, given your current situation?: none  Supplemental Drinks or Food Habits: Glucerna(4-5/week)  Food Allergies: NKFA  Factors Affecting Nutritional Intake: impaired cognitive status/motor control, diarrhea    Anthropometrics  Temp: 98.5 °F (36.9 °C)  Height Method: Stated  Height: 4' 10" (147.3 cm)  Height (inches): 58 in  Weight Method: Standard Scale  Weight: 54 kg (119 lb 0.8 oz)  Weight (lb): 119.05 lb  Ideal Body Weight (IBW), Female: 90 lb  % Ideal Body Weight, Female (lb): 133.33 lb  BMI (Calculated): 25.1  BMI Grade: 18.5-24.9 - normal     Lab/Procedures/Meds  Labs: Reviewed    (L) 11/13/2018    K 3.6 11/13/2018    BUN 53 (H) 11/13/2018    CREATININE 1.4 11/13/2018    CALCIUM 8.4 (L) 11/13/2018    PHOS 3.6 11/10/2018    MG 2.0 11/13/2018    EGFRNONAA 38.9 (A) 11/13/2018    ALBUMIN 2.8 (L) 11/13/2018      ALT 18 11/13/2018    AST 31 11/13/2018    ALKPHOS 110 11/13/2018      HGBA1C 5.8 (H) 11/10/2018     Meds: Reviewed  Scheduled Meds:   ceFEPime (MAXIPIME) IVPB  1 g Intravenous Q12H    guaiFENesin  600 mg Oral BID    heparin (porcine)  5,000 Units Subcutaneous Q8H    insulin " aspart U-100  6 Units Subcutaneous TIDWM    insulin detemir U-100  6 Units Subcutaneous QHS    lactulose  30 g Oral TID    levalbuterol  1.25 mg Nebulization Q6H WAKE    metoprolol tartrate  25 mg Oral BID    rifAXImin  550 mg Oral BID     Physical Findings/Assessment  Overall Physical Appearance: loss of subcutaneous fat, loss of muscle mass, on oxygen therapy   Oral/Mouth Cavity: tooth/teeth missing  Skin: intact    Estimated/Assessed Needs  Weight Used For Calorie Calculations: 54.4 kg (119 lb 14.9 oz)  Energy Calorie Requirements (kcal): 9318-7808  Energy Need Method: Kcal/kg(30-35 kcal/kg)  Protein Requirements: 54(1.0 gm/kg)  Weight Used For Protein Calculations: 54.4 kg (119 lb 14.9 oz)  Fluid Requirements (mL): 1mL/kcal or per MD  Fluid Need Method: RDA Method(1mL/kcal or per MD)  RDA Method (mL): 1632     Nutrition Prescription Ordered  Current Diet Order: Low Sodium, 2gm   Oral Nutrition Supplement: Boost Glucose Control    Evaluation of Received Nutrient/Fluid Intake in last 24h  I/O: Noted  Comments: [facility] 11/13/18  % Intake of Estimated Energy Needs: 50 - 75 %  % Meal Intake: 50 - 75 %    Nutrition Risk  Level of Risk/Frequency of Follow-up: high(2x week)     Assessment and Plan  [facility] 11/13/18  Severe Malnutrition in the context of Chronic Illness/Injury    Related to (etiology):  cryptogenic cirrhosis    Signs and Symptoms (as evidenced by):  Energy Intake: <50% of estimated energy requirement for greater than 1 month  Body Fat Depletion: severe depletion of orbitals and triceps   Muscle Mass Depletion: severe depletion of temples, clavicle region and interosseous muscle     Nutrition Diagnosis Status:  New    Monitor and Evaluation  Food and Nutrient Intake: energy intake, food and beverage intake  Food and Nutrient Adminstration: diet order  Anthropometric Measurements: weight, weight change  Biochemical Data, Medical Tests and Procedures: electrolyte and renal panel, gastrointestinal profile,  glucose/endocrine profile, inflammatory profile, lipid profile  Nutrition-Focused Physical Findings: overall appearance     Nutrition Follow-Up  RD Follow-up?: Yes

## 2018-11-13 NOTE — ASSESSMENT & PLAN NOTE
Avoid insulin stacking and hypoglycemia.  Lab Results   Component Value Date    CREATININE 1.4 11/13/2018

## 2018-11-13 NOTE — ASSESSMENT & PLAN NOTE
Listed for liver transplant with MELD 26. Per hepatologist, will place on internal hold. Continue to monitor.   Plan to discuss pt this week about candidacy     MELD-Na score: 26 at 11/13/2018  6:29 AM  MELD score: 22 at 11/13/2018  6:29 AM  Calculated from:  Serum Creatinine: 1.4 mg/dL at 11/13/2018  6:29 AM  Serum Sodium: 131 mmol/L at 11/13/2018  6:29 AM  Total Bilirubin: 3.9 mg/dL at 11/13/2018  6:29 AM  INR(ratio): 1.9 at 11/13/2018  6:29 AM  Age: 67 years

## 2018-11-13 NOTE — SUBJECTIVE & OBJECTIVE
Scheduled Meds:   furosemide  60 mg Intravenous BID    guaiFENesin  600 mg Oral BID    heparin (porcine)  5,000 Units Subcutaneous Q8H    insulin aspart U-100  10 Units Subcutaneous TIDWM    insulin detemir U-100  6 Units Subcutaneous BID    lactulose  30 g Oral TID    levalbuterol  1.25 mg Nebulization Q6H WAKE    metoprolol tartrate  25 mg Oral BID    moxifloxacin  400 mg Oral Daily    rifAXImin  550 mg Oral BID     Continuous Infusions:  PRN Meds:sodium chloride, acetaminophen, benzonatate, dextrose 50%, dextrose 50%, glucagon (human recombinant), glucose, glucose, guaifenesin 100 mg/5 ml, insulin aspart U-100, lactulose, ondansetron, sodium chloride 0.9%    Review of Systems   Constitutional: Positive for activity change, appetite change and fatigue. Negative for chills and fever.   HENT: Negative for congestion, ear discharge, ear pain and sinus pressure.    Respiratory: Positive for cough, shortness of breath and wheezing. Negative for apnea and chest tightness.    Cardiovascular: Positive for leg swelling. Negative for chest pain and palpitations.   Gastrointestinal: Negative for abdominal distention, abdominal pain, anal bleeding, blood in stool, diarrhea, nausea and vomiting.   Endocrine: Negative for cold intolerance and heat intolerance.   Genitourinary: Positive for decreased urine volume. Negative for dysuria, menstrual problem and urgency.   Musculoskeletal: Negative for arthralgias and back pain.   Skin: Negative for pallor.   Neurological: Positive for weakness. Negative for dizziness and headaches.   Hematological: Bruises/bleeds easily.   Psychiatric/Behavioral: Positive for confusion and decreased concentration. Negative for agitation, behavioral problems and sleep disturbance.     Objective:     Vital Signs (Most Recent):  Temp: 98.5 °F (36.9 °C) (11/13/18 1138)  Pulse: 71 (11/13/18 1340)  Resp: (!) 21 (11/13/18 1340)  BP: 122/60 (11/13/18 1230)  SpO2: 100 % (11/13/18 1340) Vital Signs  (24h Range):  Temp:  [98.5 °F (36.9 °C)-98.8 °F (37.1 °C)] 98.5 °F (36.9 °C)  Pulse:  [59-73] 71  Resp:  [18-22] 21  SpO2:  [94 %-100 %] 100 %  BP: (122-152)/(60-72) 122/60     Weight: 54 kg (119 lb 0.8 oz)  Body mass index is 24.88 kg/m².    Intake/Output - Last 3 Shifts       11/11 0700 - 11/12 0659 11/12 0700 - 11/13 0659 11/13 0700 - 11/14 0659    P.O. 420 900     I.V. (mL/kg) 109.3 (2)      Blood 101.7      Total Intake(mL/kg) 630.9 (11.6) 900 (16.5)     Urine (mL/kg/hr) 775 (0.6) 1450 (1.1) 175 (0.4)    Stool 0 0 0    Total Output 775 1450 175    Net -144.1 -550 -175           Stool Occurrence 10 x 10 x 4 x          Physical Exam   Constitutional: She is oriented to person, place, and time. She appears well-developed.   Chronically ill-appearing. Malnourished. Temporal wasting.     HENT:   Head: Normocephalic and atraumatic.   Mouth/Throat: Oropharynx is clear and moist.   Eyes: Conjunctivae are normal. No scleral icterus.   Neck: Normal range of motion.   Cardiovascular: Normal rate, regular rhythm and normal heart sounds.   Pulmonary/Chest: Effort normal. No respiratory distress. She has decreased breath sounds in the right lower field and the left lower field. She has wheezes. She has rales.   Abdominal: Soft. Bowel sounds are normal. She exhibits distension. There is no tenderness. No hernia.   Small ascites   Musculoskeletal: She exhibits edema (trace).   Lymphadenopathy:     She has no cervical adenopathy.   Neurological: She is alert and oriented to person, place, and time.   Mild asterixis   Skin: Skin is warm and dry. No rash noted.   Psychiatric: She has a normal mood and affect. Her behavior is normal. Her mood appears not anxious.   Nursing note and vitals reviewed.      Laboratory:  Immunosuppressants     None        CBC:   Recent Labs   Lab 11/13/18  0629   WBC 9.53   RBC 3.39*   HGB 10.6*   HCT 32.3*   PLT 69*   MCV 95   MCH 31.3*   MCHC 32.8     CMP:   Recent Labs   Lab 11/13/18  0629   GLU  336*   CALCIUM 8.4*   ALBUMIN 2.8*   PROT 5.3*   *   K 3.6   CO2 23      BUN 53*   CREATININE 1.4   ALKPHOS 110   ALT 18   AST 31   BILITOT 3.9*     Coagulation:   Recent Labs   Lab 11/10/18  0800  11/13/18  0629   INR 2.0*   < > 1.9*   APTT 30.7  --   --     < > = values in this interval not displayed.     Labs within the past 24 hours have been reviewed.    Diagnostic Results:  I have personally reviewed all pertinent imaging studies.

## 2018-11-14 NOTE — ASSESSMENT & PLAN NOTE
Listed for liver transplant with MELD 26. Per hepatologist, remains on internal hold. Continue to monitor.   Plan to discuss pt candidacy once improves with resp status and nutrition.      MELD-Na score: 26 at 11/14/2018  6:46 AM  MELD score: 23 at 11/14/2018  6:46 AM  Calculated from:  Serum Creatinine: 1.9 mg/dL at 11/14/2018  6:46 AM  Serum Sodium: 132 mmol/L at 11/14/2018  6:46 AM  Total Bilirubin: 3.1 mg/dL at 11/14/2018  6:46 AM  INR(ratio): 1.8 at 11/14/2018  6:46 AM  Age: 67 years

## 2018-11-14 NOTE — PROGRESS NOTES
Noted Supplement order Epic but not on ticket. Discussed with Eloina in call center.    Day 2: Calorie Count  11/13/18    Percent Intake  Calories   Breakfast  Scrambled Eggs (1/2cup)  Grits (1/2 cup)  [place] Sausage Links (1 each)  [place] Sections (1/2 orange)  Skim Milk (8oz)  Lunch  No Lunch Recorded  Dinner  Chicken Salad (1 cup)  Strawberry Shortcake  Grape Juice (2-4oz)   100%  100%  100%  100%  100%        75%  50%  100%   76  65  34  42  83        376  122  152     Full assessment completed, see Dietitian Note 11/13/2018.

## 2018-11-14 NOTE — PLAN OF CARE
Problem: Physical Therapy Goal  Goal: Physical Therapy Goal  Goals to be met by: 18     Patient will increase functional independence with mobility by performin. Supine to sit with Stand-by Assistance  2. Sit to stand transfer with Stand-by Assistance  3. Gait  x 150 feet with Contact Guard Assistance using AD as needed.   4. Lower extremity exercise program x15 reps, with supervision, in order to increase LE strength and (I) with functional mobility.      Outcome: Ongoing (interventions implemented as appropriate)  Goals remain appropriate.

## 2018-11-14 NOTE — PROGRESS NOTES
[facility]  Liver Transplant  Progress Note    Patient Name: Krystal Hobson  MRN: [id number]  Admission Date: 11/10/2018  Hospital Length of Stay: 4 days  Code Status: Full Code  Primary Care Provider: Courtney Joe MD    Subjective:     History of Present Illness:  Ms. Krystal Hobson is a 67 yr F w/ hx of cryptogenic cirrhosis, listed for liver tx 9/14/18 who presented to ER at outside hospital in [place], [place] with increased abdominal pain and then noted to have AMS on arrival and ENZO on lab work. Per the , she was extremely disoriented and her ammonia reached as high as 200. Her Cr level increased to 2.0 at the OSH. She also went into A fib with RVR and was started on a diltiazem infusion at OSH. She was then transferred to [facility] for further care. On arrival pt was noted with confusion which has now resolved with lactulose. She also was noted with a cough and fatigue.             Hospital Course:  Interval History: no acute events overnight. AAOx3. Pt reports feeling about the same this am. With complaints of throat pain this am. chloraseptic spray ordered for pain relief. Pt able to take thin liquids with ease and no coughing noted. Chest xray noted with improvement this am. Pt recently with Afib w/ RVR but now controlled with lopressor. Cardiology consulted for management and IV lasix given for diuresis yesterday. Plan to decrease lasix today given pt's increase in Cr level. High SHY-VASc but due to coagulopathy - would not give anticoagulation, can start on aspirin. 2D echo with 65% EF but elevated PA pressure. Currently on internal hold and will need to discuss when pt may become active again. MELD 26 today. She appears frail and will need to be aggressive with nutrition and PT.     Scheduled Meds:   guaiFENesin  600 mg Oral BID    heparin (porcine)  5,000 Units Subcutaneous Q8H    insulin aspart U-100  14 Units Subcutaneous TIDWM    insulin detemir U-100  10 Units Subcutaneous BID     lactulose  30 g Oral TID    levalbuterol  1.25 mg Nebulization Q6H WAKE    metoprolol tartrate  25 mg Oral BID    moxifloxacin  400 mg Oral Daily    rifAXImin  550 mg Oral BID     Continuous Infusions:  PRN Meds:sodium chloride, acetaminophen, benzonatate, dextrose 50%, dextrose 50%, glucagon (human recombinant), glucose, glucose, guaifenesin 100 mg/5 ml, insulin aspart U-100, lactulose, ondansetron, simethicone, sodium chloride 0.9%    Review of Systems   Constitutional: Positive for activity change, appetite change and fatigue. Negative for chills and fever.   HENT: Negative for congestion, ear discharge, ear pain and sinus pressure.    Respiratory: Positive for cough, shortness of breath and wheezing. Negative for apnea and chest tightness.    Cardiovascular: Positive for leg swelling. Negative for chest pain and palpitations.   Gastrointestinal: Negative for abdominal distention, abdominal pain, anal bleeding, blood in stool, diarrhea, nausea and vomiting.   Endocrine: Negative for cold intolerance and heat intolerance.   Genitourinary: Positive for decreased urine volume. Negative for dysuria, menstrual problem and urgency.   Musculoskeletal: Negative for arthralgias and back pain.   Skin: Negative for pallor.   Neurological: Positive for weakness. Negative for dizziness and headaches.   Hematological: Bruises/bleeds easily.   Psychiatric/Behavioral: Negative for agitation, behavioral problems, confusion, decreased concentration and sleep disturbance.     Objective:     Vital Signs (Most Recent):  Temp: 98 °F (36.7 °C) (11/14/18 1152)  Pulse: 79 (11/14/18 1238)  Resp: 20 (11/14/18 1238)  BP: 117/66 (11/14/18 1200)  SpO2: 98 % (11/14/18 1238) Vital Signs (24h Range):  Temp:  [98 °F (36.7 °C)-98.8 °F (37.1 °C)] 98 °F (36.7 °C)  Pulse:  [52-79] 79  Resp:  [15-28] 20  SpO2:  [94 %-100 %] 98 %  BP: (110-120)/(55-66) 117/66     Weight: 52.4 kg (115 lb 10.1 oz)  Body mass index is 24.17 kg/m².    Intake/Output -  Last 3 Shifts       11/12 0700 - 11/13 0659 11/13 0700 - 11/14 0659 11/14 0700 - 11/15 0659    P.O. 900 480 780    I.V. (mL/kg)   100 (1.9)    Blood       Total Intake(mL/kg) 900 (16.5) 480 (9.2) 880 (16.8)    Urine (mL/kg/hr) 1450 (1.1) 580 (0.5) 250 (0.7)    Emesis/NG output  0     Stool 0 0 0    Total Output 1450 580 250    Net -550 -100 +630           Urine Occurrence  1 x     Stool Occurrence 10 x 7 x 1 x    Emesis Occurrence  3 x           Physical Exam   Constitutional: She is oriented to person, place, and time. She appears well-developed.   Chronically ill-appearing. Malnourished. Temporal wasting.     HENT:   Head: Normocephalic and atraumatic.   Mouth/Throat: Oropharynx is clear and moist.   Eyes: Conjunctivae are normal. No scleral icterus.   Neck: Normal range of motion.   Cardiovascular: Normal rate, regular rhythm and normal heart sounds.   Pulmonary/Chest: Effort normal. No respiratory distress. She has decreased breath sounds in the right lower field and the left lower field. She has wheezes. She has rales.   Abdominal: Soft. Bowel sounds are normal. She exhibits distension. There is no tenderness. No hernia.   Small ascites   Musculoskeletal: She exhibits edema (trace).   Lymphadenopathy:     She has no cervical adenopathy.   Neurological: She is alert and oriented to person, place, and time.   Mild asterixis   Skin: Skin is warm and dry. No rash noted.   Psychiatric: She has a normal mood and affect. Her behavior is normal. Her mood appears not anxious.   Nursing note and vitals reviewed.      Laboratory:  Immunosuppressants     None        CBC:   Recent Labs   Lab 11/14/18  0646   WBC 6.39   RBC 3.12*   HGB 9.7*   HCT 29.6*   PLT 59*   MCV 95   MCH 31.1*   MCHC 32.8     CMP:   Recent Labs   Lab 11/14/18  0646   *   CALCIUM 8.0*   ALBUMIN 2.5*   PROT 4.7*   *   K 3.1*   CO2 22*      BUN 63*   CREATININE 1.9*   ALKPHOS 124   ALT 19   AST 34   BILITOT 3.1*     Coagulation:    Recent Labs   Lab 11/10/18  0800  11/14/18  0646   INR 2.0*   < > 1.8*   APTT 30.7  --   --     < > = values in this interval not displayed.     Labs within the past 24 hours have been reviewed.    Diagnostic Results:  I have personally reviewed all pertinent imaging studies.    Assessment/Plan:     * Hepatic encephalopathy    Acute on chronic  PO lactulose ordered, continue rifaximin.   PRN lactulose enema TID for worsening confusion  Titrate for 3-4 BM a day.   AAOx3 and reports feeling well.        End stage liver disease    Listed for liver transplant with MELD 26. Per hepatologist, remains on internal hold. Continue to monitor.   Plan to discuss pt candidacy once improves with resp status and nutrition.      MELD-Na score: 26 at 11/14/2018  6:46 AM  MELD score: 23 at 11/14/2018  6:46 AM  Calculated from:  Serum Creatinine: 1.9 mg/dL at 11/14/2018  6:46 AM  Serum Sodium: 132 mmol/L at 11/14/2018  6:46 AM  Total Bilirubin: 3.1 mg/dL at 11/14/2018  6:46 AM  INR(ratio): 1.8 at 11/14/2018  6:46 AM  Age: 67 years         Acute kidney injury superimposed on CKD    Appears to have CKD3/4 at baseline creatinine  Urine electrolytes ordered  Etiology possibly due to hepatorenal syndrome.   HRS protocol started, albumin and octreotide. No midodrine as with Afib.   Cr level improved but then noted with slight increase today (11/14).   Will plan to decrease lasix today given increase in Cr level.   Plan for albumin today as well.      Atrial fibrillation with rapid ventricular response    Patient with episode of Afib with RVR at OSH and was placed on diltiazem gtt which was discontinued 11/10 as she reverted to NSR.  Cardiology consulted for management.   Pt then returned back into Afib w/ RVR on the afternoon of 11/11 and pt placed back on diltiazem for rate control.   Pt now rated controlled and diltiazem d/c'd on 11/12 at 0200. Cont with lopressor at this time as pt remains rate controlled.    2D echo performed today to  assess cardiac risk and for discussion of candidacy with Transplant Surgery and Anesthesiology.   PA pressure on 2D echo noted elevated. Plan to diuresis.          Severe malnutrition    Severe Hand and temporal muscle wasting  Decreased energy  Frail appearing  Boost supplements ordered  Dietician consulted.     Pneumonia of right lung due to infectious organism    - Noted on recent chest xray.   - Placed on BS antibxs for treatment.   - Transition to PO Moxi (11/13) to complete a 7 day course.        Sinus tachycardia    - Improved with BB.        CKD (chronic kidney disease) stage 3, GFR 30-59 ml/min    See ENZO.       Chronic liver disease           Type 2 diabetes mellitus with hyperglycemia, with long-term current use of insulin    Sliding scale insulin  Consult endocrine for management.        Anemia of chronic disease    H/H stable. Continue to monitor with daily cbc.        Thrombocytopenia due to hypersplenism    No s/s of bleeding.  Continue to monitor.        Decompensated hepatic cirrhosis    - See hepatic encephalopathy.            VTE Risk Mitigation (From admission, onward)        Ordered     IP VTE HIGH RISK PATIENT  Once      11/10/18 0213     heparin (porcine) injection 5,000 Units  Every 8 hours      11/10/18 0213          The patients clinical status was discussed at multidisplinary rounds, involving transplant surgery, transplant medicine, pharmacy, nursing, nutrition, and social work    Discharge Planning:  Not a candidate for d/c at this time.     Jacky Crooks NP  Liver Transplant  [facility]

## 2018-11-14 NOTE — ASSESSMENT & PLAN NOTE
BG goal 140-180    Increase Levemir to 10 units BID  Increase Novolog to 14 units with meals  Moderate dose correction scale  BG monitoring AC/HS    Discharge planning: VEGA

## 2018-11-14 NOTE — ASSESSMENT & PLAN NOTE
Patient with episode of Afib with RVR at OSH and was placed on diltiazem gtt which was discontinued 11/10 as she reverted to NSR.  Cardiology consulted for management.   Pt then returned back into Afib w/ RVR on the afternoon of 11/11 and pt placed back on diltiazem for rate control.   Pt now rated controlled and diltiazem d/c'd on 11/12 at 0200. Cont with lopressor at this time as pt remains rate controlled.    2D echo performed today to assess cardiac risk and for discussion of candidacy with Transplant Surgery and Anesthesiology.   PA pressure on 2D echo noted elevated. Plan to diuresis.

## 2018-11-14 NOTE — ASSESSMENT & PLAN NOTE
Appears to have CKD3/4 at baseline creatinine  Urine electrolytes ordered  Etiology possibly due to hepatorenal syndrome.   HRS protocol started, albumin and octreotide. No midodrine as with Afib.   Cr level improved but then noted with slight increase today (11/14).   Will plan to decrease lasix today given increase in Cr level.   Plan for albumin today as well.

## 2018-11-14 NOTE — PLAN OF CARE
Pt AAOx4, VSS, afebrile. Telemetry monitoring continued, showing SB - SR.  Pt maintained SpO2>94% on 3L NC throughout shift.  Pt denies c/o pain or n/v overnight.  Fall risk precautions initiated.  Pt in chair with alarm set, lighting adjusted, pt to wear nonskid socks when ambulating.  Pt slept in chair as per preference.  Caregiver at bedside. Pt remain free from falls during shift.    Call light within reach. Will continue to monitor.

## 2018-11-14 NOTE — PLAN OF CARE
Problem: Patient Care Overview  Goal: Plan of Care Review  Outcome: Ongoing (interventions implemented as appropriate)  Patient is AAOx3, requires 1 person for assistance. Patient denies any pain or nausea. Non-slip socks are in use. PT is working with the patient. Patient received lasix and Albumin today. Patient also received PO K replacement and simethicone for gas pain. Patient remains NSR on telemetry. Monitoring the patient's blood sugar AC&HS. Patient has been sitting up in the chair all day today. Reminded the patient and her  to call for assistance. Call light and personal items are within reach.

## 2018-11-14 NOTE — SUBJECTIVE & OBJECTIVE
"Interval HPI:   Overnight events:  Remains in TSU, NAEON. BG markedly elevated yesterday and throughout the night, despite aggressive changes in insulin regimen.  Improved BG this am of 224.  Creatinine trending up, 1.9 this am. Converted from IV antibiotics to PO yesterday.  Eatin%  Nausea: No  Hypoglycemia and intervention: No  Fever: No  TPN and/or TF: No    BP (!) 115/55   Pulse 65   Temp 98.8 °F (37.1 °C) (Oral)   Resp (!) 22   Ht 4' 10" (1.473 m)   Wt 52.4 kg (115 lb 10.1 oz)   SpO2 96%   Breastfeeding? No   BMI 24.17 kg/m²     Labs Reviewed and Include    Recent Labs   Lab 18  0646   *   CALCIUM 8.0*   ALBUMIN 2.5*   PROT 4.7*   *   K 3.1*   CO2 22*      BUN 63*   CREATININE 1.9*   ALKPHOS 124   ALT 19   AST 34   BILITOT 3.1*     Lab Results   Component Value Date    WBC 6.39 2018    HGB 9.7 (L) 2018    HCT 29.6 (L) 2018    MCV 95 2018    PLT 59 (L) 2018     Recent Labs   Lab 18  0901   TSH 0.212*   FREET4 1.09     Lab Results   Component Value Date    HGBA1C 5.8 (H) 11/10/2018       Nutritional status:   Body mass index is 24.17 kg/m².  Lab Results   Component Value Date    ALBUMIN 2.5 (L) 2018    ALBUMIN 2.8 (L) 2018    ALBUMIN 3.6 2018     No results found for: PREALBUMIN    Estimated Creatinine Clearance: 23.8 mL/min (A) (based on SCr of 1.9 mg/dL (H)).    Accu-Checks  Recent Labs     18  2048 18  0755 18  1141 18  1641 18  2132 18  0748 18  1152 18  1154 18  1706 18  2058   POCTGLUCOSE 341* 263* 408* 401* 481* 376* >500* 433* 346* 447*       Current Medications and/or Treatments Impacting Glycemic Control  Immunotherapy:    Immunosuppressants     None        Steroids:   Hormones (From admission, onward)    None        Pressors:    Autonomic Drugs (From admission, onward)    None        Hyperglycemia/Diabetes Medications:   Antihyperglycemics (From " admission, onward)    Start     Stop Route Frequency Ordered    11/13/18 2100  insulin detemir U-100 pen 8 Units      -- SubQ 2 times daily 11/13/18 1713    11/13/18 1813  insulin aspart U-100 pen 1-10 Units      -- SubQ Before meals & nightly PRN 11/13/18 1713    11/13/18 1230  insulin aspart U-100 pen 10 Units      -- SubQ 3 times daily with meals 11/13/18 1222

## 2018-11-14 NOTE — TELEPHONE ENCOUNTER
PATIENT NAME: Krystal Hobson  [facility] #: [id number]    Lab Results   Component Value Date    CREATININE 1.9 (H) 11/14/2018     (L) 11/14/2018    BILITOT 3.1 (H) 11/14/2018    ALBUMIN 2.5 (L) 11/14/2018    INR 1.8 (H) 11/14/2018     MELD 26  Encephalopathy: 3 - 4  Ascites: slight  Dialysis: no     Recertification requestor: Julia Herrera

## 2018-11-14 NOTE — SUBJECTIVE & OBJECTIVE
Scheduled Meds:   guaiFENesin  600 mg Oral BID    heparin (porcine)  5,000 Units Subcutaneous Q8H    insulin aspart U-100  14 Units Subcutaneous TIDWM    insulin detemir U-100  10 Units Subcutaneous BID    lactulose  30 g Oral TID    levalbuterol  1.25 mg Nebulization Q6H WAKE    metoprolol tartrate  25 mg Oral BID    moxifloxacin  400 mg Oral Daily    rifAXImin  550 mg Oral BID     Continuous Infusions:  PRN Meds:sodium chloride, acetaminophen, benzonatate, dextrose 50%, dextrose 50%, glucagon (human recombinant), glucose, glucose, guaifenesin 100 mg/5 ml, insulin aspart U-100, lactulose, ondansetron, simethicone, sodium chloride 0.9%    Review of Systems   Constitutional: Positive for activity change, appetite change and fatigue. Negative for chills and fever.   HENT: Negative for congestion, ear discharge, ear pain and sinus pressure.    Respiratory: Positive for cough, shortness of breath and wheezing. Negative for apnea and chest tightness.    Cardiovascular: Positive for leg swelling. Negative for chest pain and palpitations.   Gastrointestinal: Negative for abdominal distention, abdominal pain, anal bleeding, blood in stool, diarrhea, nausea and vomiting.   Endocrine: Negative for cold intolerance and heat intolerance.   Genitourinary: Positive for decreased urine volume. Negative for dysuria, menstrual problem and urgency.   Musculoskeletal: Negative for arthralgias and back pain.   Skin: Negative for pallor.   Neurological: Positive for weakness. Negative for dizziness and headaches.   Hematological: Bruises/bleeds easily.   Psychiatric/Behavioral: Negative for agitation, behavioral problems, confusion, decreased concentration and sleep disturbance.     Objective:     Vital Signs (Most Recent):  Temp: 98 °F (36.7 °C) (11/14/18 1152)  Pulse: 79 (11/14/18 1238)  Resp: 20 (11/14/18 1238)  BP: 117/66 (11/14/18 1200)  SpO2: 98 % (11/14/18 1238) Vital Signs (24h Range):  Temp:  [98 °F (36.7 °C)-98.8 °F  (37.1 °C)] 98 °F (36.7 °C)  Pulse:  [52-79] 79  Resp:  [15-28] 20  SpO2:  [94 %-100 %] 98 %  BP: (110-120)/(55-66) 117/66     Weight: 52.4 kg (115 lb 10.1 oz)  Body mass index is 24.17 kg/m².    Intake/Output - Last 3 Shifts       11/12 0700 - 11/13 0659 11/13 0700 - 11/14 0659 11/14 0700 - 11/15 0659    P.O. 900 480 780    I.V. (mL/kg)   100 (1.9)    Blood       Total Intake(mL/kg) 900 (16.5) 480 (9.2) 880 (16.8)    Urine (mL/kg/hr) 1450 (1.1) 580 (0.5) 250 (0.7)    Emesis/NG output  0     Stool 0 0 0    Total Output 1450 580 250    Net -550 -100 +630           Urine Occurrence  1 x     Stool Occurrence 10 x 7 x 1 x    Emesis Occurrence  3 x           Physical Exam   Constitutional: She is oriented to person, place, and time. She appears well-developed.   Chronically ill-appearing. Malnourished. Temporal wasting.     HENT:   Head: Normocephalic and atraumatic.   Mouth/Throat: Oropharynx is clear and moist.   Eyes: Conjunctivae are normal. No scleral icterus.   Neck: Normal range of motion.   Cardiovascular: Normal rate, regular rhythm and normal heart sounds.   Pulmonary/Chest: Effort normal. No respiratory distress. She has decreased breath sounds in the right lower field and the left lower field. She has wheezes. She has rales.   Abdominal: Soft. Bowel sounds are normal. She exhibits distension. There is no tenderness. No hernia.   Small ascites   Musculoskeletal: She exhibits edema (trace).   Lymphadenopathy:     She has no cervical adenopathy.   Neurological: She is alert and oriented to person, place, and time.   Mild asterixis   Skin: Skin is warm and dry. No rash noted.   Psychiatric: She has a normal mood and affect. Her behavior is normal. Her mood appears not anxious.   Nursing note and vitals reviewed.      Laboratory:  Immunosuppressants     None        CBC:   Recent Labs   Lab 11/14/18  0646   WBC 6.39   RBC 3.12*   HGB 9.7*   HCT 29.6*   PLT 59*   MCV 95   MCH 31.1*   MCHC 32.8     CMP:   Recent Labs    Lab 11/14/18  0646   *   CALCIUM 8.0*   ALBUMIN 2.5*   PROT 4.7*   *   K 3.1*   CO2 22*      BUN 63*   CREATININE 1.9*   ALKPHOS 124   ALT 19   AST 34   BILITOT 3.1*     Coagulation:   Recent Labs   Lab 11/10/18  0800  11/14/18  0646   INR 2.0*   < > 1.8*   APTT 30.7  --   --     < > = values in this interval not displayed.     Labs within the past 24 hours have been reviewed.    Diagnostic Results:  I have personally reviewed all pertinent imaging studies.

## 2018-11-14 NOTE — ASSESSMENT & PLAN NOTE
Avoid insulin stacking and hypoglycemia.  Lab Results   Component Value Date    CREATININE 1.9 (H) 11/14/2018

## 2018-11-14 NOTE — PT/OT/SLP PROGRESS
Physical Therapy Treatment    Patient Name:  Krystal Hobson   MRN:  [id number]    Recommendations:     Discharge Recommendations:  home health PT   Discharge Equipment Recommendations: (TBD)   Barriers to discharge: Inaccessible home- 7 steps to enter (unable to practice today)    Assessment:     Krystal Hobson is a 67 y.o. female admitted with a medical diagnosis of Hepatic encephalopathy.  She presents with the following impairments/functional limitations:  impaired endurance, gait instability, impaired balance, impaired self care skills, impaired cardiopulmonary response to activity.  Ms. Hobson ambulated 60 feet today and declined any further activity such as exercises. She fatigues pretty quickly but was able to ambulate the full 60 feet with no seated rest break in between using a rolling walker and requiring CGA. In addition, she required Max A for donning a brief prior to standing activity.    Rehab Prognosis:  fair; patient would benefit from acute skilled PT services to address these deficits and reach maximum level of function.      Recent Surgery: * No surgery found *      Plan:     During this hospitalization, patient to be seen 4 x/week to address the above listed problems via gait training, therapeutic activities, therapeutic exercises, neuromuscular re-education  · Plan of Care Expires:  12/11/18   Plan of Care Reviewed with: patient, spouse    Subjective     Communicated with nurse prior to session.  Patient found seated on BSC upon PT entry to room, agreeable to treatment.      Chief Complaint: none  Patient comments/goals: none  Pain/Comfort:  · Pain Rating 1: 0/10    Patients cultural, spiritual, Sabianist conflicts given the current situation: n/a    Objective:     Patient found with: telemetry     General Precautions: Standard, fall   Orthopedic Precautions:N/A   Braces: N/A     Functional Mobility:  · Transfers:  Sit to Stand:  Contact guard assistance with rolling walker  · Gait: 60 feet with  rolling walker, swing-through gait pattern, decreased katina- CGA.  · Balance: Needs UE support via stable surface and/or rolling walker while standing for stability- CGA.      AM-PAC 6 CLICK MOBILITY  Turning over in bed (including adjusting bedclothes, sheets and blankets)?: 4  Sitting down on and standing up from a chair with arms (e.g., wheelchair, bedside commode, etc.): 3  Moving from lying on back to sitting on the side of the bed?: 3  Moving to and from a bed to a chair (including a wheelchair)?: 3  Need to walk in hospital room?: 3  Climbing 3-5 steps with a railing?: 3  Basic Mobility Total Score: 19       Therapeutic Activities and Exercises:  · Pt was assisted with donning her brief today with Total A.  · She also was assisted with toileting (total A).   · She declined any further activity after her gait trial due to fatigue. Pt's  was present throughout her session.    Patient left up in chair with call button in reach and  present..    GOALS:   Multidisciplinary Problems     Physical Therapy Goals        Problem: Physical Therapy Goal    Goal Priority Disciplines Outcome Goal Variances Interventions   Physical Therapy Goal     PT, PT/OT Ongoing (interventions implemented as appropriate)     Description:  Goals to be met by: 18     Patient will increase functional independence with mobility by performin. Supine to sit with Stand-by Assistance  2. Sit to stand transfer with Stand-by Assistance  3. Gait  x 150 feet with Contact Guard Assistance using AD as needed.   4. Lower extremity exercise program x15 reps, with supervision, in order to increase LE strength and (I) with functional mobility.                       Time Tracking:     PT Received On: 18  PT Start Time: 1210     PT Stop Time: 1233  PT Total Time (min): 23 min     Billable Minutes: Gait Training 15 and Therapeutic Activity 8    Treatment Type: Treatment  PT/PTA: PT     PTA Visit Number: 0     Evangelina VÁSQUEZ  Roel, PT  11/14/2018

## 2018-11-14 NOTE — PROGRESS NOTES
"[facility]  Endocrinology  Progress Note    Admit Date: 11/10/2018     Reason for Consult: Management of type 2 DM, Hyperglycemia     Surgical Procedure and Date: n/a    Diabetes diagnosis year:     Home Diabetes Medications: Levemir 12 units BID (vial) and Humalog SSI with meals   Lab Results   Component Value Date    HGBA1C 5.8 (H) 11/10/2018         How often checking glucose at home? 1-3  BG readings on regimen: 150-300  Hypoglycemia on the regimen? once 27; required visit to ED; gave Levemir and humalog and patient did not eat  Missed doses on regimen?  No    Diabetes Complications include:     Hyperglycemia, Hypoglycemia  and Diabetic peripheral neuropathy     Complicating diabetes co morbidities:   CIRRHOSIS      HPI:   Patient is a 67 y.o. female with a diagnosis of type 2 DM; well controlled on MDI. Also with   Cryptogenic cirrhosis, rheumatoid arthritis, and GERD. Patient was listed for liver transplant on 18. Patient presented to ED of hospital in [place] with AMS and ENZO. Endocrinology consulted for BG/ DM management.             Interval HPI:   Overnight events:  Remains in TSU, NAEON. BG markedly elevated yesterday and throughout the night, despite aggressive changes in insulin regimen.  Improved BG this am of 224.  Creatinine trending up, 1.9 this am. Converted from IV antibiotics to PO yesterday.  Eatin%  Nausea: No  Hypoglycemia and intervention: No  Fever: No  TPN and/or TF: No    BP (!) 115/55   Pulse 65   Temp 98.8 °F (37.1 °C) (Oral)   Resp (!) 22   Ht 4' 10" (1.473 m)   Wt 52.4 kg (115 lb 10.1 oz)   SpO2 96%   Breastfeeding? No   BMI 24.17 kg/m²      Labs Reviewed and Include    Recent Labs   Lab 18  0646   *   CALCIUM 8.0*   ALBUMIN 2.5*   PROT 4.7*   *   K 3.1*   CO2 22*      BUN 63*   CREATININE 1.9*   ALKPHOS 124   ALT 19   AST 34   BILITOT 3.1*     Lab Results   Component Value Date    WBC 6.39 2018    HGB 9.7 " (L) 11/14/2018    HCT 29.6 (L) 11/14/2018    MCV 95 11/14/2018    PLT 59 (L) 11/14/2018     Recent Labs   Lab 11/11/18  0901   TSH 0.212*   FREET4 1.09     Lab Results   Component Value Date    HGBA1C 5.8 (H) 11/10/2018       Nutritional status:   Body mass index is 24.17 kg/m².  Lab Results   Component Value Date    ALBUMIN 2.5 (L) 11/14/2018    ALBUMIN 2.8 (L) 11/13/2018    ALBUMIN 3.6 11/12/2018     No results found for: PREALBUMIN    Estimated Creatinine Clearance: 23.8 mL/min (A) (based on SCr of 1.9 mg/dL (H)).    Accu-Checks  Recent Labs     11/11/18  2048 11/12/18  0755 11/12/18  1141 11/12/18  1641 11/12/18  2132 11/13/18  0748 11/13/18  1152 11/13/18  1154 11/13/18  1706 11/13/18  2058   POCTGLUCOSE 341* 263* 408* 401* 481* 376* >500* 433* 346* 447*       Current Medications and/or Treatments Impacting Glycemic Control  Immunotherapy:    Immunosuppressants     None        Steroids:   Hormones (From admission, onward)    None        Pressors:    Autonomic Drugs (From admission, onward)    None        Hyperglycemia/Diabetes Medications:   Antihyperglycemics (From admission, onward)    Start     Stop Route Frequency Ordered    11/13/18 2100  insulin detemir U-100 pen 8 Units      -- SubQ 2 times daily 11/13/18 1713    11/13/18 1813  insulin aspart U-100 pen 1-10 Units      -- SubQ Before meals & nightly PRN 11/13/18 1713    11/13/18 1230  insulin aspart U-100 pen 10 Units      -- SubQ 3 times daily with meals 11/13/18 1222          ASSESSMENT and PLAN    * Hepatic encephalopathy    Managed per primary.        Type 2 diabetes mellitus with hyperglycemia, with long-term current use of insulin    BG goal 140-180    Increase Levemir to 10 units BID  Increase Novolog to 14 units with meals  Moderate dose correction scale  BG monitoring AC/HS    Discharge planning: TBD       Acute kidney injury superimposed on CKD    Avoid insulin stacking and hypoglycemia.  Lab Results   Component Value Date    CREATININE 1.9 (H)  11/14/2018            Decompensated hepatic cirrhosis    Managed per primary.   Listed for liver transplant.  May impact BG readings           Tristan Hwang NP  Endocrinology  [facility]

## 2018-11-14 NOTE — PLAN OF CARE
Problem: Patient Care Overview  Goal: Plan of Care Review  Outcome: Outcome(s) achieved Date Met: 11/13/18  Pt resting comfortably in chair throughout day. Pt took several naps and used her call light to call for assistance to bedside commode. Pt was oriented throughout day.  at bedside. Lasix 60mg iv bid. Low uop today but frequent loose stools. Lactulose given once today due to 5 bms today. cxr in am. Pt did well with meals today 75% with each meal. Will continue to increase po intake. Pt denies pain. Chair alarm is on while pt remains in chair. Seat cushion ordered due to preference to chair.

## 2018-11-15 NOTE — ASSESSMENT & PLAN NOTE
- Noted on recent chest xray.   - Initially placed on BS antibxs for treatment.    - Transitioned to PO Moxi (11/13) to complete a 7 day course.   - Pt with elevated WBC on lab work 11/14 and placed back on BS antibxs at that time.   - WBC then with quick improvement. Now transitioned back to Moxi.

## 2018-11-15 NOTE — ASSESSMENT & PLAN NOTE
Caution with insulin stacking  Estimated Creatinine Clearance: 28.3 mL/min (A) (based on SCr of 1.6 mg/dL (H)).

## 2018-11-15 NOTE — SUBJECTIVE & OBJECTIVE
"Interval HPI:   Overnight events: Remains in TSU, profound hypoglycemic event overnight (BG < 20).  BG elevated this am (315) after D50 administration for low BG.  On IV antibiotics.  Eatin-100% - intake varies greatly affected insulin requirements  Nausea: No  Hypoglycemia and intervention: Yes - D50 25 g x 4 (3 amps given to infiltrated PIV)  Fever: No  TPN and/or TF: No    BP (!) 160/79   Pulse 61   Temp 98.4 °F (36.9 °C)   Resp 20   Ht 4' 10" (1.473 m)   Wt 52.4 kg (115 lb 10.1 oz)   SpO2 98%   Breastfeeding? No   BMI 24.17 kg/m²     Labs Reviewed and Include    Recent Labs   Lab 11/15/18  0726   *   CALCIUM 8.1*   ALBUMIN 2.4*   PROT 4.7*   *   K 3.5   CO2 21*      BUN 61*   CREATININE 1.6*   ALKPHOS 124   ALT 29   AST 63*   BILITOT 4.2*     Lab Results   Component Value Date    WBC 5.61 11/15/2018    HGB 9.8 (L) 11/15/2018    HCT 31.0 (L) 11/15/2018    MCV 95 11/15/2018    PLT 62 (L) 11/15/2018     Recent Labs   Lab 18  0901   TSH 0.212*   FREET4 1.09     Lab Results   Component Value Date    HGBA1C 5.8 (H) 11/10/2018       Nutritional status:   Body mass index is 24.17 kg/m².  Lab Results   Component Value Date    ALBUMIN 2.4 (L) 11/15/2018    ALBUMIN 2.8 (L) 2018    ALBUMIN 2.5 (L) 2018     No results found for: PREALBUMIN    Estimated Creatinine Clearance: 28.3 mL/min (A) (based on SCr of 1.6 mg/dL (H)).    Accu-Checks  Recent Labs     18  2250 18  2303 18  2309 18  2328 18  2357 11/15/18  0209 11/15/18  0315 11/15/18  0511 11/15/18  0611 11/15/18  1000   POCTGLUCOSE 29* 30* 64* 227* 313* 403* 415* 403* 365* 315*       Current Medications and/or Treatments Impacting Glycemic Control  Immunotherapy:    Immunosuppressants     None        Steroids:   Hormones (From admission, onward)    None        Pressors:    Autonomic Drugs (From admission, onward)    None        Hyperglycemia/Diabetes Medications:   Antihyperglycemics (From " admission, onward)    Start     Stop Route Frequency Ordered    11/15/18 0900  insulin detemir U-100 pen 10 Units      -- SubQ 2 times daily 11/15/18 0758    11/15/18 0800  insulin aspart U-100 pen 6-12 Units      -- SubQ 3 times daily with meals 11/15/18 0758    11/15/18 0041  insulin aspart U-100 pen 0-5 Units      -- SubQ Before meals & nightly PRN 11/14/18 1304

## 2018-11-15 NOTE — ASSESSMENT & PLAN NOTE
Appears to have CKD3/4 at baseline creatinine  Urine electrolytes ordered  Etiology possibly due to hepatorenal syndrome.   HRS protocol started, albumin and octreotide. No midodrine as with Afib.   Cr level improved but then noted with slight increase today (11/14).   Will plan to diurese with lasix and albumin today. Good UOP.   Strict I&Os.

## 2018-11-15 NOTE — ASSESSMENT & PLAN NOTE
BG goal 140-180    Restart Levemir 10 units BID  Decrease Novolog to 6-12 units with meals given variable meal intake  Low dose correction scale  BG monitoring AC/HS/0200    ** Please call Endocrine for any BG related issues **    Discharge planning: VEGA

## 2018-11-15 NOTE — PROGRESS NOTES
"[facility]  Endocrinology  Progress Note    Admit Date: 11/10/2018     Reason for Consult: Management of type 2 DM, Hyperglycemia     Surgical Procedure and Date: n/a    Diabetes diagnosis year:     Home Diabetes Medications: Levemir 12 units BID (vial) and Humalog SSI with meals   Lab Results   Component Value Date    HGBA1C 5.8 (H) 11/10/2018         How often checking glucose at home? 1-3  BG readings on regimen: 150-300  Hypoglycemia on the regimen? once 27; required visit to ED; gave Levemir and humalog and patient did not eat  Missed doses on regimen?  No    Diabetes Complications include:     Hyperglycemia, Hypoglycemia  and Diabetic peripheral neuropathy     Complicating diabetes co morbidities:   CIRRHOSIS      HPI:   Patient is a 67 y.o. female with a diagnosis of type 2 DM; well controlled on MDI. Also with   Cryptogenic cirrhosis, rheumatoid arthritis, and GERD. Patient was listed for liver transplant on 18. Patient presented to ED of hospital in [place] with AMS and ENZO. Endocrinology consulted for BG/ DM management.   Of note, profound hypoglycemic event (BG < 20) the night of 18.            Interval HPI:   Overnight events: Remains in TSU, profound hypoglycemic event overnight (BG < 20).  BG elevated this am (315) after D50 administration for low BG.  On IV antibiotics.  Eatin-100% - intake varies greatly affected insulin requirements  Nausea: No  Hypoglycemia and intervention: Yes - D50 25 g x 4 (3 amps given to infiltrated PIV)  Fever: No  TPN and/or TF: No    BP (!) 160/79   Pulse 61   Temp 98.4 °F (36.9 °C)   Resp 20   Ht 4' 10" (1.473 m)   Wt 52.4 kg (115 lb 10.1 oz)   SpO2 98%   Breastfeeding? No   BMI 24.17 kg/m²      Labs Reviewed and Include    Recent Labs   Lab 11/15/18  0726   *   CALCIUM 8.1*   ALBUMIN 2.4*   PROT 4.7*   *   K 3.5   CO2 21*      BUN 61*   CREATININE 1.6*   ALKPHOS 124   ALT 29   AST 63*   BILITOT 4.2* "     Lab Results   Component Value Date    WBC 5.61 11/15/2018    HGB 9.8 (L) 11/15/2018    HCT 31.0 (L) 11/15/2018    MCV 95 11/15/2018    PLT 62 (L) 11/15/2018     Recent Labs   Lab 11/11/18  0901   TSH 0.212*   FREET4 1.09     Lab Results   Component Value Date    HGBA1C 5.8 (H) 11/10/2018       Nutritional status:   Body mass index is 24.17 kg/m².  Lab Results   Component Value Date    ALBUMIN 2.4 (L) 11/15/2018    ALBUMIN 2.8 (L) 11/14/2018    ALBUMIN 2.5 (L) 11/14/2018     No results found for: PREALBUMIN    Estimated Creatinine Clearance: 28.3 mL/min (A) (based on SCr of 1.6 mg/dL (H)).    Accu-Checks  Recent Labs     11/14/18  2250 11/14/18  2303 11/14/18  2309 11/14/18  2328 11/14/18  2357 11/15/18  0209 11/15/18  0315 11/15/18  0511 11/15/18  0611 11/15/18  1000   POCTGLUCOSE 29* 30* 64* 227* 313* 403* 415* 403* 365* 315*       Current Medications and/or Treatments Impacting Glycemic Control  Immunotherapy:    Immunosuppressants     None        Steroids:   Hormones (From admission, onward)    None        Pressors:    Autonomic Drugs (From admission, onward)    None        Hyperglycemia/Diabetes Medications:   Antihyperglycemics (From admission, onward)    Start     Stop Route Frequency Ordered    11/15/18 0900  insulin detemir U-100 pen 10 Units      -- SubQ 2 times daily 11/15/18 0758    11/15/18 0800  insulin aspart U-100 pen 6-12 Units      -- SubQ 3 times daily with meals 11/15/18 0758    11/15/18 0041  insulin aspart U-100 pen 0-5 Units      -- SubQ Before meals & nightly PRN 11/14/18 2341          ASSESSMENT and PLAN    * Hepatic encephalopathy    Managed per primary.        Type 2 diabetes mellitus with hyperglycemia, with long-term current use of insulin    BG goal 140-180    Restart Levemir 10 units BID  Decrease Novolog to 6-12 units with meals given variable meal intake  Low dose correction scale  BG monitoring AC/HS/0200    ** Please call Endocrine for any BG related issues **    Discharge  planning: TBD       Acute kidney injury superimposed on CKD    Avoid insulin stacking and hypoglycemia.  Lab Results   Component Value Date    CREATININE 1.6 (H) 11/15/2018            Decompensated hepatic cirrhosis    Managed per primary.   Listed for liver transplant.  May impact BG readings       Pneumonia of right lung due to infectious organism    Infection may elevate BG readings  On IV antibiotics       CKD (chronic kidney disease) stage 3, GFR 30-59 ml/min    Caution with insulin stacking  Estimated Creatinine Clearance: 28.3 mL/min (A) (based on SCr of 1.6 mg/dL (H)).             Tristan Hwang, SHANNON  Endocrinology  [facility]

## 2018-11-15 NOTE — SUBJECTIVE & OBJECTIVE
Scheduled Meds:   guaiFENesin  600 mg Oral BID    heparin (porcine)  5,000 Units Subcutaneous Q8H    insulin aspart U-100  6-12 Units Subcutaneous TIDWM    insulin detemir U-100  10 Units Subcutaneous BID    lactulose  30 g Oral TID    levalbuterol  1.25 mg Nebulization Q6H WAKE    magnesium oxide  400 mg Oral BID    metoprolol tartrate  25 mg Oral BID    moxifloxacin  400 mg Oral Daily    rifAXImin  550 mg Oral BID     Continuous Infusions:  PRN Meds:sodium chloride, acetaminophen, benzonatate, dextrose 50%, dextrose 50%, glucagon (human recombinant), glucose, glucose, guaifenesin 100 mg/5 ml, insulin aspart U-100, lactulose, ondansetron, simethicone, sodium chloride 0.9%    Review of Systems   Constitutional: Positive for activity change, appetite change and fatigue. Negative for chills and fever.   HENT: Negative for congestion, ear discharge, ear pain and sinus pressure.    Respiratory: Positive for cough and wheezing. Negative for apnea, chest tightness and shortness of breath.    Cardiovascular: Positive for leg swelling. Negative for chest pain and palpitations.   Gastrointestinal: Negative for abdominal distention, abdominal pain, anal bleeding, blood in stool, diarrhea, nausea and vomiting.   Endocrine: Negative for cold intolerance and heat intolerance.   Genitourinary: Positive for decreased urine volume. Negative for dysuria, menstrual problem and urgency.   Musculoskeletal: Negative for arthralgias and back pain.   Skin: Negative for pallor.   Neurological: Positive for weakness. Negative for dizziness and headaches.   Hematological: Bruises/bleeds easily.   Psychiatric/Behavioral: Negative for agitation, behavioral problems, confusion, decreased concentration and sleep disturbance.     Objective:     Vital Signs (Most Recent):  Temp: 98.3 °F (36.8 °C) (11/15/18 1200)  Pulse: 78 (11/15/18 1355)  Resp: (!) 22 (11/15/18 1355)  BP: (!) 160/79 (11/15/18 0800)  SpO2: (!) 94 % (11/15/18 1355) Vital  Signs (24h Range):  Temp:  [98.3 °F (36.8 °C)-99.8 °F (37.7 °C)] 98.3 °F (36.8 °C)  Pulse:  [55-88] 78  Resp:  [15-27] 22  SpO2:  [92 %-98 %] 94 %  BP: (108-160)/(60-79) 160/79     Weight: 52.4 kg (115 lb 10.1 oz)  Body mass index is 24.17 kg/m².    Intake/Output - Last 3 Shifts       11/13 0700 - 11/14 0659 11/14 0700 - 11/15 0659 11/15 0700 - 11/16 0659    P.O. 480 780     I.V. (mL/kg)  100 (1.9)     Total Intake(mL/kg) 480 (9.2) 880 (16.8)     Urine (mL/kg/hr) 580 (0.5) 550 (0.4)     Emesis/NG output 0      Stool 0 0     Total Output 580 550     Net -100 +330            Urine Occurrence 1 x 1 x     Stool Occurrence 7 x 6 x     Emesis Occurrence 3 x            Physical Exam   Constitutional: She is oriented to person, place, and time. She appears well-developed.   Chronically ill-appearing. Malnourished. Temporal wasting.     HENT:   Head: Normocephalic and atraumatic.   Mouth/Throat: Oropharynx is clear and moist.   Eyes: Conjunctivae are normal. No scleral icterus.   Neck: Normal range of motion.   Cardiovascular: Normal rate, regular rhythm and normal heart sounds.   Pulmonary/Chest: Effort normal. No respiratory distress. She has decreased breath sounds in the right lower field and the left lower field. She has wheezes. She has rales.   Abdominal: Soft. Bowel sounds are normal. She exhibits distension. There is no tenderness. No hernia.   Small ascites   Musculoskeletal: She exhibits edema (trace).   Lymphadenopathy:     She has no cervical adenopathy.   Neurological: She is alert and oriented to person, place, and time.   Mild asterixis   Skin: Skin is warm and dry. No rash noted.   Psychiatric: She has a normal mood and affect. Her behavior is normal. Her mood appears not anxious.   Nursing note and vitals reviewed.      Laboratory:  Immunosuppressants     None        CBC:   Recent Labs   Lab 11/15/18  0726   WBC 5.61   RBC 3.26*   HGB 9.8*   HCT 31.0*   PLT 62*   MCV 95   MCH 30.1   MCHC 31.6*     CMP:    Recent Labs   Lab 11/15/18  0726   *   CALCIUM 8.1*   ALBUMIN 2.4*   PROT 4.7*   *   K 3.5   CO2 21*      BUN 61*   CREATININE 1.6*   ALKPHOS 124   ALT 29   AST 63*   BILITOT 4.2*     Coagulation:   Recent Labs   Lab 11/10/18  0800  11/15/18  0726   INR 2.0*   < > 1.7*   APTT 30.7  --   --     < > = values in this interval not displayed.     Labs within the past 24 hours have been reviewed.    Diagnostic Results:  I have personally reviewed all pertinent imaging studies.

## 2018-11-15 NOTE — SIGNIFICANT EVENT
Received call from bedside RN.  Patient experiencing profound hypoglycemia (BG < 20) with altered mental status.  Patient had received a total of 3 doses of D50 25 g with no improvement.  Verbal instruction given to administer an additional amp of D50 and to start a D10 infusion at 50 cc/hr to maintain BG > 100.  Previous BG throughout the day in the 200s.  Of note last insulin administration, received 18 units of Novolog with dinner (14 units scheduled plus 4 units correction).  Patient had also received Levemir 10 units this morning.  Scheduled insulin discontinued.  Hourly BG monitoring ordered.  Awaiting 30 minute re-check following D50 administration.    2146 - Spoke with bedside RN.  Last dose of D50 administered to alternate peripheral IV.  Patient improved immediately - awake and alert as well as BG improved from 64 to 227.  Correction scale Novolog changed from moderate to low dose.  RN to resume hourly BG monitoring at 0000 and administer low dose correction scale novolog for BG > 350, no closer than q 4 hours.  Hold D10 infusion for now as rebound hyperglycemia is likely.

## 2018-11-15 NOTE — PROGRESS NOTES
[facility]  Liver Transplant  Progress Note    Patient Name: Krystal Hobson  MRN: [id number]  Admission Date: 11/10/2018  Hospital Length of Stay: 5 days  Code Status: Full Code  Primary Care Provider: Courtney Joe MD  Post-Operative Day:     ORGAN:     Disease Etiology: Cirrhosis: Cryptogenic (Idiopathic)  Donor Type:     CDC High Risk:     Donor CMV Status:   Donor CMV Status:   Donor HBcAB:     Donor HCV Status:     Donor HBV MARY: Organ record is missing.  Donor HCV MARY: Organ record is missing.  Whole or Partial:   Biliary Anastomosis:   Arterial Anatomy:   Subjective:     History of Present Illness:  Ms. Krystal Hobson is a 67 yr F w/ hx of cryptogenic cirrhosis, listed for liver tx 9/14/18 who presented to ER at outside hospital in [place], [place] with increased abdominal pain and then noted to have AMS on arrival and ENZO on lab work. Per the , she was extremely disoriented and her ammonia reached as high as 200. Her Cr level increased to 2.0 at the OSH. She also went into A fib with RVR and was started on a diltiazem infusion at OSH. She was then transferred to [facility] for further care. On arrival pt was noted with confusion which has now resolved with lactulose. She also was noted with a cough and fatigue.             Hospital Course:  Interval History: Pt with significant hypoglycemic event yesterday evening. Pt improved with IV dextrose and endocrine called at that time. Midline noted with infiltration and RUE with edema this am. Insulin also adjusted per endocrine this am. She reports feeling well this am and AAOx3. WBC was also noted elevated on lab work yesterday and pt was placed on BS antibxs. WBC now improved and will transition back to PO Moxi for continued treatment of PNA seen on chest xray. Cont with chloraseptic spray for pain relief. Chest xray improving. Recent A fib now well controlled with lopressor. Cardiology consulted and cont w/ lasix for diuresis. High SHY-VASc but due  to coagulopathy - would not give anticoagulation but Asprin started. 2D echo with 65% EF but elevated PA pressure. Currently on internal hold and will need to discuss when pt may become active again. MELD 24 today. She appears frail and will need to be aggressive with nutrition and PT.     Scheduled Meds:   guaiFENesin  600 mg Oral BID    heparin (porcine)  5,000 Units Subcutaneous Q8H    insulin aspart U-100  6-12 Units Subcutaneous TIDWM    insulin detemir U-100  10 Units Subcutaneous BID    lactulose  30 g Oral TID    levalbuterol  1.25 mg Nebulization Q6H WAKE    magnesium oxide  400 mg Oral BID    metoprolol tartrate  25 mg Oral BID    moxifloxacin  400 mg Oral Daily    rifAXImin  550 mg Oral BID     Continuous Infusions:  PRN Meds:sodium chloride, acetaminophen, benzonatate, dextrose 50%, dextrose 50%, glucagon (human recombinant), glucose, glucose, guaifenesin 100 mg/5 ml, insulin aspart U-100, lactulose, ondansetron, simethicone, sodium chloride 0.9%    Review of Systems   Constitutional: Positive for activity change, appetite change and fatigue. Negative for chills and fever.   HENT: Negative for congestion, ear discharge, ear pain and sinus pressure.    Respiratory: Positive for cough and wheezing. Negative for apnea, chest tightness and shortness of breath.    Cardiovascular: Positive for leg swelling. Negative for chest pain and palpitations.   Gastrointestinal: Negative for abdominal distention, abdominal pain, anal bleeding, blood in stool, diarrhea, nausea and vomiting.   Endocrine: Negative for cold intolerance and heat intolerance.   Genitourinary: Positive for decreased urine volume. Negative for dysuria, menstrual problem and urgency.   Musculoskeletal: Negative for arthralgias and back pain.   Skin: Negative for pallor.   Neurological: Positive for weakness. Negative for dizziness and headaches.   Hematological: Bruises/bleeds easily.   Psychiatric/Behavioral: Negative for agitation,  behavioral problems, confusion, decreased concentration and sleep disturbance.     Objective:     Vital Signs (Most Recent):  Temp: 98.3 °F (36.8 °C) (11/15/18 1200)  Pulse: 78 (11/15/18 1355)  Resp: (!) 22 (11/15/18 1355)  BP: (!) 160/79 (11/15/18 0800)  SpO2: (!) 94 % (11/15/18 1355) Vital Signs (24h Range):  Temp:  [98.3 °F (36.8 °C)-99.8 °F (37.7 °C)] 98.3 °F (36.8 °C)  Pulse:  [55-88] 78  Resp:  [15-27] 22  SpO2:  [92 %-98 %] 94 %  BP: (108-160)/(60-79) 160/79     Weight: 52.4 kg (115 lb 10.1 oz)  Body mass index is 24.17 kg/m².    Intake/Output - Last 3 Shifts       11/13 0700 - 11/14 0659 11/14 0700 - 11/15 0659 11/15 0700 - 11/16 0659    P.O. 480 780     I.V. (mL/kg)  100 (1.9)     Total Intake(mL/kg) 480 (9.2) 880 (16.8)     Urine (mL/kg/hr) 580 (0.5) 550 (0.4)     Emesis/NG output 0      Stool 0 0     Total Output 580 550     Net -100 +330            Urine Occurrence 1 x 1 x     Stool Occurrence 7 x 6 x     Emesis Occurrence 3 x            Physical Exam   Constitutional: She is oriented to person, place, and time. She appears well-developed.   Chronically ill-appearing. Malnourished. Temporal wasting.     HENT:   Head: Normocephalic and atraumatic.   Mouth/Throat: Oropharynx is clear and moist.   Eyes: Conjunctivae are normal. No scleral icterus.   Neck: Normal range of motion.   Cardiovascular: Normal rate, regular rhythm and normal heart sounds.   Pulmonary/Chest: Effort normal. No respiratory distress. She has decreased breath sounds in the right lower field and the left lower field. She has wheezes. She has rales.   Abdominal: Soft. Bowel sounds are normal. She exhibits distension. There is no tenderness. No hernia.   Small ascites   Musculoskeletal: She exhibits edema (trace).   Lymphadenopathy:     She has no cervical adenopathy.   Neurological: She is alert and oriented to person, place, and time.   Mild asterixis   Skin: Skin is warm and dry. No rash noted.   Psychiatric: She has a normal mood and  affect. Her behavior is normal. Her mood appears not anxious.   Nursing note and vitals reviewed.      Laboratory:  Immunosuppressants     None        CBC:   Recent Labs   Lab 11/15/18  0726   WBC 5.61   RBC 3.26*   HGB 9.8*   HCT 31.0*   PLT 62*   MCV 95   MCH 30.1   MCHC 31.6*     CMP:   Recent Labs   Lab 11/15/18  0726   *   CALCIUM 8.1*   ALBUMIN 2.4*   PROT 4.7*   *   K 3.5   CO2 21*      BUN 61*   CREATININE 1.6*   ALKPHOS 124   ALT 29   AST 63*   BILITOT 4.2*     Coagulation:   Recent Labs   Lab 11/10/18  0800  11/15/18  0726   INR 2.0*   < > 1.7*   APTT 30.7  --   --     < > = values in this interval not displayed.     Labs within the past 24 hours have been reviewed.    Diagnostic Results:  I have personally reviewed all pertinent imaging studies.    Assessment/Plan:     * Hepatic encephalopathy    Acute on chronic  PO lactulose ordered, continue rifaximin.   PRN lactulose enema TID for worsening confusion  Titrate for 3-4 BM a day.   AAOx3 and reports feeling well.        End stage liver disease    Listed for liver transplant with MELD 26. Per hepatologist, remains on internal hold. Continue to monitor.   Plan to discuss pt candidacy once resp status and nutrition improve.       MELD-Na score: 24 at 11/15/2018  7:26 AM  MELD score: 22 at 11/15/2018  7:26 AM  Calculated from:  Serum Creatinine: 1.6 mg/dL at 11/15/2018  7:26 AM  Serum Sodium: 133 mmol/L at 11/15/2018  7:26 AM  Total Bilirubin: 4.2 mg/dL at 11/15/2018  7:26 AM  INR(ratio): 1.7 at 11/15/2018  7:26 AM  Age: 67 years         Acute kidney injury superimposed on CKD    Appears to have CKD3/4 at baseline creatinine  Urine electrolytes ordered  Etiology possibly due to hepatorenal syndrome.   HRS protocol started, albumin and octreotide. No midodrine as with Afib.   Cr level improved but then noted with slight increase today (11/14).   Will plan to diurese with lasix and albumin today. Good UOP.   Strict I&Os.      Atrial  fibrillation with rapid ventricular response    Patient with episode of Afib with RVR at OSH and was placed on diltiazem gtt which was discontinued 11/10 as she reverted to NSR.  Cardiology consulted for management.   Pt then returned back into Afib w/ RVR on the afternoon of 11/11 and pt placed back on diltiazem for rate control.   Pt now rated controlled and diltiazem d/c'd on 11/12 at 0200. Cont with lopressor at this time as pt remains rate controlled.    2D echo performed today to assess cardiac risk and for discussion of candidacy with Transplant Surgery and Anesthesiology.   PA pressure on 2D echo noted elevated. Plan to diuresis w/ lasix.           Severe malnutrition    Severe Hand and temporal muscle wasting  Decreased energy  Frail appearing  Boost supplements ordered  Dietician consulted.     Pneumonia of right lung due to infectious organism    - Noted on recent chest xray.   - Initially placed on BS antibxs for treatment.    - Transitioned to PO Moxi (11/13) to complete a 7 day course.   - Pt with elevated WBC on lab work 11/14 and placed back on BS antibxs at that time.   - WBC then with quick improvement. Now transitioned back to Moxi.      Shortness of breath    - Slowly improving.        Sinus tachycardia    - Improved with BB.        CKD (chronic kidney disease) stage 3, GFR 30-59 ml/min    See ENZO.       Chronic liver disease           Type 2 diabetes mellitus with hyperglycemia, with long-term current use of insulin    Sliding scale insulin  Endocrine consulted for management.   Pt noted with significant hypoglycemic event the evening of 11/14.   Pt responded well to IV dextrose.        Anemia of chronic disease    H/H stable. Continue to monitor with daily cbc.        Thrombocytopenia due to hypersplenism    No s/s of bleeding.  Continue to monitor.        Decompensated hepatic cirrhosis    - See hepatic encephalopathy.            VTE Risk Mitigation (From admission, onward)        Ordered      IP VTE HIGH RISK PATIENT  Once      11/10/18 0213     heparin (porcine) injection 5,000 Units  Every 8 hours      11/10/18 0213          The patients clinical status was discussed at multidisplinary rounds, involving transplant surgery, transplant medicine, pharmacy, nursing, nutrition, and social work    Discharge Planning:  Not a candidate for d/c at this time.     Jacky Crooks NP  Liver Transplant  [facility]

## 2018-11-15 NOTE — PLAN OF CARE
Problem: Patient Care Overview  Goal: Plan of Care Review  Outcome: Ongoing (interventions implemented as appropriate)  VSS. Call light and personal items in reach. Chest x-ray completed. Pt up in chair and using bed side commode. No issues today. WCTM.

## 2018-11-15 NOTE — ASSESSMENT & PLAN NOTE
Sliding scale insulin  Endocrine consulted for management.   Pt noted with significant hypoglycemic event the evening of 11/14.   Pt responded well to IV dextrose.

## 2018-11-15 NOTE — PROGRESS NOTES
Day 3: Calorie Count  11/14/18    Percent Intake  Calories   Breakfast  Chicken Broth (6oz)  Grits (1/2 cup)  Grape Juice (2-4oz)  Lunch  Mustard (1pkt)  Mayonnaise (1pkt)  Ketchup (1pkt)  Baked French Fries (4oz)  Turkey [place] on White with Swiss Cheese  Boost Glucose Control  Peach Slices (1/2 cup)  Grape Juice (2-4oz)  Dinner  Chicken Broth (6oz)  Boost Glucose Control    100%  50%  100%    100%  100%  100%  50%  50%   50%  75%  100%    100%  25%    13  32  152    42  83  5  85  232  175  41  152    13  62     Full assessment completed, see Dietitian Note 11/13/2018.

## 2018-11-15 NOTE — ASSESSMENT & PLAN NOTE
Avoid insulin stacking and hypoglycemia.  Lab Results   Component Value Date    CREATININE 1.6 (H) 11/15/2018

## 2018-11-15 NOTE — SIGNIFICANT EVENT
At approximately, 9:30pm, RN notified this writer that the patient was significantly confused and lethargic upon initial assessment at 7pm, unable to follow commands or take meds. Upon assessment, patient with eyes open, blankly staring off in room. Unresponsive to verbal stimuli, withdrew from pain, not following commands or talking, only moaning. She would not turn her head to this writer, continued to blankly stare off. Vitals signs stable, spo2 97% on 3L NC. STAT CBC, CMP, Lactic acid, and ammonia ordered. CT Head ordered for AMS changes. RN ordered to give lactulose enema.     22:45, mentation worsened after lactulose enema. Somnolent, minimally responsive to tactile stimulation. Eyes closed. Labs largely stable other than WBC increased to 14 from 6 and hypokalemia (20Meq IV ordered). Restarted cefepime and vanc. RN checked accu check, BG <20. Glucose on CMP was 8. RN instructed to push 2 amps D50W and re-check BG in 5-10 minutes. Repeated BG was 30, ordered to give 3rd amp D50W. Endocrine notified by RN's of severe hypoglycemia. Instructed RN to give lasix for pending D10W gtt. Patient more awake and trying to get out the bed, moaning.     Shortly after pushing lasix, RUE noted to be severely edematous and tight, midline noted to be leaking, midline was infiltrated. Midline removed at bedside and copious serous fluid drained, likely D50W injections. 4th D50W given to L wrist PIV and patient immediately patient opened her eyes and was conversing, oriented x 3. Endocrine to place further orders for hypoglycemia. Canceled CT head and chest for now. Ordered to repeat lasix 60mg IV since first dose infiltrated into arm, and patient with significant crackles throughout lungs and will need D10W gtt in addition to IV antibiotics. Patient did not receive insulin tonight, last novolog administration around 4pm and levemir this AM.

## 2018-11-15 NOTE — ASSESSMENT & PLAN NOTE
Patient with episode of Afib with RVR at OSH and was placed on diltiazem gtt which was discontinued 11/10 as she reverted to NSR.  Cardiology consulted for management.   Pt then returned back into Afib w/ RVR on the afternoon of 11/11 and pt placed back on diltiazem for rate control.   Pt now rated controlled and diltiazem d/c'd on 11/12 at 0200. Cont with lopressor at this time as pt remains rate controlled.    2D echo performed today to assess cardiac risk and for discussion of candidacy with Transplant Surgery and Anesthesiology.   PA pressure on 2D echo noted elevated. Plan to diuresis w/ lasix.

## 2018-11-15 NOTE — ASSESSMENT & PLAN NOTE
Listed for liver transplant with MELD 26. Per hepatologist, remains on internal hold. Continue to monitor.   Plan to discuss pt candidacy once resp status and nutrition improve.       MELD-Na score: 24 at 11/15/2018  7:26 AM  MELD score: 22 at 11/15/2018  7:26 AM  Calculated from:  Serum Creatinine: 1.6 mg/dL at 11/15/2018  7:26 AM  Serum Sodium: 133 mmol/L at 11/15/2018  7:26 AM  Total Bilirubin: 4.2 mg/dL at 11/15/2018  7:26 AM  INR(ratio): 1.7 at 11/15/2018  7:26 AM  Age: 67 years

## 2018-11-16 PROBLEM — M25.421 SWELLING OF JOINT OF UPPER ARM, RIGHT: Status: ACTIVE | Noted: 2018-01-01

## 2018-11-16 PROBLEM — E43 SEVERE MALNUTRITION: Status: RESOLVED | Noted: 2018-01-01 | Resolved: 2018-01-01

## 2018-11-16 PROBLEM — M79.89 SWELLING IN RIGHT ARMPIT: Status: ACTIVE | Noted: 2018-01-01

## 2018-11-16 PROBLEM — M79.89 SWELLING IN RIGHT ARMPIT: Status: RESOLVED | Noted: 2018-01-01 | Resolved: 2018-01-01

## 2018-11-16 NOTE — ASSESSMENT & PLAN NOTE
-  Listed for liver transplant with MELD 26 thru 11/20/18. Per hepatologist, remains on internal hold 2/2 PNA- tx complete 11/18 and physical deconditioned. Continue to monitor.   -  Plan to discuss pt candidacy once resp status, nutrition and physical mobility improved.       MELD-Na score: 23 at 11/16/2018  6:34 AM  MELD score: 22 at 11/16/2018  6:34 AM  Calculated from:  Serum Creatinine: 1.6 mg/dL at 11/16/2018  6:34 AM  Serum Sodium: 135 mmol/L at 11/16/2018  6:34 AM  Total Bilirubin: 4.3 mg/dL at 11/16/2018  6:34 AM  INR(ratio): 1.6 at 11/16/2018  6:34 AM  Age: 67 years

## 2018-11-16 NOTE — ASSESSMENT & PLAN NOTE
-  Sliding scale insulin.  Endocrine consulted for management.  Apprec recs.   -  Pt noted with significant hypoglycemic event the evening of 11/14. Pt responded well to IV dextrose.

## 2018-11-16 NOTE — SUBJECTIVE & OBJECTIVE
Scheduled Meds:   furosemide  40 mg Intravenous Once    guaiFENesin  600 mg Oral BID    heparin (porcine)  5,000 Units Subcutaneous Q8H    insulin aspart U-100  3-5 Units Subcutaneous TIDWM    insulin detemir U-100  12 Units Subcutaneous Daily    lactulose  30 g Oral TID    levalbuterol  1.25 mg Nebulization Q6H WAKE    magnesium oxide  400 mg Oral BID    metoprolol tartrate  25 mg Oral BID    [START ON 11/17/2018] moxifloxacin  400 mg Oral Daily    rifAXImin  550 mg Oral BID     Continuous Infusions:  PRN Meds:sodium chloride, acetaminophen, benzonatate, dextrose 50%, dextrose 50%, glucagon (human recombinant), glucose, glucose, guaifenesin 100 mg/5 ml, insulin aspart U-100, lactulose, ondansetron, simethicone, sodium chloride 0.9%    Review of Systems   Constitutional: Positive for activity change, appetite change and fatigue. Negative for chills and fever.   HENT: Negative for congestion, ear discharge, ear pain and sinus pressure.    Respiratory: Positive for cough. Negative for apnea, chest tightness, shortness of breath and wheezing.    Cardiovascular: Positive for leg swelling. Negative for chest pain and palpitations.   Gastrointestinal: Negative for abdominal distention, abdominal pain, anal bleeding, blood in stool, diarrhea, nausea and vomiting.   Endocrine: Negative for cold intolerance and heat intolerance.   Genitourinary: Positive for decreased urine volume. Negative for dysuria, menstrual problem and urgency.   Musculoskeletal: Negative for arthralgias and back pain.   Skin: Negative for pallor.   Neurological: Positive for weakness. Negative for dizziness and headaches.   Hematological: Bruises/bleeds easily.   Psychiatric/Behavioral: Negative for agitation, behavioral problems, confusion, decreased concentration and sleep disturbance.     Objective:     Vital Signs (Most Recent):  Temp: 99.1 °F (37.3 °C) (11/16/18 1111)  Pulse: 65 (11/16/18 0800)  Resp: 18 (11/16/18 0800)  BP: 136/71  (11/16/18 0800)  SpO2: 98 % (11/16/18 0800) Vital Signs (24h Range):  Temp:  [98.3 °F (36.8 °C)-99.1 °F (37.3 °C)] 99.1 °F (37.3 °C)  Pulse:  [62-81] 65  Resp:  [10-22] 18  SpO2:  [94 %-100 %] 98 %  BP: (126-149)/(54-71) 136/71     Weight: 54.4 kg (119 lb 14.9 oz)  Body mass index is 25.07 kg/m².    Intake/Output - Last 3 Shifts       11/14 0700 - 11/15 0659 11/15 0700 - 11/16 0659 11/16 0700 - 11/17 0659    P.O. 780      I.V. (mL/kg) 100 (1.9)      Total Intake(mL/kg) 880 (16.8)      Urine (mL/kg/hr) 800 (0.6) 300 (0.2)     Emesis/NG output       Stool 0 0     Total Output 800 300     Net +80 -300            Urine Occurrence 1 x      Stool Occurrence 6 x 4 x 1 x          Physical Exam   Constitutional: She is oriented to person, place, and time. She appears well-developed.   Chronically ill-appearing. Malnourished. Temporal wasting.     HENT:   Head: Normocephalic and atraumatic.   Mouth/Throat: Oropharynx is clear and moist.   Eyes: Conjunctivae are normal. Scleral icterus is present.   Neck: Normal range of motion.   Cardiovascular: Normal rate, regular rhythm and normal heart sounds.   Pulmonary/Chest: Effort normal. No respiratory distress. She has decreased breath sounds in the right lower field and the left lower field. She has no wheezes. She has rales (fine sly bases).   Abdominal: Soft. Bowel sounds are normal. She exhibits distension. There is no tenderness. No hernia.   Small ascites   Musculoskeletal: She exhibits edema (1+ BLE).   Lymphadenopathy:     She has no cervical adenopathy.   Neurological: She is alert and oriented to person, place, and time.   Mild asterixis   Skin: Skin is warm and dry. No rash noted.   Psychiatric: She has a normal mood and affect. Her behavior is normal. Her mood appears not anxious.   Nursing note and vitals reviewed.      Laboratory:  Immunosuppressants     None        CBC:   Recent Labs   Lab 11/16/18  0634   WBC 6.55   RBC 2.86*   HGB 8.6*   HCT 27.1*   PLT 60*   MCV  95   MCH 30.1   MCHC 31.7*     CMP:   Recent Labs   Lab 11/16/18  0634   *   CALCIUM 8.2*   ALBUMIN 2.7*   PROT 4.7*   *   K 3.7   CO2 22*      BUN 69*   CREATININE 1.6*   ALKPHOS 176*   ALT 32   AST 75*   BILITOT 4.3*     Coagulation:   Recent Labs   Lab 11/10/18  0800  11/16/18  0634   INR 2.0*   < > 1.6*   APTT 30.7  --   --     < > = values in this interval not displayed.     Labs within the past 24 hours have been reviewed.    Diagnostic Results:  I have personally reviewed all pertinent imaging studies.

## 2018-11-16 NOTE — ASSESSMENT & PLAN NOTE
- Dietician completed calorie count.  Patient eating % of trays.  Will d/c calorie count.  Encourage supplements.

## 2018-11-16 NOTE — ASSESSMENT & PLAN NOTE
Avoid insulin stacking and hypoglycemia.  Lab Results   Component Value Date    CREATININE 1.6 (H) 11/16/2018

## 2018-11-16 NOTE — PROGRESS NOTES
"[facility]  Endocrinology  Progress Note    Admit Date: 11/10/2018     Reason for Consult: Management of type 2 DM, Hyperglycemia     Surgical Procedure and Date: n/a    Diabetes diagnosis year: 2001    Home Diabetes Medications: Levemir 12 units BID (vial) and Humalog SSI with meals   Lab Results   Component Value Date    HGBA1C 5.8 (H) 11/10/2018         How often checking glucose at home? 1-3  BG readings on regimen: 150-300  Hypoglycemia on the regimen? once 27; required visit to ED; gave Levemir and humalog and patient did not eat  Missed doses on regimen?  No    Diabetes Complications include:     Hyperglycemia, Hypoglycemia  and Diabetic peripheral neuropathy     Complicating diabetes co morbidities:   CIRRHOSIS      HPI:   Patient is a 67 y.o. female with a diagnosis of type 2 DM; well controlled on MDI. Also with   Cryptogenic cirrhosis, rheumatoid arthritis, and GERD. Patient was listed for liver transplant on 9/14/18. Patient presented to ED of hospital in [place] with AMS and ENZO. Endocrinology consulted for BG/ DM management.   Of note, profound hypoglycemic event (BG < 20) the night of 11/14/18.            Interval HPI:   Overnight events: Remains in TSU, BG mildly elevated overnight.  Appetite fair.  Eating:   <25%  Nausea: No  Hypoglycemia and intervention: No  Fever: No  TPN and/or TF: No    BP (!) 126/54   Pulse 64   Temp 98.7 °F (37.1 °C) (Oral)   Resp (!) 21   Ht 4' 10" (1.473 m)   Wt 52.4 kg (115 lb 10.1 oz)   SpO2 100%   Breastfeeding? No   BMI 24.17 kg/m²      Labs Reviewed and Include    No results for input(s): GLU, CALCIUM, ALBUMIN, PROT, NA, K, CO2, CL, BUN, CREATININE, ALKPHOS, ALT, AST, BILITOT in the last 24 hours.  Lab Results   Component Value Date    WBC 6.55 11/16/2018    HGB 8.6 (L) 11/16/2018    HCT 27.1 (L) 11/16/2018    MCV 95 11/16/2018    PLT 60 (L) 11/16/2018     Recent Labs   Lab 11/11/18  0901   TSH 0.212*   FREET4 1.09     Lab Results "   Component Value Date    HGBA1C 5.8 (H) 11/10/2018       Nutritional status:   Body mass index is 24.17 kg/m².  Lab Results   Component Value Date    ALBUMIN 2.4 (L) 11/15/2018    ALBUMIN 2.8 (L) 11/14/2018    ALBUMIN 2.5 (L) 11/14/2018     No results found for: PREALBUMIN    Estimated Creatinine Clearance: 28.3 mL/min (A) (based on SCr of 1.6 mg/dL (H)).    Accu-Checks  Recent Labs     11/15/18  0209 11/15/18  0315 11/15/18  0511 11/15/18  0611 11/15/18  1000 11/15/18  1327 11/15/18  1809 11/15/18  2203 11/16/18  0250 11/16/18  0722   POCTGLUCOSE 403* 415* 403* 365* 315* 310* 189* 226* 229* 216*       Current Medications and/or Treatments Impacting Glycemic Control  Immunotherapy:    Immunosuppressants     None        Steroids:   Hormones (From admission, onward)    None        Pressors:    Autonomic Drugs (From admission, onward)    None        Hyperglycemia/Diabetes Medications:   Antihyperglycemics (From admission, onward)    Start     Stop Route Frequency Ordered    11/16/18 0900  insulin detemir U-100 pen 10 Units      -- SubQ Daily 11/15/18 1816    11/15/18 0041  insulin aspart U-100 pen 0-5 Units      -- SubQ Before meals & nightly PRN 11/14/18 2341          ASSESSMENT and PLAN    * Hepatic encephalopathy    Managed per primary.        Type 2 diabetes mellitus with hyperglycemia, with long-term current use of insulin    BG goal 140-180    Increase Levemir to 12 units in AM  Novolog to 3-5 units with meals given variable meal intake  Low dose correction scale  BG monitoring AC/HS/0200    ** Please call Endocrine for any BG related issues **    Discharge planning: TBD       Acute kidney injury superimposed on CKD    Avoid insulin stacking and hypoglycemia.  Lab Results   Component Value Date    CREATININE 1.6 (H) 11/16/2018            Decompensated hepatic cirrhosis    Managed per primary.   Listed for liver transplant.  May impact BG readings       Pneumonia of right lung due to infectious organism     Infection may elevate BG readings  On IV antibiotics       CKD (chronic kidney disease) stage 3, GFR 30-59 ml/min    Caution with insulin stacking  Estimated Creatinine Clearance: 29.3 mL/min (A) (based on SCr of 1.6 mg/dL (H)).             Tristan Hwang NP  Endocrinology  [facility]

## 2018-11-16 NOTE — PROGRESS NOTES
[facility]  Liver Transplant  Progress Note    Patient Name: Krystal Hobson  MRN: [id number]  Admission Date: 11/10/2018  Hospital Length of Stay: 6 days  Code Status: Full Code  Primary Care Provider: Courtney Joe MD    Subjective:     History of Present Illness:  Ms. Krystal Hobson is a 67 yr F w/ hx of cryptogenic cirrhosis, listed for liver tx 9/14/18 who presented to ER at outside hospital in [place], [place] with increased abdominal pain and then noted to have AMS on arrival and ENZO on lab work. Per the , she was extremely disoriented and her ammonia reached as high as 200. Her Cr level increased to 2.0 at the OSH. She also went into A fib with RVR and was started on a diltiazem infusion at OSH. She was then transferred to [facility] for further care. On arrival pt was noted with confusion which has now resolved with lactulose. She also was noted with a cough and fatigue.           Hospital Course:  Interval History: blood glucose stable past 24 hours on Levemir 12u qd and Aspart 3-5u ac & hs.  Right arm remains edematous, tender and cool to touch.  Plan for US r/o DVT.  Patient AAOx4, no signs of encephalopathy.  She reports having 3-4 BMs on Lactulose.  She remains on Moxifloxacin for PNA.  Pulse ox % on 2-3L.  RN to wean O2 to maintain sat >92%.  CXR 11/15 shows improvement.  Remains in NSR.  Diuresed w Lasix yesterday.  Still w swelling to right arm and 1+ BLE edema.  Plan for Lasix 40 mg x1.  Dosing Lasix daily.  She was seen by Cardiology when she was in a fib w RVR.  High SHY-VASc but due to coagulopathy - would not give anticoagulation.  Asprin started. 2D echo with 65% EF with elevated PA pressure (55).  Will need to discuss when to repeat echo. Currently on internal hold 2/2 PNA (plan to complete 11/18) and frailty.  PT/OT following. MELD 23 today, listed w MELD 26 thru 11/20.  Monitor.    Scheduled Meds:   furosemide  40 mg Intravenous Once    guaiFENesin  600 mg Oral BID     heparin (porcine)  5,000 Units Subcutaneous Q8H    insulin aspart U-100  3-5 Units Subcutaneous TIDWM    insulin detemir U-100  12 Units Subcutaneous Daily    lactulose  30 g Oral TID    levalbuterol  1.25 mg Nebulization Q6H WAKE    magnesium oxide  400 mg Oral BID    metoprolol tartrate  25 mg Oral BID    [START ON 11/17/2018] moxifloxacin  400 mg Oral Daily    rifAXImin  550 mg Oral BID     Continuous Infusions:  PRN Meds:sodium chloride, acetaminophen, benzonatate, dextrose 50%, dextrose 50%, glucagon (human recombinant), glucose, glucose, guaifenesin 100 mg/5 ml, insulin aspart U-100, lactulose, ondansetron, simethicone, sodium chloride 0.9%    Review of Systems   Constitutional: Positive for activity change, appetite change and fatigue. Negative for chills and fever.   HENT: Negative for congestion, ear discharge, ear pain and sinus pressure.    Respiratory: Positive for cough. Negative for apnea, chest tightness, shortness of breath and wheezing.    Cardiovascular: Positive for leg swelling. Negative for chest pain and palpitations.   Gastrointestinal: Negative for abdominal distention, abdominal pain, anal bleeding, blood in stool, diarrhea, nausea and vomiting.   Endocrine: Negative for cold intolerance and heat intolerance.   Genitourinary: Positive for decreased urine volume. Negative for dysuria, menstrual problem and urgency.   Musculoskeletal: Negative for arthralgias and back pain.   Skin: Negative for pallor.   Neurological: Positive for weakness. Negative for dizziness and headaches.   Hematological: Bruises/bleeds easily.   Psychiatric/Behavioral: Negative for agitation, behavioral problems, confusion, decreased concentration and sleep disturbance.     Objective:     Vital Signs (Most Recent):  Temp: 99.1 °F (37.3 °C) (11/16/18 1111)  Pulse: 65 (11/16/18 0800)  Resp: 18 (11/16/18 0800)  BP: 136/71 (11/16/18 0800)  SpO2: 98 % (11/16/18 0800) Vital Signs (24h Range):  Temp:  [98.3 °F (36.8  °C)-99.1 °F (37.3 °C)] 99.1 °F (37.3 °C)  Pulse:  [62-81] 65  Resp:  [10-22] 18  SpO2:  [94 %-100 %] 98 %  BP: (126-149)/(54-71) 136/71     Weight: 54.4 kg (119 lb 14.9 oz)  Body mass index is 25.07 kg/m².    Intake/Output - Last 3 Shifts       11/14 0700 - 11/15 0659 11/15 0700 - 11/16 0659 11/16 0700 - 11/17 0659    P.O. 780      I.V. (mL/kg) 100 (1.9)      Total Intake(mL/kg) 880 (16.8)      Urine (mL/kg/hr) 800 (0.6) 300 (0.2)     Emesis/NG output       Stool 0 0     Total Output 800 300     Net +80 -300            Urine Occurrence 1 x      Stool Occurrence 6 x 4 x 1 x          Physical Exam   Constitutional: She is oriented to person, place, and time. She appears well-developed.   Chronically ill-appearing. Malnourished. Temporal wasting.     HENT:   Head: Normocephalic and atraumatic.   Mouth/Throat: Oropharynx is clear and moist.   Eyes: Conjunctivae are normal. Scleral icterus is present.   Neck: Normal range of motion.   Cardiovascular: Normal rate, regular rhythm and normal heart sounds.   Pulmonary/Chest: Effort normal. No respiratory distress. She has decreased breath sounds in the right lower field and the left lower field. She has no wheezes. She has rales (fine sly bases).   Abdominal: Soft. Bowel sounds are normal. She exhibits distension. There is no tenderness. No hernia.   Small ascites   Musculoskeletal: She exhibits edema (1+ BLE).   Lymphadenopathy:     She has no cervical adenopathy.   Neurological: She is alert and oriented to person, place, and time.   Mild asterixis   Skin: Skin is warm and dry. No rash noted.   Psychiatric: She has a normal mood and affect. Her behavior is normal. Her mood appears not anxious.   Nursing note and vitals reviewed.      Laboratory:  Immunosuppressants     None        CBC:   Recent Labs   Lab 11/16/18  0634   WBC 6.55   RBC 2.86*   HGB 8.6*   HCT 27.1*   PLT 60*   MCV 95   MCH 30.1   MCHC 31.7*     CMP:   Recent Labs   Lab 11/16/18  0634   *   CALCIUM  8.2*   ALBUMIN 2.7*   PROT 4.7*   *   K 3.7   CO2 22*      BUN 69*   CREATININE 1.6*   ALKPHOS 176*   ALT 32   AST 75*   BILITOT 4.3*     Coagulation:   Recent Labs   Lab 11/10/18  0800  11/16/18  0634   INR 2.0*   < > 1.6*   APTT 30.7  --   --     < > = values in this interval not displayed.     Labs within the past 24 hours have been reviewed.    Diagnostic Results:  I have personally reviewed all pertinent imaging studies.    Assessment/Plan:     * Hepatic encephalopathy    - Acute on chronic. PO lactulose ordered, continue rifaximin. Titrate Lactulose to maintain 3-4 BM a day. PRN lactulose enema TID for worsening confusion.  - currently pt is AAOx4, no encephalopathy noted.       Swelling of joint of upper arm, right    - infiltrated midline overnight 11/14-11/15.  Cont to have increased swelling, pain and cold right arm despite elevating and using PRN heat packs.  Will order US right upper extremity to r/o DVT.  Pt on ASA.  Monitor.         Physical deconditioning    - PT/OT following.  Patient need aggressive therapy.  She will also need to be able to climb steps prior to discharge.  She has at least 7 steps to climb at home.         Pneumonia of right lung due to infectious organism    - Noted on recent chest xray.  Initially placed on BS antibxs for treatment.    - Transitioned to PO Moxi (11/13) to complete a 7 day course (11/18).   - Pt with elevated WBC on lab work 11/14 and placed back on BS antibxs at that time.   - WBC then with quick improvement. Now transitioned back to Moxi stop date 11/18.      Shortness of breath    - Improving.  Wean O2 to maintain oxygen saturation >92%.           Sinus tachycardia    - Improved with BB.        Atrial fibrillation    -  Patient with episode of Afib with RVR at OSH and was placed on diltiazem gtt which was discontinued 11/10 as she reverted to NSR.  Cardiology consulted for management.   -  Pt then returned back into Afib w/ RVR on the afternoon of  11/11 and pt placed back on diltiazem for rate control.   -  Pt now rated controlled and diltiazem d/c'd on 11/12 at 0200. Cont with lopressor at this time as pt remains rate controlled.    - 2D echo performed today to assess cardiac risk and for discussion of candidacy with Transplant Surgery and Anesthesiology.  PA pressure on 2D echo noted elevated.   - dosing diuretics on daily basis.  Plan for Lasix 40 mg today.           CKD (chronic kidney disease) stage 3, GFR 30-59 ml/min    - See ENZO.  Stable.       Chronic liver disease           Type 2 diabetes mellitus with hyperglycemia, with long-term current use of insulin    -  Sliding scale insulin.  Endocrine consulted for management.  Apprec recs.   -  Pt noted with significant hypoglycemic event the evening of 11/14. Pt responded well to IV dextrose.        Anemia of chronic disease    - H/H stable. Continue to monitor with daily cbc.        End stage liver disease    -  Listed for liver transplant with MELD 26 thru 11/20/18. Per hepatologist, remains on internal hold 2/2 PNA- tx complete 11/18 and physical deconditioned. Continue to monitor.   -  Plan to discuss pt candidacy once resp status, nutrition and physical mobility improved.       MELD-Na score: 23 at 11/16/2018  6:34 AM  MELD score: 22 at 11/16/2018  6:34 AM  Calculated from:  Serum Creatinine: 1.6 mg/dL at 11/16/2018  6:34 AM  Serum Sodium: 135 mmol/L at 11/16/2018  6:34 AM  Total Bilirubin: 4.3 mg/dL at 11/16/2018  6:34 AM  INR(ratio): 1.6 at 11/16/2018  6:34 AM  Age: 67 years         Acute kidney injury superimposed on CKD    - CKD3/4 at baseline creatinine. Urine electrolytes ordered. Etiology possibly due to hepatorenal syndrome.   -  HRS protocol started, albumin and octreotide. No midodrine as with Afib.   -  Cr level improved but then noted with slight increase (11/14)- could be from diuresis.   -  Diuresed well w Lasix and albumin yesterday.  Pt not recording I/O.  Weight stable 119 but not  down yet- admit weight 112.    -  plan for lasix 40 mg x1 today.  Must weigh daily and cont strict I&Os.      Thrombocytopenia due to hypersplenism    - No overt s/s of bleeding.  Continue to monitor.        Protein-calorie malnutrition, moderate    - Dietician completed calorie count.  Patient eating % of trays.  Will d/c calorie count.  Encourage supplements.        Decompensated hepatic cirrhosis    - See hepatic encephalopathy.            VTE Risk Mitigation (From admission, onward)        Ordered     IP VTE HIGH RISK PATIENT  Once      11/10/18 0213     heparin (porcine) injection 5,000 Units  Every 8 hours      11/10/18 0213          The patients clinical status was discussed at multidisplinary rounds, involving transplant surgery, transplant medicine, pharmacy, nursing, nutrition, and social work    Discharge Planning:  No plan for discharge at this time.      Genie Smith, NP  Liver Transplant  [facility]

## 2018-11-16 NOTE — PT/OT/SLP PROGRESS
Physical Therapy      Patient Name:  Krystal Hobson   MRN:  [id number]    Patient not seen today secondary to Other (Comment). PT attempted to see pt x 2 on today. In AM, pt fatigued and not up to ambulating at current time. In PM, pt eating.  Will follow-up on next scheduled visit.     Marisa Ace, PT, DPT  11/15/2018

## 2018-11-16 NOTE — ASSESSMENT & PLAN NOTE
- Acute on chronic. PO lactulose ordered, continue rifaximin. Titrate Lactulose to maintain 3-4 BM a day. PRN lactulose enema TID for worsening confusion.  - currently pt is AAOx4, no encephalopathy noted.

## 2018-11-16 NOTE — ASSESSMENT & PLAN NOTE
Caution with insulin stacking  Estimated Creatinine Clearance: 29.3 mL/min (A) (based on SCr of 1.6 mg/dL (H)).

## 2018-11-16 NOTE — PLAN OF CARE
Problem: Patient Care Overview  Goal: Plan of Care Review  Outcome: Ongoing (interventions implemented as appropriate)  VSS. Call light in reach.  @ bedside. Pt is on RA 98%. U/S of right arm completed. Pt had 4 BMs. BS in the 200's. No issues toady. WCTM.

## 2018-11-16 NOTE — ASSESSMENT & PLAN NOTE
- CKD3/4 at baseline creatinine. Urine electrolytes ordered. Etiology possibly due to hepatorenal syndrome.   -  HRS protocol started, albumin and octreotide. No midodrine as with Afib.   -  Cr level improved but then noted with slight increase (11/14)- could be from diuresis.   -  Diuresed well w Lasix and albumin yesterday.  Pt not recording I/O.  Weight stable 119 but not down yet- admit weight 112.    -  plan for lasix 40 mg x1 today.  Must weigh daily and cont strict I&Os.

## 2018-11-16 NOTE — ASSESSMENT & PLAN NOTE
- PT/OT following.  Patient need aggressive therapy.  She will also need to be able to climb steps prior to discharge.  She has at least 7 steps to climb at home.

## 2018-11-16 NOTE — ASSESSMENT & PLAN NOTE
- Noted on recent chest xray.  Initially placed on BS antibxs for treatment.    - Transitioned to PO Moxi (11/13) to complete a 7 day course (11/18).   - Pt with elevated WBC on lab work 11/14 and placed back on BS antibxs at that time.   - WBC then with quick improvement. Now transitioned back to Moxi stop date 11/18.

## 2018-11-16 NOTE — SUBJECTIVE & OBJECTIVE
"Interval HPI:   Overnight events: Remains in TSU, BG mildly elevated overnight.  Appetite fair.  Eating:   <25%  Nausea: No  Hypoglycemia and intervention: No  Fever: No  TPN and/or TF: No    BP (!) 126/54   Pulse 64   Temp 98.7 °F (37.1 °C) (Oral)   Resp (!) 21   Ht 4' 10" (1.473 m)   Wt 52.4 kg (115 lb 10.1 oz)   SpO2 100%   Breastfeeding? No   BMI 24.17 kg/m²     Labs Reviewed and Include    No results for input(s): GLU, CALCIUM, ALBUMIN, PROT, NA, K, CO2, CL, BUN, CREATININE, ALKPHOS, ALT, AST, BILITOT in the last 24 hours.  Lab Results   Component Value Date    WBC 6.55 11/16/2018    HGB 8.6 (L) 11/16/2018    HCT 27.1 (L) 11/16/2018    MCV 95 11/16/2018    PLT 60 (L) 11/16/2018     Recent Labs   Lab 11/11/18  0901   TSH 0.212*   FREET4 1.09     Lab Results   Component Value Date    HGBA1C 5.8 (H) 11/10/2018       Nutritional status:   Body mass index is 24.17 kg/m².  Lab Results   Component Value Date    ALBUMIN 2.4 (L) 11/15/2018    ALBUMIN 2.8 (L) 11/14/2018    ALBUMIN 2.5 (L) 11/14/2018     No results found for: PREALBUMIN    Estimated Creatinine Clearance: 28.3 mL/min (A) (based on SCr of 1.6 mg/dL (H)).    Accu-Checks  Recent Labs     11/15/18  0209 11/15/18  0315 11/15/18  0511 11/15/18  0611 11/15/18  1000 11/15/18  1327 11/15/18  1809 11/15/18  2203 11/16/18  0250 11/16/18  0722   POCTGLUCOSE 403* 415* 403* 365* 315* 310* 189* 226* 229* 216*       Current Medications and/or Treatments Impacting Glycemic Control  Immunotherapy:    Immunosuppressants     None        Steroids:   Hormones (From admission, onward)    None        Pressors:    Autonomic Drugs (From admission, onward)    None        Hyperglycemia/Diabetes Medications:   Antihyperglycemics (From admission, onward)    Start     Stop Route Frequency Ordered    11/16/18 0900  insulin detemir U-100 pen 10 Units      -- SubQ Daily 11/15/18 1816    11/15/18 0041  insulin aspart U-100 pen 0-5 Units      -- SubQ Before meals & nightly PRN " 11/14/18 5219

## 2018-11-16 NOTE — ASSESSMENT & PLAN NOTE
BG goal 140-180    Increase Levemir to 12 units in AM  Novolog to 3-5 units with meals given variable meal intake  Low dose correction scale  BG monitoring AC/HS/0200    ** Please call Endocrine for any BG related issues **    Discharge planning: [facility]

## 2018-11-16 NOTE — ASSESSMENT & PLAN NOTE
- infiltrated midline overnight 11/14-11/15.  Cont to have increased swelling, pain and cold right arm despite elevating and using PRN heat packs.  Will order US right upper extremity to r/o DVT.  Pt on ASA.  Monitor.

## 2018-11-16 NOTE — ASSESSMENT & PLAN NOTE
-  Patient with episode of Afib with RVR at OSH and was placed on diltiazem gtt which was discontinued 11/10 as she reverted to NSR.  Cardiology consulted for management.   -  Pt then returned back into Afib w/ RVR on the afternoon of 11/11 and pt placed back on diltiazem for rate control.   -  Pt now rated controlled and diltiazem d/c'd on 11/12 at 0200. Cont with lopressor at this time as pt remains rate controlled.    - 2D echo performed today to assess cardiac risk and for discussion of candidacy with Transplant Surgery and Anesthesiology.  PA pressure on 2D echo noted elevated.   - dosing diuretics on daily basis.  Plan for Lasix 40 mg today.

## 2018-11-17 NOTE — ASSESSMENT & PLAN NOTE
Avoid insulin stacking and hypoglycemia.  Lab Results   Component Value Date    CREATININE 1.5 (H) 11/17/2018

## 2018-11-17 NOTE — PROGRESS NOTES
"[facility]  Endocrinology  Progress Note    Admit Date: 11/10/2018     Reason for Consult: Management of type 2 DM, Hyperglycemia     Surgical Procedure and Date: n/a    Diabetes diagnosis year:     Home Diabetes Medications: Levemir 12 units BID (vial) and Humalog SSI with meals   Lab Results   Component Value Date    HGBA1C 5.8 (H) 11/10/2018         How often checking glucose at home? 1-3  BG readings on regimen: 150-300  Hypoglycemia on the regimen? once 27; required visit to ED; gave Levemir and humalog and patient did not eat  Missed doses on regimen?  No    Diabetes Complications include:     Hyperglycemia, Hypoglycemia  and Diabetic peripheral neuropathy     Complicating diabetes co morbidities:   CIRRHOSIS      HPI:   Patient is a 67 y.o. female with a diagnosis of type 2 DM; well controlled on MDI. Also with   Cryptogenic cirrhosis, rheumatoid arthritis, and GERD. Patient was listed for liver transplant on 18. Patient presented to ED of hospital in [place] with AMS and ENZO. Endocrinology consulted for BG/ DM management.   Of note, profound hypoglycemic event (BG < 20) the night of 18.            Interval HPI:   Overnight events:  Remains in TSU. NAEON. BG above goal with scheduled basal insulin and correction scale.   Eatin%  Nausea: yes with lunch   Hypoglycemia and intervention: No  Fever: No  TPN and/or TF: No      /62   Pulse 65   Temp 98.8 °F (37.1 °C) (Oral)   Resp 20   Ht 4' 10" (1.473 m)   Wt 54.4 kg (119 lb 14.9 oz)   SpO2 95%   Breastfeeding? No   BMI 25.07 kg/m²      Labs Reviewed and Include    Recent Labs   Lab 18  0537   *   CALCIUM 8.4*   ALBUMIN 2.6*   PROT 4.7*   *   K 3.7   CO2 22*      BUN 72*   CREATININE 1.5*   ALKPHOS 187*   ALT 38   AST 80*   BILITOT 4.4*     Lab Results   Component Value Date    WBC 6.98 2018    HGB 8.5 (L) 2018    HCT 25.7 (L) 2018    MCV 92 2018    PLT 60 (L) " 11/17/2018     Recent Labs   Lab 11/11/18  0901   TSH 0.212*   FREET4 1.09     Lab Results   Component Value Date    HGBA1C 5.8 (H) 11/10/2018       Nutritional status:   Body mass index is 25.07 kg/m².  Lab Results   Component Value Date    ALBUMIN 2.6 (L) 11/17/2018    ALBUMIN 2.7 (L) 11/16/2018    ALBUMIN 2.4 (L) 11/15/2018     No results found for: PREALBUMIN    Estimated Creatinine Clearance: 31.3 mL/min (A) (based on SCr of 1.5 mg/dL (H)).    Accu-Checks  Recent Labs     11/15/18  1327 11/15/18  1809 11/15/18  2203 11/16/18  0250 11/16/18  0722 11/16/18  1243 11/16/18  1715 11/16/18  2044 11/17/18  0207 11/17/18  0759   POCTGLUCOSE 310* 189* 226* 229* 216* 226* 274* 283* 253* 198*       Current Medications and/or Treatments Impacting Glycemic Control  Immunotherapy:    Immunosuppressants     None        Steroids:   Hormones (From admission, onward)    None        Pressors:    Autonomic Drugs (From admission, onward)    None        Hyperglycemia/Diabetes Medications:   Antihyperglycemics (From admission, onward)    Start     Stop Route Frequency Ordered    11/16/18 1130  insulin aspart U-100 pen 3-5 Units      -- SubQ 3 times daily with meals 11/16/18 0743    11/16/18 0900  insulin detemir U-100 pen 12 Units      -- SubQ Daily 11/16/18 0740    11/15/18 0041  insulin aspart U-100 pen 0-5 Units      -- SubQ Before meals & nightly PRN 11/14/18 2341          ASSESSMENT and PLAN    * Hepatic encephalopathy    Managed per primary.        Type 2 diabetes mellitus with hyperglycemia, with long-term current use of insulin    BG goal 140-180    increase Levemir to 14 units in AM  Increase Novolog to 4-6 units with meals given variable meal intake  Low dose correction scale  BG monitoring AC/HS/0200    ** Please call Endocrine for any BG related issues **    Discharge planning: TBD       Decompensated hepatic cirrhosis    Managed per primary.   Listed for liver transplant.  May impact BG readings       CKD (chronic kidney  disease) stage 3, GFR 30-59 ml/min    Caution with insulin stacking  Estimated Creatinine Clearance: 31.3 mL/min (A) (based on SCr of 1.5 mg/dL (H)).         Acute kidney injury superimposed on CKD    Avoid insulin stacking and hypoglycemia.  Lab Results   Component Value Date    CREATININE 1.5 (H) 11/17/2018            Pneumonia of right lung due to infectious organism    Infection may elevate BG readings  On IV antibiotics           Jadyn Hall NP  Endocrinology  [facility]

## 2018-11-17 NOTE — SUBJECTIVE & OBJECTIVE
Scheduled Meds:   albumin human 25%  25 g Intravenous BID    guaiFENesin  600 mg Oral BID    heparin (porcine)  5,000 Units Subcutaneous Q8H    insulin aspart U-100  3-5 Units Subcutaneous TIDWM    insulin detemir U-100  12 Units Subcutaneous Daily    lactulose  30 g Oral TID    levalbuterol  1.25 mg Nebulization Q6H WAKE    magnesium oxide  400 mg Oral BID    metoprolol tartrate  25 mg Oral BID    moxifloxacin  400 mg Oral Daily    rifAXImin  550 mg Oral BID     Continuous Infusions:  PRN Meds:sodium chloride, acetaminophen, benzonatate, dextrose 50%, dextrose 50%, glucagon (human recombinant), glucose, glucose, guaifenesin 100 mg/5 ml, insulin aspart U-100, lactulose, ondansetron, simethicone, sodium chloride 0.9%    Review of Systems   Constitutional: Negative.    HENT: Negative.    Eyes: Negative.    Respiratory: Negative.    Cardiovascular: Negative.    Gastrointestinal: Negative.    Genitourinary: Negative.    Musculoskeletal: Negative.    Skin: Negative.    Neurological: Negative.    Psychiatric/Behavioral: Negative.      Objective:     Vital Signs (Most Recent):  Temp: 98.8 °F (37.1 °C) (11/17/18 0802)  Pulse: 65 (11/17/18 0802)  Resp: 20 (11/17/18 0802)  BP: 139/62 (11/17/18 0802)  SpO2: 95 % (11/17/18 0802) Vital Signs (24h Range):  Temp:  [98.7 °F (37.1 °C)-99.2 °F (37.3 °C)] 98.8 °F (37.1 °C)  Pulse:  [61-76] 65  Resp:  [15-24] 20  SpO2:  [94 %-99 %] 95 %  BP: (123-139)/(57-62) 139/62     Weight: 54.4 kg (119 lb 14.9 oz)  Body mass index is 25.07 kg/m².    Intake/Output - Last 3 Shifts       11/15 0700 - 11/16 0659 11/16 0700 - 11/17 0659 11/17 0700 - 11/18 0659    P.O.  240     I.V. (mL/kg)  0 (0)     Other  0     Total Intake(mL/kg)  240 (4.4)     Urine (mL/kg/hr) 300 (0.2) 485 (0.4)     Emesis/NG output  0     Other  0     Stool 0 0     Blood  0     Total Output 300 485     Net -300 -245            Urine Occurrence  5 x     Stool Occurrence 4 x 5 x     Emesis Occurrence  0 x            Physical Exam   Constitutional: She is oriented to person, place, and time. She appears well-developed and well-nourished.   HENT:   Head: Normocephalic and atraumatic.   Eyes: Conjunctivae and EOM are normal. Pupils are equal, round, and reactive to light. No scleral icterus.   Neck: Normal range of motion. Neck supple. No thyromegaly present.   Cardiovascular: Normal rate, regular rhythm and normal heart sounds.   Pulmonary/Chest: Effort normal and breath sounds normal. She has no rales.   Abdominal: Soft. Bowel sounds are normal. She exhibits no distension and no mass. There is no tenderness.   Musculoskeletal: Normal range of motion. She exhibits no edema.   Neurological: She is alert and oriented to person, place, and time.   Skin: Skin is warm and dry. No rash noted.   Psychiatric: She has a normal mood and affect.   Vitals reviewed.      Laboratory:  Immunosuppressants     None        CBC:   Recent Labs   Lab 11/17/18  0536   WBC 6.98   RBC 2.78*   HGB 8.5*   HCT 25.7*   PLT 60*   MCV 92   MCH 30.6   MCHC 33.1     CMP:   Recent Labs   Lab 11/17/18  0537   *   CALCIUM 8.4*   ALBUMIN 2.6*   PROT 4.7*   *   K 3.7   CO2 22*      BUN 72*   CREATININE 1.5*   ALKPHOS 187*   ALT 38   AST 80*   BILITOT 4.4*     Coagulation:   Recent Labs   Lab 11/17/18  0537   INR 1.5*     Labs within the past 24 hours have been reviewed.    Diagnostic Results:  I have personally reviewed all pertinent imaging studies.

## 2018-11-17 NOTE — PLAN OF CARE
Problem: Patient Care Overview  Goal: Plan of Care Review  Outcome: Ongoing (interventions implemented as appropriate)  AOx4, VSS, denies pain. Accuchecks ACHS + 2AM.  and 253. Coverage given at QHS of 1 unit for BCG of 283. Remains on tele - NS (60s). Refuse lactulose d/t multiple BMs (>5).  remains present at bedside and active in care. Remains free from falls. Pt asleep in chair throughout the night. Chair remains locked in place. Personal items and call light w/in reach. NAD, WCTM.

## 2018-11-17 NOTE — ASSESSMENT & PLAN NOTE
BG goal 140-180    increase Levemir to 14 units in AM  Increase Novolog to 4-6 units with meals given variable meal intake  Low dose correction scale  BG monitoring AC/HS/0200    ** Please call Endocrine for any BG related issues **    Discharge planning: VEGA

## 2018-11-17 NOTE — ASSESSMENT & PLAN NOTE
Caution with insulin stacking  Estimated Creatinine Clearance: 31.3 mL/min (A) (based on SCr of 1.5 mg/dL (H)).

## 2018-11-17 NOTE — PLAN OF CARE
Problem: Patient Care Overview  Goal: Plan of Care Review  - Patient is AAOx4, ambulates with 1 person assistance. Patient's  at bedside attentive to patient's needs.   - Afebrile, WBC 6.98    - Telemetry monitoring SR 60's   - Patient on room air - sats > 95 %   - Accuchecks AC/HS/0200 - mealtime and SSI administered per order   - Patient received albumin this morning - CR 1.5 - urine output 300 cc so far this shift   - Continuing scheduled lactulose - patient had 2 bowel movements so far this shift   - No complaints of pain   - See flow sheet for complete assessment details

## 2018-11-17 NOTE — SUBJECTIVE & OBJECTIVE
"Interval HPI:   Overnight events:  Remains in TSU. NAEON. BG above goal with scheduled basal insulin and correction scale.   Eatin%  Nausea: yes with lunch   Hypoglycemia and intervention: No  Fever: No  TPN and/or TF: No      /62   Pulse 65   Temp 98.8 °F (37.1 °C) (Oral)   Resp 20   Ht 4' 10" (1.473 m)   Wt 54.4 kg (119 lb 14.9 oz)   SpO2 95%   Breastfeeding? No   BMI 25.07 kg/m²     Labs Reviewed and Include    Recent Labs   Lab 18  0537   *   CALCIUM 8.4*   ALBUMIN 2.6*   PROT 4.7*   *   K 3.7   CO2 22*      BUN 72*   CREATININE 1.5*   ALKPHOS 187*   ALT 38   AST 80*   BILITOT 4.4*     Lab Results   Component Value Date    WBC 6.98 2018    HGB 8.5 (L) 2018    HCT 25.7 (L) 2018    MCV 92 2018    PLT 60 (L) 2018     Recent Labs   Lab 18  0901   TSH 0.212*   FREET4 1.09     Lab Results   Component Value Date    HGBA1C 5.8 (H) 11/10/2018       Nutritional status:   Body mass index is 25.07 kg/m².  Lab Results   Component Value Date    ALBUMIN 2.6 (L) 2018    ALBUMIN 2.7 (L) 2018    ALBUMIN 2.4 (L) 11/15/2018     No results found for: PREALBUMIN    Estimated Creatinine Clearance: 31.3 mL/min (A) (based on SCr of 1.5 mg/dL (H)).    Accu-Checks  Recent Labs     11/15/18  1327 11/15/18  1809 11/15/18  2203 18  0250 18  0722 18  1243 18  1715 18  2044 18  0207 18  0759   POCTGLUCOSE 310* 189* 226* 229* 216* 226* 274* 283* 253* 198*       Current Medications and/or Treatments Impacting Glycemic Control  Immunotherapy:    Immunosuppressants     None        Steroids:   Hormones (From admission, onward)    None        Pressors:    Autonomic Drugs (From admission, onward)    None        Hyperglycemia/Diabetes Medications:   Antihyperglycemics (From admission, onward)    Start     Stop Route Frequency Ordered    18 1130  insulin aspart U-100 pen 3-5 Units      -- SubQ 3 times daily with " meals 11/16/18 0743    11/16/18 0900  insulin detemir U-100 pen 12 Units      -- SubQ Daily 11/16/18 0740    11/15/18 0041  insulin aspart U-100 pen 0-5 Units      -- SubQ Before meals & nightly PRN 11/14/18 2130

## 2018-11-17 NOTE — ASSESSMENT & PLAN NOTE
-  Listed for liver transplant with MELD 26 thru 11/20/18. Per hepatologist, remains on internal hold 2/2 PNA- tx complete 11/18 and physical deconditioned. Continue to monitor.   -  Plan to discuss pt candidacy once resp status, nutrition and physical mobility improved.       MELD-Na score: 21 at 11/17/2018  5:37 AM  MELD score: 20 at 11/17/2018  5:37 AM  Calculated from:  Serum Creatinine: 1.5 mg/dL at 11/17/2018  5:37 AM  Serum Sodium: 135 mmol/L at 11/17/2018  5:37 AM  Total Bilirubin: 4.4 mg/dL at 11/17/2018  5:37 AM  INR(ratio): 1.5 at 11/17/2018  5:37 AM  Age: 67 years

## 2018-11-18 NOTE — ASSESSMENT & PLAN NOTE
Avoid insulin stacking and hypoglycemia.  Lab Results   Component Value Date    CREATININE 1.5 (H) 11/18/2018

## 2018-11-18 NOTE — ASSESSMENT & PLAN NOTE
BG goal 140-180    continue Levemir to 14 units in AM  decrease Novolog to 4-6 units with meals given variable meal intake; hold scheduled insulin when having broth as meal.   Low dose correction scale  BG monitoring AC/HS/0200    ** Please call Endocrine for any BG related issues **    Discharge planning: TBD

## 2018-11-18 NOTE — SUBJECTIVE & OBJECTIVE
Scheduled Meds:   albumin human 25%  25 g Intravenous BID    furosemide  40 mg Intravenous Once    guaiFENesin  600 mg Oral BID    heparin (porcine)  5,000 Units Subcutaneous Q8H    insulin aspart U-100  4-6 Units Subcutaneous TIDWM    insulin detemir U-100  14 Units Subcutaneous Daily    lactulose  30 g Oral TID    levalbuterol  1.25 mg Nebulization Q6H WAKE    magnesium oxide  400 mg Oral BID    metoprolol tartrate  25 mg Oral BID    rifAXImin  550 mg Oral BID     Continuous Infusions:  PRN Meds:sodium chloride, acetaminophen, benzonatate, dextrose 50%, dextrose 50%, glucagon (human recombinant), glucose, glucose, guaifenesin 100 mg/5 ml, insulin aspart U-100, lactulose, ondansetron, simethicone, sodium chloride 0.9%    Review of Systems   Constitutional: Negative.    HENT: Negative.    Eyes: Negative.    Respiratory: Negative.    Cardiovascular: Negative.    Gastrointestinal: Negative.    Genitourinary: Negative.    Musculoskeletal: Negative.    Skin: Negative.    Neurological: Negative.    Psychiatric/Behavioral: Negative.      Objective:     Vital Signs (Most Recent):  Temp: 98.2 °F (36.8 °C) (11/18/18 0737)  Pulse: 72 (11/18/18 0852)  Resp: 16 (11/18/18 0852)  BP: 130/61 (11/18/18 0737)  SpO2: 97 % (11/18/18 0852) Vital Signs (24h Range):  Temp:  [98.2 °F (36.8 °C)-98.5 °F (36.9 °C)] 98.2 °F (36.8 °C)  Pulse:  [62-73] 72  Resp:  [16-25] 16  SpO2:  [91 %-99 %] 97 %  BP: (126-148)/(59-65) 130/61     Weight: 54.4 kg (119 lb 14.9 oz)  Body mass index is 25.07 kg/m².    Intake/Output - Last 3 Shifts       11/16 0700 - 11/17 0659 11/17 0700 - 11/18 0659 11/18 0700 - 11/19 0659    P.O. 240 840     I.V. (mL/kg) 0 (0)      Other 0      Total Intake(mL/kg) 240 (4.4) 840 (15.4)     Urine (mL/kg/hr) 485 (0.4) 700 (0.5)     Emesis/NG output 0 0     Other 0 0     Stool 0 0     Blood 0 0     Total Output 485 700     Net -245 +140            Urine Occurrence 5 x 0 x     Stool Occurrence 5 x 6 x     Emesis  Occurrence 0 x 0 x           Physical Exam   Constitutional: She is oriented to person, place, and time. She appears well-developed and well-nourished.   HENT:   Head: Normocephalic and atraumatic.   Eyes: Conjunctivae and EOM are normal. Pupils are equal, round, and reactive to light. No scleral icterus.   Neck: Normal range of motion. Neck supple. No thyromegaly present.   Cardiovascular: Normal rate, regular rhythm and normal heart sounds.   Pulmonary/Chest: Effort normal and breath sounds normal. She has no rales.   Abdominal: Soft. Bowel sounds are normal. She exhibits no distension and no mass. There is no tenderness.   Musculoskeletal: Normal range of motion. She exhibits no edema.   Neurological: She is alert and oriented to person, place, and time.   Skin: Skin is warm and dry. No rash noted.   Psychiatric: She has a normal mood and affect.   Vitals reviewed.      Laboratory:  Immunosuppressants     None        CBC:   Recent Labs   Lab 11/18/18  0544   WBC 9.05   RBC 2.65*   HGB 8.2*   HCT 25.3*   PLT 77*   MCV 96   MCH 30.9   MCHC 32.4     CMP:   Recent Labs   Lab 11/18/18  0544   *   CALCIUM 8.7   ALBUMIN 3.6   PROT 5.3*   *   K 3.4*   CO2 21*      BUN 73*   CREATININE 1.5*   ALKPHOS 166*   ALT 35   AST 69*   BILITOT 6.0*     Coagulation:   Recent Labs   Lab 11/18/18  0544   INR 1.7*     Labs within the past 24 hours have been reviewed.    Diagnostic Results:  I have personally reviewed all pertinent imaging studies.

## 2018-11-18 NOTE — ASSESSMENT & PLAN NOTE
-  Listed for liver transplant with MELD 26 thru 11/20/18. Per hepatologist, remains on internal hold 2/2 PNA- tx complete 11/18 and physical deconditioned. Continue to monitor.   -  Plan to discuss pt candidacy once resp status, nutrition and physical mobility improved.       MELD-Na score: 24 at 11/18/2018  5:44 AM  MELD score: 23 at 11/18/2018  5:44 AM  Calculated from:  Serum Creatinine: 1.5 mg/dL at 11/18/2018  5:44 AM  Serum Sodium: 135 mmol/L at 11/18/2018  5:44 AM  Total Bilirubin: 6 mg/dL at 11/18/2018  5:44 AM  INR(ratio): 1.7 at 11/18/2018  5:44 AM  Age: 67 years

## 2018-11-18 NOTE — ASSESSMENT & PLAN NOTE
BG goal 140-180    increase Levemir to 14 units in AM  Increase Novolog to 5-7 units with meals given variable meal intake  Low dose correction scale  BG monitoring AC/HS/0200    ** Please call Endocrine for any BG related issues **    Discharge planning: VEGA

## 2018-11-18 NOTE — ASSESSMENT & PLAN NOTE
- CKD3/4 at baseline creatinine. Urine electrolytes ordered. Etiology possibly due to hepatorenal syndrome.   -  HRS protocol started, albumin and octreotide. No midodrine as with Afib.   -  Cr level improved but then noted with slight increase (11/14)- could be from diuresis.   -   plan for lasix 40 mg x1 today after IV albumin.  Must weigh daily and cont strict I&Os.

## 2018-11-18 NOTE — SUBJECTIVE & OBJECTIVE
"Interval HPI:   Overnight events: remains in TSU. NAEON. BG at or above goal with scheduled insulin; requiring correction scale coverage.   Eatin%  Nausea: Yes  Hypoglycemia and intervention: No  Fever: No  TPN and/or TF: No    /61   Pulse 67   Temp 98.3 °F (36.8 °C) (Oral)   Resp (!) 21   Ht 4' 10" (1.473 m)   Wt 54.4 kg (119 lb 14.9 oz)   SpO2 98%   Breastfeeding? No   BMI 25.07 kg/m²     Labs Reviewed and Include    Recent Labs   Lab 18  0544   *   CALCIUM 8.7   ALBUMIN 3.6   PROT 5.3*   *   K 3.4*   CO2 21*      BUN 73*   CREATININE 1.5*   ALKPHOS 166*   ALT 35   AST 69*   BILITOT 6.0*     Lab Results   Component Value Date    WBC 9.05 2018    HGB 8.2 (L) 2018    HCT 25.3 (L) 2018    MCV 96 2018    PLT 77 (L) 2018     Recent Labs   Lab 18  0901   TSH 0.212*   FREET4 1.09     Lab Results   Component Value Date    HGBA1C 5.8 (H) 11/10/2018       Nutritional status:   Body mass index is 25.07 kg/m².  Lab Results   Component Value Date    ALBUMIN 3.6 2018    ALBUMIN 2.6 (L) 2018    ALBUMIN 2.7 (L) 2018     No results found for: PREALBUMIN    Estimated Creatinine Clearance: 31.3 mL/min (A) (based on SCr of 1.5 mg/dL (H)).    Accu-Checks  Recent Labs     18  0250 18  0722 18  1243 18  1715 18  2044 18  0207 18  0759 18  1142 18  1701 18  2148   POCTGLUCOSE 229* 216* 226* 274* 283* 253* 198* 281* 229* 196*       Current Medications and/or Treatments Impacting Glycemic Control  Immunotherapy:    Immunosuppressants     None        Steroids:   Hormones (From admission, onward)    None        Pressors:    Autonomic Drugs (From admission, onward)    None        Hyperglycemia/Diabetes Medications:   Antihyperglycemics (From admission, onward)    Start     Stop Route Frequency Ordered    18 0900  insulin detemir U-100 pen 14 Units      -- SubQ Daily 18 0912 "    11/17/18 1645  insulin aspart U-100 pen 4-6 Units      -- SubQ 3 times daily with meals 11/17/18 1254    11/15/18 0041  insulin aspart U-100 pen 0-5 Units      -- SubQ Before meals & nightly PRN 11/14/18 2070

## 2018-11-18 NOTE — PLAN OF CARE
Problem: Patient Care Overview  Goal: Plan of Care Review  Outcome: Ongoing (interventions implemented as appropriate)  Pt AAOx4, VSS, in bed with upper siderails raised x2, bed in lowest/locked position, call light/personal belongings within reach. Pt instructed to call for assistance. Pt verbalizes understanding. Pt afebrile at this time.  Proper hand hygiene performed before and after pt care.      Held PM lactulose per pt refusal. AAOx4. 4 bm thru day shift, 1 so far this shift.  PRN Perle administered for cough. Pt encouraged to cough up sputum if able. IS encouraged.   Scheduled albumin administered.  Pt on tele, NSR 60's.  'Groaning' noted during sleep. VSS. Pt appears to be in no distress, denies SOB or trouble breathing.   Monitoring blood glucose ACHS + 0200. SSI coverage administered PRN bg>200.   at bedside attentive to pt's needs.  UOP measured via hat.     Will continue to monitor patient.

## 2018-11-18 NOTE — PLAN OF CARE
Problem: Patient Care Overview  Goal: Plan of Care Review  - Patient is AAOx4, ambulates with 1 person assistance. Patient's  at bedside attentive to patient's needs.   - Afebrile, WBC 9.05  - Telemetry monitoring SR 60's   - Patient on room air - sats > 95 %. Patient with frequent cough, administered PRN guafenisin x1 and PRN tessalon Perles x2 with no releif noted.   - Accuchecks AC/HS/0200 - mealtime and SSI administered per order   - Patient received albumin and lasix this morning - CR 1.5 - 4 unmeasured urine occurrences   - Continuing scheduled lactulose - patient had 4 bowel movements so far this shift   - No complaints of pain   - See flow sheet for complete assessment details

## 2018-11-18 NOTE — PROGRESS NOTES
"[facility]  Endocrinology  Progress Note    Admit Date: 11/10/2018     Reason for Consult: Management of type 2 DM, Hyperglycemia     Surgical Procedure and Date: n/a    Diabetes diagnosis year:     Home Diabetes Medications: Levemir 12 units BID (vial) and Humalog SSI with meals   Lab Results   Component Value Date    HGBA1C 5.8 (H) 11/10/2018         How often checking glucose at home? 1-3  BG readings on regimen: 150-300  Hypoglycemia on the regimen? once 27; required visit to ED; gave Levemir and humalog and patient did not eat  Missed doses on regimen?  No    Diabetes Complications include:     Hyperglycemia, Hypoglycemia  and Diabetic peripheral neuropathy     Complicating diabetes co morbidities:   CIRRHOSIS      HPI:   Patient is a 67 y.o. female with a diagnosis of type 2 DM; well controlled on MDI. Also with   Cryptogenic cirrhosis, rheumatoid arthritis, and GERD. Patient was listed for liver transplant on 18. Patient presented to ED of hospital in [place] with AMS and ENZO. Endocrinology consulted for BG/ DM management.   Of note, profound hypoglycemic event (BG < 20) the night of 18.            Interval HPI:   Overnight events: remains in TSU. NAEON. BG at or above goal with scheduled insulin; requiring correction scale coverage.   Eatin%  Nausea: Yes  Hypoglycemia and intervention: No  Fever: No  TPN and/or TF: No    /61   Pulse 67   Temp 98.3 °F (36.8 °C) (Oral)   Resp (!) 21   Ht 4' 10" (1.473 m)   Wt 54.4 kg (119 lb 14.9 oz)   SpO2 98%   Breastfeeding? No   BMI 25.07 kg/m²      Labs Reviewed and Include    Recent Labs   Lab 18  0544   *   CALCIUM 8.7   ALBUMIN 3.6   PROT 5.3*   *   K 3.4*   CO2 21*      BUN 73*   CREATININE 1.5*   ALKPHOS 166*   ALT 35   AST 69*   BILITOT 6.0*     Lab Results   Component Value Date    WBC 9.05 2018    HGB 8.2 (L) 2018    HCT 25.3 (L) 2018    MCV 96 2018    PLT 77 " (L) 11/18/2018     Recent Labs   Lab 11/11/18  0901   TSH 0.212*   FREET4 1.09     Lab Results   Component Value Date    HGBA1C 5.8 (H) 11/10/2018       Nutritional status:   Body mass index is 25.07 kg/m².  Lab Results   Component Value Date    ALBUMIN 3.6 11/18/2018    ALBUMIN 2.6 (L) 11/17/2018    ALBUMIN 2.7 (L) 11/16/2018     No results found for: PREALBUMIN    Estimated Creatinine Clearance: 31.3 mL/min (A) (based on SCr of 1.5 mg/dL (H)).    Accu-Checks  Recent Labs     11/16/18  0250 11/16/18  0722 11/16/18  1243 11/16/18  1715 11/16/18  2044 11/17/18  0207 11/17/18  0759 11/17/18  1142 11/17/18  1701 11/17/18  2148   POCTGLUCOSE 229* 216* 226* 274* 283* 253* 198* 281* 229* 196*       Current Medications and/or Treatments Impacting Glycemic Control  Immunotherapy:    Immunosuppressants     None        Steroids:   Hormones (From admission, onward)    None        Pressors:    Autonomic Drugs (From admission, onward)    None        Hyperglycemia/Diabetes Medications:   Antihyperglycemics (From admission, onward)    Start     Stop Route Frequency Ordered    11/18/18 0900  insulin detemir U-100 pen 14 Units      -- SubQ Daily 11/17/18 0912    11/17/18 1645  insulin aspart U-100 pen 4-6 Units      -- SubQ 3 times daily with meals 11/17/18 1254    11/15/18 0041  insulin aspart U-100 pen 0-5 Units      -- SubQ Before meals & nightly PRN 11/14/18 2341          ASSESSMENT and PLAN    * Hepatic encephalopathy    Managed per primary.        Type 2 diabetes mellitus with hyperglycemia, with long-term current use of insulin    BG goal 140-180    increase Levemir to 14 units in AM  Increase Novolog to 5-7 units with meals given variable meal intake  Low dose correction scale  BG monitoring AC/HS/0200    ** Please call Endocrine for any BG related issues **    Discharge planning: TBD       Decompensated hepatic cirrhosis    Managed per primary.   Listed for liver transplant.  May impact BG readings       CKD (chronic kidney  disease) stage 3, GFR 30-59 ml/min    Caution with insulin stacking  Estimated Creatinine Clearance: 31.3 mL/min (A) (based on SCr of 1.5 mg/dL (H)).         Acute kidney injury superimposed on CKD    Avoid insulin stacking and hypoglycemia.  Lab Results   Component Value Date    CREATININE 1.5 (H) 11/18/2018            Pneumonia of right lung due to infectious organism    Infection may elevate BG readings  On IV antibiotics           Jadyn Hall NP  Endocrinology  [facility]

## 2018-11-18 NOTE — PROGRESS NOTES
[facility]  Liver Transplant  Progress Note    Patient Name: Krystal Hobson  MRN: [id number]  Admission Date: 11/10/2018  Hospital Length of Stay: 8 days  Code Status: Full Code  Primary Care Provider: Courtney Joe MD    Subjective:     History of Present Illness:  Ms. Krsytal Hobson is a 67 yr F w/ hx of cryptogenic cirrhosis, listed for liver tx 9/14/18 who presented to ER at outside hospital in [place], [place] with increased abdominal pain and then noted to have AMS on arrival and ENZO on lab work. Per the , she was extremely disoriented and her ammonia reached as high as 200. Her Cr level increased to 2.0 at the OSH. She also went into A fib with RVR and was started on a diltiazem infusion at OSH. She was then transferred to [facility] for further care. On arrival pt was noted with confusion which has now resolved with lactulose. She also was noted with a cough and fatigue.             Hospital Course:  Interval History: blood glucose stable past 24 hours on Levemir 12u qd and Aspart 3-5u ac & hs.  Right arm remains edematous, but no DVT on US. Patient AAOx4, no signs of encephalopathy.  She reports having 3-4 BMs on Lactulose.  She remains on Moxifloxacin for PNA.  Now on room air.  CXR 11/15 shows improvement.  Remains in NSR.  Received lasix 2 days ago. BUN 70 today with creat 1.5. Urine output improved with IV albumin.  Still w swelling to right arm and 1+ BLE edema. Dosing Lasix daily.  She was seen by Cardiology when she was in a fib w RVR.  High SHY-VASc but due to coagulopathy - would not give anticoagulation.  Asprin started. 2D echo with 65% EF with elevated PA pressure (55).  Will need to discuss when to repeat echo. Currently on internal hold 2/2 PNA (plan to complete 11/18) and frailty.  PT/OT following. MELD 24 today, listed w MELD 26 thru 11/20.  Monitor.    Scheduled Meds:   albumin human 25%  25 g Intravenous BID    furosemide  40 mg Intravenous Once    guaiFENesin  600 mg Oral  BID    heparin (porcine)  5,000 Units Subcutaneous Q8H    insulin aspart U-100  4-6 Units Subcutaneous TIDWM    insulin detemir U-100  14 Units Subcutaneous Daily    lactulose  30 g Oral TID    levalbuterol  1.25 mg Nebulization Q6H WAKE    magnesium oxide  400 mg Oral BID    metoprolol tartrate  25 mg Oral BID    rifAXImin  550 mg Oral BID     Continuous Infusions:  PRN Meds:sodium chloride, acetaminophen, benzonatate, dextrose 50%, dextrose 50%, glucagon (human recombinant), glucose, glucose, guaifenesin 100 mg/5 ml, insulin aspart U-100, lactulose, ondansetron, simethicone, sodium chloride 0.9%    Review of Systems   Constitutional: Negative.    HENT: Negative.    Eyes: Negative.    Respiratory: Negative.    Cardiovascular: Negative.    Gastrointestinal: Negative.    Genitourinary: Negative.    Musculoskeletal: Negative.    Skin: Negative.    Neurological: Negative.    Psychiatric/Behavioral: Negative.      Objective:     Vital Signs (Most Recent):  Temp: 98.2 °F (36.8 °C) (11/18/18 0737)  Pulse: 72 (11/18/18 0852)  Resp: 16 (11/18/18 0852)  BP: 130/61 (11/18/18 0737)  SpO2: 97 % (11/18/18 0852) Vital Signs (24h Range):  Temp:  [98.2 °F (36.8 °C)-98.5 °F (36.9 °C)] 98.2 °F (36.8 °C)  Pulse:  [62-73] 72  Resp:  [16-25] 16  SpO2:  [91 %-99 %] 97 %  BP: (126-148)/(59-65) 130/61     Weight: 54.4 kg (119 lb 14.9 oz)  Body mass index is 25.07 kg/m².    Intake/Output - Last 3 Shifts       11/16 0700 - 11/17 0659 11/17 0700 - 11/18 0659 11/18 0700 - 11/19 0659    P.O. 240 840     I.V. (mL/kg) 0 (0)      Other 0      Total Intake(mL/kg) 240 (4.4) 840 (15.4)     Urine (mL/kg/hr) 485 (0.4) 700 (0.5)     Emesis/NG output 0 0     Other 0 0     Stool 0 0     Blood 0 0     Total Output 485 700     Net -245 +140            Urine Occurrence 5 x 0 x     Stool Occurrence 5 x 6 x     Emesis Occurrence 0 x 0 x           Physical Exam   Constitutional: She is oriented to person, place, and time. She appears well-developed and  well-nourished.   HENT:   Head: Normocephalic and atraumatic.   Eyes: Conjunctivae and EOM are normal. Pupils are equal, round, and reactive to light. No scleral icterus.   Neck: Normal range of motion. Neck supple. No thyromegaly present.   Cardiovascular: Normal rate, regular rhythm and normal heart sounds.   Pulmonary/Chest: Effort normal and breath sounds normal. She has no rales.   Abdominal: Soft. Bowel sounds are normal. She exhibits no distension and no mass. There is no tenderness.   Musculoskeletal: Normal range of motion. She exhibits no edema.   Neurological: She is alert and oriented to person, place, and time.   Skin: Skin is warm and dry. No rash noted.   Psychiatric: She has a normal mood and affect.   Vitals reviewed.      Laboratory:  Immunosuppressants     None        CBC:   Recent Labs   Lab 11/18/18  0544   WBC 9.05   RBC 2.65*   HGB 8.2*   HCT 25.3*   PLT 77*   MCV 96   MCH 30.9   MCHC 32.4     CMP:   Recent Labs   Lab 11/18/18  0544   *   CALCIUM 8.7   ALBUMIN 3.6   PROT 5.3*   *   K 3.4*   CO2 21*      BUN 73*   CREATININE 1.5*   ALKPHOS 166*   ALT 35   AST 69*   BILITOT 6.0*     Coagulation:   Recent Labs   Lab 11/18/18  0544   INR 1.7*     Labs within the past 24 hours have been reviewed.    Diagnostic Results:  I have personally reviewed all pertinent imaging studies.    Assessment/Plan:     * Hepatic encephalopathy    - Acute on chronic. PO lactulose ordered, continue rifaximin. Titrate Lactulose to maintain 3-4 BM a day. PRN lactulose enema TID for worsening confusion.  - currently pt is AAOx4, no encephalopathy noted.       Swelling of joint of upper arm, right    - infiltrated midline overnight 11/14-11/15.  Cont to have increased swelling, pain and cold right arm despite elevating and using PRN heat packs.  Will order US right upper extremity to r/o DVT.  Pt on ASA.  Monitor.         Physical deconditioning    - PT/OT following.  Patient need aggressive therapy.   She will also need to be able to climb steps prior to discharge.  She has at least 7 steps to climb at home.         Pneumonia of right lung due to infectious organism    - Noted on recent chest xray.  Initially placed on BS antibxs for treatment.    - Transitioned to PO Moxi (11/13) to complete a 7 day course (11/18).   - Pt with elevated WBC on lab work 11/14 and placed back on BS antibxs at that time.   - WBC then with quick improvement. Now transitioned back to Moxi stop date 11/18.      Shortness of breath    - Improving.  off oxygen     Sinus tachycardia    - Improved with BB.        Atrial fibrillation    -  Patient with episode of Afib with RVR at OSH and was placed on diltiazem gtt which was discontinued 11/10 as she reverted to NSR.  Cardiology consulted for management.   -  Pt then returned back into Afib w/ RVR on the afternoon of 11/11 and pt placed back on diltiazem for rate control.   -  Pt now rated controlled and diltiazem d/c'd on 11/12 at 0200. Cont with lopressor at this time as pt remains rate controlled.    - 2D echo performed today to assess cardiac risk and for discussion of candidacy with Transplant Surgery and Anesthesiology.  PA pressure on 2D echo noted elevated.   - dosing diuretics on daily basis.  Plan for Lasix 40 mg today.           CKD (chronic kidney disease) stage 3, GFR 30-59 ml/min    - See ENZO.  Stable.       Chronic liver disease           Type 2 diabetes mellitus with hyperglycemia, with long-term current use of insulin    -  Sliding scale insulin.  Endocrine consulted for management.  Apprec recs.   -  Pt noted with significant hypoglycemic event the evening of 11/14. Pt responded well to IV dextrose.        Anemia of chronic disease    - H/H stable. Continue to monitor with daily cbc.        End stage liver disease    -  Listed for liver transplant with MELD 26 thru 11/20/18. Per hepatologist, remains on internal hold 2/2 PNA- tx complete 11/18 and physical deconditioned.  Continue to monitor.   -  Plan to discuss pt candidacy once resp status, nutrition and physical mobility improved.       MELD-Na score: 24 at 11/18/2018  5:44 AM  MELD score: 23 at 11/18/2018  5:44 AM  Calculated from:  Serum Creatinine: 1.5 mg/dL at 11/18/2018  5:44 AM  Serum Sodium: 135 mmol/L at 11/18/2018  5:44 AM  Total Bilirubin: 6 mg/dL at 11/18/2018  5:44 AM  INR(ratio): 1.7 at 11/18/2018  5:44 AM  Age: 67 years         Acute kidney injury superimposed on CKD    - CKD3/4 at baseline creatinine. Urine electrolytes ordered. Etiology possibly due to hepatorenal syndrome.   -  HRS protocol started, albumin and octreotide. No midodrine as with Afib.   -  Cr level improved but then noted with slight increase (11/14)- could be from diuresis.   -   plan for lasix 40 mg x1 today after IV albumin.  Must weigh daily and cont strict I&Os.      Hyponatremia with decreased serum osmolality           Thrombocytopenia due to hypersplenism    - No overt s/s of bleeding.  Continue to monitor.        Protein-calorie malnutrition, moderate    - Dietician completed calorie count.  Patient eating % of trays.  Will d/c calorie count.  Encourage supplements.        Decompensated hepatic cirrhosis    - See hepatic encephalopathy.            VTE Risk Mitigation (From admission, onward)        Ordered     IP VTE HIGH RISK PATIENT  Once      11/10/18 0213     heparin (porcine) injection 5,000 Units  Every 8 hours      11/10/18 0213          The patients clinical status was discussed at multidisplinary rounds, involving transplant surgery, transplant medicine, pharmacy, nursing, nutrition, and social work    Discharge Planning:  No Patient Care Coordination Note on file.      Louise Flower MD  Liver Transplant  [facility]

## 2018-11-19 PROBLEM — H92.09 EAR PAIN: Status: ACTIVE | Noted: 2018-01-01

## 2018-11-19 PROBLEM — E87.1 HYPONATREMIA WITH DECREASED SERUM OSMOLALITY: Status: RESOLVED | Noted: 2018-01-01 | Resolved: 2018-01-01

## 2018-11-19 PROBLEM — E87.70 HYPERVOLEMIA: Status: ACTIVE | Noted: 2018-01-01

## 2018-11-19 NOTE — PLAN OF CARE
Problem: Physical Therapy Goal  Goal: Physical Therapy Goal  Goals to be met by: 18     Patient will increase functional independence with mobility by performin. Supine to sit with Stand-by Assistance  2. Sit to stand transfer with Stand-by Assistance  3. Gait  x 150 feet with Contact Guard Assistance using AD as needed.   4. Lower extremity exercise program x15 reps, with supervision, in order to increase LE strength and (I) with functional mobility.      Outcome: Ongoing (interventions implemented as appropriate)  Goals reviewed and remain appropriate. Pt progressing towards goals.    Citlali Mallory, PT, DPT   2018  [phone]

## 2018-11-19 NOTE — PT/OT/SLP PROGRESS
Physical Therapy Treatment    Patient Name:  Krystal Hobson   MRN:  [id number]    Recommendations:     Discharge Recommendations:  home health PT   Discharge Equipment Recommendations: (TBD)   Barriers to discharge: Inaccessible home    Assessment:     Krystal Hobson is a 67 y.o. female admitted with a medical diagnosis of Hepatic encephalopathy.  She presents with the following impairments/functional limitations:  weakness, gait instability, impaired endurance, impaired balance, impaired self care skills, impaired functional mobilty, impaired cardiopulmonary response to activity. Pt progressing functional mobility, as she was able to increase ambulation distance this date. However, continues to demo impaired endurance and generalized weakness, limiting further (I) with mobility. Pt with mild SOB and fatigue noted following gait. Pt would continue to benefit from skilled acute PT in order to address current deficits and progress functional mobility.     Rehab Prognosis:  good; patient would benefit from acute skilled PT services to address these deficits and reach maximum level of function.      Recent Surgery: * No surgery found *      Plan:     During this hospitalization, patient to be seen 4 x/week to address the above listed problems via gait training, therapeutic activities, therapeutic exercises, neuromuscular re-education  · Plan of Care Expires:  12/11/18   Plan of Care Reviewed with: patient, spouse    Subjective     Communicated with RN prior to session.  Patient found UIC upon PT entry to room, agreeable to treatment.       Chief Complaint: none noted   Patient comments/goals: Pt pleasant and agreeable throughout session.   Pain/Comfort:  · Pain Rating 1: 0/10    Patients cultural, spiritual, Anabaptist conflicts given the current situation: none noted     Objective:     Patient found with: pulse ox (continuous), telemetry, oxygen     General Precautions: Standard, fall   Orthopedic Precautions:N/A    Braces: N/A     Functional Mobility:  · Transfers:     · Sit to Stand:  contact guard assistance with rolling walker  · Cues for hand placement and transfer technique with RW   · Gait: 140 ft. with RW and CGA  · Decreased step length, increased forward flexion, decreased toe-floor clearance, impaired weight-shifting ability  · spO2 100% pre- and post-gait  · Cues for upright posture, forward gaze, and RW management       AM-PAC 6 CLICK MOBILITY  Turning over in bed (including adjusting bedclothes, sheets and blankets)?: 4  Sitting down on and standing up from a chair with arms (e.g., wheelchair, bedside commode, etc.): 3  Moving from lying on back to sitting on the side of the bed?: 3  Moving to and from a bed to a chair (including a wheelchair)?: 3  Need to walk in hospital room?: 3  Climbing 3-5 steps with a railing?: 3  Basic Mobility Total Score: 19       Therapeutic Activities and Exercises:   Completed functional mobility as described above.   Pt education provided on safe mobility techniques with RW.   RW adjust for proper pt height    Patient left up in chair with all lines intact, call button in reach and pt's  present..    GOALS:   Multidisciplinary Problems     Physical Therapy Goals        Problem: Physical Therapy Goal    Goal Priority Disciplines Outcome Goal Variances Interventions   Physical Therapy Goal     PT, PT/OT Ongoing (interventions implemented as appropriate)     Description:  Goals to be met by: 18     Patient will increase functional independence with mobility by performin. Supine to sit with Stand-by Assistance  2. Sit to stand transfer with Stand-by Assistance  3. Gait  x 150 feet with Contact Guard Assistance using AD as needed.   4. Lower extremity exercise program x15 reps, with supervision, in order to increase LE strength and (I) with functional mobility.                       Time Tracking:     PT Received On: 18  PT Start Time: 1040     PT Stop Time:  1054  PT Total Time (min): 14 min     Billable Minutes: Gait Training 14    Treatment Type: Treatment  PT/PTA: PT     PTA Visit Number: 0     Citlali Mallory PT, DPT   11/19/2018  [phone]

## 2018-11-19 NOTE — ASSESSMENT & PLAN NOTE
- Dietician completed calorie count.  Patient eating % of trays.  Will d/c calorie count.  Pt drinking 2-3 supplement drinks daily.

## 2018-11-19 NOTE — SUBJECTIVE & OBJECTIVE
Scheduled Meds:   guaiFENesin  600 mg Oral BID    heparin (porcine)  5,000 Units Subcutaneous Q8H    insulin aspart U-100  5-7 Units Subcutaneous TIDWM    insulin detemir U-100  14 Units Subcutaneous Daily    lactulose  30 g Oral TID    levalbuterol  1.25 mg Nebulization Q6H WAKE    magnesium oxide  400 mg Oral BID    metoprolol tartrate  25 mg Oral BID    rifAXImin  550 mg Oral BID     Continuous Infusions:  PRN Meds:acetaminophen, benzonatate, dextrose 50%, dextrose 50%, glucagon (human recombinant), glucose, glucose, guaifenesin 100 mg/5 ml, insulin aspart U-100, lactulose, ondansetron, simethicone, sodium chloride 0.9%    Review of Systems   Constitutional: Positive for activity change, appetite change and fatigue. Negative for chills and fever.   HENT: Positive for ear pain. Negative for congestion, ear discharge and sinus pressure.    Respiratory: Negative for apnea, cough, chest tightness, shortness of breath and wheezing.    Cardiovascular: Negative for chest pain, palpitations and leg swelling.   Gastrointestinal: Negative for abdominal distention, abdominal pain, anal bleeding, blood in stool, diarrhea, nausea and vomiting.   Endocrine: Negative for cold intolerance and heat intolerance.   Genitourinary: Positive for decreased urine volume (incontinent of urine). Negative for dysuria, menstrual problem and urgency.   Musculoskeletal: Negative for arthralgias and back pain.   Skin: Positive for wound. Negative for pallor.   Allergic/Immunologic: Positive for immunocompromised state.   Neurological: Positive for weakness. Negative for dizziness and headaches.   Hematological: Bruises/bleeds easily.   Psychiatric/Behavioral: Negative for agitation, behavioral problems, confusion, decreased concentration and sleep disturbance.     Objective:     Vital Signs (Most Recent):  Temp: 98.4 °F (36.9 °C) (11/19/18 1140)  Pulse: 71 (11/19/18 1146)  Resp: 17 (11/19/18 1131)  BP: 131/67 (11/19/18 0810)  SpO2: 97  % (11/19/18 1140) Vital Signs (24h Range):  Temp:  [98 °F (36.7 °C)-98.9 °F (37.2 °C)] 98.4 °F (36.9 °C)  Pulse:  [64-83] 71  Resp:  [15-25] 17  SpO2:  [95 %-100 %] 97 %  BP: (119-131)/(54-67) 131/67     Weight: 55.3 kg (121 lb 14.6 oz)  Body mass index is 25.48 kg/m².    Intake/Output - Last 3 Shifts       11/17 0700 - 11/18 0659 11/18 0700 - 11/19 0659 11/19 0700 - 11/20 0659    P.O. 840 890     I.V. (mL/kg)       Other       Total Intake(mL/kg) 840 (15.4) 890 (16.4)     Urine (mL/kg/hr) 700 (0.5) 450 (0.3) 150 (0.4)    Emesis/NG output 0      Other 0      Stool 0 0     Blood 0      Total Output 700 450 150    Net +140 +440 -150           Urine Occurrence 0 x 5 x     Stool Occurrence 6 x 5 x     Emesis Occurrence 0 x 0 x           Physical Exam   Constitutional: She is oriented to person, place, and time. She appears well-developed.   Chronically ill-appearing. Malnourished. Temporal wasting.   HENT:   Head: Normocephalic and atraumatic.   Mouth/Throat: Oropharynx is clear and moist.   Eyes: Conjunctivae are normal. Scleral icterus is present.   Neck: Normal range of motion.   Cardiovascular: Normal rate, regular rhythm and normal heart sounds.   Pulmonary/Chest: Effort normal. No respiratory distress. She has no decreased breath sounds. She has no wheezes. She has no rales.   Abdominal: Soft. Bowel sounds are normal. She exhibits distension. There is no tenderness. No hernia.   Small ascites   Musculoskeletal: She exhibits edema (1+ BLE, +2 RUE).   Lymphadenopathy:     She has no cervical adenopathy.   Neurological: She is alert and oriented to person, place, and time.   Skin: Skin is warm and dry. No rash noted.   Psychiatric: She has a normal mood and affect. Her behavior is normal. Her mood appears not anxious.   Nursing note and vitals reviewed.      Laboratory:  Immunosuppressants     None        CBC:   Recent Labs   Lab 11/19/18  1040   WBC 11.01   RBC 2.58*   HGB 8.0*   HCT 25.2*   PLT 90*   MCV 98   MCH  31.0   MCHC 31.7*     CMP:   Recent Labs   Lab 11/19/18  0711   *   CALCIUM 9.0   ALBUMIN 3.9   PROT 5.6*   *   K 3.5   CO2 21*      BUN 72*   CREATININE 1.5*   ALKPHOS 157*   ALT 31   AST 57*   BILITOT 7.2*     Coagulation:   Recent Labs   Lab 11/19/18  0711   INR 1.9*     Labs within the past 24 hours have been reviewed.    Diagnostic Results:  I have personally reviewed all pertinent imaging studies.

## 2018-11-19 NOTE — ASSESSMENT & PLAN NOTE
- Was on 2-3 L O2 11/16.  Weaned off over the weekend.  O2 sat on RA this am was 91%.  Pt c/o feeling SOB.  O2 at 1L applied.  Pt w non productive wet cough.  Cont CPT, IS and Guaifenesin.  Monitor.   - Lasix 60 mg ordered.

## 2018-11-19 NOTE — ASSESSMENT & PLAN NOTE
- H/H stable. Continue to monitor with daily cbc.   - h/h decreased 11/19- repeat h/h at baseline.  No overt signs of bleeding.

## 2018-11-19 NOTE — ASSESSMENT & PLAN NOTE
- infiltrated midline overnight 11/14-11/15.  Cont to have increased swelling, pain and cold right arm despite elevating and using PRN heat packs.    - US right upper extremity 11/16 negative for DVT.  Pt on ASA.  Monitor.

## 2018-11-19 NOTE — PROGRESS NOTES
[facility]  Liver Transplant  Progress Note     Patient Name: Krystal Hobson  MRN: [id number]  Admission Date: 11/10/2018  Hospital Length of Stay: 6 days  Code Status: Full Code  Primary Care Provider: Courtney Joe MD     Subjective:      History of Present Illness:  Ms. Krystal Hobson is a 67 yr F w/ hx of cryptogenic cirrhosis, listed for liver tx 9/14/18 who presented to ER at outside hospital in [place], [place] with increased abdominal pain and then noted to have AMS on arrival and ENZO on lab work. Per the , she was extremely disoriented and her ammonia reached as high as 200. Her Cr level increased to 2.0 at the OSH. She also went into A fib with RVR and was started on a diltiazem infusion at OSH. She was then transferred to [facility] for further care. On arrival pt was noted with confusion which has now resolved with lactulose. She also was noted with a cough and fatigue.            Hospital Course:  Interval History: blood glucose stable past 24 hours on Levemir 12u qd and Aspart 3-5u ac & hs.  Right arm remains edematous, tender and cool to touch.  Plan for US r/o DVT.  Patient AAOx4, no signs of encephalopathy.  She reports having 3-4 BMs on Lactulose.  She remains on Moxifloxacin for PNA.  Pulse ox % on 2-3L.  RN to wean O2 to maintain sat >92%.  CXR 11/15 shows improvement.  Remains in NSR.  Diuresed w Lasix yesterday.  Still w swelling to right arm and 1+ BLE edema.  Plan for Lasix 40 mg x1.  Dosing Lasix daily.  She was seen by Cardiology when she was in a fib w RVR.  High SHY-VASc but due to coagulopathy - would not give anticoagulation.  Asprin started. 2D echo with 65% EF with elevated PA pressure (55).  Will need to discuss when to repeat echo. Currently on internal hold 2/2 PNA (plan to complete 11/18) and frailty.  PT/OT following. MELD 23 today, listed w MELD 26 thru 11/20.  Monitor.     Scheduled Meds:   furosemide  40 mg Intravenous Once    guaiFENesin  600 mg Oral BID     heparin (porcine)  5,000 Units Subcutaneous Q8H    insulin aspart U-100  3-5 Units Subcutaneous TIDWM    insulin detemir U-100  12 Units Subcutaneous Daily    lactulose  30 g Oral TID    levalbuterol  1.25 mg Nebulization Q6H WAKE    magnesium oxide  400 mg Oral BID    metoprolol tartrate  25 mg Oral BID    [START ON 11/17/2018] moxifloxacin  400 mg Oral Daily    rifAXImin  550 mg Oral BID      Continuous Infusions:  PRN Meds:sodium chloride, acetaminophen, benzonatate, dextrose 50%, dextrose 50%, glucagon (human recombinant), glucose, glucose, guaifenesin 100 mg/5 ml, insulin aspart U-100, lactulose, ondansetron, simethicone, sodium chloride 0.9%     Review of Systems   Constitutional: Positive for activity change, appetite change and fatigue. Negative for chills and fever.   HENT: Negative for congestion, ear discharge, ear pain and sinus pressure.    Respiratory: Positive for cough. Negative for apnea, chest tightness, shortness of breath and wheezing.    Cardiovascular: Positive for leg swelling. Negative for chest pain and palpitations.   Gastrointestinal: Negative for abdominal distention, abdominal pain, anal bleeding, blood in stool, diarrhea, nausea and vomiting.   Endocrine: Negative for cold intolerance and heat intolerance.   Genitourinary: Positive for decreased urine volume. Negative for dysuria, menstrual problem and urgency.   Musculoskeletal: Negative for arthralgias and back pain.   Skin: Negative for pallor.   Neurological: Positive for weakness. Negative for dizziness and headaches.   Hematological: Bruises/bleeds easily.   Psychiatric/Behavioral: Negative for agitation, behavioral problems, confusion, decreased concentration and sleep disturbance.      Objective:      Vital Signs (Most Recent):  Temp: 99.1 °F (37.3 °C) (11/16/18 1111)  Pulse: 65 (11/16/18 0800)  Resp: 18 (11/16/18 0800)  BP: 136/71 (11/16/18 0800)  SpO2: 98 % (11/16/18 0800) Vital Signs (24h Range):  Temp:  [98.3 °F  (36.8 °C)-99.1 °F (37.3 °C)] 99.1 °F (37.3 °C)  Pulse:  [62-81] 65  Resp:  [10-22] 18  SpO2:  [94 %-100 %] 98 %  BP: (126-149)/(54-71) 136/71      Weight: 54.4 kg (119 lb 14.9 oz)  Body mass index is 25.07 kg/m².            Intake/Output - Last 3 Shifts        11/14 0700 - 11/15 0659 11/15 0700 - 11/16 0659 11/16 0700 - 11/17 0659     P.O. 780         I.V. (mL/kg) 100 (1.9)         Total Intake(mL/kg) 880 (16.8)         Urine (mL/kg/hr) 800 (0.6) 300 (0.2)       Emesis/NG output           Stool 0 0       Total Output 800 300       Net +80 -300                   Urine Occurrence 1 x         Stool Occurrence 6 x 4 x 1 x             Physical Exam   Constitutional: She is oriented to person, place, and time. She appears well-developed.   Chronically ill-appearing. Malnourished. Temporal wasting.     HENT:   Head: Normocephalic and atraumatic.   Mouth/Throat: Oropharynx is clear and moist.   Eyes: Conjunctivae are normal. Scleral icterus is present.   Neck: Normal range of motion.   Cardiovascular: Normal rate, regular rhythm and normal heart sounds.   Pulmonary/Chest: Effort normal. No respiratory distress. She has decreased breath sounds in the right lower field and the left lower field. She has no wheezes. She has rales (fine sly bases).   Abdominal: Soft. Bowel sounds are normal. She exhibits distension. There is no tenderness. No hernia.   Small ascites   Musculoskeletal: She exhibits edema (1+ BLE).   Lymphadenopathy:     She has no cervical adenopathy.   Neurological: She is alert and oriented to person, place, and time.   Mild asterixis   Skin: Skin is warm and dry. No rash noted.   Psychiatric: She has a normal mood and affect. Her behavior is normal. Her mood appears not anxious.   Nursing note and vitals reviewed.        Laboratory:      Immunosuppressants      None          CBC:       Recent Labs   Lab 11/16/18  0634   WBC 6.55   RBC 2.86*   HGB 8.6*   HCT 27.1*   PLT 60*   MCV 95   MCH 30.1   MCHC 31.7*       CMP:       Recent Labs   Lab 11/16/18  0634   *   CALCIUM 8.2*   ALBUMIN 2.7*   PROT 4.7*   *   K 3.7   CO2 22*      BUN 69*   CREATININE 1.6*   ALKPHOS 176*   ALT 32   AST 75*   BILITOT 4.3*      Coagulation:         Recent Labs   Lab 11/10/18  0800   11/16/18  0634   INR 2.0*   < > 1.6*   APTT 30.7  --   --     < > = values in this interval not displayed.      Labs within the past 24 hours have been reviewed.     Diagnostic Results:  I have personally reviewed all pertinent imaging studies.     Assessment/Plan:          * Hepatic encephalopathy     - Acute on chronic. PO lactulose ordered, continue rifaximin. Titrate Lactulose to maintain 3-4 BM a day. PRN lactulose enema TID for worsening confusion.  - currently pt is AAOx4, no encephalopathy noted.        Swelling of joint of upper arm, right     - infiltrated midline overnight 11/14-11/15.  Cont to have increased swelling, pain and cold right arm despite elevating and using PRN heat packs.  Will order US right upper extremity to r/o DVT.  Pt on ASA.  Monitor.           Physical deconditioning     - PT/OT following.  Patient need aggressive therapy.  She will also need to be able to climb steps prior to discharge.  She has at least 7 steps to climb at home.           Pneumonia of right lung due to infectious organism     - Noted on recent chest xray.  Initially placed on BS antibxs for treatment.    - Transitioned to PO Moxi (11/13) to complete a 7 day course (11/18).   - Pt with elevated WBC on lab work 11/14 and placed back on BS antibxs at that time.   - WBC then with quick improvement. Now transitioned back to Moxi stop date 11/18.       Shortness of breath     - Improving.  Wean O2 to maintain oxygen saturation >92%.             Sinus tachycardia     - Improved with BB.          Atrial fibrillation     -  Patient with episode of Afib with RVR at OSH and was placed on diltiazem gtt which was discontinued 11/10 as she reverted to NSR.   Cardiology consulted for management.   -  Pt then returned back into Afib w/ RVR on the afternoon of 11/11 and pt placed back on diltiazem for rate control.   -  Pt now rated controlled and diltiazem d/c'd on 11/12 at 0200. Cont with lopressor at this time as pt remains rate controlled.    - 2D echo performed today to assess cardiac risk and for discussion of candidacy with Transplant Surgery and Anesthesiology.  PA pressure on 2D echo noted elevated.   - dosing diuretics on daily basis.  Plan for Lasix 40 mg today.              CKD (chronic kidney disease) stage 3, GFR 30-59 ml/min     - See ENZO.  Stable.         Chronic liver disease               Type 2 diabetes mellitus with hyperglycemia, with long-term current use of insulin     -  Sliding scale insulin.  Endocrine consulted for management.  Apprec recs.   -  Pt noted with significant hypoglycemic event the evening of 11/14. Pt responded well to IV dextrose.          Anemia of chronic disease     - H/H stable. Continue to monitor with daily cbc.          End stage liver disease     -  Listed for liver transplant with MELD 26 thru 11/20/18. Per hepatologist, remains on internal hold 2/2 PNA- tx complete 11/18 and physical deconditioned. Continue to monitor.   -  Plan to discuss pt candidacy once resp status, nutrition and physical mobility improved.        MELD-Na score: 23 at 11/16/2018  6:34 AM  MELD score: 22 at 11/16/2018  6:34 AM  Calculated from:  Serum Creatinine: 1.6 mg/dL at 11/16/2018  6:34 AM  Serum Sodium: 135 mmol/L at 11/16/2018  6:34 AM  Total Bilirubin: 4.3 mg/dL at 11/16/2018  6:34 AM  INR(ratio): 1.6 at 11/16/2018  6:34 AM  Age: 67 years            Acute kidney injury superimposed on CKD     - CKD3/4 at baseline creatinine. Urine electrolytes ordered. Etiology possibly due to hepatorenal syndrome.   -  HRS protocol started, albumin and octreotide. No midodrine as with Afib.   -  Cr level improved but then noted with slight increase (11/14)-  could be from diuresis.   -  Diuresed well w Lasix and albumin yesterday.  Pt not recording I/O.  Weight stable 119 but not down yet- admit weight 112.    -  plan for lasix 40 mg x1 today.  Must weigh daily and cont strict I&Os.       Thrombocytopenia due to hypersplenism     - No overt s/s of bleeding.  Continue to monitor.          Protein-calorie malnutrition, moderate     - Dietician completed calorie count.  Patient eating % of trays.  Will d/c calorie count.  Encourage supplements.          Decompensated hepatic cirrhosis     - See hepatic encephalopathy.                       VTE Risk Mitigation (From admission, onward)         Ordered       IP VTE HIGH RISK PATIENT  Once      11/10/18 0213       heparin (porcine) injection 5,000 Units  Every 8 hours      11/10/18 0213             The patients clinical status was discussed at multidisplinary rounds, involving transplant surgery, transplant medicine, pharmacy, nursing, nutrition, and social work     Discharge Planning:  No plan for discharge at this time.       Genie Smith NP  Liver Transplant  [facility]

## 2018-11-19 NOTE — HPI
Ms. Hobson is a 66 yo female admitted for AMS in the setting of cirrhosis. ENT consulted for left ear pain. Ms. Hobson states that her ear pain has developed over the past couple of days. It is associated with coughing. It does not hurt when she is not coughing. She denies otorrhea, aural fullness, vertigo. Endorses bilateral tinnitus. She notes that she had a perforation of her left ear drum at one time. Had tympanostomy tubes as a kid. No recent surgery or trauma to the ears.

## 2018-11-19 NOTE — ASSESSMENT & PLAN NOTE
- Acute on chronic. PO lactulose ordered, continue rifaximin. Titrate Lactulose to maintain 3-4 BM a day. PRN lactulose enema TID for worsening confusion.  - currently pt is AAOx4, no encephalopathy noted.  Mentation is delayed.

## 2018-11-19 NOTE — ASSESSMENT & PLAN NOTE
-  Patient with episode of Afib with RVR at OSH and was placed on diltiazem gtt which was discontinued 11/10 as she reverted to NSR.  Cardiology consulted for management.   -  Pt then returned back into Afib w/ RVR on the afternoon of 11/11 and pt placed back on diltiazem for rate control.   -  Pt now rated controlled and diltiazem d/c'd on 11/12 at 0200. Cont with lopressor at this time as pt remains rate controlled.    - 2D echo performed today to assess cardiac risk and for discussion of candidacy with Transplant Surgery and Anesthesiology.  PA pressure on 2D echo noted elevated.   - dosing diuretics on daily basis.  Received 40 mg 11/16 and 11/8.  Pt incontinent of urine making strict I/O difficult to record accurately.    - Plan for lasix 60 mg x1 now.

## 2018-11-19 NOTE — ASSESSMENT & PLAN NOTE
- TTE on admit 11/12 showed RV systolic pressure >55.   - dose diuretics on daily basis.  - plan to repeat echo tomorrow to reassess PA pressure.    - weight trending up.  Cont strict I/O's.  Weight daily.

## 2018-11-19 NOTE — PLAN OF CARE
Problem: Patient Care Overview  Goal: Plan of Care Review  Outcome: Ongoing (interventions implemented as appropriate)  Pt AAOx4, VSS, in bed with upper siderails raised x2, bed in lowest/locked position, call light/personal belongings within reach. Pt instructed to call for assistance. Pt verbalizes understanding. Pt afebrile at this time.  Proper hand hygiene performed before and after pt care.      Moxifloxacin continued for pneumonia. CXR (11/15) showed improvement.   R arm remains edematous from IV infiltration. US neg for DVT.   Held PM lactulose per pt refusal. AAOx4. 4 bm thru day shift, 1 so far this shift.  PRN Perle administered for cough.   Scheduled albumin administered.  Pt on tele, NSR 60's. Metoprolol continued for hx of afib.  Monitoring blood glucose ACHS + 0200. SSI coverage administered PRN bg>200.   at bedside attentive to pt's needs.  UOP measured via hat.      Will continue to monitor patient.

## 2018-11-19 NOTE — ASSESSMENT & PLAN NOTE
66 yo female with left ear pain. Left TM appears thickened and opacified with possible middle ear effusion. Suspicion for acute infection is low.    -recommend flonase daily, autoinsufflation 5 times per day  -recommend outpatient audiogram  -patient can follow up in ear clinic as an outpatient  -please call ENT with any questions or concerns

## 2018-11-19 NOTE — CARE UPDATE
BG goal 140-180    Continue Levemir to 14 units in AM  continue Novolog to 5-7 units with meals given variable meal intake  Low dose correction scale  BG monitoring AC/HS/0200      ** Please call Endocrine for any BG related issues **

## 2018-11-19 NOTE — SUBJECTIVE & OBJECTIVE
Scheduled Meds:   guaiFENesin  600 mg Oral BID    heparin (porcine)  5,000 Units Subcutaneous Q8H    insulin aspart U-100  5-7 Units Subcutaneous TIDWM    insulin detemir U-100  14 Units Subcutaneous Daily    lactulose  30 g Oral TID    levalbuterol  1.25 mg Nebulization Q6H WAKE    magnesium oxide  400 mg Oral BID    metoprolol tartrate  25 mg Oral BID    rifAXImin  550 mg Oral BID     Continuous Infusions:  PRN Meds:acetaminophen, benzonatate, dextrose 50%, dextrose 50%, glucagon (human recombinant), glucose, glucose, guaifenesin 100 mg/5 ml, insulin aspart U-100, lactulose, ondansetron, simethicone, sodium chloride 0.9%    Review of Systems   Constitutional: Positive for activity change, appetite change and fatigue. Negative for chills and fever.   HENT: Positive for ear pain. Negative for congestion, ear discharge and sinus pressure.    Respiratory: Positive for cough. Negative for apnea, chest tightness, shortness of breath and wheezing.    Cardiovascular: Positive for leg swelling. Negative for chest pain and palpitations.   Gastrointestinal: Negative for abdominal distention, abdominal pain, anal bleeding, blood in stool, diarrhea, nausea and vomiting.   Endocrine: Negative for cold intolerance and heat intolerance.   Genitourinary: Positive for decreased urine volume (incontinent of urine). Negative for dysuria, menstrual problem and urgency.   Musculoskeletal: Negative for arthralgias and back pain.   Skin: Negative for pallor.   Neurological: Positive for weakness. Negative for dizziness and headaches.   Hematological: Bruises/bleeds easily.   Psychiatric/Behavioral: Negative for agitation, behavioral problems, confusion, decreased concentration and sleep disturbance.     Objective:     Vital Signs (Most Recent):  Temp: 98.4 °F (36.9 °C) (11/19/18 1140)  Pulse: 71 (11/19/18 1146)  Resp: 17 (11/19/18 1131)  BP: 131/67 (11/19/18 0810)  SpO2: 97 % (11/19/18 1140) Vital Signs (24h Range):  Temp:  [98  °F (36.7 °C)-98.9 °F (37.2 °C)] 98.4 °F (36.9 °C)  Pulse:  [64-83] 71  Resp:  [15-25] 17  SpO2:  [95 %-100 %] 97 %  BP: (119-131)/(54-67) 131/67     Weight: 54.4 kg (119 lb 14.9 oz)  Body mass index is 25.07 kg/m².    Intake/Output - Last 3 Shifts       11/17 0700 - 11/18 0659 11/18 0700 - 11/19 0659 11/19 0700 - 11/20 0659    P.O. 840 890     I.V. (mL/kg)       Other       Total Intake(mL/kg) 840 (15.4) 890 (16.4)     Urine (mL/kg/hr) 700 (0.5) 450 (0.3)     Emesis/NG output 0      Other 0      Stool 0 0     Blood 0      Total Output 700 450     Net +140 +440            Urine Occurrence 0 x 5 x     Stool Occurrence 6 x 5 x     Emesis Occurrence 0 x 0 x           Physical Exam   Constitutional: She is oriented to person, place, and time. She appears well-developed.   Chronically ill-appearing. Malnourished. Temporal wasting.     HENT:   Head: Normocephalic and atraumatic.   Mouth/Throat: Oropharynx is clear and moist.   Eyes: Conjunctivae are normal. Scleral icterus is present.   Neck: Normal range of motion.   Cardiovascular: Normal rate, regular rhythm and normal heart sounds.   Pulmonary/Chest: Effort normal. No respiratory distress. She has decreased breath sounds in the right lower field and the left lower field. She has no wheezes. She has rales (fine sly bases).   Abdominal: Soft. Bowel sounds are normal. She exhibits distension. There is no tenderness. No hernia.   Small ascites   Musculoskeletal: She exhibits edema (1+ BLE).   Lymphadenopathy:     She has no cervical adenopathy.   Neurological: She is alert and oriented to person, place, and time.   Mild asterixis   Skin: Skin is warm and dry. No rash noted.   Psychiatric: She has a normal mood and affect. Her behavior is normal. Her mood appears not anxious.   Nursing note and vitals reviewed.      Laboratory:  Immunosuppressants     None        CBC:   Recent Labs   Lab 11/19/18  1040   WBC 11.01   RBC 2.58*   HGB 8.0*   HCT 25.2*   PLT 90*   MCV 98   MCH  31.0   MCHC 31.7*     CMP:   Recent Labs   Lab 11/19/18  0711   *   CALCIUM 9.0   ALBUMIN 3.9   PROT 5.6*   *   K 3.5   CO2 21*      BUN 72*   CREATININE 1.5*   ALKPHOS 157*   ALT 31   AST 57*   BILITOT 7.2*     Coagulation:   Recent Labs   Lab 11/19/18  0711   INR 1.9*     Labs within the past 24 hours have been reviewed.    Diagnostic Results:  I have personally reviewed all pertinent imaging studies.

## 2018-11-19 NOTE — ASSESSMENT & PLAN NOTE
-  Listed for liver transplant with MELD 26 thru 11/20/18. Per hepatologist, remains on internal hold 2/2 PNA- tx complete 11/18 and physical deconditioned. Continue to monitor. PT/OT following.  -  Plan to discuss pt candidacy once resp status, nutrition and physical mobility improved.       MELD-Na score: 26 at 11/19/2018  7:11 AM  MELD score: 25 at 11/19/2018  7:11 AM  Calculated from:  Serum Creatinine: 1.5 mg/dL at 11/19/2018  7:11 AM  Serum Sodium: 135 mmol/L at 11/19/2018  7:11 AM  Total Bilirubin: 7.2 mg/dL at 11/19/2018  7:11 AM  INR(ratio): 1.9 at 11/19/2018  7:11 AM  Age: 67 years

## 2018-11-19 NOTE — SUBJECTIVE & OBJECTIVE
Medications:  Continuous Infusions:  Scheduled Meds:   guaiFENesin  600 mg Oral BID    heparin (porcine)  5,000 Units Subcutaneous Q8H    insulin aspart U-100  5-7 Units Subcutaneous TIDWM    insulin detemir U-100  14 Units Subcutaneous Daily    lactulose  30 g Oral TID    levalbuterol  1.25 mg Nebulization Q6H WAKE    magnesium oxide  400 mg Oral BID    metoprolol tartrate  25 mg Oral BID    rifAXImin  550 mg Oral BID     PRN Meds:acetaminophen, benzonatate, dextrose 50%, dextrose 50%, glucagon (human recombinant), glucose, glucose, guaifenesin 100 mg/5 ml, insulin aspart U-100, lactulose, ondansetron, simethicone, sodium chloride 0.9%     No current facility-administered medications on file prior to encounter.      Current Outpatient Medications on File Prior to Encounter   Medication Sig    ALBUTEROL INHL Inhale 1 puff into the lungs 4 (four) times daily as needed.    ergocalciferol (ERGOCALCIFEROL) 50,000 unit Cap Take 50,000 Units by mouth every 7 days.    fluticasone/vilanterol (BREO ELLIPTA INHL) Inhale into the lungs.    hydrOXYzine HCl (ATARAX) 25 MG tablet Take 25 mg by mouth 3 (three) times daily as needed for Itching.    insulin detemir U-100 (LEVEMIR) 100 unit/mL injection Inject 12 Units into the skin 2 (two) times daily.     insulin lispro (HUMALOG) 100 unit/mL injection Sliding scale     lactulose (CHRONULAC) 10 gram/15 mL solution Take 23 mLs (15.3333 g total) by mouth 3 (three) times daily. Titrate to 3 BM daily    omeprazole (PRILOSEC) 40 MG capsule Take 40 mg by mouth every morning.    ondansetron (ZOFRAN) 4 MG tablet Take 4 mg by mouth every 8 (eight) hours as needed for Nausea.    ONETOUCH ULTRA BLUE TEST STRIP Strp     propranolol (INDERAL) 10 MG tablet Take 5 mg by mouth 2 (two) times daily.     rifAXIMin (XIFAXAN) 550 mg Tab Take 550 mg by mouth 2 (two) times daily.    SHINGRIX, PF, 50 mcg/0.5 mL injection     sodium bicarbonate 650 MG tablet Take 2 tablets (1,300 mg  total) by mouth 3 (three) times daily.    zinc gluconate 50 mg tablet Take 50 mg by mouth once daily.        Review of patient's allergies indicates:  No Known Allergies    Past Medical History:   Diagnosis Date    Cancer     breat Cancer    Cirrhosis     Diabetes mellitus     GERD (gastroesophageal reflux disease)     Rheumatoid arthritis      Past Surgical History:   Procedure Laterality Date    CARPAL TUNNEL RELEASE      left wrist    CHOLECYSTECTOMY      MASTECTOMY      left breast 25  years ago     Family History     Problem Relation (Age of Onset)    COPD Sister    Heart disease Mother    Liver disease Father, Sister        Tobacco Use    Smoking status: Never Smoker    Tobacco comment: patient denies   Substance and Sexual Activity    Alcohol use: No     Comment: stopped 10/17    Drug use: No     Comment: patient denies    Sexual activity: Not on file     Review of Systems   Constitutional: Negative for chills and fever.   HENT: Positive for ear pain, hearing loss (chronic) and tinnitus. Negative for congestion, ear discharge, facial swelling, rhinorrhea, sinus pressure, sore throat and voice change.    Eyes: Negative for visual disturbance.   Musculoskeletal: Negative for neck pain.   Neurological: Negative for dizziness.     Objective:     Vital Signs (Most Recent):  Temp: 98.4 °F (36.9 °C) (11/19/18 1140)  Pulse: 66 (11/19/18 1536)  Resp: 14 (11/19/18 1220)  BP: 131/67 (11/19/18 1220)  SpO2: 95 % (11/19/18 1220) Vital Signs (24h Range):  Temp:  [98 °F (36.7 °C)-98.4 °F (36.9 °C)] 98.4 °F (36.9 °C)  Pulse:  [64-83] 66  Resp:  [14-25] 14  SpO2:  [95 %-100 %] 95 %  BP: (119-131)/(54-67) 131/67     Weight: 55.3 kg (121 lb 14.6 oz)  Body mass index is 25.48 kg/m².    Date 11/19/18 0700 - 11/20/18 0659   Shift 9700-9988 8266-8790 0995-6601 24 Hour Total   INTAKE   Shift Total(mL/kg)       OUTPUT   Urine(mL/kg/hr) 150(0.3)   150   Shift Total(mL/kg) 150(2.7)   150(2.7)   Weight (kg) 55.3 55.3 55.3  55.3       Physical Exam  Constitutional: Well appearing / communicating.  NAD.  Eyes: EOM I Bilaterally. Scleral icterus  Head/Face: Normocephalic.  Negative paranasal sinus pressure/tenderness.  Salivary glands WNL.  House Brackmann I Bilaterally.  Right Ear: External Auditory Canal WNL,TM w/ tympanosclerosis.  Auricle WNL.  Left Ear: External Auditory Canal WNL, TM is thickened and opacified. It does not appear inflamed. There appears to be a middle ear effusion. Auricle WNL, nontender. No mastoid tenderness.  Nose: No gross nasal septal deviation. Inferior Turbinates 2+ bilaterally. No septal perforation. No masses/lesions. External nasal skin without masses/lesions.  Oral Cavity: Edentulous. Gingiva/lips WNL.  FOM Soft, no masses palpated. Oral Tongue mobile. Hard Palate WNL.   Oropharynx: BOT WNL. No masses/lesions noted. Tonsillar fossa/pharyngeal wall without lesions. Posterior oropharynx WNL.  Soft palate without masses. Midline uvula.   Neck/Lymphatic: No LAD I-VI bilaterally.  No thyromegaly.  No masses noted on exam.  Neuro/Psychiatric: AOx3.  Normal mood and affect.   Cardiovascular: Normal carotid pulses bilaterally, no increasing jugular venous distention noted at cervical region bilaterally.    Respiratory: Normal respiratory effort, no stridor, no retractions noted.    CBC  Recent Labs   Lab 11/19/18  1040   WBC 11.01   HGB 8.0*   HCT 25.2*   MCV 98   PLT 90*     BMP  Recent Labs   Lab 11/19/18  0711   *   *   K 3.5      CO2 21*   BUN 72*   CREATININE 1.5*   CALCIUM 9.0   MG 2.2

## 2018-11-19 NOTE — ASSESSMENT & PLAN NOTE
- CKD 3-4 at baseline creatinine. Urine electrolytes ordered. Etiology possibly due to hepatorenal syndrome.   - HRS protocol started, albumin and octreotide. No midodrine as with Afib.   - Cr level improved but then noted with slight increase (11/14)- could be from diuresis.   - Dosing diuresis on daily basis.  Lasix 40 mg x 11/16 and 11/18.  Plan for Lasix 60 mg today.  - Must weigh daily and cont strict I&Os.

## 2018-11-19 NOTE — ASSESSMENT & PLAN NOTE
-  Sliding scale insulin.  Endocrine consulted for management.  Apprec recs.   -  Pt noted with significant hypoglycemic event the evening of 11/14. Pt responded well to IV dextrose.   - blood glucose has remained stable and diabetic current regimen.

## 2018-11-19 NOTE — TELEPHONE ENCOUNTER
PATIENT NAME: Krystal Hobson  [facility] #: [id number]    Lab Results   Component Value Date    CREATININE 1.5 (H) 11/19/2018     (L) 11/19/2018    BILITOT 7.2 (H) 11/19/2018    ALBUMIN 3.9 11/19/2018    INR 1.9 (H) 11/19/2018     MELD 26  Encephalopathy: 3 - 4  Ascites: slight  Dialysis: no     Recertification requestor: Julia Herrera

## 2018-11-19 NOTE — CONSULTS
[facility]  Otorhinolaryngology-Head & Neck Surgery  Consult Note    Patient Name: Krystal Hobson  MRN: [id number]  Code Status: Full Code  Admission Date: 11/10/2018  Hospital Length of Stay: 9 days  Attending Physician: Jabier Fritz MD  Primary Care Provider: Courtney Joe MD    Patient information was obtained from patient.     Inpatient consult to ENT  Consult performed by: Cal Hurtado Jr., MD  Consult ordered by: Genie Smith NP        Subjective:     Chief Complaint/Reason for Admission: otalgia    History of Present Illness: Ms. Hobson is a 68 yo female admitted for AMS in the setting of cirrhosis. ENT consulted for left ear pain. Ms. Hobson states that her ear pain has developed over the past couple of days. It is associated with coughing. It does not hurt when she is not coughing. She denies otorrhea, aural fullness, vertigo. Endorses bilateral tinnitus. She notes that she had a perforation of her left ear drum at one time. Had tympanostomy tubes as a kid. No recent surgery or trauma to the ears.     Medications:  Continuous Infusions:  Scheduled Meds:   guaiFENesin  600 mg Oral BID    heparin (porcine)  5,000 Units Subcutaneous Q8H    insulin aspart U-100  5-7 Units Subcutaneous TIDWM    insulin detemir U-100  14 Units Subcutaneous Daily    lactulose  30 g Oral TID    levalbuterol  1.25 mg Nebulization Q6H WAKE    magnesium oxide  400 mg Oral BID    metoprolol tartrate  25 mg Oral BID    rifAXImin  550 mg Oral BID     PRN Meds:acetaminophen, benzonatate, dextrose 50%, dextrose 50%, glucagon (human recombinant), glucose, glucose, guaifenesin 100 mg/5 ml, insulin aspart U-100, lactulose, ondansetron, simethicone, sodium chloride 0.9%     No current facility-administered medications on file prior to encounter.      Current Outpatient Medications on File Prior to Encounter   Medication Sig    ALBUTEROL INHL Inhale 1 puff into the lungs 4 (four) times daily as needed.     ergocalciferol (ERGOCALCIFEROL) 50,000 unit Cap Take 50,000 Units by mouth every 7 days.    fluticasone/vilanterol (BREO ELLIPTA INHL) Inhale into the lungs.    hydrOXYzine HCl (ATARAX) 25 MG tablet Take 25 mg by mouth 3 (three) times daily as needed for Itching.    insulin detemir U-100 (LEVEMIR) 100 unit/mL injection Inject 12 Units into the skin 2 (two) times daily.     insulin lispro (HUMALOG) 100 unit/mL injection Sliding scale     lactulose (CHRONULAC) 10 gram/15 mL solution Take 23 mLs (15.3333 g total) by mouth 3 (three) times daily. Titrate to 3 BM daily    omeprazole (PRILOSEC) 40 MG capsule Take 40 mg by mouth every morning.    ondansetron (ZOFRAN) 4 MG tablet Take 4 mg by mouth every 8 (eight) hours as needed for Nausea.    ONETOUCH ULTRA BLUE TEST STRIP Strp     propranolol (INDERAL) 10 MG tablet Take 5 mg by mouth 2 (two) times daily.     rifAXIMin (XIFAXAN) 550 mg Tab Take 550 mg by mouth 2 (two) times daily.    SHINGRIX, PF, 50 mcg/0.5 mL injection     sodium bicarbonate 650 MG tablet Take 2 tablets (1,300 mg total) by mouth 3 (three) times daily.    zinc gluconate 50 mg tablet Take 50 mg by mouth once daily.        Review of patient's allergies indicates:  No Known Allergies    Past Medical History:   Diagnosis Date    Cancer     breat Cancer    Cirrhosis     Diabetes mellitus     GERD (gastroesophageal reflux disease)     Rheumatoid arthritis      Past Surgical History:   Procedure Laterality Date    CARPAL TUNNEL RELEASE      left wrist    CHOLECYSTECTOMY      MASTECTOMY      left breast 25  years ago     Family History     Problem Relation (Age of Onset)    COPD Sister    Heart disease Mother    Liver disease Father, Sister        Tobacco Use    Smoking status: Never Smoker    Tobacco comment: patient denies   Substance and Sexual Activity    Alcohol use: No     Comment: stopped 10/17    Drug use: No     Comment: patient denies    Sexual activity: Not on file      Review of Systems   Constitutional: Negative for chills and fever.   HENT: Positive for ear pain, hearing loss (chronic) and tinnitus. Negative for congestion, ear discharge, facial swelling, rhinorrhea, sinus pressure, sore throat and voice change.    Eyes: Negative for visual disturbance.   Musculoskeletal: Negative for neck pain.   Neurological: Negative for dizziness.     Objective:     Vital Signs (Most Recent):  Temp: 98.4 °F (36.9 °C) (11/19/18 1140)  Pulse: 66 (11/19/18 1536)  Resp: 14 (11/19/18 1220)  BP: 131/67 (11/19/18 1220)  SpO2: 95 % (11/19/18 1220) Vital Signs (24h Range):  Temp:  [98 °F (36.7 °C)-98.4 °F (36.9 °C)] 98.4 °F (36.9 °C)  Pulse:  [64-83] 66  Resp:  [14-25] 14  SpO2:  [95 %-100 %] 95 %  BP: (119-131)/(54-67) 131/67     Weight: 55.3 kg (121 lb 14.6 oz)  Body mass index is 25.48 kg/m².    Date 11/19/18 0700 - 11/20/18 0659   Shift 1317-2844 6260-6026 5731-3715 24 Hour Total   INTAKE   Shift Total(mL/kg)       OUTPUT   Urine(mL/kg/hr) 150(0.3)   150   Shift Total(mL/kg) 150(2.7)   150(2.7)   Weight (kg) 55.3 55.3 55.3 55.3       Physical Exam  Constitutional: Well appearing / communicating.  NAD.  Eyes: EOM I Bilaterally. Scleral icterus  Head/Face: Normocephalic.  Negative paranasal sinus pressure/tenderness.  Salivary glands WNL.  House Brackmann I Bilaterally.  Right Ear: External Auditory Canal WNL,TM w/ tympanosclerosis.  Auricle WNL.  Left Ear: External Auditory Canal WNL, TM is thickened and opacified. It does not appear inflamed. There appears to be a middle ear effusion. Auricle WNL, nontender. No mastoid tenderness.  Nose: No gross nasal septal deviation. Inferior Turbinates 2+ bilaterally. No septal perforation. No masses/lesions. External nasal skin without masses/lesions.  Oral Cavity: Edentulous. Gingiva/lips WNL.  FOM Soft, no masses palpated. Oral Tongue mobile. Hard Palate WNL.   Oropharynx: BOT WNL. No masses/lesions noted. Tonsillar fossa/pharyngeal wall without  lesions. Posterior oropharynx WNL.  Soft palate without masses. Midline uvula.   Neck/Lymphatic: No LAD I-VI bilaterally.  No thyromegaly.  No masses noted on exam.  Neuro/Psychiatric: AOx3.  Normal mood and affect.   Cardiovascular: Normal carotid pulses bilaterally, no increasing jugular venous distention noted at cervical region bilaterally.    Respiratory: Normal respiratory effort, no stridor, no retractions noted.    CBC  Recent Labs   Lab 11/19/18  1040   WBC 11.01   HGB 8.0*   HCT 25.2*   MCV 98   PLT 90*     BMP  Recent Labs   Lab 11/19/18  0711   *   *   K 3.5      CO2 21*   BUN 72*   CREATININE 1.5*   CALCIUM 9.0   MG 2.2           Assessment/Plan:     Ear pain    68 yo female with left ear pain. Left TM appears thickened and opacified with possible middle ear effusion. Suspicion for acute infection is low.    -recommend flonase daily, autoinsufflation 5 times per day  -recommend outpatient audiogram  -patient can follow up in ear clinic as an outpatient  -please call ENT with any questions or concerns       VTE Risk Mitigation (From admission, onward)        Ordered     IP VTE HIGH RISK PATIENT  Once      11/10/18 0213     heparin (porcine) injection 5,000 Units  Every 8 hours      11/10/18 0213          Thank you for your consult.    Cal Hurtado Jr., MD  Otorhinolaryngology-Head & Neck Surgery  [facility]

## 2018-11-19 NOTE — PROGRESS NOTES
[facility]  Liver Transplant  Progress Note    Patient Name: Krystal Hobson  MRN: [id number]  Admission Date: 11/10/2018  Hospital Length of Stay: 9 days  Code Status: Full Code  Primary Care Provider: Courtney Joe MD    Subjective:     History of Present Illness:  Ms. Krystal Hobson is a 67 yr F w/ hx of cryptogenic cirrhosis, listed for liver tx 9/14/18 who presented to ER at outside hospital in [place], [place] with increased abdominal pain and then noted to have AMS on arrival and ENZO on lab work. Per the , she was extremely disoriented and her ammonia reached as high as 200. Her Cr level increased to 2.0 at the OSH. She also went into A fib with RVR and was started on a diltiazem infusion at OSH. She was then transferred to [facility] for further care. On arrival pt was noted with confusion which has now resolved with lactulose. She also was noted with a cough and fatigue.           Hospital Course:  Interval History:  Per pt and , patient did well over the weekend.  This am, pt c/o some anxiety, non productive wet cough and left ear pain worse w coughing.  Pulse 91% on RA.  Pt sates she feels a little SOB.  O2 applied at 1L.  Pulse ox improved to >94%.  Anxiety improved after dose of Hydoxyzine.  Repeat CXR w ill-defined patchy airspace consolidation bilaterally slightly more marked on the left side identified as before. No pleural effusion.  Pt completed Moxi for PNA on 11/18/18.  Will cont CPT, IS and Guaifenesin for cough.  Plan to repeat echo tomorrow to reassess PA pressure.  Previous echo 11/12 showed PA pressure >55.  Cont to diurese.  Pt voided 450ml and 5 missed urine output.  Pt needs daily weight.   Plan for Lasix 60 mg today.  Pt is alert and slow to respond however answers questions appropriately.  Cont lactulose to titrate to 3-4 stools.  Edema to right arm improved.  Remains in NSR.  Cont Asprin. Currently on internal hold 2/2 PNA (complete 11/18) and frailty.  PT/OT  following. MELD 25 today, listed w MELD 26 thru 11/20.  Monitor.      Scheduled Meds:   guaiFENesin  600 mg Oral BID    heparin (porcine)  5,000 Units Subcutaneous Q8H    insulin aspart U-100  5-7 Units Subcutaneous TIDWM    insulin detemir U-100  14 Units Subcutaneous Daily    lactulose  30 g Oral TID    levalbuterol  1.25 mg Nebulization Q6H WAKE    magnesium oxide  400 mg Oral BID    metoprolol tartrate  25 mg Oral BID    rifAXImin  550 mg Oral BID     Continuous Infusions:  PRN Meds:acetaminophen, benzonatate, dextrose 50%, dextrose 50%, glucagon (human recombinant), glucose, glucose, guaifenesin 100 mg/5 ml, insulin aspart U-100, lactulose, ondansetron, simethicone, sodium chloride 0.9%    Review of Systems   Constitutional: Positive for activity change, appetite change and fatigue. Negative for chills and fever.   HENT: Positive for ear pain. Negative for congestion, ear discharge and sinus pressure.    Respiratory: Positive for cough. Negative for apnea, chest tightness, shortness of breath and wheezing.    Cardiovascular: Positive for leg swelling. Negative for chest pain and palpitations.   Gastrointestinal: Negative for abdominal distention, abdominal pain, anal bleeding, blood in stool, diarrhea, nausea and vomiting.   Endocrine: Negative for cold intolerance and heat intolerance.   Genitourinary: Positive for decreased urine volume (incontinent of urine). Negative for dysuria, menstrual problem and urgency.   Musculoskeletal: Negative for arthralgias and back pain.   Skin: Negative for pallor.   Neurological: Positive for weakness. Negative for dizziness and headaches.   Hematological: Bruises/bleeds easily.   Psychiatric/Behavioral: Negative for agitation, behavioral problems, confusion, decreased concentration and sleep disturbance.     Objective:     Vital Signs (Most Recent):  Temp: 98.4 °F (36.9 °C) (11/19/18 1140)  Pulse: 71 (11/19/18 1146)  Resp: 17 (11/19/18 1131)  BP: 131/67 (11/19/18  0810)  SpO2: 97 % (11/19/18 1140) Vital Signs (24h Range):  Temp:  [98 °F (36.7 °C)-98.9 °F (37.2 °C)] 98.4 °F (36.9 °C)  Pulse:  [64-83] 71  Resp:  [15-25] 17  SpO2:  [95 %-100 %] 97 %  BP: (119-131)/(54-67) 131/67     Weight: 54.4 kg (119 lb 14.9 oz)  Body mass index is 25.07 kg/m².    Intake/Output - Last 3 Shifts       11/17 0700 - 11/18 0659 11/18 0700 - 11/19 0659 11/19 0700 - 11/20 0659    P.O. 840 890     I.V. (mL/kg)       Other       Total Intake(mL/kg) 840 (15.4) 890 (16.4)     Urine (mL/kg/hr) 700 (0.5) 450 (0.3)     Emesis/NG output 0      Other 0      Stool 0 0     Blood 0      Total Output 700 450     Net +140 +440            Urine Occurrence 0 x 5 x     Stool Occurrence 6 x 5 x     Emesis Occurrence 0 x 0 x           Physical Exam   Constitutional: She is oriented to person, place, and time. She appears well-developed.   Chronically ill-appearing. Malnourished. Temporal wasting.     HENT:   Head: Normocephalic and atraumatic.   Mouth/Throat: Oropharynx is clear and moist.   Eyes: Conjunctivae are normal. Scleral icterus is present.   Neck: Normal range of motion.   Cardiovascular: Normal rate, regular rhythm and normal heart sounds.   Pulmonary/Chest: Effort normal. No respiratory distress. She has decreased breath sounds in the right lower field and the left lower field. She has no wheezes. She has rales (fine sly bases).   Abdominal: Soft. Bowel sounds are normal. She exhibits distension. There is no tenderness. No hernia.   Small ascites   Musculoskeletal: She exhibits edema (1+ BLE).   Lymphadenopathy:     She has no cervical adenopathy.   Neurological: She is alert and oriented to person, place, and time.   Mild asterixis   Skin: Skin is warm and dry. No rash noted.   Psychiatric: She has a normal mood and affect. Her behavior is normal. Her mood appears not anxious.   Nursing note and vitals reviewed.      Laboratory:  Immunosuppressants     None        CBC:   Recent Labs   Lab 11/19/18  1040    WBC 11.01   RBC 2.58*   HGB 8.0*   HCT 25.2*   PLT 90*   MCV 98   MCH 31.0   MCHC 31.7*     CMP:   Recent Labs   Lab 11/19/18  0711   *   CALCIUM 9.0   ALBUMIN 3.9   PROT 5.6*   *   K 3.5   CO2 21*      BUN 72*   CREATININE 1.5*   ALKPHOS 157*   ALT 31   AST 57*   BILITOT 7.2*     Coagulation:   Recent Labs   Lab 11/19/18  0711   INR 1.9*     Labs within the past 24 hours have been reviewed.    Diagnostic Results:  I have personally reviewed all pertinent imaging studies.    Assessment/Plan:     * Hepatic encephalopathy    - Acute on chronic. PO lactulose ordered, continue rifaximin. Titrate Lactulose to maintain 3-4 BM a day. PRN lactulose enema TID for worsening confusion.  - currently pt is AAOx4, no encephalopathy noted.  Mentation is delayed.       Hypervolemia    - TTE on admit 11/12 showed RV systolic pressure >55.   - dose diuretics on daily basis.  - plan to repeat echo tomorrow to reassess PA pressure.    - weight trending up.  Cont strict I/O's.  Weight daily.         Swelling of joint of upper arm, right    - infiltrated midline overnight 11/14-11/15.  Cont to have increased swelling, pain and cold right arm despite elevating and using PRN heat packs.    - US right upper extremity 11/16 negative for DVT.  Pt on ASA.  Monitor.         Physical deconditioning    - PT/OT following.  Patient need aggressive therapy.  She will also need to be able to climb steps prior to discharge.  She has at least 7 steps to climb at home.         Pneumonia of right lung due to infectious organism    - Noted on recent chest xray.  Initially placed on BS antibxs for treatment.    - Transitioned to PO Moxi (11/13) to complete a 7 day course (11/18).   - Pt with elevated WBC on lab work 11/14 and placed back on BS antibxs at that time.   - WBC then with quick improvement. Now transitioned back to Moxi stop date 11/18.   - repeated CXR today given pt has continued wet cough that sounds productive but pt  unable to produce secretions.  CXR w ill-defined patchy airspace consolidation bilaterally slightly more marked on the left side identified as before.  No pleural effusion.  Monitor.       Shortness of breath    - Was on 2-3 L O2 11/16.  Weaned off over the weekend.  O2 sat on RA this am was 91%.  Pt c/o feeling SOB.  O2 at 1L applied.  Pt w non productive wet cough.  Cont CPT, IS and Guaifenesin.  Monitor.   - Lasix 60 mg ordered.       Sinus tachycardia    - Improved with BB.   - remains in NSR.       Atrial fibrillation    -  Patient with episode of Afib with RVR at OSH and was placed on diltiazem gtt which was discontinued 11/10 as she reverted to NSR.  Cardiology consulted for management.   -  Pt then returned back into Afib w/ RVR on the afternoon of 11/11 and pt placed back on diltiazem for rate control.   -  Pt now rated controlled and diltiazem d/c'd on 11/12 at 0200. Cont with lopressor at this time as pt remains rate controlled.    - 2D echo performed today to assess cardiac risk and for discussion of candidacy with Transplant Surgery and Anesthesiology.  PA pressure on 2D echo noted elevated.   - dosing diuretics on daily basis.  Received 40 mg 11/16 and 11/8.  Pt incontinent of urine making strict I/O difficult to record accurately.    - Plan for lasix 60 mg x1 now.             CKD (chronic kidney disease) stage 3, GFR 30-59 ml/min    - See ENZO.  Stable.       Chronic liver disease           Type 2 diabetes mellitus with hyperglycemia, with long-term current use of insulin    -  Sliding scale insulin.  Endocrine consulted for management.  Apprec recs.   -  Pt noted with significant hypoglycemic event the evening of 11/14. Pt responded well to IV dextrose.   - blood glucose has remained stable and diabetic current regimen.         Anemia of chronic disease    - H/H stable. Continue to monitor with daily cbc.   - h/h decreased 11/19- repeat h/h at baseline.  No overt signs of bleeding.        End stage  liver disease    -  Listed for liver transplant with MELD 26 thru 11/20/18. Per hepatologist, remains on internal hold 2/2 PNA- tx complete 11/18 and physical deconditioned. Continue to monitor. PT/OT following.  -  Plan to discuss pt candidacy once resp status, nutrition and physical mobility improved.       MELD-Na score: 26 at 11/19/2018  7:11 AM  MELD score: 25 at 11/19/2018  7:11 AM  Calculated from:  Serum Creatinine: 1.5 mg/dL at 11/19/2018  7:11 AM  Serum Sodium: 135 mmol/L at 11/19/2018  7:11 AM  Total Bilirubin: 7.2 mg/dL at 11/19/2018  7:11 AM  INR(ratio): 1.9 at 11/19/2018  7:11 AM  Age: 67 years         Acute kidney injury superimposed on CKD    - CKD 3-4 at baseline creatinine. Urine electrolytes ordered. Etiology possibly due to hepatorenal syndrome.   - HRS protocol started, albumin and octreotide. No midodrine as with Afib.   - Cr level improved but then noted with slight increase (11/14)- could be from diuresis.   - Dosing diuresis on daily basis.  Lasix 40 mg x 11/16 and 11/18.  Plan for Lasix 60 mg today.  - Must weigh daily and cont strict I&Os.      Thrombocytopenia due to hypersplenism    - No overt s/s of bleeding.  Continue to monitor.        Protein-calorie malnutrition, moderate    - Dietician completed calorie count.  Patient eating % of trays.  Will d/c calorie count.  Pt drinking 2-3 supplement drinks daily.       Organ transplant candidate    - on internal hold.         Decompensated hepatic cirrhosis    - See hepatic encephalopathy.            VTE Risk Mitigation (From admission, onward)        Ordered     IP VTE HIGH RISK PATIENT  Once      11/10/18 0213     heparin (porcine) injection 5,000 Units  Every 8 hours      11/10/18 0213          The patients clinical status was discussed at multidisplinary rounds, involving transplant surgery, transplant medicine, pharmacy, nursing, nutrition, and social work    Discharge Planning:  No plan for discharge.    Genie Smith,  NP  Liver Transplant  [facility]

## 2018-11-19 NOTE — ASSESSMENT & PLAN NOTE
- Noted on recent chest xray.  Initially placed on BS antibxs for treatment.    - Transitioned to PO Moxi (11/13) to complete a 7 day course (11/18).   - Pt with elevated WBC on lab work 11/14 and placed back on BS antibxs at that time.   - WBC then with quick improvement. Now transitioned back to Moxi stop date 11/18.   - repeated CXR today given pt has continued wet cough that sounds productive but pt unable to produce secretions.  CXR w ill-defined patchy airspace consolidation bilaterally slightly more marked on the left side identified as before.  No pleural effusion.  Monitor.

## 2018-11-20 PROBLEM — R93.1 ABNORMAL ECHOCARDIOGRAM: Status: ACTIVE | Noted: 2018-01-01

## 2018-11-20 NOTE — SUBJECTIVE & OBJECTIVE
"Interval HPI:   Overnight events: remains in TSU. MARILEE. Working with PT. Hypoglycemia overnight; patient ate broth and cup of fruit for dinner. BG at goal this AM; prandial excursions noted.   Eatin-50%; sometimes eats just broth.   Nausea: No  Hypoglycemia and intervention: Yes treated with glucose tabs; increased to 219  Fever: No  TPN and/or TF: No    BP (!) 148/63   Pulse 60   Temp 99 °F (37.2 °C) (Oral)   Resp 18   Ht 4' 10" (1.473 m)   Wt 54.6 kg (120 lb 5.9 oz)   SpO2 97%   Breastfeeding? No   BMI 25.16 kg/m²     Labs Reviewed and Include    Recent Labs   Lab 18  0711   *   CALCIUM 9.0   ALBUMIN 3.9   PROT 5.6*   *   K 3.5   CO2 21*      BUN 72*   CREATININE 1.5*   ALKPHOS 157*   ALT 31   AST 57*   BILITOT 7.2*     Lab Results   Component Value Date    WBC 11.01 2018    HGB 8.0 (L) 2018    HCT 25.2 (L) 2018    MCV 98 2018    PLT 90 (L) 2018     No results for input(s): TSH, FREET4 in the last 168 hours.  Lab Results   Component Value Date    HGBA1C 5.8 (H) 11/10/2018       Nutritional status:   Body mass index is 25.16 kg/m².  Lab Results   Component Value Date    ALBUMIN 3.9 2018    ALBUMIN 3.6 2018    ALBUMIN 2.6 (L) 2018     No results found for: PREALBUMIN    Estimated Creatinine Clearance: 31.4 mL/min (A) (based on SCr of 1.5 mg/dL (H)).    Accu-Checks  Recent Labs     18  2047 18  0207 18  0756 18  1146 18  1434 18  1641 18  2220 18  2300 18  2301 18  0209   POCTGLUCOSE 138* 167* 227* 276* 293* 175* 69* 477* 219* 208*       Current Medications and/or Treatments Impacting Glycemic Control  Immunotherapy:    Immunosuppressants     None        Steroids:   Hormones (From admission, onward)    None        Pressors:    Autonomic Drugs (From admission, onward)    None        Hyperglycemia/Diabetes Medications:   Antihyperglycemics (From admission, onward)    Start  "    Stop Route Frequency Ordered    11/18/18 1130  insulin aspart U-100 pen 5-7 Units      -- SubQ 3 times daily with meals 11/18/18 1057    11/18/18 0900  insulin detemir U-100 pen 14 Units      -- SubQ Daily 11/17/18 0912    11/15/18 0041  insulin aspart U-100 pen 0-5 Units      -- SubQ Before meals & nightly PRN 11/14/18 7850

## 2018-11-20 NOTE — PLAN OF CARE
Problem: Physical Therapy Goal  Goal: Physical Therapy Goal  Goals to be met by: 18     Patient will increase functional independence with mobility by performin. Supine to sit with Stand-by Assistance  2. Sit to stand transfer with Stand-by Assistance met  3. Gait  x 150 feet with Contact Guard Assistance using AD as needed. met  4. Lower extremity exercise program x15 reps, with supervision, in order to increase LE strength and (I) with functional mobility.      Patient met transfer and gait goal. Will have PT reassess goals next visit.  Gurvinder Uribe,PTA

## 2018-11-20 NOTE — PROGRESS NOTES
(SW) presented to the patient's (pt) room for follow up and continuity of care.  Pt was present, being fed breakfast by her , alert/oriented, only communicated when spoken to and engaged mildly.  Pt's  presented alert/oriented, engaged appropriately and communicated openly.  Pt denied any pending and/or coping issues when questioned.  SW inquired if pt's  had made a decision about obtaining local lodging to which he advised he was still awaiting a definite answer from Dr. Santos as to if the pt needed to relocate at this time.  SW reminded pt and pt's  that resources were available should they require assistance with local lodging information.  Pt's  advised the pt would begin working with P/T on today to which the pt agreed.  There were no other issues or concerns addressed.  SW remains available for education, resources, support and discharge planning as appropriate.

## 2018-11-20 NOTE — HPI
CC: elevated PAP in a liver transplant eval    Ms. Krystal Hosbon is a 68 yo female with cryptogenic cirrhosis listed for liver tx 09/14/18, presented as a transfer from [facility] after she was noted to be encephalopathic with an ENZO. Transferred here for further care. Over the course of her stay here she has had afib with RVR that has been paroxsymal on this admission. Tried to rate control her which has been successful recently with lopressor. AC was deferred due to her thrombocytopenia and liver dysfunction at that time for a CHADS_VASC of 3. In addition the patient has been intermittently diuresed with IV pushes of lasix 40 mg at first and 60 mg thereafter on her stay here. She has also completed a course of ABX for possible PNA and is currently on internal hold due to this and her deconditioning.     On admit here it was noted that her PAP pressures have been elevated on 2D echo. When compared to a DSE in September which showed an estimated PAP of 28 (also no evidence of stress induced ischemia) it has risen to 46-55 mm Hg. Note that has been documented that the patient's In's and out's are heard to accurately measure due to the fact that she is incontinent. RV function was noted that the patient had normal RV and LV function with normal sizes.

## 2018-11-20 NOTE — ASSESSMENT & PLAN NOTE
- PT/OT following.  Patient need aggressive therapy.  She will also need to be able to climb steps prior to discharge.  She has at least 7 steps to climb at home.    - PT/OT currently recommending home with home health.

## 2018-11-20 NOTE — PT/OT/SLP PROGRESS
Physical Therapy Treatment    Patient Name:  Krystal Hobson   MRN:  [id number]    Recommendations:     Discharge Recommendations:  home with home health   Discharge Equipment Recommendations: bedside commode, walker, rolling   Barriers to discharge: None    Assessment:     Krystal Hobson is a 67 y.o. female admitted with a medical diagnosis of Hepatic encephalopathy.  She presents with the following impairments/functional limitations:  weakness, impaired self care skills, impaired balance, gait instability, impaired functional mobilty, impaired cardiopulmonary response to activity .Mrs. Hobson tolerated increase distance with gait training well. Noted fair/good balance with OOB mobility using RW.  Mrs. Hobson met 2/4 goals.  She will require 24 hour assistance upon discharge from hospital.     Rehab Prognosis:  Good; patient would benefit from acute skilled PT services to address these deficits and reach maximum level of function.      Recent Surgery: * No surgery found *      Plan:     During this hospitalization, patient to be seen 4 x/week to address the above listed problems via therapeutic activities, gait training, therapeutic exercises, neuromuscular re-education  · Plan of Care Expires:  12/11/18   Plan of Care Reviewed with: patient, spouse    Subjective     Communicated with NSG prior to session.  Patient found seated in BS chair upon PT entry to room, agreeable to treatment.      Chief Complaint: denied any pain  Patient comments/goals: Patient's  stated he walks with her in the halls.  Pain/Comfort:  · Pain Rating 1: 0/10    Patients cultural, spiritual, Church conflicts given the current situation: none noted     Objective:     Patient found with: pulse ox (continuous), telemetry     General Precautions: Standard, fall   Orthopedic Precautions:N/A   Braces:       Functional Mobility:  · Transfers:     · Sit to Stand:  stand by assistance with rolling walker  · Gait: amb 75 feet and 150-160 feet  with RW CGA/SBA.  No LOB, noted min trunk flexion, decraese heel strike and decraese IRAIDA.      AM-PAC 6 CLICK MOBILITY  Turning over in bed (including adjusting bedclothes, sheets and blankets)?: 4  Sitting down on and standing up from a chair with arms (e.g., wheelchair, bedside commode, etc.): 4  Moving from lying on back to sitting on the side of the bed?: 3  Moving to and from a bed to a chair (including a wheelchair)?: 4  Need to walk in hospital room?: 4  Climbing 3-5 steps with a railing?: 3  Basic Mobility Total Score: 22       Therapeutic Activities and Exercises:   Mrs. Hobson performed AP,hip flexion and LAQ x15-20 reps B LE's      Patient left up in chair with all lines intact, call button in reach and  present..    GOALS:   Multidisciplinary Problems     Physical Therapy Goals        Problem: Physical Therapy Goal    Goal Priority Disciplines Outcome Goal Variances Interventions   Physical Therapy Goal     PT, PT/OT Ongoing (interventions implemented as appropriate)     Description:  Goals to be met by: 18     Patient will increase functional independence with mobility by performin. Supine to sit with Stand-by Assistance  2. Sit to stand transfer with Stand-by Assistance met  3. Gait  x 150 feet with Contact Guard Assistance using AD as needed. met  4. Lower extremity exercise program x15 reps, with supervision, in order to increase LE strength and (I) with functional mobility.                        Time Tracking:     PT Received On: 18  PT Start Time: 1110     PT Stop Time: 1127  PT Total Time (min): 17 min     Billable Minutes: Gait Training 10 and Therapeutic Activity 7    Treatment Type: Treatment  PT/PTA: PTA     PTA Visit Number: 1     Gurvinder Uribe II, PTA  2018

## 2018-11-20 NOTE — ASSESSMENT & PLAN NOTE
- infiltrated midline overnight 11/14-11/15.  Cont to have increased swelling, pain and cold right arm despite elevating and using PRN heat packs.    - US right upper extremity 11/16 negative for DVT.  Pt on ASA.  Monitor.    - edema improving with diuresis

## 2018-11-20 NOTE — ASSESSMENT & PLAN NOTE
- Noted on recent chest xray.  Initially placed on BS antibxs for treatment.    - Transitioned to PO Moxi (11/13) to complete a 7 day course (11/18).   - Pt with elevated WBC on lab work 11/14 and placed back on BS antibxs at that time.   - WBC then with quick improvement. Now transitioned back to Moxi stop date 11/18.   - cough and SOB improving

## 2018-11-20 NOTE — ASSESSMENT & PLAN NOTE
-  Listed for liver transplant with MELD 26 thru 11/26/18. Per hepatologist, remains on internal hold 2/2 PNA- tx complete 11/18 and physical deconditioned. Continue to monitor. PT/OT following.  -  Plan to discuss pt candidacy once resp status, nutrition, and physical mobility improve. Need repeat RHC for elevated PA pressure.    MELD-Na score: 25 at 11/20/2018  6:49 AM  MELD score: 25 at 11/20/2018  6:49 AM  Calculated from:  Serum Creatinine: 1.4 mg/dL at 11/20/2018  6:49 AM  Serum Sodium: 137 mmol/L at 11/20/2018  6:49 AM  Total Bilirubin: 7.9 mg/dL at 11/20/2018  6:49 AM  INR(ratio): 1.9 at 11/20/2018  6:49 AM  Age: 67 years

## 2018-11-20 NOTE — ASSESSMENT & PLAN NOTE
- TTE 11/12 with PA pressure > 55  - Repeat echo 11/20 with PA pressure 46  - HTS consulted for RHC at the end of the week  - continue to diurese -- lasix 60mg IV this AM, 40mg in PM  - BNP in AM

## 2018-11-20 NOTE — ASSESSMENT & PLAN NOTE
- 2D echo 11/12 with PA pressure > 55  - Repeat echo 11/20 with PA pressure 46  - HTS consulted for RHC at the end of the week  - continue to diurese -- lasix 60mg IV x 1 today  - BNP in AM

## 2018-11-20 NOTE — PT/OT/SLP PROGRESS
Occupational Therapy      Patient Name:  Krystal Hobson   MRN:  [id number]  [id number]  Patient attempted today for evaluation, pt found up in bedside chair, very sleepy/lethargic, c/o increased SOB and MD RENE bedside to assess breathing/volume status, pt declined further activity at this time.  does report assist pt with ambulation to BR and walking as tolerated with the walker and also reports pt recently given anti-anxiety meds,  informed  of POC, shad informed  . Will follow-up for eval as appropriate and as schedule allows.    Pippa Nolasco OT  11/20/2018

## 2018-11-20 NOTE — PROGRESS NOTES
[facility]  Liver Transplant  Progress Note    Patient Name: Krystal Hobson  MRN: [id number]  Admission Date: 11/10/2018  Hospital Length of Stay: 10 days  Code Status: Full Code  Primary Care Provider: Courtney Joe MD    Subjective:     History of Present Illness:  Ms. Krystal Hobson is a 67 yr F w/ hx of cryptogenic cirrhosis, listed for liver tx 9/14/18 who presented to ER at outside hospital in [place], [place] with increased abdominal pain and then noted to have AMS on arrival and ENZO on lab work. Per the , she was extremely disoriented and her ammonia reached as high as 200. Her Cr level increased to 2.0 at the OSH. She also went into A fib with RVR and was started on a diltiazem infusion at OSH. She was then transferred to [facility] for further care. On arrival pt was noted with confusion which has now resolved with lactulose. She also was noted with a cough and fatigue.       Interval History: no acute events overnight. Feels well today, cough and breathing improving. 2Decho today, PA pressure 46, HTS consulted for RHC at the end of the week, plan to diurese this week and monitor Cr closely. Lasix 60mg IV this AM, 40mg IV this PM, needs accurate I&O's, weight decreasing. SOB improved, wean O2 from 1L to RA. Hydroxyzine ordered for bedtime due to poor sleeping. RUE edema improving with lasix. Currently on internal hold 2/2 PNA (complete 11/18) and frailty, to be discussed in conference this week. Cont lactulose to titrate to 3-4 stools.    Scheduled Meds:   guaiFENesin  600 mg Oral BID    heparin (porcine)  5,000 Units Subcutaneous Q8H    insulin aspart U-100  5-7 Units Subcutaneous TIDWM    insulin detemir U-100  14 Units Subcutaneous Daily    lactulose  30 g Oral TID    levalbuterol  1.25 mg Nebulization Q6H WAKE    magnesium oxide  400 mg Oral BID    metoprolol tartrate  25 mg Oral BID    rifAXImin  550 mg Oral BID     Continuous Infusions:  PRN Meds:acetaminophen, benzonatate,  dextrose 50%, dextrose 50%, glucagon (human recombinant), glucose, glucose, guaifenesin 100 mg/5 ml, insulin aspart U-100, lactulose, ondansetron, simethicone, sodium chloride 0.9%    Review of Systems   Constitutional: Positive for activity change, appetite change and fatigue. Negative for chills and fever.   HENT: Positive for ear pain. Negative for congestion, ear discharge and sinus pressure.    Respiratory: Negative for apnea, cough, chest tightness, shortness of breath and wheezing.    Cardiovascular: Negative for chest pain, palpitations and leg swelling.   Gastrointestinal: Negative for abdominal distention, abdominal pain, anal bleeding, blood in stool, diarrhea, nausea and vomiting.   Endocrine: Negative for cold intolerance and heat intolerance.   Genitourinary: Positive for decreased urine volume (incontinent of urine). Negative for dysuria, menstrual problem and urgency.   Musculoskeletal: Negative for arthralgias and back pain.   Skin: Positive for wound. Negative for pallor.   Allergic/Immunologic: Positive for immunocompromised state.   Neurological: Positive for weakness. Negative for dizziness and headaches.   Hematological: Bruises/bleeds easily.   Psychiatric/Behavioral: Negative for agitation, behavioral problems, confusion, decreased concentration and sleep disturbance.     Objective:     Vital Signs (Most Recent):  Temp: 98.4 °F (36.9 °C) (11/19/18 1140)  Pulse: 71 (11/19/18 1146)  Resp: 17 (11/19/18 1131)  BP: 131/67 (11/19/18 0810)  SpO2: 97 % (11/19/18 1140) Vital Signs (24h Range):  Temp:  [98 °F (36.7 °C)-98.9 °F (37.2 °C)] 98.4 °F (36.9 °C)  Pulse:  [64-83] 71  Resp:  [15-25] 17  SpO2:  [95 %-100 %] 97 %  BP: (119-131)/(54-67) 131/67     Weight: 55.3 kg (121 lb 14.6 oz)  Body mass index is 25.48 kg/m².    Intake/Output - Last 3 Shifts       11/17 0700 - 11/18 0659 11/18 0700 - 11/19 0659 11/19 0700 - 11/20 0659    P.O. 840 890     I.V. (mL/kg)       Other       Total Intake(mL/kg) 840  (15.4) 890 (16.4)     Urine (mL/kg/hr) 700 (0.5) 450 (0.3) 150 (0.4)    Emesis/NG output 0      Other 0      Stool 0 0     Blood 0      Total Output 700 450 150    Net +140 +440 -150           Urine Occurrence 0 x 5 x     Stool Occurrence 6 x 5 x     Emesis Occurrence 0 x 0 x           Physical Exam   Constitutional: She is oriented to person, place, and time. She appears well-developed.   Chronically ill-appearing. Malnourished. Temporal wasting.   HENT:   Head: Normocephalic and atraumatic.   Mouth/Throat: Oropharynx is clear and moist.   Eyes: Conjunctivae are normal. Scleral icterus is present.   Neck: Normal range of motion.   Cardiovascular: Normal rate, regular rhythm and normal heart sounds.   Pulmonary/Chest: Effort normal. No respiratory distress. She has no decreased breath sounds. She has no wheezes. She has no rales.   Abdominal: Soft. Bowel sounds are normal. She exhibits distension. There is no tenderness. No hernia.   Small ascites   Musculoskeletal: She exhibits edema (1+ BLE, +2 RUE).   Lymphadenopathy:     She has no cervical adenopathy.   Neurological: She is alert and oriented to person, place, and time.   Skin: Skin is warm and dry. No rash noted.   Psychiatric: She has a normal mood and affect. Her behavior is normal. Her mood appears not anxious.   Nursing note and vitals reviewed.      Laboratory:  Immunosuppressants     None        CBC:   Recent Labs   Lab 11/19/18  1040   WBC 11.01   RBC 2.58*   HGB 8.0*   HCT 25.2*   PLT 90*   MCV 98   MCH 31.0   MCHC 31.7*     CMP:   Recent Labs   Lab 11/19/18  0711   *   CALCIUM 9.0   ALBUMIN 3.9   PROT 5.6*   *   K 3.5   CO2 21*      BUN 72*   CREATININE 1.5*   ALKPHOS 157*   ALT 31   AST 57*   BILITOT 7.2*     Coagulation:   Recent Labs   Lab 11/19/18  0711   INR 1.9*     Labs within the past 24 hours have been reviewed.    Diagnostic Results:  I have personally reviewed all pertinent imaging studies.    Assessment/Plan:     *  Hepatic encephalopathy    - Acute on chronic. PO lactulose ordered, continue rifaximin. Titrate Lactulose to maintain 3-4 BM a day. PRN lactulose enema TID for worsening confusion.  - currently pt is AAOx4, no encephalopathy noted.  Mentation is delayed.       Acute kidney injury superimposed on CKD    - CKD 3-4 at baseline creatinine. Urine electrolytes ordered. Etiology possibly due to hepatorenal syndrome.   - HRS protocol started on admission, albumin and octreotide. No midodrine as with Afib.   - Cr since admission has improved  - Dosing lasix daily based on labs -- Lasix IV 60mg this AM, 40mg in PM  - Must weigh daily and cont strict I&Os.      Hypervolemia    - TTE 11/12 with PA pressure > 55  - Repeat echo 11/20 with PA pressure 46  - HTS consulted for RHC at the end of the week  - continue to diurese -- lasix 60mg IV this AM, 40mg in PM  - BNP in AM     Ear pain    - ENT consulted 11/19, recommend flonase, no ear infection seen.      Swelling of joint of upper arm, right    - infiltrated midline overnight 11/14-11/15.  Cont to have increased swelling, pain and cold right arm despite elevating and using PRN heat packs.    - US right upper extremity 11/16 negative for DVT.  Pt on ASA.  Monitor.    - edema improving with diuresis     Physical deconditioning    - PT/OT following.  Patient need aggressive therapy.  She will also need to be able to climb steps prior to discharge.  She has at least 7 steps to climb at home.    - PT/OT currently recommending home with home health.      Pneumonia of right lung due to infectious organism    - Noted on recent chest xray.  Initially placed on BS antibxs for treatment.    - Transitioned to PO Moxi (11/13) to complete a 7 day course (11/18).   - Pt with elevated WBC on lab work 11/14 and placed back on BS antibxs at that time.   - WBC then with quick improvement. Now transitioned back to Moxi stop date 11/18.   - cough and SOB improving     Shortness of breath    - Was  on 2-3 L O2 11/16.    - Now at 1L PRN and RA majority of the time with spo2 > 95%.  - SOB and cough improving with diuresis  - continue with mucinex and tessalon perle.      Sinus tachycardia    - Worsened with albuterol, improved with BB.   - remains in NSR. Continue tele.     Atrial fibrillation    -  Patient with episode of Afib with RVR at OSH and was placed on diltiazem gtt which was discontinued 11/10 as she reverted to NSR.  Cardiology consulted for management.   -  Pt then returned back into Afib w/ RVR on the afternoon of 11/11 and pt placed back on diltiazem for rate control.   -  Pt now rated controlled and diltiazem d/c'd on 11/12 at 0200. Cont with lopressor at this time as pt remains rate controlled.       CKD (chronic kidney disease) stage 3, GFR 30-59 ml/min    - See ENZO.  Stable.     Chronic liver disease           Type 2 diabetes mellitus with hyperglycemia, with long-term current use of insulin    - Basal and prandial insulin ordered. Endocrine consulted for management. Apprec recs.   - Pt noted with significant hypoglycemic event the evening of 11/14, BG 8. Pt responded well to IV dextrose.   - blood glucose has remained stable and diabetic current regimen.       Anemia of chronic disease    - H/H stable. Continue to monitor with daily cbc.   - no overt signs of bleeding     End stage liver disease    -  Listed for liver transplant with MELD 26 thru 11/26/18. Per hepatologist, remains on internal hold 2/2 PNA- tx complete 11/18 and physical deconditioned. Continue to monitor. PT/OT following.  -  Plan to discuss pt candidacy once resp status, nutrition, and physical mobility improve. Need repeat RHC for elevated PA pressure.    MELD-Na score: 25 at 11/20/2018  6:49 AM  MELD score: 25 at 11/20/2018  6:49 AM  Calculated from:  Serum Creatinine: 1.4 mg/dL at 11/20/2018  6:49 AM  Serum Sodium: 137 mmol/L at 11/20/2018  6:49 AM  Total Bilirubin: 7.9 mg/dL at 11/20/2018  6:49 AM  INR(ratio): 1.9 at  11/20/2018  6:49 AM  Age: 67 years     Thrombocytopenia due to hypersplenism    - No overt s/s of bleeding.  Continue to monitor.      Protein-calorie malnutrition, moderate    - Dietician completed calorie count.  Patient eating % of trays.  Will d/c calorie count.  Pt drinking 2-3 supplement drinks daily.       Organ transplant candidate    - on internal hold.       Decompensated hepatic cirrhosis    - See hepatic encephalopathy.          VTE Risk Mitigation (From admission, onward)        Ordered     IP VTE HIGH RISK PATIENT  Once      11/10/18 0213     heparin (porcine) injection 5,000 Units  Every 8 hours      11/10/18 0213          The patients clinical status was discussed at multidisplinary rounds, involving transplant surgery, transplant medicine, pharmacy, nursing, nutrition, and social work    Discharge Planning: not stable for discharge at this time. Discharge likely this weekend after RHC.     Sol Buckner, CHAITANYA  Liver Transplant  [facility]

## 2018-11-20 NOTE — ASSESSMENT & PLAN NOTE
Ms. Krystal Hobson is a 68 yo female with cryptogenic cirrhosis listed for liver tx 09/14/18, presented as a transfer from [facility] after she was noted to be encephalopathic with an ENZO. Encephalopathy has since resolved, but noted that the patient is reported 15 pounds over her dry weight. In addition patient is hypervolemic on examination with JVD. SOB may be a combination of resolving PNA or fluid.     Recommendations:  - Would recommend another dose of lasix 80 mg IV tonight followed by 80 mg IV BID.   - Strict intake and output. Understand that it may be hard due to the patient's incontinence   - Check electrolytes and replace BID while diuresis  - Will plan for RHC once euvolemic at earliest Friday.     Discussed with Dr. Cooper,    Bebeto Baxter MD, PGY-5  Cardiology HTS

## 2018-11-20 NOTE — PLAN OF CARE
AAO x 4. VSS, afebrile, SpO2>95% on 1L NC. Telemetry monitoring-SR. BG monitoring AC/HS/0200. PRN glucose tabs 15 g given for BG=69. PRN tessalon perle and mucinex given 1x this shift. Plan for repeat 2D echo today. POC reviewed with pt and spouse. Fall precautions maintained and pt repositions self. See flowsheet for assessment findings. Will continue to monitor.

## 2018-11-20 NOTE — ASSESSMENT & PLAN NOTE
- CKD 3-4 at baseline creatinine. Urine electrolytes ordered. Etiology possibly due to hepatorenal syndrome.   - HRS protocol started on admission, albumin and octreotide. No midodrine as with Afib.   - Cr since admission has improved  - Dosing lasix daily based on labs -- Lasix IV 60mg this AM, 40mg in PM  - Must weigh daily and cont strict I&Os.

## 2018-11-20 NOTE — CONSULTS
[facility]  Heart Transplant  Consult Note    Patient Name: Krystal Hobson  MRN: [id number]  Admission Date: 11/10/2018  Hospital Length of Stay: 10 days  Attending Physician: Jabier Fritz MD  Primary Care Provider: Courtney Joe MD   Principal Problem:Hepatic encephalopathy    Inpatient consult to Heart Transplant  Consult performed by: Bebeto Baxter MD  Consult ordered by: Sol Buckner DNP  Reason for consult: elevated PAP        Subjective:     History of Present Illness:  CC: elevated PAP in a liver transplant eval    Ms. Krystal Hobson is a 66 yo female with cryptogenic cirrhosis listed for liver tx 09/14/18, presented as a transfer from [facility] after she was noted to be encephalopathic with an ENZO. Transferred here for further care. Over the course of her stay here she has had afib with RVR that has been paroxsymal on this admission. Tried to rate control her which has been successful recently with lopressor. AC was deferred due to her thrombocytopenia and liver dysfunction at that time for a CHADS_VASC of 3. In addition the patient has been intermittently diuresed with IV pushes of lasix 40 mg at first and 60 mg thereafter on her stay here. She has also completed a course of ABX for possible PNA and is currently on internal hold due to this and her deconditioning.     On admit here it was noted that her PAP pressures have been elevated on 2D echo. When compared to a DSE in September which showed an estimated PAP of 28 (also no evidence of stress induced ischemia) it has risen to 46-55 mm Hg. Note that has been documented that the patient's In's and out's are heard to accurately measure due to the fact that she is incontinent. RV function was noted that the patient had normal RV and LV function with normal sizes.           Past Medical History:   Diagnosis Date    Cancer     breat Cancer    Cirrhosis     Diabetes mellitus     GERD (gastroesophageal reflux disease)      Rheumatoid arthritis        Past Surgical History:   Procedure Laterality Date    CARPAL TUNNEL RELEASE      left wrist    CHOLECYSTECTOMY      MASTECTOMY      left breast 25  years ago       Review of patient's allergies indicates:  No Known Allergies    Current Facility-Administered Medications   Medication    acetaminophen tablet 650 mg    benzonatate capsule 100 mg    dextrose 50% injection 12.5 g    dextrose 50% injection 25 g    glucagon (human recombinant) injection 1 mg    glucose chewable tablet 16 g    glucose chewable tablet 24 g    guaifenesin 100 mg/5 ml syrup 200 mg    guaiFENesin 12 hr tablet 600 mg    heparin (porcine) injection 5,000 Units    hydrOXYzine HCl tablet 25 mg    insulin aspart U-100 pen 0-5 Units    insulin aspart U-100 pen 3-5 Units    insulin detemir U-100 pen 14 Units    lactulose 10 gram/15 mL solution (enema) 200 g    lactulose 20 gram/30 mL solution Soln 30 g    levalbuterol nebulizer solution 1.25 mg    magnesium oxide tablet 400 mg    metoprolol tartrate (LOPRESSOR) tablet 25 mg    ondansetron disintegrating tablet 8 mg    rifAXIMin tablet 550 mg    simethicone chewable tablet 80 mg    sodium chloride 0.9% flush 3 mL     Family History     Problem Relation (Age of Onset)    COPD Sister    Heart disease Mother    Liver disease Father, Sister        Tobacco Use    Smoking status: Never Smoker    Tobacco comment: patient denies   Substance and Sexual Activity    Alcohol use: No     Comment: stopped 10/17    Drug use: No     Comment: patient denies    Sexual activity: Not on file     Review of Systems   Constitutional: Positive for fatigue.   Respiratory: Positive for shortness of breath (at rest). Negative for wheezing.    Cardiovascular: Negative for chest pain, palpitations and leg swelling.   Gastrointestinal: Negative for abdominal distention.     Objective:     Vital Signs (Most Recent):  Temp: 97.9 °F (36.6 °C) (11/20/18 1137)  Pulse: 67  (11/20/18 1348)  Resp: (!) 24 (11/20/18 1348)  BP: (!) 149/82 (11/20/18 1045)  SpO2: (!) 94 % (11/20/18 1348) Vital Signs (24h Range):  Temp:  [97.9 °F (36.6 °C)-99.4 °F (37.4 °C)] 97.9 °F (36.6 °C)  Pulse:  [60-82] 67  Resp:  [18-24] 24  SpO2:  [94 %-100 %] 94 %  BP: (126-150)/(59-82) 149/82     Patient Vitals for the past 72 hrs (Last 3 readings):   Weight   11/20/18 0736 54.4 kg (120 lb)   11/20/18 0400 54.6 kg (120 lb 5.9 oz)   11/19/18 1300 55.3 kg (121 lb 14.6 oz)     Body mass index is 25.08 kg/m².      Intake/Output Summary (Last 24 hours) at 11/20/2018 1405  Last data filed at 11/20/2018 1300  Gross per 24 hour   Intake 1140 ml   Output 851 ml   Net 289 ml       Physical Exam   Constitutional: She is oriented to person, place, and time. She appears well-developed. She appears distressed.   HENT:   Head: Normocephalic and atraumatic.   Eyes: EOM are normal. Pupils are equal, round, and reactive to light.   Neck: Normal range of motion. Neck supple. JVD (CVP 10) present.   Cardiovascular: Normal rate and regular rhythm.   Pulmonary/Chest: Effort normal. She has no rales.   Bronchial breath sounds noted on LLL   Abdominal: Soft. Bowel sounds are normal.   Musculoskeletal: Normal range of motion. She exhibits no edema.   Neurological: She is alert and oriented to person, place, and time.   Skin: Skin is warm. She is diaphoretic.   Vitals reviewed.      Significant Labs:  CBC:  Recent Labs   Lab 11/19/18  0711 11/19/18  1040 11/20/18  0649   WBC 10.54 11.01 10.43   RBC 2.44* 2.58* 2.43*   HGB 7.7* 8.0* 7.7*   HCT 23.4* 25.2* 23.1*   PLT 83* 90* 97*   MCV 96 98 95   MCH 31.6* 31.0 31.7*   MCHC 32.9 31.7* 33.3     BNP:  No results for input(s): BNP in the last 168 hours.    Invalid input(s): BNPTRIAGELBLO  CMP:  Recent Labs   Lab 11/18/18  0544 11/19/18  0711 11/20/18  0649   * 195* 123*   CALCIUM 8.7 9.0 8.8   ALBUMIN 3.6 3.9 3.5   PROT 5.3* 5.6* 5.3*   * 135* 137   K 3.4* 3.5 3.6   CO2 21* 21* 21*     103 105   BUN 73* 72* 77*   CREATININE 1.5* 1.5* 1.4   ALKPHOS 166* 157* 166*   ALT 35 31 32   AST 69* 57* 56*   BILITOT 6.0* 7.2* 7.9*      Coagulation:   Recent Labs   Lab 11/18/18  0544 11/19/18  0711 11/20/18  0649   INR 1.7* 1.9* 1.9*     LDH:  No results for input(s): LDH in the last 72 hours.  Microbiology:  Microbiology Results (last 7 days)     Procedure Component Value Units Date/Time    Blood culture [[id number]] Collected:  11/14/18 2141    Order Status:  Completed Specimen:  Blood Updated:  11/19/18 2312     Blood Culture, Routine No growth after 5 days.    Blood culture [[id number]] Collected:  11/14/18 2141    Order Status:  Completed Specimen:  Blood Updated:  11/19/18 2312     Blood Culture, Routine No growth after 5 days.    Blood culture - site #1 [[id number]] Collected:  11/10/18 0719    Order Status:  Completed Specimen:  Blood Updated:  11/15/18 1012     Blood Culture, Routine No growth after 5 days.    Blood culture - site #2 [[id number]] Collected:  11/10/18 0718    Order Status:  Completed Specimen:  Blood Updated:  11/15/18 1012     Blood Culture, Routine No growth after 5 days.    Respiratory Viral Panel by PCR [facility]; Nasal Swab [[id number]] Collected:  11/11/18 1055    Order Status:  Completed Specimen:  Respiratory Updated:  11/14/18 0627     Respiratory Virus Panel, source Nasal Swab     RVP - Adenovirus Not Detected     Comment: Detects Serotypes B and E. Detection of Serotype C may   be limited. If Adenovirus infection is suspected and a   Not Detected result is returned the sample should be   re-tested for Adenovirus using an independent method  (e.g. Grabbed Adenovirus Quantitative Real-Time  PCR test.          Enterovirus Not Detected     Comment: Cross-reactivity has been observed between certain Rhinovirus  strains and the Enterovirus assay.          Human Bocavirus Not Detected     Human Coronavirus Not Detected     Comment: The Human Coronavirus assay detects  Human coronavirus types  229E, OC43,NL63 and HKU1.          RVP - Human Metapneumovirus (hMPV) Not Detected     RVP - Influenza A Not Detected     Influenza A - B2Z0-36 Not Detected     RVP - Influenza B Not Detected     Parainfluenza Not Detected     Respiratory Syncytial VirusVirus (RSV) A Not Detected     Comment: The Respiratory Syncytial Viral assay detects types A and B,  however it does not distinguish between the two.          RVP - Rhinovirus Not Detected     Comment: Cross-Reactivity has been observed between certain   Rhinovirus strains and the Enterovirus assay.  Target Enriched Mulitplex Polymerase Chain Reaction (TEM-PCR)  allows for the detection of multiple pathogens out of a single  reaction.  This test was developed and its performance   characteristics determined by [employer]  It has not   been cleared or approved by the U.S.Food and Drug Administration.  Results should be used in conjunction with clinical findings,   and should not form the sole basis for a diagnosis or treatment  decision.  TEM-PCR is a licensed technology of [employer].         Narrative:       Receiving Lab:->[facility]          I have reviewed all pertinent labs within the past 24 hours.    Diagnostic Results:  I have reviewed all pertinent imaging results/findings within the past 24 hours.    Assessment/Plan:     Hypervolemia    Ms. Krystal Hobson is a 66 yo female with cryptogenic cirrhosis listed for liver tx 09/14/18, presented as a transfer from [facility] after she was noted to be encephalopathic with an ENZO. Encephalopathy has since resolved, but noted that the patient is reported 15 pounds over her dry weight. In addition patient is hypervolemic on examination with JVD. SOB may be a combination of resolving PNA or fluid.     Recommendations:  - Would recommend another dose of lasix 80 mg IV tonight followed by 80 mg IV BID.   - Strict intake and output. Understand that it may be hard due to the  patient's incontinence   - Check electrolytes and replace BID while diuresis  - Will plan for RHC once euvolemic at earliest Friday.     Discussed with Dr. Cooper,    Bebeto Baxter MD, PGY-5  Cardiology HTS         Thank you for your consult. I will follow-up with patient. Please contact us if you have any additional questions.    Bebeto Bxater MD  Heart Transplant  [facility]

## 2018-11-20 NOTE — PLAN OF CARE
Problem: Patient Care Overview  Goal: Plan of Care Review  Outcome: Ongoing (interventions implemented as appropriate)  Patient AAO SB assist with ambulation. Patients  at bedside VERY attentive to patient.  SR per tele.  Patient given lasix IV today, lungs coarse. Upper 90s on 1LNC.  2 D echo performed today.  BG controlled.

## 2018-11-20 NOTE — ASSESSMENT & PLAN NOTE
-  Patient with episode of Afib with RVR at OSH and was placed on diltiazem gtt which was discontinued 11/10 as she reverted to NSR.  Cardiology consulted for management.   -  Pt then returned back into Afib w/ RVR on the afternoon of 11/11 and pt placed back on diltiazem for rate control.   -  Pt now rated controlled and diltiazem d/c'd on 11/12 at 0200. Cont with lopressor at this time as pt remains rate controlled.

## 2018-11-20 NOTE — SUBJECTIVE & OBJECTIVE
Past Medical History:   Diagnosis Date    Cancer     breat Cancer    Cirrhosis     Diabetes mellitus     GERD (gastroesophageal reflux disease)     Rheumatoid arthritis        Past Surgical History:   Procedure Laterality Date    CARPAL TUNNEL RELEASE      left wrist    CHOLECYSTECTOMY      MASTECTOMY      left breast 25  years ago       Review of patient's allergies indicates:  No Known Allergies    Current Facility-Administered Medications   Medication    acetaminophen tablet 650 mg    benzonatate capsule 100 mg    dextrose 50% injection 12.5 g    dextrose 50% injection 25 g    glucagon (human recombinant) injection 1 mg    glucose chewable tablet 16 g    glucose chewable tablet 24 g    guaifenesin 100 mg/5 ml syrup 200 mg    guaiFENesin 12 hr tablet 600 mg    heparin (porcine) injection 5,000 Units    hydrOXYzine HCl tablet 25 mg    insulin aspart U-100 pen 0-5 Units    insulin aspart U-100 pen 3-5 Units    insulin detemir U-100 pen 14 Units    lactulose 10 gram/15 mL solution (enema) 200 g    lactulose 20 gram/30 mL solution Soln 30 g    levalbuterol nebulizer solution 1.25 mg    magnesium oxide tablet 400 mg    metoprolol tartrate (LOPRESSOR) tablet 25 mg    ondansetron disintegrating tablet 8 mg    rifAXIMin tablet 550 mg    simethicone chewable tablet 80 mg    sodium chloride 0.9% flush 3 mL     Family History     Problem Relation (Age of Onset)    COPD Sister    Heart disease Mother    Liver disease Father, Sister        Tobacco Use    Smoking status: Never Smoker    Tobacco comment: patient denies   Substance and Sexual Activity    Alcohol use: No     Comment: stopped 10/17    Drug use: No     Comment: patient denies    Sexual activity: Not on file     Review of Systems   Constitutional: Positive for fatigue.   Respiratory: Positive for shortness of breath (at rest). Negative for wheezing.    Cardiovascular: Negative for chest pain, palpitations and leg swelling.    Gastrointestinal: Negative for abdominal distention.     Objective:     Vital Signs (Most Recent):  Temp: 97.9 °F (36.6 °C) (11/20/18 1137)  Pulse: 67 (11/20/18 1348)  Resp: (!) 24 (11/20/18 1348)  BP: (!) 149/82 (11/20/18 1045)  SpO2: (!) 94 % (11/20/18 1348) Vital Signs (24h Range):  Temp:  [97.9 °F (36.6 °C)-99.4 °F (37.4 °C)] 97.9 °F (36.6 °C)  Pulse:  [60-82] 67  Resp:  [18-24] 24  SpO2:  [94 %-100 %] 94 %  BP: (126-150)/(59-82) 149/82     Patient Vitals for the past 72 hrs (Last 3 readings):   Weight   11/20/18 0736 54.4 kg (120 lb)   11/20/18 0400 54.6 kg (120 lb 5.9 oz)   11/19/18 1300 55.3 kg (121 lb 14.6 oz)     Body mass index is 25.08 kg/m².      Intake/Output Summary (Last 24 hours) at 11/20/2018 1405  Last data filed at 11/20/2018 1300  Gross per 24 hour   Intake 1140 ml   Output 851 ml   Net 289 ml       Physical Exam   Constitutional: She is oriented to person, place, and time. She appears well-developed. She appears distressed.   HENT:   Head: Normocephalic and atraumatic.   Eyes: EOM are normal. Pupils are equal, round, and reactive to light.   Neck: Normal range of motion. Neck supple. JVD (CVP 10) present.   Cardiovascular: Normal rate and regular rhythm.   Pulmonary/Chest: Effort normal. She has no rales.   Bronchial breath sounds noted on LLL   Abdominal: Soft. Bowel sounds are normal.   Musculoskeletal: Normal range of motion. She exhibits no edema.   Neurological: She is alert and oriented to person, place, and time.   Skin: Skin is warm. She is diaphoretic.   Vitals reviewed.      Significant Labs:  CBC:  Recent Labs   Lab 11/19/18  0711 11/19/18  1040 11/20/18  0649   WBC 10.54 11.01 10.43   RBC 2.44* 2.58* 2.43*   HGB 7.7* 8.0* 7.7*   HCT 23.4* 25.2* 23.1*   PLT 83* 90* 97*   MCV 96 98 95   MCH 31.6* 31.0 31.7*   MCHC 32.9 31.7* 33.3     BNP:  No results for input(s): BNP in the last 168 hours.    Invalid input(s): BNPTRIAMICHELLE  CMP:  Recent Labs   Lab 11/18/18  0544 11/19/18  0711  11/20/18  0649   * 195* 123*   CALCIUM 8.7 9.0 8.8   ALBUMIN 3.6 3.9 3.5   PROT 5.3* 5.6* 5.3*   * 135* 137   K 3.4* 3.5 3.6   CO2 21* 21* 21*    103 105   BUN 73* 72* 77*   CREATININE 1.5* 1.5* 1.4   ALKPHOS 166* 157* 166*   ALT 35 31 32   AST 69* 57* 56*   BILITOT 6.0* 7.2* 7.9*      Coagulation:   Recent Labs   Lab 11/18/18  0544 11/19/18  0711 11/20/18  0649   INR 1.7* 1.9* 1.9*     LDH:  No results for input(s): LDH in the last 72 hours.  Microbiology:  Microbiology Results (last 7 days)     Procedure Component Value Units Date/Time    Blood culture [[id number]] Collected:  11/14/18 2141    Order Status:  Completed Specimen:  Blood Updated:  11/19/18 2312     Blood Culture, Routine No growth after 5 days.    Blood culture [[id number]] Collected:  11/14/18 2141    Order Status:  Completed Specimen:  Blood Updated:  11/19/18 2312     Blood Culture, Routine No growth after 5 days.    Blood culture - site #1 [[id number]] Collected:  11/10/18 0719    Order Status:  Completed Specimen:  Blood Updated:  11/15/18 1012     Blood Culture, Routine No growth after 5 days.    Blood culture - site #2 [[id number]] Collected:  11/10/18 0718    Order Status:  Completed Specimen:  Blood Updated:  11/15/18 1012     Blood Culture, Routine No growth after 5 days.    Respiratory Viral Panel by PCR [facility]; Nasal Swab [[id number]] Collected:  11/11/18 1055    Order Status:  Completed Specimen:  Respiratory Updated:  11/14/18 0627     Respiratory Virus Panel, source Nasal Swab     RVP - Adenovirus Not Detected     Comment: Detects Serotypes B and E. Detection of Serotype C may   be limited. If Adenovirus infection is suspected and a   Not Detected result is returned the sample should be   re-tested for Adenovirus using an independent method  (e.g. [employer] Adenovirus Quantitative Real-Time  PCR test.          Enterovirus Not Detected     Comment: Cross-reactivity has been observed between certain  Rhinovirus  strains and the Enterovirus assay.          Human Bocavirus Not Detected     Human Coronavirus Not Detected     Comment: The Human Coronavirus assay detects Human coronavirus types  229E, OC43,NL63 and HKU1.          RVP - Human Metapneumovirus (hMPV) Not Detected     RVP - Influenza A Not Detected     Influenza A - Y3U4-89 Not Detected     RVP - Influenza B Not Detected     Parainfluenza Not Detected     Respiratory Syncytial VirusVirus (RSV) A Not Detected     Comment: The Respiratory Syncytial Viral assay detects types A and B,  however it does not distinguish between the two.          RVP - Rhinovirus Not Detected     Comment: Cross-Reactivity has been observed between certain   Rhinovirus strains and the Enterovirus assay.  Target Enriched Mulitplex Polymerase Chain Reaction (TEM-PCR)  allows for the detection of multiple pathogens out of a single  reaction.  This test was developed and its performance   characteristics determined by [employer].  It has not   been cleared or approved by the U.S.Food and Drug Administration.  Results should be used in conjunction with clinical findings,   and should not form the sole basis for a diagnosis or treatment  decision.  TEM-PCR is a licensed technology of [employer].         Narrative:       Receiving Lab:->[facility]          I have reviewed all pertinent labs within the past 24 hours.    Diagnostic Results:  I have reviewed all pertinent imaging results/findings within the past 24 hours.

## 2018-11-20 NOTE — PROGRESS NOTES
Notified H Dudley NP that the patient had not urinated since 40mg IV lasix push. NP ordered albumen and repeat renal.  Will continue to monitor.

## 2018-11-20 NOTE — ASSESSMENT & PLAN NOTE
- Was on 2-3 L O2 11/16.    - Now at 1L PRN and RA majority of the time with spo2 > 95%.  - SOB and cough improving with diuresis  - continue with mucinex and tessalon perle.

## 2018-11-20 NOTE — PROGRESS NOTES
"[facility]  Endocrinology  Progress Note    Admit Date: 11/10/2018     Reason for Consult: Management of type 2 DM, Hyperglycemia     Surgical Procedure and Date: n/a    Diabetes diagnosis year:     Home Diabetes Medications: Levemir 12 units BID (vial) and Humalog SSI with meals   Lab Results   Component Value Date    HGBA1C 5.8 (H) 11/10/2018         How often checking glucose at home? 1-3  BG readings on regimen: 150-300  Hypoglycemia on the regimen? once 27; required visit to ED; gave Levemir and humalog and patient did not eat  Missed doses on regimen?  No    Diabetes Complications include:     Hyperglycemia, Hypoglycemia  and Diabetic peripheral neuropathy     Complicating diabetes co morbidities:   CIRRHOSIS      HPI:   Patient is a 67 y.o. female with a diagnosis of type 2 DM; well controlled on MDI. Also with   Cryptogenic cirrhosis, rheumatoid arthritis, and GERD. Patient was listed for liver transplant on 18. Patient presented to ED of hospital in [place] with AMS and ENZO. Endocrinology consulted for BG/ DM management.   Of note, profound hypoglycemic event (BG < 20) the night of 18.            Interval HPI:   Overnight events: remains in TSU. NAEON. Working with PT. Hypoglycemia overnight; patient ate broth and cup of fruit for dinner. BG at goal this AM; prandial excursions noted.   Eatin-50%; sometimes eats just broth.   Nausea: No  Hypoglycemia and intervention: Yes treated with glucose tabs; increased to 219  Fever: No  TPN and/or TF: No    BP (!) 148/63   Pulse 60   Temp 99 °F (37.2 °C) (Oral)   Resp 18   Ht 4' 10" (1.473 m)   Wt 54.6 kg (120 lb 5.9 oz)   SpO2 97%   Breastfeeding? No   BMI 25.16 kg/m²      Labs Reviewed and Include    Recent Labs   Lab 18  0711   *   CALCIUM 9.0   ALBUMIN 3.9   PROT 5.6*   *   K 3.5   CO2 21*      BUN 72*   CREATININE 1.5*   ALKPHOS 157*   ALT 31   AST 57*   BILITOT 7.2*     Lab Results "   Component Value Date    WBC 11.01 11/19/2018    HGB 8.0 (L) 11/19/2018    HCT 25.2 (L) 11/19/2018    MCV 98 11/19/2018    PLT 90 (L) 11/19/2018     No results for input(s): TSH, FREET4 in the last 168 hours.  Lab Results   Component Value Date    HGBA1C 5.8 (H) 11/10/2018       Nutritional status:   Body mass index is 25.16 kg/m².  Lab Results   Component Value Date    ALBUMIN 3.9 11/19/2018    ALBUMIN 3.6 11/18/2018    ALBUMIN 2.6 (L) 11/17/2018     No results found for: PREALBUMIN    Estimated Creatinine Clearance: 31.4 mL/min (A) (based on SCr of 1.5 mg/dL (H)).    Accu-Checks  Recent Labs     11/18/18  2047 11/19/18  0207 11/19/18  0756 11/19/18  1146 11/19/18  1434 11/19/18  1641 11/19/18  2220 11/19/18  2300 11/19/18  2301 11/20/18  0209   POCTGLUCOSE 138* 167* 227* 276* 293* 175* 69* 477* 219* 208*       Current Medications and/or Treatments Impacting Glycemic Control  Immunotherapy:    Immunosuppressants     None        Steroids:   Hormones (From admission, onward)    None        Pressors:    Autonomic Drugs (From admission, onward)    None        Hyperglycemia/Diabetes Medications:   Antihyperglycemics (From admission, onward)    Start     Stop Route Frequency Ordered    11/18/18 1130  insulin aspart U-100 pen 5-7 Units      -- SubQ 3 times daily with meals 11/18/18 1057    11/18/18 0900  insulin detemir U-100 pen 14 Units      -- SubQ Daily 11/17/18 0912    11/15/18 0041  insulin aspart U-100 pen 0-5 Units      -- SubQ Before meals & nightly PRN 11/14/18 2341          ASSESSMENT and PLAN    * Hepatic encephalopathy    Managed per primary.        Type 2 diabetes mellitus with hyperglycemia, with long-term current use of insulin    BG goal 140-180    continue Levemir to 14 units in AM  decrease Novolog to 4-6 units with meals given variable meal intake; hold scheduled insulin when having broth as meal.   Low dose correction scale  BG monitoring AC/HS/0200    ** Please call Endocrine for any BG related  issues **    Discharge planning: TBD       Decompensated hepatic cirrhosis    Managed per primary.   Listed for liver transplant.  May impact BG readings       CKD (chronic kidney disease) stage 3, GFR 30-59 ml/min    Caution with insulin stacking  Estimated Creatinine Clearance: 31.3 mL/min (A) (based on SCr of 1.5 mg/dL (H)).         Acute kidney injury superimposed on CKD    Avoid insulin stacking and hypoglycemia.  Lab Results   Component Value Date    CREATININE 1.5 (H) 11/18/2018            Pneumonia of right lung due to infectious organism    Infection may elevate BG readings  On IV antibiotics           aJdyn Hall NP  Endocrinology  [facility]

## 2018-11-20 NOTE — PROGRESS NOTES
Patient unable to catch urine in hat, one um noted. Patients  stated that they will use the bed side commode next time to attempt to catch the urine.

## 2018-11-20 NOTE — CARE UPDATE
RN Proactive Rounding Note  Time of Visit: 1605    Admit Date: 11/10/2018  LOS: 10  Code Status: Full Code   Date of Visit: 2018  : 1950  Age: 67 y.o.  Sex: female  Race: White  Bed: 50479/57726 A:   MRN: [id number]  Was the patient discharged from an ICU this admission? no   Was the patient discharged from a PACU within last 24 hours?  no  Did the patient receive conscious sedation/general anesthesia in last 24 hours?  no  Was the patient in the ED within the past 24 hours?  no  Was the patient started on NIPPV within the past 24 hours?  no  Attending Physician: Jabier Fritz MD  Primary Service: [facility]      ASSESSMENT:     Abnormal Vital Signs: Tachypnea  Clinical Issues: Respiratory; Called for increased respiratory rate. On arrival to bedside, patient sitting in bed. RR-28/min. No accessory muscle use. Coarse bilateral breath sounds left > right. Non-productive, congested cough. Patient denies SOB. SpO2-95% on RA. IV x 2 started.      INTERVENTIONS/ RECOMMENDATIONS:     Continue current plan of care, prn cough medicines    Discussed plan of care with charge nurse.    PHYSICIAN ESCALATION:     Yes/No  no    Disposition: Remain in room 51667.    FOLLOW-UP/CONTINGENCY:     Call back the Rapid Response Nurse at [phone] for additional questions or concerns.

## 2018-11-20 NOTE — ASSESSMENT & PLAN NOTE
- Basal and prandial insulin ordered. Endocrine consulted for management. Apprec recs.   - Pt noted with significant hypoglycemic event the evening of 11/14, BG 8. Pt responded well to IV dextrose.   - blood glucose has remained stable and diabetic current regimen.

## 2018-11-21 PROBLEM — J96.01 ACUTE RESPIRATORY FAILURE WITH HYPOXIA: Status: ACTIVE | Noted: 2018-01-01

## 2018-11-21 PROBLEM — R91.8 PULMONARY INFILTRATES ON CXR: Status: ACTIVE | Noted: 2018-01-01

## 2018-11-21 PROBLEM — E87.6 HYPOKALEMIA: Status: ACTIVE | Noted: 2018-01-01

## 2018-11-21 PROBLEM — J15.20: Status: ACTIVE | Noted: 2018-01-01

## 2018-11-21 NOTE — ASSESSMENT & PLAN NOTE
- Noted on recent chest xray.  Initially placed on BS antibxs for treatment.    - Transitioned to PO Moxi (11/13) to complete a 7 day course (11/18).   - Pt with elevated WBC on lab work 11/14 and placed back on BS antibxs at that time.   - WBC then with quick improvement.  - cough and SOB improving  - Repeat CXR today with persistent JUAN opacification, denies SOB but visibly tachypneic and using accessory muscles  - Pulmonology consulted  - Start treatment for HCAP with vanc/zosyn

## 2018-11-21 NOTE — PROGRESS NOTES
[facility]  Liver Transplant  Progress Note    Patient Name: Krystal Hobson  MRN: [id number]  Admission Date: 11/10/2018  Hospital Length of Stay: 11 days  Code Status: Full Code  Primary Care Provider: Courtney Joe MD    Subjective:     History of Present Illness:  Ms. Krystal Hobson is a 67 yr F w/ hx of cryptogenic cirrhosis, listed for liver tx 9/14/18 who presented to ER at outside hospital in [place], [place] with increased abdominal pain and then noted to have AMS on arrival and ENZO on lab work. Per the , she was extremely disoriented and her ammonia reached as high as 200. Her Cr level increased to 2.0 at the OSH. She also went into A fib with RVR and was started on a diltiazem infusion at OSH. She was then transferred to [facility] for further care. On arrival pt was noted with confusion which has now resolved with lactulose. She also was noted with a cough and fatigue.       Interval History: no acute events overnight. More lethargic in early evening yest, responded well to lactulose enema. 2D echo yest with PA pressure 46, HTS consulted for RHC end of the week at the earliest. BNP 1892. Plan to diurese, dose lasix daily based on Cr as just recovering from ENZO, lasix 40mg IV x 2 doses today. UOP decreased yesterday, Cr mildly elevated today. Cough and breathing continue to improve, repeat CXR this AM as required 2L O2 overnight, persistent JUAN opacification. Pulmonology consulted. Start treatment for HCAP with vanc/zosyn. RUE edema improving. Remains on internal hold 2/2 frailty and elevated PA pressure. MELD 30 today. Continue lactulose to titrate for 3-4 stools/day. Repeat labs 4pm.     Scheduled Meds:   furosemide  40 mg Intravenous BID    guaiFENesin  600 mg Oral BID    heparin (porcine)  5,000 Units Subcutaneous Q8H    insulin aspart U-100  4-6 Units Subcutaneous TIDWM    insulin detemir U-100  16 Units Subcutaneous Daily    lactulose  30 g Oral TID    levalbuterol  1.25 mg  Nebulization Q6H WAKE    magnesium oxide  400 mg Oral BID    metoprolol tartrate  25 mg Oral BID    rifAXImin  550 mg Oral BID     Continuous Infusions:  PRN Meds:acetaminophen, benzonatate, dextrose 50%, dextrose 50%, glucagon (human recombinant), glucose, glucose, guaifenesin 100 mg/5 ml, insulin aspart U-100, lactulose, ondansetron, simethicone, sodium chloride 0.9%    Review of Systems   Constitutional: Positive for activity change, appetite change and fatigue. Negative for chills and fever.   HENT: Positive for ear pain. Negative for congestion, ear discharge and sinus pressure.    Respiratory: Positive for cough. Negative for apnea, chest tightness, shortness of breath and wheezing.    Cardiovascular: Negative for chest pain, palpitations and leg swelling.   Gastrointestinal: Negative for abdominal distention, abdominal pain, anal bleeding, blood in stool, diarrhea, nausea and vomiting.   Endocrine: Negative for cold intolerance and heat intolerance.   Genitourinary: Positive for decreased urine volume (incontinent of urine). Negative for dysuria, menstrual problem and urgency.   Musculoskeletal: Negative for arthralgias and back pain.   Skin: Positive for wound. Negative for pallor.   Allergic/Immunologic: Positive for immunocompromised state.   Neurological: Positive for weakness. Negative for dizziness and headaches.   Hematological: Bruises/bleeds easily.   Psychiatric/Behavioral: Negative for agitation, behavioral problems, confusion, decreased concentration and sleep disturbance.     Objective:     Vital Signs (Most Recent):  Temp: 98.1 °F (36.7 °C) (11/20/18 1945)  Pulse: 67 (11/21/18 0843)  Resp: (!) 24 (11/21/18 0843)  BP: (!) 147/65 (11/21/18 0715)  SpO2: (!) 94 % (11/21/18 0843) Vital Signs (24h Range):  Temp:  [97.9 °F (36.6 °C)-98.1 °F (36.7 °C)] 98.1 °F (36.7 °C)  Pulse:  [57-82] 67  Resp:  [18-35] 24  SpO2:  [94 %-98 %] 94 %  BP: (110-149)/(57-82) 147/65     Weight: 54.6 kg (120 lb 5.9  oz)  Body mass index is 25.16 kg/m².    Intake/Output - Last 3 Shifts       11/19 0700 - 11/20 0659 11/20 0700 - 11/21 0659 11/21 0700 - 11/22 0659    P.O. 1140 730 100    Total Intake(mL/kg) 1140 (20.9) 730 (13.4) 100 (1.8)    Urine (mL/kg/hr) 700 (0.5) 301 (0.2) 100 (0.5)    Emesis/NG output 0      Stool 0 2 1    Total Output 700 303 101    Net +440 +427 -1           Urine Occurrence 0 x 4 x     Stool Occurrence 3 x 1 x     Emesis Occurrence 0 x 0 x           Physical Exam   Constitutional: She is oriented to person, place, and time. She appears well-developed.   Chronically ill-appearing. Malnourished. Temporal wasting.   HENT:   Head: Normocephalic and atraumatic.   Mouth/Throat: Oropharynx is clear and moist.   Eyes: Conjunctivae are normal. Scleral icterus is present.   Neck: Normal range of motion.   Cardiovascular: Normal rate, regular rhythm and normal heart sounds.   Pulmonary/Chest: Effort normal. No respiratory distress. She has no decreased breath sounds. She has no wheezes. She has rales in the right lower field and the left lower field.   Abdominal: Soft. Bowel sounds are normal. She exhibits distension. There is no tenderness. No hernia.   Small ascites   Musculoskeletal: She exhibits edema (1+ BLE, +2 RUE).   Lymphadenopathy:     She has no cervical adenopathy.   Neurological: She is alert and oriented to person, place, and time.   Skin: Skin is warm and dry. No rash noted.   Psychiatric: She has a normal mood and affect. Her behavior is normal. Her mood appears not anxious.   Nursing note and vitals reviewed.      Laboratory:  Immunosuppressants     None        CBC:   Recent Labs   Lab 11/21/18  0727   WBC 12.53   RBC 2.59*   HGB 8.3*   HCT 26.7*   *   *   MCH 32.0*   MCHC 31.1*     CMP:   Recent Labs   Lab 11/21/18  0727   *   CALCIUM 9.0   ALBUMIN 3.7   PROT 5.7*      K 3.2*   CO2 20*      BUN 82*   CREATININE 1.6*   ALKPHOS 162*   ALT 33   AST 47*   BILITOT 7.3*      Coagulation:   Recent Labs   Lab 11/21/18  0728   INR 2.7*     Labs within the past 24 hours have been reviewed.    Diagnostic Results:  I have personally reviewed all pertinent imaging studies.    Assessment/Plan:     * Hepatic encephalopathy    - Acute on chronic. PO lactulose ordered, continue rifaximin. Titrate Lactulose to maintain 3-4 BM a day. PRN lactulose enema TID for worsening confusion.  - currently pt is AAOx4, no encephalopathy noted.  Mentation is delayed.       Acute kidney injury superimposed on CKD    - CKD 3-4 at baseline creatinine. Urine electrolytes ordered. Etiology possibly due to hepatorenal syndrome.   - HRS protocol started on admission, albumin and octreotide. No midodrine as with Afib.   - Cr since admission has improved  - Dosing lasix daily based on labs -- Lasix 40mg IV x 2 doses today  - Must weigh daily and cont strict I&Os.      Hypervolemia    - TTE 11/12 with PA pressure > 55  - Repeat echo 11/20 with PA pressure 46  - HTS consulted for RHC at the end of the week at the earliest  - continue to diurese -- lasix 40mg IV x 2 doses  - BNP 11/21 was 1892     Hypokalemia    - continue to monitor closely while diuresing. Replete PRN.   - repeat CMP in afternoon     Ear pain    - ENT consulted 11/19, recommend flonase, no ear infection seen.      Swelling of joint of upper arm, right    - infiltrated midline overnight 11/14-11/15.  Cont to have increased swelling, pain and cold right arm despite elevating and using PRN heat packs.    - US right upper extremity 11/16 negative for DVT.  Pt on ASA.  Monitor.    - edema improving with diuresis     Physical deconditioning    - PT/OT following.  Patient need aggressive therapy.  She will also need to be able to climb steps prior to discharge.  She has at least 7 steps to climb at home.    - PT/OT currently recommending home with home health.      Pneumonia of right lung due to infectious organism    - Noted on recent chest xray.  Initially  placed on BS antibxs for treatment.    - Transitioned to PO Moxi (11/13) to complete a 7 day course (11/18).   - Pt with elevated WBC on lab work 11/14 and placed back on BS antibxs at that time.   - WBC then with quick improvement.  - cough and SOB improving  - Repeat CXR today with persistent JUAN opacification, denies SOB but visibly tachypneic and using accessory muscles  - Pulmonology consulted  - Start treatment for HCAP with vanc/zosyn     Shortness of breath    - Was on 2-3 L O2 11/16.    - Now at 1L PRN and RA majority of the time with spo2 > 95%.  - SOB and cough improving with diuresis  - continue with mucinex and tessalon perle.   - Repeat CXR today with persistent JUAN opacification     Sinus tachycardia    - Worsened with albuterol, improved with BB.   - remains in NSR. Continue tele.     Atrial fibrillation    -  Patient with episode of Afib with RVR at OSH and was placed on diltiazem gtt which was discontinued 11/10 as she reverted to NSR.  Cardiology consulted for management.   -  Pt then returned back into Afib w/ RVR on the afternoon of 11/11 and pt placed back on diltiazem for rate control.   -  Pt now rated controlled and diltiazem d/c'd on 11/12 at 0200. Cont with lopressor at this time as pt remains rate controlled.       CKD (chronic kidney disease) stage 3, GFR 30-59 ml/min    - See ENZO.  Stable.     Chronic liver disease           Type 2 diabetes mellitus with hyperglycemia, with long-term current use of insulin    - Basal and prandial insulin ordered. Endocrine consulted for management. Apprec recs.   - Pt noted with significant hypoglycemic event the evening of 11/14, BG 8. Pt responded well to IV dextrose.   - blood glucose has remained stable and diabetic current regimen.       Anemia of chronic disease    - H/H stable. Continue to monitor with daily cbc.   - no overt signs of bleeding     End stage liver disease    -  Listed for liver transplant with MELD 27. Per hepatologist, remains on  internal hold 2/2 initially for PNA, but now for frailty and elevated PA pressure. Continue to monitor.  -  Plan to discuss pt candidacy once resp status, nutrition, and physical mobility improve. Need repeat RHC for elevated PA pressure.    MELD-Na score: 30 at 11/21/2018  7:28 AM  MELD score: 30 at 11/21/2018  7:28 AM  Calculated from:  Serum Creatinine: 1.6 mg/dL at 11/21/2018  7:27 AM  Serum Sodium: 140 mmol/L (Rounded to 137 mmol/L) at 11/21/2018  7:27 AM  Total Bilirubin: 7.3 mg/dL at 11/21/2018  7:27 AM  INR(ratio): 2.7 at 11/21/2018  7:28 AM  Age: 67 years     Thrombocytopenia due to hypersplenism    - No overt s/s of bleeding.  Continue to monitor.      Protein-calorie malnutrition, moderate    - Dietician completed calorie count.  Patient eating % of trays.  Will d/c calorie count.  Pt drinking 2-3 supplement drinks daily.       Organ transplant candidate    - on internal hold.       Decompensated hepatic cirrhosis    - See hepatic encephalopathy.          VTE Risk Mitigation (From admission, onward)        Ordered     IP VTE HIGH RISK PATIENT  Once      11/10/18 0213     heparin (porcine) injection 5,000 Units  Every 8 hours      11/10/18 0213          The patients clinical status was discussed at multidisplinary rounds, involving transplant surgery, transplant medicine, pharmacy, nursing, nutrition, and social work    Discharge Planning: not stable for discharge at this time.    Sol Buckner, CHAITANYA  Liver Transplant  [facility]

## 2018-11-21 NOTE — CONSULTS
[facility]  Nephrology  Consult Note    Patient Name: Krystal Hobson  MRN: [id number]  Admission Date: 11/10/2018  Hospital Length of Stay: 11 days  Attending Provider: Jabier Fritz MD   Primary Care Physician: Courtney Joe MD  Principal Problem:Hepatic encephalopathy    Consults  Subjective:     HPI: Reason for consult: Diuresis in setting of PAP46, pulmonary edema, HRS    Ms. 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  - She presented to [facility] as xfer from [facility] under liver transplant team due to encephalopathy and new onset ENZO.   - Per the , she was extremely disoriented @ OSH and her ammonia reached as high as 200, and her Cr level increased to 2.0 She also went into A fib with RVR and was started on a diltiazem gtt at OSH.   - On arrival at [facility] she was disoriented and started on lactulose with noted improvement. During her current admission on CXR they found a concern for HCAP and she was started on broad spectrum ABx and then shifted her on Moxifloxacin 7 D course. On 11/20/2018 found on CXR a concern for Lt lung middle lobe consolidation and they started her on vancomycin and zosyn.  - Current renal fxn is baseline Cr 1.6, however BUN has been uptrending from 50s to 85 over several days. No known UGIB.    Past Medical History:   Diagnosis Date    Cancer     breat Cancer    Cirrhosis     Diabetes mellitus     GERD (gastroesophageal reflux disease)     Rheumatoid arthritis        Past Surgical History:   Procedure Laterality Date    CARPAL TUNNEL RELEASE      left wrist    CHOLECYSTECTOMY      MASTECTOMY      left breast 25  years ago       Review of patient's allergies indicates:  No Known Allergies  Current Facility-Administered Medications   Medication Frequency    acetaminophen tablet 650 mg Q8H PRN    benzonatate capsule 100 mg TID PRN    dextrose 50% injection 12.5 g PRN    dextrose 50% injection 25 g PRN     glucagon (human recombinant) injection 1 mg PRN    glucose chewable tablet 16 g PRN    glucose chewable tablet 24 g PRN    guaifenesin 100 mg/5 ml syrup 200 mg Q4H PRN    guaiFENesin 12 hr tablet 600 mg BID    heparin (porcine) injection 5,000 Units Q8H    insulin aspart U-100 pen 0-5 Units QID (AC + HS) PRN    insulin aspart U-100 pen 4-6 Units TIDWM    insulin detemir U-100 pen 16 Units Daily    lactulose 10 gram/15 mL solution (enema) 200 g TID PRN    lactulose 20 gram/30 mL solution Soln 30 g TID    lactulose 20 gram/30 mL solution Soln 30 g Q6H PRN    levalbuterol nebulizer solution 1.25 mg Q6H WAKE    magnesium oxide tablet 400 mg BID    metoprolol tartrate (LOPRESSOR) tablet 25 mg BID    ondansetron disintegrating tablet 8 mg Q8H PRN    piperacillin-tazobactam 4.5 g in sodium chloride 0.9% 100 mL IVPB (ready to mix system) Q8H    rifAXIMin tablet 550 mg BID    simethicone chewable tablet 80 mg TID PRN    sodium chloride 0.9% flush 3 mL PRN    vancomycin 750 mg in dextrose 5 % 250 mL IVPB (ready to mix system) Q24H     Family History     Problem Relation (Age of Onset)    COPD Sister    Heart disease Mother    Liver disease Father, Sister        Tobacco Use    Smoking status: Never Smoker    Tobacco comment: patient denies   Substance and Sexual Activity    Alcohol use: No     Comment: stopped 10/17    Drug use: No     Comment: patient denies    Sexual activity: Not on file     Review of Systems   Unable to perform ROS: Mental status change   Constitutional: Positive for activity change and fatigue.   Respiratory: Positive for cough and shortness of breath.    Cardiovascular: Positive for leg swelling.     Objective:     Vital Signs (Most Recent):  Temp: 97.6 °F (36.4 °C) (11/21/18 1616)  Pulse: 63 (11/21/18 1600)  Resp: (!) 25 (11/21/18 1600)  BP: 126/67 (11/21/18 1600)  SpO2: 99 % (11/21/18 1600)  O2 Device (Oxygen Therapy): nasal cannula (11/21/18 1302) Vital Signs (24h  Range):  Temp:  [97.6 °F (36.4 °C)-98.1 °F (36.7 °C)] 97.6 °F (36.4 °C)  Pulse:  [57-81] 63  Resp:  [18-25] 25  SpO2:  [94 %-99 %] 99 %  BP: (110-159)/(55-67) 126/67     Weight: 54.6 kg (120 lb 5.9 oz) (11/21/18 0400)  Body mass index is 25.16 kg/m².  Body surface area is 1.49 meters squared.    I/O last 3 completed shifts:  In: 1150 [P.O.:1150]  Out: 803 [Urine:801; Stool:2]    Physical Exam   Constitutional: She appears distressed.   HENT:   Head: Normocephalic and atraumatic.   Eyes: Conjunctivae are normal. Scleral icterus is present.   Neck: Neck supple. No JVD present. No tracheal deviation present.   Cardiovascular: Normal rate and normal heart sounds. Exam reveals no gallop and no friction rub.   Pulmonary/Chest: No stridor. She is in respiratory distress. She has no wheezes. She has rales. She exhibits no tenderness.   Abdominal: Soft. Bowel sounds are normal. She exhibits no mass. There is no tenderness. There is no rebound and no guarding.   Musculoskeletal: She exhibits edema (mild diffuse anasarca of limbs). She exhibits no tenderness.   Neurological:   Encephalopathic, briefly rousable to loud voice or physical stimulus, one word responses yes and no to basic questions.    Skin: Skin is warm and dry. She is not diaphoretic. No erythema.       Significant Labs:  CBC:   Recent Labs   Lab 11/21/18  1348   WBC 15.78*   RBC 2.57*   HGB 8.1*   HCT 25.1*   *   MCV 98   MCH 31.5*   MCHC 32.3     CMP:   Recent Labs   Lab 11/21/18  1348   *   CALCIUM 9.4   ALBUMIN 4.0   PROT 6.3      K 3.5   CO2 21*      BUN 85*   CREATININE 1.9*   ALKPHOS 182*   ALT 34   AST 49*   BILITOT 8.1*     No results for input(s): COLORU, CLARITYU, SPECGRAV, PHUR, PROTEINUA, GLUCOSEU, BILIRUBINCON, BLOODU, WBCU, RBCU, BACTERIA, MUCUS, NITRITE, LEUKOCYTESUR, UROBILINOGEN, HYALINECASTS in the last 168 hours.  All labs within the past 24 hours have been reviewed.    Significant Imaging:  Labs:  Reviewed    Assessment/Plan:     Acute kidney injury superimposed on CKD    Cr baseline 1.6, Peaked 3.0 (10/30) reduced to baseline 1.6 on 11/2 and been hovering around baseline since with recent jump to 1.9 this afternoon. Despite stable Cr until now, BUN has been gradually increasing 50s to 85 over last several days. Consult for volume management in setting of PAP46 with pulmonary edema, concern for HRS. However today WCC jump 10 to 16, Lactate of 3.3 with JUAN consolidation on cxr. C/f sepsis at this time. RHC planned for Friday    A/P  - Recommend holding diuretics until sepsis r/o  - Recommend repeat lactate, add procal  - Continue Vanc/Zosyn  - Repeat UA/UCx ordered  - Urine studies ordered  - Check CVP  - Will continue to follow         Thank you for your consult. I will follow-up with patient. Please contact us if you have any additional questions.    Kelvin Liriano MD  Nephrology  [facility]

## 2018-11-21 NOTE — HPI
Krystal Hobson is a 68 yo F with cryptogenic cirrhosis (listed for liver transplant 18) who presented to [facility] on  with acute encephalopathy, hypervolemia, and acute renal failure 2/2 decompensated liver cirrhosis. Patient is being actively diuresed. Her transplant status remains on hold due to frailty and elevated PA pressures observed on 2D echocardiograms (see below). When compared to a DSE in September which showed an estimated PAP of 28 (also no evidence of stress induced ischemia) it has risen to 46-55 mm Hg. As a result, Interventional Cardiology is consulted for RHC on Friday, .    CARDIAC IMAGIND echocardiogram 2018: PER REPORT:  · Normal left ventricular systolic function. The estimated ejection fraction is 68%  · No wall motion abnormalities.  · Indeterminate left ventricular diastolic function.  · Normal right ventricular systolic function.  · Mild tricuspid regurgitation.  · Normal central venous pressure (3 mm Hg).  · The estimated PA systolic pressure is 46.30 mm Hg  · Pulmonary hypertension present.  · No pericardial effusion.    2D echocardiogram 2018: PER REPORT:  · Left ventricle ejection fraction is normal at 65%  · Normal RV systolic function  · Grade III (severe) left ventricular diastolic dysfunction consistent with restrictive physiology.  · Mild biatrial enlargement  · Moderate mitral regurgitation.  · Mild tricuspid regurgitation.  · Pulmonary hypertension present - RV systolic pressure is > 55mm Hg (IVC not visualized)      DSE 2018: PER REPORT:    1 - Normal left ventricular systolic function (EF 60-65%).     2 - No wall motion abnormalities.     3 - Indeterminate LV diastolic function.     4 - Normal right ventricular systolic function .     5 - Moderate left atrial enlargement.     6 - Trivial tricuspid regurgitation.     7 - Trivial to mild pulmonic regurgitation.     8 - The estimated PA systolic pressure is 29 mmHg.     No evidence of stress induced  myocardial ischemia.

## 2018-11-21 NOTE — CONSULTS
[facility]  Pulmonology  Consult Note    Patient Name: Krystal Hobson  MRN: [id number]  Admission Date: 11/10/2018  Hospital Length of Stay: 11 days  Code Status: Full Code  Attending Physician: Jabier Fritz MD  Primary Care Provider: Courtney Joe MD   Principal Problem: Hepatic encephalopathy    Inpatient consult to Pulmonology  Consult performed by: Yuval Glover MD  Consult ordered by: Sol Buckner DNP  Reason for consult: SOB, and possibility of pneumonia         Subjective:     HPI:  Ms. 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  - She presented to [facility] as a transfer from an outside hospital @ [place], [place] under liver transplant team due to encephalopathy and new onset ENZO.   - Per the , she was extremely disoriented @ OSH and her ammonia reached as high as 200, and her Cr level increased to 2.0 She also went into A fib with RVR and was started on a diltiazem infusion at OSH.   - On arrival day she was disoriented and they started her on lactulose which made her bit better. During her current admission on CXR they found a concern for HCAP and she was started on broad spectrum ABx and then shifted her on Moxifloxacin 7 D course. On 11/20/2018 found on CXR a concern for Lt lung middle lobe consolidation and they started her on vancomycin and zosyn.       Past Medical History:   Diagnosis Date    Cancer     breat Cancer    Cirrhosis     Diabetes mellitus     GERD (gastroesophageal reflux disease)     Rheumatoid arthritis        Past Surgical History:   Procedure Laterality Date    CARPAL TUNNEL RELEASE      left wrist    CHOLECYSTECTOMY      MASTECTOMY      left breast 25  years ago       Review of patient's allergies indicates:  No Known Allergies    Family History     Problem Relation (Age of Onset)    COPD Sister    Heart disease Mother    Liver disease Father, Sister        Tobacco Use    Smoking status: Never  Smoker    Tobacco comment: patient denies   Substance and Sexual Activity    Alcohol use: No     Comment: stopped 10/17    Drug use: No     Comment: patient denies    Sexual activity: Not on file         Review of Systems   Unable to perform ROS: Mental status change     Objective:     Vital Signs (Most Recent):  Temp: 97.7 °F (36.5 °C) (11/21/18 1156)  Pulse: 65 (11/21/18 1302)  Resp: (!) 23 (11/21/18 1302)  BP: (!) 159/55 (11/21/18 1130)  SpO2: 97 % (11/21/18 1302) Vital Signs (24h Range):  Temp:  [97.7 °F (36.5 °C)-98.1 °F (36.7 °C)] 97.7 °F (36.5 °C)  Pulse:  [57-81] 65  Resp:  [18-35] 23  SpO2:  [94 %-98 %] 97 %  BP: (110-159)/(55-65) 159/55     Weight: 54.6 kg (120 lb 5.9 oz)  Body mass index is 25.16 kg/m².      Intake/Output Summary (Last 24 hours) at 11/21/2018 1457  Last data filed at 11/21/2018 1400  Gross per 24 hour   Intake 370 ml   Output 204 ml   Net 166 ml       Physical Exam   Constitutional: She appears well-developed and well-nourished. No distress.   Pt looks really ill, mooning and has a shallow breathing pattern    Eyes: Scleral icterus is present.   Neck: Normal range of motion.   Cardiovascular: Normal rate and regular rhythm.   No murmur heard.  Pulmonary/Chest: No stridor. No respiratory distress. She has no wheezes. She has rales. She exhibits no tenderness.   Abdominal: Soft. Bowel sounds are normal. She exhibits no distension. There is no tenderness.   Musculoskeletal: Normal range of motion. She exhibits edema. She exhibits no deformity.   Neurological:   Pt has waxing a waning LOC   Skin: She is not diaphoretic.       Vents:  Oxygen Concentration (%): 3 (11/15/18 2221)    Lines/Drains/Airways     Peripheral Intravenous Line                 Peripheral IV - Single Lumen 11/20/18 1615 Anterior;Right Hand less than 1 day         Peripheral IV - Single Lumen 11/20/18 1619 Anterior;Distal;Left Forearm less than 1 day                Significant Labs:    CBC/Anemia Profile:  Recent Labs    Lab 11/20/18  0649 11/21/18  0727 11/21/18  1348   WBC 10.43 12.53 15.78*   HGB 7.7* 8.3* 8.1*   HCT 23.1* 26.7* 25.1*   PLT 97* 116* 132*   MCV 95 103* 98   RDW 19.5* 21.5* 21.2*        Chemistries:  Recent Labs   Lab 11/20/18  0649 11/20/18  1819 11/21/18  0727    136 140   K 3.6 4.0 3.2*    105 107   CO2 21* 19* 20*   BUN 77* 81* 82*   CREATININE 1.4 1.7* 1.6*   CALCIUM 8.8 9.1 9.0   ALBUMIN 3.5 3.8 3.7   PROT 5.3*  --  5.7*   BILITOT 7.9*  --  7.3*   ALKPHOS 166*  --  162*   ALT 32  --  33   AST 56*  --  47*   MG 2.2  --  2.4   PHOS  --  3.4  --        Bilirubin:   Recent Labs   Lab 11/17/18  0537 11/18/18  0544 11/19/18  0711 11/20/18  0649 11/21/18  0727   BILITOT 4.4* 6.0* 7.2* 7.9* 7.3*     BMP:   Recent Labs   Lab 11/21/18  0727   *      K 3.2*      CO2 20*   BUN 82*   CREATININE 1.6*   CALCIUM 9.0   MG 2.4     CMP:   Recent Labs   Lab 11/20/18  0649 11/20/18  1819 11/21/18  0727    136 140   K 3.6 4.0 3.2*    105 107   CO2 21* 19* 20*   * 272* 177*   BUN 77* 81* 82*   CREATININE 1.4 1.7* 1.6*   CALCIUM 8.8 9.1 9.0   PROT 5.3*  --  5.7*   ALBUMIN 3.5 3.8 3.7   BILITOT 7.9*  --  7.3*   ALKPHOS 166*  --  162*   AST 56*  --  47*   ALT 32  --  33   ANIONGAP 11 12 13   EGFRNONAA 38.9* 30.8* 33.1*     Coagulation:   Recent Labs   Lab 11/21/18  1348   INR 1.9*     Assessment/Plan:     Pneumonia involving left lung    68 y/o lady presented as a [place] [facility] with liver transplant team in regards to her liver cirrhosis. She presented due to encephalopathy and ENZO on top of her CKD. During her admission there was a concern from her primary team in regards to a possible diagnosis of HCAP in which she was started on broad spectrum ABx, then shifted to Moxifloxacin X 7 D course. Recent CXR on 11/20/2018 showed a possible non-resolution of the Lt side consolidation in which she is re-started on Zosyn and vancomycin.   - She is  hemodynamically stable, not hypoxic, O2 sat on room air is > 92%  - Per nurses and  that she has some dry cough, not productive and no hemoptysis  - She is currently on lasix 40 mg IV BID with no much urine output.  - Her CXR previous findings were mostly consistent with pulmonary edema. Her new Lt opacification on recent CXR on 11/21/2018 might be due to a new onset of aspiration pneumonia VS. Non-resolved pulmonary edema. Aspiration could be a 2nd possibility due to Pt altered mental status, and having esophogeal strictures/varices (1ry cause is fluid overload/pulmonary edema)  - Pt is non on immunosuppressants which excludes opportunistic infections.    Plan:  - Strict monitoring for intake/output  - Continue diuresing the pt as tolerated   - Continue ABx until Cultures results comes back, and then de-escalate.  - Speech therapy recs for swallow study to avoid having aspiration pneumonias.       Shortness of breath    Check pneumonia            Thank you for your consult. I will follow-up with patient. Please contact us if you have any additional questions.     Yuval Glover MD  Pulmonology  [facility]

## 2018-11-21 NOTE — PLAN OF CARE
PRN lactulose enema given given for worsening mental status-pt now more alert. VSS, afebrile, SpO2>95% on 2L NC. Telemetry monitoring-SR. BG monitoring AC/HS/0200. PRN tessalon perle given 1x this shift. POC reviewed with pt and spouse. Fall precautions maintained and pt repositions self. See flowsheet for assessment findings. Will continue to monitor.

## 2018-11-21 NOTE — ASSESSMENT & PLAN NOTE
Caution with insulin stacking  Estimated Creatinine Clearance: 29.4 mL/min (A) (based on SCr of 1.6 mg/dL (H)).

## 2018-11-21 NOTE — SUBJECTIVE & OBJECTIVE
Interval History:  Diuresed well with lasix IV 40 mg and 60 mg pushes. Cr did go up to 1.7. But now is back down to 1.6. BNP is better.     Continuous Infusions:  Scheduled Meds:   guaiFENesin  600 mg Oral BID    heparin (porcine)  5,000 Units Subcutaneous Q8H    insulin aspart U-100  4-6 Units Subcutaneous TIDWM    insulin detemir U-100  16 Units Subcutaneous Daily    lactulose  30 g Oral TID    levalbuterol  1.25 mg Nebulization Q6H WAKE    magnesium oxide  400 mg Oral BID    metoprolol tartrate  25 mg Oral BID    potassium chloride  40 mEq Oral Once    rifAXImin  550 mg Oral BID     PRN Meds:acetaminophen, benzonatate, dextrose 50%, dextrose 50%, glucagon (human recombinant), glucose, glucose, guaifenesin 100 mg/5 ml, insulin aspart U-100, lactulose, ondansetron, simethicone, sodium chloride 0.9%    Review of patient's allergies indicates:  No Known Allergies  Objective:     Vital Signs (Most Recent):  Temp: 98.1 °F (36.7 °C) (11/20/18 1945)  Pulse: 67 (11/21/18 0843)  Resp: (!) 24 (11/21/18 0843)  BP: (!) 147/65 (11/21/18 0715)  SpO2: (!) 94 % (11/21/18 0843) Vital Signs (24h Range):  Temp:  [97.9 °F (36.6 °C)-98.1 °F (36.7 °C)] 98.1 °F (36.7 °C)  Pulse:  [57-82] 67  Resp:  [18-35] 24  SpO2:  [94 %-98 %] 94 %  BP: (110-149)/(57-82) 147/65     Patient Vitals for the past 72 hrs (Last 3 readings):   Weight   11/21/18 0400 54.6 kg (120 lb 5.9 oz)   11/20/18 0736 54.4 kg (120 lb)   11/20/18 0400 54.6 kg (120 lb 5.9 oz)     Body mass index is 25.16 kg/m².      Intake/Output Summary (Last 24 hours) at 11/21/2018 1017  Last data filed at 11/21/2018 0800  Gross per 24 hour   Intake 830 ml   Output 404 ml   Net 426 ml       Hemodynamic Parameters:       EKG NSR currently.    Physical Exam   Constitutional: She is oriented to person, place, and time. She appears well-developed. She appears distressed.   HENT:   Head: Normocephalic and atraumatic.   Eyes: EOM are normal. Pupils are equal, round, and reactive to  light.   Neck: Normal range of motion. Neck supple. JVD (CVP 10) present.   Cardiovascular: Normal rate and regular rhythm.   Pulmonary/Chest: Effort normal. She has no rales.   Bronchial breath sounds noted on LLL   Abdominal: Soft. Bowel sounds are normal.   Musculoskeletal: Normal range of motion. She exhibits no edema.   Neurological: She is alert and oriented to person, place, and time.   Skin: Skin is warm. She is diaphoretic.   Vitals reviewed.      Significant Labs:  CBC:  Recent Labs   Lab 11/19/18  1040 11/20/18  0649 11/21/18  0727   WBC 11.01 10.43 12.53   RBC 2.58* 2.43* 2.59*   HGB 8.0* 7.7* 8.3*   HCT 25.2* 23.1* 26.7*   PLT 90* 97* 116*   MCV 98 95 103*   MCH 31.0 31.7* 32.0*   MCHC 31.7* 33.3 31.1*     BNP:  Recent Labs   Lab 11/21/18  0727   BNP 1,892*     CMP:  Recent Labs   Lab 11/19/18  0711 11/20/18  0649 11/20/18  1819 11/21/18  0727   * 123* 272* 177*   CALCIUM 9.0 8.8 9.1 9.0   ALBUMIN 3.9 3.5 3.8 3.7   PROT 5.6* 5.3*  --  5.7*   * 137 136 140   K 3.5 3.6 4.0 3.2*   CO2 21* 21* 19* 20*    105 105 107   BUN 72* 77* 81* 82*   CREATININE 1.5* 1.4 1.7* 1.6*   ALKPHOS 157* 166*  --  162*   ALT 31 32  --  33   AST 57* 56*  --  47*   BILITOT 7.2* 7.9*  --  7.3*      Coagulation:   Recent Labs   Lab 11/19/18  0711 11/20/18  0649 11/21/18  0728   INR 1.9* 1.9* 2.7*     LDH:  No results for input(s): LDH in the last 72 hours.  Microbiology:  Microbiology Results (last 7 days)     Procedure Component Value Units Date/Time    Urine culture [[id number]]     Order Status:  No result Specimen:  Urine     Blood culture [[id number]] Collected:  11/14/18 2141    Order Status:  Completed Specimen:  Blood Updated:  11/19/18 2312     Blood Culture, Routine No growth after 5 days.    Blood culture [[id number]] Collected:  11/14/18 2141    Order Status:  Completed Specimen:  Blood Updated:  11/19/18 2312     Blood Culture, Routine No growth after 5 days.    Blood culture - site #1 [[id number]]  Collected:  11/10/18 0719    Order Status:  Completed Specimen:  Blood Updated:  11/15/18 1012     Blood Culture, Routine No growth after 5 days.    Blood culture - site #2 [[id number]] Collected:  11/10/18 0718    Order Status:  Completed Specimen:  Blood Updated:  11/15/18 1012     Blood Culture, Routine No growth after 5 days.          I have reviewed all pertinent labs within the past 24 hours.    Estimated Creatinine Clearance: 29.4 mL/min (A) (based on SCr of 1.6 mg/dL (H)).    Diagnostic Results:  I have reviewed all pertinent imaging results/findings within the past 24 hours.

## 2018-11-21 NOTE — ASSESSMENT & PLAN NOTE
- Was on 2-3 L O2 11/16.    - Now at 1L PRN and RA majority of the time with spo2 > 95%.  - SOB and cough improving with diuresis  - continue with mucinex and tessalon perle.   - Repeat CXR today with persistent JUAN opacification

## 2018-11-21 NOTE — SUBJECTIVE & OBJECTIVE
Past Medical History:   Diagnosis Date    Cancer     breat Cancer    Cirrhosis     Diabetes mellitus     GERD (gastroesophageal reflux disease)     Rheumatoid arthritis        Past Surgical History:   Procedure Laterality Date    CARPAL TUNNEL RELEASE      left wrist    CHOLECYSTECTOMY      MASTECTOMY      left breast 25  years ago       Review of patient's allergies indicates:  No Known Allergies    PTA Medications   Medication Sig    ALBUTEROL INHL Inhale 1 puff into the lungs 4 (four) times daily as needed.    ergocalciferol (ERGOCALCIFEROL) 50,000 unit Cap Take 50,000 Units by mouth every 7 days.    fluticasone/vilanterol (BREO ELLIPTA INHL) Inhale into the lungs.    hydrOXYzine HCl (ATARAX) 25 MG tablet Take 25 mg by mouth 3 (three) times daily as needed for Itching.    insulin detemir U-100 (LEVEMIR) 100 unit/mL injection Inject 12 Units into the skin 2 (two) times daily.     insulin lispro (HUMALOG) 100 unit/mL injection Sliding scale     lactulose (CHRONULAC) 10 gram/15 mL solution Take 23 mLs (15.3333 g total) by mouth 3 (three) times daily. Titrate to 3 BM daily    omeprazole (PRILOSEC) 40 MG capsule Take 40 mg by mouth every morning.    ondansetron (ZOFRAN) 4 MG tablet Take 4 mg by mouth every 8 (eight) hours as needed for Nausea.    ONETOUCH ULTRA BLUE TEST STRIP Strp     propranolol (INDERAL) 10 MG tablet Take 5 mg by mouth 2 (two) times daily.     rifAXIMin (XIFAXAN) 550 mg Tab Take 550 mg by mouth 2 (two) times daily.    SHINGRIX, PF, 50 mcg/0.5 mL injection     sodium bicarbonate 650 MG tablet Take 2 tablets (1,300 mg total) by mouth 3 (three) times daily.    zinc gluconate 50 mg tablet Take 50 mg by mouth once daily.      Family History     Problem Relation (Age of Onset)    COPD Sister    Heart disease Mother    Liver disease Father, Sister        Tobacco Use    Smoking status: Never Smoker    Tobacco comment: patient denies   Substance and Sexual Activity    Alcohol use:  No     Comment: stopped 10/17    Drug use: No     Comment: patient denies    Sexual activity: Not on file     Review of Systems   Unable to perform ROS: mental status change     Objective:     Vital Signs (Most Recent):  Temp: 97.7 °F (36.5 °C) (11/21/18 1156)  Pulse: 65 (11/21/18 1302)  Resp: (!) 23 (11/21/18 1302)  BP: (!) 159/55 (11/21/18 1130)  SpO2: 97 % (11/21/18 1302) Vital Signs (24h Range):  Temp:  [97.7 °F (36.5 °C)-98.1 °F (36.7 °C)] 97.7 °F (36.5 °C)  Pulse:  [57-81] 65  Resp:  [18-35] 23  SpO2:  [94 %-98 %] 97 %  BP: (110-159)/(55-65) 159/55     Weight: 54.6 kg (120 lb 5.9 oz)  Body mass index is 25.16 kg/m².    SpO2: 97 %  O2 Device (Oxygen Therapy): nasal cannula      Intake/Output Summary (Last 24 hours) at 11/21/2018 1443  Last data filed at 11/21/2018 1400  Gross per 24 hour   Intake 370 ml   Output 204 ml   Net 166 ml       Lines/Drains/Airways     Peripheral Intravenous Line                 Peripheral IV - Single Lumen 11/20/18 1615 Anterior;Right Hand less than 1 day         Peripheral IV - Single Lumen 11/20/18 1619 Anterior;Distal;Left Forearm less than 1 day                Physical Exam   Constitutional: She appears cachectic. She appears ill.   HENT:   Head: Normocephalic and atraumatic.   Mouth/Throat: Oropharynx is clear and moist.   Eyes: EOM are normal. Pupils are equal, round, and reactive to light. Scleral icterus is present.   Neck: Normal range of motion. Neck supple. JVD present.   Cardiovascular: Normal rate and regular rhythm. Exam reveals no gallop and no friction rub.   No murmur heard.  Pulmonary/Chest: Effort normal and breath sounds normal. No respiratory distress. She has no wheezes. She has no rales. She exhibits no tenderness.   Abdominal: Soft. Bowel sounds are normal.   Musculoskeletal: Normal range of motion. She exhibits edema.   Neurological: She is alert. She is disoriented.   Skin: Skin is warm and dry. No erythema.       Significant Labs: All pertinent lab  results from the last 24 hours have been reviewed.    Significant Imaging: Reviewed in EPIC

## 2018-11-21 NOTE — HPI
Reason for consult: Diuresis in setting of PAP46, pulmonary edema, HRS    Ms. 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  - She presented to [facility] as xfer from [place] under liver transplant team due to encephalopathy and new onset ENZO.   - Per the , she was extremely disoriented @ OSH and her ammonia reached as high as 200, and her Cr level increased to 2.0 She also went into A fib with RVR and was started on a diltiazem gtt at OSH.   - On arrival at [facility] she was disoriented and started on lactulose with noted improvement. During her current admission on CXR they found a concern for HCAP and she was started on broad spectrum ABx and then shifted her on Moxifloxacin 7 D course. On 11/20/2018 found on CXR a concern for Lt lung middle lobe consolidation and started on vancomycin and zosyn.  - Renal fxn baseline 1.6 at admit but trending up

## 2018-11-21 NOTE — HOSPITAL COURSE
Since admission pt LOC has been waxing and weaning. CXR on admission day it was much clear than the latest ones. By time her chest x-rays are having signs consistent with pulmonary edema, she had been on lasix with different dosages and frequency. Currently, she is off and she is on CRRT.   - Her latest CXR on 11/21/2018 showed Lt sided lung consolidation and she is re-started on zosyn/vancomycin after completing a 7 D course of Moxifloxacin.   - Latest Echo showed = EF 68%, diastolic LV dysfunction, Pulmonary artery pressure of 46.3    - She is intubated due to inability to maintain airway, and multiple failed SBT's  - Her Latest CXR shows more signs of fluffiness which is mostly consistent with pulmonary edema, but she is covered with broad spectrum ABx.    Today: Pt is vitally stable, she is alert, and following commands. She failed am SBT. U/S chest was done showing B-lines and incompressible IVC

## 2018-11-21 NOTE — PT/OT/SLP PROGRESS
Physical Therapy      Patient Name:  Krystal Hobson   MRN:  [id number]    Patient not seen today secondary to (Pt toileting at AM attempt. Returned to attempt in PM, but RN requested hold 2* pt encephalopathic). Will follow-up at next scheduled session as able.    Citlali Mallory, PT, DPT   11/21/2018  [phone]

## 2018-11-21 NOTE — ASSESSMENT & PLAN NOTE
Ms. Krystal Hobson is a 66 yo female with cryptogenic cirrhosis listed for liver tx 09/14/18, presented as a transfer from [facility] after she was noted to be encephalopathic with an ENZO. Encephalopathy has since resolved, but noted that the patient is reported 15 pounds over her dry weight. In addition patient is hypervolemic on examination with JVD. SOB may be a combination of resolving PNA or fluid.     Recommendations:  - Would recommend 40 mg lasix IV BID for today.   - Strict intake and output. Understand that it may be hard due to the patient's incontinence   - Check electrolytes and replace BID while diuresis  - Will plan for RHC once euvolemic at earliest Friday with interventional cardiology. Will place consult on Thursday for RHC Friday.     Discussed with Bebeto Coughlin MD, PGY-5  Cardiology HTS

## 2018-11-21 NOTE — PROGRESS NOTES
[facility]  Heart Transplant  Progress Note    Patient Name: Krystal Hobson  MRN: [id number]  Admission Date: 11/10/2018  Hospital Length of Stay: 11 days  Attending Physician: Jabier Fritz MD  Primary Care Provider: Courtney Joe MD  Principal Problem:Hepatic encephalopathy    Subjective:     Interval History:  Diuresed well with lasix IV 40 mg and 60 mg pushes. Cr did go up to 1.7. But now is back down to 1.6. BNP is better.     Continuous Infusions:  Scheduled Meds:   guaiFENesin  600 mg Oral BID    heparin (porcine)  5,000 Units Subcutaneous Q8H    insulin aspart U-100  4-6 Units Subcutaneous TIDWM    insulin detemir U-100  16 Units Subcutaneous Daily    lactulose  30 g Oral TID    levalbuterol  1.25 mg Nebulization Q6H WAKE    magnesium oxide  400 mg Oral BID    metoprolol tartrate  25 mg Oral BID    potassium chloride  40 mEq Oral Once    rifAXImin  550 mg Oral BID     PRN Meds:acetaminophen, benzonatate, dextrose 50%, dextrose 50%, glucagon (human recombinant), glucose, glucose, guaifenesin 100 mg/5 ml, insulin aspart U-100, lactulose, ondansetron, simethicone, sodium chloride 0.9%    Review of patient's allergies indicates:  No Known Allergies  Objective:     Vital Signs (Most Recent):  Temp: 98.1 °F (36.7 °C) (11/20/18 1945)  Pulse: 67 (11/21/18 0843)  Resp: (!) 24 (11/21/18 0843)  BP: (!) 147/65 (11/21/18 0715)  SpO2: (!) 94 % (11/21/18 0843) Vital Signs (24h Range):  Temp:  [97.9 °F (36.6 °C)-98.1 °F (36.7 °C)] 98.1 °F (36.7 °C)  Pulse:  [57-82] 67  Resp:  [18-35] 24  SpO2:  [94 %-98 %] 94 %  BP: (110-149)/(57-82) 147/65     Patient Vitals for the past 72 hrs (Last 3 readings):   Weight   11/21/18 0400 54.6 kg (120 lb 5.9 oz)   11/20/18 0736 54.4 kg (120 lb)   11/20/18 0400 54.6 kg (120 lb 5.9 oz)     Body mass index is 25.16 kg/m².      Intake/Output Summary (Last 24 hours) at 11/21/2018 1017  Last data filed at 11/21/2018 0800  Gross per 24 hour   Intake 830 ml   Output 404  ml   Net 426 ml       Hemodynamic Parameters:       EKG NSR currently.    Physical Exam   Constitutional: She is oriented to person, place, and time. She appears well-developed. She appears distressed.   HENT:   Head: Normocephalic and atraumatic.   Eyes: EOM are normal. Pupils are equal, round, and reactive to light.   Neck: Normal range of motion. Neck supple. JVD (CVP 10) present.   Cardiovascular: Normal rate and regular rhythm.   Pulmonary/Chest: Effort normal. She has no rales.   Bronchial breath sounds noted on LLL   Abdominal: Soft. Bowel sounds are normal.   Musculoskeletal: Normal range of motion. She exhibits no edema.   Neurological: She is alert and oriented to person, place, and time.   Skin: Skin is warm. She is diaphoretic.   Vitals reviewed.      Significant Labs:  CBC:  Recent Labs   Lab 11/19/18  1040 11/20/18  0649 11/21/18  0727   WBC 11.01 10.43 12.53   RBC 2.58* 2.43* 2.59*   HGB 8.0* 7.7* 8.3*   HCT 25.2* 23.1* 26.7*   PLT 90* 97* 116*   MCV 98 95 103*   MCH 31.0 31.7* 32.0*   MCHC 31.7* 33.3 31.1*     BNP:  Recent Labs   Lab 11/21/18  0727   BNP 1,892*     CMP:  Recent Labs   Lab 11/19/18  0711 11/20/18  0649 11/20/18  1819 11/21/18  0727   * 123* 272* 177*   CALCIUM 9.0 8.8 9.1 9.0   ALBUMIN 3.9 3.5 3.8 3.7   PROT 5.6* 5.3*  --  5.7*   * 137 136 140   K 3.5 3.6 4.0 3.2*   CO2 21* 21* 19* 20*    105 105 107   BUN 72* 77* 81* 82*   CREATININE 1.5* 1.4 1.7* 1.6*   ALKPHOS 157* 166*  --  162*   ALT 31 32  --  33   AST 57* 56*  --  47*   BILITOT 7.2* 7.9*  --  7.3*      Coagulation:   Recent Labs   Lab 11/19/18  0711 11/20/18  0649 11/21/18  0728   INR 1.9* 1.9* 2.7*     LDH:  No results for input(s): LDH in the last 72 hours.  Microbiology:  Microbiology Results (last 7 days)     Procedure Component Value Units Date/Time    Urine culture [[id number]]     Order Status:  No result Specimen:  Urine     Blood culture [[id number]] Collected:  11/14/18 2141    Order Status:   Completed Specimen:  Blood Updated:  11/19/18 2312     Blood Culture, Routine No growth after 5 days.    Blood culture [[id number]] Collected:  11/14/18 2141    Order Status:  Completed Specimen:  Blood Updated:  11/19/18 2312     Blood Culture, Routine No growth after 5 days.    Blood culture - site #1 [[id number]] Collected:  11/10/18 0719    Order Status:  Completed Specimen:  Blood Updated:  11/15/18 1012     Blood Culture, Routine No growth after 5 days.    Blood culture - site #2 [[id number]] Collected:  11/10/18 0718    Order Status:  Completed Specimen:  Blood Updated:  11/15/18 1012     Blood Culture, Routine No growth after 5 days.          I have reviewed all pertinent labs within the past 24 hours.    Estimated Creatinine Clearance: 29.4 mL/min (A) (based on SCr of 1.6 mg/dL (H)).    Diagnostic Results:  I have reviewed all pertinent imaging results/findings within the past 24 hours.    Assessment and Plan:     CC: elevated PAP in a liver transplant eval    Ms. Krystal Hobson is a 66 yo female with cryptogenic cirrhosis listed for liver tx 09/14/18, presented as a transfer from [facility] after she was noted to be encephalopathic with an ENZO. Transferred here for further care. Over the course of her stay here she has had afib with RVR that has been paroxsymal on this admission. Tried to rate control her which has been successful recently with lopressor. AC was deferred due to her thrombocytopenia and liver dysfunction at that time for a CHADS_VASC of 3. In addition the patient has been intermittently diuresed with IV pushes of lasix 40 mg at first and 60 mg thereafter on her stay here. She has also completed a course of ABX for possible PNA and is currently on internal hold due to this and her deconditioning.     On admit here it was noted that her PAP pressures have been elevated on 2D echo. When compared to a DSE in September which showed an estimated PAP of 28 (also no evidence of stress induced  ischemia) it has risen to 46-55 mm Hg. Note that has been documented that the patient's In's and out's are heard to accurately measure due to the fact that she is incontinent. RV function was noted that the patient had normal RV and LV function with normal sizes.           Hypervolemia    Ms. Krystal Hobson is a 68 yo female with cryptogenic cirrhosis listed for liver tx 09/14/18, presented as a transfer from [facility] after she was noted to be encephalopathic with an ENZO. Encephalopathy has since resolved, but noted that the patient is reported 15 pounds over her dry weight. In addition patient is hypervolemic on examination with JVD. SOB may be a combination of resolving PNA or fluid.     Recommendations:  - Would recommend 40 mg lasix IV BID for today.   - Strict intake and output. Understand that it may be hard due to the patient's incontinence   - Check electrolytes and replace BID while diuresis  - Will plan for RHC once euvolemic at earliest Friday with interventional cardiology. Will place consult on Thursday for RHC Friday.     Discussed with Dr. Cooper,    Beebto Baxter MD, PGY-5  Cardiology HTS         Bebeto Baxter MD  Heart Transplant  [facility]

## 2018-11-21 NOTE — HPI
Ms. 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  - She presented to [facility] as a transfer from an outside hospital @ [place], [place] under liver transplant team due to encephalopathy and new onset ENZO.   - Per the , she was extremely disoriented @ OSH and her ammonia reached as high as 200, and her Cr level increased to 2.0 She also went into A fib with RVR and was started on a diltiazem infusion at OSH.   - On arrival day she was disoriented and they started her on lactulose which made her bit better. During her current admission on CXR they found a concern for HCAP and she was started on broad spectrum ABx and then shifted her on Moxifloxacin 7 D course. On 11/20/2018 found on CXR a concern for Lt lung middle lobe consolidation and they started her on vancomycin and zosyn.

## 2018-11-21 NOTE — TELEPHONE ENCOUNTER
PATIENT NAME: Krystal Hobson  [facility] #: [id number]    Lab Results   Component Value Date    CREATININE 1.7 (H) 11/20/2018     11/20/2018    BILITOT 7.9 (H) 11/20/2018    ALBUMIN 3.8 11/20/2018    INR 1.9 (H) 11/20/2018       MELD 27  Encephalopathy: 3 - 4  Ascites: slight  Dialysis: no     Recertification requestor: Julia Herrera

## 2018-11-21 NOTE — ASSESSMENT & PLAN NOTE
-  Listed for liver transplant with MELD 27. Per hepatologist, remains on internal hold 2/2 initially for PNA, but now for frailty and elevated PA pressure. Continue to monitor.  -  Plan to discuss pt candidacy once resp status, nutrition, and physical mobility improve. Need repeat RHC for elevated PA pressure.    MELD-Na score: 30 at 11/21/2018  7:28 AM  MELD score: 30 at 11/21/2018  7:28 AM  Calculated from:  Serum Creatinine: 1.6 mg/dL at 11/21/2018  7:27 AM  Serum Sodium: 140 mmol/L (Rounded to 137 mmol/L) at 11/21/2018  7:27 AM  Total Bilirubin: 7.3 mg/dL at 11/21/2018  7:27 AM  INR(ratio): 2.7 at 11/21/2018  7:28 AM  Age: 67 years

## 2018-11-21 NOTE — ASSESSMENT & PLAN NOTE
- TTE 11/12 with PA pressure > 55  - Repeat echo 11/20 with PA pressure 46  - HTS consulted for RHC at the end of the week at the earliest  - continue to diurese -- lasix 40mg IV x 2 doses  - BNP 11/21 was 1892

## 2018-11-21 NOTE — ASSESSMENT & PLAN NOTE
66 y/o lady presented as a transfer from [facility] to [facility] [place] for re-evaluation with liver transplant team in regards to her liver cirrhosis. She presented due to encephalopathy and ENZO on top of her CKD. During her admission there was a concern from her primary team in regards to a possible diagnosis of HCAP in which she was started on broad spectrum ABx, then shifted to Moxifloxacin X 7 D course. Recent CXR on 11/20/2018 showed a possible non-resolution of the Lt side consolidation in which she is re-started on Zosyn and vancomycin.   - She is hemodynamically stable, not hypoxic, O2 sat on room air is > 92%  - Per nurses and  that she has some dry cough, not productive and no hemoptysis  - She is currently on lasix 40 mg IV BID with no much urine output.  - Her CXR previous findings were mostly consistent with pulmonary edema. Her new Lt opacification on recent CXR on 11/21/2018 might be due to a new onset of aspiration pneumonia VS. Non-resolved pulmonary edema. Aspiration could be a 2nd possibility due to Pt altered mental status, and having esophogeal strictures/varices (1ry cause is fluid overload/pulmonary edema)  - Pt is non on immunosuppressants which excludes opportunistic infections.    Plan:  - Continue diuresing the pt as tolerated   - Continue ABx until Cultures results comes back, and then de-escalate.  - Speech therapy recs for swallow study to avoid having aspiration pneumonias.

## 2018-11-21 NOTE — PLAN OF CARE
Problem: Occupational Therapy Goal  Goal: Occupational Therapy Goal  Goals to be met by: 12/4/18    Patient will increase functional independence with ADLs by performing:    Feeding with Supervision.  UE Dressing with Minimal Assistance.  LE Dressing with Minimal Assistance.  Grooming while standing at sink with Contact Guard Assistance.  Toileting from toilet with Minimal Assistance for hygiene and clothing management.   Supine to sit with Supervision.  Toilet transfer to toilet with Minimal Assistance.  Upper extremity exercise program per handout, with independence.    Outcome: Ongoing (interventions implemented as appropriate)  Continue with POC.   Brianne machado OT  11/21/2018

## 2018-11-21 NOTE — SUBJECTIVE & OBJECTIVE
Past Medical History:   Diagnosis Date    Cancer     breat Cancer    Cirrhosis     Diabetes mellitus     GERD (gastroesophageal reflux disease)     Rheumatoid arthritis        Past Surgical History:   Procedure Laterality Date    CARPAL TUNNEL RELEASE      left wrist    CHOLECYSTECTOMY      MASTECTOMY      left breast 25  years ago       Review of patient's allergies indicates:  No Known Allergies    Family History     Problem Relation (Age of Onset)    COPD Sister    Heart disease Mother    Liver disease Father, Sister        Tobacco Use    Smoking status: Never Smoker    Tobacco comment: patient denies   Substance and Sexual Activity    Alcohol use: No     Comment: stopped 10/17    Drug use: No     Comment: patient denies    Sexual activity: Not on file         Review of Systems   Unable to perform ROS: Mental status change     Objective:     Vital Signs (Most Recent):  Temp: 97.7 °F (36.5 °C) (11/21/18 1156)  Pulse: 65 (11/21/18 1302)  Resp: (!) 23 (11/21/18 1302)  BP: (!) 159/55 (11/21/18 1130)  SpO2: 97 % (11/21/18 1302) Vital Signs (24h Range):  Temp:  [97.7 °F (36.5 °C)-98.1 °F (36.7 °C)] 97.7 °F (36.5 °C)  Pulse:  [57-81] 65  Resp:  [18-35] 23  SpO2:  [94 %-98 %] 97 %  BP: (110-159)/(55-65) 159/55     Weight: 54.6 kg (120 lb 5.9 oz)  Body mass index is 25.16 kg/m².      Intake/Output Summary (Last 24 hours) at 11/21/2018 1457  Last data filed at 11/21/2018 1400  Gross per 24 hour   Intake 370 ml   Output 204 ml   Net 166 ml       Physical Exam   Constitutional: She appears well-developed and well-nourished. No distress.   Pt looks really ill, mooning and has a shallow breathing pattern    Eyes: Scleral icterus is present.   Neck: Normal range of motion.   Cardiovascular: Normal rate and regular rhythm.   No murmur heard.  Pulmonary/Chest: No stridor. No respiratory distress. She has no wheezes. She has rales. She exhibits no tenderness.   Abdominal: Soft. Bowel sounds are normal. She exhibits no  distension. There is no tenderness.   Musculoskeletal: Normal range of motion. She exhibits edema. She exhibits no deformity.   Neurological:   Pt has waxing a waning LOC   Skin: She is not diaphoretic.       Vents:  Oxygen Concentration (%): 3 (11/15/18 2221)    Lines/Drains/Airways     Peripheral Intravenous Line                 Peripheral IV - Single Lumen 11/20/18 1615 Anterior;Right Hand less than 1 day         Peripheral IV - Single Lumen 11/20/18 1619 Anterior;Distal;Left Forearm less than 1 day                Significant Labs:    CBC/Anemia Profile:  Recent Labs   Lab 11/20/18  0649 11/21/18  0727 11/21/18  1348   WBC 10.43 12.53 15.78*   HGB 7.7* 8.3* 8.1*   HCT 23.1* 26.7* 25.1*   PLT 97* 116* 132*   MCV 95 103* 98   RDW 19.5* 21.5* 21.2*        Chemistries:  Recent Labs   Lab 11/20/18  0649 11/20/18 1819 11/21/18  0727    136 140   K 3.6 4.0 3.2*    105 107   CO2 21* 19* 20*   BUN 77* 81* 82*   CREATININE 1.4 1.7* 1.6*   CALCIUM 8.8 9.1 9.0   ALBUMIN 3.5 3.8 3.7   PROT 5.3*  --  5.7*   BILITOT 7.9*  --  7.3*   ALKPHOS 166*  --  162*   ALT 32  --  33   AST 56*  --  47*   MG 2.2  --  2.4   PHOS  --  3.4  --        Bilirubin:   Recent Labs   Lab 11/17/18  0537 11/18/18  0544 11/19/18  0711 11/20/18  0649 11/21/18  0727   BILITOT 4.4* 6.0* 7.2* 7.9* 7.3*     BMP:   Recent Labs   Lab 11/21/18  0727   *      K 3.2*      CO2 20*   BUN 82*   CREATININE 1.6*   CALCIUM 9.0   MG 2.4     CMP:   Recent Labs   Lab 11/20/18  0649 11/20/18 1819 11/21/18  0727    136 140   K 3.6 4.0 3.2*    105 107   CO2 21* 19* 20*   * 272* 177*   BUN 77* 81* 82*   CREATININE 1.4 1.7* 1.6*   CALCIUM 8.8 9.1 9.0   PROT 5.3*  --  5.7*   ALBUMIN 3.5 3.8 3.7   BILITOT 7.9*  --  7.3*   ALKPHOS 166*  --  162*   AST 56*  --  47*   ALT 32  --  33   ANIONGAP 11 12 13   EGFRNONAA 38.9* 30.8* 33.1*     Coagulation:   Recent Labs   Lab 11/21/18  1348   INR 1.9*

## 2018-11-21 NOTE — PROGRESS NOTES
" [facility]  Adult Nutrition  Progress Note     SUMMARY       Recommendations  Recommendation/Intervention:   1. Encourage increased PO intake and OS intake as tolerated.   2. Dietitian Following    Goals: Patient to meet >85% EEN/EPN  Nutrition Goal Status: progressing towards goal  Communication of RD Recs: (POC)    Patient with Severe Malnutrition in the context of Chronic Illness/Injury    Reason for Assessment  Reason for Assessment: RD follow-up  Diagnosis: transplant/postoperative complications(Hepatic encephalopathy, Pre- Liver tx)  Relevant Medical History: cryptogenic cirrhosis, DM, GERD  Interdisciplinary Rounds: attended  General Information Comments: Patient with Hepatic encephalopathy, hx of cryptogenic cirrhosis listed for liver. Patient with decreasing po intake related to mental status, but consuming at least 3 supplements a day. Calorie count completed 11/15/18. NFPE completed 11/13/18. Noted on problem list Severe malnutrition-resolved as of 11/16/2018. Dietitian does not agree.   Nutrition Discharge Planning: Patient to meet optimal nutrition     Nutrition Risk Screen  Nutrition Risk Screen: no indicators present    Nutrition/Diet History  Patient Reported Diet/Restrictions/Preferences: general, low salt  Typical Food/Fluid Intake: Decreased po intake  Food Preferences: noted  Do you have any cultural, spiritual, Buddhist conflicts, given your current situation?: none noted   Supplemental Drinks or Food Habits: Glucerna(4-5/week)  Food Allergies: NKFA  Factors Affecting Nutritional Intake: impaired cognitive status/motor control, decreased appetite    Anthropometrics  Temp: 98.1 °F (36.7 °C)  Height Method: Stated  Height: 4' 10" (147.3 cm)  Height (inches): 58 in  Weight Method: Bed Scale  Weight: 54.6 kg (120 lb 5.9 oz)  Weight (lb): 120.37 lb  Ideal Body Weight (IBW), Female: 90 lb  % Ideal Body Weight, Female (lb): 133.33 lb  BMI (Calculated): 25.1  BMI Grade: 18.5-24.9 - " normal     Lab/Procedures/Meds  Labs: Reviewed    11/21/2018    K 3.2 (L) 11/21/2018     11/21/2018    CO2 20 (L) 11/21/2018    BUN 82 (H) 11/21/2018    CREATININE 1.6 (H) 11/21/2018    CALCIUM 9.0 11/21/2018    PHOS 3.4 11/20/2018    MG 2.4 11/21/2018    EGFRNONAA 33.1 (A) 11/21/2018    ALBUMIN 3.7 11/21/2018      ALT 33 11/21/2018    AST 47 (H) 11/21/2018    ALKPHOS 162 (H) 11/21/2018      HGBA1C 5.8 (H) 11/10/2018     Meds: Reviewed  Scheduled Meds:   guaiFENesin  600 mg Oral BID    heparin (porcine)  5,000 Units Subcutaneous Q8H    insulin aspart U-100  4-6 Units Subcutaneous TIDWM    insulin detemir U-100  16 Units Subcutaneous Daily    lactulose  30 g Oral TID    levalbuterol  1.25 mg Nebulization Q6H WAKE    magnesium oxide  400 mg Oral BID    metoprolol tartrate  25 mg Oral BID    potassium chloride  40 mEq Oral Once    rifAXImin  550 mg Oral BID     Physical Findings/Assessment  Overall Physical Appearance: loss of subcutaneous fat, loss of muscle mass, on oxygen therapy(nasula cannula)  Oral/Mouth Cavity: tooth/teeth missing  Skin: intact    Estimated/Assessed Needs  Weight Used For Calorie Calculations: 54.4 kg (119 lb 14.9 oz)  Energy Calorie Requirements (kcal): 8533-0115  Energy Need Method: Kcal/kg(30-35 kcal/kg)  Protein Requirements: 54(1.0 gm/kg)  Weight Used For Protein Calculations: 54.4 kg (119 lb 14.9 oz)  Fluid Requirements (mL): 1mL/kcal or per MD  Fluid Need Method: RDA Method(1mL/kcal or per MD)  RDA Method (mL): 1632  CHO Requirement: 200gm    Nutrition Prescription Ordered  Current Diet Order: Diabetic  Oral Nutrition Supplement: Boost Breeze, Boost Glucose Control    Evaluation of Received Nutrient/Fluid Intake in last 24h  I/O: Noted  Comments: LBM 11/20/18  % Intake of Estimated Energy Needs: 50 - 75 %  % Meal Intake: 25 - 50 %    Nutrition Risk  Level of Risk/Frequency of Follow-up: low(1x week)     Assessment and Plan  Severe malnutrition    NFPE  11/13/18  Severe Malnutrition in the context of Chronic Illness/Injury    Related to (etiology):  cryptogenic cirrhosis    Signs and Symptoms (as evidenced by):  Energy Intake: <50% of estimated energy requirement for greater than 1 month  Body Fat Depletion: severe depletion of orbitals and triceps   Muscle Mass Depletion: severe depletion of temples, clavicle region and interosseous muscle     Nutrition Diagnosis Status:  Continues      Monitor and Evaluation  Food and Nutrient Intake: energy intake, food and beverage intake  Food and Nutrient Adminstration: diet order  Anthropometric Measurements: weight, weight change  Biochemical Data, Medical Tests and Procedures: electrolyte and renal panel, gastrointestinal profile, glucose/endocrine profile, inflammatory profile, lipid profile  Nutrition-Focused Physical Findings: overall appearance     Nutrition Follow-Up  RD Follow-up?: Yes

## 2018-11-21 NOTE — PLAN OF CARE
Spoke with Dr. Montelongo about plan of care for patient.  Dr. Coelho, nephrology, concerned about fluid status.  Recommended placing central line to obtain central venous pressure and possible dialysis.  Spoke with transplant resident, Dr. Angela, to place line.

## 2018-11-21 NOTE — SUBJECTIVE & OBJECTIVE
Scheduled Meds:   furosemide  40 mg Intravenous BID    guaiFENesin  600 mg Oral BID    heparin (porcine)  5,000 Units Subcutaneous Q8H    insulin aspart U-100  4-6 Units Subcutaneous TIDWM    insulin detemir U-100  16 Units Subcutaneous Daily    lactulose  30 g Oral TID    levalbuterol  1.25 mg Nebulization Q6H WAKE    magnesium oxide  400 mg Oral BID    metoprolol tartrate  25 mg Oral BID    rifAXImin  550 mg Oral BID     Continuous Infusions:  PRN Meds:acetaminophen, benzonatate, dextrose 50%, dextrose 50%, glucagon (human recombinant), glucose, glucose, guaifenesin 100 mg/5 ml, insulin aspart U-100, lactulose, ondansetron, simethicone, sodium chloride 0.9%    Review of Systems   Constitutional: Positive for activity change, appetite change and fatigue. Negative for chills and fever.   HENT: Positive for ear pain. Negative for congestion, ear discharge and sinus pressure.    Respiratory: Positive for cough. Negative for apnea, chest tightness, shortness of breath and wheezing.    Cardiovascular: Negative for chest pain, palpitations and leg swelling.   Gastrointestinal: Negative for abdominal distention, abdominal pain, anal bleeding, blood in stool, diarrhea, nausea and vomiting.   Endocrine: Negative for cold intolerance and heat intolerance.   Genitourinary: Positive for decreased urine volume (incontinent of urine). Negative for dysuria, menstrual problem and urgency.   Musculoskeletal: Negative for arthralgias and back pain.   Skin: Positive for wound. Negative for pallor.   Allergic/Immunologic: Positive for immunocompromised state.   Neurological: Positive for weakness. Negative for dizziness and headaches.   Hematological: Bruises/bleeds easily.   Psychiatric/Behavioral: Negative for agitation, behavioral problems, confusion, decreased concentration and sleep disturbance.     Objective:     Vital Signs (Most Recent):  Temp: 98.1 °F (36.7 °C) (11/20/18 1945)  Pulse: 67 (11/21/18 0843)  Resp: (!)  24 (11/21/18 0843)  BP: (!) 147/65 (11/21/18 0715)  SpO2: (!) 94 % (11/21/18 0843) Vital Signs (24h Range):  Temp:  [97.9 °F (36.6 °C)-98.1 °F (36.7 °C)] 98.1 °F (36.7 °C)  Pulse:  [57-82] 67  Resp:  [18-35] 24  SpO2:  [94 %-98 %] 94 %  BP: (110-149)/(57-82) 147/65     Weight: 54.6 kg (120 lb 5.9 oz)  Body mass index is 25.16 kg/m².    Intake/Output - Last 3 Shifts       11/19 0700 - 11/20 0659 11/20 0700 - 11/21 0659 11/21 0700 - 11/22 0659    P.O. 1140 730 100    Total Intake(mL/kg) 1140 (20.9) 730 (13.4) 100 (1.8)    Urine (mL/kg/hr) 700 (0.5) 301 (0.2) 100 (0.5)    Emesis/NG output 0      Stool 0 2 1    Total Output 700 303 101    Net +440 +427 -1           Urine Occurrence 0 x 4 x     Stool Occurrence 3 x 1 x     Emesis Occurrence 0 x 0 x           Physical Exam   Constitutional: She is oriented to person, place, and time. She appears well-developed.   Chronically ill-appearing. Malnourished. Temporal wasting.   HENT:   Head: Normocephalic and atraumatic.   Mouth/Throat: Oropharynx is clear and moist.   Eyes: Conjunctivae are normal. Scleral icterus is present.   Neck: Normal range of motion.   Cardiovascular: Normal rate, regular rhythm and normal heart sounds.   Pulmonary/Chest: Effort normal. No respiratory distress. She has no decreased breath sounds. She has no wheezes. She has rales in the right lower field and the left lower field.   Abdominal: Soft. Bowel sounds are normal. She exhibits distension. There is no tenderness. No hernia.   Small ascites   Musculoskeletal: She exhibits edema (1+ BLE, +2 RUE).   Lymphadenopathy:     She has no cervical adenopathy.   Neurological: She is alert and oriented to person, place, and time.   Skin: Skin is warm and dry. No rash noted.   Psychiatric: She has a normal mood and affect. Her behavior is normal. Her mood appears not anxious.   Nursing note and vitals reviewed.      Laboratory:  Immunosuppressants     None        CBC:   Recent Labs   Lab 11/21/18  0727   WBC  12.53   RBC 2.59*   HGB 8.3*   HCT 26.7*   *   *   MCH 32.0*   MCHC 31.1*     CMP:   Recent Labs   Lab 11/21/18  0727   *   CALCIUM 9.0   ALBUMIN 3.7   PROT 5.7*      K 3.2*   CO2 20*      BUN 82*   CREATININE 1.6*   ALKPHOS 162*   ALT 33   AST 47*   BILITOT 7.3*     Coagulation:   Recent Labs   Lab 11/21/18  0728   INR 2.7*     Labs within the past 24 hours have been reviewed.    Diagnostic Results:  I have personally reviewed all pertinent imaging studies.

## 2018-11-21 NOTE — PT/OT/SLP PROGRESS
Speech Language Pathology      Krystal Hobson  MRN: [id number]    Patient not seen today secondary to NSG hold. Pt not appropriate for swallow eval 2/2 encephalopathic at this time. SLP will follow up.     Peyton Bahena CCC-SLP   11/21/2018

## 2018-11-21 NOTE — SUBJECTIVE & OBJECTIVE
"Interval HPI:   Overnight events: remains in TSU. Patient lethargic today. Patient moaning in bed.  at bedside updated.  Patient more confused overnight. BG above goal; required correction scale.   Eating: variable   Nausea: No  Hypoglycemia and intervention: No  Fever: No  TPN and/or TF: No    BP (!) 147/65   Pulse 67   Temp 98.1 °F (36.7 °C) (Oral)   Resp (!) 24   Ht 4' 10" (1.473 m)   Wt 54.6 kg (120 lb 5.9 oz)   SpO2 (!) 94%   Breastfeeding? No   BMI 25.16 kg/m²     Labs Reviewed and Include    Recent Labs   Lab 11/21/18  0727   *   CALCIUM 9.0   ALBUMIN 3.7   PROT 5.7*      K 3.2*   CO2 20*      BUN 82*   CREATININE 1.6*   ALKPHOS 162*   ALT 33   AST 47*   BILITOT 7.3*     Lab Results   Component Value Date    WBC 12.53 11/21/2018    HGB 8.3 (L) 11/21/2018    HCT 26.7 (L) 11/21/2018     (H) 11/21/2018     (L) 11/21/2018     No results for input(s): TSH, FREET4 in the last 168 hours.  Lab Results   Component Value Date    HGBA1C 5.8 (H) 11/10/2018       Nutritional status:   Body mass index is 25.16 kg/m².  Lab Results   Component Value Date    ALBUMIN 3.7 11/21/2018    ALBUMIN 3.8 11/20/2018    ALBUMIN 3.5 11/20/2018     No results found for: PREALBUMIN    Estimated Creatinine Clearance: 29.4 mL/min (A) (based on SCr of 1.6 mg/dL (H)).    Accu-Checks  Recent Labs     11/19/18  2300 11/19/18  2301 11/20/18  0209 11/20/18  0821 11/20/18  1437 11/20/18  1757 11/20/18  2024 11/20/18  2213 11/21/18  0154 11/21/18  0728   POCTGLUCOSE 477* 219* 208* 152* 290* 284* 348* 287* 197* 192*       Current Medications and/or Treatments Impacting Glycemic Control  Immunotherapy:    Immunosuppressants     None        Steroids:   Hormones (From admission, onward)    None        Pressors:    Autonomic Drugs (From admission, onward)    None        Hyperglycemia/Diabetes Medications:   Antihyperglycemics (From admission, onward)    Start     Stop Route Frequency Ordered    11/20/18 " 1645  insulin aspart U-100 pen 4-6 Units      -- SubQ 3 times daily with meals 11/20/18 1624    11/18/18 0900  insulin detemir U-100 pen 14 Units      -- SubQ Daily 11/17/18 0912    11/15/18 0041  insulin aspart U-100 pen 0-5 Units      -- SubQ Before meals & nightly PRN 11/14/18 2857

## 2018-11-21 NOTE — PT/OT/SLP EVAL
Occupational Therapy   Evaluation    Name: Krystal Hobson  MRN: [id number]  Admitting Diagnosis:  Hepatic encephalopathy      Recommendations:     Discharge Recommendations: SNF  Discharge Equipment Recommendations:  bedside commode, walker, rolling, bath bench  Barriers to discharge:  None    History:     Occupational Profile:  Living Environment: Pt lives with  in mobile home; 7STE with B rails, bathroom contain tub-shower combo with no DME  Previous level of function: PTA,  reports pt was independent with ADl and functional mobility   Roles and Routines: Home and community dweller, wife, does not drive  Equipment Used at Home:  none  Assistance upon Discharge: Pt will have assistance from  (  is at home all day); sisters live near by    Past Medical History:   Diagnosis Date    Cancer     breat Cancer    Cirrhosis     Diabetes mellitus     GERD (gastroesophageal reflux disease)     Rheumatoid arthritis        Past Surgical History:   Procedure Laterality Date    CARPAL TUNNEL RELEASE      left wrist    CHOLECYSTECTOMY      MASTECTOMY      left breast 25  years ago       Subjective     Chief Complaint: No complaints  Patient/Family Comments/goals: Return to PLOF    Pain/Comfort:  · Pain Rating 1: 0/10  · Pain Rating Post-Intervention 1: 0/10    Patients cultural, spiritual, Gnosticist conflicts given the current situation:  None stated    Objective:     Communicated with: RN prior to session.  Patient found with: all lines intact, call button in reach and Rn notified and pulse ox (continuous), telemetry upon OT entry to room.    General Precautions: Standard, fall   Orthopedic Precautions:N/A   Braces: N/A     Occupational Performance:    Bed Mobility:    · NT, pt found seated UIC upon arrival    Functional Mobility/Transfers:  · Patient completed Sit <> Stand Transfer with moderate assistance  with  hand-held assist   · Patient completed Toilet Transfer sit <> stand with moderate  assistance with  hand-held assist ( used bedside commode)  · Functional Mobility: Pt took multiple steps ( ~10 steps) from EOB <> bedside commode with moderate assistance    Activities of Daily Living:  · Lower Body Dressing: moderate assistance to jorge B socks while seated UIC  · Toileting: maximal assistance to complete toilet hygiene while standing    Cognitive/Visual Perceptual:  Cognitive/Psychosocial Skills:     -       Oriented to: Person, Place, Time and Situation   -       Follows Commands/attention:Follows one-step commands  -       Communication: clear/fluent, low tone ( pt appears distressed making noises throughout however with no complaints-- 02 and HR WFL)   -       Safety awareness/insight to disability: impaired   -       Mood/Affect/Coping skills/emotional control: Lethargic  Visual/Perceptual:      -Intact     Physical Exam:  Postural examination/scapula alignment:    -       Rounded shoulders  Skin integrity: Visible skin intact  Edema:  Moderate BLE's  Dominant hand:    -       RUE  Upper Extremity Range of Motion:     -       Right Upper Extremity: WFL  -       Left Upper Extremity: WFL  Upper Extremity Strength:    -       Right Upper Extremity: WFL  -       Left Upper Extremity: WFL   Strength:    -       Right Upper Extremity: WFL  -       Left Upper Extremity: WFL    AMPAC 6 Click ADL:  AMPAC Total Score: 15    Treatment & Education:  -Pt edu on OT role/POC  -Importance of OOB activity with staff assistance  -Safety during functional t/f and mobility  - White board updated  - Multiple self care tasks completed-- assistance level noted above  - All questions/concerns answered within OT scope of practice  Education:    Patient left up in chair with all lines intact, call button in reach and Rn notified    Assessment:     Krystal Hobson is a 67 y.o. female with a medical diagnosis of Hepatic encephalopathy.  She presents with the following performance deficits affecting function: weakness,  "impaired endurance, gait instability, impaired balance, impaired self care skills, impaired functional mobilty, decreased safety awareness, edema, impaired cognition.  Pt appears lethargic with mild confusion and requiring increased level of assistance with ADL and functional mobility in comparison to PT evaluation yesterday . Pt would benefit from SNF following d/c to continue to progress towards goals and improve quality of life.     Rehab Prognosis: Good; patient would benefit from acute skilled OT services to address these deficits and reach maximum level of function.         Clinical Decision Makin.  OT Low:  "Pt evaluation falls under low complexity for evaluation coding due to performance deficits noted in 1-3 areas as stated above and 0 co-morbities affecting current functional status. Data obtained from problem focused assessments. No modifications or assistance was required for completion of evaluation. Only brief occupational profile and history review completed."     Plan:     Patient to be seen 4 x/week to address the above listed problems via self-care/home management, therapeutic activities, therapeutic exercises, neuromuscular re-education  · Plan of Care Expires: 18  · Plan of Care Reviewed with: patient, spouse    This Plan of care has been discussed with the patient who was involved in its development and understands and is in agreement with the identified goals and treatment plan    GOALS:   Multidisciplinary Problems     Occupational Therapy Goals        Problem: Occupational Therapy Goal    Goal Priority Disciplines Outcome Interventions   Occupational Therapy Goal     OT, PT/OT Ongoing (interventions implemented as appropriate)    Description:  Goals to be met by: 18    Patient will increase functional independence with ADLs by performing:    Feeding with Supervision.  UE Dressing with Minimal Assistance.  LE Dressing with Minimal Assistance.  Grooming while standing at " sink with Contact Guard Assistance.  Toileting from toilet with Minimal Assistance for hygiene and clothing management.   Supine to sit with Supervision.  Toilet transfer to toilet with Minimal Assistance.  Upper extremity exercise program per handout, with independence.                      Time Tracking:     OT Date of Treatment: 11/21/18  OT Start Time: 0955  OT Stop Time: 1012  OT Total Time (min): 17 min    Billable Minutes:Evaluation 17    Brianne machado OT  11/21/2018

## 2018-11-21 NOTE — ASSESSMENT & PLAN NOTE
BG goal 140-180    increase Levemir to 16 units in AM  continue Novolog to 4-6 units with meals given variable meal intake; hold scheduled insulin when having broth as meal.   Low dose correction scale  BG monitoring /HS/0200    ADDENDUM: discontinue scheduled novolog given intubated and npo.     ** Please call Endocrine for any BG related issues **    Discharge planning: TBD

## 2018-11-21 NOTE — ASSESSMENT & PLAN NOTE
Cr baseline 1.6, Peaked 3.0 (10/30) reduced to baseline 1.6 on 11/2 and been hovering around baseline since with recent jump to 1.9 this afternoon. Despite stable Cr until now, BUN has been gradually increasing 50s to 85 over last several days. Consult for volume management in setting of PAP46 with pulmonary edema, concern for HRS. However today WCC jump 10 to 16, Lactate of 3.3 with JUAN consolidation on cxr. C/f sepsis at this time. RHC planned for Friday    A/P  - Recommend holding diuretics until sepsis r/o  - Recommend repeat lactate, add procal  - Continue Vanc/Zosyn  - Repeat UA/UCx ordered  - Urine studies ordered  - Check CVP

## 2018-11-21 NOTE — ASSESSMENT & PLAN NOTE
Avoid insulin stacking and hypoglycemia.  Lab Results   Component Value Date    CREATININE 1.6 (H) 11/21/2018

## 2018-11-21 NOTE — TELEPHONE ENCOUNTER
PATIENT NAME: Krystal Hobson  [facility] #: [id number]    Lab Results   Component Value Date    CREATININE 1.6 (H) 11/21/2018     11/21/2018    BILITOT 7.3 (H) 11/21/2018    ALBUMIN 3.7 11/21/2018    INR 2.7 (H) 11/21/2018     Meld 30  Encephalopathy: 3 - 4  Ascites: slight  Dialysis: no     Recertification requestor: Ector Lane

## 2018-11-21 NOTE — SUBJECTIVE & OBJECTIVE
Past Medical History:   Diagnosis Date    Cancer     breat Cancer    Cirrhosis     Diabetes mellitus     GERD (gastroesophageal reflux disease)     Rheumatoid arthritis        Past Surgical History:   Procedure Laterality Date    CARPAL TUNNEL RELEASE      left wrist    CHOLECYSTECTOMY      MASTECTOMY      left breast 25  years ago       Review of patient's allergies indicates:  No Known Allergies  Current Facility-Administered Medications   Medication Frequency    acetaminophen tablet 650 mg Q8H PRN    benzonatate capsule 100 mg TID PRN    dextrose 50% injection 12.5 g PRN    dextrose 50% injection 25 g PRN    glucagon (human recombinant) injection 1 mg PRN    glucose chewable tablet 16 g PRN    glucose chewable tablet 24 g PRN    guaifenesin 100 mg/5 ml syrup 200 mg Q4H PRN    guaiFENesin 12 hr tablet 600 mg BID    heparin (porcine) injection 5,000 Units Q8H    insulin aspart U-100 pen 0-5 Units QID (AC + HS) PRN    insulin aspart U-100 pen 4-6 Units TIDWM    insulin detemir U-100 pen 16 Units Daily    lactulose 10 gram/15 mL solution (enema) 200 g TID PRN    lactulose 20 gram/30 mL solution Soln 30 g TID    lactulose 20 gram/30 mL solution Soln 30 g Q6H PRN    levalbuterol nebulizer solution 1.25 mg Q6H WAKE    magnesium oxide tablet 400 mg BID    metoprolol tartrate (LOPRESSOR) tablet 25 mg BID    ondansetron disintegrating tablet 8 mg Q8H PRN    piperacillin-tazobactam 4.5 g in sodium chloride 0.9% 100 mL IVPB (ready to mix system) Q8H    rifAXIMin tablet 550 mg BID    simethicone chewable tablet 80 mg TID PRN    sodium chloride 0.9% flush 3 mL PRN    vancomycin 750 mg in dextrose 5 % 250 mL IVPB (ready to mix system) Q24H     Family History     Problem Relation (Age of Onset)    COPD Sister    Heart disease Mother    Liver disease Father, Sister        Tobacco Use    Smoking status: Never Smoker    Tobacco comment: patient denies   Substance and Sexual Activity    Alcohol  use: No     Comment: stopped 10/17    Drug use: No     Comment: patient denies    Sexual activity: Not on file     Review of Systems   Unable to perform ROS: Mental status change   Constitutional: Positive for activity change and fatigue.   Respiratory: Positive for cough and shortness of breath.    Cardiovascular: Positive for leg swelling.     Objective:     Vital Signs (Most Recent):  Temp: 97.6 °F (36.4 °C) (11/21/18 1616)  Pulse: 63 (11/21/18 1600)  Resp: (!) 25 (11/21/18 1600)  BP: 126/67 (11/21/18 1600)  SpO2: 99 % (11/21/18 1600)  O2 Device (Oxygen Therapy): nasal cannula (11/21/18 1302) Vital Signs (24h Range):  Temp:  [97.6 °F (36.4 °C)-98.1 °F (36.7 °C)] 97.6 °F (36.4 °C)  Pulse:  [57-81] 63  Resp:  [18-25] 25  SpO2:  [94 %-99 %] 99 %  BP: (110-159)/(55-67) 126/67     Weight: 54.6 kg (120 lb 5.9 oz) (11/21/18 0400)  Body mass index is 25.16 kg/m².  Body surface area is 1.49 meters squared.    I/O last 3 completed shifts:  In: 1150 [P.O.:1150]  Out: 803 [Urine:801; Stool:2]    Physical Exam   Constitutional: She appears distressed.   HENT:   Head: Normocephalic and atraumatic.   Eyes: Conjunctivae are normal. Scleral icterus is present.   Neck: Neck supple. No JVD present. No tracheal deviation present.   Cardiovascular: Normal rate and normal heart sounds. Exam reveals no gallop and no friction rub.   Pulmonary/Chest: No stridor. She is in respiratory distress. She has no wheezes. She has rales. She exhibits no tenderness.   Abdominal: Soft. Bowel sounds are normal. She exhibits no mass. There is no tenderness. There is no rebound and no guarding.   Musculoskeletal: She exhibits edema (mild diffuse anasarca of limbs). She exhibits no tenderness.   Neurological:   Encephalopathic, briefly rousable to loud voice or physical stimulus, one word responses yes and no to basic questions.    Skin: Skin is warm and dry. She is not diaphoretic. No erythema.       Significant Labs:  CBC:   Recent Labs   Lab  11/21/18  1348   WBC 15.78*   RBC 2.57*   HGB 8.1*   HCT 25.1*   *   MCV 98   MCH 31.5*   MCHC 32.3     CMP:   Recent Labs   Lab 11/21/18  1348   *   CALCIUM 9.4   ALBUMIN 4.0   PROT 6.3      K 3.5   CO2 21*      BUN 85*   CREATININE 1.9*   ALKPHOS 182*   ALT 34   AST 49*   BILITOT 8.1*     No results for input(s): COLORU, CLARITYU, SPECGRAV, PHUR, PROTEINUA, GLUCOSEU, BILIRUBINCON, BLOODU, WBCU, RBCU, BACTERIA, MUCUS, NITRITE, LEUKOCYTESUR, UROBILINOGEN, HYALINECASTS in the last 168 hours.  All labs within the past 24 hours have been reviewed.    Significant Imaging:  Labs: Reviewed

## 2018-11-21 NOTE — CARE UPDATE
RN Proactive Rounding Note  Time of Visit:     Admit Date: 11/10/2018  LOS: 10  Code Status: Full Code   Date of Visit: 2018  : 1950  Age: 67 y.o.  Sex: female  Race: White  Bed: 43180/45070 A:   MRN: [id number]  Was the patient discharged from an ICU this admission? no   Was the patient discharged from a PACU within last 24 hours?  no  Did the patient receive conscious sedation/general anesthesia in last 24 hours?  no  Was the patient in the ED within the past 24 hours?  no  Was the patient started on NIPPV within the past 24 hours?  no  Attending Physician: Jabier Fritz MD  Primary Service: [facility] [place] [facility]      ASSESSMENT:     Abnormal Vital Signs:  Clinical Issues: Neuro     INTERVENTIONS/ RECOMMENDATIONS:   Followup completed from rounds earlier in the day. Patient with some worsening tachypnea, and MS. Pt receiving lactulose enema for worsening mental status at time of visit. Also, noted to have expiratory crackles. 2L NC applied for comfort. Resp Tx scheduled and to be administered. Pr with increased Creatinine and decreased UOP. Notified Dr. Ferreira of patient status. Will continue to follow    Discussed plan of care with RNAlyce.    PHYSICIAN ESCALATION:     Yes/No  yes    Orders received and case discussed with Dr. Ferreira .    Disposition: Remain in room 67459.    FOLLOW-UP/CONTINGENCY:     Call back the Rapid Response Nurse at x [phone] for additional questions or concerns.

## 2018-11-21 NOTE — ASSESSMENT & PLAN NOTE
- CKD 3-4 at baseline creatinine. Urine electrolytes ordered. Etiology possibly due to hepatorenal syndrome.   - HRS protocol started on admission, albumin and octreotide. No midodrine as with Afib.   - Cr since admission has improved  - Dosing lasix daily based on labs -- Lasix 40mg IV x 2 doses today  - Must weigh daily and cont strict I&Os.

## 2018-11-22 NOTE — PROGRESS NOTES
Pulmonary & Critical Care Medicine Progress Note  Ms. Hobson is a 68 y/o lady with a known case of cryptogenic Liver cirrhosis, CKD S 3/4, Afib, DM with persistently abnormal CXR now intubated.    Subjective:   Ms. Hobson was intubated overnight for altered mental status. She is on minimal ventilator settings and doing well from a respiratory standpoint. She has continued to have increased secretions and has had increasing pressor requirements.     Past medical, surgical, family, and social history from initial consult was reviewed and verified during today's visit.     Vital Signs:   Temp:  [94.5 °F (34.7 °C)-97.6 °F (36.4 °C)] 97.4 °F (36.3 °C)  Pulse:  [47-72] 60  Resp:  [4-31] 23  SpO2:  [81 %-100 %] 100 %  BP: ()/(43-73) 116/56  Arterial Line BP: ()/(29-52) 136/39      Fluid Balance:     Intake/Output Summary (Last 24 hours) at 11/22/2018 1613  Last data filed at 11/22/2018 1500  Gross per 24 hour   Intake 959 ml   Output 633 ml   Net 326 ml       Review of Systems:   A comprehensive 12-point review of systems was performed, and is negative except for those items mentioned above in the HPI section of this note.     Physical Exam:   General: thin, ill appearing, WF, intubated and sedated.  HEENT: AT/NC, PERRL, EOMI, oral and nasal mucosa moist.   Cardiac: tachycardic,  with no MRG with brisk cap refill and symmetric pulses in distal extremities.  Respiratory: Normal inspection. Symmetric chest rise. Normal palpation and percussion. Auscultation clear bilaterally. No increased work of breathing noted.   Abdomen: Soft, NT/ND. +BS.   Extremities: No edema.   Neuro: Grossly intact to brief exam. Oriented x3 with appropriate mood/affect to situation.     Personal Review and Summary of Interval Diagnostics    Laboratory Studies:   Recent Labs   Lab 11/22/18  0738   PH 7.343*   PCO2 43.5   PO2 35*   HCO3 23.6*   POCSATURATED 64*   BE -2     Recent Labs   Lab 11/22/18  0350   WBC 8.35   RBC 2.06*   HGB 6.7*    HCT 20.5*   PLT 83*   *   MCH 32.5*   MCHC 32.7     Recent Labs   Lab 11/22/18  0350      K 3.5   *   CO2 20*   BUN 85*   CREATININE 1.8*   MG 2.6  2.6       Microbiology Data:   Microbiology Results (last 7 days)     Procedure Component Value Units Date/Time    Blood culture [[id number]] Collected:  11/21/18 1348    Order Status:  Completed Specimen:  Blood Updated:  11/22/18 1612     Blood Culture, Routine No Growth to date     Blood Culture, Routine No Growth to date    Blood culture [[id number]] Collected:  11/21/18 1452    Order Status:  Completed Specimen:  Blood Updated:  11/22/18 0115     Blood Culture, Routine No Growth to date    Narrative:       Collection has been rescheduled by JWB at 11/21/2018 13:50 Reason:   unable to draw second set of cultures. made Eric aware of   failure to collect second set.  Collection has been rescheduled by CBE at 11/21/2018 14:12 Reason:   ALFONZO Thomas said to come back later   Collection has been rescheduled by JWB at 11/21/2018 13:50 Reason:   unable to draw second set of cultures. made Eric aware of   failure to collect second set.  Collection has been rescheduled by CBE at 11/21/2018 14:12 Reason:   ALFONZO Thomas said to come back later     Urine culture [[id number]] Collected:  11/21/18 1448    Order Status:  Sent Specimen:  Urine, Clean Catch Updated:  11/21/18 1633    Blood culture [[id number]] Collected:  11/14/18 2141    Order Status:  Completed Specimen:  Blood Updated:  11/19/18 2312     Blood Culture, Routine No growth after 5 days.    Blood culture [[id number]] Collected:  11/14/18 2141    Order Status:  Completed Specimen:  Blood Updated:  11/19/18 2312     Blood Culture, Routine No growth after 5 days.        Chest Imaging:     Infusions:     norepinephrine bitartrate-D5W 0.16 mcg/kg/min (11/22/18 1500)    propofol 10 mcg/kg/min (11/22/18 1500)       Scheduled Medications:    guaiFENesin  600 mg Oral BID    heparin (porcine)  5,000  Units Subcutaneous Q8H    insulin detemir U-100  18 Units Subcutaneous Daily    lactulose  200 g Rectal Once    lactulose  30 g Oral TID    levalbuterol  1.25 mg Nebulization Q6H WAKE    magnesium oxide  400 mg Oral BID    metoprolol tartrate  25 mg Oral BID    piperacillin-tazobactam (ZOSYN) IVPB  4.5 g Intravenous Q8H    rifAXImin  550 mg Oral BID    vancomycin (VANCOCIN) IVPB  1,000 mg Intravenous Q24H       PRN Medications:   acetaminophen, benzonatate, dextrose 50%, glucagon (human recombinant), guaifenesin 100 mg/5 ml, insulin aspart U-100, lactulose, lactulose, ondansetron, simethicone, sodium chloride 0.9%    Impression & Recommendations    Acute Respiratory Failure  - very abnormal CXR but unclear etiology- pneumonia vs inflammatory response.   - not ARDS given her low ventilator settings and low supplemental oxygen needs  - could be pneumonia , however bilateral upper lobe predominance and again very minimal oxygen requirements make it less likely  - some concern for DAH? But no evidence in her secretions of hemoptysis and no clear reason for that.   - also could be continued worsening pulmonary edema.   - agree with antibiotics and diuresis    Overall very poor prognosis given this patients overall picture. Would suggest the primary team continuing to discuss with the  her overall condition and possibly consider palliative care consult.       Brie Arzate M.D.  [facility] Pulmonary/Critical Care Fellow

## 2018-11-22 NOTE — ASSESSMENT & PLAN NOTE
Avoid insulin stacking and hypoglycemia.  Lab Results   Component Value Date    CREATININE 1.7 (H) 11/21/2018

## 2018-11-22 NOTE — ASSESSMENT & PLAN NOTE
-  Listed for liver transplant with MELD 27. Per hepatologist, remains on internal hold 2/2 initially for PNA, but now for frailty and elevated PA pressure. Continue to monitor.  -  Plan to discuss pt candidacy once resp status, nutrition, and physical mobility improve. Need repeat RHC for elevated PA pressure. Cards following    MELD-Na score: 28 at 11/22/2018  3:50 AM  MELD score: 28 at 11/22/2018  3:50 AM  Calculated from:  Serum Creatinine: 1.8 mg/dL at 11/22/2018  3:50 AM  Serum Sodium: 145 mmol/L (Rounded to 137 mmol/L) at 11/22/2018  3:50 AM  Total Bilirubin: 6.2 mg/dL at 11/22/2018  3:50 AM  INR(ratio): 2.2 at 11/22/2018  3:50 AM  Age: 67 years

## 2018-11-22 NOTE — PROCEDURES
"Krystal Hobson is a 67 y.o. female patient.    Temp: 97.6 °F (36.4 °C) (11/21/18 1900)  Pulse: 60 (11/21/18 1947)  Resp: 14 (11/21/18 1929)  BP: (!) 151/65 (11/21/18 1929)  SpO2: 100 % (11/21/18 1947)  Weight: 54.6 kg (120 lb 5.9 oz) (11/21/18 0400)  Height: 4' 10" (147.3 cm) (11/20/18 4836)       Arterial Line  Date/Time: 11/21/2018 8:09 PM  Location procedure was performed: [facility] SURGICAL ICU (SICU)  Performed by: Dakota Sol MD  Authorized by: Dakota Sol MD   Pre-op Diagnosis: Acute Respiratory Failure  Post-operative diagnosis: Acute Respiratory Failure  Consent Done: Emergent Situation  Indications: multiple ABGs, respiratory failure and hemodynamic monitoring  Location: right radial  Description of findings: US guided    Needle gauge: 20  Seldinger technique: Seldinger technique used  Number of attempts: 1  Post-procedure: line sutured and dressing applied  Post-procedure CMS: normal  Patient tolerance: Patient tolerated the procedure well with no immediate complications          Dakota Sol  11/21/2018  "

## 2018-11-22 NOTE — ASSESSMENT & PLAN NOTE
Caution with insulin stacking  Estimated Creatinine Clearance: 27.7 mL/min (A) (based on SCr of 1.7 mg/dL (H)).

## 2018-11-22 NOTE — PROGRESS NOTES
Patient transferred to SICU report given to blank MEJÍA at the bedside.  Patient sats low 80s on NRB, will intubate.  Patients  aware of transfer. Unable to place central line due to patient moving and HR dropping into the 40s.

## 2018-11-22 NOTE — PROGRESS NOTES
"[facility]  Endocrinology  Progress Note    Admit Date: 11/10/2018     Reason for Consult: Management of type 2 DM, Hyperglycemia     Surgical Procedure and Date: n/a    Diabetes diagnosis year: 2001    Home Diabetes Medications: Levemir 12 units BID (vial) and Humalog SSI with meals   Lab Results   Component Value Date    HGBA1C 5.8 (H) 11/10/2018         How often checking glucose at home? 1-3  BG readings on regimen: 150-300  Hypoglycemia on the regimen? once 27; required visit to ED; gave Levemir and humalog and patient did not eat  Missed doses on regimen?  No    Diabetes Complications include:     Hyperglycemia, Hypoglycemia  and Diabetic peripheral neuropathy     Complicating diabetes co morbidities:   CIRRHOSIS      HPI:   Patient is a 67 y.o. female with a diagnosis of type 2 DM; well controlled on MDI. Also with   Cryptogenic cirrhosis, rheumatoid arthritis, and GERD. Patient was listed for liver transplant on 9/14/18. Patient presented to ED of hospital in [place] with AMS and ENZO. Endocrinology consulted for BG/ DM management.   Of note, profound hypoglycemic event (BG < 20) the night of 11/14/18.            Interval HPI:   Overnight events: remains in TSU. Patient lethargic today. Patient moaning in bed.  at bedside updated.  Patient more confused overnight. BG above goal; required correction scale.   Eating: variable   Nausea: No  Hypoglycemia and intervention: No  Fever: No  TPN and/or TF: No    BP (!) 147/65   Pulse 67   Temp 98.1 °F (36.7 °C) (Oral)   Resp (!) 24   Ht 4' 10" (1.473 m)   Wt 54.6 kg (120 lb 5.9 oz)   SpO2 (!) 94%   Breastfeeding? No   BMI 25.16 kg/m²      Labs Reviewed and Include    Recent Labs   Lab 11/21/18  0727   *   CALCIUM 9.0   ALBUMIN 3.7   PROT 5.7*      K 3.2*   CO2 20*      BUN 82*   CREATININE 1.6*   ALKPHOS 162*   ALT 33   AST 47*   BILITOT 7.3*     Lab Results   Component Value Date    WBC 12.53 11/21/2018    HGB " 8.3 (L) 11/21/2018    HCT 26.7 (L) 11/21/2018     (H) 11/21/2018     (L) 11/21/2018     No results for input(s): TSH, FREET4 in the last 168 hours.  Lab Results   Component Value Date    HGBA1C 5.8 (H) 11/10/2018       Nutritional status:   Body mass index is 25.16 kg/m².  Lab Results   Component Value Date    ALBUMIN 3.7 11/21/2018    ALBUMIN 3.8 11/20/2018    ALBUMIN 3.5 11/20/2018     No results found for: PREALBUMIN    Estimated Creatinine Clearance: 29.4 mL/min (A) (based on SCr of 1.6 mg/dL (H)).    Accu-Checks  Recent Labs     11/19/18  2300 11/19/18  2301 11/20/18  0209 11/20/18  0821 11/20/18  1437 11/20/18  1757 11/20/18  2024 11/20/18  2213 11/21/18  0154 11/21/18  0728   POCTGLUCOSE 477* 219* 208* 152* 290* 284* 348* 287* 197* 192*       Current Medications and/or Treatments Impacting Glycemic Control  Immunotherapy:    Immunosuppressants     None        Steroids:   Hormones (From admission, onward)    None        Pressors:    Autonomic Drugs (From admission, onward)    None        Hyperglycemia/Diabetes Medications:   Antihyperglycemics (From admission, onward)    Start     Stop Route Frequency Ordered    11/20/18 1645  insulin aspart U-100 pen 4-6 Units      -- SubQ 3 times daily with meals 11/20/18 1624    11/18/18 0900  insulin detemir U-100 pen 14 Units      -- SubQ Daily 11/17/18 0912    11/15/18 0041  insulin aspart U-100 pen 0-5 Units      -- SubQ Before meals & nightly PRN 11/14/18 2341          ASSESSMENT and PLAN    * Hepatic encephalopathy    Managed per primary.        Type 2 diabetes mellitus with hyperglycemia, with long-term current use of insulin    BG goal 140-180    increase Levemir to 16 units in AM  continue Novolog to 4-6 units with meals given variable meal intake; hold scheduled insulin when having broth as meal.   Low dose correction scale  BG monitoring AC/HS/0200    ADDENDUM: discontinue scheduled novolog given intubated and npo.     ** Please call Endocrine for  any BG related issues **    Discharge planning: TBD       Decompensated hepatic cirrhosis    Managed per primary.   Listed for liver transplant.  May impact BG readings       CKD (chronic kidney disease) stage 3, GFR 30-59 ml/min    Caution with insulin stacking  Estimated Creatinine Clearance: 29.4 mL/min (A) (based on SCr of 1.6 mg/dL (H)).         Acute kidney injury superimposed on CKD    Avoid insulin stacking and hypoglycemia.  Lab Results   Component Value Date    CREATININE 1.6 (H) 11/21/2018            Pneumonia involving left lung    Infection may elevate BG readings  On IV antibiotics           Jadyn Hall NP  Endocrinology  [facility]

## 2018-11-22 NOTE — ASSESSMENT & PLAN NOTE
Acute component likely from toxic/ischemic ATN, making urine, but borderline oliguric, acid/base and lytes ok, volume status stable    Plan/Recommendations:  -no RRT for now  -continue treatment of underlying infection/sepsis  -if felt more volume then could resume loop diuretics

## 2018-11-22 NOTE — PROGRESS NOTES
UCHE Muro notified of patients shallow breathing, vitals stable, currently on 2L-RA sats greater than 94%.  Patient unable to swallow whole medications, speech consult placed.  Patient refusing to take all lactulose and resp txs.  UCHE Muro at the bedside. Lactulose enema given.

## 2018-11-22 NOTE — PROGRESS NOTES
Dr. Sol notified of pt's SBP in mid 90s. Levo gtt restarted @.02mcg/kg/min.  Notified pt's UOP for past 2hrs: 10cc. Orders given to administer 500ml of albumin.

## 2018-11-22 NOTE — SUBJECTIVE & OBJECTIVE
Interval History:  Patient over the last 24 hrs has been moved to the unit out concern for worsening sepsis secondary to her JUAN PNA. Intubated as the patient was hypoxic. Central line placed. CVP is 16. SVO2 is 68. CO is 6.54 and CI is 4.36. SVR is 636. On levo 0.15.     Continuous Infusions:   norepinephrine bitartrate-D5W 0.15 mcg/kg/min (11/22/18 0922)    propofol 10 mcg/kg/min (11/22/18 0900)     Scheduled Meds:   guaiFENesin  600 mg Oral BID    heparin (porcine)  5,000 Units Subcutaneous Q8H    insulin detemir U-100  18 Units Subcutaneous Daily    lactulose  30 g Oral TID    levalbuterol  1.25 mg Nebulization Q6H WAKE    magnesium oxide  400 mg Oral BID    metoprolol tartrate  25 mg Oral BID    piperacillin-tazobactam (ZOSYN) IVPB  4.5 g Intravenous Q8H    rifAXImin  550 mg Oral BID    vancomycin (VANCOCIN) IVPB  1,000 mg Intravenous Q24H     PRN Meds:acetaminophen, benzonatate, dextrose 50%, glucagon (human recombinant), guaifenesin 100 mg/5 ml, insulin aspart U-100, lactulose, lactulose, ondansetron, simethicone, sodium chloride 0.9%    Review of patient's allergies indicates:  No Known Allergies  Objective:     Vital Signs (Most Recent):  Temp: 97.5 °F (36.4 °C) (11/22/18 0900)  Pulse: (!) 52 (11/22/18 0915)  Resp: (!) 23 (11/22/18 0806)  BP: (!) 96/47 (11/22/18 0900)  SpO2: 100 % (11/22/18 0915) Vital Signs (24h Range):  Temp:  [94.5 °F (34.7 °C)-97.7 °F (36.5 °C)] 97.5 °F (36.4 °C)  Pulse:  [47-81] 52  Resp:  [4-31] 23  SpO2:  [81 %-100 %] 100 %  BP: ()/(43-73) 96/47  Arterial Line BP: ()/(29-52) 101/33     Patient Vitals for the past 72 hrs (Last 3 readings):   Weight   11/22/18 0400 54.5 kg (120 lb 2.4 oz)   11/21/18 0400 54.6 kg (120 lb 5.9 oz)   11/20/18 0736 54.4 kg (120 lb)     Body mass index is 25.11 kg/m².      Intake/Output Summary (Last 24 hours) at 11/22/2018 0940  Last data filed at 11/22/2018 0900  Gross per 24 hour   Intake 899 ml   Output 694 ml   Net 205 ml        Hemodynamic Parameters:       EKG NSR currently.    Physical Exam   Constitutional: She is oriented to person, place, and time. She appears well-developed.   HENT:   Head: Normocephalic and atraumatic.   intubated   Eyes: EOM are normal. Pupils are equal, round, and reactive to light.   Neck: Normal range of motion. Neck supple. JVD (CVP 16) present.   Cardiovascular: Normal rate and regular rhythm.   Pulmonary/Chest: Effort normal. She has no rales.   Bronchial breath sounds noted on LLL   Abdominal: Soft. Bowel sounds are normal.   Musculoskeletal: Normal range of motion. She exhibits no edema.   Neurological: She is alert and oriented to person, place, and time.   Skin: Skin is warm.   Vitals reviewed.      Significant Labs:  CBC:  Recent Labs   Lab 11/21/18 1939 11/21/18 2336 11/22/18  0350   WBC 14.19* 12.76* 8.35   RBC 2.63* 2.20* 2.06*   HGB 8.5* 6.9* 6.7*   HCT 25.9* 21.3* 20.5*   * 104* 83*   MCV 99* 97 100*   MCH 32.3* 31.4* 32.5*   MCHC 32.8 32.4 32.7     BNP:  Recent Labs   Lab 11/21/18  0727   BNP 1,892*     CMP:  Recent Labs   Lab 11/21/18 1939 11/21/18 2336 11/22/18  0350   * 187* 178*   CALCIUM 8.5* 8.6* 8.6*   ALBUMIN 3.4* 3.5 3.5   PROT 5.4* 5.1* 5.0*    144 145   K 3.9 3.6 3.5   CO2 21* 20* 20*   * 112* 113*   BUN 84* 84* 85*   CREATININE 1.7* 1.8* 1.8*   ALKPHOS 163* 128 123   ALT 31 23 25   AST 48* 36 35   BILITOT 6.4* 5.9* 6.2*      Coagulation:   Recent Labs   Lab 11/21/18  0728 11/21/18  1348 11/22/18  0350   INR 2.7* 1.9* 2.2*     LDH:  No results for input(s): LDH in the last 72 hours.  Microbiology:  Microbiology Results (last 7 days)     Procedure Component Value Units Date/Time    Blood culture [[id number]] Collected:  11/21/18 1452    Order Status:  Completed Specimen:  Blood Updated:  11/22/18 0115     Blood Culture, Routine No Growth to date    Narrative:       Collection has been rescheduled by EMANUEL at 11/21/2018 13:50 Reason:   unable to draw second  set of cultures. made Eric aware of   failure to collect second set.  Collection has been rescheduled by CBE at 11/21/2018 14:12 Reason:   ALFONZO Thomas said to come back later   Collection has been rescheduled by JWNEY at 11/21/2018 13:50 Reason:   unable to draw second set of cultures. made Eric aware of   failure to collect second set.  Collection has been rescheduled by CBE at 11/21/2018 14:12 Reason:   ALFONZO Thomas said to come back later     Blood culture [[id number]] Collected:  11/21/18 1348    Order Status:  Completed Specimen:  Blood Updated:  11/21/18 2145     Blood Culture, Routine No Growth to date    Urine culture [[id number]] Collected:  11/21/18 1448    Order Status:  Sent Specimen:  Urine, Clean Catch Updated:  11/21/18 1633    Blood culture [[id number]] Collected:  11/14/18 2141    Order Status:  Completed Specimen:  Blood Updated:  11/19/18 2312     Blood Culture, Routine No growth after 5 days.    Blood culture [[id number]] Collected:  11/14/18 2141    Order Status:  Completed Specimen:  Blood Updated:  11/19/18 2312     Blood Culture, Routine No growth after 5 days.    Blood culture - site #1 [[id number]] Collected:  11/10/18 0719    Order Status:  Completed Specimen:  Blood Updated:  11/15/18 1012     Blood Culture, Routine No growth after 5 days.    Blood culture - site #2 [[id number]] Collected:  11/10/18 0718    Order Status:  Completed Specimen:  Blood Updated:  11/15/18 1012     Blood Culture, Routine No growth after 5 days.          I have reviewed all pertinent labs within the past 24 hours.    Estimated Creatinine Clearance: 26.1 mL/min (A) (based on SCr of 1.8 mg/dL (H)).    Diagnostic Results:  I have reviewed all pertinent imaging results/findings within the past 24 hours.

## 2018-11-22 NOTE — ANESTHESIA PROCEDURE NOTES
Intubation    Diagnosis: Acute Hypercapnic Respiratory Failure, Encephalopathy  Patient location during procedure: ICU  Procedure start time: 11/21/2018 6:34 PM  Timeout: 11/21/2018 6:34 PM  Procedure end time: 11/21/2018 6:36 PM  Staffing  Anesthesiologist: Isak Rogers MD  Resident/CRNA: Jason Fry MD  Performed: resident/CRNA   Anesthesiologist was present at the time of the procedure.  Preanesthetic Checklist  Completed: patient identified, timeout performed, IV checked and monitors and equipment checked  Intubation  Indication: respiratory failure, hypercapnia, airway protection  Pre-oxygenation. Induction: intravenous rapid sequence, mask ventilation: n/a.  Intubation: postinduction, laryngoscopy glidescope, Alo 3.  Endotracheal Tube: oral, 7.0 mm ID, cuffed (inflated to minimal occlusive pressure)  Attempts: 1, Grade I - full view of cords  Complicating Factors: none  Tube secured at 21 cm at the teeth.  Findings post-intubation: bilateral breath sounds, positive ETCO2, atraumatic / condition of teeth unchanged  Position Confirmation: auscultation  Additional Notes  Emergent. Propofol 80mg, Succinylcholine 120mg, Phenylephrine 150mcg. Upper Dentures removed. Pre-oxygenated with NRB, ventilation not attempted.

## 2018-11-22 NOTE — SUBJECTIVE & OBJECTIVE
Interval History: making urine, stable labs    Review of patient's allergies indicates:  No Known Allergies  Current Facility-Administered Medications   Medication Frequency    acetaminophen tablet 650 mg Q8H PRN    benzonatate capsule 100 mg TID PRN    dextrose 50% injection 12.5 g PRN    glucagon (human recombinant) injection 1 mg PRN    guaifenesin 100 mg/5 ml syrup 200 mg Q4H PRN    guaiFENesin 12 hr tablet 600 mg BID    heparin (porcine) injection 5,000 Units Q8H    insulin aspart U-100 pen 0-5 Units Q6H PRN    insulin detemir U-100 pen 18 Units Daily    lactulose 10 gram/15 mL solution (enema) 200 g TID PRN    lactulose 10 gram/15 mL solution (enema) 200 g Once    lactulose 20 gram/30 mL solution Soln 30 g TID    lactulose 20 gram/30 mL solution Soln 30 g Q6H PRN    levalbuterol nebulizer solution 1.25 mg Q6H WAKE    magnesium oxide tablet 400 mg BID    metoprolol tartrate (LOPRESSOR) tablet 25 mg BID    norepinephrine 4 mg in dextrose 5% 250 mL infusion (premix) (titrating) Continuous    ondansetron disintegrating tablet 8 mg Q8H PRN    piperacillin-tazobactam 4.5 g in sodium chloride 0.9% 100 mL IVPB (ready to mix system) Q8H    propofol (DIPRIVAN) 10 mg/mL infusion Continuous    rifAXIMin tablet 550 mg BID    simethicone chewable tablet 80 mg TID PRN    sodium chloride 0.9% flush 3 mL PRN    vancomycin in dextrose 5 % 1 gram/250 mL IVPB 1,000 mg Q24H       Objective:     Vital Signs (Most Recent):  Temp: 97.4 °F (36.3 °C) (11/22/18 1100)  Pulse: (!) 56 (11/22/18 1200)  Resp: (!) 23 (11/22/18 0806)  BP: (!) 113/56 (11/22/18 1200)  SpO2: 96 % (11/22/18 1200)  O2 Device (Oxygen Therapy): ventilator (11/22/18 1200) Vital Signs (24h Range):  Temp:  [94.5 °F (34.7 °C)-97.6 °F (36.4 °C)] 97.4 °F (36.3 °C)  Pulse:  [47-72] 56  Resp:  [4-31] 23  SpO2:  [81 %-100 %] 96 %  BP: ()/(43-73) 113/56  Arterial Line BP: ()/(29-52) 119/43     Weight: 54.5 kg (120 lb 2.4 oz) (11/22/18  0400)  Body mass index is 25.11 kg/m².  Body surface area is 1.49 meters squared.    I/O last 3 completed shifts:  In: 1269 [P.O.:370; I.V.:339; NG/GT:210; IV Piggyback:350]  Out: 780 [Urine:427; Drains:350; Stool:3]    Physical Exam   Constitutional: She is oriented to person, place, and time.   HENT:   Head: Atraumatic.   Neck: No JVD present.   Cardiovascular: Normal rate and regular rhythm.   Pulmonary/Chest: Effort normal and breath sounds normal.   Abdominal: Soft. She exhibits no distension. There is tenderness (around incision site). There is no rebound and no guarding.   Musculoskeletal: She exhibits edema. She exhibits no tenderness.   Neurological: She is alert and oriented to person, place, and time.   Skin: Skin is warm.

## 2018-11-22 NOTE — PLAN OF CARE
Discussed plan of care with Dr. Marquez. Patient is currently septic and concern for infection and potential HRS.     Recommendations:  - Would try to keep CVP at 10-12 and agree with holding diuresis at this time. CVP is 16 and SVO2 is 68. SVR is 636. And CO/CI is 6.5/1.5.  - Will defer RHC currently  - Will defer treatment of PNA to primary team    If any questions arise or concerns please feel free to contact us,    Bebeto Baxter MD, PGY-5  Cardiology fellow [facility]

## 2018-11-22 NOTE — SUBJECTIVE & OBJECTIVE
Stepped up to SICU yesterday evening given respiratory acidosis and need for closer monitoring. Intubated on arrival, remains on minimal vent settings. Levo at 0.15 this morning. UOP minimal overnight.     Scheduled Meds:   guaiFENesin  600 mg Oral BID    heparin (porcine)  5,000 Units Subcutaneous Q8H    insulin detemir U-100  18 Units Subcutaneous Daily    lactulose  200 g Rectal Once    lactulose  30 g Oral TID    levalbuterol  1.25 mg Nebulization Q6H WAKE    magnesium oxide  400 mg Oral BID    metoprolol tartrate  25 mg Oral BID    piperacillin-tazobactam (ZOSYN) IVPB  4.5 g Intravenous Q8H    rifAXImin  550 mg Oral BID    vancomycin (VANCOCIN) IVPB  1,000 mg Intravenous Q24H     Continuous Infusions:   norepinephrine bitartrate-D5W 0.16 mcg/kg/min (11/22/18 1300)    propofol 10 mcg/kg/min (11/22/18 1300)     PRN Meds:acetaminophen, benzonatate, dextrose 50%, glucagon (human recombinant), guaifenesin 100 mg/5 ml, insulin aspart U-100, lactulose, lactulose, ondansetron, simethicone, sodium chloride 0.9%    Review of Systems   Unable to perform ROS: Intubated     Objective:     Vital Signs (Most Recent):  Temp: 97.4 °F (36.3 °C) (11/22/18 1100)  Pulse: (!) 55 (11/22/18 1343)  Resp: (!) 23 (11/22/18 1345)  BP: (!) 90/47 (11/22/18 1300)  SpO2: 97 % (11/22/18 1343) Vital Signs (24h Range):  Temp:  [94.5 °F (34.7 °C)-97.6 °F (36.4 °C)] 97.4 °F (36.3 °C)  Pulse:  [47-72] 55  Resp:  [4-31] 23  SpO2:  [81 %-100 %] 97 %  BP: ()/(43-73) 90/47  Arterial Line BP: ()/(29-52) 117/43     Weight: 54.5 kg (120 lb 2.4 oz)  Body mass index is 25.11 kg/m².    Intake/Output - Last 3 Shifts       11/20 0700 - 11/21 0659 11/21 0700 - 11/22 0659 11/22 0700 - 11/23 0659    P.O. 730 100     I.V. (mL/kg)  339 (6.2)     NG/GT  210 60    IV Piggyback  350     Total Intake(mL/kg) 730 (13.4) 999 (18.3) 60 (1.1)    Urine (mL/kg/hr) 301 (0.2) 417 (0.3) 45 (0.1)    Emesis/NG output       Drains  350     Stool 2 3 0     Total Output 303 770 45    Net +427 +229 +15           Urine Occurrence 4 x      Stool Occurrence 1 x 4 x 1 x    Emesis Occurrence 0 x            Physical Exam   Constitutional: She is oriented to person, place, and time. She appears well-developed.   HENT:   Head: Normocephalic and atraumatic.   intubated   Eyes: EOM are normal. Pupils are equal, round, and reactive to light.   Neck: Normal range of motion. Neck supple. JVD (CVP 16) present.   Cardiovascular: Normal rate and regular rhythm.   Pulmonary/Chest: Effort normal. She has no rales.   Bronchial breath sounds noted on LLL   Abdominal: Soft. Bowel sounds are normal.   Musculoskeletal: Normal range of motion. She exhibits no edema.   Neurological: She is alert and oriented to person, place, and time.   Skin: Skin is warm.   Vitals reviewed.      Laboratory:  Immunosuppressants     None        CBC:   Recent Labs   Lab 11/22/18  0350   WBC 8.35   RBC 2.06*   HGB 6.7*   HCT 20.5*   PLT 83*   *   MCH 32.5*   MCHC 32.7     CMP:   Recent Labs   Lab 11/22/18  0350   *   CALCIUM 8.6*   ALBUMIN 3.5   PROT 5.0*      K 3.5   CO2 20*   *   BUN 85*   CREATININE 1.8*   ALKPHOS 123   ALT 25   AST 35   BILITOT 6.2*     Coagulation:   Recent Labs   Lab 11/22/18  0350   INR 2.2*     Cardiac Markers: No results for input(s): CKMB, TROPONINT, MYOGLOBIN in the last 168 hours.  ABGs:   Recent Labs   Lab 11/22/18  0738   PH 7.343*   PCO2 43.5   HCO3 23.6*   POCSATURATED 64*   BE -2     Labs within the past 24 hours have been reviewed.    Diagnostic Results:  I have personally reviewed all pertinent imaging studies.

## 2018-11-22 NOTE — PROGRESS NOTES
Dr. Sol notified of pt becoming bradycardic in 48-50s. SBP In 120s.   Orders to abtain EKG obtained.

## 2018-11-22 NOTE — CARE UPDATE
BG goal 140-180    increase Levemir to 18 units daily.  Low dose correction scale  BG monitoring every 6 hours      ** Please call Endocrine for any BG related issues **

## 2018-11-22 NOTE — PROCEDURES
"Krystal Hobson is a 67 y.o. female patient.    Temp: 97.6 °F (36.4 °C) (11/21/18 1900)  Pulse: 61 (11/21/18 1929)  Resp: 14 (11/21/18 1929)  BP: (!) 151/65 (11/21/18 1929)  SpO2: 100 % (11/21/18 1929)  Weight: 54.6 kg (120 lb 5.9 oz) (11/21/18 0400)  Height: 4' 10" (147.3 cm) (11/20/18 2536)       Central Line  Date/Time: 11/21/2018 8:00 PM  Location procedure was performed: [facility] (SICU)  Performed by: Dakota Sol MD  Pre-operative Diagnosis: Acute Respiratory Failure  Post-operative diagnosis: Acute Respiratory Failure  Consent Done: Yes  Time out: Immediately prior to procedure a "time out" was called to verify the correct patient, procedure, equipment, support staff and site/side marked as required.  Indications: vascular access, hemodynamic monitoring and hemodialysis  Description of findings: Patent Left IJ vein, wire thread easily.  Left IJ chosen due to recent Right IJ stick    Preparation: skin prepped with ChloraPrep  Sterile barriers: all five maximum sterile barriers used - cap, mask, sterile gown, sterile gloves, and large sterile sheet  Hand hygiene: hand hygiene performed prior to central venous catheter insertion  Location details: left internal jugular  Catheter type: trialysis  Catheter size: 12 Fr  Catheter Length: 16cm    Ultrasound guidance: yes  Vessel Caliber: large, patent, compressibility normal  Needle advanced into vessel with real time Ultrasound guidance.  Guidewire confirmed in vessel.  Sterile sheath used.  Number of attempts: 1  Assessment: placement verified by x-ray,  no pneumothorax on x-ray and successful placement  Complications: none  Post-procedure: line sutured,  chlorhexidine patch,  sterile dressing applied and blood return through all ports  Complications: No          Dakota Sol  11/21/2018  "

## 2018-11-22 NOTE — PROGRESS NOTES
[facility]  Nephrology  Progress Note    Patient Name: Krystal Hobson  MRN: [id number]  Admission Date: 11/10/2018  Hospital Length of Stay: 12 days  Attending Provider: Abrahan Montelongo MD   Primary Care Physician: Courtney Joe MD  Principal Problem:Hepatic encephalopathy    Subjective:     HPI: Reason for consult: Diuresis in setting of PAP46, pulmonary edema, HRS    Ms. 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  - She presented to [facility] as xfer from [place] under liver transplant team due to encephalopathy and new onset ENZO.   - Per the , she was extremely disoriented @ OSH and her ammonia reached as high as 200, and her Cr level increased to 2.0 She also went into A fib with RVR and was started on a diltiazem gtt at OSH.   - On arrival at [facility] she was disoriented and started on lactulose with noted improvement. During her current admission on CXR they found a concern for HCAP and she was started on broad spectrum ABx and then shifted her on Moxifloxacin 7 D course. On 11/20/2018 found on CXR a concern for Lt lung middle lobe consolidation and they started her on vancomycin and zosyn.  - Current renal fxn is baseline Cr 1.6, however BUN has been uptrending from 50s to 85 over several days. No known UGIB.    Interval History: making urine, stable labs    Review of patient's allergies indicates:  No Known Allergies  Current Facility-Administered Medications   Medication Frequency    acetaminophen tablet 650 mg Q8H PRN    benzonatate capsule 100 mg TID PRN    dextrose 50% injection 12.5 g PRN    glucagon (human recombinant) injection 1 mg PRN    guaifenesin 100 mg/5 ml syrup 200 mg Q4H PRN    guaiFENesin 12 hr tablet 600 mg BID    heparin (porcine) injection 5,000 Units Q8H    insulin aspart U-100 pen 0-5 Units Q6H PRN    insulin detemir U-100 pen 18 Units Daily    lactulose 10 gram/15 mL solution (enema) 200 g TID PRN    lactulose 10  gram/15 mL solution (enema) 200 g Once    lactulose 20 gram/30 mL solution Soln 30 g TID    lactulose 20 gram/30 mL solution Soln 30 g Q6H PRN    levalbuterol nebulizer solution 1.25 mg Q6H WAKE    magnesium oxide tablet 400 mg BID    metoprolol tartrate (LOPRESSOR) tablet 25 mg BID    norepinephrine 4 mg in dextrose 5% 250 mL infusion (premix) (titrating) Continuous    ondansetron disintegrating tablet 8 mg Q8H PRN    piperacillin-tazobactam 4.5 g in sodium chloride 0.9% 100 mL IVPB (ready to mix system) Q8H    propofol (DIPRIVAN) 10 mg/mL infusion Continuous    rifAXIMin tablet 550 mg BID    simethicone chewable tablet 80 mg TID PRN    sodium chloride 0.9% flush 3 mL PRN    vancomycin in dextrose 5 % 1 gram/250 mL IVPB 1,000 mg Q24H       Objective:     Vital Signs (Most Recent):  Temp: 97.4 °F (36.3 °C) (11/22/18 1100)  Pulse: (!) 56 (11/22/18 1200)  Resp: (!) 23 (11/22/18 0806)  BP: (!) 113/56 (11/22/18 1200)  SpO2: 96 % (11/22/18 1200)  O2 Device (Oxygen Therapy): ventilator (11/22/18 1200) Vital Signs (24h Range):  Temp:  [94.5 °F (34.7 °C)-97.6 °F (36.4 °C)] 97.4 °F (36.3 °C)  Pulse:  [47-72] 56  Resp:  [4-31] 23  SpO2:  [81 %-100 %] 96 %  BP: ()/(43-73) 113/56  Arterial Line BP: ()/(29-52) 119/43     Weight: 54.5 kg (120 lb 2.4 oz) (11/22/18 0400)  Body mass index is 25.11 kg/m².  Body surface area is 1.49 meters squared.    I/O last 3 completed shifts:  In: 1269 [P.O.:370; I.V.:339; NG/GT:210; IV Piggyback:350]  Out: 780 [Urine:427; Drains:350; Stool:3]    Physical Exam   Constitutional: She is oriented to person, place, and time.   HENT:   Head: Atraumatic.   Neck: No JVD present.   Cardiovascular: Normal rate and regular rhythm.   Pulmonary/Chest: Effort normal and breath sounds normal.   Abdominal: Soft. She exhibits no distension. There is tenderness (around incision site). There is no rebound and no guarding.   Musculoskeletal: She exhibits edema. She exhibits no tenderness.    Neurological: She is alert and oriented to person, place, and time.   Skin: Skin is warm.           Assessment/Plan:     Acute kidney injury superimposed on CKD    Acute component likely from toxic/ischemic ATN, making urine, but borderline oliguric, acid/base and lytes ok, volume status stable    Plan/Recommendations:  -no RRT for now  -continue treatment of underlying infection/sepsis  -if felt more volume then could resume loop diuretics           Thank you for your consult. I will follow-up with patient. Please contact us if you have any additional questions.    Ifeanyi Schumacher MD  Nephrology  [facility]

## 2018-11-22 NOTE — PT/OT/SLP PROGRESS
Speech Language Pathology Discharge Summary      Krystal Hobson  MRN: [id number]    Patient not seen today secondary to Patient transferred to ICU for acute respiratory failure and intubated. ST to await new orders.       Emily P. Abadie M.S., CCC-SLP  Speech Language Pathologist  [phone]  11/22/2018

## 2018-11-22 NOTE — PROGRESS NOTES
[facility]  Liver Transplant  Progress Note    Patient Name: Krystal Hobson  MRN: [id number]  Admission Date: 11/10/2018  Hospital Length of Stay: 12 days  Code Status: Full Code  Primary Care Provider: Courtney Joe MD    Subjective:     Stepped up to SICU yesterday evening given respiratory acidosis and need for closer monitoring. Intubated on arrival, remains on minimal vent settings. Levo at 0.15 this morning. UOP minimal overnight.     Scheduled Meds:   guaiFENesin  600 mg Oral BID    heparin (porcine)  5,000 Units Subcutaneous Q8H    insulin detemir U-100  18 Units Subcutaneous Daily    lactulose  200 g Rectal Once    lactulose  30 g Oral TID    levalbuterol  1.25 mg Nebulization Q6H WAKE    magnesium oxide  400 mg Oral BID    metoprolol tartrate  25 mg Oral BID    piperacillin-tazobactam (ZOSYN) IVPB  4.5 g Intravenous Q8H    rifAXImin  550 mg Oral BID    vancomycin (VANCOCIN) IVPB  1,000 mg Intravenous Q24H     Continuous Infusions:   norepinephrine bitartrate-D5W 0.16 mcg/kg/min (11/22/18 1300)    propofol 10 mcg/kg/min (11/22/18 1300)     PRN Meds:acetaminophen, benzonatate, dextrose 50%, glucagon (human recombinant), guaifenesin 100 mg/5 ml, insulin aspart U-100, lactulose, lactulose, ondansetron, simethicone, sodium chloride 0.9%    Review of Systems   Unable to perform ROS: Intubated     Objective:     Vital Signs (Most Recent):  Temp: 97.4 °F (36.3 °C) (11/22/18 1100)  Pulse: (!) 55 (11/22/18 1343)  Resp: (!) 23 (11/22/18 1345)  BP: (!) 90/47 (11/22/18 1300)  SpO2: 97 % (11/22/18 1343) Vital Signs (24h Range):  Temp:  [94.5 °F (34.7 °C)-97.6 °F (36.4 °C)] 97.4 °F (36.3 °C)  Pulse:  [47-72] 55  Resp:  [4-31] 23  SpO2:  [81 %-100 %] 97 %  BP: ()/(43-73) 90/47  Arterial Line BP: ()/(29-52) 117/43     Weight: 54.5 kg (120 lb 2.4 oz)  Body mass index is 25.11 kg/m².    Intake/Output - Last 3 Shifts       11/20 0700 - 11/21 0659 11/21 0700 - 11/22 0659 11/22 0700 -  11/23 0659    P.O. 730 100     I.V. (mL/kg)  339 (6.2)     NG/GT  210 60    IV Piggyback  350     Total Intake(mL/kg) 730 (13.4) 999 (18.3) 60 (1.1)    Urine (mL/kg/hr) 301 (0.2) 417 (0.3) 45 (0.1)    Emesis/NG output       Drains  350     Stool 2 3 0    Total Output 303 770 45    Net +427 +229 +15           Urine Occurrence 4 x      Stool Occurrence 1 x 4 x 1 x    Emesis Occurrence 0 x            Physical Exam   Constitutional: She is oriented to person, place, and time. She appears well-developed.   HENT:   Head: Normocephalic and atraumatic.   intubated   Eyes: EOM are normal. Pupils are equal, round, and reactive to light.   Neck: Normal range of motion. Neck supple. JVD (CVP 16) present.   Cardiovascular: Normal rate and regular rhythm.   Pulmonary/Chest: Effort normal. She has no rales.   Bronchial breath sounds noted on LLL   Abdominal: Soft. Bowel sounds are normal.   Musculoskeletal: Normal range of motion. She exhibits no edema.   Neurological: She is alert and oriented to person, place, and time.   Skin: Skin is warm.   Vitals reviewed.      Laboratory:  Immunosuppressants     None        CBC:   Recent Labs   Lab 11/22/18  0350   WBC 8.35   RBC 2.06*   HGB 6.7*   HCT 20.5*   PLT 83*   *   MCH 32.5*   MCHC 32.7     CMP:   Recent Labs   Lab 11/22/18  0350   *   CALCIUM 8.6*   ALBUMIN 3.5   PROT 5.0*      K 3.5   CO2 20*   *   BUN 85*   CREATININE 1.8*   ALKPHOS 123   ALT 25   AST 35   BILITOT 6.2*     Coagulation:   Recent Labs   Lab 11/22/18  0350   INR 2.2*     Cardiac Markers: No results for input(s): CKMB, TROPONINT, MYOGLOBIN in the last 168 hours.  ABGs:   Recent Labs   Lab 11/22/18  0738   PH 7.343*   PCO2 43.5   HCO3 23.6*   POCSATURATED 64*   BE -2     Labs within the past 24 hours have been reviewed.    Diagnostic Results:  I have personally reviewed all pertinent imaging studies.    Assessment/Plan:   Krysatl Hobson is a 67 y.o. female     Pulmonary   Acute respiratory  failure with hypoxia    68yo F liver-listed, stepped up overnight for respiratory failure with worsening respiratory acidosis     Plan:    Neuro:   -Pain control: prn fentanyl while intubates  -Sedation with propofol while intubated  -Daily sedation vacations    Pulmonary:   -intubated on arrival to SICU last night  Vent Mode: A/C  Oxygen Concentration (%):  [] 30  Resp Rate Total:  [14 br/min-36 br/min] 24 br/min  Vt Set:  [300 mL] 300 mL  PEEP/CPAP:  [5 cmH20] 5 cmH20  Mean Airway Pressure:  [8.4 gbL48-64 cmH20] 11 cmH20  -daily CXR - evidence of significant bilateral consolidation   -ABGs daily and prn  -continuous pulse ox   -Daily SBT    Cardiac:  -SBP > 100 goal  -Currently requiring levo @ 0.15 for support, wean as tolerated  -Heart failure following, appreciate recs     Renal:   -Mendoza in place  -UOP minimal in last 24hrs  -Bun/Cr trending up, continue to monitor  -Nephrology following, appreciate recommendations, may need diuresis vs. Dialysis to help with volume clearance    Fluids/Electrolytes/Nutrition/GI:   -Nutritional status: NPO while intubated  -replace lytes PRN  -Lactulose enemas TID    Hematology/Oncology:  -H/H 6.7/20.5 from Hgb 6.9 - likely dilutional in setting of resuscitation with albumin, would consider giving unit pRBCs if able to remove some volume with CVP 16  -INR 2.2, increased from 1.9, plts 83 from 104  -Anticoagulation: [facility]    Infectious Disease:   -Afebrile  -WBC 8, continue to monitor   -Abx: Vanc/Zosyn     Endocrine:  -Glucose goal of 140-180  -Endocrine following, see recommendations    Dispo:  -Continue care in the ICU setting         Pneumonia involving left lung    - Noted on recent chest xray.  Initially placed on BS antibxs for treatment.    - Transitioned to PO Moxi (11/13) to complete a 7 day course (11/18).   - Pt with elevated WBC on lab work 11/14 and placed back on BS antibxs at that time.   - WBC then with quick improvement.  - cough and SOB improving  - Repeat  CXR today with persistent JUAN opacification, denies SOB but visibly tachypneic and using accessory muscles  - Pulmonology consulted  - Start treatment for HCAP with vanc/zosyn     Cardiac/Vascular   Sinus tachycardia    - Worsened with albuterol, improved with BB.   - remains in NSR. Continue tele.     Atrial fibrillation    -  Patient with episode of Afib with RVR at OSH and was placed on diltiazem gtt which was discontinued 11/10 as she reverted to NSR.  Cardiology consulted for management.   -  Pt then returned back into Afib w/ RVR on the afternoon of 11/11 and pt placed back on diltiazem for rate control.   -  Pt now rated controlled and diltiazem d/c'd on 11/12 at 0200. Cont with lopressor at this time as pt remains rate controlled.       Renal/   Hypokalemia    - continue to monitor closely while diuresing. Replete PRN.   - repeat CMP in afternoon     Hypervolemia    - TTE 11/12 with PA pressure > 55  - Repeat echo 11/20 with PA pressure 46  - HTS consulted for RHC at the end of the week at the earliest  - continue to diurese -- lasix 40mg IV x 2 doses  - BNP 11/21 was 1892     CKD (chronic kidney disease) stage 3, GFR 30-59 ml/min    - See ENZO.  Stable.     Acute kidney injury superimposed on CKD    - CKD 3-4 at baseline creatinine. Urine electrolytes ordered. Etiology possibly due to hepatorenal syndrome.   - HRS protocol started on admission, albumin and octreotide. No midodrine as with Afib.   - Cr since admission has improved  - Dosing lasix daily based on labs -- Lasix 40mg IV x 2 doses today  - Must weigh daily and cont strict I&Os.      Hematology   Thrombocytopenia due to hypersplenism    - No overt s/s of bleeding.  Continue to monitor.      Oncology   Anemia of chronic disease    - H/H stable. Continue to monitor with daily cbc.   - no overt signs of bleeding     Endocrine   Type 2 diabetes mellitus with hyperglycemia, with long-term current use of insulin    - Basal and prandial insulin ordered.  Endocrine consulted for management. Apprec recs.   - Pt noted with significant hypoglycemic event the evening of 11/14, BG 8. Pt responded well to IV dextrose.   - blood glucose has remained stable and diabetic current regimen.       Protein-calorie malnutrition, moderate    - Dietician completed calorie count.  Patient eating % of trays.  Will d/c calorie count.  Pt drinking 2-3 supplement drinks daily.       GI   * Hepatic encephalopathy    - Acute on chronic. PO lactulose ordered, continue rifaximin. Titrate Lactulose to maintain 3-4 BM a day. PRN lactulose enema TID for worsening confusion.  - currently pt is AAOx4, no encephalopathy noted.  Mentation is delayed.       Chronic liver disease           End stage liver disease    -  Listed for liver transplant with MELD 27. Per hepatologist, remains on internal hold 2/2 initially for PNA, but now for frailty and elevated PA pressure. Continue to monitor.  -  Plan to discuss pt candidacy once resp status, nutrition, and physical mobility improve. Need repeat RHC for elevated PA pressure. Cards following    MELD-Na score: 28 at 11/22/2018  3:50 AM  MELD score: 28 at 11/22/2018  3:50 AM  Calculated from:  Serum Creatinine: 1.8 mg/dL at 11/22/2018  3:50 AM  Serum Sodium: 145 mmol/L (Rounded to 137 mmol/L) at 11/22/2018  3:50 AM  Total Bilirubin: 6.2 mg/dL at 11/22/2018  3:50 AM  INR(ratio): 2.2 at 11/22/2018  3:50 AM  Age: 67 years       The patients clinical status was discussed at multidisplinary rounds, involving transplant surgery, transplant medicine, pharmacy, nursing, nutrition, and social work.    Lien Angela MD  Liver Transplant  [facility]

## 2018-11-22 NOTE — PLAN OF CARE
Pt vented on AC, 40%, & 5 PEEP.  Pt bradycardic throughout night; EKG obtained.  SBP maintained >100.  CVP: 12-16.  Gtts include: Propofol  @ 10mcg/kg/min, & Levo currently @ 0.14mcg/kg/min.  500cc Albumin given.  UOP minimal.  Pt had 3 BMs.  OGT: 250cc green output. Pt moving extremities & following commands on sedation vacation. Pt restrained for safety. Pt turned Q2 to prevent skin breakdown. Plan of care reviewed w/pt & spouse; all questions answered.

## 2018-11-22 NOTE — ASSESSMENT & PLAN NOTE
66yo F liver-listed, stepped up overnight for respiratory failure with worsening respiratory acidosis     Plan:    Neuro:   -Pain control: prn fentanyl while intubates  -Sedation with propofol while intubated  -Daily sedation vacations    Pulmonary:   -intubated on arrival to SICU last night  Vent Mode: A/C  Oxygen Concentration (%):  [] 30  Resp Rate Total:  [14 br/min-36 br/min] 24 br/min  Vt Set:  [300 mL] 300 mL  PEEP/CPAP:  [5 cmH20] 5 cmH20  Mean Airway Pressure:  [8.4 jbD97-41 cmH20] 11 cmH20  -daily CXR - evidence of significant bilateral consolidation   -ABGs daily and prn  -continuous pulse ox   -Daily SBT    Cardiac:  -SBP > 100 goal  -Currently requiring levo @ 0.15 for support, wean as tolerated  -Heart failure following, appreciate recs     Renal:   -Mendoza in place  -UOP minimal in last 24hrs  -Bun/Cr trending up, continue to monitor  -Nephrology following, appreciate recommendations, may need diuresis vs. Dialysis to help with volume clearance    Fluids/Electrolytes/Nutrition/GI:   -Nutritional status: NPO while intubated  -replace lytes PRN  -Lactulose enemas TID    Hematology/Oncology:  -H/H 6.7/20.5 from Hgb 6.9 - likely dilutional in setting of resuscitation with albumin, would consider giving unit pRBCs if able to remove some volume with CVP 16  -INR 2.2, increased from 1.9, plts 83 from 104  -Anticoagulation: [facility]    Infectious Disease:   -Afebrile  -WBC 8, continue to monitor   -Abx: Vanc/Zosyn     Endocrine:  -Glucose goal of 140-180  -Endocrine following, see recommendations    Dispo:  -Continue care in the ICU setting

## 2018-11-22 NOTE — ASSESSMENT & PLAN NOTE
BG goal 140-180    Decrease Levemir to 14 units daily.  Low dose correction scale  BG monitoring every 6 hours    Discharge planning: TBD

## 2018-11-23 PROBLEM — Z78.9 ON ENTERAL NUTRITION: Status: ACTIVE | Noted: 2018-01-01

## 2018-11-23 NOTE — PT/OT/SLP DISCHARGE
Occupational Therapy Discharge Summary    Krystal Hobson  MRN: [id number]   Principal Problem: Hepatic encephalopathy      Patient Discharged from acute Occupational Therapy on 11/23.  Please refer to prior OT note dated 11/20 for functional status.    Assessment:     Transfer to ICU 11/21    Objective:     GOALS:   Multidisciplinary Problems     Occupational Therapy Goals        Problem: Occupational Therapy Goal    Goal Priority Disciplines Outcome Interventions   Occupational Therapy Goal     OT, PT/OT Ongoing (interventions implemented as appropriate)    Description:  Goals to be met by: 12/4/18    Patient will increase functional independence with ADLs by performing:    Feeding with Supervision.  UE Dressing with Minimal Assistance.  LE Dressing with Minimal Assistance.  Grooming while standing at sink with Contact Guard Assistance.  Toileting from toilet with Minimal Assistance for hygiene and clothing management.   Supine to sit with Supervision.  Toilet transfer to toilet with Minimal Assistance.  Upper extremity exercise program per handout, with independence.                      Reasons for Discontinuation of Therapy Services  Transfer to alternate level of care.      Plan:     Transfer to ICU; please re-consult therapy services when medically appropriate.     Brianne machado, OT  11/23/2018

## 2018-11-23 NOTE — PT/OT/SLP DISCHARGE
Physical Therapy Discharge Summary    Name: Krystal Hobson  MRN: [id number]   Principal Problem: Hepatic encephalopathy     Patient Discharged from acute Physical Therapy on 18.  Please refer to prior PT noted date on 18 for functional status.     Assessment:     Patient appropriate for care in another setting.    Objective:     GOALS:   Multidisciplinary Problems     Physical Therapy Goals        Problem: Physical Therapy Goal    Goal Priority Disciplines Outcome Goal Variances Interventions   Physical Therapy Goal     PT, PT/OT Ongoing (interventions implemented as appropriate)     Description:  Goals to be met by: 18     Patient will increase functional independence with mobility by performin. Supine to sit with Stand-by Assistance  2. Sit to stand transfer with Stand-by Assistance met  3. Gait  x 150 feet with Contact Guard Assistance using AD as needed. met  4. Lower extremity exercise program x15 reps, with supervision, in order to increase LE strength and (I) with functional mobility.                      PT will await further orders  Reasons for Discontinuation of Therapy Services  Transfer to alternate level of care.      Plan:     Patient Discharged to: ICU.    Daly Toth, PT  2018

## 2018-11-23 NOTE — ASSESSMENT & PLAN NOTE
Cryptogenic cirrhosis awaiting liver xplant, presented for AMS and found to have ENZO on CKDIIIb. Cr worsening since admission. Acute component likely from toxic/ischemic ATN (FENa 0.4% suggesting pre-renal component), making urine but oliguric, acid/base and lytes ok, volume status stable    Lab Results   Component Value Date    CREATININE 2.1 (H) 11/23/2018    CREATININE 2.1 (H) 11/22/2018    CREATININE 1.8 (H) 11/22/2018    CREATININE 1.8 (H) 11/21/2018    CREATININE 1.7 (H) 11/21/2018    CREATININE 1.9 (H) 11/21/2018    CREATININE 1.6 (H) 11/21/2018    CREATININE 1.7 (H) 11/20/2018         Plan/Recommendations:  -no RRT for now  -continue treatment of underlying infection/sepsis  - 100 IV lasix today at noon, if UOP doesn't  by 6pm today recommend additional bolus 160mg IV Lasix + 500mg IV chlorothiazide given current CVP 12 and hypervolemic state w/ anasarca and pulmonary edema. Continue pressors as needed for adequate MAP. If still no urinary response, can consider HD tomorrow

## 2018-11-23 NOTE — PROGRESS NOTES
[facility]  Liver Transplant  Progress Note    Patient Name: Krystal Hobson  MRN: [id number]  Admission Date: 11/10/2018  Hospital Length of Stay: 13 days  Code Status: Full Code  Primary Care Provider: Courtney Joe MD    Subjective:     No acute events overnight, remains on minimal vent settings. Afebrile, levo at 0.02 this morning. UOP only 160cc over 24hrs.     Scheduled Meds:   famotidine  20 mg Oral QHS    [START ON 11/24/2018] fluconazole  200 mg Oral Daily    furosemide  100 mg Intravenous Once    guaifenesin 100 mg/5 ml  200 mg Per OG tube Q6H    heparin (porcine)  5,000 Units Subcutaneous Q8H    insulin detemir U-100  14 Units Subcutaneous Daily    lactulose  200 g Rectal Once    lactulose  30 g Oral TID    levalbuterol  1.25 mg Nebulization Q6H WAKE    piperacillin-tazobactam (ZOSYN) IVPB  4.5 g Intravenous Q12H    rifAXImin  550 mg Oral BID     Continuous Infusions:   norepinephrine bitartrate-D5W 0.02 mcg/kg/min (11/23/18 1100)    propofol 20 mcg/kg/min (11/23/18 1100)     PRN Meds:sodium chloride, acetaminophen, benzonatate, dextrose 50%, glucagon (human recombinant), guaifenesin 100 mg/5 ml, insulin aspart U-100, lactulose, lactulose, ondansetron, simethicone, sodium chloride 0.9%    Review of Systems   Unable to perform ROS: Intubated     Objective:     Vital Signs (Most Recent):  Temp: 98.7 °F (37.1 °C) (11/23/18 1100)  Pulse: 60 (11/23/18 1115)  Resp: 18 (11/23/18 0708)  BP: (!) 91/46 (11/23/18 1100)  SpO2: 99 % (11/23/18 1115) Vital Signs (24h Range):  Temp:  [97.7 °F (36.5 °C)-98.7 °F (37.1 °C)] 98.7 °F (37.1 °C)  Pulse:  [49-69] 60  Resp:  [18-23] 18  SpO2:  [91 %-100 %] 99 %  BP: ()/(45-88) 91/46  Arterial Line BP: ()/(31-45) 104/35     Weight: 56.7 kg (125 lb 0 oz)  Body mass index is 26.13 kg/m².    Intake/Output - Last 3 Shifts       11/21 0700 - 11/22 0659 11/22 0700 - 11/23 0659 11/23 0700 - 11/24 0659    P.O. 100      I.V. (mL/kg) 339 (6.2) 1314  (23.2)     NG/ 210 150    IV Piggyback 350      Total Intake(mL/kg) 999 (18.3) 1524 (26.9) 150 (2.6)    Urine (mL/kg/hr) 417 (0.3) 185 (0.1) 40 (0.2)    Drains 350 250     Stool 3 0     Total Output 770 435 40    Net +229 +1089 +110           Stool Occurrence 4 x 4 x           Physical Exam   Constitutional: She is oriented to person, place, and time. She appears well-developed.   HENT:   Head: Normocephalic and atraumatic.   intubated   Eyes: EOM are normal. Pupils are equal, round, and reactive to light.   Neck: Normal range of motion. Neck supple. JVD (CVP 16) present.   Cardiovascular: Normal rate and regular rhythm.   Pulmonary/Chest: Effort normal. She has no rales.   Bronchial breath sounds noted on LLL   Abdominal: Soft. Bowel sounds are normal.   Musculoskeletal: Normal range of motion. She exhibits no edema.   Neurological: She is alert and oriented to person, place, and time.   Skin: Skin is warm.   Vitals reviewed.      Laboratory:  Immunosuppressants     None        CBC:   Recent Labs   Lab 11/23/18  0303   WBC 6.26   RBC 2.02*   HGB 6.5*   HCT 20.3*   PLT 80*   *   MCH 32.2*   MCHC 32.0     CMP:   Recent Labs   Lab 11/23/18  0303      CALCIUM 8.3*   ALBUMIN 3.0*   PROT 4.7*      K 3.3*   CO2 18*   *   BUN 89*   CREATININE 2.1*   ALKPHOS 115   ALT 22   AST 37   BILITOT 6.1*     Coagulation:   Recent Labs   Lab 11/23/18  0303   INR 2.3*     Cardiac Markers: No results for input(s): CKMB, TROPONINT, MYOGLOBIN in the last 168 hours.  ABGs:   Recent Labs   Lab 11/23/18  0804   PH 7.457*   PCO2 30.0*   HCO3 21.2*   POCSATURATED 95   BE -3     Labs within the past 24 hours have been reviewed.    Diagnostic Results:  I have personally reviewed all pertinent imaging studies.    Assessment/Plan:   Krystal Hobson is a 67 y.o. female     Acute respiratory failure with hypoxia    66yo F liver-listed, stepped up overnight for respiratory failure with worsening respiratory acidosis      Plan:    Neuro:   -Pain control: prn fentanyl while intubates  -Sedation with propofol while intubated  -Daily sedation vacations    Pulmonary:   -intubated on arrival to SICU 11/21  Vent Mode: A/C  Oxygen Concentration (%):  [29-99] 99  Resp Rate Total:  [18 br/min-36 br/min] 28 br/min  Vt Set:  [300 mL] 300 mL  PEEP/CPAP:  [5 cmH20] 5 cmH20  Mean Airway Pressure:  [8.2 anE65-99 cmH20] 12 cmH20  -daily CXR - evidence of significant bilateral consolidation   -ABGs daily and prn  -continuous pulse ox   -Daily SBT    Cardiac:  -SBP > 100 goal  -Currently requiring levo @ 0.02 for support, wean as tolerated  -Heart failure following, appreciate recs     Renal:   -Mendoza in place  -UOP minimal in last 24hrs  -Bun/Cr trending up, continue to monitor  -Nephrology following, appreciate recommendations, likely will require dialysis today    Fluids/Electrolytes/Nutrition/GI:   -Nutritional status: NPO while intubated  -Will start TFs today @ 10cc/hr  -replace lytes PRN  -Lactulose enemas TID    Hematology/Oncology:  -Hgb 6.5 from 6.7 yesterday - likely dilutional in setting of resuscitation with albumin, but will give 1 unit pRBCs followed by lasix 100mg per nephrology   -INR 2.3, plts 80  -Anticoagulation: SQH    Infectious Disease:   -Afebrile  -WBC 6.26, continue to monitor   -Abx: Vanc/Zosyn     Endocrine:  -Glucose goal of 140-180  -Endocrine following, see recommendations    Dispo:  -Continue care in the ICU setting, likely dialysis today            The patients clinical status was discussed at multidisplinary rounds, involving transplant surgery, transplant medicine, pharmacy, nursing, nutrition, and social work.    Lien Angela MD  Liver Transplant  [facility]

## 2018-11-23 NOTE — TELEPHONE ENCOUNTER
PATIENT NAME: Krystal Hobson  [facility] #: [id number]    Lab Results   Component Value Date    CREATININE 2.1 (H) 11/23/2018     11/23/2018    BILITOT 6.1 (H) 11/23/2018    ALBUMIN 3.0 (L) 11/23/2018    INR 2.3 (H) 11/23/2018     MELD 30  Encephalopathy: 3 - 4  Ascites: slight  Dialysis: no     Recertification requestor: Julia Herrera

## 2018-11-23 NOTE — SUBJECTIVE & OBJECTIVE
"Interval HPI:   Overnight events: Transferred to ICU 2 days ago. Remains intubated. BG at or below goal with basal insulin. Creatinine remains elevated at 2.1.   Eating:   NPO  Hypoglycemia and intervention: No  Fever: No  TF: isosource at 10 cc/hr (advance to goal of 40 cc/hr)    BP (!) 109/51   Pulse 61   Temp 97.7 °F (36.5 °C) (Oral)   Resp 18   Ht 4' 10" (1.473 m)   Wt 56.7 kg (125 lb 0 oz)   SpO2 97%   Breastfeeding? No   BMI 26.13 kg/m²     Labs Reviewed and Include    Recent Labs   Lab 11/23/18  0303      CALCIUM 8.3*   ALBUMIN 3.0*   PROT 4.7*      K 3.3*   CO2 18*   *   BUN 89*   CREATININE 2.1*   ALKPHOS 115   ALT 22   AST 37   BILITOT 6.1*     Lab Results   Component Value Date    WBC 6.26 11/23/2018    HGB 6.5 (L) 11/23/2018    HCT 20.3 (L) 11/23/2018     (H) 11/23/2018    PLT 80 (L) 11/23/2018     No results for input(s): TSH, FREET4 in the last 168 hours.  Lab Results   Component Value Date    HGBA1C 5.8 (H) 11/10/2018       Nutritional status:   Body mass index is 26.13 kg/m².  Lab Results   Component Value Date    ALBUMIN 3.0 (L) 11/23/2018    ALBUMIN 3.2 (L) 11/22/2018    ALBUMIN 3.5 11/22/2018     No results found for: PREALBUMIN    Estimated Creatinine Clearance: 23.3 mL/min (A) (based on SCr of 2.1 mg/dL (H)).    Accu-Checks  Recent Labs     11/21/18  0728 11/21/18  1147 11/21/18  1450 11/21/18  1638 11/21/18  2340 11/22/18  0529 11/22/18  1156 11/22/18  1754 11/22/18  2330 11/23/18  0539   POCTGLUCOSE 192* 237* 250* 216* 225* 177* 190* 172* 116* 90       Current Medications and/or Treatments Impacting Glycemic Control  Immunotherapy:    Immunosuppressants     None        Steroids:   Hormones (From admission, onward)    None        Pressors:    Autonomic Drugs (From admission, onward)    Start     Stop Route Frequency Ordered    11/21/18 1900  norepinephrine 4 mg in dextrose 5% 250 mL infusion (premix) (titrating)     Question Answer Comment   Titrate by: (in " mcg/kg/min) 0.02    Titrate interval: (in minutes) 5    Titrate to maintain: (MAP or SBP) MAP    Greater than: (in mmHg) 65    Maximum dose: (in mcg/kg/min) 3        -- IV Continuous 11/21/18 1845        Hyperglycemia/Diabetes Medications:   Antihyperglycemics (From admission, onward)    Start     Stop Route Frequency Ordered    11/22/18 0900  insulin detemir U-100 pen 18 Units      -- SubQ Daily 11/22/18 0814    11/21/18 1955  insulin aspart U-100 pen 0-5 Units      -- SubQ Every 6 hours PRN 11/21/18 4402

## 2018-11-23 NOTE — SUBJECTIVE & OBJECTIVE
Past Medical History:   Diagnosis Date    Cancer     breat Cancer    Cirrhosis     Diabetes mellitus     GERD (gastroesophageal reflux disease)     Rheumatoid arthritis      Interval History: Oliguric 180mL/24hrs. xfer to ICU 11/21 PM for hypercapneic resp failure, intubated + central line + art line. CXR w/ worsening diffuse interstitial edema. WCC and lactate trending down on vanc/zosyn    Review of patient's allergies indicates:  No Known Allergies  Current Facility-Administered Medications   Medication Frequency    0.9%  NaCl infusion (for blood administration) Q24H PRN    acetaminophen tablet 650 mg Q8H PRN    benzonatate capsule 100 mg TID PRN    dextrose 50% injection 12.5 g PRN    famotidine tablet 20 mg QHS    [START ON 11/24/2018] fluconazole tablet 200 mg Daily    glucagon (human recombinant) injection 1 mg PRN    guaifenesin 100 mg/5 ml syrup 200 mg Q4H PRN    guaifenesin 100 mg/5 ml syrup 200 mg Q6H    heparin (porcine) injection 5,000 Units Q8H    insulin aspart U-100 pen 0-5 Units Q6H PRN    insulin detemir U-100 pen 14 Units Daily    lactulose 10 gram/15 mL solution (enema) 200 g TID PRN    lactulose 10 gram/15 mL solution (enema) 200 g Once    lactulose 20 gram/30 mL solution Soln 30 g TID    lactulose 20 gram/30 mL solution Soln 30 g Q6H PRN    levalbuterol nebulizer solution 1.25 mg Q6H WAKE    norepinephrine 4 mg in dextrose 5% 250 mL infusion (premix) (titrating) Continuous    ondansetron disintegrating tablet 8 mg Q8H PRN    piperacillin-tazobactam 4.5 g in sodium chloride 0.9% 100 mL IVPB (ready to mix system) Q12H    propofol (DIPRIVAN) 10 mg/mL infusion Continuous    rifAXIMin tablet 550 mg BID    simethicone chewable tablet 80 mg TID PRN    sodium chloride 0.9% flush 3 mL PRN       Objective:     Vital Signs (Most Recent):  Temp: 97.7 °F (36.5 °C) (11/23/18 1300)  Pulse: 65 (11/23/18 1400)  Resp: (!) 26 (11/23/18 1300)  BP: (!) 102/51 (11/23/18 1400)  SpO2: 98 %  (11/23/18 1400)  O2 Device (Oxygen Therapy): ventilator (11/23/18 1300) Vital Signs (24h Range):  Temp:  [97.7 °F (36.5 °C)-98.7 °F (37.1 °C)] 97.7 °F (36.5 °C)  Pulse:  [52-69] 65  Resp:  [18-26] 26  SpO2:  [91 %-100 %] 98 %  BP: ()/(45-88) 102/51  Arterial Line BP: (101-136)/(31-50) 121/43     Weight: 56.7 kg (125 lb 0 oz) (11/23/18 0400)  Body mass index is 26.13 kg/m².  Body surface area is 1.52 meters squared.    I/O last 3 completed shifts:  In: 2073 [I.V.:1653; NG/GT:420]  Out: 752 [Urine:252; Drains:500]    Physical Exam   Constitutional: She is oriented to person, place, and time.   HENT:   Head: Atraumatic.   Neck: No JVD present.   Cardiovascular: Normal rate and regular rhythm.   Pulmonary/Chest: Effort normal and breath sounds normal.   Abdominal: Soft. She exhibits no distension. There is tenderness (around incision site). There is no rebound and no guarding.   Musculoskeletal: She exhibits edema. She exhibits no tenderness.   Neurological: She is alert and oriented to person, place, and time.   Skin: Skin is warm.           Past Surgical History:   Procedure Laterality Date    CARPAL TUNNEL RELEASE      left wrist    CHOLECYSTECTOMY      MASTECTOMY      left breast 25  years ago       Review of patient's allergies indicates:  No Known Allergies  Current Facility-Administered Medications   Medication Frequency    acetaminophen tablet 650 mg Q8H PRN    benzonatate capsule 100 mg TID PRN    dextrose 50% injection 12.5 g PRN    glucagon (human recombinant) injection 1 mg PRN    guaifenesin 100 mg/5 ml syrup 200 mg Q4H PRN    guaiFENesin 12 hr tablet 600 mg BID    heparin (porcine) injection 5,000 Units Q8H    insulin aspart U-100 pen 0-5 Units Q6H PRN    insulin detemir U-100 pen 14 Units Daily    lactulose 10 gram/15 mL solution (enema) 200 g TID PRN    lactulose 10 gram/15 mL solution (enema) 200 g Once    lactulose 20 gram/30 mL solution Soln 30 g TID    lactulose 20 gram/30 mL  solution Soln 30 g Q6H PRN    levalbuterol nebulizer solution 1.25 mg Q6H WAKE    magnesium oxide tablet 400 mg BID    metoprolol tartrate (LOPRESSOR) tablet 25 mg BID    norepinephrine 4 mg in dextrose 5% 250 mL infusion (premix) (titrating) Continuous    ondansetron disintegrating tablet 8 mg Q8H PRN    piperacillin-tazobactam 4.5 g in sodium chloride 0.9% 100 mL IVPB (ready to mix system) Q8H    propofol (DIPRIVAN) 10 mg/mL infusion Continuous    rifAXIMin tablet 550 mg BID    simethicone chewable tablet 80 mg TID PRN    sodium chloride 0.9% flush 3 mL PRN    vancomycin in dextrose 5 % 1 gram/250 mL IVPB 1,000 mg Q24H     Family History     Problem Relation (Age of Onset)    COPD Sister    Heart disease Mother    Liver disease Father, Sister        Tobacco Use    Smoking status: Never Smoker    Tobacco comment: patient denies   Substance and Sexual Activity    Alcohol use: No     Comment: stopped 10/17    Drug use: No     Comment: patient denies    Sexual activity: Not on file     Review of Systems   Unable to perform ROS: Mental status change   Constitutional: Positive for activity change and fatigue.   Respiratory: Positive for cough and shortness of breath.    Cardiovascular: Positive for leg swelling.     Objective:     Vital Signs (Most Recent):  Temp: 97.7 °F (36.5 °C) (11/23/18 0700)  Pulse: 61 (11/23/18 0945)  Resp: 18 (11/23/18 0708)  BP: (!) 104/51 (11/23/18 0945)  SpO2: 100 % (11/23/18 0945)  O2 Device (Oxygen Therapy): ventilator (11/23/18 0900) Vital Signs (24h Range):  Temp:  [97.4 °F (36.3 °C)-98.1 °F (36.7 °C)] 97.7 °F (36.5 °C)  Pulse:  [49-69] 61  Resp:  [18-23] 18  SpO2:  [91 %-100 %] 100 %  BP: ()/(45-88) 104/51  Arterial Line BP: ()/(31-45) 116/37     Weight: 56.7 kg (125 lb 0 oz) (11/23/18 0400)  Body mass index is 26.13 kg/m².  Body surface area is 1.52 meters squared.    I/O last 3 completed shifts:  In: 2073 [I.V.:1653; NG/GT:420]  Out: 752 [Urine:252;  Drains:500]    Physical Exam   Constitutional: No distress.   Sedated/Intubated, diffusely jaundiced, cachectic and anasarcic appearance   HENT:   Head: Normocephalic and atraumatic.   Eyes: Conjunctivae are normal. Scleral icterus is present.   Neck: Neck supple. No JVD present. No tracheal deviation present.   Cardiovascular: Normal rate and normal heart sounds. Exam reveals no gallop and no friction rub.   Pulmonary/Chest: No stridor. She has no wheezes. She has no rales. She exhibits no tenderness.   On vent, mechanical breath sounds   Abdominal: Soft. Bowel sounds are normal. She exhibits no mass. There is no tenderness. There is no rebound and no guarding.   Musculoskeletal: She exhibits edema (mild diffuse anasarca of distal limbs). She exhibits no tenderness.   Neurological:   Encephalopathic, briefly rousable to loud voice or physical stimulus, one word responses yes and no to basic questions.    Skin: Skin is warm and dry. She is not diaphoretic. No erythema.   Vitals reviewed.      Significant Labs:  CBC:   Recent Labs   Lab 11/23/18  0303   WBC 6.26   RBC 2.02*   HGB 6.5*   HCT 20.3*   PLT 80*   *   MCH 32.2*   MCHC 32.0     CMP:   Recent Labs   Lab 11/23/18  0303      CALCIUM 8.3*   ALBUMIN 3.0*   PROT 4.7*      K 3.3*   CO2 18*   *   BUN 89*   CREATININE 2.1*   ALKPHOS 115   ALT 22   AST 37   BILITOT 6.1*     Recent Labs   Lab 11/21/18  1448   COLORU Effie   SPECGRAV 1.015   PHUR 5.0   PROTEINUA 1+*   BACTERIA Occasional   NITRITE Negative   LEUKOCYTESUR Negative   HYALINECASTS 56*     All labs within the past 24 hours have been reviewed.    Significant Imaging:  Labs: Reviewed

## 2018-11-23 NOTE — ASSESSMENT & PLAN NOTE
68yo F liver-listed, stepped up overnight for respiratory failure with worsening respiratory acidosis     Plan:    Neuro:   -Pain control: prn fentanyl while intubates  -Sedation with propofol while intubated  -Daily sedation vacations    Pulmonary:   -intubated on arrival to SICU 11/21  Vent Mode: A/C  Oxygen Concentration (%):  [29-99] 99  Resp Rate Total:  [18 br/min-36 br/min] 28 br/min  Vt Set:  [300 mL] 300 mL  PEEP/CPAP:  [5 cmH20] 5 cmH20  Mean Airway Pressure:  [8.2 gnV47-68 cmH20] 12 cmH20  -daily CXR - evidence of significant bilateral consolidation   -ABGs daily and prn  -continuous pulse ox   -Daily SBT    Cardiac:  -SBP > 100 goal  -Currently requiring levo @ 0.02 for support, wean as tolerated  -Heart failure following, appreciate recs     Renal:   -Mendoza in place  -UOP minimal in last 24hrs  -Bun/Cr trending up, continue to monitor  -Nephrology following, appreciate recommendations, likely will require dialysis today    Fluids/Electrolytes/Nutrition/GI:   -Nutritional status: NPO while intubated  -Will start TFs today @ 10cc/hr  -replace lytes PRN  -Lactulose enemas TID    Hematology/Oncology:  -Hgb 6.5 from 6.7 yesterday - likely dilutional in setting of resuscitation with albumin, but will give 1 unit pRBCs followed by lasix 100mg per nephrology   -INR 2.3, plts 80  -Anticoagulation: SQH    Infectious Disease:   -Afebrile  -WBC 6.26, continue to monitor   -Abx: Vanc/Zosyn     Endocrine:  -Glucose goal of 140-180  -Endocrine following, see recommendations    Dispo:  -Continue care in the ICU setting, likely dialysis today

## 2018-11-23 NOTE — PLAN OF CARE
Problem: Patient Care Overview  Goal: Plan of Care Review  Outcome: Ongoing (interventions implemented as appropriate)  Pt remains intubated, A/C ventilation, 30%FiO2 5 PEEP, sats 100%  Afebrile, HR sinus sinan  Levo gtt infusing to maintain SBP>100  Propofol gtt infusing for sedation  Mendoza intact, 5-10mL UOP per hour  BMx2  CVP 16, 11, 12 flat  OG in place, 100mL green biliary drainage per shift  Accuchecks q6, no coverage provided  Potassium replaced per orders  Liver ultrasound completed at bedside, see results review  With sedation off, pt follows commands, moves all extremities  Pt turned q2 to prevent skin breakdown, free from falls/injury over shift  Plan of care reviewed with pt and spouse at bedside, all questions answered  Will continue to monitor

## 2018-11-23 NOTE — PROGRESS NOTES
"[facility]  Endocrinology  Progress Note    Admit Date: 11/10/2018     Reason for Consult: Management of type 2 DM, Hyperglycemia     Surgical Procedure and Date: n/a    Diabetes diagnosis year: 2001    Home Diabetes Medications: Levemir 12 units BID (vial) and Humalog SSI with meals   Lab Results   Component Value Date    HGBA1C 5.8 (H) 11/10/2018         How often checking glucose at home? 1-3  BG readings on regimen: 150-300  Hypoglycemia on the regimen? once 27; required visit to ED; gave Levemir and humalog and patient did not eat  Missed doses on regimen?  No    Diabetes Complications include:     Hyperglycemia, Hypoglycemia  and Diabetic peripheral neuropathy     Complicating diabetes co morbidities:   CIRRHOSIS      HPI:   Patient is a 67 y.o. female with a diagnosis of type 2 DM; well controlled on MDI. Also with   Cryptogenic cirrhosis, rheumatoid arthritis, and GERD. Patient was listed for liver transplant on 9/14/18. Patient presented to ED of hospital in [place] with AMS and ENZO. Endocrinology consulted for BG/ DM management.   Of note, profound hypoglycemic event (BG < 20) the night of 11/14/18.            Interval HPI:   Overnight events: Transferred to ICU 2 days ago. Remains intubated. BG at or below goal with basal insulin. Creatinine remains elevated at 2.1.   Eating:   NPO  Hypoglycemia and intervention: No  Fever: No  TF: isosource at 10 cc/hr (advance to goal of 40 cc/hr)    BP (!) 109/51   Pulse 61   Temp 97.7 °F (36.5 °C) (Oral)   Resp 18   Ht 4' 10" (1.473 m)   Wt 56.7 kg (125 lb 0 oz)   SpO2 97%   Breastfeeding? No   BMI 26.13 kg/m²      Labs Reviewed and Include    Recent Labs   Lab 11/23/18  0303      CALCIUM 8.3*   ALBUMIN 3.0*   PROT 4.7*      K 3.3*   CO2 18*   *   BUN 89*   CREATININE 2.1*   ALKPHOS 115   ALT 22   AST 37   BILITOT 6.1*     Lab Results   Component Value Date    WBC 6.26 11/23/2018    HGB 6.5 (L) 11/23/2018    HCT 20.3 " (L) 11/23/2018     (H) 11/23/2018    PLT 80 (L) 11/23/2018     No results for input(s): TSH, FREET4 in the last 168 hours.  Lab Results   Component Value Date    HGBA1C 5.8 (H) 11/10/2018       Nutritional status:   Body mass index is 26.13 kg/m².  Lab Results   Component Value Date    ALBUMIN 3.0 (L) 11/23/2018    ALBUMIN 3.2 (L) 11/22/2018    ALBUMIN 3.5 11/22/2018     No results found for: PREALBUMIN    Estimated Creatinine Clearance: 23.3 mL/min (A) (based on SCr of 2.1 mg/dL (H)).    Accu-Checks  Recent Labs     11/21/18  0728 11/21/18  1147 11/21/18  1450 11/21/18  1638 11/21/18  2340 11/22/18  0529 11/22/18  1156 11/22/18  1754 11/22/18  2330 11/23/18  0539   POCTGLUCOSE 192* 237* 250* 216* 225* 177* 190* 172* 116* 90       Current Medications and/or Treatments Impacting Glycemic Control  Immunotherapy:    Immunosuppressants     None        Steroids:   Hormones (From admission, onward)    None        Pressors:    Autonomic Drugs (From admission, onward)    Start     Stop Route Frequency Ordered    11/21/18 1900  norepinephrine 4 mg in dextrose 5% 250 mL infusion (premix) (titrating)     Question Answer Comment   Titrate by: (in mcg/kg/min) 0.02    Titrate interval: (in minutes) 5    Titrate to maintain: (MAP or SBP) MAP    Greater than: (in mmHg) 65    Maximum dose: (in mcg/kg/min) 3        -- IV Continuous 11/21/18 1845        Hyperglycemia/Diabetes Medications:   Antihyperglycemics (From admission, onward)    Start     Stop Route Frequency Ordered    11/22/18 0900  insulin detemir U-100 pen 18 Units      -- SubQ Daily 11/22/18 0814    11/21/18 1955  insulin aspart U-100 pen 0-5 Units      -- SubQ Every 6 hours PRN 11/21/18 1855          ASSESSMENT and PLAN    * Hepatic encephalopathy    Managed per primary.        Type 2 diabetes mellitus with hyperglycemia, with long-term current use of insulin    BG goal 140-180    Decrease Levemir to 14 units daily.  Low dose correction scale  BG monitoring every  6 hours    Discharge planning: TBD       Decompensated hepatic cirrhosis    Managed per primary.   Listed for liver transplant.  May impact BG readings       CKD (chronic kidney disease) stage 3, GFR 30-59 ml/min    Caution with insulin stacking  Estimated Creatinine Clearance: 27.7 mL/min (A) (based on SCr of 1.7 mg/dL (H)).         Acute kidney injury superimposed on CKD    Avoid insulin stacking and hypoglycemia.  Lab Results   Component Value Date    CREATININE 1.7 (H) 11/21/2018            Pneumonia involving left lung    Infection may elevate BG readings  On IV antibiotics           Jadyn Hall NP  Endocrinology  [facility]

## 2018-11-23 NOTE — SUBJECTIVE & OBJECTIVE
No acute events overnight, remains on minimal vent settings. Afebrile, levo at 0.02 this morning. UOP only 160cc over 24hrs.     Scheduled Meds:   famotidine  20 mg Oral QHS    [START ON 11/24/2018] fluconazole  200 mg Oral Daily    furosemide  100 mg Intravenous Once    guaifenesin 100 mg/5 ml  200 mg Per OG tube Q6H    heparin (porcine)  5,000 Units Subcutaneous Q8H    insulin detemir U-100  14 Units Subcutaneous Daily    lactulose  200 g Rectal Once    lactulose  30 g Oral TID    levalbuterol  1.25 mg Nebulization Q6H WAKE    piperacillin-tazobactam (ZOSYN) IVPB  4.5 g Intravenous Q12H    rifAXImin  550 mg Oral BID     Continuous Infusions:   norepinephrine bitartrate-D5W 0.02 mcg/kg/min (11/23/18 1100)    propofol 20 mcg/kg/min (11/23/18 1100)     PRN Meds:sodium chloride, acetaminophen, benzonatate, dextrose 50%, glucagon (human recombinant), guaifenesin 100 mg/5 ml, insulin aspart U-100, lactulose, lactulose, ondansetron, simethicone, sodium chloride 0.9%    Review of Systems   Unable to perform ROS: Intubated     Objective:     Vital Signs (Most Recent):  Temp: 98.7 °F (37.1 °C) (11/23/18 1100)  Pulse: 60 (11/23/18 1115)  Resp: 18 (11/23/18 0708)  BP: (!) 91/46 (11/23/18 1100)  SpO2: 99 % (11/23/18 1115) Vital Signs (24h Range):  Temp:  [97.7 °F (36.5 °C)-98.7 °F (37.1 °C)] 98.7 °F (37.1 °C)  Pulse:  [49-69] 60  Resp:  [18-23] 18  SpO2:  [91 %-100 %] 99 %  BP: ()/(45-88) 91/46  Arterial Line BP: ()/(31-45) 104/35     Weight: 56.7 kg (125 lb 0 oz)  Body mass index is 26.13 kg/m².    Intake/Output - Last 3 Shifts       11/21 0700 - 11/22 0659 11/22 0700 - 11/23 0659 11/23 0700 - 11/24 0659    P.O. 100      I.V. (mL/kg) 339 (6.2) 1314 (23.2)     NG/ 210 150    IV Piggyback 350      Total Intake(mL/kg) 999 (18.3) 1524 (26.9) 150 (2.6)    Urine (mL/kg/hr) 417 (0.3) 185 (0.1) 40 (0.2)    Drains 350 250     Stool 3 0     Total Output 770 435 40    Net +229 +1089 +110           Stool  Occurrence 4 x 4 x           Physical Exam   Constitutional: She is oriented to person, place, and time. She appears well-developed.   HENT:   Head: Normocephalic and atraumatic.   intubated   Eyes: EOM are normal. Pupils are equal, round, and reactive to light.   Neck: Normal range of motion. Neck supple. JVD (CVP 16) present.   Cardiovascular: Normal rate and regular rhythm.   Pulmonary/Chest: Effort normal. She has no rales.   Bronchial breath sounds noted on LLL   Abdominal: Soft. Bowel sounds are normal.   Musculoskeletal: Normal range of motion. She exhibits no edema.   Neurological: She is alert and oriented to person, place, and time.   Skin: Skin is warm.   Vitals reviewed.      Laboratory:  Immunosuppressants     None        CBC:   Recent Labs   Lab 11/23/18  0303   WBC 6.26   RBC 2.02*   HGB 6.5*   HCT 20.3*   PLT 80*   *   MCH 32.2*   MCHC 32.0     CMP:   Recent Labs   Lab 11/23/18  0303      CALCIUM 8.3*   ALBUMIN 3.0*   PROT 4.7*      K 3.3*   CO2 18*   *   BUN 89*   CREATININE 2.1*   ALKPHOS 115   ALT 22   AST 37   BILITOT 6.1*     Coagulation:   Recent Labs   Lab 11/23/18  0303   INR 2.3*     Cardiac Markers: No results for input(s): CKMB, TROPONINT, MYOGLOBIN in the last 168 hours.  ABGs:   Recent Labs   Lab 11/23/18  0804   PH 7.457*   PCO2 30.0*   HCO3 21.2*   POCSATURATED 95   BE -3     Labs within the past 24 hours have been reviewed.    Diagnostic Results:  I have personally reviewed all pertinent imaging studies.

## 2018-11-23 NOTE — PROGRESS NOTES
(CYNDEE) presented to the patient's (pt) room for follow up as the pt had been transferred to the ICU from the TSU. Pt presented intubated and sedated.  Pt's  and two sisters (Clarissa and Amy) were present, alert/oriented and all engaged appropriately.  Pt's  advised the pt was experiencing respiratory distress and needed to be extubated and transferred.  Pt's  denied any coping issues and expressed concern for the pt.  While CYNDEE was visiting, Dr. Montelongo and the medical team presented to provide the family with a medical update.  As per Dr. Montelongo, the pt continues to experience delayed responses to commands, will receive a unit of blood followed by 40 of lasix and begin tube feeds.  Pt's sister inquired if the pt would remain on hold and if removed what were the pt's chances of being transplanted?  Dr. Montelongo advised the pt has several medical issues along with the need for a new liver; therefore, her prognosis remains questionable at this time.  Dr. Montelongo advised if the pt has a window of a chance with regards to being transplanted once all other medical issues are resolved, the team would have no problem with moving forward.  Pt's family verbalized understanding to the information as explained.  SW provided active listening and encouragement to the family and they denied any other concerns and/or issues.  SW remains available for education, resources, support and discharge planning as appropriate.

## 2018-11-23 NOTE — PROGRESS NOTES
[facility]  Nephrology  Progress Note    Patient Name: Krystal Hobson  MRN: [id number]  Admission Date: 11/10/2018  Hospital Length of Stay: 13 days  Attending Provider: Abrahan Montelongo MD   Primary Care Physician: Courtney Joe MD  Principal Problem:Hepatic encephalopathy    Subjective:     HPI: Reason for consult: Diuresis in setting of PAP46, pulmonary edema, HRS    Ms. 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  - She presented to [facility] as xfer from [place] under liver transplant team due to encephalopathy and new onset ENZO.   - Per the , she was extremely disoriented @ OSH and her ammonia reached as high as 200, and her Cr level increased to 2.0 She also went into A fib with RVR and was started on a diltiazem gtt at OSH.   - On arrival at [facility] she was disoriented and started on lactulose with noted improvement. During her current admission on CXR they found a concern for HCAP and she was started on broad spectrum ABx and then shifted her on Moxifloxacin 7 D course. On 11/20/2018 found on CXR a concern for Lt lung middle lobe consolidation and started on vancomycin and zosyn.  - Renal fxn baseline 1.6 at admit but trending up    Past Medical History:   Diagnosis Date    Cancer     breat Cancer    Cirrhosis     Diabetes mellitus     GERD (gastroesophageal reflux disease)     Rheumatoid arthritis      Interval History: Oliguric 180mL/24hrs. xfer to ICU 11/21 PM for hypercapneic resp failure, intubated + central line + art line. CXR w/ worsening diffuse interstitial edema. WCC and lactate trending down on vanc/zosyn    Review of patient's allergies indicates:  No Known Allergies  Current Facility-Administered Medications   Medication Frequency    0.9%  NaCl infusion (for blood administration) Q24H PRN    acetaminophen tablet 650 mg Q8H PRN    benzonatate capsule 100 mg TID PRN    dextrose 50% injection 12.5 g PRN    famotidine tablet  20 mg QHS    [START ON 11/24/2018] fluconazole tablet 200 mg Daily    glucagon (human recombinant) injection 1 mg PRN    guaifenesin 100 mg/5 ml syrup 200 mg Q4H PRN    guaifenesin 100 mg/5 ml syrup 200 mg Q6H    heparin (porcine) injection 5,000 Units Q8H    insulin aspart U-100 pen 0-5 Units Q6H PRN    insulin detemir U-100 pen 14 Units Daily    lactulose 10 gram/15 mL solution (enema) 200 g TID PRN    lactulose 10 gram/15 mL solution (enema) 200 g Once    lactulose 20 gram/30 mL solution Soln 30 g TID    lactulose 20 gram/30 mL solution Soln 30 g Q6H PRN    levalbuterol nebulizer solution 1.25 mg Q6H WAKE    norepinephrine 4 mg in dextrose 5% 250 mL infusion (premix) (titrating) Continuous    ondansetron disintegrating tablet 8 mg Q8H PRN    piperacillin-tazobactam 4.5 g in sodium chloride 0.9% 100 mL IVPB (ready to mix system) Q12H    propofol (DIPRIVAN) 10 mg/mL infusion Continuous    rifAXIMin tablet 550 mg BID    simethicone chewable tablet 80 mg TID PRN    sodium chloride 0.9% flush 3 mL PRN       Objective:     Vital Signs (Most Recent):  Temp: 97.7 °F (36.5 °C) (11/23/18 1300)  Pulse: 65 (11/23/18 1400)  Resp: (!) 26 (11/23/18 1300)  BP: (!) 102/51 (11/23/18 1400)  SpO2: 98 % (11/23/18 1400)  O2 Device (Oxygen Therapy): ventilator (11/23/18 1300) Vital Signs (24h Range):  Temp:  [97.7 °F (36.5 °C)-98.7 °F (37.1 °C)] 97.7 °F (36.5 °C)  Pulse:  [52-69] 65  Resp:  [18-26] 26  SpO2:  [91 %-100 %] 98 %  BP: ()/(45-88) 102/51  Arterial Line BP: (101-136)/(31-50) 121/43     Weight: 56.7 kg (125 lb 0 oz) (11/23/18 0400)  Body mass index is 26.13 kg/m².  Body surface area is 1.52 meters squared.    I/O last 3 completed shifts:  In: 2073 [I.V.:1653; NG/GT:420]  Out: 752 [Urine:252; Drains:500]    Physical Exam   Constitutional: She is oriented to person, place, and time.   HENT:   Head: Atraumatic.   Neck: No JVD present.   Cardiovascular: Normal rate and regular rhythm.   Pulmonary/Chest:  Effort normal and breath sounds normal.   Abdominal: Soft. She exhibits no distension. There is tenderness (around incision site). There is no rebound and no guarding.   Musculoskeletal: She exhibits edema. She exhibits no tenderness.   Neurological: She is alert and oriented to person, place, and time.   Skin: Skin is warm.           Past Surgical History:   Procedure Laterality Date    CARPAL TUNNEL RELEASE      left wrist    CHOLECYSTECTOMY      MASTECTOMY      left breast 25  years ago       Review of patient's allergies indicates:  No Known Allergies  Current Facility-Administered Medications   Medication Frequency    acetaminophen tablet 650 mg Q8H PRN    benzonatate capsule 100 mg TID PRN    dextrose 50% injection 12.5 g PRN    glucagon (human recombinant) injection 1 mg PRN    guaifenesin 100 mg/5 ml syrup 200 mg Q4H PRN    guaiFENesin 12 hr tablet 600 mg BID    heparin (porcine) injection 5,000 Units Q8H    insulin aspart U-100 pen 0-5 Units Q6H PRN    insulin detemir U-100 pen 14 Units Daily    lactulose 10 gram/15 mL solution (enema) 200 g TID PRN    lactulose 10 gram/15 mL solution (enema) 200 g Once    lactulose 20 gram/30 mL solution Soln 30 g TID    lactulose 20 gram/30 mL solution Soln 30 g Q6H PRN    levalbuterol nebulizer solution 1.25 mg Q6H WAKE    magnesium oxide tablet 400 mg BID    metoprolol tartrate (LOPRESSOR) tablet 25 mg BID    norepinephrine 4 mg in dextrose 5% 250 mL infusion (premix) (titrating) Continuous    ondansetron disintegrating tablet 8 mg Q8H PRN    piperacillin-tazobactam 4.5 g in sodium chloride 0.9% 100 mL IVPB (ready to mix system) Q8H    propofol (DIPRIVAN) 10 mg/mL infusion Continuous    rifAXIMin tablet 550 mg BID    simethicone chewable tablet 80 mg TID PRN    sodium chloride 0.9% flush 3 mL PRN    vancomycin in dextrose 5 % 1 gram/250 mL IVPB 1,000 mg Q24H     Family History     Problem Relation (Age of Onset)    COPD Sister    Heart disease  Mother    Liver disease Father, Sister        Tobacco Use    Smoking status: Never Smoker    Tobacco comment: patient denies   Substance and Sexual Activity    Alcohol use: No     Comment: stopped 10/17    Drug use: No     Comment: patient denies    Sexual activity: Not on file     Review of Systems   Unable to perform ROS: Mental status change   Constitutional: Positive for activity change and fatigue.   Respiratory: Positive for cough and shortness of breath.    Cardiovascular: Positive for leg swelling.     Objective:     Vital Signs (Most Recent):  Temp: 97.7 °F (36.5 °C) (11/23/18 0700)  Pulse: 61 (11/23/18 0945)  Resp: 18 (11/23/18 0708)  BP: (!) 104/51 (11/23/18 0945)  SpO2: 100 % (11/23/18 0945)  O2 Device (Oxygen Therapy): ventilator (11/23/18 0900) Vital Signs (24h Range):  Temp:  [97.4 °F (36.3 °C)-98.1 °F (36.7 °C)] 97.7 °F (36.5 °C)  Pulse:  [49-69] 61  Resp:  [18-23] 18  SpO2:  [91 %-100 %] 100 %  BP: ()/(45-88) 104/51  Arterial Line BP: ()/(31-45) 116/37     Weight: 56.7 kg (125 lb 0 oz) (11/23/18 0400)  Body mass index is 26.13 kg/m².  Body surface area is 1.52 meters squared.    I/O last 3 completed shifts:  In: 2073 [I.V.:1653; NG/GT:420]  Out: 752 [Urine:252; Drains:500]    Physical Exam   Constitutional: No distress.   Sedated/Intubated, diffusely jaundiced, cachectic and anasarcic appearance   HENT:   Head: Normocephalic and atraumatic.   Eyes: Conjunctivae are normal. Scleral icterus is present.   Neck: Neck supple. No JVD present. No tracheal deviation present.   Cardiovascular: Normal rate and normal heart sounds. Exam reveals no gallop and no friction rub.   Pulmonary/Chest: No stridor. She has no wheezes. She has no rales. She exhibits no tenderness.   On vent, mechanical breath sounds   Abdominal: Soft. Bowel sounds are normal. She exhibits no mass. There is no tenderness. There is no rebound and no guarding.   Musculoskeletal: She exhibits edema (mild diffuse anasarca of  distal limbs). She exhibits no tenderness.   Neurological:   Encephalopathic, briefly rousable to loud voice or physical stimulus, one word responses yes and no to basic questions.    Skin: Skin is warm and dry. She is not diaphoretic. No erythema.   Vitals reviewed.      Significant Labs:  CBC:   Recent Labs   Lab 11/23/18  0303   WBC 6.26   RBC 2.02*   HGB 6.5*   HCT 20.3*   PLT 80*   *   MCH 32.2*   MCHC 32.0     CMP:   Recent Labs   Lab 11/23/18  0303      CALCIUM 8.3*   ALBUMIN 3.0*   PROT 4.7*      K 3.3*   CO2 18*   *   BUN 89*   CREATININE 2.1*   ALKPHOS 115   ALT 22   AST 37   BILITOT 6.1*     Recent Labs   Lab 11/21/18  1448   COLORU Effie   SPECGRAV 1.015   PHUR 5.0   PROTEINUA 1+*   BACTERIA Occasional   NITRITE Negative   LEUKOCYTESUR Negative   HYALINECASTS 56*     All labs within the past 24 hours have been reviewed.    Significant Imaging:  Labs: Reviewed    Assessment/Plan:     Acute kidney injury superimposed on CKD    Cryptogenic cirrhosis awaiting liver xplant, presented for AMS and found to have ENZO on CKDIIIb. Cr worsening since admission. Acute component likely from toxic/ischemic ATN (FENa 0.4% suggesting pre-renal component), making urine but oliguric, acid/base and lytes ok, volume status stable    Lab Results   Component Value Date    CREATININE 2.1 (H) 11/23/2018    CREATININE 2.1 (H) 11/22/2018    CREATININE 1.8 (H) 11/22/2018    CREATININE 1.8 (H) 11/21/2018    CREATININE 1.7 (H) 11/21/2018    CREATININE 1.9 (H) 11/21/2018    CREATININE 1.6 (H) 11/21/2018    CREATININE 1.7 (H) 11/20/2018         Plan/Recommendations:  -no RRT for now  -continue treatment of underlying infection/sepsis  - 100 IV lasix today at noon, if UOP doesn't  by 6pm today recommend additional bolus 160mg IV Lasix + 500mg IV chlorothiazide given current CVP 12 and hypervolemic state w/ anasarca and pulmonary edema. Continue pressors as needed for adequate MAP. If still no urinary  response, can consider HD tomorrow           Thank you for your consult. I will follow-up with patient. Please contact us if you have any additional questions.    Kelvin Liriano MD  Nephrology  [facility]

## 2018-11-23 NOTE — PLAN OF CARE
Problem: Patient Care Overview  Goal: Plan of Care Review  Pt vented on AC, 30%, 5PEEP.  Pt between bradycardia & NSR through shift. CVP: 11-10.  Gtts include: Levo titrated to maintain SBP >100 & Propofol @ 15mcg/kg/min.  UOP minimal.  Pt had 2 BMs.  OGT: 150cc green output.  Pt following commands during sedation vacation. Pt restrained for safety.  Turned Q2 to prevent skin breakdown.  Plan of care reviewed w/pt & family; all questions answered.

## 2018-11-24 NOTE — NURSING
CRRT initiated as ordered and per protocol (see flow sheet for details). Lines secured and alarms on. Good flows LIJ temporary catheter.

## 2018-11-24 NOTE — PROGRESS NOTES
[facility]  Pulmonology  Progress Note    Patient Name: Krystal Hobson  MRN: [id number]  Admission Date: 11/10/2018  Hospital Length of Stay: 14 days  Code Status: Full Code  Attending Provider: Abrahan Montelongo MD  Primary Care Provider: Courtney Joe MD   Principal Problem: Hepatic encephalopathy    Subjective:     Interval History: Poor response to diuretics yesterday and some following commands off sedation per notes. Per nephro, may dialyze today.    Objective:     Vital Signs (Most Recent):  Temp: 97.8 °F (36.6 °C) (11/24/18 0700)  Pulse: 73 (11/24/18 0830)  Resp: 18 (11/24/18 0705)  BP: (!) 122/52 (11/24/18 0815)  SpO2: 96 % (11/24/18 0830) Vital Signs (24h Range):  Temp:  [97.6 °F (36.4 °C)-98.7 °F (37.1 °C)] 97.8 °F (36.6 °C)  Pulse:  [] 73  Resp:  [18-26] 18  SpO2:  [92 %-100 %] 96 %  BP: ()/(45-66) 122/52  Arterial Line BP: ()/(31-53) 123/51     Weight: 57 kg (125 lb 10.6 oz)  Body mass index is 26.26 kg/m².      Intake/Output Summary (Last 24 hours) at 11/24/2018 0858  Last data filed at 11/24/2018 0800  Gross per 24 hour   Intake 1946.5 ml   Output 1443 ml   Net 503.5 ml   net for admission:  2.3L    Physical Exam   Constitutional: She appears well-developed.   Frail-appearing elderly woman   HENT:   Head: Normocephalic and atraumatic.   Neck: Neck supple.   Bruising around RIJ bandage with minimal dried blood.   Cardiovascular: Normal rate and regular rhythm.   Prominent P2   Pulmonary/Chest: She has no wheezes.   Tachypneic, breathing above the vent ~30, coarse rhales anteriorly bilaterally.   Abdominal: Soft.   Neurological:   Not responsive to verbal stimulation, not opening eyes during physical exam.   Skin: Skin is warm and dry.   Vitals reviewed.      Vents:  Vent Mode: A/C (11/24/18 0700)  Ventilator Initiated: Yes (11/21/18 1839)  Set Rate: 18 bmp (11/24/18 0700)  Vt Set: 300 mL (11/24/18 0700)  PEEP/CPAP: 5 cmH20 (11/24/18 0700)  Oxygen Concentration (%): 30  (11/24/18 0830)  Peak Airway Pressure: 27 cmH2O (11/24/18 0700)  Plateau Pressure: 0 cmH20 (11/24/18 0700)  Total Ve: 8.99 mL (11/24/18 0700)  F/VT Ratio<105 (RSBI): (!) 79.11 (11/23/18 2021)    Lines/Drains/Airways     Central Venous Catheter Line                 Trialysis (Dialysis) Catheter 11/21/18 1851 left internal jugular 2 days          Drain                 NG/OG Tube 11/21/18 1840 [place] sump 16 Fr. Right mouth 2 days         Urethral Catheter 11/21/18 1500 2 days         Fecal Incontinence  11/23/18 1300 less than 1 day          Airway                 Airway - Non-Surgical 11/21/18 1836 Endotracheal Tube 2 days          Arterial Line                 Arterial Line 11/21/18 1900 Right Radial 2 days          Peripheral Intravenous Line                 Peripheral IV - Single Lumen 11/20/18 1615 Anterior;Right Hand 3 days         Peripheral IV - Single Lumen 11/20/18 1619 Anterior;Distal;Left Forearm 3 days         Peripheral IV - Single Lumen 11/21/18 1852 Right Foot 2 days                Significant Labs:    CBC/Anemia Profile:  Recent Labs   Lab 11/22/18  1638 11/23/18  0303 11/24/18  0325   WBC 11.30 6.26 7.55   HGB 7.3* 6.5* 8.1*   HCT 22.4* 20.3* 23.8*   * 80* 62*   MCV 97 101* 94   RDW 22.2* 22.5* 24.4*        Chemistries:  Recent Labs   Lab 11/22/18  1652 11/23/18  0303 11/23/18  1532 11/23/18  2204 11/24/18  0319 11/24/18  0325    142  --  143  --  142   K 3.1* 3.3* 3.5 3.4*  --  4.2    111*  --  110  --  111*   CO2 20* 18*  --  18*  --  17*   BUN 87* 89*  --  90*  --  93*   CREATININE 2.1* 2.1*  --  2.5*  --  2.6*   CALCIUM 8.5* 8.3*  --  9.1  --  9.3   ALBUMIN 3.2* 3.0*  --  3.1*  --  3.1*   PROT  --  4.7*  --   --   --  5.2*   BILITOT  --  6.1*  --   --   --  7.4*   ALKPHOS  --  115  --   --   --  132   ALT  --  22  --   --   --  21   AST  --  37  --   --   --  35   MG 2.6 2.5  --  2.7*  --  2.8*   PHOS 4.1  --   --  4.8*  4.8* 4.8*  --        All pertinent labs within  the past 24 hours have been reviewed.    Micro: all studies NGTD    Significant Imaging:  CXR: I have reviewed all pertinent results/findings within the past 24 hours and my personal findings are:  No significant improvement today compared to yesterday. Comared to previous films over the past month, no significant UL disease from the end of October, and her progressive infiltrates have favored the upper lobes throughout.    Assessment/Plan:     Acute respiratory failure with hypoxia    Acute vent-dependent respiratory failure due to hypoxia and encephalopathy. Minimal oxygenation support required from the vent.  -daily SAT & SBT as able- failed SBT today due to tachypnea     Pneumonia involving left lung    Likely bilateral UL pneumonia with superimposed pulmonary edema. Comparing old CXRs, she had increased interstitial markings throughout, but no significant increase in opacification in the upper lobes, indicating this is likely all acute from infection or pulmonary edema. No recent respiratory cultures to help guide therapy.  -con't broad-spectrum antibiotics  -Recommend low tidal volume ventilation (6cc/kg IBW) and high-PEEP strategy per [facility] protocol.  -Recommend titration of FiO2 to maintain SpO2 >90%.  -Daily SAT & SBTs as medically appropriate. Failed SBT today due to tachypnea to 40 with Vt <300cc.  -Recommend more agressive volume removal. She seems to have failed diuretic therapy at this time and HD may be indicated.         Discussed with liver transplant resident on call.     Glory Snow,   Pulmonology  [facility]

## 2018-11-24 NOTE — PROGRESS NOTES
[facility]  Liver Transplant  Progress Note    Patient Name: Krystal Hobson  MRN: [id number]  Admission Date: 11/10/2018  Hospital Length of Stay: 14 days  Code Status: Full Code  Primary Care Provider: Courtney Joe MD    Subjective:     Scheduled Meds:   famotidine  20 mg Oral QHS    fluconazole  200 mg Oral Daily    guaifenesin 100 mg/5 ml  200 mg Per OG tube Q6H    heparin (porcine)  5,000 Units Subcutaneous Q8H    insulin detemir U-100  14 Units Subcutaneous Daily    lactulose  200 g Rectal Once    levalbuterol  1.25 mg Nebulization Q6H WAKE    piperacillin-tazobactam (ZOSYN) IVPB  4.5 g Intravenous Q12H    rifAXImin  550 mg Oral BID     Continuous Infusions:   norepinephrine bitartrate-D5W Stopped (11/23/18 1300)    propofol 20 mcg/kg/min (11/24/18 0800)     PRN Meds:sodium chloride, acetaminophen, benzonatate, dextrose 50%, glucagon (human recombinant), guaifenesin 100 mg/5 ml, insulin aspart U-100, lactulose, lactulose, ondansetron, simethicone, sodium chloride 0.9%    Review of Systems   Unable to perform ROS: Intubated     Objective:     Vital Signs (Most Recent):  Temp: 97.8 °F (36.6 °C) (11/24/18 0700)  Pulse: 67 (11/24/18 0800)  Resp: 18 (11/24/18 0705)  BP: (!) 122/52 (11/24/18 0800)  SpO2: 96 % (11/24/18 0800) Vital Signs (24h Range):  Temp:  [97.6 °F (36.4 °C)-98.7 °F (37.1 °C)] 97.8 °F (36.6 °C)  Pulse:  [] 67  Resp:  [18-26] 18  SpO2:  [92 %-100 %] 96 %  BP: ()/(45-66) 122/52  Arterial Line BP: ()/(31-53) 124/50     Weight: 57 kg (125 lb 10.6 oz)  Body mass index is 26.26 kg/m².    Intake/Output - Last 3 Shifts       11/22 0700 - 11/23 0659 11/23 0700 - 11/24 0659 11/24 0700 - 11/25 0659    P.O.       I.V. (mL/kg) 1314 (23.2) 614 (10.8)     Blood  562.5     NG/ 840 80    IV Piggyback       Total Intake(mL/kg) 1524 (26.9) 2016.5 (35.4) 80 (1.4)    Urine (mL/kg/hr) 185 (0.1) 423 (0.3) 40 (0.5)    Drains 250      Stool 0 1000     Total Output 435 2394  40    Net +1089 +593.5 +40           Stool Occurrence 4 x 4 x           Physical Exam   Constitutional: She is oriented to person, place, and time. She appears well-developed.   HENT:   Head: Normocephalic and atraumatic.   intubated   Eyes: EOM are normal. Pupils are equal, round, and reactive to light.   Neck: Normal range of motion. Neck supple. JVD (CVP 16) present.   Cardiovascular: Normal rate and regular rhythm.   Pulmonary/Chest: Effort normal. She has no rales.   Bronchial breath sounds noted on LLL   Abdominal: Soft. Bowel sounds are normal.   Musculoskeletal: Normal range of motion. She exhibits no edema.   Neurological: She is alert and oriented to person, place, and time.   Skin: Skin is warm.   Vitals reviewed.      Laboratory:  Immunosuppressants     None        CBC:   Recent Labs   Lab 11/24/18 0325   WBC 7.55   RBC 2.53*   HGB 8.1*   HCT 23.8*   PLT 62*   MCV 94   MCH 32.0*   MCHC 34.0     CMP:   Recent Labs   Lab 11/24/18 0325   *   CALCIUM 9.3   ALBUMIN 3.1*   PROT 5.2*      K 4.2   CO2 17*   *   BUN 93*   CREATININE 2.6*   ALKPHOS 132   ALT 21   AST 35   BILITOT 7.4*     Coagulation:   Recent Labs   Lab 11/24/18 0325   INR 2.1*     Labs within the past 24 hours have been reviewed.    Diagnostic Results:  I have personally reviewed all pertinent imaging studies.    Assessment/Plan:   Krystal Hobson is a 67 y.o. female     ENT   Ear pain    - ENT consulted 11/19, recommend flonase, no ear infection seen.      Pulmonary   Acute respiratory failure with hypoxia    66yo F liver-listed, stepped up overnight for respiratory failure with worsening respiratory acidosis     Plan:    Neuro:   -Pain control: prn fentanyl while intubates  -Sedation with propofol while intubated  -Daily sedation vacations    Pulmonary:   -intubated on arrival to SICU 11/21  Vent Mode: A/C  Oxygen Concentration (%):  [29-99] 30  Resp Rate Total:  [18 br/min-46 br/min] 31 br/min  Vt Set:  [300 mL] 300  mL  PEEP/CPAP:  [5 cmH20] 5 cmH20  Mean Airway Pressure:  [8.2 ptK82-91 cmH20] 11 cmH20  -daily CXR - evidence of significant bilateral consolidation   -ABGs daily and prn  -continuous pulse ox   -Daily SBT    Cardiac:  -SBP > 100 goal  -Off pressors  -Heart failure following, appreciate recs   - Had Afib with RVR 11/23/18, broke out with 2x 5mg doses of IV metop, her pressure was stable throughout    Renal:   -Mendoza in place  -UOP minimal in last 24hrs  -Bun/Cr trending up, continue to monitor  - Had trial of lasix, followed by lasix + diuril yesterday with minimal UOP response  -Nephrology following, appreciate recommendations, likely will require dialysis today    Fluids/Electrolytes/Nutrition/GI:   -Nutritional status: NPO while intubated  -Will start TFs today @ 10cc/hr  -replace lytes PRN  -will hold Lactulose enemas TID    Hematology/Oncology:  -Hgb 6.5 from 6.7 yesterday - likely dilutional in setting of resuscitation with albumin, but will give 1 unit pRBCs followed by lasix 100mg per nephrology   -INR 2.3, plts 80  -Anticoagulation: SQH  -Transfuse 1x pRBC    Infectious Disease:   -Afebrile  -WBC 6.26, continue to monitor   -Abx: Vanc/Zosyn     Endocrine:  -Glucose goal of 140-180  -Endocrine following, see recommendations    Dispo:  -Continue care in the ICU setting, likely dialysis today          Pneumonia involving left lung    - Noted on recent chest xray.  Initially placed on BS antibxs for treatment.    - Transitioned to PO Moxi (11/13) to complete a 7 day course (11/18).   - Pt with elevated WBC on lab work 11/14 and placed back on BS antibxs at that time.   - WBC then with quick improvement.  - cough and SOB improving  - Repeat CXR today with persistent JUAN opacification, denies SOB but visibly tachypneic and using accessory muscles  - Pulmonology consulted  - Start treatment for HCAP with vanc/zosyn     Cardiac/Vascular   Sinus tachycardia    - Worsened with albuterol, improved with BB.   -  remains in NSR. Continue tele.     Atrial fibrillation    -  Patient with episode of Afib with RVR at OSH and was placed on diltiazem gtt which was discontinued 11/10 as she reverted to NSR.  Cardiology consulted for management.   -  Pt then returned back into Afib w/ RVR on the afternoon of 11/11 and pt placed back on diltiazem for rate control.   -  Pt now rated controlled and diltiazem d/c'd on 11/12 at 0200. Cont with lopressor at this time as pt remains rate controlled.       Renal/   Hypokalemia    - continue to monitor closely while diuresing. Replete PRN.   - repeat CMP in afternoon     Hypervolemia    - TTE 11/12 with PA pressure > 55  - Repeat echo 11/20 with PA pressure 46  - HTS consulted for RHC at the end of the week at the earliest  - continue to diurese -- lasix 40mg IV x 2 doses  - BNP 11/21 was 1892     CKD (chronic kidney disease) stage 3, GFR 30-59 ml/min    - See ENZO.  Stable.     Acute kidney injury superimposed on CKD    - CKD 3-4 at baseline creatinine. Urine electrolytes ordered. Etiology possibly due to hepatorenal syndrome.   - HRS protocol started on admission, albumin and octreotide. No midodrine as with Afib.   - Cr since admission has improved  - Dosing lasix daily based on labs -- Lasix 40mg IV x 2 doses today  - Must weigh daily and cont strict I&Os.      Hematology   Thrombocytopenia due to hypersplenism    - No overt s/s of bleeding.  Continue to monitor.      Oncology   Anemia of chronic disease    - H/H stable. Continue to monitor with daily cbc.   - no overt signs of bleeding     Endocrine   Type 2 diabetes mellitus with hyperglycemia, with long-term current use of insulin    - Basal and prandial insulin ordered. Endocrine consulted for management. Apprec recs.   - Pt noted with significant hypoglycemic event the evening of 11/14, BG 8. Pt responded well to IV dextrose.   - blood glucose has remained stable and diabetic current regimen.       Protein-calorie malnutrition,  moderate    - Dietician completed calorie count.  Patient eating % of trays.  Will d/c calorie count.  Pt drinking 2-3 supplement drinks daily.       GI   * Hepatic encephalopathy    - Acute on chronic. PO lactulose ordered, continue rifaximin. Titrate Lactulose to maintain 3-4 BM a day. PRN lactulose enema TID for worsening confusion.  - currently pt is AAOx4, no encephalopathy noted.  Mentation is delayed.       Chronic liver disease           End stage liver disease    -  Listed for liver transplant with MELD 27. Per hepatologist, remains on internal hold 2/2 initially for PNA, but now for frailty and elevated PA pressure. Continue to monitor.  -  Plan to discuss pt candidacy once resp status, nutrition, and physical mobility improve. Need repeat RHC for elevated PA pressure. Cards following    MELD-Na score: 31 at 11/24/2018  3:25 AM  MELD score: 31 at 11/24/2018  3:25 AM  Calculated from:  Serum Creatinine: 2.6 mg/dL at 11/24/2018  3:25 AM  Serum Sodium: 142 mmol/L (Rounded to 137 mmol/L) at 11/24/2018  3:25 AM  Total Bilirubin: 7.4 mg/dL at 11/24/2018  3:25 AM  INR(ratio): 2.1 at 11/24/2018  3:25 AM  Age: 67 years     Decompensated hepatic cirrhosis    - See hepatic encephalopathy.      Orthopedic   Swelling of joint of upper arm, right    - infiltrated midline overnight 11/14-11/15.  Cont to have increased swelling, pain and cold right arm despite elevating and using PRN heat packs.    - US right upper extremity 11/16 negative for DVT.  Pt on ASA.  Monitor.    - edema improving with diuresis     Other   Physical deconditioning    - PT/OT following.  Patient need aggressive therapy.  She will also need to be able to climb steps prior to discharge.  She has at least 7 steps to climb at home.    - PT/OT currently recommending home with home health.      Shortness of breath    - Was on 2-3 L O2 11/16.    - Now at 1L PRN and RA majority of the time with spo2 > 95%.  - SOB and cough improving with diuresis  -  continue with mucinex and tessalon perle.   - Repeat CXR today with persistent JUAN opacification     Organ transplant candidate    - on internal hold.           The patients clinical status was discussed at multidisplinary rounds, involving transplant surgery, transplant medicine, pharmacy, nursing, nutrition, and social work.    Sergio Ferreira MD  Liver Transplant  [facility]

## 2018-11-24 NOTE — ASSESSMENT & PLAN NOTE
BG goal 140-180    continue Levemir to 14 units daily.  Change to BG monitoring every 4 hours and low dose correction    Discharge planning: TBD

## 2018-11-24 NOTE — SUBJECTIVE & OBJECTIVE
Scheduled Meds:   famotidine  20 mg Oral QHS    fluconazole  200 mg Oral Daily    guaifenesin 100 mg/5 ml  200 mg Per OG tube Q6H    heparin (porcine)  5,000 Units Subcutaneous Q8H    insulin detemir U-100  14 Units Subcutaneous Daily    lactulose  200 g Rectal Once    levalbuterol  1.25 mg Nebulization Q6H WAKE    piperacillin-tazobactam (ZOSYN) IVPB  4.5 g Intravenous Q12H    rifAXImin  550 mg Oral BID     Continuous Infusions:   norepinephrine bitartrate-D5W Stopped (11/23/18 1300)    propofol 20 mcg/kg/min (11/24/18 0800)     PRN Meds:sodium chloride, acetaminophen, benzonatate, dextrose 50%, glucagon (human recombinant), guaifenesin 100 mg/5 ml, insulin aspart U-100, lactulose, lactulose, ondansetron, simethicone, sodium chloride 0.9%    Review of Systems   Unable to perform ROS: Intubated     Objective:     Vital Signs (Most Recent):  Temp: 97.8 °F (36.6 °C) (11/24/18 0700)  Pulse: 67 (11/24/18 0800)  Resp: 18 (11/24/18 0705)  BP: (!) 122/52 (11/24/18 0800)  SpO2: 96 % (11/24/18 0800) Vital Signs (24h Range):  Temp:  [97.6 °F (36.4 °C)-98.7 °F (37.1 °C)] 97.8 °F (36.6 °C)  Pulse:  [] 67  Resp:  [18-26] 18  SpO2:  [92 %-100 %] 96 %  BP: ()/(45-66) 122/52  Arterial Line BP: ()/(31-53) 124/50     Weight: 57 kg (125 lb 10.6 oz)  Body mass index is 26.26 kg/m².    Intake/Output - Last 3 Shifts       11/22 0700 - 11/23 0659 11/23 0700 - 11/24 0659 11/24 0700 - 11/25 0659    P.O.       I.V. (mL/kg) 1314 (23.2) 614 (10.8)     Blood  562.5     NG/ 840 80    IV Piggyback       Total Intake(mL/kg) 1524 (26.9) 2016.5 (35.4) 80 (1.4)    Urine (mL/kg/hr) 185 (0.1) 423 (0.3) 40 (0.5)    Drains 250      Stool 0 1000     Total Output 435 1423 40    Net +1089 +593.5 +40           Stool Occurrence 4 x 4 x           Physical Exam   Constitutional: She is oriented to person, place, and time. She appears well-developed.   HENT:   Head: Normocephalic and atraumatic.   intubated   Eyes: EOM are  normal. Pupils are equal, round, and reactive to light.   Neck: Normal range of motion. Neck supple. JVD (CVP 16) present.   Cardiovascular: Normal rate and regular rhythm.   Pulmonary/Chest: Effort normal. She has no rales.   Bronchial breath sounds noted on LLL   Abdominal: Soft. Bowel sounds are normal.   Musculoskeletal: Normal range of motion. She exhibits no edema.   Neurological: She is alert and oriented to person, place, and time.   Skin: Skin is warm.   Vitals reviewed.      Laboratory:  Immunosuppressants     None        CBC:   Recent Labs   Lab 11/24/18 0325   WBC 7.55   RBC 2.53*   HGB 8.1*   HCT 23.8*   PLT 62*   MCV 94   MCH 32.0*   MCHC 34.0     CMP:   Recent Labs   Lab 11/24/18 0325   *   CALCIUM 9.3   ALBUMIN 3.1*   PROT 5.2*      K 4.2   CO2 17*   *   BUN 93*   CREATININE 2.6*   ALKPHOS 132   ALT 21   AST 35   BILITOT 7.4*     Coagulation:   Recent Labs   Lab 11/24/18 0325   INR 2.1*     Labs within the past 24 hours have been reviewed.    Diagnostic Results:  I have personally reviewed all pertinent imaging studies.

## 2018-11-24 NOTE — SUBJECTIVE & OBJECTIVE
"Interval HPI:   Overnight events: Remains in ICU, intubated. SLED overnight per nephrology. BG at goal with basal insulin; not requiring correction scale.   Eating:   NPO  Nausea: No  Hypoglycemia and intervention: No  Fever: No  TF: isosource at 40 cc/hr     BP (!) 118/55 (BP Location: Right arm, Patient Position: Lying)   Pulse 62   Temp 97.8 °F (36.6 °C) (Oral)   Resp 18   Ht 4' 10" (1.473 m)   Wt 57 kg (125 lb 10.6 oz)   SpO2 96%   Breastfeeding? No   BMI 26.26 kg/m²     Labs Reviewed and Include    Recent Labs   Lab 11/24/18  0325   *   CALCIUM 9.3   ALBUMIN 3.1*   PROT 5.2*      K 4.2   CO2 17*   *   BUN 93*   CREATININE 2.6*   ALKPHOS 132   ALT 21   AST 35   BILITOT 7.4*     Lab Results   Component Value Date    WBC 7.55 11/24/2018    HGB 8.1 (L) 11/24/2018    HCT 23.8 (L) 11/24/2018    MCV 94 11/24/2018    PLT 62 (L) 11/24/2018     No results for input(s): TSH, FREET4 in the last 168 hours.  Lab Results   Component Value Date    HGBA1C 5.8 (H) 11/10/2018       Nutritional status:   Body mass index is 26.26 kg/m².  Lab Results   Component Value Date    ALBUMIN 3.1 (L) 11/24/2018    ALBUMIN 3.1 (L) 11/23/2018    ALBUMIN 3.0 (L) 11/23/2018     No results found for: PREALBUMIN    Estimated Creatinine Clearance: 18.9 mL/min (A) (based on SCr of 2.6 mg/dL (H)).    Accu-Checks  Recent Labs     11/21/18  2340 11/22/18  0529 11/22/18  1156 11/22/18  1754 11/22/18  2330 11/23/18  0539 11/23/18  1535 11/23/18  1802 11/24/18  0113 11/24/18  0602   POCTGLUCOSE 225* 177* 190* 172* 116* 90 136* 122* 151* 196*       Current Medications and/or Treatments Impacting Glycemic Control  Immunotherapy:    Immunosuppressants     None        Steroids:   Hormones (From admission, onward)    None        Pressors:    Autonomic Drugs (From admission, onward)    Start     Stop Route Frequency Ordered    11/21/18 1900  norepinephrine 4 mg in dextrose 5% 250 mL infusion (premix) (titrating)     Question Answer " Comment   Titrate by: (in mcg/kg/min) 0.02    Titrate interval: (in minutes) 5    Titrate to maintain: (MAP or SBP) MAP    Greater than: (in mmHg) 65    Maximum dose: (in mcg/kg/min) 3        -- IV Continuous 11/21/18 1845        Hyperglycemia/Diabetes Medications:   Antihyperglycemics (From admission, onward)    Start     Stop Route Frequency Ordered    11/23/18 0900  insulin detemir U-100 pen 14 Units      -- SubQ Daily 11/23/18 0735    11/21/18 1955  insulin aspart U-100 pen 0-5 Units      -- SubQ Every 6 hours PRN 11/21/18 7877

## 2018-11-24 NOTE — PLAN OF CARE
Patient currently stable  No afib noted during shift  CRRT started at 1330, tolerating UF goal of 350 with low dose levo added to maintain SBP >100  ABG obtained at 1400, Fio2 increased to 50% for low PaO2  Tolerating tube feeds at goal   Propofol for sedation  Plan of care reviewed with pt's  at bedside  Will continue to monitor

## 2018-11-24 NOTE — ASSESSMENT & PLAN NOTE
Acute vent-dependent respiratory failure due to hypoxia and encephalopathy. Minimal oxygenation support required from the vent.  -daily SAT & SBT as able- failed SBT today due to tachypnea

## 2018-11-24 NOTE — ASSESSMENT & PLAN NOTE
68yo F liver-listed, stepped up overnight for respiratory failure with worsening respiratory acidosis     Plan:    Neuro:   -Pain control: prn fentanyl while intubates  -Sedation with propofol while intubated  -Daily sedation vacations    Pulmonary:   -intubated on arrival to SICU 11/21  Vent Mode: A/C  Oxygen Concentration (%):  [29-99] 30  Resp Rate Total:  [18 br/min-46 br/min] 31 br/min  Vt Set:  [300 mL] 300 mL  PEEP/CPAP:  [5 cmH20] 5 cmH20  Mean Airway Pressure:  [8.2 usI96-32 cmH20] 11 cmH20  -daily CXR - evidence of significant bilateral consolidation   -ABGs daily and prn  -continuous pulse ox   -Daily SBT    Cardiac:  -SBP > 100 goal  -Off pressors  -Heart failure following, appreciate recs   - Had Afib with RVR 11/23/18, broke out with 2x 5mg doses of IV metop, her pressure was stable throughout    Renal:   -Mendoza in place  -UOP minimal in last 24hrs  -Bun/Cr trending up, continue to monitor  - Had trial of lasix, followed by lasix + diuril yesterday with minimal UOP response  -Nephrology following, appreciate recommendations, likely will require dialysis today    Fluids/Electrolytes/Nutrition/GI:   -Nutritional status: NPO while intubated  -Will start TFs today @ 10cc/hr  -replace lytes PRN  -will hold Lactulose enemas TID    Hematology/Oncology:  -Hgb 6.5 from 6.7 yesterday - likely dilutional in setting of resuscitation with albumin, but will give 1 unit pRBCs followed by lasix 100mg per nephrology   -INR 2.3, plts 80  -Anticoagulation: SQH  -Transfuse 1x pRBC    Infectious Disease:   -Afebrile  -WBC 6.26, continue to monitor   -Abx: Vanc/Zosyn     Endocrine:  -Glucose goal of 140-180  -Endocrine following, see recommendations    Dispo:  -Continue care in the ICU setting, likely dialysis today

## 2018-11-24 NOTE — ASSESSMENT & PLAN NOTE
Caution with insulin stacking  Estimated Creatinine Clearance: 18.9 mL/min (A) (based on SCr of 2.6 mg/dL (H)).

## 2018-11-24 NOTE — PLAN OF CARE
Patient currently stable  Levo gtt d/c'd this AM, SBP remains >100  Sats >95% on vent, 30%, 5 PEEP  Propofol infusing for sedation  1 unit of PRBCs given, followed by 100 mg lasix, minimal response  Plan to given additional diuretics this PM and reassess need for CRRT  Patient following commands off sedation  TF started through OG, tolerating well  Patient continuing to have frequent liquid bowel movements, 1500 lactulose dose held per order  Plan of care reviewed with pt's  at bedside  Will continue to monitor

## 2018-11-24 NOTE — ASSESSMENT & PLAN NOTE
Likely bilateral UL pneumonia with superimposed pulmonary edema. Comparing old CXRs, she had increased interstitial markings throughout, but no significant increase in opacification in the upper lobes, indicating this is likely all acute from infection or pulmonary edema. No recent respiratory cultures to help guide therapy.  -con't broad-spectrum antibiotics  -Recommend low tidal volume ventilation (6cc/kg IBW) and high-PEEP strategy per [facility] protocol.  -Recommend titration of FiO2 to maintain SpO2 >90%.  -Daily SAT & SBTs as medically appropriate. Failed SBT today due to tachypnea to 40 with Vt <300cc.  -Recommend more agressive volume removal. She seems to have failed diuretic therapy at this time and HD may be indicated.

## 2018-11-24 NOTE — ASSESSMENT & PLAN NOTE
Cryptogenic cirrhosis awaiting liver xplant, presented for AMS and found to have ENZO on CKDIIIb. Cr worsening since admission. Acute component likely from toxic/ischemic ATN (FENa 0.4% suggesting pre-renal component), making urine but oliguric, acid/base and lytes ok, volume status stable    No significant response to high dose diuretic challenge, worsening volume overload, BUN 93, bicarb 17, K 4.2    Plan/Recommendations:  -start SLED for primarily volume removal, but also metabolic clearance  -run continuously until am, then reassess

## 2018-11-24 NOTE — NURSING
Dr Yatse notified of pt's sats 90% on monitor and BP 90s/40s after CRRT started  ABG obtained and FIO2 on vent increased to 50%  Levo restarted to maintain SBP >100, tube feeds held for now  Will continue to monitor

## 2018-11-24 NOTE — PLAN OF CARE
Problem: Patient Care Overview  Goal: Plan of Care Review   Pt vented on AC, 30%, & 5PEEP.  SBP maintained >100. Pt went into AFIB w/RVR during night.  EKG & labs obtained.  5mg of metoprolol given x2 to control HR; pt's HR returned to 60s. CVP: 10-12. Propofol gtt infusing @ 20mcg/kg/min. TF titrated to goal of 40cc/hr; minimal residuals. 60mg of Lasix & 500mg of Diuril administered w/little response.  Pt noted to having frequent liquid stools.  Following commands when sedation off. Pt turned Q2 to prevent skin breakdown. Plan for discussion regarding CRRT today. Plan of care reviewed w/pt & spouse; all questions answered.

## 2018-11-24 NOTE — PROGRESS NOTES
[facility]  Nephrology  Progress Note    Patient Name: Krystal Hobson  MRN: [id number]  Admission Date: 11/10/2018  Hospital Length of Stay: 14 days  Attending Provider: Abrahan Montelongo MD   Primary Care Physician: Courtney Joe MD  Principal Problem:Hepatic encephalopathy    Subjective:     HPI: Reason for consult: Diuresis in setting of PAP46, pulmonary edema, HRS    Ms. 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  - She presented to [facility] as xfer from [place] under liver transplant team due to encephalopathy and new onset ENZO.   - Per the , she was extremely disoriented @ OSH and her ammonia reached as high as 200, and her Cr level increased to 2.0 She also went into A fib with RVR and was started on a diltiazem gtt at OSH.   - On arrival at [facility] she was disoriented and started on lactulose with noted improvement. During her current admission on CXR they found a concern for HCAP and she was started on broad spectrum ABx and then shifted her on Moxifloxacin 7 D course. On 11/20/2018 found on CXR a concern for Lt lung middle lobe consolidation and started on vancomycin and zosyn.  - Renal fxn baseline 1.6 at admit but trending up    Interval History: no significant response to high dose diuretic (loop + thiazide), CVP increasing and reported at 16 this morning (up from 12)    Review of patient's allergies indicates:  No Known Allergies  Current Facility-Administered Medications   Medication Frequency    0.9%  NaCl infusion (CRRT USE ONLY) Continuous    0.9%  NaCl infusion (for blood administration) Q24H PRN    acetaminophen tablet 650 mg Q8H PRN    benzonatate capsule 100 mg TID PRN    dextrose 50% injection 12.5 g PRN    famotidine tablet 20 mg QHS    fluconazole tablet 200 mg Daily    glucagon (human recombinant) injection 1 mg PRN    guaifenesin 100 mg/5 ml syrup 200 mg Q4H PRN    guaifenesin 100 mg/5 ml syrup 200 mg Q6H    heparin  (porcine) injection 5,000 Units Q8H    insulin aspart U-100 pen 0-5 Units Q6H PRN    insulin detemir U-100 pen 14 Units Daily    lactulose 10 gram/15 mL solution (enema) 200 g TID PRN    lactulose 10 gram/15 mL solution (enema) 200 g Once    lactulose 20 gram/30 mL solution Soln 30 g Q6H PRN    levalbuterol nebulizer solution 1.25 mg Q6H WAKE    magnesium sulfate 2g in water 50mL IVPB (premix) PRN    norepinephrine 4 mg in dextrose 5% 250 mL infusion (premix) (titrating) Continuous    ondansetron disintegrating tablet 8 mg Q8H PRN    piperacillin-tazobactam 4.5 g in sodium chloride 0.9% 100 mL IVPB (ready to mix system) Q12H    propofol (DIPRIVAN) 10 mg/mL infusion Continuous    rifAXIMin tablet 550 mg BID    simethicone chewable tablet 80 mg TID PRN    sodium chloride 0.9% flush 3 mL PRN       Objective:     Vital Signs (Most Recent):  Temp: 97.8 °F (36.6 °C) (11/24/18 1100)  Pulse: 65 (11/24/18 1107)  Resp: 18 (11/24/18 0705)  BP: (!) 114/56 (11/24/18 1100)  SpO2: 97 % (11/24/18 1107)  O2 Device (Oxygen Therapy): ventilator (11/24/18 1107) Vital Signs (24h Range):  Temp:  [97.6 °F (36.4 °C)-98.1 °F (36.7 °C)] 97.8 °F (36.6 °C)  Pulse:  [] 65  Resp:  [18-26] 18  SpO2:  [92 %-100 %] 97 %  BP: ()/(45-79) 114/56  Arterial Line BP: ()/(31-53) 116/46     Weight: 57 kg (125 lb 10.6 oz) (11/24/18 0500)  Body mass index is 26.26 kg/m².  Body surface area is 1.53 meters squared.    I/O last 3 completed shifts:  In: 2665.5 [I.V.:1073; Blood:562.5; NG/GT:1030]  Out: 1673 [Urine:523; Drains:150; Stool:1000]    Physical Exam   HENT:   Head: Atraumatic.   Neck: No JVD present.   Cardiovascular: Normal rate and regular rhythm.   Pulmonary/Chest: Effort normal and breath sounds normal.   Abdominal: Soft. She exhibits no distension.   Musculoskeletal: She exhibits edema. She exhibits no tenderness.   Skin: Skin is warm.           Assessment/Plan:     Acute kidney injury superimposed on CKD     Cryptogenic cirrhosis awaiting liver xplant, presented for AMS and found to have ENZO on CKDIIIb. Cr worsening since admission. Acute component likely from toxic/ischemic ATN (FENa 0.4% suggesting pre-renal component), making urine but oliguric, acid/base and lytes ok, volume status stable    No significant response to high dose diuretic challenge, worsening volume overload, BUN 93, bicarb 17, K 4.2    Plan/Recommendations:  -start SLED for primarily volume removal, but also metabolic clearance  -run continuously until am, then reassess         Thank you for your consult. I will follow-up with patient. Please contact us if you have any additional questions.    Ifeanyi Schumacher MD  Nephrology  [facility]

## 2018-11-24 NOTE — PROGRESS NOTES
Dr. Ferreira notified of pt's HR increasing to 140s.  BP stable w/SBP in 120s.  Orders given to obtain EKG & Renal panel, Mag, Phos.  MD coming to bedside.

## 2018-11-24 NOTE — SUBJECTIVE & OBJECTIVE
Interval History: Poor response to diuretics yesterday and some following commands off sedation per notes. Per nephro, may dialyze today.    Objective:     Vital Signs (Most Recent):  Temp: 97.8 °F (36.6 °C) (11/24/18 0700)  Pulse: 73 (11/24/18 0830)  Resp: 18 (11/24/18 0705)  BP: (!) 122/52 (11/24/18 0815)  SpO2: 96 % (11/24/18 0830) Vital Signs (24h Range):  Temp:  [97.6 °F (36.4 °C)-98.7 °F (37.1 °C)] 97.8 °F (36.6 °C)  Pulse:  [] 73  Resp:  [18-26] 18  SpO2:  [92 %-100 %] 96 %  BP: ()/(45-66) 122/52  Arterial Line BP: ()/(31-53) 123/51     Weight: 57 kg (125 lb 10.6 oz)  Body mass index is 26.26 kg/m².      Intake/Output Summary (Last 24 hours) at 11/24/2018 0858  Last data filed at 11/24/2018 0800  Gross per 24 hour   Intake 1946.5 ml   Output 1443 ml   Net 503.5 ml   net for admission:  2.3L    Physical Exam   Constitutional: She appears well-developed.   Frail-appearing elderly woman   HENT:   Head: Normocephalic and atraumatic.   Neck: Neck supple.   Bruising around RIJ bandage with minimal dried blood.   Cardiovascular: Normal rate and regular rhythm.   Prominent P2   Pulmonary/Chest: She has no wheezes.   Tachypneic, breathing above the vent ~30, coarse rhales anteriorly bilaterally.   Abdominal: Soft.   Neurological:   Not responsive to verbal stimulation, not opening eyes during physical exam.   Skin: Skin is warm and dry.   Vitals reviewed.      Vents:  Vent Mode: A/C (11/24/18 0700)  Ventilator Initiated: Yes (11/21/18 1839)  Set Rate: 18 bmp (11/24/18 0700)  Vt Set: 300 mL (11/24/18 0700)  PEEP/CPAP: 5 cmH20 (11/24/18 0700)  Oxygen Concentration (%): 30 (11/24/18 0830)  Peak Airway Pressure: 27 cmH2O (11/24/18 0700)  Plateau Pressure: 0 cmH20 (11/24/18 0700)  Total Ve: 8.99 mL (11/24/18 0700)  F/VT Ratio<105 (RSBI): (!) 79.11 (11/23/18 2021)    Lines/Drains/Airways     Central Venous Catheter Line                 Trialysis (Dialysis) Catheter 11/21/18 1851 left internal jugular 2  days          Drain                 NG/OG Tube 11/21/18 1840 Yoni griffithsp 16 Fr. Right mouth 2 days         Urethral Catheter 11/21/18 1500 2 days         Fecal Incontinence  11/23/18 1300 less than 1 day          Airway                 Airway - Non-Surgical 11/21/18 1836 Endotracheal Tube 2 days          Arterial Line                 Arterial Line 11/21/18 1900 Right Radial 2 days          Peripheral Intravenous Line                 Peripheral IV - Single Lumen 11/20/18 1615 Anterior;Right Hand 3 days         Peripheral IV - Single Lumen 11/20/18 1619 Anterior;Distal;Left Forearm 3 days         Peripheral IV - Single Lumen 11/21/18 1852 Right Foot 2 days                Significant Labs:    CBC/Anemia Profile:  Recent Labs   Lab 11/22/18  1638 11/23/18  0303 11/24/18  0325   WBC 11.30 6.26 7.55   HGB 7.3* 6.5* 8.1*   HCT 22.4* 20.3* 23.8*   * 80* 62*   MCV 97 101* 94   RDW 22.2* 22.5* 24.4*        Chemistries:  Recent Labs   Lab 11/22/18  1652 11/23/18  0303 11/23/18  1532 11/23/18  2204 11/24/18  0319 11/24/18  0325    142  --  143  --  142   K 3.1* 3.3* 3.5 3.4*  --  4.2    111*  --  110  --  111*   CO2 20* 18*  --  18*  --  17*   BUN 87* 89*  --  90*  --  93*   CREATININE 2.1* 2.1*  --  2.5*  --  2.6*   CALCIUM 8.5* 8.3*  --  9.1  --  9.3   ALBUMIN 3.2* 3.0*  --  3.1*  --  3.1*   PROT  --  4.7*  --   --   --  5.2*   BILITOT  --  6.1*  --   --   --  7.4*   ALKPHOS  --  115  --   --   --  132   ALT  --  22  --   --   --  21   AST  --  37  --   --   --  35   MG 2.6 2.5  --  2.7*  --  2.8*   PHOS 4.1  --   --  4.8*  4.8* 4.8*  --        All pertinent labs within the past 24 hours have been reviewed.    Micro: all studies NGTD    Significant Imaging:  CXR: I have reviewed all pertinent results/findings within the past 24 hours and my personal findings are:  No significant improvement today compared to yesterday. Comared to previous films over the past month, no significant UL disease from  the end of October, and her progressive infiltrates have favored the upper lobes throughout.

## 2018-11-24 NOTE — PROGRESS NOTES
Dr. Ferreira notified pt's HR remains in 140s after 5mg of metoprolol.  SBP in 120s.  Awaiting orders.

## 2018-11-24 NOTE — ASSESSMENT & PLAN NOTE
Avoid insulin stacking and hypoglycemia.  Lab Results   Component Value Date    CREATININE 2.6 (H) 11/24/2018

## 2018-11-24 NOTE — SUBJECTIVE & OBJECTIVE
Interval History: no significant response to high dose diuretic (loop + thiazide), CVP increasing and reported at 16 this morning (up from 12)    Review of patient's allergies indicates:  No Known Allergies  Current Facility-Administered Medications   Medication Frequency    0.9%  NaCl infusion (CRRT USE ONLY) Continuous    0.9%  NaCl infusion (for blood administration) Q24H PRN    acetaminophen tablet 650 mg Q8H PRN    benzonatate capsule 100 mg TID PRN    dextrose 50% injection 12.5 g PRN    famotidine tablet 20 mg QHS    fluconazole tablet 200 mg Daily    glucagon (human recombinant) injection 1 mg PRN    guaifenesin 100 mg/5 ml syrup 200 mg Q4H PRN    guaifenesin 100 mg/5 ml syrup 200 mg Q6H    heparin (porcine) injection 5,000 Units Q8H    insulin aspart U-100 pen 0-5 Units Q6H PRN    insulin detemir U-100 pen 14 Units Daily    lactulose 10 gram/15 mL solution (enema) 200 g TID PRN    lactulose 10 gram/15 mL solution (enema) 200 g Once    lactulose 20 gram/30 mL solution Soln 30 g Q6H PRN    levalbuterol nebulizer solution 1.25 mg Q6H WAKE    magnesium sulfate 2g in water 50mL IVPB (premix) PRN    norepinephrine 4 mg in dextrose 5% 250 mL infusion (premix) (titrating) Continuous    ondansetron disintegrating tablet 8 mg Q8H PRN    piperacillin-tazobactam 4.5 g in sodium chloride 0.9% 100 mL IVPB (ready to mix system) Q12H    propofol (DIPRIVAN) 10 mg/mL infusion Continuous    rifAXIMin tablet 550 mg BID    simethicone chewable tablet 80 mg TID PRN    sodium chloride 0.9% flush 3 mL PRN       Objective:     Vital Signs (Most Recent):  Temp: 97.8 °F (36.6 °C) (11/24/18 1100)  Pulse: 65 (11/24/18 1107)  Resp: 18 (11/24/18 0705)  BP: (!) 114/56 (11/24/18 1100)  SpO2: 97 % (11/24/18 1107)  O2 Device (Oxygen Therapy): ventilator (11/24/18 1107) Vital Signs (24h Range):  Temp:  [97.6 °F (36.4 °C)-98.1 °F (36.7 °C)] 97.8 °F (36.6 °C)  Pulse:  [] 65  Resp:  [18-26] 18  SpO2:  [92 %-100 %]  97 %  BP: ()/(45-79) 114/56  Arterial Line BP: ()/(31-53) 116/46     Weight: 57 kg (125 lb 10.6 oz) (11/24/18 0500)  Body mass index is 26.26 kg/m².  Body surface area is 1.53 meters squared.    I/O last 3 completed shifts:  In: 2665.5 [I.V.:1073; Blood:562.5; NG/GT:1030]  Out: 1673 [Urine:523; Drains:150; Stool:1000]    Physical Exam   HENT:   Head: Atraumatic.   Neck: No JVD present.   Cardiovascular: Normal rate and regular rhythm.   Pulmonary/Chest: Effort normal and breath sounds normal.   Abdominal: Soft. She exhibits no distension.   Musculoskeletal: She exhibits edema. She exhibits no tenderness.   Skin: Skin is warm.

## 2018-11-24 NOTE — CARE UPDATE
BG goal 140-180. BG at or slightly above goal with Levemir;not requiring correction scale. TF at 40 cc/hr.     continue Levemir to 14 units daily.  Low dose correction scale  BG monitoring every 6 hours      ** Please call Endocrine for any BG related issues **

## 2018-11-25 NOTE — ASSESSMENT & PLAN NOTE
66yo F liver-listed, stepped up overnight for respiratory failure with worsening respiratory acidosis     Plan:    Neuro:   -Pain control: prn fentanyl while intubates  -Sedation with propofol while intubated  -Daily sedation vacations    Pulmonary:   -intubated on arrival to SICU 11/21  Vent Mode: A/C  Oxygen Concentration (%):  [] 50  Resp Rate Total:  [18 br/min-157 br/min] 29 br/min  Vt Set:  [300 mL] 300 mL  PEEP/CPAP:  [5 cmH20] 5 cmH20  Mean Airway Pressure:  [8.9 yrO01-80 cmH20] 21 cmH20  -daily CXR - evidence of significant bilateral consolidation   -ABGs daily and prn  -continuous pulse ox   -Daily SBT  - Failed SBT with Pulm yesterday    Cardiac:  -SBP > 100 goal  -On low dose levo with CRRT  -Heart failure following, appreciate recs   - Afib with RVR 11/23/18, broke out with 2x 5mg doses of IV metop, her pressure was stable throughout    Renal:   -Mendoza in place  -UOP minimal in last 24hrs  -Bun/Cr trending up, continue to monitor  - Had trial of lasix, followed by lasix + diuril yesterday with minimal UOP response  -Nephrology following, appreciate recommendations, likely will require dialysis today  -Started CRRT 11/24/18    Fluids/Electrolytes/Nutrition/GI:   -Nutritional status: NPO while intubated  -Will start TFs today @ 10cc/hr  -replace lytes PRN  -will hold Lactulose enemas TID    Hematology/Oncology:  -Hgb 6.5 from 6.7 yesterday - likely dilutional in setting of resuscitation with albumin, but will give 1 unit pRBCs followed by lasix 100mg per nephrology   -INR 2.3, plts 80  -Anticoagulation: SQH  -Transfuse 1x pRBC today    Infectious Disease:   -Afebrile  -WBC 6.26, continue to monitor   -Abx: Vanc/Zosyn     Endocrine:  -Glucose goal of 140-180  -Endocrine following, see recommendations    Dispo:  -Continue care in the ICU setting, likely dialysis today

## 2018-11-25 NOTE — PLAN OF CARE
Problem: Patient Care Overview  Goal: Plan of Care Review  Outcome: Ongoing (interventions implemented as appropriate)  VS currently stable. Pt remains intubated A/C 50% FiO2 and 5 peep Vt 300. Gtts: Propofol at 15mcg/kg/min and levophed at 0.04mcg/kg/min. Sedation vacation performed pt arouses and opened eyes, withdraws from pain, not currently following commands. Tolerating tube feeds at goal of 40cc/hr. See flowsheets for assessments and intake and output. Labs trended. POCT glucose monitored no coverage needed. Plans for another sedation vacation. Plan of care reviewed with patients spouse. Will continue to monitor.

## 2018-11-25 NOTE — PROGRESS NOTES
"[facility]  Endocrinology  Progress Note    Admit Date: 11/10/2018     Reason for Consult: Management of type 2 DM, Hyperglycemia     Surgical Procedure and Date: n/a    Diabetes diagnosis year: 2001    Home Diabetes Medications: Levemir 12 units BID (vial) and Humalog SSI with meals   Lab Results   Component Value Date    HGBA1C 5.8 (H) 11/10/2018         How often checking glucose at home? 1-3  BG readings on regimen: 150-300  Hypoglycemia on the regimen? once 27; required visit to ED; gave Levemir and humalog and patient did not eat  Missed doses on regimen?  No    Diabetes Complications include:     Hyperglycemia, Hypoglycemia  and Diabetic peripheral neuropathy     Complicating diabetes co morbidities:   CIRRHOSIS      HPI:   Patient is a 67 y.o. female with a diagnosis of type 2 DM; well controlled on MDI. Also with   Cryptogenic cirrhosis, rheumatoid arthritis, and GERD. Patient was listed for liver transplant on 9/14/18. Patient presented to ED of hospital in [place] with AMS and ENZO. Endocrinology consulted for BG/ DM management.   Of note, profound hypoglycemic event (BG < 20) the night of 11/14/18.            Interval HPI:   Overnight events: Remains in ICU, intubated. SLED overnight per nephrology. BG at goal with basal insulin; not requiring correction scale.   Eating:   NPO  Nausea: No  Hypoglycemia and intervention: No  Fever: No  TF: isosource at 40 cc/hr     BP (!) 118/55 (BP Location: Right arm, Patient Position: Lying)   Pulse 62   Temp 97.8 °F (36.6 °C) (Oral)   Resp 18   Ht 4' 10" (1.473 m)   Wt 57 kg (125 lb 10.6 oz)   SpO2 96%   Breastfeeding? No   BMI 26.26 kg/m²      Labs Reviewed and Include    Recent Labs   Lab 11/24/18  0325   *   CALCIUM 9.3   ALBUMIN 3.1*   PROT 5.2*      K 4.2   CO2 17*   *   BUN 93*   CREATININE 2.6*   ALKPHOS 132   ALT 21   AST 35   BILITOT 7.4*     Lab Results   Component Value Date    WBC 7.55 11/24/2018    HGB 8.1 " (L) 11/24/2018    HCT 23.8 (L) 11/24/2018    MCV 94 11/24/2018    PLT 62 (L) 11/24/2018     No results for input(s): TSH, FREET4 in the last 168 hours.  Lab Results   Component Value Date    HGBA1C 5.8 (H) 11/10/2018       Nutritional status:   Body mass index is 26.26 kg/m².  Lab Results   Component Value Date    ALBUMIN 3.1 (L) 11/24/2018    ALBUMIN 3.1 (L) 11/23/2018    ALBUMIN 3.0 (L) 11/23/2018     No results found for: PREALBUMIN    Estimated Creatinine Clearance: 18.9 mL/min (A) (based on SCr of 2.6 mg/dL (H)).    Accu-Checks  Recent Labs     11/21/18  2340 11/22/18  0529 11/22/18  1156 11/22/18  1754 11/22/18  2330 11/23/18  0539 11/23/18  1535 11/23/18  1802 11/24/18  0113 11/24/18  0602   POCTGLUCOSE 225* 177* 190* 172* 116* 90 136* 122* 151* 196*       Current Medications and/or Treatments Impacting Glycemic Control  Immunotherapy:    Immunosuppressants     None        Steroids:   Hormones (From admission, onward)    None        Pressors:    Autonomic Drugs (From admission, onward)    Start     Stop Route Frequency Ordered    11/21/18 1900  norepinephrine 4 mg in dextrose 5% 250 mL infusion (premix) (titrating)     Question Answer Comment   Titrate by: (in mcg/kg/min) 0.02    Titrate interval: (in minutes) 5    Titrate to maintain: (MAP or SBP) MAP    Greater than: (in mmHg) 65    Maximum dose: (in mcg/kg/min) 3        -- IV Continuous 11/21/18 1845        Hyperglycemia/Diabetes Medications:   Antihyperglycemics (From admission, onward)    Start     Stop Route Frequency Ordered    11/23/18 0900  insulin detemir U-100 pen 14 Units      -- SubQ Daily 11/23/18 0735    11/21/18 1955  insulin aspart U-100 pen 0-5 Units      -- SubQ Every 6 hours PRN 11/21/18 1855          ASSESSMENT and PLAN    * Hepatic encephalopathy    Managed per primary.        Type 2 diabetes mellitus with hyperglycemia, with long-term current use of insulin    BG goal 140-180    continue Levemir to 14 units daily.  Change to BG  monitoring every 4 hours and low dose correction    Discharge planning: TBD       Decompensated hepatic cirrhosis    Managed per primary.   Listed for liver transplant.  May impact BG readings       CKD (chronic kidney disease) stage 3, GFR 30-59 ml/min    Caution with insulin stacking  Estimated Creatinine Clearance: 18.9 mL/min (A) (based on SCr of 2.6 mg/dL (H)).         On enteral nutrition    May elevate BG       Acute kidney injury superimposed on CKD    Avoid insulin stacking and hypoglycemia.  Lab Results   Component Value Date    CREATININE 2.6 (H) 11/24/2018            Pneumonia involving left lung    Infection may elevate BG readings  On IV antibiotics           Jadyn Hall NP  Endocrinology  [facility]

## 2018-11-25 NOTE — PROGRESS NOTES
[facility]  Liver Transplant  Progress Note    Patient Name: Krystal Hobson  MRN: [id number]  Admission Date: 11/10/2018  Hospital Length of Stay: 15 days  Code Status: Full Code  Primary Care Provider: Courtney Joe MD    Subjective:     Scheduled Meds:   famotidine  20 mg Oral QHS    fluconazole  200 mg Oral Daily    guaifenesin 100 mg/5 ml  200 mg Per OG tube Q6H    heparin (porcine)  5,000 Units Subcutaneous Q8H    insulin detemir U-100  14 Units Subcutaneous Daily    lactulose  200 g Rectal Once    levalbuterol  1.25 mg Nebulization Q6H WAKE    piperacillin-tazobactam (ZOSYN) IVPB  4.5 g Intravenous Q12H    rifAXImin  550 mg Oral BID     Continuous Infusions:   sodium chloride 0.9% 200 mL/hr at 11/25/18 0600    norepinephrine bitartrate-D5W 0.04 mcg/kg/min (11/25/18 0600)    propofol 15 mcg/kg/min (11/25/18 0601)     PRN Meds:sodium chloride, sodium chloride, acetaminophen, benzonatate, dextrose 50%, glucagon (human recombinant), guaifenesin 100 mg/5 ml, insulin aspart U-100, lactulose, lactulose, magnesium sulfate IVPB, ondansetron, simethicone, sodium chloride 0.9%    Review of Systems  Objective:     Vital Signs (Most Recent):  Temp: 97.9 °F (36.6 °C) (11/25/18 0625)  Pulse: 83 (11/25/18 0625)  Resp: (!) 34 (11/24/18 1915)  BP: (!) 118/42 (11/25/18 0600)  SpO2: 100 % (11/25/18 0625) Vital Signs (24h Range):  Temp:  [97.7 °F (36.5 °C)-98.4 °F (36.9 °C)] 97.9 °F (36.6 °C)  Pulse:  [59-87] 83  Resp:  [18-34] 34  SpO2:  [90 %-100 %] 100 %  BP: ()/(42-81) 118/42  Arterial Line BP: ()/(31-59) 121/44     Weight: 57 kg (125 lb 10.6 oz)  Body mass index is 26.26 kg/m².    Intake/Output - Last 3 Shifts       11/23 0700 - 11/24 0659 11/24 0700 - 11/25 0659    I.V. (mL/kg) 614 (10.8) 2899.6 (50.9)    Blood 562.5 250    NG/ 1050    Total Intake(mL/kg) 2016.5 (35.4) 4199.6 (73.7)    Urine (mL/kg/hr) 423 (0.3) 200 (0.1)    Other  4353    Stool 1000 700    Total Output 1423 5883     Net +593.5 -1053.4          Stool Occurrence 4 x 1 x          Physical Exam   Constitutional: She is oriented to person, place, and time. She appears well-developed.   HENT:   Head: Normocephalic and atraumatic.   intubated   Eyes: EOM are normal. Pupils are equal, round, and reactive to light.   Neck: Normal range of motion. Neck supple. JVD (CVP 16) present.   Cardiovascular: Normal rate and regular rhythm.   Pulmonary/Chest: Effort normal. She has no rales.   Bronchial breath sounds noted on LLL   Abdominal: Soft. Bowel sounds are normal.   Musculoskeletal: Normal range of motion. She exhibits no edema.   Neurological: She is alert and oriented to person, place, and time.   Skin: Skin is warm.   Vitals reviewed.      Laboratory:  Immunosuppressants     None        CBC:   Recent Labs   Lab 11/25/18  0300   WBC 11.25   RBC 2.34*   HGB 7.4*   HCT 22.7*   PLT 71*   MCV 97   MCH 31.6*   MCHC 32.6     CMP:   Recent Labs   Lab 11/25/18  0300   *  163*   CALCIUM 7.8*  7.8*   ALBUMIN 2.8*  2.8*   PROT 5.2*     139   K 4.5  4.5   CO2 25  25     107   BUN 8  8   CREATININE 0.6  0.6   ALKPHOS 155*   ALT 20   AST 41*   BILITOT 6.7*     Coagulation:   Recent Labs   Lab 11/25/18  0300   INR 1.8*     Labs within the past 24 hours have been reviewed.    Diagnostic Results:  I have personally reviewed all pertinent imaging studies.    Assessment/Plan:   Krystal Hobson is a 67 y.o. female     ENT   Ear pain    - ENT consulted 11/19, recommend flonase, no ear infection seen.      Pulmonary   Acute respiratory failure with hypoxia    66yo F liver-listed, stepped up overnight for respiratory failure with worsening respiratory acidosis     Plan:    Neuro:   -Pain control: prn fentanyl while intubates  -Sedation with propofol while intubated  -Daily sedation vacations    Pulmonary:   -intubated on arrival to SICU 11/21  Vent Mode: A/C  Oxygen Concentration (%):  [] 50  Resp Rate Total:  [18 br/min-157  br/min] 29 br/min  Vt Set:  [300 mL] 300 mL  PEEP/CPAP:  [5 cmH20] 5 cmH20  Mean Airway Pressure:  [8.9 voK43-18 cmH20] 21 cmH20  -daily CXR - evidence of significant bilateral consolidation   -ABGs daily and prn  -continuous pulse ox   -Daily SBT  - Failed SBT with Pulm yesterday    Cardiac:  -SBP > 100 goal  -On low dose levo with CRRT  -Heart failure following, appreciate recs   - Afib with RVR 11/23/18, broke out with 2x 5mg doses of IV metop, her pressure was stable throughout    Renal:   -Mendoza in place  -UOP minimal in last 24hrs  -Bun/Cr trending up, continue to monitor  - Had trial of lasix, followed by lasix + diuril yesterday with minimal UOP response  -Nephrology following, appreciate recommendations, likely will require dialysis today  -Started CRRT 11/24/18    Fluids/Electrolytes/Nutrition/GI:   -Nutritional status: NPO while intubated  -Will start TFs today @ 10cc/hr  -replace lytes PRN  -will hold Lactulose enemas TID    Hematology/Oncology:  -Hgb 6.5 from 6.7 yesterday - likely dilutional in setting of resuscitation with albumin, but will give 1 unit pRBCs followed by lasix 100mg per nephrology   -INR 2.3, plts 80  -Anticoagulation: SQH  -Transfuse 1x pRBC today    Infectious Disease:   -Afebrile  -WBC 6.26, continue to monitor   -Abx: Vanc/Zosyn     Endocrine:  -Glucose goal of 140-180  -Endocrine following, see recommendations    Dispo:  -Continue care in the ICU setting, likely dialysis today          Pneumonia involving left lung    - Noted on recent chest xray.  Initially placed on BS antibxs for treatment.    - Transitioned to PO Moxi (11/13) to complete a 7 day course (11/18).   - Pt with elevated WBC on lab work 11/14 and placed back on BS antibxs at that time.   - WBC then with quick improvement.  - cough and SOB improving  - Repeat CXR today with persistent JUAN opacification, denies SOB but visibly tachypneic and using accessory muscles  - Pulmonology consulted  - Start treatment for HCAP  with vanc/zosyn     Cardiac/Vascular   Sinus tachycardia    - Worsened with albuterol, improved with BB.   - remains in NSR. Continue tele.     Atrial fibrillation    -  Patient with episode of Afib with RVR at OSH and was placed on diltiazem gtt which was discontinued 11/10 as she reverted to NSR.  Cardiology consulted for management.   -  Pt then returned back into Afib w/ RVR on the afternoon of 11/11 and pt placed back on diltiazem for rate control.   -  Pt now rated controlled and diltiazem d/c'd on 11/12 at 0200. Cont with lopressor at this time as pt remains rate controlled.       Renal/   Hypokalemia    - continue to monitor closely while diuresing. Replete PRN.   - repeat CMP in afternoon     Hypervolemia    - TTE 11/12 with PA pressure > 55  - Repeat echo 11/20 with PA pressure 46  - HTS consulted for RHC at the end of the week at the earliest  - continue to diurese -- lasix 40mg IV x 2 doses  - BNP 11/21 was 1892     CKD (chronic kidney disease) stage 3, GFR 30-59 ml/min    - See ENZO.  Stable.     Acute kidney injury superimposed on CKD    - CKD 3-4 at baseline creatinine. Urine electrolytes ordered. Etiology possibly due to hepatorenal syndrome.   - HRS protocol started on admission, albumin and octreotide. No midodrine as with Afib.   - Cr since admission has improved  - Dosing lasix daily based on labs -- Lasix 40mg IV x 2 doses today  - Must weigh daily and cont strict I&Os.      Hematology   Thrombocytopenia due to hypersplenism    - No overt s/s of bleeding.  Continue to monitor.      Oncology   Anemia of chronic disease    - H/H stable. Continue to monitor with daily cbc.   - no overt signs of bleeding     Endocrine   Type 2 diabetes mellitus with hyperglycemia, with long-term current use of insulin    - Basal and prandial insulin ordered. Endocrine consulted for management. Apprec recs.   - Pt noted with significant hypoglycemic event the evening of 11/14, BG 8. Pt responded well to IV dextrose.    - blood glucose has remained stable and diabetic current regimen.       Protein-calorie malnutrition, moderate    - Dietician completed calorie count.  Patient eating % of trays.  Will d/c calorie count.  Pt drinking 2-3 supplement drinks daily.       GI   * Hepatic encephalopathy    - Acute on chronic. PO lactulose ordered, continue rifaximin. Titrate Lactulose to maintain 3-4 BM a day. PRN lactulose enema TID for worsening confusion.  - currently pt is AAOx4, no encephalopathy noted.  Mentation is delayed.       Chronic liver disease           End stage liver disease    -  Listed for liver transplant with MELD 27. Per hepatologist, remains on internal hold 2/2 initially for PNA, but now for frailty and elevated PA pressure. Continue to monitor.  -  Plan to discuss pt candidacy once resp status, nutrition, and physical mobility improve. Need repeat RHC for elevated PA pressure. Cards following    MELD-Na score: 31 at 11/24/2018  3:25 AM  MELD score: 31 at 11/24/2018  3:25 AM  Calculated from:  Serum Creatinine: 2.6 mg/dL at 11/24/2018  3:25 AM  Serum Sodium: 142 mmol/L (Rounded to 137 mmol/L) at 11/24/2018  3:25 AM  Total Bilirubin: 7.4 mg/dL at 11/24/2018  3:25 AM  INR(ratio): 2.1 at 11/24/2018  3:25 AM  Age: 67 years     Decompensated hepatic cirrhosis    - See hepatic encephalopathy.      Orthopedic   Swelling of joint of upper arm, right    - infiltrated midline overnight 11/14-11/15.  Cont to have increased swelling, pain and cold right arm despite elevating and using PRN heat packs.    - US right upper extremity 11/16 negative for DVT.  Pt on ASA.  Monitor.    - edema improving with diuresis     Other   Physical deconditioning    - PT/OT following.  Patient need aggressive therapy.  She will also need to be able to climb steps prior to discharge.  She has at least 7 steps to climb at home.    - PT/OT currently recommending home with home health.      Shortness of breath    - Was on 2-3 L O2 11/16.     - Now at 1L PRN and RA majority of the time with spo2 > 95%.  - SOB and cough improving with diuresis  - continue with mucinex and tessalon perle.   - Repeat CXR today with persistent JUAN opacification     Organ transplant candidate    - on internal hold.           The patients clinical status was discussed at multidisplinary rounds, involving transplant surgery, transplant medicine, pharmacy, nursing, nutrition, and social work.    Sergio Ferreira MD  Liver Transplant  [facility]

## 2018-11-25 NOTE — SUBJECTIVE & OBJECTIVE
Scheduled Meds:   famotidine  20 mg Oral QHS    fluconazole  200 mg Oral Daily    guaifenesin 100 mg/5 ml  200 mg Per OG tube Q6H    heparin (porcine)  5,000 Units Subcutaneous Q8H    insulin detemir U-100  14 Units Subcutaneous Daily    lactulose  200 g Rectal Once    levalbuterol  1.25 mg Nebulization Q6H WAKE    piperacillin-tazobactam (ZOSYN) IVPB  4.5 g Intravenous Q12H    rifAXImin  550 mg Oral BID     Continuous Infusions:   sodium chloride 0.9% 200 mL/hr at 11/25/18 0600    norepinephrine bitartrate-D5W 0.04 mcg/kg/min (11/25/18 0600)    propofol 15 mcg/kg/min (11/25/18 0601)     PRN Meds:sodium chloride, sodium chloride, acetaminophen, benzonatate, dextrose 50%, glucagon (human recombinant), guaifenesin 100 mg/5 ml, insulin aspart U-100, lactulose, lactulose, magnesium sulfate IVPB, ondansetron, simethicone, sodium chloride 0.9%    Review of Systems  Objective:     Vital Signs (Most Recent):  Temp: 97.9 °F (36.6 °C) (11/25/18 0625)  Pulse: 83 (11/25/18 0625)  Resp: (!) 34 (11/24/18 1915)  BP: (!) 118/42 (11/25/18 0600)  SpO2: 100 % (11/25/18 0625) Vital Signs (24h Range):  Temp:  [97.7 °F (36.5 °C)-98.4 °F (36.9 °C)] 97.9 °F (36.6 °C)  Pulse:  [59-87] 83  Resp:  [18-34] 34  SpO2:  [90 %-100 %] 100 %  BP: ()/(42-81) 118/42  Arterial Line BP: ()/(31-59) 121/44     Weight: 57 kg (125 lb 10.6 oz)  Body mass index is 26.26 kg/m².    Intake/Output - Last 3 Shifts       11/23 0700 - 11/24 0659 11/24 0700 - 11/25 0659    I.V. (mL/kg) 614 (10.8) 2899.6 (50.9)    Blood 562.5 250    NG/ 1050    Total Intake(mL/kg) 2016.5 (35.4) 4199.6 (73.7)    Urine (mL/kg/hr) 423 (0.3) 200 (0.1)    Other  4353    Stool 1000 700    Total Output 1423 5253    Net +593.5 -1053.4          Stool Occurrence 4 x 1 x          Physical Exam   Constitutional: She is oriented to person, place, and time. She appears well-developed.   HENT:   Head: Normocephalic and atraumatic.   intubated   Eyes: EOM are normal.  Pupils are equal, round, and reactive to light.   Neck: Normal range of motion. Neck supple. JVD (CVP 16) present.   Cardiovascular: Normal rate and regular rhythm.   Pulmonary/Chest: Effort normal. She has no rales.   Bronchial breath sounds noted on LLL   Abdominal: Soft. Bowel sounds are normal.   Musculoskeletal: Normal range of motion. She exhibits no edema.   Neurological: She is alert and oriented to person, place, and time.   Skin: Skin is warm.   Vitals reviewed.      Laboratory:  Immunosuppressants     None        CBC:   Recent Labs   Lab 11/25/18 0300   WBC 11.25   RBC 2.34*   HGB 7.4*   HCT 22.7*   PLT 71*   MCV 97   MCH 31.6*   MCHC 32.6     CMP:   Recent Labs   Lab 11/25/18 0300   *  163*   CALCIUM 7.8*  7.8*   ALBUMIN 2.8*  2.8*   PROT 5.2*     139   K 4.5  4.5   CO2 25  25     107   BUN 8  8   CREATININE 0.6  0.6   ALKPHOS 155*   ALT 20   AST 41*   BILITOT 6.7*     Coagulation:   Recent Labs   Lab 11/25/18  0300   INR 1.8*     Labs within the past 24 hours have been reviewed.    Diagnostic Results:  I have personally reviewed all pertinent imaging studies.

## 2018-11-25 NOTE — ASSESSMENT & PLAN NOTE
Avoid insulin stacking and hypoglycemia.  Lab Results   Component Value Date    CREATININE 0.6 11/25/2018    CREATININE 0.6 11/25/2018

## 2018-11-25 NOTE — ASSESSMENT & PLAN NOTE
Caution with insulin stacking  Estimated Creatinine Clearance: 81.9 mL/min (based on SCr of 0.6 mg/dL).

## 2018-11-25 NOTE — ASSESSMENT & PLAN NOTE
Cryptogenic cirrhosis awaiting liver xplant, presented for AMS and found to have ENZO on CKDIIIb. Cr worsening since admission. Acute component likely from toxic/ischemic ATN (FENa 0.4% suggesting pre-renal component), making urine but oliguric, acid/base and lytes ok, volume status stable    Did well on SLED, net negative by 1.4L, acid/base and lytes look good, making urine, but remaining significantly oliguric    Should be able to keep up with volume through 12hr nightly SLED treatments    Plan/Recommendations:  -SLED x 12hrs overnight, hold during the day  -strict Is & Os, watch for recovery  -serial RFPs

## 2018-11-25 NOTE — PLAN OF CARE
Patient currently stable  CRRT off at 1030, plan to restart this evening  On vent: A/C, 40/5, sats remains >98%  CVP 9,9,7  TF's held at 1200 for residuals of 500, restarted at 1700 at 10cc/hr  BMS in place, 400cc output of stool  Mendoza in place, remains oliguric  All sedation currently off, patient remains drowsy, opens eyes and withdraws to pain but not currently following commands, MD aware  Plan of care reviewed with pt and  at bedside  Will continue to monitor

## 2018-11-25 NOTE — SUBJECTIVE & OBJECTIVE
Interval History: on SLED yesterday and overnight, net negative by 1.4L, remaining significantly oliguric    Review of patient's allergies indicates:  No Known Allergies  Current Facility-Administered Medications   Medication Frequency    0.9%  NaCl infusion (CRRT USE ONLY) Continuous    0.9%  NaCl infusion (for blood administration) Q24H PRN    0.9%  NaCl infusion (for blood administration) Q24H PRN    acetaminophen tablet 650 mg Q8H PRN    benzonatate capsule 100 mg TID PRN    dextrose 50% injection 12.5 g PRN    famotidine tablet 20 mg QHS    fluconazole tablet 200 mg Daily    glucagon (human recombinant) injection 1 mg PRN    guaifenesin 100 mg/5 ml syrup 200 mg Q4H PRN    guaifenesin 100 mg/5 ml syrup 200 mg Q6H    heparin (porcine) injection 5,000 Units Q8H    insulin aspart U-100 pen 0-5 Units Q4H PRN    insulin detemir U-100 pen 14 Units Daily    lactulose 10 gram/15 mL solution (enema) 200 g TID PRN    lactulose 10 gram/15 mL solution (enema) 200 g Once    lactulose 20 gram/30 mL solution Soln 30 g Q6H PRN    levalbuterol nebulizer solution 1.25 mg Q6H WAKE    magnesium sulfate 2g in water 50mL IVPB (premix) PRN    norepinephrine 4 mg in dextrose 5% 250 mL infusion (premix) (titrating) Continuous    ondansetron disintegrating tablet 8 mg Q8H PRN    piperacillin-tazobactam 4.5 g in sodium chloride 0.9% 100 mL IVPB (ready to mix system) Q12H    propofol (DIPRIVAN) 10 mg/mL infusion Continuous    rifAXIMin tablet 550 mg BID    simethicone chewable tablet 80 mg TID PRN    sodium chloride 0.9% flush 3 mL PRN       Objective:     Vital Signs (Most Recent):  Temp: 98.4 °F (36.9 °C) (11/25/18 1100)  Pulse: 84 (11/25/18 1100)  Resp: (!) 29 (11/25/18 0721)  BP: (!) 126/58 (11/25/18 1100)  SpO2: 100 % (11/25/18 1100)  O2 Device (Oxygen Therapy): ventilator (11/25/18 1100) Vital Signs (24h Range):  Temp:  [97.7 °F (36.5 °C)-98.4 °F (36.9 °C)] 98.4 °F (36.9 °C)  Pulse:  [64-87] 84  Resp:   [25-34] 29  SpO2:  [90 %-100 %] 100 %  BP: ()/(42-81) 126/58  Arterial Line BP: ()/(31-63) 153/63     Weight: 57 kg (125 lb 10.6 oz) (11/24/18 0500)  Body mass index is 26.26 kg/m².  Body surface area is 1.53 meters squared.    I/O last 3 completed shifts:  In: 5008.6 [I.V.:3118.6; Blood:250; NG/GT:1640]  Out: 6958 [Urine:515; Other:4743; Stool:1700]    Physical Exam   HENT:   Head: Atraumatic.   Neck: No JVD present.   Cardiovascular: Normal rate and regular rhythm.   Pulmonary/Chest: Effort normal and breath sounds normal.   Abdominal: Soft. She exhibits no distension.   Musculoskeletal: She exhibits no edema or tenderness.   Neurological: She is alert.   Skin: Skin is warm.

## 2018-11-25 NOTE — PROGRESS NOTES
[facility]  Nephrology  Progress Note    Patient Name: Krystal Hobson  MRN: [id number]  Admission Date: 11/10/2018  Hospital Length of Stay: 15 days  Attending Provider: Abrahan Montelongo MD   Primary Care Physician: Courtney Joe MD  Principal Problem:Hepatic encephalopathy    Subjective:     HPI: Reason for consult: Diuresis in setting of PAP46, pulmonary edema, HRS    Ms. 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  - She presented to [facility] [place]fer from [place] under liver transplant team due to encephalopathy and new onset ENZO.   - Per the , she was extremely disoriented @ OSH and her ammonia reached as high as 200, and her Cr level increased to 2.0 She also went into A fib with RVR and was started on a diltiazem gtt at OSH.   - On arrival at [facility] she was disoriented and started on lactulose with noted improvement. During her current admission on CXR they found a concern for HCAP and she was started on broad spectrum ABx and then shifted her on Moxifloxacin 7 D course. On 11/20/2018 found on CXR a concern for Lt lung middle lobe consolidation and started on vancomycin and zosyn.  - Renal fxn baseline 1.6 at admit but trending up    Interval History: on SLED yesterday and overnight, net negative by 1.4L, remaining significantly oliguric    Review of patient's allergies indicates:  No Known Allergies  Current Facility-Administered Medications   Medication Frequency    0.9%  NaCl infusion (CRRT USE ONLY) Continuous    0.9%  NaCl infusion (for blood administration) Q24H PRN    0.9%  NaCl infusion (for blood administration) Q24H PRN    acetaminophen tablet 650 mg Q8H PRN    benzonatate capsule 100 mg TID PRN    dextrose 50% injection 12.5 g PRN    famotidine tablet 20 mg QHS    fluconazole tablet 200 mg Daily    glucagon (human recombinant) injection 1 mg PRN    guaifenesin 100 mg/5 ml syrup 200 mg Q4H PRN    guaifenesin 100 mg/5 ml syrup  200 mg Q6H    heparin (porcine) injection 5,000 Units Q8H    insulin aspart U-100 pen 0-5 Units Q4H PRN    insulin detemir U-100 pen 14 Units Daily    lactulose 10 gram/15 mL solution (enema) 200 g TID PRN    lactulose 10 gram/15 mL solution (enema) 200 g Once    lactulose 20 gram/30 mL solution Soln 30 g Q6H PRN    levalbuterol nebulizer solution 1.25 mg Q6H WAKE    magnesium sulfate 2g in water 50mL IVPB (premix) PRN    norepinephrine 4 mg in dextrose 5% 250 mL infusion (premix) (titrating) Continuous    ondansetron disintegrating tablet 8 mg Q8H PRN    piperacillin-tazobactam 4.5 g in sodium chloride 0.9% 100 mL IVPB (ready to mix system) Q12H    propofol (DIPRIVAN) 10 mg/mL infusion Continuous    rifAXIMin tablet 550 mg BID    simethicone chewable tablet 80 mg TID PRN    sodium chloride 0.9% flush 3 mL PRN       Objective:     Vital Signs (Most Recent):  Temp: 98.4 °F (36.9 °C) (11/25/18 1100)  Pulse: 84 (11/25/18 1100)  Resp: (!) 29 (11/25/18 0721)  BP: (!) 126/58 (11/25/18 1100)  SpO2: 100 % (11/25/18 1100)  O2 Device (Oxygen Therapy): ventilator (11/25/18 1100) Vital Signs (24h Range):  Temp:  [97.7 °F (36.5 °C)-98.4 °F (36.9 °C)] 98.4 °F (36.9 °C)  Pulse:  [64-87] 84  Resp:  [25-34] 29  SpO2:  [90 %-100 %] 100 %  BP: ()/(42-81) 126/58  Arterial Line BP: ()/(31-63) 153/63     Weight: 57 kg (125 lb 10.6 oz) (11/24/18 0500)  Body mass index is 26.26 kg/m².  Body surface area is 1.53 meters squared.    I/O last 3 completed shifts:  In: 5008.6 [I.V.:3118.6; Blood:250; NG/GT:1640]  Out: 6958 [Urine:515; Other:4743; Stool:1700]    Physical Exam   HENT:   Head: Atraumatic.   Neck: No JVD present.   Cardiovascular: Normal rate and regular rhythm.   Pulmonary/Chest: Effort normal and breath sounds normal.   Abdominal: Soft. She exhibits no distension.   Musculoskeletal: She exhibits no edema or tenderness.   Neurological: She is alert.   Skin: Skin is warm.           Assessment/Plan:      Acute kidney injury superimposed on CKD    Cryptogenic cirrhosis awaiting liver xplant, presented for AMS and found to have ENZO on CKDIIIb. Cr worsening since admission. Acute component likely from toxic/ischemic ATN (FENa 0.4% suggesting pre-renal component), making urine but oliguric, acid/base and lytes ok, volume status stable    Did well on SLED, net negative by 1.4L, acid/base and lytes look good, making urine, but remaining significantly oliguric    Should be able to keep up with volume through 12hr nightly SLED treatments    Plan/Recommendations:  -SLED x 12hrs overnight, hold during the day  -strict Is & Os, watch for recovery  -serial RFPs         Thank you for your consult. I will follow-up with patient. Please contact us if you have any additional questions.    Ifeanyi Schumacher MD  Nephrology  [facility]

## 2018-11-25 NOTE — ASSESSMENT & PLAN NOTE
BG goal 140-180    continue Levemir to 14 units daily.  Start novolog 2 units every 4 hours with TF; hold if TF held   contine BG monitoring every 4 hours and low dose correction    Discharge planning: TBD

## 2018-11-26 NOTE — TELEPHONE ENCOUNTER
PATIENT NAME: Krystal Hobson  [facility] #: [id number]    Lab Results   Component Value Date    CREATININE 0.8 11/26/2018     11/26/2018    BILITOT 10.7 (H) 11/26/2018    ALBUMIN 2.4 (L) 11/26/2018    INR 1.6 (H) 11/26/2018   MELD 34    Encephalopathy: 3 - 4  Ascites: slight  Dialysis: yes     Recertification requestor: Julia Herrera

## 2018-11-26 NOTE — PROGRESS NOTES
2150: Dr. Sol notified of patient going in to Afib with RVR in the 160's BP stable 120's/40's orders received to push 5mg of metoprolol.     2200: Pt remained in AFIB with RVR in the 120's-140's with 's/40's. Orders received to push 5mg more of metoprolol pt remains in AFIB 130's.    2205: Pt remains in Afib 130's-140's with 's/40's orders received to push 5 mg of metoprolol

## 2018-11-26 NOTE — PROGRESS NOTES
[facility]  Nephrology  Progress Note    Patient Name: Krystal Hobson  MRN: [id number]  Admission Date: 11/10/2018  Hospital Length of Stay: 16 days  Attending Provider: Abrahan Montelongo MD   Primary Care Physician: Courtney Joe MD  Principal Problem:Hepatic encephalopathy    Subjective:     HPI: Reason for consult: Diuresis in setting of PAP46, pulmonary edema, HRS    Ms. 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  - She presented to [facility] as xfer from [facility] under liver transplant team due to encephalopathy and new onset ENZO.   - Per the , she was extremely disoriented @ OSH and her ammonia reached as high as 200, and her Cr level increased to 2.0 She also went into A fib with RVR and was started on a diltiazem gtt at OSH.   - On arrival at [facility] she was disoriented and started on lactulose with noted improvement. During her current admission on CXR they found a concern for HCAP and she was started on broad spectrum ABx and then shifted her on Moxifloxacin 7 D course. On 11/20/2018 found on CXR a concern for Lt lung middle lobe consolidation and started on vancomycin and zosyn.  - Renal fxn baseline 1.6 at admit but trending up    Interval History: on nightly SLED since Saturday. Tolerated well first night, however yesterday night developed RVR with CVP of 1 and was aborted early after only a couple hours. CVP improved to 7 on discontinuation. Remains anuric. Plan for trial of spontaneous breathing today.    Review of patient's allergies indicates:  No Known Allergies  Current Facility-Administered Medications   Medication Frequency    0.9%  NaCl infusion (CRRT USE ONLY) Continuous    acetaminophen tablet 650 mg Q8H PRN    dextrose 50% injection 12.5 g PRN    diltiaZEM 125 mg in D5W 125 mL infusion Continuous    famotidine tablet 20 mg QHS    fluconazole tablet 200 mg Daily    glucagon (human recombinant) injection 1 mg PRN     guaifenesin 100 mg/5 ml syrup 200 mg Q4H PRN    guaifenesin 100 mg/5 ml syrup 200 mg Q6H    heparin (porcine) injection 5,000 Units Q8H    insulin aspart U-100 pen 0-5 Units Q4H PRN    [START ON 11/27/2018] insulin aspart U-100 pen 2 Units Q24H    [START ON 11/27/2018] insulin aspart U-100 pen 2 Units Q24H    [START ON 11/27/2018] insulin aspart U-100 pen 2 Units Q24H    insulin aspart U-100 pen 2 Units Q24H    insulin aspart U-100 pen 2 Units Q24H    insulin aspart U-100 pen 2 Units Q24H    insulin detemir U-100 pen 14 Units Daily    k phos di & mono-sod phos mono 250 mg tablet 2 tablet TID PRN    lactulose 10 gram/15 mL solution (enema) 200 g TID PRN    lactulose 20 gram/30 mL solution Soln 30 g Q6H PRN    levalbuterol (XOPENEX) 1.25 mg/0.5 mL nebulizer solution     levalbuterol nebulizer solution 1.25 mg Q6H WAKE    norepinephrine 4 mg in dextrose 5% 250 mL infusion (premix) (titrating) Continuous    ondansetron disintegrating tablet 8 mg Q8H PRN    piperacillin-tazobactam 4.5 g in sodium chloride 0.9% 100 mL IVPB (ready to mix system) Q12H    propofol (DIPRIVAN) 10 mg/mL infusion Continuous    rifAXIMin tablet 550 mg BID    simethicone chewable tablet 80 mg TID PRN    sodium chloride 0.9% flush 3 mL PRN       Objective:     Vital Signs (Most Recent):  Temp: 98.1 °F (36.7 °C) (11/26/18 0700)  Pulse: 71 (11/26/18 1300)  Resp: 13 (11/26/18 1300)  BP: (!) 108/53 (11/26/18 1300)  SpO2: 98 % (11/26/18 1300)  O2 Device (Oxygen Therapy): ventilator (11/26/18 1300) Vital Signs (24h Range):  Temp:  [98 °F (36.7 °C)-98.8 °F (37.1 °C)] 98.1 °F (36.7 °C)  Pulse:  [] 71  Resp:  [13-34] 13  SpO2:  [95 %-100 %] 98 %  BP: ()/(47-58) 108/53  Arterial Line BP: ()/(33-50) 121/38     Weight: 57 kg (125 lb 10.6 oz) (11/24/18 0500)  Body mass index is 26.26 kg/m².  Body surface area is 1.53 meters squared.    I/O last 3 completed shifts:  In: 6574.8 [I.V.:4994.8; Blood:250; NG/GT:1330]  Out:  6851 [Urine:102; Drains:250; Other:5349; Stool:1150]    Physical Exam   HENT:   Head: Atraumatic.   Neck: No JVD present.   Cardiovascular: Normal rate and regular rhythm.   Pulmonary/Chest: Effort normal and breath sounds normal. She has no wheezes.   On vent, mechanical breath sounds     Abdominal: Soft. She exhibits no distension.   Musculoskeletal: She exhibits edema (trace pitting to knee). She exhibits no tenderness.   Neurological:   Sedated, on vent   Skin: Skin is warm.           Assessment/Plan:     Acute kidney injury superimposed on CKD    Cryptogenic cirrhosis awaiting liver xplant, presented for AMS and found to have EZNO on CKDIIIb. Cr worsening since admission. Acute component likely from toxic/ischemic ATN (FENa 0.4% suggesting pre-renal component), making urine but oliguric, acid/base and lytes ok, volume status stable    Did well on SLED, net negative by 1.4L, acid/base and lytes look good, making urine, but remaining significantly oliguric    Should be able to keep up with volume through 12hr nightly SLED treatments    Plan/Recommendations:  - CXR infiltrates improving on repeat imaging, plan for spontaneous trial of breathing today  - Nightly SLED commenced over the weekend, tolerated well first night but aborted early last night due to RVR and CVP of 1, CVP increase to 7 upon d/c. Given current labs and volume status ok will hold off SLED tonight.   -strict Is & Os  -serial RFPs, trend electrolytes         Thank you for your consult. I will follow-up with patient. Please contact us if you have any additional questions.    Kelvin Liriano MD  Nephrology  [facility]

## 2018-11-26 NOTE — PLAN OF CARE
Problem: Patient Care Overview  Goal: Plan of Care Review  Outcome: Ongoing (interventions implemented as appropriate)  VS currently stable. Pt remains intubated A/C 40% FiO2 and 5 peep Vt 300. Gtts: Propofol at 15mcg/kg/min and levophed at 0.06mcg/kg/min, Cardizem started for Afib with RVR. Metoprolol 5mg x3. Sedation vacation performed pt following commands and moving all extremities. Tolerating tube feeds at goal of 40cc/hr. See flowsheets for assessments and intake and output. Labs trended. POCT glucose monitored sliding scale coverage needed. Plans for SBT today. Plan of care reviewed with patients spouse. Will continue to monitor.

## 2018-11-26 NOTE — PROGRESS NOTES
[facility]  Liver Transplant  Progress Note    Patient Name: Krystal Hobson  MRN: [id number]  Admission Date: 11/10/2018  Hospital Length of Stay: 16 days  Code Status: Full Code  Primary Care Provider: Courtney Joe MD  Post-Op Day        ORGAN:     Disease Etiology: Cirrhosis: Cryptogenic (Idiopathic)  Donor Type:     CDC High Risk:     Donor CMV Status:   Donor CMV Status:   Donor HBcAB:     Donor HCV Status:     Donor HBV MARY: Organ record is missing.  Donor HCV MARY: Organ record is missing.  Whole or Partial:   Biliary Anastomosis:   Arterial Anatomy:     Subjective:   Patient went into Afib with RVR overnight. Diltiazem drip started with conversion around 2AM this morning. Dilt drip currently stopped.     Scheduled Meds:   famotidine  20 mg Oral QHS    fluconazole  200 mg Oral Daily    guaifenesin 100 mg/5 ml  200 mg Per OG tube Q6H    heparin (porcine)  5,000 Units Subcutaneous Q8H    insulin detemir U-100  14 Units Subcutaneous Daily    lactulose  200 g Rectal Once    levalbuterol  1.25 mg Nebulization Q6H WAKE    piperacillin-tazobactam (ZOSYN) IVPB  4.5 g Intravenous Q12H    rifAXImin  550 mg Oral BID     Continuous Infusions:   sodium chloride 0.9% 200 mL/hr at 11/25/18 2245    dilTIAZem Stopped (11/26/18 0300)    norepinephrine bitartrate-D5W 0.05 mcg/kg/min (11/26/18 0700)    propofol 15 mcg/kg/min (11/26/18 0700)     PRN Meds:sodium chloride, sodium chloride, acetaminophen, benzonatate, dextrose 50%, glucagon (human recombinant), guaifenesin 100 mg/5 ml, insulin aspart U-100, lactulose, lactulose, magnesium sulfate IVPB, ondansetron, simethicone, sodium chloride 0.9%    Review of Systems  Objective:     Vital Signs (Most Recent):  Temp: 98.1 °F (36.7 °C) (11/26/18 0700)  Pulse: 70 (11/26/18 0727)  Resp: 20 (11/26/18 0727)  BP: (!) 105/51 (11/26/18 0700)  SpO2: 100 % (11/26/18 0727) Vital Signs (24h Range):  Temp:  [97.8 °F (36.6 °C)-98.8 °F (37.1 °C)] 98.1 °F (36.7  °C)  Pulse:  [] 70  Resp:  [20-34] 20  SpO2:  [95 %-100 %] 100 %  BP: ()/(45-70) 105/51  Arterial Line BP: ()/(33-63) 105/36     Weight: 57 kg (125 lb 10.6 oz)  Body mass index is 26.26 kg/m².    Intake/Output - Last 3 Shifts       11/24 0700 - 11/25 0659 11/25 0700 - 11/26 0659 11/26 0700 - 11/27 0659    I.V. (mL/kg) 2899.6 (50.9) 2299.2 (40.3)     Blood 250      NG/GT 1050 690 40    Total Intake(mL/kg) 4199.6 (73.7) 2989.2 (52.4) 40 (0.7)    Urine (mL/kg/hr) 200 (0.1) 40 (0)     Drains  250     Other 4353 2487     Stool 700 750     Total Output 5253 3527     Net -1053.4 -537.8 +40           Stool Occurrence 1 x            Physical Exam   Constitutional: She is oriented to person, place, and time. She appears well-developed.   HENT:   Head: Normocephalic and atraumatic.   intubated   Eyes: EOM are normal. Pupils are equal, round, and reactive to light.   Neck: Normal range of motion. Neck supple. JVD (CVP 16) present.   Cardiovascular: Normal rate and regular rhythm.   Pulmonary/Chest: Effort normal. She has no rales.   Bronchial breath sounds noted on LLL   Abdominal: Soft. Bowel sounds are normal.   Musculoskeletal: Normal range of motion. She exhibits no edema.   Neurological: She is alert and oriented to person, place, and time.   Skin: Skin is warm.   Vitals reviewed.      Laboratory:  Immunosuppressants     None        Labs within the past 24 hours have been reviewed.    Diagnostic Results:  I have personally reviewed all pertinent imaging studies.    Assessment/Plan:   Krystal Hobson is a 67 y.o. female     ENT   Ear pain    - ENT consulted 11/19, recommend flonase, no ear infection seen.      Pulmonary   Acute respiratory failure with hypoxia    68yo F liver-listed, stepped up on 11/21 for respiratory failure with worsening respiratory acidosis     Plan:    Neuro:   -Pain control: prn fentanyl while intubated  -Sedation with propofol while intubated  -Daily sedation vacations  -Followed all  commands off sedation yesterday     Pulmonary:   -intubated on arrival to SICU 11/21  Vent Mode: A/C  Oxygen Concentration (%):  [] 40  Resp Rate Total:  [18 br/min-40 br/min] 18 br/min  Vt Set:  [300 mL] 300 mL  PEEP/CPAP:  [5 cmH20] 5 cmH20  Mean Airway Pressure:  [8.9 heT53-01 cmH20] 9.7 cmH20  -daily CXR - evidence of significant bilateral consolidation   -ABGs daily and prn  -continuous pulse ox   -Daily SBT  - Failed SBT with Pulm yesterday. CPAP trial again today     Cardiac:  -SBP > 100 goal  -On low dose levo with CRRT  -Heart failure following, appreciate recs   - Afib with RVR 11/23/18, broke out with 2x 5mg doses of IV metop, her pressure was stable throughout  - Went back into Afib with RVR on 11/25. Given IV metoprolol 5mg q3. Did not resolve, started on diltiazem drip. Converted out of Afib around 3am. dilt drip stopped at that time.     Renal:   -Mendoza in place  -UOP minimal in last 24hrs  -Bun/Cr trending up, continue to monitor  - Had trial of lasix, followed by lasix + diuril yesterday with minimal UOP response  -Nephrology following, appreciate recommendations, likely will require dialysis today  -Started CRRT 11/24/18. Will continue nocturnal CRRT     Fluids/Electrolytes/Nutrition/GI:   -Nutritional status: NPO while intubated  -Will start TFs today @ 10cc/hr  -replace lytes PRN  -will hold Lactulose enemas TID  -continue TF via OGT    Hematology/Oncology:  -Hgb 7.6 from 7.9/23.4 from 23.7 yesterday.   -PT/INR: 16.3, 1.6 today   -continue to monitor. Transfuse as needed     Infectious Disease:   -Afebrile  -WBC 8.56, continue to monitor   -Abx: Vanc/Zosyn. Follow up vanc trough     Endocrine:  -Glucose goal of 140-180  -Endocrine following, see recommendations    Dispo:  -Continue care in the ICU setting         Pneumonia involving left lung    - Noted on recent chest xray.  Initially placed on BS antibxs for treatment.    - Transitioned to PO Moxi (11/13) to complete a 7 day course  (11/18).   - Pt with elevated WBC on lab work 11/14 and placed back on BS antibxs at that time.   - WBC then with quick improvement.  - cough and SOB improving  - Repeat CXR today with persistent JUAN opacification, denies SOB but visibly tachypneic and using accessory muscles  - Pulmonology consulted  - Start treatment for HCAP with vanc/zosyn     Cardiac/Vascular   Sinus tachycardia    - Worsened with albuterol, improved with BB.   - remains in NSR. Continue tele.     Atrial fibrillation    -  Patient with episode of Afib with RVR at OSH and was placed on diltiazem gtt which was discontinued 11/10 as she reverted to NSR.  Cardiology consulted for management.   -  Pt then returned back into Afib w/ RVR on the afternoon of 11/11 and pt placed back on diltiazem for rate control.   -  Pt now rated controlled and diltiazem d/c'd on 11/12 at 0200. Cont with lopressor at this time as pt remains rate controlled.       Renal/   Hypokalemia    - continue to monitor closely while diuresing. Replete PRN.   - repeat CMP in afternoon     Hypervolemia    - TTE 11/12 with PA pressure > 55  - Repeat echo 11/20 with PA pressure 46  - HTS consulted for RHC at the end of the week at the earliest  - continue to diurese -- lasix 40mg IV x 2 doses  - BNP 11/21 was 1892     CKD (chronic kidney disease) stage 3, GFR 30-59 ml/min    - See ENZO.  Stable.     Acute kidney injury superimposed on CKD    - CKD 3-4 at baseline creatinine. Urine electrolytes ordered. Etiology possibly due to hepatorenal syndrome.   - HRS protocol started on admission, albumin and octreotide. No midodrine as with Afib.   - Cr since admission has improved  - Dosing lasix daily based on labs -- Lasix 40mg IV x 2 doses today  - Must weigh daily and cont strict I&Os.      Hematology   Thrombocytopenia due to hypersplenism    - No overt s/s of bleeding.  Continue to monitor.      Oncology   Anemia of chronic disease    - H/H stable. Continue to monitor with daily cbc.    - no overt signs of bleeding     Endocrine   Type 2 diabetes mellitus with hyperglycemia, with long-term current use of insulin    - Basal and prandial insulin ordered. Endocrine consulted for management. Apprec recs.   - Pt noted with significant hypoglycemic event the evening of 11/14, BG 8. Pt responded well to IV dextrose.   - blood glucose has remained stable and diabetic current regimen.       Protein-calorie malnutrition, moderate    - Dietician completed calorie count.  Patient eating % of trays.  Will d/c calorie count.  Pt drinking 2-3 supplement drinks daily.       GI   * Hepatic encephalopathy    - Acute on chronic. PO lactulose ordered, continue rifaximin. Titrate Lactulose to maintain 3-4 BM a day. PRN lactulose enema TID for worsening confusion.  - currently pt is AAOx4, no encephalopathy noted.  Mentation is delayed.       Chronic liver disease           End stage liver disease    -  Listed for liver transplant with MELD 27. Per hepatologist, remains on internal hold 2/2 initially for PNA, but now for frailty and elevated PA pressure. Continue to monitor.  -  Plan to discuss pt candidacy once resp status, nutrition, and physical mobility improve. Need repeat RHC for elevated PA pressure. Cards following    MELD-Na score: 31 at 11/24/2018  3:25 AM  MELD score: 31 at 11/24/2018  3:25 AM  Calculated from:  Serum Creatinine: 2.6 mg/dL at 11/24/2018  3:25 AM  Serum Sodium: 142 mmol/L (Rounded to 137 mmol/L) at 11/24/2018  3:25 AM  Total Bilirubin: 7.4 mg/dL at 11/24/2018  3:25 AM  INR(ratio): 2.1 at 11/24/2018  3:25 AM  Age: 67 years     Decompensated hepatic cirrhosis    - See hepatic encephalopathy.      Orthopedic   Swelling of joint of upper arm, right    - infiltrated midline overnight 11/14-11/15.  Cont to have increased swelling, pain and cold right arm despite elevating and using PRN heat packs.    - US right upper extremity 11/16 negative for DVT.  Pt on ASA.  Monitor.    - edema  improving with diuresis     Other   Physical deconditioning    - PT/OT following.  Patient need aggressive therapy.  She will also need to be able to climb steps prior to discharge.  She has at least 7 steps to climb at home.    - PT/OT currently recommending home with home health.      Shortness of breath    - Was on 2-3 L O2 11/16.    - Now at 1L PRN and RA majority of the time with spo2 > 95%.  - SOB and cough improving with diuresis  - continue with mucinex and tessalon perle.   - Repeat CXR today with persistent JUAN opacification     Organ transplant candidate    - on internal hold.           The patients clinical status was discussed at multidisplinary rounds, involving transplant surgery, transplant medicine, pharmacy, nursing, nutrition, and social work.    Suzette Chamberlain MD  Liver Transplant  [facility]

## 2018-11-26 NOTE — SUBJECTIVE & OBJECTIVE
"Interval HPI:   Overnight events: Remains in ICU, intubated. Afib overnight. BG at or slightly above goal; requiring correction scale coverage.   Eating:   NPO  Nausea: No  Hypoglycemia and intervention: No  Fever: No  TF:isosource at 40 cc/hr       BP (!) 105/51 (BP Location: Left arm, Patient Position: Lying)   Pulse 70   Temp 98.1 °F (36.7 °C) (Oral)   Resp 20   Ht 4' 10" (1.473 m)   Wt 57 kg (125 lb 10.6 oz)   SpO2 100%   Breastfeeding? No   BMI 26.26 kg/m²     Labs Reviewed and Include    Recent Labs   Lab 11/26/18  0300   *  207*  207*   CALCIUM 7.7*  7.7*  7.7*   ALBUMIN 2.4*  2.4*  2.4*   PROT 4.7*     138  138   K 4.5  4.5  4.5   CO2 23  23  23     107  107   BUN 10  10  10   CREATININE 0.8  0.8  0.8   ALKPHOS 147*   ALT 16   AST 41*   BILITOT 10.7*     Lab Results   Component Value Date    WBC 8.56 11/26/2018    HGB 7.6 (L) 11/26/2018    HCT 23.9 (L) 11/26/2018    MCV 93 11/26/2018    PLT 53 (L) 11/26/2018     No results for input(s): TSH, FREET4 in the last 168 hours.  Lab Results   Component Value Date    HGBA1C 5.8 (H) 11/10/2018       Nutritional status:   Body mass index is 26.26 kg/m².  Lab Results   Component Value Date    ALBUMIN 2.4 (L) 11/26/2018    ALBUMIN 2.4 (L) 11/26/2018    ALBUMIN 2.4 (L) 11/26/2018     No results found for: PREALBUMIN    Estimated Creatinine Clearance: 61.4 mL/min (based on SCr of 0.8 mg/dL).    Accu-Checks  Recent Labs     11/24/18  0602 11/24/18  1246 11/24/18  1818 11/24/18  2340 11/25/18  0530 11/25/18  0845 11/25/18  1203 11/25/18  1538 11/25/18  2102 11/26/18  0308   POCTGLUCOSE 196* 258* 219* 194* 177* 192* 185* 232* 171* 220*       Current Medications and/or Treatments Impacting Glycemic Control  Immunotherapy:    Immunosuppressants     None        Steroids:   Hormones (From admission, onward)    None        Pressors:    Autonomic Drugs (From admission, onward)    Start     Stop Route Frequency Ordered    11/21/18 1900  " norepinephrine 4 mg in dextrose 5% 250 mL infusion (premix) (titrating)     Question Answer Comment   Titrate by: (in mcg/kg/min) 0.02    Titrate interval: (in minutes) 5    Titrate to maintain: (MAP or SBP) MAP    Greater than: (in mmHg) 65    Maximum dose: (in mcg/kg/min) 3        -- IV Continuous 11/21/18 6441        Hyperglycemia/Diabetes Medications:   Antihyperglycemics (From admission, onward)    Start     Stop Route Frequency Ordered    11/25/18 0918  insulin aspart U-100 pen 0-5 Units      -- SubQ Every 4 hours PRN 11/25/18 0819    11/23/18 0900  insulin detemir U-100 pen 14 Units      -- SubQ Daily 11/23/18 0735

## 2018-11-26 NOTE — PROGRESS NOTES
Sedation vacation performed. Pt awakens but drowsy. Follows commands and moves all extremities freely and equally.

## 2018-11-26 NOTE — PROGRESS NOTES
"[facility]  Endocrinology  Progress Note    Admit Date: 11/10/2018     Reason for Consult: Management of type 2 DM, Hyperglycemia     Surgical Procedure and Date: n/a    Diabetes diagnosis year: 2001    Home Diabetes Medications: Levemir 12 units BID (vial) and Humalog SSI with meals   Lab Results   Component Value Date    HGBA1C 5.8 (H) 11/10/2018         How often checking glucose at home? 1-3  BG readings on regimen: 150-300  Hypoglycemia on the regimen? once 27; required visit to ED; gave Levemir and humalog and patient did not eat  Missed doses on regimen?  No    Diabetes Complications include:     Hyperglycemia, Hypoglycemia  and Diabetic peripheral neuropathy     Complicating diabetes co morbidities:   CIRRHOSIS      HPI:   Patient is a 67 y.o. female with a diagnosis of type 2 DM; well controlled on MDI. Also with   Cryptogenic cirrhosis, rheumatoid arthritis, and GERD. Patient was listed for liver transplant on 9/14/18. Patient presented to ED of hospital in [place] with AMS and ENZO. Endocrinology consulted for BG/ DM management.   Of note, profound hypoglycemic event (BG < 20) the night of 11/14/18.            Interval HPI:   Overnight events: Remains in ICU, intubated. Afib overnight. BG at or slightly above goal; requiring correction scale coverage.   Eating:   NPO  Nausea: No  Hypoglycemia and intervention: No  Fever: No  TF:isosource at 40 cc/hr       BP (!) 105/51 (BP Location: Left arm, Patient Position: Lying)   Pulse 70   Temp 98.1 °F (36.7 °C) (Oral)   Resp 20   Ht 4' 10" (1.473 m)   Wt 57 kg (125 lb 10.6 oz)   SpO2 100%   Breastfeeding? No   BMI 26.26 kg/m²      Labs Reviewed and Include    Recent Labs   Lab 11/26/18  0300   *  207*  207*   CALCIUM 7.7*  7.7*  7.7*   ALBUMIN 2.4*  2.4*  2.4*   PROT 4.7*     138  138   K 4.5  4.5  4.5   CO2 23  23  23     107  107   BUN 10  10  10   CREATININE 0.8  0.8  0.8   ALKPHOS 147*   ALT 16 "   AST 41*   BILITOT 10.7*     Lab Results   Component Value Date    WBC 8.56 11/26/2018    HGB 7.6 (L) 11/26/2018    HCT 23.9 (L) 11/26/2018    MCV 93 11/26/2018    PLT 53 (L) 11/26/2018     No results for input(s): TSH, FREET4 in the last 168 hours.  Lab Results   Component Value Date    HGBA1C 5.8 (H) 11/10/2018       Nutritional status:   Body mass index is 26.26 kg/m².  Lab Results   Component Value Date    ALBUMIN 2.4 (L) 11/26/2018    ALBUMIN 2.4 (L) 11/26/2018    ALBUMIN 2.4 (L) 11/26/2018     No results found for: PREALBUMIN    Estimated Creatinine Clearance: 61.4 mL/min (based on SCr of 0.8 mg/dL).    Accu-Checks  Recent Labs     11/24/18  0602 11/24/18  1246 11/24/18  1818 11/24/18  2340 11/25/18  0530 11/25/18  0845 11/25/18  1203 11/25/18  1538 11/25/18  2102 11/26/18  0308   POCTGLUCOSE 196* 258* 219* 194* 177* 192* 185* 232* 171* 220*       Current Medications and/or Treatments Impacting Glycemic Control  Immunotherapy:    Immunosuppressants     None        Steroids:   Hormones (From admission, onward)    None        Pressors:    Autonomic Drugs (From admission, onward)    Start     Stop Route Frequency Ordered    11/21/18 1900  norepinephrine 4 mg in dextrose 5% 250 mL infusion (premix) (titrating)     Question Answer Comment   Titrate by: (in mcg/kg/min) 0.02    Titrate interval: (in minutes) 5    Titrate to maintain: (MAP or SBP) MAP    Greater than: (in mmHg) 65    Maximum dose: (in mcg/kg/min) 3        -- IV Continuous 11/21/18 1845        Hyperglycemia/Diabetes Medications:   Antihyperglycemics (From admission, onward)    Start     Stop Route Frequency Ordered    11/25/18 0918  insulin aspart U-100 pen 0-5 Units      -- SubQ Every 4 hours PRN 11/25/18 0819    11/23/18 0900  insulin detemir U-100 pen 14 Units      -- SubQ Daily 11/23/18 8503          ASSESSMENT and PLAN    * Hepatic encephalopathy    Managed per primary.        Type 2 diabetes mellitus with hyperglycemia, with long-term current  use of insulin    BG goal 140-180    continue Levemir to 14 units daily.  Start novolog 2 units every 4 hours with TF; hold if TF held   contine BG monitoring every 4 hours and low dose correction    Discharge planning: TBD       Decompensated hepatic cirrhosis    Managed per primary.   Listed for liver transplant.  May impact BG readings       CKD (chronic kidney disease) stage 3, GFR 30-59 ml/min    Caution with insulin stacking  Estimated Creatinine Clearance: 81.9 mL/min (based on SCr of 0.6 mg/dL).         On enteral nutrition    May elevate BG       Acute kidney injury superimposed on CKD    Avoid insulin stacking and hypoglycemia.  Lab Results   Component Value Date    CREATININE 0.6 11/25/2018    CREATININE 0.6 11/25/2018            Pneumonia involving left lung    Infection may elevate BG readings  On IV antibiotics           Jadyn Hall NP  Endocrinology  [facility]

## 2018-11-26 NOTE — ASSESSMENT & PLAN NOTE
Avoid insulin stacking and hypoglycemia.  Lab Results   Component Value Date    CREATININE 1.8 (H) 11/27/2018

## 2018-11-26 NOTE — ASSESSMENT & PLAN NOTE
Cryptogenic cirrhosis awaiting liver xplant, presented for AMS and found to have ENZO on CKDIIIb. Cr worsening since admission. Acute component likely from toxic/ischemic ATN (FENa 0.4% suggesting pre-renal component), making urine but oliguric, acid/base and lytes ok, volume status stable    Did well on SLED, net negative by 1.4L, acid/base and lytes look good, making urine, but remaining significantly oliguric    Should be able to keep up with volume through 12hr nightly SLED treatments    Plan/Recommendations:  - CXR infiltrates improving on repeat imaging, plan for spontaneous trial of breathing today  - Nightly SLED commenced over the weekend, tolerated well first night but aborted early last night due to RVR and CVP of 1, CVP increase to 7 upon d/c. Given current labs and volume status ok will hold off SLED tonight.   -strict Is & Os  -serial RFPs, trend electrolytes

## 2018-11-26 NOTE — PROGRESS NOTES
CRRT:    Treatment ran for 4 hours last night until vitals became unstable. Rinsed back by primary nurse. ICU team ordered to HOLD CRRT overnight.

## 2018-11-26 NOTE — ASSESSMENT & PLAN NOTE
Likely bilateral UL pneumonia Vs. superimposed pulmonary edema or both. Comparing old CXRs, latest CXR = bilateral airspace consolidation, BL fluffiness on both lungs, without significant increase in opacification in the upper lobes. No recent respiratory cultures to help guide therapy.  -Continue broad-spectrum antibiotics  -Recommend titration of FiO2 to maintain SpO2 >90%.  -Daily SAT & SBTs as medically appropriate. Failed SBT on 11/25/2018 due to tachypnea to 40 with Vt <300cc.  -Lasix was D/C, and currently she is on CRRT     Plan:  - Continue same management

## 2018-11-26 NOTE — ASSESSMENT & PLAN NOTE
Caution with insulin stacking  Estimated Creatinine Clearance: 40.9 mL/min (based on SCr of 1.2 mg/dL).

## 2018-11-26 NOTE — SUBJECTIVE & OBJECTIVE
Interval History: Nothing happened overnight. Pt is vitally stable except low BP which might be caused due to her condition plus medications. She is mechanical ventilation with FiO2 30 - 40%, off propofol and levophed. Failed couple of SBT, primary team will try some trials today for extubation.     Objective:     Vital Signs (Most Recent):  Temp: 98.1 °F (36.7 °C) (11/26/18 0700)  Pulse: 71 (11/26/18 1300)  Resp: 13 (11/26/18 1300)  BP: (!) 108/53 (11/26/18 1300)  SpO2: 98 % (11/26/18 1300) Vital Signs (24h Range):  Temp:  [98 °F (36.7 °C)-98.8 °F (37.1 °C)] 98.1 °F (36.7 °C)  Pulse:  [] 71  Resp:  [13-34] 13  SpO2:  [95 %-100 %] 98 %  BP: ()/(47-58) 108/53  Arterial Line BP: ()/(33-50) 121/38     Weight: 57 kg (125 lb 10.6 oz)  Body mass index is 26.26 kg/m².      Intake/Output Summary (Last 24 hours) at 11/26/2018 1401  Last data filed at 11/26/2018 1000  Gross per 24 hour   Intake 2749.16 ml   Output 1841 ml   Net 908.16 ml       Physical Exam   Constitutional: No distress.   She is intubated    HENT:   Head: Normocephalic.   Eyes: Right eye exhibits no discharge. Left eye exhibits no discharge.   Neck: No thyromegaly present.   Cardiovascular: Normal rate and regular rhythm.   Murmur heard.  Pulmonary/Chest: Effort normal. She has no wheezes. She has rales. She exhibits no tenderness.   Abdominal: Soft. Bowel sounds are normal. She exhibits distension. There is no tenderness.   Musculoskeletal: She exhibits no edema, tenderness or deformity.   Neurological:   She is intubated, off sedatives. She is not responding neither to pain or verbal stimulus    Skin: She is not diaphoretic.       Vents:  Vent Mode: A/C (11/26/18 1300)  Ventilator Initiated: Yes (11/21/18 1839)  Set Rate: 18 bmp (11/26/18 1300)  Vt Set: 300 mL (11/26/18 1300)  Pressure Support: 15 cmH20 (11/26/18 1134)  PEEP/CPAP: 5 cmH20 (11/26/18 1300)  Oxygen Concentration (%): 40 (11/26/18 1300)  Peak Airway Pressure: 50 cmH2O  (11/26/18 1300)  Plateau Pressure: 0 cmH20 (11/26/18 1300)  Total Ve: 10.9 mL (11/26/18 1300)  F/VT Ratio<105 (RSBI): (!) 28.63 (11/26/18 1300)    Lines/Drains/Airways     Central Venous Catheter Line                 Trialysis (Dialysis) Catheter 11/21/18 1851 left internal jugular 4 days          Drain                 NG/OG Tube 11/21/18 1840 [place] sump 16 Fr. Right mouth 4 days         Urethral Catheter 11/21/18 1500 4 days         Rectal Tube 11/24/18 1200 rectal tube w/ balloon (indicate number of mLs) 2 days          Airway                 Airway - Non-Surgical 11/21/18 1836 Endotracheal Tube 4 days          Arterial Line                 Arterial Line 11/21/18 1900 Right Radial 4 days          Peripheral Intravenous Line                 Peripheral IV - Single Lumen 11/20/18 1619 Anterior;Distal;Left Forearm 5 days         Peripheral IV - Single Lumen 11/25/18 0627 Right;Anterior Antecubital 1 day                Significant Labs:    CBC/Anemia Profile:  Recent Labs   Lab 11/25/18 1941 11/25/18 2241 11/26/18  0300   WBC 5.34 6.68 8.56   HGB 7.5* 7.9* 7.6*   HCT 23.5* 23.7* 23.9*   PLT 41* 45* 53*   MCV 94 93 93   RDW 24.9* 25.4* 25.2*        Chemistries:  Recent Labs   Lab 11/25/18  0300  11/25/18 1941 11/25/18  2241 11/26/18  0300 11/26/18  1229     139   < > 139 136  136  136 138  138  138 137   K 4.5  4.5   < > 4.4 4.3  4.3  4.3 4.5  4.5  4.5 4.3     107   < > 108 106  106  106 107  107  107 107   CO2 25  25   < > 26 26  26  26 23  23  23 21*   BUN 8  8   < > 10 6*  6*  6* 10  10  10 17   CREATININE 0.6  0.6   < > 0.7 0.7  0.7  0.7 0.8  0.8  0.8 1.2   CALCIUM 7.8*  7.8*   < > 7.7* 7.5*  7.5*  7.5* 7.7*  7.7*  7.7* 8.2*   ALBUMIN 2.8*  2.8*   < > 2.4* 2.6*  2.6*  2.6* 2.4*  2.4*  2.4* 2.5*   PROT 5.2*  --  4.7*  --  4.7*  --    BILITOT 6.7*  --  12.1*  --  10.7*  --    ALKPHOS 155*  --  146*  --  147*  --    ALT 20  --  18  --  16  --    AST 41*  --  40  --   41*  --    MG 2.5  2.5   < > 1.8 2.1  2.1  2.1 2.2  2.2  2.2 2.7*   PHOS 1.6*   < > 1.7* 1.6*  1.6*  1.6* 2.1*  2.1* 2.4*    < > = values in this interval not displayed.       BMP:   Recent Labs   Lab 11/26/18  1229   *      K 4.3      CO2 21*   BUN 17   CREATININE 1.2   CALCIUM 8.2*   MG 2.7*     CMP:   Recent Labs   Lab 11/25/18  0300  11/25/18  1941 11/25/18  2241 11/26/18  0300 11/26/18  1229     139   < > 139 136  136  136 138  138  138 137   K 4.5  4.5   < > 4.4 4.3  4.3  4.3 4.5  4.5  4.5 4.3     107   < > 108 106  106  106 107  107  107 107   CO2 25  25   < > 26 26  26  26 23  23  23 21*   *  163*   < > 181* 147*  147*  147* 207*  207*  207* 214*   BUN 8  8   < > 10 6*  6*  6* 10  10  10 17   CREATININE 0.6  0.6   < > 0.7 0.7  0.7  0.7 0.8  0.8  0.8 1.2   CALCIUM 7.8*  7.8*   < > 7.7* 7.5*  7.5*  7.5* 7.7*  7.7*  7.7* 8.2*   PROT 5.2*  --  4.7*  --  4.7*  --    ALBUMIN 2.8*  2.8*   < > 2.4* 2.6*  2.6*  2.6* 2.4*  2.4*  2.4* 2.5*   BILITOT 6.7*  --  12.1*  --  10.7*  --    ALKPHOS 155*  --  146*  --  147*  --    AST 41*  --  40  --  41*  --    ALT 20  --  18  --  16  --    ANIONGAP 7*  7*   < > 5* 4*  4*  4* 8  8  8 9   EGFRNONAA >60.0  >60.0   < > >60.0 >60.0  >60.0  >60.0 >60.0  >60.0  >60.0 46.9*    < > = values in this interval not displayed.

## 2018-11-26 NOTE — PROGRESS NOTES
[facility]  Pulmonology  Progress Note    Patient Name: Krystal Hobson  MRN: [id number]  Admission Date: 11/10/2018  Hospital Length of Stay: 16 days  Code Status: Full Code  Attending Provider: Abrahan Montelongo MD  Primary Care Provider: Courtney Joe MD   Principal Problem: Hepatic encephalopathy    Subjective:     Interval History: Nothing happened overnight. Pt is vitally stable except low BP which might be caused due to her condition plus medications. She is mechanical ventilation with FiO2 30 - 40%, off propofol and levophed. Failed couple of SBT, primary team will try some trials today for extubation.     Objective:     Vital Signs (Most Recent):  Temp: 98.1 °F (36.7 °C) (11/26/18 0700)  Pulse: 71 (11/26/18 1300)  Resp: 13 (11/26/18 1300)  BP: (!) 108/53 (11/26/18 1300)  SpO2: 98 % (11/26/18 1300) Vital Signs (24h Range):  Temp:  [98 °F (36.7 °C)-98.8 °F (37.1 °C)] 98.1 °F (36.7 °C)  Pulse:  [] 71  Resp:  [13-34] 13  SpO2:  [95 %-100 %] 98 %  BP: ()/(47-58) 108/53  Arterial Line BP: ()/(33-50) 121/38     Weight: 57 kg (125 lb 10.6 oz)  Body mass index is 26.26 kg/m².      Intake/Output Summary (Last 24 hours) at 11/26/2018 1401  Last data filed at 11/26/2018 1000  Gross per 24 hour   Intake 2749.16 ml   Output 1841 ml   Net 908.16 ml       Physical Exam   Constitutional: No distress.   She is intubated    HENT:   Head: Normocephalic.   Eyes: Right eye exhibits no discharge. Left eye exhibits no discharge.   Neck: No thyromegaly present.   Cardiovascular: Normal rate and regular rhythm.   Murmur heard.  Pulmonary/Chest: Effort normal. She has no wheezes. She has rales. She exhibits no tenderness.   Abdominal: Soft. Bowel sounds are normal. She exhibits distension. There is no tenderness.   Musculoskeletal: She exhibits no edema, tenderness or deformity.   Neurological:   She is intubated, off sedatives. She is not responding neither to pain or verbal stimulus    Skin: She is not  diaphoretic.       Vents:  Vent Mode: A/C (11/26/18 1300)  Ventilator Initiated: Yes (11/21/18 1839)  Set Rate: 18 bmp (11/26/18 1300)  Vt Set: 300 mL (11/26/18 1300)  Pressure Support: 15 cmH20 (11/26/18 1134)  PEEP/CPAP: 5 cmH20 (11/26/18 1300)  Oxygen Concentration (%): 40 (11/26/18 1300)  Peak Airway Pressure: 50 cmH2O (11/26/18 1300)  Plateau Pressure: 0 cmH20 (11/26/18 1300)  Total Ve: 10.9 mL (11/26/18 1300)  F/VT Ratio<105 (RSBI): (!) 28.63 (11/26/18 1300)    Lines/Drains/Airways     Central Venous Catheter Line                 Trialysis (Dialysis) Catheter 11/21/18 1851 left internal jugular 4 days          Drain                 NG/OG Tube 11/21/18 1840 [place] sump 16 Fr. Right mouth 4 days         Urethral Catheter 11/21/18 1500 4 days         Rectal Tube 11/24/18 1200 rectal tube w/ balloon (indicate number of mLs) 2 days          Airway                 Airway - Non-Surgical 11/21/18 1836 Endotracheal Tube 4 days          Arterial Line                 Arterial Line 11/21/18 1900 Right Radial 4 days          Peripheral Intravenous Line                 Peripheral IV - Single Lumen 11/20/18 1619 Anterior;Distal;Left Forearm 5 days         Peripheral IV - Single Lumen 11/25/18 0627 Right;Anterior Antecubital 1 day                Significant Labs:    CBC/Anemia Profile:  Recent Labs   Lab 11/25/18 1941 11/25/18 2241 11/26/18  0300   WBC 5.34 6.68 8.56   HGB 7.5* 7.9* 7.6*   HCT 23.5* 23.7* 23.9*   PLT 41* 45* 53*   MCV 94 93 93   RDW 24.9* 25.4* 25.2*        Chemistries:  Recent Labs   Lab 11/25/18  0300  11/25/18 1941 11/25/18 2241 11/26/18  0300 11/26/18  1229     139   < > 139 136  136  136 138  138  138 137   K 4.5  4.5   < > 4.4 4.3  4.3  4.3 4.5  4.5  4.5 4.3     107   < > 108 106  106  106 107  107  107 107   CO2 25  25   < > 26 26  26  26 23  23  23 21*   BUN 8  8   < > 10 6*  6*  6* 10  10  10 17   CREATININE 0.6  0.6   < > 0.7 0.7  0.7  0.7 0.8  0.8  0.8  1.2   CALCIUM 7.8*  7.8*   < > 7.7* 7.5*  7.5*  7.5* 7.7*  7.7*  7.7* 8.2*   ALBUMIN 2.8*  2.8*   < > 2.4* 2.6*  2.6*  2.6* 2.4*  2.4*  2.4* 2.5*   PROT 5.2*  --  4.7*  --  4.7*  --    BILITOT 6.7*  --  12.1*  --  10.7*  --    ALKPHOS 155*  --  146*  --  147*  --    ALT 20  --  18  --  16  --    AST 41*  --  40  --  41*  --    MG 2.5  2.5   < > 1.8 2.1  2.1  2.1 2.2  2.2  2.2 2.7*   PHOS 1.6*   < > 1.7* 1.6*  1.6*  1.6* 2.1*  2.1* 2.4*    < > = values in this interval not displayed.       BMP:   Recent Labs   Lab 11/26/18  1229   *      K 4.3      CO2 21*   BUN 17   CREATININE 1.2   CALCIUM 8.2*   MG 2.7*     CMP:   Recent Labs   Lab 11/25/18  0300  11/25/18  1941 11/25/18  2241 11/26/18  0300 11/26/18  1229     139   < > 139 136  136  136 138  138  138 137   K 4.5  4.5   < > 4.4 4.3  4.3  4.3 4.5  4.5  4.5 4.3     107   < > 108 106  106  106 107  107  107 107   CO2 25  25   < > 26 26  26  26 23  23  23 21*   *  163*   < > 181* 147*  147*  147* 207*  207*  207* 214*   BUN 8  8   < > 10 6*  6*  6* 10  10  10 17   CREATININE 0.6  0.6   < > 0.7 0.7  0.7  0.7 0.8  0.8  0.8 1.2   CALCIUM 7.8*  7.8*   < > 7.7* 7.5*  7.5*  7.5* 7.7*  7.7*  7.7* 8.2*   PROT 5.2*  --  4.7*  --  4.7*  --    ALBUMIN 2.8*  2.8*   < > 2.4* 2.6*  2.6*  2.6* 2.4*  2.4*  2.4* 2.5*   BILITOT 6.7*  --  12.1*  --  10.7*  --    ALKPHOS 155*  --  146*  --  147*  --    AST 41*  --  40  --  41*  --    ALT 20  --  18  --  16  --    ANIONGAP 7*  7*   < > 5* 4*  4*  4* 8  8  8 9   EGFRNONAA >60.0  >60.0   < > >60.0 >60.0  >60.0  >60.0 >60.0  >60.0  >60.0 46.9*    < > = values in this interval not displayed.     Assessment/Plan:     Pneumonia involving left lung    Likely bilateral UL pneumonia Vs. pulmonary edema or both. Comparing old CXRs, latest CXR = bilateral airspace consolidation, BL fluffiness on both lungs, without significant increase in  opacification in the upper lobes. No recent respiratory cultures to help guide therapy.  -Continue broad-spectrum antibiotics  -Recommend titration of FiO2 to maintain SpO2 >90%.  -Daily SAT & SBTs as medically appropriate. Failed SBT on 11/25/2018 due to tachypnea to 40 with Vt <300cc.  -Lasix was D/C, and currently she is on CRRT     Plan:  - Continue same management        Shortness of breath    Check pneumonia             Yuval Glover MD  Pulmonology  [facility]

## 2018-11-26 NOTE — ASSESSMENT & PLAN NOTE
68yo F liver-listed, stepped up on 11/21 for respiratory failure with worsening respiratory acidosis     Plan:    Neuro:   -Pain control: prn fentanyl while intubated  -Sedation with propofol while intubated  -Daily sedation vacations  -Followed all commands off sedation yesterday     Pulmonary:   -intubated on arrival to SICU 11/21  Vent Mode: A/C  Oxygen Concentration (%):  [] 40  Resp Rate Total:  [18 br/min-40 br/min] 18 br/min  Vt Set:  [300 mL] 300 mL  PEEP/CPAP:  [5 cmH20] 5 cmH20  Mean Airway Pressure:  [8.9 djH74-83 cmH20] 9.7 cmH20  -daily CXR - evidence of significant bilateral consolidation   -ABGs daily and prn  -continuous pulse ox   -Daily SBT  - Failed SBT with Pulm yesterday. CPAP trial again today     Cardiac:  -SBP > 100 goal  -On low dose levo with CRRT  -Heart failure following, appreciate recs   - Afib with RVR 11/23/18, broke out with 2x 5mg doses of IV metop, her pressure was stable throughout  - Went back into Afib with RVR on 11/25. Given IV metoprolol 5mg q3. Did not resolve, started on diltiazem drip. Converted out of Afib around 3am. dilt drip stopped at that time.     Renal:   -Mendoza in place  -UOP minimal in last 24hrs  -Bun/Cr trending up, continue to monitor  - Had trial of lasix, followed by lasix + diuril yesterday with minimal UOP response  -Nephrology following, appreciate recommendations, likely will require dialysis today  -Started CRRT 11/24/18. Will continue nocturnal CRRT     Fluids/Electrolytes/Nutrition/GI:   -Nutritional status: NPO while intubated  -Will start TFs today @ 10cc/hr  -replace lytes PRN  -will hold Lactulose enemas TID  -continue TF via OGT    Hematology/Oncology:  -Hgb 7.6 from 7.9/23.4 from 23.7 yesterday.   -PT/INR: 16.3, 1.6 today   -continue to monitor. Transfuse as needed     Infectious Disease:   -Afebrile  -WBC 8.56, continue to monitor   -Abx: Vanc/Zosyn. Follow up vanc trough     Endocrine:  -Glucose goal of 140-180  -Endocrine following, see  recommendations    Dispo:  -Continue care in the ICU setting

## 2018-11-26 NOTE — ASSESSMENT & PLAN NOTE
BG goal 140-180    Levemir 14 units daily.  Novolog 2 units every 4 hours with TF; hold if TF held   Low dose correction scale  BG monitoring every 4 hours     ADDENDUM: Increase Novolog to 3 units every 4 hours with TF    Discharge planning: TBD

## 2018-11-26 NOTE — SUBJECTIVE & OBJECTIVE
Interval History: on nightly SLED since Saturday. Tolerated well first night, however yesterday night developed RVR with CVP of 1 and was aborted early after only a couple hours. CVP improved to 7 on discontinuation. Remains anuric. Plan for trial of spontaneous breathing today.    Review of patient's allergies indicates:  No Known Allergies  Current Facility-Administered Medications   Medication Frequency    0.9%  NaCl infusion (CRRT USE ONLY) Continuous    acetaminophen tablet 650 mg Q8H PRN    dextrose 50% injection 12.5 g PRN    diltiaZEM 125 mg in D5W 125 mL infusion Continuous    famotidine tablet 20 mg QHS    fluconazole tablet 200 mg Daily    glucagon (human recombinant) injection 1 mg PRN    guaifenesin 100 mg/5 ml syrup 200 mg Q4H PRN    guaifenesin 100 mg/5 ml syrup 200 mg Q6H    heparin (porcine) injection 5,000 Units Q8H    insulin aspart U-100 pen 0-5 Units Q4H PRN    [START ON 11/27/2018] insulin aspart U-100 pen 2 Units Q24H    [START ON 11/27/2018] insulin aspart U-100 pen 2 Units Q24H    [START ON 11/27/2018] insulin aspart U-100 pen 2 Units Q24H    insulin aspart U-100 pen 2 Units Q24H    insulin aspart U-100 pen 2 Units Q24H    insulin aspart U-100 pen 2 Units Q24H    insulin detemir U-100 pen 14 Units Daily    k phos di & mono-sod phos mono 250 mg tablet 2 tablet TID PRN    lactulose 10 gram/15 mL solution (enema) 200 g TID PRN    lactulose 20 gram/30 mL solution Soln 30 g Q6H PRN    levalbuterol (XOPENEX) 1.25 mg/0.5 mL nebulizer solution     levalbuterol nebulizer solution 1.25 mg Q6H WAKE    norepinephrine 4 mg in dextrose 5% 250 mL infusion (premix) (titrating) Continuous    ondansetron disintegrating tablet 8 mg Q8H PRN    piperacillin-tazobactam 4.5 g in sodium chloride 0.9% 100 mL IVPB (ready to mix system) Q12H    propofol (DIPRIVAN) 10 mg/mL infusion Continuous    rifAXIMin tablet 550 mg BID    simethicone chewable tablet 80 mg TID PRN    sodium chloride  0.9% flush 3 mL PRN       Objective:     Vital Signs (Most Recent):  Temp: 98.1 °F (36.7 °C) (11/26/18 0700)  Pulse: 71 (11/26/18 1300)  Resp: 13 (11/26/18 1300)  BP: (!) 108/53 (11/26/18 1300)  SpO2: 98 % (11/26/18 1300)  O2 Device (Oxygen Therapy): ventilator (11/26/18 1300) Vital Signs (24h Range):  Temp:  [98 °F (36.7 °C)-98.8 °F (37.1 °C)] 98.1 °F (36.7 °C)  Pulse:  [] 71  Resp:  [13-34] 13  SpO2:  [95 %-100 %] 98 %  BP: ()/(47-58) 108/53  Arterial Line BP: ()/(33-50) 121/38     Weight: 57 kg (125 lb 10.6 oz) (11/24/18 0500)  Body mass index is 26.26 kg/m².  Body surface area is 1.53 meters squared.    I/O last 3 completed shifts:  In: 6574.8 [I.V.:4994.8; Blood:250; NG/GT:1330]  Out: 6851 [Urine:102; Drains:250; Other:5349; Stool:1150]    Physical Exam   HENT:   Head: Atraumatic.   Neck: No JVD present.   Cardiovascular: Normal rate and regular rhythm.   Pulmonary/Chest: Effort normal and breath sounds normal. She has no wheezes.   On vent, mechanical breath sounds     Abdominal: Soft. She exhibits no distension.   Musculoskeletal: She exhibits edema (trace pitting to knee). She exhibits no tenderness.   Neurological:   Sedated, on vent   Skin: Skin is warm.

## 2018-11-26 NOTE — SUBJECTIVE & OBJECTIVE
Scheduled Meds:   famotidine  20 mg Oral QHS    fluconazole  200 mg Oral Daily    guaifenesin 100 mg/5 ml  200 mg Per OG tube Q6H    heparin (porcine)  5,000 Units Subcutaneous Q8H    insulin detemir U-100  14 Units Subcutaneous Daily    lactulose  200 g Rectal Once    levalbuterol  1.25 mg Nebulization Q6H WAKE    piperacillin-tazobactam (ZOSYN) IVPB  4.5 g Intravenous Q12H    rifAXImin  550 mg Oral BID     Continuous Infusions:   sodium chloride 0.9% 200 mL/hr at 11/25/18 2245    dilTIAZem Stopped (11/26/18 0300)    norepinephrine bitartrate-D5W 0.05 mcg/kg/min (11/26/18 0700)    propofol 15 mcg/kg/min (11/26/18 0700)     PRN Meds:sodium chloride, sodium chloride, acetaminophen, benzonatate, dextrose 50%, glucagon (human recombinant), guaifenesin 100 mg/5 ml, insulin aspart U-100, lactulose, lactulose, magnesium sulfate IVPB, ondansetron, simethicone, sodium chloride 0.9%    Review of Systems  Objective:     Vital Signs (Most Recent):  Temp: 98.1 °F (36.7 °C) (11/26/18 0700)  Pulse: 70 (11/26/18 0727)  Resp: 20 (11/26/18 0727)  BP: (!) 105/51 (11/26/18 0700)  SpO2: 100 % (11/26/18 0727) Vital Signs (24h Range):  Temp:  [97.8 °F (36.6 °C)-98.8 °F (37.1 °C)] 98.1 °F (36.7 °C)  Pulse:  [] 70  Resp:  [20-34] 20  SpO2:  [95 %-100 %] 100 %  BP: ()/(45-70) 105/51  Arterial Line BP: ()/(33-63) 105/36     Weight: 57 kg (125 lb 10.6 oz)  Body mass index is 26.26 kg/m².    Intake/Output - Last 3 Shifts       11/24 0700 - 11/25 0659 11/25 0700 - 11/26 0659 11/26 0700 - 11/27 0659    I.V. (mL/kg) 2899.6 (50.9) 2299.2 (40.3)     Blood 250      NG/GT 1050 690 40    Total Intake(mL/kg) 4199.6 (73.7) 2989.2 (52.4) 40 (0.7)    Urine (mL/kg/hr) 200 (0.1) 40 (0)     Drains  250     Other 4353 2487     Stool 700 750     Total Output 5253 3527     Net -1053.4 -537.8 +40           Stool Occurrence 1 x            Physical Exam   Constitutional: She is oriented to person, place, and time. She appears  well-developed.   HENT:   Head: Normocephalic and atraumatic.   intubated   Eyes: EOM are normal. Pupils are equal, round, and reactive to light.   Neck: Normal range of motion. Neck supple. JVD (CVP 16) present.   Cardiovascular: Normal rate and regular rhythm.   Pulmonary/Chest: Effort normal. She has no rales.   Bronchial breath sounds noted on LLL   Abdominal: Soft. Bowel sounds are normal.   Musculoskeletal: Normal range of motion. She exhibits no edema.   Neurological: She is alert and oriented to person, place, and time.   Skin: Skin is warm.   Vitals reviewed.      Laboratory:  Immunosuppressants     None        Labs within the past 24 hours have been reviewed.    Diagnostic Results:  I have personally reviewed all pertinent imaging studies.

## 2018-11-27 PROBLEM — E87.6 HYPOKALEMIA: Status: RESOLVED | Noted: 2018-01-01 | Resolved: 2018-01-01

## 2018-11-27 PROBLEM — E87.70 HYPERVOLEMIA: Status: RESOLVED | Noted: 2018-01-01 | Resolved: 2018-01-01

## 2018-11-27 PROBLEM — H92.09 EAR PAIN: Status: RESOLVED | Noted: 2018-01-01 | Resolved: 2018-01-01

## 2018-11-27 PROBLEM — R06.02 SHORTNESS OF BREATH: Status: RESOLVED | Noted: 2018-01-01 | Resolved: 2018-01-01

## 2018-11-27 PROBLEM — M25.421 SWELLING OF JOINT OF UPPER ARM, RIGHT: Status: RESOLVED | Noted: 2018-01-01 | Resolved: 2018-01-01

## 2018-11-27 PROBLEM — J18.9 PNEUMONIA INVOLVING LEFT LUNG: Status: RESOLVED | Noted: 2018-01-01 | Resolved: 2018-01-01

## 2018-11-27 PROBLEM — R23.9 SUSPECTED PRESSURE INJURY OF DEEP TISSUE: Status: ACTIVE | Noted: 2018-01-01

## 2018-11-27 NOTE — PLAN OF CARE
Patient remains on vent, 40/5, sats ~ 100%  Sedation currently off, too somnolent today for SBT, plan for SBT tomorrow morning  Off levo today  Tube feeds continued, tolerating well  Mendoza in place, remains oliguric  CVP 6  CRRT held tonight  Abdominal ultrasound performed  Plan of care reviewed with pt's  at bedside  Will continue to monitor

## 2018-11-27 NOTE — PROGRESS NOTES
"[facility]  Endocrinology  Progress Note    Admit Date: 11/10/2018     Reason for Consult: Management of type 2 DM, Hyperglycemia     Surgical Procedure and Date: n/a    Diabetes diagnosis year: 2001    Home Diabetes Medications: Levemir 12 units BID (vial) and Humalog SSI with meals   Lab Results   Component Value Date    HGBA1C 5.8 (H) 11/10/2018         How often checking glucose at home? 1-3  BG readings on regimen: 150-300  Hypoglycemia on the regimen? once 27; required visit to ED; gave Levemir and humalog and patient did not eat  Missed doses on regimen?  No    Diabetes Complications include:     Hyperglycemia, Hypoglycemia  and Diabetic peripheral neuropathy     Complicating diabetes co morbidities:   CIRRHOSIS      HPI:   Patient is a 67 y.o. female with a diagnosis of type 2 DM; well controlled on MDI. Also with   Cryptogenic cirrhosis, rheumatoid arthritis, and GERD. Patient was listed for liver transplant on 9/14/18. Patient presented to ED of hospital in [place] with AMS and ENZO. Endocrinology consulted for BG/ DM management.   Of note, profound hypoglycemic event (BG < 20) the night of 11/14/18.            Interval HPI:   Overnight events: Remains in ICU, intubated. Sedation vacation this am. BG mildly elevated on insulin regimen and continuous TF.  On IV antibiotics.  Eating:   NPO  Nausea: No  Hypoglycemia and intervention: No  Fever: No  TPN and/or TF: Yes  TF and rate: Isosource at 40 cc/hr (goal)    /76 (BP Location: Left arm, Patient Position: Lying)   Pulse 75   Temp 97.7 °F (36.5 °C) (Oral)   Resp (!) 27   Ht 4' 10" (1.473 m)   Wt 61 kg (134 lb 7.7 oz)   SpO2 100%   Breastfeeding? No   BMI 28.11 kg/m²      Labs Reviewed and Include    Recent Labs   Lab 11/27/18  0316   *  254*   CALCIUM 8.3*  8.3*   ALBUMIN 2.4*  2.4*   PROT 5.1*     137   K 4.3  4.3   CO2 22*  22*     108   BUN 26*  26*   CREATININE 1.6*  1.6*   ALKPHOS 149*   ALT " 17   AST 44*   BILITOT 8.3*     Lab Results   Component Value Date    WBC 6.65 11/27/2018    HGB 7.8 (L) 11/27/2018    HCT 23.6 (L) 11/27/2018    MCV 93 11/27/2018    PLT 46 (L) 11/27/2018     No results for input(s): TSH, FREET4 in the last 168 hours.  Lab Results   Component Value Date    HGBA1C 5.8 (H) 11/10/2018       Nutritional status:   Body mass index is 28.11 kg/m².  Lab Results   Component Value Date    ALBUMIN 2.4 (L) 11/27/2018    ALBUMIN 2.4 (L) 11/27/2018    ALBUMIN 2.1 (L) 11/26/2018    ALBUMIN 2.1 (L) 11/26/2018     No results found for: PREALBUMIN    Estimated Creatinine Clearance: 32.9 mL/min (A) (based on SCr of 1.6 mg/dL (H)).    Accu-Checks  Recent Labs     11/25/18  1203 11/25/18  1538 11/25/18  2102 11/26/18  0308 11/26/18  0752 11/26/18  1228 11/26/18  1558 11/26/18  1945 11/26/18  2341 11/27/18  0318   POCTGLUCOSE 185* 232* 171* 220* 255* 232* 230* 211* 212* 240*       Current Medications and/or Treatments Impacting Glycemic Control  Immunotherapy:    Immunosuppressants     None        Steroids:   Hormones (From admission, onward)    None        Pressors:    Autonomic Drugs (From admission, onward)    Start     Stop Route Frequency Ordered    11/21/18 1900  norepinephrine 4 mg in dextrose 5% 250 mL infusion (premix) (titrating)     Question Answer Comment   Titrate by: (in mcg/kg/min) 0.02    Titrate interval: (in minutes) 5    Titrate to maintain: (MAP or SBP) MAP    Greater than: (in mmHg) 65    Maximum dose: (in mcg/kg/min) 3        -- IV Continuous 11/21/18 1845        Hyperglycemia/Diabetes Medications:   Antihyperglycemics (From admission, onward)    Start     Stop Route Frequency Ordered    11/27/18 0800  insulin aspart U-100 pen 2 Units      -- SubQ Every 24 hours (non-standard times) 11/26/18 0848    11/27/18 0400  insulin aspart U-100 pen 2 Units      -- SubQ Every 24 hours (non-standard times) 11/26/18 0848    11/27/18 0000  insulin aspart U-100 pen 2 Units      -- SubQ Every 24  hours (non-standard times) 11/26/18 0848    11/26/18 2000  insulin aspart U-100 pen 2 Units      -- SubQ Every 24 hours (non-standard times) 11/26/18 0848    11/26/18 1600  insulin aspart U-100 pen 2 Units      -- SubQ Every 24 hours (non-standard times) 11/26/18 0848    11/26/18 1200  insulin aspart U-100 pen 2 Units      -- SubQ Every 24 hours (non-standard times) 11/26/18 0848    11/26/18 0947  insulin aspart U-100 pen 0-5 Units      -- SubQ Every 4 hours PRN 11/26/18 0848    11/23/18 0900  insulin detemir U-100 pen 14 Units      -- SubQ Daily 11/23/18 0735          ASSESSMENT and PLAN    * Hepatic encephalopathy    Managed per primary.        Type 2 diabetes mellitus with hyperglycemia, with long-term current use of insulin    BG goal 140-180    Levemir 14 units daily.  Novolog 2 units every 4 hours with TF; hold if TF held   Low dose correction scale  BG monitoring every 4 hours     ADDENDUM: Increase Novolog to 3 units every 4 hours with TF    Discharge planning: TBD       Acute kidney injury superimposed on CKD    Avoid insulin stacking and hypoglycemia.  Lab Results   Component Value Date    CREATININE 1.8 (H) 11/27/2018            Decompensated hepatic cirrhosis    Managed per primary.   Listed for liver transplant.  May impact BG readings       On enteral nutrition    On supplemental TF, elevating BG readings       CKD (chronic kidney disease) stage 3, GFR 30-59 ml/min    Caution with insulin stacking  Estimated Creatinine Clearance: 40.9 mL/min (based on SCr of 1.2 mg/dL).             Tristan Hwang NP  Endocrinology  [facility]

## 2018-11-27 NOTE — SUBJECTIVE & OBJECTIVE
Interval History: on nightly SLED since Saturday. Tolerated well first night, however yesterday night developed RVR with CVP of 1 and was aborted early after only a couple hours. CVP improved to 7 on discontinuation. Remains anuric. Plan for trial of spontaneous breathing today.    Review of patient's allergies indicates:  No Known Allergies  Current Facility-Administered Medications   Medication Frequency    acetaminophen tablet 650 mg Q8H PRN    dextrose 50% injection 12.5 g PRN    diltiaZEM 125 mg in D5W 125 mL infusion Continuous    famotidine tablet 20 mg QHS    fluconazole tablet 200 mg Daily    glucagon (human recombinant) injection 1 mg PRN    guaifenesin 100 mg/5 ml syrup 200 mg Q4H PRN    guaifenesin 100 mg/5 ml syrup 200 mg Q6H    heparin (porcine) injection 5,000 Units Q8H    insulin aspart U-100 pen 0-5 Units Q4H PRN    insulin aspart U-100 pen 2 Units Q24H    insulin aspart U-100 pen 2 Units Q24H    insulin aspart U-100 pen 2 Units Q24H    insulin aspart U-100 pen 2 Units Q24H    insulin aspart U-100 pen 2 Units Q24H    insulin aspart U-100 pen 2 Units Q24H    insulin detemir U-100 pen 14 Units Daily    k phos di & mono-sod phos mono 250 mg tablet 2 tablet TID PRN    lactulose 10 gram/15 mL solution (enema) 200 g TID PRN    lactulose 20 gram/30 mL solution Soln 30 g Q6H PRN    levalbuterol nebulizer solution 1.25 mg Q6H WAKE    norepinephrine 4 mg in dextrose 5% 250 mL infusion (premix) (titrating) Continuous    ondansetron disintegrating tablet 8 mg Q8H PRN    piperacillin-tazobactam 4.5 g in sodium chloride 0.9% 100 mL IVPB (ready to mix system) Q12H    propofol (DIPRIVAN) 10 mg/mL infusion Continuous    rifAXIMin tablet 550 mg BID    simethicone chewable tablet 80 mg TID PRN    sodium chloride 0.9% flush 3 mL PRN       Objective:     Vital Signs (Most Recent):  Temp: 97.8 °F (36.6 °C) (11/27/18 1100)  Pulse: 76 (11/27/18 1344)  Resp: (!) 25 (11/27/18 1344)  BP: (!) 120/57  (11/27/18 1300)  SpO2: 100 % (11/27/18 1344)  O2 Device (Oxygen Therapy): ventilator (11/27/18 1344) Vital Signs (24h Range):  Temp:  [97.7 °F (36.5 °C)-98.3 °F (36.8 °C)] 97.8 °F (36.6 °C)  Pulse:  [64-85] 76  Resp:  [25-27] 25  SpO2:  [93 %-100 %] 100 %  BP: ()/(46-78) 120/57  Arterial Line BP: (102-136)/(33-57) 121/44     Weight: 61 kg (134 lb 7.7 oz) (11/27/18 0300)  Body mass index is 28.11 kg/m².  Body surface area is 1.58 meters squared.    I/O last 3 completed shifts:  In: 2396.2 [I.V.:1376.2; NG/GT:1020]  Out: 2079 [Urine:113; Other:1066; Stool:900]    Physical Exam   HENT:   Head: Normocephalic and atraumatic.   Eyes: Scleral icterus is present.   Neck: No JVD present.   Cardiovascular: Normal rate and regular rhythm.   Pulmonary/Chest: Effort normal and breath sounds normal. She has no wheezes.   On vent, mechanical breath sounds     Abdominal: Soft. She exhibits no distension.   Musculoskeletal: She exhibits edema (trace pitting to knee). She exhibits no tenderness.   Neurological:   Eyes open intermittently spontaneously, non-verbal on vent, not responding to commands   Skin: Skin is warm and dry.   jaundiced   Vitals reviewed.

## 2018-11-27 NOTE — SUBJECTIVE & OBJECTIVE
"Interval HPI:   Overnight events: Remains in ICU, intubated. Sedation vacation this am. BG mildly elevated on insulin regimen and continuous TF.  On IV antibiotics.  Eating:   NPO  Nausea: No  Hypoglycemia and intervention: No  Fever: No  TPN and/or TF: Yes  TF and rate: Isosource at 40 cc/hr (goal)    /76 (BP Location: Left arm, Patient Position: Lying)   Pulse 75   Temp 97.7 °F (36.5 °C) (Oral)   Resp (!) 27   Ht 4' 10" (1.473 m)   Wt 61 kg (134 lb 7.7 oz)   SpO2 100%   Breastfeeding? No   BMI 28.11 kg/m²     Labs Reviewed and Include    Recent Labs   Lab 11/27/18  0316   *  254*   CALCIUM 8.3*  8.3*   ALBUMIN 2.4*  2.4*   PROT 5.1*     137   K 4.3  4.3   CO2 22*  22*     108   BUN 26*  26*   CREATININE 1.6*  1.6*   ALKPHOS 149*   ALT 17   AST 44*   BILITOT 8.3*     Lab Results   Component Value Date    WBC 6.65 11/27/2018    HGB 7.8 (L) 11/27/2018    HCT 23.6 (L) 11/27/2018    MCV 93 11/27/2018    PLT 46 (L) 11/27/2018     No results for input(s): TSH, FREET4 in the last 168 hours.  Lab Results   Component Value Date    HGBA1C 5.8 (H) 11/10/2018       Nutritional status:   Body mass index is 28.11 kg/m².  Lab Results   Component Value Date    ALBUMIN 2.4 (L) 11/27/2018    ALBUMIN 2.4 (L) 11/27/2018    ALBUMIN 2.1 (L) 11/26/2018    ALBUMIN 2.1 (L) 11/26/2018     No results found for: PREALBUMIN    Estimated Creatinine Clearance: 32.9 mL/min (A) (based on SCr of 1.6 mg/dL (H)).    Accu-Checks  Recent Labs     11/25/18  1203 11/25/18  1538 11/25/18  2102 11/26/18  0308 11/26/18  0752 11/26/18  1228 11/26/18  1558 11/26/18  1945 11/26/18  2341 11/27/18  0318   POCTGLUCOSE 185* 232* 171* 220* 255* 232* 230* 211* 212* 240*       Current Medications and/or Treatments Impacting Glycemic Control  Immunotherapy:    Immunosuppressants     None        Steroids:   Hormones (From admission, onward)    None        Pressors:    Autonomic Drugs (From admission, onward)    Start     Stop " Route Frequency Ordered    11/21/18 1900  norepinephrine 4 mg in dextrose 5% 250 mL infusion (premix) (titrating)     Question Answer Comment   Titrate by: (in mcg/kg/min) 0.02    Titrate interval: (in minutes) 5    Titrate to maintain: (MAP or SBP) MAP    Greater than: (in mmHg) 65    Maximum dose: (in mcg/kg/min) 3        -- IV Continuous 11/21/18 1845        Hyperglycemia/Diabetes Medications:   Antihyperglycemics (From admission, onward)    Start     Stop Route Frequency Ordered    11/27/18 0800  insulin aspart U-100 pen 2 Units      -- SubQ Every 24 hours (non-standard times) 11/26/18 0848    11/27/18 0400  insulin aspart U-100 pen 2 Units      -- SubQ Every 24 hours (non-standard times) 11/26/18 0848    11/27/18 0000  insulin aspart U-100 pen 2 Units      -- SubQ Every 24 hours (non-standard times) 11/26/18 0848    11/26/18 2000  insulin aspart U-100 pen 2 Units      -- SubQ Every 24 hours (non-standard times) 11/26/18 0848    11/26/18 1600  insulin aspart U-100 pen 2 Units      -- SubQ Every 24 hours (non-standard times) 11/26/18 0848    11/26/18 1200  insulin aspart U-100 pen 2 Units      -- SubQ Every 24 hours (non-standard times) 11/26/18 0848    11/26/18 0947  insulin aspart U-100 pen 0-5 Units      -- SubQ Every 4 hours PRN 11/26/18 0848    11/23/18 0900  insulin detemir U-100 pen 14 Units      -- SubQ Daily 11/23/18 0735

## 2018-11-27 NOTE — SUBJECTIVE & OBJECTIVE
Scheduled Meds:   famotidine  20 mg Oral QHS    fluconazole  200 mg Oral Daily    guaifenesin 100 mg/5 ml  200 mg Per OG tube Q6H    heparin (porcine)  5,000 Units Subcutaneous Q8H    insulin aspart U-100  2 Units Subcutaneous Q24H    insulin aspart U-100  2 Units Subcutaneous Q24H    insulin aspart U-100  2 Units Subcutaneous Q24H    insulin aspart U-100  2 Units Subcutaneous Q24H    insulin aspart U-100  2 Units Subcutaneous Q24H    insulin aspart U-100  2 Units Subcutaneous Q24H    insulin detemir U-100  14 Units Subcutaneous Daily    levalbuterol  1.25 mg Nebulization Q6H WAKE    piperacillin-tazobactam (ZOSYN) IVPB  4.5 g Intravenous Q12H    rifAXImin  550 mg Oral BID     Continuous Infusions:   dilTIAZem Stopped (11/26/18 0300)    norepinephrine bitartrate-D5W Stopped (11/26/18 0900)    propofol Stopped (11/26/18 0800)     PRN Meds:acetaminophen, dextrose 50%, glucagon (human recombinant), guaifenesin 100 mg/5 ml, insulin aspart U-100, k phos di & mono-sod phos mono, lactulose, lactulose, ondansetron, simethicone, sodium chloride 0.9%    Review of Systems  Objective:     Vital Signs (Most Recent):  Temp: 97.7 °F (36.5 °C) (11/27/18 0700)  Pulse: 75 (11/27/18 0720)  Resp: (!) 27 (11/27/18 0718)  BP: 106/76 (11/27/18 0700)  SpO2: 100 % (11/27/18 0720) Vital Signs (24h Range):  Temp:  [97.7 °F (36.5 °C)-98.6 °F (37 °C)] 97.7 °F (36.5 °C)  Pulse:  [64-83] 75  Resp:  [13-27] 27  SpO2:  [93 %-100 %] 100 %  BP: ()/(46-78) 106/76  Arterial Line BP: (102-138)/(33-57) 110/38     Weight: 61 kg (134 lb 7.7 oz)  Body mass index is 28.11 kg/m².    Intake/Output - Last 3 Shifts       11/25 0700 - 11/26 0659 11/26 0700 - 11/27 0659 11/27 0700 - 11/28 0659    I.V. (mL/kg) 2299.2 (40.3) 499 (8.2)     Blood       NG/ 610     Total Intake(mL/kg) 2989.2 (52.4) 1109 (18.2)     Urine (mL/kg/hr) 40 (0) 103 (0.1)     Drains 250      Other 2487      Stool 750 550     Total Output 3527 653     Net -537.8  +456                  Physical Exam   Constitutional: She is oriented to person, place, and time. She appears well-developed.   HENT:   Head: Normocephalic and atraumatic.   intubated   Eyes: EOM are normal. Pupils are equal, round, and reactive to light.   Neck: Normal range of motion. Neck supple.   Cardiovascular: Normal rate and regular rhythm.   Pulmonary/Chest: Effort normal. She has no rales.   Bronchial breath sounds noted on LLL   Abdominal: Soft. Bowel sounds are normal.   Musculoskeletal: Normal range of motion. She exhibits no edema.   Neurological: She is alert and oriented to person, place, and time.   Skin: Skin is warm.   Vitals reviewed.      Laboratory:  Immunosuppressants     None        Labs within the past 24 hours have been reviewed.    Diagnostic Results:  I have personally reviewed all pertinent imaging studies.

## 2018-11-27 NOTE — PROGRESS NOTES
[facility]  Pulmonology  Progress Note    Patient Name: Krystal Hobson  MRN: [id number]  Admission Date: 11/10/2018  Hospital Length of Stay: 17 days  Code Status: Full Code  Attending Provider: Abrahan Montelongo MD  Primary Care Provider: Courtney Joe MD   Principal Problem: Hepatic encephalopathy    Subjective:     Interval History: No overnight events, she is vitally stable, off sedation and levophed. She failed the am SBT.     Objective:     Vital Signs (Most Recent):  Temp: 97.7 °F (36.5 °C) (11/27/18 0700)  Pulse: 83 (11/27/18 0815)  Resp: (!) 27 (11/27/18 0718)  BP: (!) 119/57 (11/27/18 0800)  SpO2: 96 % (11/27/18 0815) Vital Signs (24h Range):  Temp:  [97.7 °F (36.5 °C)-98.6 °F (37 °C)] 97.7 °F (36.5 °C)  Pulse:  [64-85] 83  Resp:  [13-27] 27  SpO2:  [93 %-100 %] 96 %  BP: ()/(46-78) 119/57  Arterial Line BP: (102-138)/(33-57) 119/40     Weight: 61 kg (134 lb 7.7 oz)  Body mass index is 28.11 kg/m².      Intake/Output Summary (Last 24 hours) at 11/27/2018 0849  Last data filed at 11/27/2018 0800  Gross per 24 hour   Intake 1109 ml   Output 638 ml   Net 471 ml       Physical Exam   Constitutional: No distress.   She is intubated, off sedation and levophed    HENT:   Head: Normocephalic.   Eyes: Right eye exhibits no discharge. Left eye exhibits no discharge.   Cardiovascular: Normal rate and regular rhythm.   Murmur heard.  Pulmonary/Chest: Breath sounds normal. She has no wheezes. She exhibits no tenderness.   Abdominal: Soft. Bowel sounds are normal. She exhibits distension. There is no tenderness.   Musculoskeletal: She exhibits no edema, tenderness or deformity.   Neurological: She is alert.   Skin: She is not diaphoretic.       Vents:  Vent Mode: Spont (11/27/18 0720)  Ventilator Initiated: Yes (11/21/18 1839)  Set Rate: 18 bmp (11/27/18 0720)  Vt Set: 300 mL (11/27/18 0720)  Pressure Support: 10 cmH20 (11/27/18 0720)  PEEP/CPAP: 5 cmH20 (11/27/18 0720)  Oxygen Concentration (%): 30  (11/27/18 0815)  Peak Airway Pressure: 16 cmH2O (11/27/18 0720)  Plateau Pressure: 0 cmH20 (11/27/18 0720)  Total Ve: 6.79 mL (11/27/18 0720)  F/VT Ratio<105 (RSBI): (!) 28.63 (11/26/18 1300)    Lines/Drains/Airways     Central Venous Catheter Line                 Trialysis (Dialysis) Catheter 11/21/18 1851 left internal jugular 5 days          Drain                 NG/OG Tube 11/21/18 1840 [place] sump 16 Fr. Right mouth 5 days         Urethral Catheter 11/21/18 1500 5 days         Rectal Tube 11/24/18 1200 rectal tube w/ balloon (indicate number of mLs) 2 days          Airway                 Airway - Non-Surgical 11/21/18 1836 Endotracheal Tube 5 days          Arterial Line                 Arterial Line 11/21/18 1900 Right Radial 5 days          Peripheral Intravenous Line                 Peripheral IV - Single Lumen 11/25/18 0627 Right;Anterior Antecubital 2 days         Peripheral IV - Single Lumen 11/26/18 1500 Left Antecubital less than 1 day                Significant Labs:    CBC/Anemia Profile:  Recent Labs   Lab 11/25/18  2241 11/26/18  0300 11/27/18  0316   WBC 6.68 8.56 6.65   HGB 7.9* 7.6* 7.8*   HCT 23.7* 23.9* 23.6*   PLT 45* 53* 46*   MCV 93 93 93   RDW 25.4* 25.2* 26.5*        Chemistries:  Recent Labs   Lab 11/25/18  1941  11/26/18  0300 11/26/18  1229 11/26/18  2137 11/27/18  0316      < > 138  138  138 137 137  137 137  137   K 4.4   < > 4.5  4.5  4.5 4.3 4.3  4.3 4.3  4.3      < > 107  107  107 107 108  108 108  108   CO2 26   < > 23  23  23 21* 24  24 22*  22*   BUN 10   < > 10  10  10 17 23  23 26*  26*   CREATININE 0.7   < > 0.8  0.8  0.8 1.2 1.5*  1.5* 1.6*  1.6*   CALCIUM 7.7*   < > 7.7*  7.7*  7.7* 8.2* 8.2*  8.2* 8.3*  8.3*   ALBUMIN 2.4*   < > 2.4*  2.4*  2.4* 2.5* 2.1*  2.1* 2.4*  2.4*   PROT 4.7*  --  4.7*  --   --  5.1*   BILITOT 12.1*  --  10.7*  --   --  8.3*   ALKPHOS 146*  --  147*  --   --  149*   ALT 18  --  16  --   --  17   AST 40  --   41*  --   --  44*   MG 1.8   < > 2.2  2.2  2.2 2.7* 2.5  2.5 2.5   PHOS 1.7*   < > 2.1*  2.1* 2.4* 3.0  3.0 3.3    < > = values in this interval not displayed.       BMP:   Recent Labs   Lab 11/27/18 0316   *  254*     137   K 4.3  4.3     108   CO2 22*  22*   BUN 26*  26*   CREATININE 1.6*  1.6*   CALCIUM 8.3*  8.3*   MG 2.5     CMP:   Recent Labs   Lab 11/25/18  1941  11/26/18  0300 11/26/18  1229 11/26/18 2137 11/27/18 0316      < > 138  138  138 137 137  137 137  137   K 4.4   < > 4.5  4.5  4.5 4.3 4.3  4.3 4.3  4.3      < > 107  107  107 107 108  108 108  108   CO2 26   < > 23  23  23 21* 24  24 22*  22*   *   < > 207*  207*  207* 214* 232*  232* 254*  254*   BUN 10   < > 10  10  10 17 23  23 26*  26*   CREATININE 0.7   < > 0.8  0.8  0.8 1.2 1.5*  1.5* 1.6*  1.6*   CALCIUM 7.7*   < > 7.7*  7.7*  7.7* 8.2* 8.2*  8.2* 8.3*  8.3*   PROT 4.7*  --  4.7*  --   --  5.1*   ALBUMIN 2.4*   < > 2.4*  2.4*  2.4* 2.5* 2.1*  2.1* 2.4*  2.4*   BILITOT 12.1*  --  10.7*  --   --  8.3*   ALKPHOS 146*  --  147*  --   --  149*   AST 40  --  41*  --   --  44*   ALT 18  --  16  --   --  17   ANIONGAP 5*   < > 8  8  8 9 5*  5* 7*  7*   EGFRNONAA >60.0   < > >60.0  >60.0  >60.0 46.9* 35.8*  35.8* 33.1*  33.1*    < > = values in this interval not displayed.     Cardiac Markers: No results for input(s): CKMB, TROPONINT, MYOGLOBIN in the last 48 hours.      Assessment/Plan:     Pulmonary infiltrates on CXR    Likely bilateral UL pneumonia Vs. pulmonary edema or both. Comparing old CXRs, latest CXR = bilateral airspace consolidation, BL fluffiness on both lungs.   - Intubated on arrival to SICU 11/21 due to inability to maintain an airway. She is off sedation, awake and following commands.   - Started CRRT 11/24/18. Lasix was D/C. Yesterday CRRT was held.   - On broad-spectrum antibiotics D7  -Recommend titration of FiO2 to maintain SpO2  >90%.  -Daily SBTs as medically appropriate. Failed SBT on 11/27/2018 in the am due to tachypnea. Repeat tomorrow    Plan:  - Continue same management     Acute respiratory failure with hypoxia    Acute vent-dependent respiratory failure due to hypoxia and encephalopathy.   - daily SAT & SBT as able- failed SBT today due to tachypnea            Yuval Glover MD  Pulmonology  [facility]

## 2018-11-27 NOTE — ASSESSMENT & PLAN NOTE
Likely bilateral UL pneumonia Vs. pulmonary edema or both. Comparing old CXRs, latest CXR = bilateral airspace consolidation, BL fluffiness on both lungs.   - Intubated on arrival to SICU 11/21 due to inability to maintain an airway. She is off sedation, awake and following commands.   - Started CRRT 11/24/18. Lasix was D/C. Yesterday CRRT was held.   - On broad-spectrum antibiotics D7  -Recommend titration of FiO2 to maintain SpO2 >90%.  -Daily SBTs as medically appropriate. Failed SBT on 11/27/2018 in the am due to tachypnea. Repeat tomorrow    Plan:  - Continue same management

## 2018-11-27 NOTE — SUBJECTIVE & OBJECTIVE
Interval History:  Off CRRT overnight. VS remained stable. Off sedation since yesterday morning - started to wake up and follow commands around 3 AM. Off drips this AM.     Scheduled Meds:   famotidine  20 mg Oral QHS    fluconazole  200 mg Oral Daily    guaifenesin 100 mg/5 ml  200 mg Per OG tube Q6H    heparin (porcine)  5,000 Units Subcutaneous Q8H    insulin aspart U-100  2 Units Subcutaneous Q24H    insulin aspart U-100  2 Units Subcutaneous Q24H    insulin aspart U-100  2 Units Subcutaneous Q24H    insulin aspart U-100  2 Units Subcutaneous Q24H    insulin aspart U-100  2 Units Subcutaneous Q24H    insulin aspart U-100  2 Units Subcutaneous Q24H    insulin detemir U-100  14 Units Subcutaneous Daily    levalbuterol  1.25 mg Nebulization Q6H WAKE    piperacillin-tazobactam (ZOSYN) IVPB  4.5 g Intravenous Q12H    rifAXImin  550 mg Oral BID     Continuous Infusions:   dilTIAZem Stopped (11/26/18 0300)    norepinephrine bitartrate-D5W Stopped (11/26/18 0900)    propofol Stopped (11/26/18 0800)     Objective:     Vital Signs (Most Recent):  Temp: 98.3 °F (36.8 °C) (11/27/18 0300)  Pulse: 74 (11/27/18 0630)  Resp: 13 (11/26/18 1300)  BP: (!) 107/55 (11/27/18 0600)  SpO2: 100 % (11/27/18 0630) Vital Signs (24h Range):  Temp:  [97.7 °F (36.5 °C)-98.6 °F (37 °C)] 98.3 °F (36.8 °C)  Pulse:  [64-83] 74  Resp:  [13-20] 13  SpO2:  [93 %-100 %] 100 %  BP: ()/(46-78) 107/55  Arterial Line BP: (102-138)/(33-57) 107/35     Weight: 61 kg (134 lb 7.7 oz)  Body mass index is 28.11 kg/m².    Intake/Output - Last 3 Shifts       11/25 0700 - 11/26 0659 11/26 0700 - 11/27 0659    I.V. (mL/kg) 2299.2 (40.3) 499 (8.2)    NG/ 610    Total Intake(mL/kg) 2989.2 (52.4) 1109 (18.2)    Urine (mL/kg/hr) 40 (0) 103 (0.1)    Drains 250     Other 2487     Stool 750 550    Total Output 3527 653    Net -537.8 +456                Physical Exam   Constitutional: She is oriented to person, place, and time. She appears  well-developed.   HENT:   Head: Normocephalic and atraumatic.   intubated   Eyes: EOM are normal. Pupils are equal, round, and reactive to light.   Neck: Normal range of motion. Neck supple.   Cardiovascular: Normal rate and regular rhythm.   Pulmonary/Chest: Effort normal. She has no rales.   Bronchial breath sounds noted on LLL   Abdominal: Soft. Bowel sounds are normal.   Musculoskeletal: Normal range of motion. She exhibits no edema.   Neurological: She is alert and oriented to person, place, and time.   Skin: Skin is warm.   Vitals reviewed.      Laboratory:  Immunosuppressants     None        Labs within the past 24 hours have been reviewed.    Diagnostic Results:  I have personally reviewed all pertinent imaging studies.

## 2018-11-27 NOTE — SUBJECTIVE & OBJECTIVE
Interval History: No overnight events, she is vitally stable, off sedation and levophed. She failed the am SBT.     Objective:     Vital Signs (Most Recent):  Temp: 97.7 °F (36.5 °C) (11/27/18 0700)  Pulse: 83 (11/27/18 0815)  Resp: (!) 27 (11/27/18 0718)  BP: (!) 119/57 (11/27/18 0800)  SpO2: 96 % (11/27/18 0815) Vital Signs (24h Range):  Temp:  [97.7 °F (36.5 °C)-98.6 °F (37 °C)] 97.7 °F (36.5 °C)  Pulse:  [64-85] 83  Resp:  [13-27] 27  SpO2:  [93 %-100 %] 96 %  BP: ()/(46-78) 119/57  Arterial Line BP: (102-138)/(33-57) 119/40     Weight: 61 kg (134 lb 7.7 oz)  Body mass index is 28.11 kg/m².      Intake/Output Summary (Last 24 hours) at 11/27/2018 0849  Last data filed at 11/27/2018 0800  Gross per 24 hour   Intake 1109 ml   Output 638 ml   Net 471 ml       Physical Exam   Constitutional: No distress.   She is intubated, off sedation and levophed    HENT:   Head: Normocephalic.   Eyes: Right eye exhibits no discharge. Left eye exhibits no discharge.   Cardiovascular: Normal rate and regular rhythm.   Murmur heard.  Pulmonary/Chest: Breath sounds normal. She has no wheezes. She exhibits no tenderness.   Abdominal: Soft. Bowel sounds are normal. She exhibits distension. There is no tenderness.   Musculoskeletal: She exhibits no edema, tenderness or deformity.   Neurological: She is alert.   Skin: She is not diaphoretic.       Vents:  Vent Mode: Spont (11/27/18 0720)  Ventilator Initiated: Yes (11/21/18 1839)  Set Rate: 18 bmp (11/27/18 0720)  Vt Set: 300 mL (11/27/18 0720)  Pressure Support: 10 cmH20 (11/27/18 0720)  PEEP/CPAP: 5 cmH20 (11/27/18 0720)  Oxygen Concentration (%): 30 (11/27/18 0815)  Peak Airway Pressure: 16 cmH2O (11/27/18 0720)  Plateau Pressure: 0 cmH20 (11/27/18 0720)  Total Ve: 6.79 mL (11/27/18 0720)  F/VT Ratio<105 (RSBI): (!) 28.63 (11/26/18 1300)    Lines/Drains/Airways     Central Venous Catheter Line                 Trialysis (Dialysis) Catheter 11/21/18 2041 left internal jugular 5  days          Drain                 NG/OG Tube 11/21/18 1840 [place] sump 16 Fr. Right mouth 5 days         Urethral Catheter 11/21/18 1500 5 days         Rectal Tube 11/24/18 1200 rectal tube w/ balloon (indicate number of mLs) 2 days          Airway                 Airway - Non-Surgical 11/21/18 1836 Endotracheal Tube 5 days          Arterial Line                 Arterial Line 11/21/18 1900 Right Radial 5 days          Peripheral Intravenous Line                 Peripheral IV - Single Lumen 11/25/18 0627 Right;Anterior Antecubital 2 days         Peripheral IV - Single Lumen 11/26/18 1500 Left Antecubital less than 1 day                Significant Labs:    CBC/Anemia Profile:  Recent Labs   Lab 11/25/18  2241 11/26/18  0300 11/27/18  0316   WBC 6.68 8.56 6.65   HGB 7.9* 7.6* 7.8*   HCT 23.7* 23.9* 23.6*   PLT 45* 53* 46*   MCV 93 93 93   RDW 25.4* 25.2* 26.5*        Chemistries:  Recent Labs   Lab 11/25/18  1941  11/26/18  0300 11/26/18  1229 11/26/18  2137 11/27/18  0316      < > 138  138  138 137 137  137 137  137   K 4.4   < > 4.5  4.5  4.5 4.3 4.3  4.3 4.3  4.3      < > 107  107  107 107 108  108 108  108   CO2 26   < > 23  23  23 21* 24  24 22*  22*   BUN 10   < > 10  10  10 17 23  23 26*  26*   CREATININE 0.7   < > 0.8  0.8  0.8 1.2 1.5*  1.5* 1.6*  1.6*   CALCIUM 7.7*   < > 7.7*  7.7*  7.7* 8.2* 8.2*  8.2* 8.3*  8.3*   ALBUMIN 2.4*   < > 2.4*  2.4*  2.4* 2.5* 2.1*  2.1* 2.4*  2.4*   PROT 4.7*  --  4.7*  --   --  5.1*   BILITOT 12.1*  --  10.7*  --   --  8.3*   ALKPHOS 146*  --  147*  --   --  149*   ALT 18  --  16  --   --  17   AST 40  --  41*  --   --  44*   MG 1.8   < > 2.2  2.2  2.2 2.7* 2.5  2.5 2.5   PHOS 1.7*   < > 2.1*  2.1* 2.4* 3.0  3.0 3.3    < > = values in this interval not displayed.       BMP:   Recent Labs   Lab 11/27/18  0316   *  254*     137   K 4.3  4.3     108   CO2 22*  22*   BUN 26*  26*   CREATININE 1.6*  1.6*    CALCIUM 8.3*  8.3*   MG 2.5     CMP:   Recent Labs   Lab 11/25/18  1941  11/26/18  0300 11/26/18  1229 11/26/18 2137 11/27/18  0316      < > 138  138  138 137 137  137 137  137   K 4.4   < > 4.5  4.5  4.5 4.3 4.3  4.3 4.3  4.3      < > 107  107  107 107 108  108 108  108   CO2 26   < > 23  23  23 21* 24  24 22*  22*   *   < > 207*  207*  207* 214* 232*  232* 254*  254*   BUN 10   < > 10  10  10 17 23  23 26*  26*   CREATININE 0.7   < > 0.8  0.8  0.8 1.2 1.5*  1.5* 1.6*  1.6*   CALCIUM 7.7*   < > 7.7*  7.7*  7.7* 8.2* 8.2*  8.2* 8.3*  8.3*   PROT 4.7*  --  4.7*  --   --  5.1*   ALBUMIN 2.4*   < > 2.4*  2.4*  2.4* 2.5* 2.1*  2.1* 2.4*  2.4*   BILITOT 12.1*  --  10.7*  --   --  8.3*   ALKPHOS 146*  --  147*  --   --  149*   AST 40  --  41*  --   --  44*   ALT 18  --  16  --   --  17   ANIONGAP 5*   < > 8  8  8 9 5*  5* 7*  7*   EGFRNONAA >60.0   < > >60.0  >60.0  >60.0 46.9* 35.8*  35.8* 33.1*  33.1*    < > = values in this interval not displayed.     Cardiac Markers: No results for input(s): CKMB, TROPONINT, MYOGLOBIN in the last 48 hours.

## 2018-11-27 NOTE — SUBJECTIVE & OBJECTIVE
Interval History:  Afib with RVR overnight. Metoprolol push x 3 and dilt drip started.   CRRT ran overnight for 4 hours  Until vials were unstable. Rinsed back and then CRRT held the rest of the night.   Did not tolerate SBT this AM - CXR with diffuse ground glass opacities    Scheduled Meds:   famotidine  20 mg Oral QHS    fluconazole  200 mg Oral Daily    guaifenesin 100 mg/5 ml  200 mg Per OG tube Q6H    heparin (porcine)  5,000 Units Subcutaneous Q8H    [START ON 11/27/2018] insulin aspart U-100  2 Units Subcutaneous Q24H    [START ON 11/27/2018] insulin aspart U-100  2 Units Subcutaneous Q24H    [START ON 11/27/2018] insulin aspart U-100  2 Units Subcutaneous Q24H    insulin aspart U-100  2 Units Subcutaneous Q24H    insulin aspart U-100  2 Units Subcutaneous Q24H    insulin aspart U-100  2 Units Subcutaneous Q24H    insulin detemir U-100  14 Units Subcutaneous Daily    levalbuterol        levalbuterol  1.25 mg Nebulization Q6H WAKE    piperacillin-tazobactam (ZOSYN) IVPB  4.5 g Intravenous Q12H    rifAXImin  550 mg Oral BID     Continuous Infusions:   sodium chloride 0.9% 200 mL/hr at 11/25/18 2245    dilTIAZem Stopped (11/26/18 0300)    norepinephrine bitartrate-D5W Stopped (11/26/18 0900)    propofol Stopped (11/26/18 0800)     Objective:     Vital Signs (Most Recent):  Temp: 97.7 °F (36.5 °C) (11/26/18 1500)  Pulse: 67 (11/26/18 1830)  Resp: 13 (11/26/18 1300)  BP: (!) 113/55 (11/26/18 1800)  SpO2: 100 % (11/26/18 1830) Vital Signs (24h Range):  Temp:  [97.7 °F (36.5 °C)-98.6 °F (37 °C)] 97.7 °F (36.5 °C)  Pulse:  [] 67  Resp:  [13-34] 13  SpO2:  [93 %-100 %] 100 %  BP: ()/(47-66) 113/55  Arterial Line BP: ()/(33-57) 109/36     Weight: 57 kg (125 lb 10.6 oz)  Body mass index is 26.26 kg/m².    Intake/Output - Last 3 Shifts       11/24 0700 - 11/25 0659 11/25 0700 - 11/26 0659 11/26 0700 - 11/27 0659    I.V. (mL/kg) 2899.6 (50.9) 2299.2 (40.3) 384 (6.7)    Blood 250       NG/GT 1050 690 200    Total Intake(mL/kg) 4199.6 (73.7) 2989.2 (52.4) 584 (10.2)    Urine (mL/kg/hr) 200 (0.1) 40 (0) 63 (0.1)    Drains  250     Other 4353 2487     Stool 700 750     Total Output 5253 3527 63    Net -1053.4 -537.8 +521           Stool Occurrence 1 x            Physical Exam   Constitutional: She is oriented to person, place, and time. She appears well-developed.   HENT:   Head: Normocephalic and atraumatic.   intubated   Eyes: EOM are normal. Pupils are equal, round, and reactive to light.   Neck: Normal range of motion. Neck supple.   Cardiovascular: Normal rate and regular rhythm.   Pulmonary/Chest: Effort normal. She has no rales.   Bronchial breath sounds noted on LLL   Abdominal: Soft. Bowel sounds are normal.   Musculoskeletal: Normal range of motion. She exhibits no edema.   Neurological: She is alert and oriented to person, place, and time.   Skin: Skin is warm.   Vitals reviewed.      Laboratory:  Immunosuppressants     None        Labs within the past 24 hours have been reviewed.    Diagnostic Results:  I have personally reviewed all pertinent imaging studies.

## 2018-11-27 NOTE — ASSESSMENT & PLAN NOTE
68yo F liver-listed, stepped up on 11/21 for respiratory failure with worsening respiratory acidosis     Plan:    Neuro:   -Pain control: prn fentanyl while intubated  -Sedation holiday since yesterday morning   -Daily sedation vacations  -Followed all commands off sedation     Pulmonary:   -intubated on arrival to SICU 11/21  Vent Mode: Spont  Oxygen Concentration (%):  [] 40  Resp Rate Total:  [19 br/min-37 br/min] 27 br/min  Vt Set:  [300 mL] 300 mL  PEEP/CPAP:  [5 cmH20] 5 cmH20  Pressure Support:  [10 cmH20-15 cmH20] 10 cmH20  Mean Airway Pressure:  [7.2 yxU24-18 cmH20] 7.2 cmH20  -daily CXR - evidence of significant bilateral consolidation   -ABGs daily and prn  -continuous pulse ox   - Failed SBT with Pulm yesterday. CPAP trial again today     Cardiac:  -SBP > 100 goal  -Heart failure following, appreciate recs   - Afib with RVR 11/23/18, broke out with 2x 5mg doses of IV metop, her pressure was stable throughout  - Went back into Afib with RVR on 11/25. Given IV metoprolol 5mg q3. Did not resolve, started on diltiazem drip. Converted out of Afib around 3am. dilt drip stopped at that time.   -no episodes of Afib overnight. Diltiazem drip has been off     Renal:   -Mendoza in place  -UOP minimal in last 24hrs  -Bun/Cr trending up, continue to monitor  - Had trial of lasix, followed by lasix + diuril yesterday with minimal UOP response  -Nephrology following, appreciate recommendations, likely will require dialysis today  -Started CRRT 11/24/18. Will continue nocturnal CRRT     Fluids/Electrolytes/Nutrition/GI:   -Nutritional status: NPO while intubated  -Continue TF @ 40cc/hour  -replace lytes PRN  -will hold Lactulose enemas TID    Hematology/Oncology:  -Hgb 7.8/23.6. Continue to monitor    -PT/INR: 15.7, 1.6 today   -continue to monitor. Transfuse as needed     Infectious Disease:   -Afebrile  -WBC 6.65, continue to monitor   -Abx: Zosyn.     Endocrine:  -Glucose goal of 140-180  -Endocrine following,  see recommendations    Dispo:  -Continue care in the ICU setting  -possible SBT today

## 2018-11-27 NOTE — PROGRESS NOTES
"Wound care consulted per RN for "wound to rectum." Pt has a hx of cryptogenic cirrhosis, listed for liver tx 9/14/18. She presented to ER at outside hospital in [place], [place] with increased abdominal pain, disorientation, and  AMS on arrival and ENZO on lab work. Her ammonia reached as high as 200. She was then transferred to [facility] for further care. Pt was treated with lactulose and a bowel management system was placed.     Upon assessment anus has redness and purple discoloration noted. Possible deep tissue injury related to flexiseal device with recommendation to remove flexiseal to prevent pressure from anus.  Nurse at bedside removed bowel management system per recommendation.     Recommendation:  -Triad ointment to be applied twice a day to rectal area. Triad ointment provides a hydrophilic environment to promote healing of exposed tissue and prevents breakdown of intact skin  -Continue pressure injury prevention interventions  -Removal of flexiseal to reduce pressure applied to anus.    Plan of care discussed with nurse and family member at bedside.       11/27/18 1348       Pressure Injury 11/27/18 Rectum Deep tissue injury   Date First Assessed: 11/27/18   Pressure Injury Present on Admission: no  Location: Rectum  Is this injury device related?: (c) Yes  Staging: Deep tissue injury   Wound Image    Staging D  (r/t flexiseal from bowel mgt system)   Dressing Appearance No dressing   Drainage Amount None   Appearance Red;Purple   Periwound Area Moist   Care Cleansed with:;Other (see comments)  (Cleansing wipes)   Periwound Care Moisture barrier applied  (Triad ordered)   Dressing Change Due 11/28/18     "

## 2018-11-27 NOTE — PROGRESS NOTES
(SW) presented to the patient's (pt) bedside for follow up and continuity of care.  Pt observed to be intubated and being weaned off of sedation; therefore, unable to be assessed.  Pt's  present, actively engaged and communicating effectively.  During rounds, pt's  was able to ask Dr. Molina questions with regards to the pt's prognosis which were answered and pt's  verbalized understanding.  SW inquired about pt's 's coping, family members being able to assist in caregiver duties and his self care.  Pt's  denied any coping issues and explained the pt's sisters live in [place], were present on last week and are available to present when needed.  Pt's  reported he has been eating and able to sleep when he can ( remains in pt's room 24/7).  SW encouraged pt's  to take frequent breaks from the pt's room in an attempt to retrieve fresh air and appropriate mental awareness.  Pt's  agreed to do so.  Pt's  inquired about the location of the [facility] [address] to which SW was able to visually point out the apartments from the pt's room window.  Pt's  explained that he is awaiting possible relocation instructions from Dr. Santos once the pt is able to discuss being discharge while awaiting transplant.  SW advised pt's  to contact SW team should he need any community referrals and/or housing information at that time.  No other concerns were addressed.  SW remains available for education, resources, support and discharge planning as appropriate.

## 2018-11-27 NOTE — PROGRESS NOTES
[facility]  Critical Care - Surgery  Progress Note    Patient Name: Krystal Hobson  MRN: [id number]  Admission Date: 11/10/2018  Hospital Length of Stay: 16 days  Code Status: Full Code  Attending Provider: Abrahan Montelongo MD  Primary Care Provider: Courtney Joe MD   Principal Problem: Hepatic encephalopathy    Subjective:     Hospital/ICU Course:  No notes on file    Interval History:  Afib with RVR overnight. Metoprolol push x 3 and dilt drip started.   CRRT ran overnight for 4 hours  Until vials were unstable. Rinsed back and then CRRT held the rest of the night.   Did not tolerate SBT this AM - CXR with diffuse ground glass opacities    Scheduled Meds:   famotidine  20 mg Oral QHS    fluconazole  200 mg Oral Daily    guaifenesin 100 mg/5 ml  200 mg Per OG tube Q6H    heparin (porcine)  5,000 Units Subcutaneous Q8H    [START ON 11/27/2018] insulin aspart U-100  2 Units Subcutaneous Q24H    [START ON 11/27/2018] insulin aspart U-100  2 Units Subcutaneous Q24H    [START ON 11/27/2018] insulin aspart U-100  2 Units Subcutaneous Q24H    insulin aspart U-100  2 Units Subcutaneous Q24H    insulin aspart U-100  2 Units Subcutaneous Q24H    insulin aspart U-100  2 Units Subcutaneous Q24H    insulin detemir U-100  14 Units Subcutaneous Daily    levalbuterol        levalbuterol  1.25 mg Nebulization Q6H WAKE    piperacillin-tazobactam (ZOSYN) IVPB  4.5 g Intravenous Q12H    rifAXImin  550 mg Oral BID     Continuous Infusions:   sodium chloride 0.9% 200 mL/hr at 11/25/18 2245    dilTIAZem Stopped (11/26/18 0300)    norepinephrine bitartrate-D5W Stopped (11/26/18 0900)    propofol Stopped (11/26/18 0800)     Objective:     Vital Signs (Most Recent):  Temp: 97.7 °F (36.5 °C) (11/26/18 1500)  Pulse: 67 (11/26/18 1830)  Resp: 13 (11/26/18 1300)  BP: (!) 113/55 (11/26/18 1800)  SpO2: 100 % (11/26/18 1830) Vital Signs (24h Range):  Temp:  [97.7 °F (36.5 °C)-98.6 °F (37 °C)] 97.7 °F (36.5  °C)  Pulse:  [] 67  Resp:  [13-34] 13  SpO2:  [93 %-100 %] 100 %  BP: ()/(47-66) 113/55  Arterial Line BP: ()/(33-57) 109/36     Weight: 57 kg (125 lb 10.6 oz)  Body mass index is 26.26 kg/m².    Intake/Output - Last 3 Shifts       11/24 0700 - 11/25 0659 11/25 0700 - 11/26 0659 11/26 0700 - 11/27 0659    I.V. (mL/kg) 2899.6 (50.9) 2299.2 (40.3) 384 (6.7)    Blood 250      NG/GT 1050 690 200    Total Intake(mL/kg) 4199.6 (73.7) 2989.2 (52.4) 584 (10.2)    Urine (mL/kg/hr) 200 (0.1) 40 (0) 63 (0.1)    Drains  250     Other 4353 2487     Stool 700 750     Total Output 5253 3527 63    Net -1053.4 -537.8 +521           Stool Occurrence 1 x            Physical Exam   Constitutional: She is oriented to person, place, and time. She appears well-developed.   HENT:   Head: Normocephalic and atraumatic.   intubated   Eyes: EOM are normal. Pupils are equal, round, and reactive to light.   Neck: Normal range of motion. Neck supple.   Cardiovascular: Normal rate and regular rhythm.   Pulmonary/Chest: Effort normal. She has no rales.   Bronchial breath sounds noted on LLL   Abdominal: Soft. Bowel sounds are normal.   Musculoskeletal: Normal range of motion. She exhibits no edema.   Neurological: She is alert and oriented to person, place, and time.   Skin: Skin is warm.   Vitals reviewed.      Laboratory:  Immunosuppressants     None        Labs within the past 24 hours have been reviewed.    Diagnostic Results:  I have personally reviewed all pertinent imaging studies.    Assessment/Plan:     Acute respiratory failure with hypoxia    Acute respiratory failure with hypoxia     68yo F liver-listed, stepped up on 11/21 for respiratory failure with worsening respiratory acidosis      Plan:     Neuro:   -Pain control: prn fentanyl while intubated  -Sedation with propofol while intubated  -Daily sedation vacations  -Followed all commands off sedation yesterday      Pulmonary:   -intubated on arrival to SICU  11/21  -daily CXR - evidence of significant bilateral consolidation   -ABGs daily and prn  -continuous pulse ox   -Continue Daily SBT  - Failed SBT with Pulm yesterday. CPAP trial again today      Cardiac:  -SBP > 100 goal  - JACINDA 11/20 - elevated PA pressures   -On low dose levo with CRRT  -Heart failure following, appreciate recs   - Afib with RVR 11/23/18, broke out with 2x 5mg doses of IV metop, her pressure was stable throughout  - Went back into Afib with RVR on 11/25. Given IV metoprolol 5mg q3. Did not resolve, started on diltiazem drip. Converted out of Afib around 3am. dilt drip stopped at that time.       Renal:   -Mendoza in place  -Continue to monitor UOP  -Bun/Cr   - Had trial of lasix, followed by lasix + diuril yesterday with minimal UOP response  -Nephrology following, appreciate recommendations  -Started CRRT 11/24/18. Will hold off on nocturnal CRRT tonight.      Fluids/Electrolytes/Nutrition/GI:   -Nutritional status: NPO while intubated  -Will start TFs today @ 10cc/hr to AAT   -replace lytes PRN  -will hold Lactulose enemas TID     Hematology/Oncology:  -Monitor CBC   -PT/INR   -continue to monitor. Transfuse as needed      Infectious Disease:   -Afebrile  -WBC, continue to monitor   -Abx: Vanc/Zosyn to cover HCAP . Follow up vanc trough      Endocrine:  -Glucose goal of 140-180  -Endocrine following, see recommendations     Dispo:  -Continue care in the ICU setting  -Primary: Transplant                  Critical care was time spent personally by me on the following activities: development of treatment plan with patient or surrogate and bedside caregivers, discussions with consultants, evaluation of patient's response to treatment, examination of patient, ordering and performing treatments and interventions, ordering and review of laboratory studies, ordering and review of radiographic studies, pulse oximetry, re-evaluation of patient's condition.  This critical care time did not overlap with that  of any other provider or involve time for any procedures.     Alice Doty MD  Critical Care - Surgery  [facility]

## 2018-11-27 NOTE — PROGRESS NOTES
" [facility]  Adult Nutrition  Progress Note    SUMMARY       Recommendations  Recommendation/Intervention:   1. Recommend modifying TF to Glucerna 1.5 at a 40 mL/hr - to provide 1440 kcal/day, 79g protein/day, and 729mL free fluid/day.   2. When able to extubate, ADAT to Diabetic with texture per SLP.   RD to monitor.    Goals: Patient to meet >85% EEN/EPN  Nutrition Goal Status: goal met  Communication of RD Recs: (POC)    Reason for Assessment  Reason for Assessment: RD follow-up  Diagnosis: transplant/postoperative complications(Hepatic encephalopathy, Pre- Liver tx)  Relevant Medical History: cryptogenic cirrhosis, DM, GERD  Interdisciplinary Rounds: did not attend  General Information Comments: Patient transferred to SICU 2/2 respiratory failure , intubated. Plan for SBT today. On nocturnal CRRT. Started on TF, tolerating at goal rate but having some elevated BG levels. (NFPE completed , patient with severe malnutrition.)  Nutrition Discharge Planning: Unable to determine at this time    Nutrition Risk Screen  Nutrition Risk Screen: no indicators present    Nutrition/Diet History  Patient Reported Diet/Restrictions/Preferences: general, low salt  Typical Food/Fluid Intake: Decreased po intake  Food Preferences: noted  Do you have any cultural, spiritual, Church conflicts, given your current situation?: none noted   Supplemental Drinks or Food Habits: Glucerna(4-5/week)  Food Allergies: NKFA  Factors Affecting Nutritional Intake: NPO, on mechanical ventilation    Anthropometrics  Temp: 97.7 °F (36.5 °C)  Height Method: Stated  Height: 4' 10" (147.3 cm)  Height (inches): 58 in  Weight Method: Bed Scale  Weight: 61 kg (134 lb 7.7 oz)  Weight (lb): 134.48 lb  Ideal Body Weight (IBW), Female: 90 lb  % Ideal Body Weight, Female (lb): 149.42 lb  BMI (Calculated): 28.2  BMI Grade: 25 - 29.9 - overweight  Usual Body Weight (UBW), k.8 kg(2018 per chart review)  % Usual Body Weight: " 120.33  % Weight Change From Usual Weight: 20.08 %    Lab/Procedures/Meds  Pertinent Labs Reviewed: reviewed  Pertinent Labs Comments: BUN 26, Creat 1.6, Glu 254, POCT Glu 211-240, HgbA1c 5.8, Ca 8.3, Alb 2.4, T. bili 8.3  Pertinent Medications Reviewed: reviewed  Pertinent Medications Comments: famotidine, insulin, levophed    Physical Findings/Assessment  Overall Physical Appearance: on ventilator support, loss of muscle mass, loss of subcutaneous fat  Tubes: orogastric tube  Oral/Mouth Cavity: tooth/teeth missing  Skin: edema    Estimated/Assessed Needs  Weight Used For Calorie Calculations: 61 kg (134 lb 7.7 oz)  Energy Calorie Requirements (kcal): 1168 kcal/day  Energy Need Method: [facility]  Protein Requirements: 73-92 g/day(1.2-1.5 g/kg)  Weight Used For Protein Calculations: 61 kg (134 lb 7.7 oz)  Fluid Requirements (mL): 1mL/kcal or per MD  Fluid Need Method: RDA Method  RDA Method (mL): 1168  CHO Requirement: 50% total kcal    Nutrition Prescription Ordered  Current Diet Order: NPO  Current Nutrition Support Formula Ordered: Isosource 1.5  Current Nutrition Support Rate Ordered: 40 (ml)  Current Nutrition Support Frequency Ordered: mL/hr    Evaluation of Received Nutrient/Fluid Intake  Enteral Calories (kcal): 1440  Enteral Protein (gm): 65  Enteral (Free Water) Fluid (mL): 733  % Kcal Needs: 123  % Protein Needs: 89  I/O: +1.1L x 24hrs  Energy Calories Required: exceed needs  Protein Required: meeting needs  Fluid Required: (per MD)  Comments: LBM 11/25  Tolerance: tolerating  % Intake of Estimated Energy Needs: Other: > 100%  % Meal Intake: NPO    Nutrition Risk  Level of Risk/Frequency of Follow-up: low(1x week)     Assessment and Plan  Severe malnutrition-MD resolved as of 11/16/2018, RD does not agree that problem is resolved    Nutrition Problem  Malnutrition in the context of Chronic Illness/Injury    Related to (etiology):  Cryptogenic cirrhosis    Signs and Symptoms (as evidenced by):  Energy  Intake: <50% of estimated energy requirement for at least 1 month  Body Fat Depletion: moderate and severe depletion of orbitals, triceps and thoracic and lumbar region   Muscle Mass Depletion: moderate and severe depletion of temples, clavicle region, interosseous muscle and lower extremities   Weight Loss: unable to determine if any weight loss 2/2 fluid present  Fluid Accumulation: mild and moderate    Interventions/Recommendations (treatment strategy):  Full assessment completed, see RD Note 11/27/2018.    Nutrition Diagnosis Status:  Continues     Monitor and Evaluation  Food and Nutrient Intake: energy intake, enteral nutrition intake  Food and Nutrient Adminstration: enteral and parenteral nutrition administration  Anthropometric Measurements: weight, weight change  Biochemical Data, Medical Tests and Procedures: electrolyte and renal panel, gastrointestinal profile, glucose/endocrine profile, inflammatory profile, lipid profile  Nutrition-Focused Physical Findings: overall appearance     Nutrition Follow-Up  RD Follow-up?: Yes

## 2018-11-27 NOTE — ASSESSMENT & PLAN NOTE
Acute vent-dependent respiratory failure due to hypoxia and encephalopathy.   - daily SAT & SBT as able- failed SBT today due to tachypnea

## 2018-11-27 NOTE — TELEPHONE ENCOUNTER
Patient's case discussed in Liver  Discussion Committee.  Patient is too ill for transplant at this time. Patient will be made Status 7.  Dr. Molina informed family.

## 2018-11-27 NOTE — PROGRESS NOTES
[facility]  Nephrology  Progress Note    Patient Name: Krystal Hobson  MRN: [id number]  Admission Date: 11/10/2018  Hospital Length of Stay: 17 days  Attending Provider: Abrahan Montelongo MD   Primary Care Physician: Courtney Joe MD  Principal Problem:Hepatic encephalopathy    Subjective:     HPI: Reason for consult: Diuresis in setting of PAP46, pulmonary edema, HRS    Ms. 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  - She [place] [facility] [place] [facility] under liver transplant team due to encephalopathy and new onset ENZO.   - Per the , she was extremely disoriented @ OSH and her ammonia reached as high as 200, and her Cr level increased to 2.0 She also went into A fib with RVR and was started on a diltiazem gtt at OSH.   - On arrival at [facility] she was disoriented and started on lactulose with noted improvement. During her current admission on CXR they found a concern for HCAP and she was started on broad spectrum ABx and then shifted her on Moxifloxacin 7 D course. On 11/20/2018 found on CXR a concern for Lt lung middle lobe consolidation and started on vancomycin and zosyn.  - Renal fxn baseline 1.6 at admit but trending up    Interval History: on nightly SLED since Saturday. Tolerated well first night, however yesterday night developed RVR with CVP of 1 and was aborted early after only a couple hours. CVP improved to 7 on discontinuation. Remains anuric. Plan for trial of spontaneous breathing today.    Review of patient's allergies indicates:  No Known Allergies  Current Facility-Administered Medications   Medication Frequency    acetaminophen tablet 650 mg Q8H PRN    dextrose 50% injection 12.5 g PRN    diltiaZEM 125 mg in D5W 125 mL infusion Continuous    famotidine tablet 20 mg QHS    fluconazole tablet 200 mg Daily    glucagon (human recombinant) injection 1 mg PRN    guaifenesin 100 mg/5 ml syrup 200 mg Q4H PRN    guaifenesin  100 mg/5 ml syrup 200 mg Q6H    heparin (porcine) injection 5,000 Units Q8H    insulin aspart U-100 pen 0-5 Units Q4H PRN    insulin aspart U-100 pen 2 Units Q24H    insulin aspart U-100 pen 2 Units Q24H    insulin aspart U-100 pen 2 Units Q24H    insulin aspart U-100 pen 2 Units Q24H    insulin aspart U-100 pen 2 Units Q24H    insulin aspart U-100 pen 2 Units Q24H    insulin detemir U-100 pen 14 Units Daily    k phos di & mono-sod phos mono 250 mg tablet 2 tablet TID PRN    lactulose 10 gram/15 mL solution (enema) 200 g TID PRN    lactulose 20 gram/30 mL solution Soln 30 g Q6H PRN    levalbuterol nebulizer solution 1.25 mg Q6H WAKE    norepinephrine 4 mg in dextrose 5% 250 mL infusion (premix) (titrating) Continuous    ondansetron disintegrating tablet 8 mg Q8H PRN    piperacillin-tazobactam 4.5 g in sodium chloride 0.9% 100 mL IVPB (ready to mix system) Q12H    propofol (DIPRIVAN) 10 mg/mL infusion Continuous    rifAXIMin tablet 550 mg BID    simethicone chewable tablet 80 mg TID PRN    sodium chloride 0.9% flush 3 mL PRN       Objective:     Vital Signs (Most Recent):  Temp: 97.8 °F (36.6 °C) (11/27/18 1100)  Pulse: 76 (11/27/18 1344)  Resp: (!) 25 (11/27/18 1344)  BP: (!) 120/57 (11/27/18 1300)  SpO2: 100 % (11/27/18 1344)  O2 Device (Oxygen Therapy): ventilator (11/27/18 1344) Vital Signs (24h Range):  Temp:  [97.7 °F (36.5 °C)-98.3 °F (36.8 °C)] 97.8 °F (36.6 °C)  Pulse:  [64-85] 76  Resp:  [25-27] 25  SpO2:  [93 %-100 %] 100 %  BP: ()/(46-78) 120/57  Arterial Line BP: (102-136)/(33-57) 121/44     Weight: 61 kg (134 lb 7.7 oz) (11/27/18 0300)  Body mass index is 28.11 kg/m².  Body surface area is 1.58 meters squared.    I/O last 3 completed shifts:  In: 2396.2 [I.V.:1376.2; NG/GT:1020]  Out: 2079 [Urine:113; Other:1066; Stool:900]    Physical Exam   HENT:   Head: Normocephalic and atraumatic.   Eyes: Scleral icterus is present.   Neck: No JVD present.   Cardiovascular: Normal rate and  regular rhythm.   Pulmonary/Chest: Effort normal and breath sounds normal. She has no wheezes.   On vent, mechanical breath sounds     Abdominal: Soft. She exhibits no distension.   Musculoskeletal: She exhibits edema (trace pitting to knee). She exhibits no tenderness.   Neurological:   Eyes open intermittently spontaneously, non-verbal on vent, not responding to commands   Skin: Skin is warm and dry.   jaundiced   Vitals reviewed.          Assessment/Plan:     Acute kidney injury superimposed on CKD    Cryptogenic cirrhosis awaiting liver xplant, presented for AMS and found to have ENZO on CKDIIIb. Cr worsening since admission. Acute component likely from toxic/ischemic ATN (FENa 0.4% suggesting pre-renal component), making urine but oliguric, acid/base and lytes ok, volume status stable    Did well on SLED, net negative by 1.4L, acid/base and lytes look good, making urine, but remaining significantly oliguric    Should be able to keep up with volume through 12hr nightly SLED treatments    A/P  - Diffuse CXR infiltrates persist, no significant change from 11/26. SBT failed due to tachypnea. Remains oliguric 90mL/24hrs. Weaned off pressors, tolerating well.   - SLED commenced over the weekend, tolerated well first night but aborted early 11/25 night due to paroxysmal AF+RVR and CVP of 1, CVP increase to 7 upon d/c. SLED held last night, plan for SLED tonight 12hrs with IV albumin x2  -strict Is & Os         Thank you for your consult. I will follow-up with patient. Please contact us if you have any additional questions.    Kelvin Liriano MD  Nephrology  [facility]

## 2018-11-27 NOTE — PROGRESS NOTES
[facility]  Critical Care - Surgery  Progress Note    Patient Name: Krystal Hobson  MRN: [id number]  Admission Date: 11/10/2018  Hospital Length of Stay: 17 days  Code Status: Full Code  Attending Provider: Abrahan Montelongo MD  Primary Care Provider: Courtney Joe MD   Principal Problem: Hepatic encephalopathy    Subjective:     Hospital/ICU Course:  No notes on file    Interval History:  Off CRRT overnight. VS remained stable. Off sedation since yesterday morning - started to wake up and follow commands around 3 AM. Off drips this AM.     Scheduled Meds:   famotidine  20 mg Oral QHS    fluconazole  200 mg Oral Daily    guaifenesin 100 mg/5 ml  200 mg Per OG tube Q6H    heparin (porcine)  5,000 Units Subcutaneous Q8H    insulin aspart U-100  2 Units Subcutaneous Q24H    insulin aspart U-100  2 Units Subcutaneous Q24H    insulin aspart U-100  2 Units Subcutaneous Q24H    insulin aspart U-100  2 Units Subcutaneous Q24H    insulin aspart U-100  2 Units Subcutaneous Q24H    insulin aspart U-100  2 Units Subcutaneous Q24H    insulin detemir U-100  14 Units Subcutaneous Daily    levalbuterol  1.25 mg Nebulization Q6H WAKE    piperacillin-tazobactam (ZOSYN) IVPB  4.5 g Intravenous Q12H    rifAXImin  550 mg Oral BID     Continuous Infusions:   dilTIAZem Stopped (11/26/18 0300)    norepinephrine bitartrate-D5W Stopped (11/26/18 0900)    propofol Stopped (11/26/18 0800)     Objective:     Vital Signs (Most Recent):  Temp: 98.3 °F (36.8 °C) (11/27/18 0300)  Pulse: 74 (11/27/18 0630)  Resp: 13 (11/26/18 1300)  BP: (!) 107/55 (11/27/18 0600)  SpO2: 100 % (11/27/18 0630) Vital Signs (24h Range):  Temp:  [97.7 °F (36.5 °C)-98.6 °F (37 °C)] 98.3 °F (36.8 °C)  Pulse:  [64-83] 74  Resp:  [13-20] 13  SpO2:  [93 %-100 %] 100 %  BP: ()/(46-78) 107/55  Arterial Line BP: (102-138)/(33-57) 107/35     Weight: 61 kg (134 lb 7.7 oz)  Body mass index is 28.11 kg/m².    Intake/Output - Last 3 Shifts        11/25 0700 - 11/26 0659 11/26 0700 - 11/27 0659    I.V. (mL/kg) 2299.2 (40.3) 499 (8.2)    NG/ 610    Total Intake(mL/kg) 2989.2 (52.4) 1109 (18.2)    Urine (mL/kg/hr) 40 (0) 103 (0.1)    Drains 250     Other 2487     Stool 750 550    Total Output 3527 653    Net -537.8 +456                Physical Exam   Constitutional: She is oriented to person, place, and time. She appears well-developed.   HENT:   Head: Normocephalic and atraumatic.   intubated   Eyes: EOM are normal. Pupils are equal, round, and reactive to light.   Neck: Normal range of motion. Neck supple.   Cardiovascular: Normal rate and regular rhythm.   Pulmonary/Chest: Effort normal. She has no rales.   Bronchial breath sounds noted on LLL   Abdominal: Soft. Bowel sounds are normal.   Musculoskeletal: Normal range of motion. She exhibits no edema.   Neurological: She is alert and oriented to person, place, and time.   Skin: Skin is warm.   Vitals reviewed.      Laboratory:  Immunosuppressants     None        Labs within the past 24 hours have been reviewed.    Diagnostic Results:  I have personally reviewed all pertinent imaging studies.    Assessment/Plan:     Acute respiratory failure with hypoxia    Acute respiratory failure with hypoxia     66yo F liver-listed, stepped up on 11/21 for respiratory failure with worsening respiratory acidosis      Plan:     Neuro:   -Pain control: prn fentanyl while intubated  -Sedation off, sedation vacation since yesterday AM  -Following commands      Pulmonary:   -intubated on arrival to SICU 11/21  -daily CXR - evidence of significant bilateral consolidation, no change  -ABGs daily and prn  -continuous pulse ox   -Continue Daily SBT  - Failed SBT with Pulm yesterday. CPAP trial again today      Cardiac:  -SBP > 100 goal, did not require any drips overnight   - JACINDA 11/20 - elevated PA pressures   -Heart failure following, appreciate recs   - Afib with RVR 11/23/18, broke out with 2x 5mg doses of IV metop, her  pressure was stable throughout  - Went back into Afib with RVR on 11/25. Given IV metoprolol 5mg q3. Did not resolve, started on diltiazem drip. Converted out of Afib around 3am. dilt drip stopped at that time.         Renal:   -Mendoza in place  -Continue to monitor UOP: minimal, 88 cc total in past 24 hours   - CRRT started 11/24  -Bun/Cr - trending up, may need CRRT again today (held last night)  -Nephrology following, appreciate recommendations      Fluids/Electrolytes/Nutrition/GI:   -Nutritional status: NPO while intubated  - TFs @ 40 - goal   -replace lytes PRN  -holding Lactulose enemas TID, per primary      Hematology/Oncology:  -Monitor CBC   -PT/INR   - Transfuse as needed      Infectious Disease:   -Afebrile  -WBC, continue to monitor   -Abx: Vanc/Zosyn to cover HCAP . Follow up vanc trough      Endocrine:  -Glucose goal of 140-180  -Endocrine following, see recommendations for insulin      Ppx:  H2B, SQH    Dispo:  -Continue care in the ICU setting  -Primary: Transplant                Critical care was time spent personally by me on the following activities: development of treatment plan with patient or surrogate and bedside caregivers, discussions with consultants, evaluation of patient's response to treatment, examination of patient, ordering and performing treatments and interventions, ordering and review of laboratory studies, ordering and review of radiographic studies, pulse oximetry, re-evaluation of patient's condition.  This critical care time did not overlap with that of any other provider or involve time for any procedures.     Alice Doty MD  Critical Care - Surgery  [facility]

## 2018-11-27 NOTE — PROGRESS NOTES
[facility]  Liver Transplant  Progress Note    Patient Name: Krystal Hobson  MRN: [id number]  Admission Date: 11/10/2018  Hospital Length of Stay: 17 days  Code Status: Full Code  Primary Care Provider: Courtney Joe MD    Subjective:   SANTOSHEON. Has been off sedation for 24 hours    History of Present Illness:  Ms. Krystal Hobson is a 67 yr F w/ hx of cryptogenic cirrhosis, listed for liver tx 9/14/18 who presented to ER at outside hospital in [place], [place] with increased abdominal pain and then noted to have AMS on arrival and ENZO on lab work. Per the , she was extremely disoriented and her ammonia reached as high as 200. Her Cr level increased to 2.0 at the OSH. She also went into A fib with RVR and was started on a diltiazem infusion at OSH. She was then transferred to [facility] for further care. On arrival pt was noted with confusion which has now resolved with lactulose. She also was noted with a cough and fatigue.       Hospital Course:  Interval History: no acute events overnight. More lethargic in early evening yest, responded well to lactulose enema. 2D echo yest with PA pressure 46, HTS consulted for RHC end of the week at the earliest. BNP 1892. Plan to diurese, dose lasix daily based on Cr as just recovering from ENZO, lasix 40mg IV x 2 doses today. UOP decreased yesterday, Cr mildly elevated today. Cough and breathing continue to improve, repeat CXR this AM as required 2L O2 overnight, persistent JUAN opacification. Pulmonology consulted. Start treatment for HCAP with vanc/zosyn. RUE edema improving. Remains on internal hold 2/2 frailty and elevated PA pressure. MELD 30 today. Continue lactulose to titrate for 3-4 stools/day. Repeat labs 4pm.     Scheduled Meds:   famotidine  20 mg Oral QHS    fluconazole  200 mg Oral Daily    guaifenesin 100 mg/5 ml  200 mg Per OG tube Q6H    heparin (porcine)  5,000 Units Subcutaneous Q8H    insulin aspart U-100  2 Units Subcutaneous Q24H    insulin  aspart U-100  2 Units Subcutaneous Q24H    insulin aspart U-100  2 Units Subcutaneous Q24H    insulin aspart U-100  2 Units Subcutaneous Q24H    insulin aspart U-100  2 Units Subcutaneous Q24H    insulin aspart U-100  2 Units Subcutaneous Q24H    insulin detemir U-100  14 Units Subcutaneous Daily    levalbuterol  1.25 mg Nebulization Q6H WAKE    piperacillin-tazobactam (ZOSYN) IVPB  4.5 g Intravenous Q12H    rifAXImin  550 mg Oral BID     Continuous Infusions:   dilTIAZem Stopped (11/26/18 0300)    norepinephrine bitartrate-D5W Stopped (11/26/18 0900)    propofol Stopped (11/26/18 0800)     PRN Meds:acetaminophen, dextrose 50%, glucagon (human recombinant), guaifenesin 100 mg/5 ml, insulin aspart U-100, k phos di & mono-sod phos mono, lactulose, lactulose, ondansetron, simethicone, sodium chloride 0.9%    Review of Systems  Objective:     Vital Signs (Most Recent):  Temp: 97.7 °F (36.5 °C) (11/27/18 0700)  Pulse: 75 (11/27/18 0720)  Resp: (!) 27 (11/27/18 0718)  BP: 106/76 (11/27/18 0700)  SpO2: 100 % (11/27/18 0720) Vital Signs (24h Range):  Temp:  [97.7 °F (36.5 °C)-98.6 °F (37 °C)] 97.7 °F (36.5 °C)  Pulse:  [64-83] 75  Resp:  [13-27] 27  SpO2:  [93 %-100 %] 100 %  BP: ()/(46-78) 106/76  Arterial Line BP: (102-138)/(33-57) 110/38     Weight: 61 kg (134 lb 7.7 oz)  Body mass index is 28.11 kg/m².    Intake/Output - Last 3 Shifts       11/25 0700 - 11/26 0659 11/26 0700 - 11/27 0659 11/27 0700 - 11/28 0659    I.V. (mL/kg) 2299.2 (40.3) 499 (8.2)     Blood       NG/ 610     Total Intake(mL/kg) 2989.2 (52.4) 1109 (18.2)     Urine (mL/kg/hr) 40 (0) 103 (0.1)     Drains 250      Other 2487      Stool 750 550     Total Output 3527 653     Net -537.8 +456                  Physical Exam   Constitutional: She is oriented to person, place, and time. She appears well-developed.   HENT:   Head: Normocephalic and atraumatic.   intubated   Eyes: EOM are normal. Pupils are equal, round, and reactive to  light.   Neck: Normal range of motion. Neck supple.   Cardiovascular: Normal rate and regular rhythm.   Pulmonary/Chest: Effort normal. She has no rales.   Bronchial breath sounds noted on LLL   Abdominal: Soft. Bowel sounds are normal.   Musculoskeletal: Normal range of motion. She exhibits no edema.   Neurological: She is alert and oriented to person, place, and time.   Skin: Skin is warm.   Vitals reviewed.      Laboratory:  Immunosuppressants     None        Labs within the past 24 hours have been reviewed.    Diagnostic Results:  I have personally reviewed all pertinent imaging studies.    Assessment/Plan:     * Hepatic encephalopathy    - Acute on chronic. PO lactulose ordered, continue rifaximin. Titrate Lactulose to maintain 3-4 BM a day. PRN lactulose enema TID for worsening confusion.  - currently pt is AAOx4, no encephalopathy noted.  Mentation is delayed.       Acute respiratory failure with hypoxia    66yo F liver-listed, stepped up on 11/21 for respiratory failure with worsening respiratory acidosis     Plan:    Neuro:   -Pain control: prn fentanyl while intubated  -Sedation holiday since yesterday morning   -Daily sedation vacations  -Followed all commands off sedation     Pulmonary:   -intubated on arrival to SICU 11/21  Vent Mode: Spont  Oxygen Concentration (%):  [] 40  Resp Rate Total:  [19 br/min-37 br/min] 27 br/min  Vt Set:  [300 mL] 300 mL  PEEP/CPAP:  [5 cmH20] 5 cmH20  Pressure Support:  [10 cmH20-15 cmH20] 10 cmH20  Mean Airway Pressure:  [7.2 ujA00-15 cmH20] 7.2 cmH20  -daily CXR - evidence of significant bilateral consolidation   -ABGs daily and prn  -continuous pulse ox   - Failed SBT with Pulm yesterday. CPAP trial again today     Cardiac:  -SBP > 100 goal  -Heart failure following, appreciate recs   - Afib with RVR 11/23/18, broke out with 2x 5mg doses of IV metop, her pressure was stable throughout  - Went back into Afib with RVR on 11/25. Given IV metoprolol 5mg q3. Did not  resolve, started on diltiazem drip. Converted out of Afib around 3am. dilt drip stopped at that time.   -no episodes of Afib overnight. Diltiazem drip has been off     Renal:   -Mendoza in place  -UOP minimal in last 24hrs  -Bun/Cr trending up, continue to monitor  - Had trial of lasix, followed by lasix + diuril yesterday with minimal UOP response  -Nephrology following, appreciate recommendations, likely will require dialysis today  -Started CRRT 11/24/18. Will continue nocturnal CRRT     Fluids/Electrolytes/Nutrition/GI:   -Nutritional status: NPO while intubated  -Continue TF @ 40cc/hour  -replace lytes PRN  -will hold Lactulose enemas TID    Hematology/Oncology:  -Hgb 7.8/23.6. Continue to monitor    -PT/INR: 15.7, 1.6 today   -continue to monitor. Transfuse as needed     Infectious Disease:   -Afebrile  -WBC 6.65, continue to monitor   -Abx: Zosyn.     Endocrine:  -Glucose goal of 140-180  -Endocrine following, see recommendations    Dispo:  -Continue care in the ICU setting  -possible SBT today          Sinus tachycardia    - Worsened with albuterol, improved with BB.   - remains in NSR. Continue tele.     Atrial fibrillation    -  Patient with episode of Afib with RVR at OSH and was placed on diltiazem gtt which was discontinued 11/10 as she reverted to NSR.  Cardiology consulted for management.   -  Pt then returned back into Afib w/ RVR on the afternoon of 11/11 and pt placed back on diltiazem for rate control.   -  Pt now rated controlled and diltiazem d/c'd on 11/12 at 0200. Cont with lopressor at this time as pt remains rate controlled.       CKD (chronic kidney disease) stage 3, GFR 30-59 ml/min    - See ENZO.  Stable.     Chronic liver disease           Type 2 diabetes mellitus with hyperglycemia, with long-term current use of insulin    - Basal and prandial insulin ordered. Endocrine consulted for management. Apprec recs.   - Pt noted with significant hypoglycemic event the evening of 11/14, BG 8. Pt  responded well to IV dextrose.   - blood glucose has remained stable and diabetic current regimen.       Anemia of chronic disease    - H/H stable. Continue to monitor with daily cbc.   - no overt signs of bleeding     End stage liver disease    -  Listed for liver transplant with MELD 27. Per hepatologist, remains on internal hold 2/2 initially for PNA, but now for frailty and elevated PA pressure. Continue to monitor.  -  Plan to discuss pt candidacy once resp status, nutrition, and physical mobility improve. Need repeat RHC for elevated PA pressure. Cards following    MELD-Na score: 31 at 11/24/2018  3:25 AM  MELD score: 31 at 11/24/2018  3:25 AM  Calculated from:  Serum Creatinine: 2.6 mg/dL at 11/24/2018  3:25 AM  Serum Sodium: 142 mmol/L (Rounded to 137 mmol/L) at 11/24/2018  3:25 AM  Total Bilirubin: 7.4 mg/dL at 11/24/2018  3:25 AM  INR(ratio): 2.1 at 11/24/2018  3:25 AM  Age: 67 years     Acute kidney injury superimposed on CKD    - CKD 3-4 at baseline creatinine. Urine electrolytes ordered. Etiology possibly due to hepatorenal syndrome.   - HRS protocol started on admission, albumin and octreotide. No midodrine as with Afib.   - Cr since admission has improved  - Dosing lasix daily based on labs -- Lasix 40mg IV x 2 doses today  - Must weigh daily and cont strict I&Os.      Thrombocytopenia due to hypersplenism    - No overt s/s of bleeding.  Continue to monitor.      Protein-calorie malnutrition, moderate    - Dietician completed calorie count.  Patient eating % of trays.  Will d/c calorie count.  Pt drinking 2-3 supplement drinks daily.       Organ transplant candidate    - on internal hold.       Decompensated hepatic cirrhosis    - See hepatic encephalopathy.          VTE Risk Mitigation (From admission, onward)        Ordered     IP VTE HIGH RISK PATIENT  Once      11/10/18 0213     heparin (porcine) injection 5,000 Units  Every 8 hours      11/10/18 0213          The patients clinical status  was discussed at multidisplinary rounds, involving transplant surgery, transplant medicine, pharmacy, nursing, nutrition, and social work    Discharge Planning:  No Patient Care Coordination Note on file.      Suzette Chamberlain MD  Liver Transplant  [facility]    Agree with above.  Patient previously on hold but continues to be critically ill.  Family understands she is inactive.  Will be placed on Status 7  Apolinar Molina Jr

## 2018-11-27 NOTE — ASSESSMENT & PLAN NOTE
Cryptogenic cirrhosis awaiting liver xplant, presented for AMS and found to have ENZO on CKDIIIb. Cr worsening since admission. Acute component likely from toxic/ischemic ATN (FENa 0.4% suggesting pre-renal component), making urine but oliguric, acid/base and lytes ok, volume status stable    Did well on SLED, net negative by 1.4L, acid/base and lytes look good, making urine, but remaining significantly oliguric    Should be able to keep up with volume through 12hr nightly SLED treatments    A/P  - Diffuse CXR infiltrates persist, no significant change from 11/26. SBT failed due to tachypnea. Remains oliguric 90mL/24hrs. Weaned off pressors, tolerating well.   - SLED commenced over the weekend, tolerated well first night but aborted early 11/25 night due to paroxysmal AF+RVR and CVP of 1, CVP increase to 7 upon d/c. SLED held last night, and no acute need at present based on RFP and volume status. Pt remains approximately fluid neutral over last 24hrs accounting for insensible losses. Continue trending RFP with low threshold for SLED.  -strict Is & Os

## 2018-11-27 NOTE — ASSESSMENT & PLAN NOTE
Acute respiratory failure with hypoxia     66yo F liver-listed, stepped up on 11/21 for respiratory failure with worsening respiratory acidosis      Plan:     Neuro:   -Pain control: prn fentanyl while intubated  -Sedation off, sedation vacation since yesterday AM  -Following commands      Pulmonary:   -intubated on arrival to SICU 11/21  -daily CXR - evidence of significant bilateral consolidation, no change  -ABGs daily and prn  -continuous pulse ox   -Continue Daily SBT  - Failed SBT with Pulm yesterday. CPAP trial again today      Cardiac:  -SBP > 100 goal  - JACINDA 11/20 - elevated PA pressures   -Heart failure following, appreciate recs   - Afib with RVR 11/23/18, broke out with 2x 5mg doses of IV metop, her pressure was stable throughout  - Went back into Afib with RVR on 11/25. Given IV metoprolol 5mg q3. Did not resolve, started on diltiazem drip. Converted out of Afib around 3am. dilt drip stopped at that time.    - did not require any drips overnight      Renal:   -Mendoza in place  -Continue to monitor UOP: minimal, 88 cc total in past 24 hours   - CRRT started 11/24  -Bun/Cr - trending up, may need CRRT again today (held last night)  -Nephrology following, appreciate recommendations      Fluids/Electrolytes/Nutrition/GI:   -Nutritional status: NPO while intubated  - TFs @ 40 - goal   -replace lytes PRN  -holding Lactulose enemas TID, per primary      Hematology/Oncology:  -Monitor CBC   -PT/INR   -continue to monitor. Transfuse as needed      Infectious Disease:   -Afebrile  -WBC, continue to monitor   -Abx: Vanc/Zosyn to cover HCAP . Follow up vanc trough      Endocrine:  -Glucose goal of 140-180  -Endocrine following, see recommendations for insulin      Dispo:  -Continue care in the ICU setting  -Primary: Transplant

## 2018-11-27 NOTE — ASSESSMENT & PLAN NOTE
Acute respiratory failure with hypoxia     68yo F liver-listed, stepped up on 11/21 for respiratory failure with worsening respiratory acidosis      Plan:     Neuro:   -Pain control: prn fentanyl while intubated  -Sedation with propofol while intubated  -Daily sedation vacations  -Followed all commands off sedation yesterday      Pulmonary:   -intubated on arrival to SICU 11/21  -daily CXR - evidence of significant bilateral consolidation   -ABGs daily and prn  -continuous pulse ox   -Continue Daily SBT  - Failed SBT with Pulm yesterday. CPAP trial again today      Cardiac:  -SBP > 100 goal  - JACINDA 11/20 - elevated PA pressures   -On low dose levo with CRRT  -Heart failure following, appreciate recs   - Afib with RVR 11/23/18, broke out with 2x 5mg doses of IV metop, her pressure was stable throughout  - Went back into Afib with RVR on 11/25. Given IV metoprolol 5mg q3. Did not resolve, started on diltiazem drip. Converted out of Afib around 3am. dilt drip stopped at that time.       Renal:   -Mendoza in place  -Continue to monitor UOP  -Bun/Cr   - Had trial of lasix, followed by lasix + diuril yesterday with minimal UOP response  -Nephrology following, appreciate recommendations  -Started CRRT 11/24/18. Will hold off on nocturnal CRRT tonight.      Fluids/Electrolytes/Nutrition/GI:   -Nutritional status: NPO while intubated  -Will start TFs today @ 10cc/hr to AAT   -replace lytes PRN  -will hold Lactulose enemas TID     Hematology/Oncology:  -Monitor CBC   -PT/INR   -continue to monitor. Transfuse as needed      Infectious Disease:   -Afebrile  -WBC, continue to monitor   -Abx: Vanc/Zosyn to cover HCAP . Follow up vanc trough      Endocrine:  -Glucose goal of 140-180  -Endocrine following, see recommendations     Dispo:  -Continue care in the ICU setting  -Primary: Transplant

## 2018-11-27 NOTE — PLAN OF CARE
Problem: Patient Care Overview  Goal: Plan of Care Review  Outcome: Ongoing (interventions implemented as appropriate)  VSS. No pressor requirements overnight. No HR issues. Propofol remained off since 0700 11/26. Patient became more awake and responsive throughout night. Nods appropriately, follows commands moves all extremities. Daily ABG obtained this morning. Plans for SBT. Tube feeds at goal rate, BMS in place.   at bedside, plan of care reviewed. Questions and concerns addressed. Will continue to monitor.

## 2018-11-28 PROBLEM — N18.9 ACUTE KIDNEY INJURY SUPERIMPOSED ON CKD: Status: RESOLVED | Noted: 2018-01-01 | Resolved: 2018-01-01

## 2018-11-28 PROBLEM — E44.0 PROTEIN-CALORIE MALNUTRITION, MODERATE: Status: RESOLVED | Noted: 2018-01-01 | Resolved: 2018-01-01

## 2018-11-28 PROBLEM — N17.9 ACUTE KIDNEY INJURY SUPERIMPOSED ON CKD: Status: RESOLVED | Noted: 2018-01-01 | Resolved: 2018-01-01

## 2018-11-28 PROBLEM — Z76.82 ORGAN TRANSPLANT CANDIDATE: Status: RESOLVED | Noted: 2018-01-01 | Resolved: 2018-01-01

## 2018-11-28 PROBLEM — D69.59 THROMBOCYTOPENIA DUE TO HYPERSPLENISM: Status: RESOLVED | Noted: 2018-01-01 | Resolved: 2018-01-01

## 2018-11-28 PROBLEM — D73.1 THROMBOCYTOPENIA DUE TO HYPERSPLENISM: Status: RESOLVED | Noted: 2018-01-01 | Resolved: 2018-01-01

## 2018-11-28 NOTE — PHYSICIAN QUERY
PT Name: Krystal Hobson  MR #: [id number]    Physician Query Form - Atrial Fibrillation Specificity     CDS/: Calli Gomes               Contact information:[email or web address]     This form is a permanent document in the medical record.     Query Date: November 27, 2018    By submitting this query, we are merely seeking further clarification of documentation. Please utilize your independent clinical judgment when addressing the question(s) below.    The medical record contains the following:   Indicators     Supporting Clinical Findings Location in Medical Record   x Atrial Fibrillation Afib with RVR    Liver transplant pn 11-10   x EKG results EKG earlier today that showed regular R-R intervals  reported by our attending as sinus rhythm as well.   Cardiology cn 11-10   x Medication Diltiazem gtt    Liver transplant pn 11-10    Treatment     x Other Afib with RVR 11/23/18, broke out with 2x 5mg doses of IV metop, her pressure was stable throughout   - Went back into Afib with RVR on 11/25. Given IV metoprolol 5mg q3. Did not resolve, started on diltiazem drip. Converted out of Afib around 3am. dilt drip stopped at that time.  Liver transplant pn 11-26       Provider, please further specify the Atrial Fibrillation diagnosis.    x Paroxysmal    Other (please specify):    Clinically Undetermined       Please document in your progress notes daily for the duration of treatment until resolved, and include in your discharge summary.

## 2018-11-28 NOTE — ASSESSMENT & PLAN NOTE
BG goal 140-180    Levemir 14 units daily.  Restart Novolog at 2 units every 4 hours with TF; hold if TF held   Low dose correction scale  BG monitoring every 4 hours    Discharge planning: TBD

## 2018-11-28 NOTE — NURSING
Pt in afib with RVR. HR in the 140's. CRRT only running for 20 minutes. Dr. Duncan aware. 12 lead ordered. Orders to give metoprolol. Will continue to monitor.

## 2018-11-28 NOTE — PROGRESS NOTES
[facility]  Critical Care - Surgery  Progress Note    Patient Name: Krystal Hobson  MRN: [id number]  Admission Date: 11/10/2018  Hospital Length of Stay: 18 days  Code Status: Full Code  Attending Provider: Abrahan Montelongo MD  Primary Care Provider: Courtney Joe MD   Principal Problem: Hepatic encephalopathy    Subjective:     Hospital/ICU Course:  No notes on file    Interval History/Significant Events:     On Diltiazem gtt on NSR  Remains Intubated  No sedation  No pressors  Receiving blood products        Objective:     Vital Signs (Most Recent):  Temp: 97.9 °F (36.6 °C) (11/28/18 0615)  Pulse: 82 (11/28/18 0615)  Resp: 13 (11/28/18 0615)  BP: 115/64 (11/28/18 0600)  SpO2: 97 % (11/28/18 0615) Vital Signs (24h Range):  Temp:  [97.7 °F (36.5 °C)-98.2 °F (36.8 °C)] 97.9 °F (36.6 °C)  Pulse:  [] 82  Resp:  [13-27] 13  SpO2:  [89 %-100 %] 97 %  BP: ()/(51-76) 115/64  Arterial Line BP: (105-145)/(38-63) 121/44     Weight: 57.7 kg (127 lb 3.3 oz)  Body mass index is 26.59 kg/m².      Intake/Output Summary (Last 24 hours) at 11/28/2018 0648  Last data filed at 11/28/2018 0600  Gross per 24 hour   Intake 3277 ml   Output 2811 ml   Net 466 ml       Physical Exam   Constitutional: She appears well-developed and well-nourished.   HENT:   Head: Normocephalic and atraumatic.   Cardiovascular: Normal rate and regular rhythm.   Pulmonary/Chest:   Intubated on PAV+   Abdominal: Soft.   Musculoskeletal: Normal range of motion.   Neurological: She is alert.   Nursing note and vitals reviewed.      Vents:  Vent Mode: A/C (11/28/18 0514)  Ventilator Initiated: Yes (11/21/18 1839)  Set Rate: 18 bmp (11/28/18 0514)  Vt Set: 300 mL (11/28/18 0514)  Pressure Support: 14 cmH20 (11/27/18 1019)  PEEP/CPAP: 5 cmH20 (11/28/18 0514)  Oxygen Concentration (%): 31 (11/28/18 0615)  Peak Airway Pressure: 29 cmH2O (11/28/18 0514)  Plateau Pressure: 22 cmH20 (11/28/18 0514)  Total Ve: 8.3 mL (11/28/18 0514)  F/VT Ratio<105  (RSBI): (!) 44.44 (11/27/18 1910)    Lines/Drains/Airways     Central Venous Catheter Line                 Trialysis (Dialysis) Catheter 11/21/18 1851 left internal jugular 6 days          Drain                 NG/OG Tube 11/21/18 1840 Yoni sump 16 Fr. Right mouth 6 days         Urethral Catheter 11/21/18 1500 6 days          Airway                 Airway - Non-Surgical 11/21/18 1836 Endotracheal Tube 6 days          Arterial Line                 Arterial Line 11/21/18 1900 Right Radial 6 days          Pressure Ulcer                 Pressure Injury 11/27/18 Rectum Deep tissue injury 1 day          Peripheral Intravenous Line                 Peripheral IV - Single Lumen 11/26/18 1500 Left Antecubital 1 day                Significant Labs:    CBC/Anemia Profile:  Recent Labs   Lab 11/27/18  0316 11/28/18  0400   WBC 6.65 6.04   HGB 7.8* 6.7*   HCT 23.6* 20.5*   PLT 46* 38*   MCV 93 92   RDW 26.5* 26.9*        Chemistries:  Recent Labs   Lab 11/27/18 0316 11/27/18  1400 11/27/18  1935 11/28/18  0400     137 139 140 136   K 4.3  4.3 4.5 4.1 4.2     108 110 109 105   CO2 22*  22* 22* 25 25   BUN 26*  26* 32* 25* 7*   CREATININE 1.6*  1.6* 1.8* 1.3 0.6   CALCIUM 8.3*  8.3* 8.3* 8.2* 7.7*   ALBUMIN 2.4*  2.4* 2.2* 2.3* 3.0*   PROT 5.1*  --   --  5.3*   BILITOT 8.3*  --   --  8.1*   ALKPHOS 149*  --   --  132   ALT 17  --   --  17   AST 44*  --   --  49*   MG 2.5 3.0* 2.6 2.0   PHOS 3.3 3.7 2.8  --        All pertinent labs within the past 24 hours have been reviewed.    Significant Imaging:  I have reviewed all pertinent imaging results/findings within the past 24 hours.    Assessment/Plan:     Acute respiratory failure with hypoxia    Acute respiratory failure with hypoxia     68yo F liver-listed, stepped up on 11/21 for respiratory failure with worsening respiratory acidosis      Plan:     Neuro:   -Pain control: prn fentanyl while intubated  -Sedation off  -Following commands      Pulmonary:    -intubated on arrival to SICU 11/21  -CXR with bilateral patchy opacities, concerning for viral pneumonia / ARDS type picture   -ABGs daily and prn  -Minimal Vent Settings  -SBT and Wean to Extubate     Cardiac:  -SBP > 100 goal  - JACINDA 11/20 - elevated PA pressures   -Heart failure following, appreciate recs   - Afib with RVR -- rate improved with Diltiazem gtt      Renal:   -Mendoza in place  -Continue to monitor UOP: oliguria   - CRRT started 11/24  -CRRT overnight; -2.5 L for optimization of extubation  -Nephrology following, appreciate recommendations      Fluids/Electrolytes/Nutrition/GI:   - TFs @ 40 - goal   -replace lytes PRN  -holding Lactulose enemas TID, per primary      Hematology/Oncology:  -Monitor CBC -- Transfuse 2U PRBC today  -PT/INR   -continue to monitor. Transfuse as needed      Infectious Disease:   -Afebrile  -WBC 6.7  -Abx: Diflucan/Zosyn to cover HCAP      Endocrine:  -Glucose goal of 140-180  -Endocrine following     Dispo:  -Continue care in the ICU setting  -Wean to Extubate Today if Tolerating  -Primary: Transplant                  Critical care was time spent personally by me on the following activities: development of treatment plan with patient or surrogate and bedside caregivers, discussions with consultants, evaluation of patient's response to treatment, examination of patient, ordering and performing treatments and interventions, ordering and review of laboratory studies, ordering and review of radiographic studies, pulse oximetry, re-evaluation of patient's condition.  This critical care time did not overlap with that of any other provider or involve time for any procedures.     Minesh Blair MD  Critical Care - Surgery  [facility]

## 2018-11-28 NOTE — SUBJECTIVE & OBJECTIVE
Interval History: on nightly SLED since Saturday. Tolerated well first night, however yesterday night developed RVR with CVP of 1 and was aborted early after only a couple hours. CVP improved to 7 on discontinuation. Remains anuric. Plan for trial of spontaneous breathing today.    Review of patient's allergies indicates:  No Known Allergies  Current Facility-Administered Medications   Medication Frequency    0.9%  NaCl infusion (CRRT USE ONLY) Continuous    0.9%  NaCl infusion (for blood administration) Q24H PRN    acetaminophen tablet 650 mg Q8H PRN    dextrose 50% injection 12.5 g PRN    diltiaZEM 125 mg in D5W 125 mL infusion Continuous    famotidine tablet 20 mg QHS    fluconazole tablet 200 mg Daily    glucagon (human recombinant) injection 1 mg PRN    guaifenesin 100 mg/5 ml syrup 200 mg Q4H PRN    guaifenesin 100 mg/5 ml syrup 200 mg Q6H    heparin (porcine) injection 5,000 Units Q8H    insulin aspart U-100 pen 0-5 Units Q4H PRN    [START ON 11/29/2018] insulin aspart U-100 pen 2 Units Q24H    [START ON 11/29/2018] insulin aspart U-100 pen 2 Units Q24H    [START ON 11/29/2018] insulin aspart U-100 pen 2 Units Q24H    [START ON 11/29/2018] insulin aspart U-100 pen 2 Units Q24H    insulin aspart U-100 pen 2 Units Q24H    insulin aspart U-100 pen 2 Units Q24H    insulin detemir U-100 pen 14 Units Daily    k phos di & mono-sod phos mono 250 mg tablet 2 tablet TID PRN    lactulose 10 gram/15 mL solution (enema) 200 g TID PRN    lactulose 20 gram/30 mL solution Soln 30 g Q6H PRN    levalbuterol nebulizer solution 1.25 mg Q6H WAKE    magnesium sulfate 2g in water 50mL IVPB (premix) PRN    norepinephrine 4 mg in dextrose 5% 250 mL infusion (premix) (titrating) Continuous    ondansetron disintegrating tablet 8 mg Q8H PRN    propofol (DIPRIVAN) 10 mg/mL infusion Continuous    rifAXIMin tablet 550 mg BID    simethicone chewable tablet 80 mg TID PRN    sodium chloride 0.9% flush 3 mL PRN        Objective:     Vital Signs (Most Recent):  Temp: 98.2 °F (36.8 °C) (11/28/18 1500)  Pulse: 66 (11/28/18 1517)  Resp: (!) 21 (11/28/18 1300)  BP: 103/72 (11/28/18 1500)  SpO2: 98 % (11/28/18 1517)  O2 Device (Oxygen Therapy): ventilator (11/28/18 1517) Vital Signs (24h Range):  Temp:  [97.6 °F (36.4 °C)-98.2 °F (36.8 °C)] 98.2 °F (36.8 °C)  Pulse:  [] 66  Resp:  [13-30] 21  SpO2:  [89 %-100 %] 98 %  BP: ()/(51-72) 103/72  Arterial Line BP: (104-145)/(34-63) 120/45     Weight: 57.7 kg (127 lb 3.3 oz) (11/28/18 0400)  Body mass index is 26.59 kg/m².  Body surface area is 1.54 meters squared.    I/O last 3 completed shifts:  In: 4081 [I.V.:2852; Blood:229; NG/GT:1000]  Out: 3401 [Urine:200; Other:2651; Stool:550]    Physical Exam   HENT:   Head: Normocephalic and atraumatic.   Eyes: Scleral icterus is present.   Neck: No JVD present.   Cardiovascular: Normal rate and regular rhythm.   Pulmonary/Chest: Effort normal and breath sounds normal. She has no wheezes.   On vent, mechanical breath sounds     Abdominal: Soft. She exhibits no distension.   Musculoskeletal: She exhibits edema (trace pitting to knee). She exhibits no tenderness.   Neurological:   Eyes open intermittently spontaneously, non-verbal on vent, not responding to commands   Skin: Skin is warm and dry.   jaundiced   Vitals reviewed.      Interval History: SLED last night, again developed AF+RVR about 20 minutes into session, controlled adequately with BB and cardizem gtt to complete full session, now converted back to NSR. CXR this morning shows no improvement of pulmonary infiltrates, still unable to pass SBT for extubation.     Review of patient's allergies indicates:  No Known Allergies  Current Facility-Administered Medications   Medication Frequency    0.9%  NaCl infusion (CRRT USE ONLY) Continuous    0.9%  NaCl infusion (for blood administration) Q24H PRN    acetaminophen tablet 650 mg Q8H PRN    dextrose 50% injection 12.5 g PRN     diltiaZEM 125 mg in D5W 125 mL infusion Continuous    famotidine tablet 20 mg QHS    fluconazole tablet 200 mg Daily    glucagon (human recombinant) injection 1 mg PRN    guaifenesin 100 mg/5 ml syrup 200 mg Q4H PRN    guaifenesin 100 mg/5 ml syrup 200 mg Q6H    heparin (porcine) injection 5,000 Units Q8H    insulin aspart U-100 pen 0-5 Units Q4H PRN    insulin detemir U-100 pen 14 Units Daily    k phos di & mono-sod phos mono 250 mg tablet 2 tablet TID PRN    lactulose 10 gram/15 mL solution (enema) 200 g TID PRN    lactulose 20 gram/30 mL solution Soln 30 g Q6H PRN    levalbuterol nebulizer solution 1.25 mg Q6H WAKE    magnesium sulfate 2g in water 50mL IVPB (premix) PRN    norepinephrine 4 mg in dextrose 5% 250 mL infusion (premix) (titrating) Continuous    ondansetron disintegrating tablet 8 mg Q8H PRN    propofol (DIPRIVAN) 10 mg/mL infusion Continuous    rifAXIMin tablet 550 mg BID    simethicone chewable tablet 80 mg TID PRN    sodium chloride 0.9% flush 3 mL PRN       Objective:     Vital Signs (Most Recent):  Temp: 97.7 °F (36.5 °C) (11/28/18 0900)  Pulse: 66 (11/28/18 0900)  Resp: 19 (11/28/18 0900)  BP: (!) 109/53 (11/28/18 0900)  SpO2: 97 % (11/28/18 0900)  O2 Device (Oxygen Therapy): ventilator (11/28/18 0900) Vital Signs (24h Range):  Temp:  [97.6 °F (36.4 °C)-98.2 °F (36.8 °C)] 97.7 °F (36.5 °C)  Pulse:  [] 66  Resp:  [13-30] 19  SpO2:  [89 %-100 %] 97 %  BP: ()/(51-64) 109/53  Arterial Line BP: (104-145)/(34-63) 114/42     Weight: 57.7 kg (127 lb 3.3 oz) (11/28/18 0400)  Body mass index is 26.59 kg/m².  Body surface area is 1.54 meters squared.    I/O last 3 completed shifts:  In: 4081 [I.V.:2852; Blood:229; NG/GT:1000]  Out: 3401 [Urine:200; Other:2651; Stool:550]    Physical Exam   HENT:   Head: Normocephalic and atraumatic.   Eyes: Scleral icterus is present.   Neck: No JVD present.   Cardiovascular: Normal rate and regular rhythm.   Pulmonary/Chest: Effort  normal and breath sounds normal. She has no wheezes.   On vent, mechanical breath sounds     Abdominal: Soft. She exhibits no distension.   Musculoskeletal: She exhibits edema (trace pitting to knees). She exhibits no tenderness.   Neurological:   Unresponsive to verbal or tacticle stimuli   Skin: Skin is warm and dry.   jaundiced   Vitals reviewed.

## 2018-11-28 NOTE — SUBJECTIVE & OBJECTIVE
Interval History: No overnight events, she is vitally stable, off sedation and levophed. She failed the am SBT.     Objective:     Vital Signs (Most Recent):  Temp: 98.2 °F (36.8 °C) (11/28/18 1500)  Pulse: 62 (11/28/18 1700)  Resp: (!) 21 (11/28/18 1300)  BP: (!) 104/51 (11/28/18 1700)  SpO2: (!) 93 % (11/28/18 1700) Vital Signs (24h Range):  Temp:  [97.6 °F (36.4 °C)-98.2 °F (36.8 °C)] 98.2 °F (36.8 °C)  Pulse:  [] 62  Resp:  [13-30] 21  SpO2:  [89 %-100 %] 93 %  BP: ()/(51-72) 104/51  Arterial Line BP: (103-145)/(34-63) 103/36     Weight: 57.7 kg (127 lb 3.3 oz)  Body mass index is 26.59 kg/m².      Intake/Output Summary (Last 24 hours) at 11/28/2018 1705  Last data filed at 11/28/2018 1700  Gross per 24 hour   Intake 4387 ml   Output 2828 ml   Net 1559 ml       Physical Exam   Constitutional: No distress.   She is intubated, off sedation and levophed    HENT:   Head: Normocephalic.   Eyes: Right eye exhibits no discharge. Left eye exhibits no discharge.   Cardiovascular: Normal rate and regular rhythm.   Murmur heard.  Pulmonary/Chest: Breath sounds normal. She has no wheezes. She exhibits no tenderness.   Abdominal: Soft. Bowel sounds are normal. She exhibits distension. There is no tenderness.   Musculoskeletal: She exhibits no edema, tenderness or deformity.   Neurological: She is alert.   Skin: She is not diaphoretic.       Vents:  Vent Mode: A/C (11/28/18 1517)  Ventilator Initiated: Yes (11/21/18 1839)  Set Rate: 16 bmp (11/28/18 1517)  Vt Set: 300 mL (11/28/18 1517)  Pressure Support: 0 cmH20 (11/28/18 0854)  PEEP/CPAP: 8 cmH20 (11/28/18 1517)  Oxygen Concentration (%): 30 (11/28/18 1700)  Peak Airway Pressure: 28 cmH2O (11/28/18 1517)  Plateau Pressure: 24 cmH20 (11/28/18 1517)  Total Ve: 6.99 mL (11/28/18 1517)  F/VT Ratio<105 (RSBI): (!) 55.41 (11/28/18 1300)    Lines/Drains/Airways     Central Venous Catheter Line                 Trialysis (Dialysis) Catheter 11/21/18 2951 left internal  jugular 6 days          Drain                 NG/OG Tube 11/21/18 1840 Yoni sump 16 Fr. Right mouth 6 days          Airway                 Airway - Non-Surgical 11/21/18 1836 Endotracheal Tube 6 days          Arterial Line                 Arterial Line 11/21/18 1900 Right Radial 6 days          Pressure Ulcer                 Pressure Injury 11/27/18 Rectum Deep tissue injury 1 day          Peripheral Intravenous Line                 Peripheral IV - Single Lumen 11/26/18 1500 Left Antecubital 2 days                Significant Labs:    CBC/Anemia Profile:  Recent Labs   Lab 11/27/18  0316 11/28/18  0400 11/28/18  1559   WBC 6.65 6.04 6.17   HGB 7.8* 6.7* 9.5*   HCT 23.6* 20.5* 28.9*   PLT 46* 38*  --    MCV 93 92 92   RDW 26.5* 26.9* 23.0*        Chemistries:  Recent Labs   Lab 11/27/18  0316 11/27/18  1400 11/27/18  1935 11/28/18  0400 11/28/18  1431     137 139 140 136 135*   K 4.3  4.3 4.5 4.1 4.2 4.7     108 110 109 105 107   CO2 22*  22* 22* 25 25 24   BUN 26*  26* 32* 25* 7* 13   CREATININE 1.6*  1.6* 1.8* 1.3 0.6 0.8   CALCIUM 8.3*  8.3* 8.3* 8.2* 7.7* 8.1*   ALBUMIN 2.4*  2.4* 2.2* 2.3* 3.0* 2.8*   PROT 5.1*  --   --  5.3*  --    BILITOT 8.3*  --   --  8.1*  --    ALKPHOS 149*  --   --  132  --    ALT 17  --   --  17  --    AST 44*  --   --  49*  --    MG 2.5 3.0* 2.6 2.0 1.9   PHOS 3.3 3.7 2.8  --  2.2*       BMP:   Recent Labs   Lab 11/28/18  1431   *   *   K 4.7      CO2 24   BUN 13   CREATININE 0.8   CALCIUM 8.1*   MG 1.9     CMP:   Recent Labs   Lab 11/27/18 0316  11/27/18  1935 11/28/18  0400 11/28/18  1431     137   < > 140 136 135*   K 4.3  4.3   < > 4.1 4.2 4.7     108   < > 109 105 107   CO2 22*  22*   < > 25 25 24   *  254*   < > 102 127* 269*   BUN 26*  26*   < > 25* 7* 13   CREATININE 1.6*  1.6*   < > 1.3 0.6 0.8   CALCIUM 8.3*  8.3*   < > 8.2* 7.7* 8.1*   PROT 5.1*  --   --  5.3*  --    ALBUMIN 2.4*  2.4*   < > 2.3* 3.0* 2.8*    BILITOT 8.3*  --   --  8.1*  --    ALKPHOS 149*  --   --  132  --    AST 44*  --   --  49*  --    ALT 17  --   --  17  --    ANIONGAP 7*  7*   < > 6* 6* 4*   EGFRNONAA 33.1*  33.1*   < > 42.6* >60.0 >60.0    < > = values in this interval not displayed.     Cardiac Markers: No results for input(s): CKMB, TROPONINT, MYOGLOBIN in the last 48 hours.

## 2018-11-28 NOTE — NURSING
At 0645, patient converted to SR rate 68bpm, BP stable. Dr. Angela notified. States to continue Cardizem at 5 mg/hr at this time.

## 2018-11-28 NOTE — NURSING
Dr. Sandoval at bedside. Orders to restart continuous Cardizem infusion as ordered following a 15 mg IV bolus for persistent Afib not yet rate-controlled.

## 2018-11-28 NOTE — PHYSICIAN QUERY
PT Name: Krystal Hobson  MR #: [id number]    Physician Query Form -Integumentary  Clarification     CDS/: Calli Gomes               Contact information:[email or web address]    This form is a permanent document in the medical record.     Query Date: November 27, 2018    By submitting this query, we are merely seeking further clarification of documentation. Please utilize your independent clinical judgment when addressing the question(s) below.    The Medical record contains the following:   Indicator  Supporting Clinical Findings Location in Medical Record    Redness      Decubitus, Pressure Ulcer, etc.     x Deep Tissue Injury Pressure Injury Present on Admission: no  Location: Rectum  Is this injury device related?: (c) Yes  Staging: Deep tissue injury  Wound ostomy pn 11-27   x Wound Care Consult Upon assessment anus has redness and purple discoloration noted. Possible deep tissue injury related to flexiseal device with recommendation to remove flexiseal to prevent pressure from anus.   Wound ostomy pn 11-27    Medication     x Treatment Recommendation:   -Triad ointment to be applied twice a day to rectal area. Triad ointment provides a hydrophilic environment to promote healing of exposed tissue and prevents breakdown of intact skin   -Continue pressure injury prevention interventions   -Removal of flexiseal to reduce pressure applied to anus.    Wound ostomy pn 11-27    Other       National Pressure Ulcer Advisory Panel (2007) Pressure Ulcer Definitions & Stages:    Stage I:  Intact skin with non-blanchable redness of a localized area usually over a bony prominence.   Stage II:  Partial thickness loss of dermis presenting as a shallow open ulcer with a red pink wound bed, without slough.   Stage III: Full thickness tissue loss. Subcutaneous fat may be visible but bone, tendon or muscle is not exposed. Slough may be present but does not obscure the depth of tissue loss. May include  undermining and tunneling.   Stage IV: Full thickness tissue loss with exposed bone, tendon or muscle. Slough or eschar may be present on some parts of the wound bed. Often include undermining and tunneling.   Unstageable:   Full thickness tissue loss in which the base of the ulcer is covered by slough and/or eschar in the wound bed. Until enough slough and/or eschar is removed to expose the base of the wound, the true depth, and therefore stage, cannot be determined.   Deep Tissue Injury: Purple or maroon localized area of discolored intact skin or blood-filled blister due to damage of underlying soft tissue from  pressure and/or shear.     Provider, please specify the diagnosis or diagnoses associated with above clinical findings.      [ x ] Decubitus (Pressure) Ulcer / Deep Tissue Injury (please specify site, laterality & stage)    Site Laterality Stage   [  ]Other Site (please specify):          [  ] Other Integumentary Diagnosis (please specify): ___________________________________  [  ] Clinically Undetermined    Please document in your progress notes daily for the duration of treatment until resolved and include in your discharge summary.

## 2018-11-28 NOTE — SUBJECTIVE & OBJECTIVE
Interval History/Significant Events:     On Diltiazem gtt on NSR  Remains Intubated  No sedation  No pressors  Receiving blood products        Objective:     Vital Signs (Most Recent):  Temp: 97.9 °F (36.6 °C) (11/28/18 0615)  Pulse: 82 (11/28/18 0615)  Resp: 13 (11/28/18 0615)  BP: 115/64 (11/28/18 0600)  SpO2: 97 % (11/28/18 0615) Vital Signs (24h Range):  Temp:  [97.7 °F (36.5 °C)-98.2 °F (36.8 °C)] 97.9 °F (36.6 °C)  Pulse:  [] 82  Resp:  [13-27] 13  SpO2:  [89 %-100 %] 97 %  BP: ()/(51-76) 115/64  Arterial Line BP: (105-145)/(38-63) 121/44     Weight: 57.7 kg (127 lb 3.3 oz)  Body mass index is 26.59 kg/m².      Intake/Output Summary (Last 24 hours) at 11/28/2018 0648  Last data filed at 11/28/2018 0600  Gross per 24 hour   Intake 3277 ml   Output 2811 ml   Net 466 ml       Physical Exam   Constitutional: She appears well-developed and well-nourished.   HENT:   Head: Normocephalic and atraumatic.   Cardiovascular: Normal rate and regular rhythm.   Pulmonary/Chest:   Intubated on PAV+   Abdominal: Soft.   Musculoskeletal: Normal range of motion.   Neurological: She is alert.   Nursing note and vitals reviewed.      Vents:  Vent Mode: A/C (11/28/18 0514)  Ventilator Initiated: Yes (11/21/18 1839)  Set Rate: 18 bmp (11/28/18 0514)  Vt Set: 300 mL (11/28/18 0514)  Pressure Support: 14 cmH20 (11/27/18 1019)  PEEP/CPAP: 5 cmH20 (11/28/18 0514)  Oxygen Concentration (%): 31 (11/28/18 0615)  Peak Airway Pressure: 29 cmH2O (11/28/18 0514)  Plateau Pressure: 22 cmH20 (11/28/18 0514)  Total Ve: 8.3 mL (11/28/18 0514)  F/VT Ratio<105 (RSBI): (!) 44.44 (11/27/18 1910)    Lines/Drains/Airways     Central Venous Catheter Line                 Trialysis (Dialysis) Catheter 11/21/18 1851 left internal jugular 6 days          Drain                 NG/OG Tube 11/21/18 1840 Brewster sump 16 Fr. Right mouth 6 days         Urethral Catheter 11/21/18 1500 6 days          Airway                 Airway - Non-Surgical 11/21/18  1836 Endotracheal Tube 6 days          Arterial Line                 Arterial Line 11/21/18 1900 Right Radial 6 days          Pressure Ulcer                 Pressure Injury 11/27/18 Rectum Deep tissue injury 1 day          Peripheral Intravenous Line                 Peripheral IV - Single Lumen 11/26/18 1500 Left Antecubital 1 day                Significant Labs:    CBC/Anemia Profile:  Recent Labs   Lab 11/27/18 0316 11/28/18  0400   WBC 6.65 6.04   HGB 7.8* 6.7*   HCT 23.6* 20.5*   PLT 46* 38*   MCV 93 92   RDW 26.5* 26.9*        Chemistries:  Recent Labs   Lab 11/27/18 0316 11/27/18  1400 11/27/18  1935 11/28/18  0400     137 139 140 136   K 4.3  4.3 4.5 4.1 4.2     108 110 109 105   CO2 22*  22* 22* 25 25   BUN 26*  26* 32* 25* 7*   CREATININE 1.6*  1.6* 1.8* 1.3 0.6   CALCIUM 8.3*  8.3* 8.3* 8.2* 7.7*   ALBUMIN 2.4*  2.4* 2.2* 2.3* 3.0*   PROT 5.1*  --   --  5.3*   BILITOT 8.3*  --   --  8.1*   ALKPHOS 149*  --   --  132   ALT 17  --   --  17   AST 44*  --   --  49*   MG 2.5 3.0* 2.6 2.0   PHOS 3.3 3.7 2.8  --        All pertinent labs within the past 24 hours have been reviewed.    Significant Imaging:  I have reviewed all pertinent imaging results/findings within the past 24 hours.

## 2018-11-28 NOTE — ASSESSMENT & PLAN NOTE
Caution with insulin stacking  Estimated Creatinine Clearance: 82.9 mL/min (based on SCr of 0.6 mg/dL).

## 2018-11-28 NOTE — ASSESSMENT & PLAN NOTE
Pulmonary edema. Comparing old CXRs, latest CXR = bilateral airspace consolidation, BL fluffiness on both lungs.   - Intubated on arrival to SICU 11/21 due to inability to maintain an airway. She is off sedation, awake and following commands.   - Started CRRT 11/24/18. Lasix was D/C. Yesterday CRRT was done but pt is still net + with fluids.   - On broad-spectrum antibiotics D8  -Recommend titration of FiO2 to maintain SpO2 >90%.  -Daily SBTs as medically appropriate. Failed SBT on 11/28/2018 in the am due to tachypnea. Repeat tomorrow   - U/S chest was done showing B-lines and incompressible IVC    Plan:  - Continue same management  - We would appreciate more aggressive fluid removal if pt can tolerate.

## 2018-11-28 NOTE — PROGRESS NOTES
"[facility]  Endocrinology  Progress Note    Admit Date: 11/10/2018     Reason for Consult: Management of type 2 DM, Hyperglycemia     Surgical Procedure and Date: n/a    Diabetes diagnosis year: 2001    Home Diabetes Medications: Levemir 12 units BID (vial) and Humalog SSI with meals   Lab Results   Component Value Date    HGBA1C 5.8 (H) 11/10/2018         How often checking glucose at home? 1-3  BG readings on regimen: 150-300  Hypoglycemia on the regimen? once 27; required visit to ED; gave Levemir and humalog and patient did not eat  Missed doses on regimen?  No    Diabetes Complications include:     Hyperglycemia, Hypoglycemia  and Diabetic peripheral neuropathy     Complicating diabetes co morbidities:   CIRRHOSIS      HPI:   Patient is a 67 y.o. female with a diagnosis of type 2 DM; well controlled on MDI. Also with   Cryptogenic cirrhosis, rheumatoid arthritis, and GERD. Patient was listed for liver transplant on 9/14/18. Patient presented to ED of hospital in [place] with AMS and ENZO. Endocrinology consulted for BG/ DM management.   Of note, profound hypoglycemic event (BG < 20) the night of 11/14/18.            Interval HPI:   Overnight events: Remains in ICU, intubated. BG below goal overnight on insulin regimen.  TF increased back to goal after being held for residuals.  Low H&H and platelets this am, to receive 2 units PRBCs.  On IV antibiotics.  Eating:   NPO  Nausea: No  Hypoglycemia and intervention: No  Fever: No  TPN and/or TF: Yes  TF and rate: Isosource at 40 cc/hr (goal)    BP (!) 107/57 (BP Location: Left arm, Patient Position: Lying)   Pulse 66   Temp 97.8 °F (36.6 °C) (Oral)   Resp (!) 27   Ht 4' 10" (1.473 m)   Wt 57.7 kg (127 lb 3.3 oz)   SpO2 99%   Breastfeeding? No   BMI 26.59 kg/m²      Labs Reviewed and Include    Recent Labs   Lab 11/28/18  0400   *   CALCIUM 7.7*   ALBUMIN 3.0*   PROT 5.3*      K 4.2   CO2 25      BUN 7*   CREATININE 0.6 "   ALKPHOS 132   ALT 17   AST 49*   BILITOT 8.1*     Lab Results   Component Value Date    WBC 6.04 11/28/2018    HGB 6.7 (L) 11/28/2018    HCT 20.5 (L) 11/28/2018    MCV 92 11/28/2018    PLT 38 (LL) 11/28/2018     No results for input(s): TSH, FREET4 in the last 168 hours.  Lab Results   Component Value Date    HGBA1C 5.8 (H) 11/10/2018       Nutritional status:   Body mass index is 26.59 kg/m².  Lab Results   Component Value Date    ALBUMIN 3.0 (L) 11/28/2018    ALBUMIN 2.3 (L) 11/27/2018    ALBUMIN 2.2 (L) 11/27/2018     No results found for: PREALBUMIN    Estimated Creatinine Clearance: 82.9 mL/min (based on SCr of 0.6 mg/dL).    Accu-Checks  Recent Labs     11/26/18  1558 11/26/18  1945 11/26/18  2341 11/27/18  0318 11/27/18  0811 11/27/18  1229 11/27/18  1613 11/27/18  2015 11/28/18  0004 11/28/18  0354   POCTGLUCOSE 230* 211* 212* 240* 247* 224* 156* 103 112* 136*       Current Medications and/or Treatments Impacting Glycemic Control  Immunotherapy:    Immunosuppressants     None        Steroids:   Hormones (From admission, onward)    None        Pressors:    Autonomic Drugs (From admission, onward)    Start     Stop Route Frequency Ordered    11/21/18 1900  norepinephrine 4 mg in dextrose 5% 250 mL infusion (premix) (titrating)     Question Answer Comment   Titrate by: (in mcg/kg/min) 0.02    Titrate interval: (in minutes) 5    Titrate to maintain: (MAP or SBP) MAP    Greater than: (in mmHg) 65    Maximum dose: (in mcg/kg/min) 3        -- IV Continuous 11/21/18 1845        Hyperglycemia/Diabetes Medications:   Antihyperglycemics (From admission, onward)    Start     Stop Route Frequency Ordered    11/26/18 0947  insulin aspart U-100 pen 0-5 Units      -- SubQ Every 4 hours PRN 11/26/18 0848    11/23/18 0900  insulin detemir U-100 pen 14 Units      -- SubQ Daily 11/23/18 0735          ASSESSMENT and PLAN    * Hepatic encephalopathy    Managed per primary.        Type 2 diabetes mellitus with hyperglycemia,  with long-term current use of insulin    BG goal 140-180    Levemir 14 units daily.  Restart Novolog at 2 units every 4 hours with TF; hold if TF held   Low dose correction scale  BG monitoring every 4 hours    Discharge planning: TBD       Acute kidney injury superimposed on CKD    Avoid insulin stacking and hypoglycemia.  Lab Results   Component Value Date    CREATININE 0.6 11/28/2018            Decompensated hepatic cirrhosis    Managed per primary.   Listed for liver transplant.  May impact BG readings       On enteral nutrition    On supplemental TF, elevating BG readings       CKD (chronic kidney disease) stage 3, GFR 30-59 ml/min    Caution with insulin stacking  Estimated Creatinine Clearance: 82.9 mL/min (based on SCr of 0.6 mg/dL).             Tristan Hwang, SHANNON  Endocrinology  [facility]

## 2018-11-28 NOTE — ASSESSMENT & PLAN NOTE
Avoid insulin stacking and hypoglycemia.  Lab Results   Component Value Date    CREATININE 0.6 11/28/2018

## 2018-11-28 NOTE — PROGRESS NOTES
[facility]  Nephrology  Progress Note    Patient Name: Krystal Hobson  MRN: [id number]  Admission Date: 11/10/2018  Hospital Length of Stay: 18 days  Attending Provider: Abrahan Montelongo MD   Primary Care Physician: Courtney Joe MD  Principal Problem:Hepatic encephalopathy    Subjective:     HPI: Reason for consult: Diuresis in setting of PAP46, pulmonary edema, HRS    Ms. 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  - She presented to [facility] [place]fer from [place] under liver transplant team due to encephalopathy and new onset ENZO.   - Per the , she was extremely disoriented @ OSH and her ammonia reached as high as 200, and her Cr level increased to 2.0 She also went into A fib with RVR and was started on a diltiazem gtt at OSH.   - On arrival at [facility] she was disoriented and started on lactulose with noted improvement. During her current admission on CXR they found a concern for HCAP and she was started on broad spectrum ABx and then shifted her on Moxifloxacin 7 D course. On 11/20/2018 found on CXR a concern for Lt lung middle lobe consolidation and started on vancomycin and zosyn.  - Renal fxn baseline 1.6 at admit but trending up    Interval History: on nightly SLED since Saturday. Tolerated well first night, however yesterday night developed RVR with CVP of 1 and was aborted early after only a couple hours. CVP improved to 7 on discontinuation. Remains anuric. Plan for trial of spontaneous breathing today.    Review of patient's allergies indicates:  No Known Allergies  Current Facility-Administered Medications   Medication Frequency    0.9%  NaCl infusion (CRRT USE ONLY) Continuous    0.9%  NaCl infusion (for blood administration) Q24H PRN    acetaminophen tablet 650 mg Q8H PRN    dextrose 50% injection 12.5 g PRN    diltiaZEM 125 mg in D5W 125 mL infusion Continuous    famotidine tablet 20 mg QHS    fluconazole tablet 200 mg Daily     glucagon (human recombinant) injection 1 mg PRN    guaifenesin 100 mg/5 ml syrup 200 mg Q4H PRN    guaifenesin 100 mg/5 ml syrup 200 mg Q6H    heparin (porcine) injection 5,000 Units Q8H    insulin aspart U-100 pen 0-5 Units Q4H PRN    [START ON 11/29/2018] insulin aspart U-100 pen 2 Units Q24H    [START ON 11/29/2018] insulin aspart U-100 pen 2 Units Q24H    [START ON 11/29/2018] insulin aspart U-100 pen 2 Units Q24H    [START ON 11/29/2018] insulin aspart U-100 pen 2 Units Q24H    insulin aspart U-100 pen 2 Units Q24H    insulin aspart U-100 pen 2 Units Q24H    insulin detemir U-100 pen 14 Units Daily    k phos di & mono-sod phos mono 250 mg tablet 2 tablet TID PRN    lactulose 10 gram/15 mL solution (enema) 200 g TID PRN    lactulose 20 gram/30 mL solution Soln 30 g Q6H PRN    levalbuterol nebulizer solution 1.25 mg Q6H WAKE    magnesium sulfate 2g in water 50mL IVPB (premix) PRN    norepinephrine 4 mg in dextrose 5% 250 mL infusion (premix) (titrating) Continuous    ondansetron disintegrating tablet 8 mg Q8H PRN    propofol (DIPRIVAN) 10 mg/mL infusion Continuous    rifAXIMin tablet 550 mg BID    simethicone chewable tablet 80 mg TID PRN    sodium chloride 0.9% flush 3 mL PRN       Objective:     Vital Signs (Most Recent):  Temp: 98.2 °F (36.8 °C) (11/28/18 1500)  Pulse: 66 (11/28/18 1517)  Resp: (!) 21 (11/28/18 1300)  BP: 103/72 (11/28/18 1500)  SpO2: 98 % (11/28/18 1517)  O2 Device (Oxygen Therapy): ventilator (11/28/18 1517) Vital Signs (24h Range):  Temp:  [97.6 °F (36.4 °C)-98.2 °F (36.8 °C)] 98.2 °F (36.8 °C)  Pulse:  [] 66  Resp:  [13-30] 21  SpO2:  [89 %-100 %] 98 %  BP: ()/(51-72) 103/72  Arterial Line BP: (104-145)/(34-63) 120/45     Weight: 57.7 kg (127 lb 3.3 oz) (11/28/18 0400)  Body mass index is 26.59 kg/m².  Body surface area is 1.54 meters squared.    I/O last 3 completed shifts:  In: 4081 [I.V.:2852; Blood:229; NG/GT:1000]  Out: 3401 [Urine:200; Other:2651;  Stool:550]    Physical Exam   HENT:   Head: Normocephalic and atraumatic.   Eyes: Scleral icterus is present.   Neck: No JVD present.   Cardiovascular: Normal rate and regular rhythm.   Pulmonary/Chest: Effort normal and breath sounds normal. She has no wheezes.   On vent, mechanical breath sounds     Abdominal: Soft. She exhibits no distension.   Musculoskeletal: She exhibits edema (trace pitting to knee). She exhibits no tenderness.   Neurological:   Eyes open intermittently spontaneously, non-verbal on vent, not responding to commands   Skin: Skin is warm and dry.   jaundiced   Vitals reviewed.      Interval History: SLED last night, again developed AF+RVR about 20 minutes into session, controlled adequately with BB and cardizem gtt to complete full session, now converted back to NSR. CXR this morning shows no improvement of pulmonary infiltrates, still unable to pass SBT for extubation.     Review of patient's allergies indicates:  No Known Allergies  Current Facility-Administered Medications   Medication Frequency    0.9%  NaCl infusion (CRRT USE ONLY) Continuous    0.9%  NaCl infusion (for blood administration) Q24H PRN    acetaminophen tablet 650 mg Q8H PRN    dextrose 50% injection 12.5 g PRN    diltiaZEM 125 mg in D5W 125 mL infusion Continuous    famotidine tablet 20 mg QHS    fluconazole tablet 200 mg Daily    glucagon (human recombinant) injection 1 mg PRN    guaifenesin 100 mg/5 ml syrup 200 mg Q4H PRN    guaifenesin 100 mg/5 ml syrup 200 mg Q6H    heparin (porcine) injection 5,000 Units Q8H    insulin aspart U-100 pen 0-5 Units Q4H PRN    insulin detemir U-100 pen 14 Units Daily    k phos di & mono-sod phos mono 250 mg tablet 2 tablet TID PRN    lactulose 10 gram/15 mL solution (enema) 200 g TID PRN    lactulose 20 gram/30 mL solution Soln 30 g Q6H PRN    levalbuterol nebulizer solution 1.25 mg Q6H WAKE    magnesium sulfate 2g in water 50mL IVPB (premix) PRN    norepinephrine 4 mg in  dextrose 5% 250 mL infusion (premix) (titrating) Continuous    ondansetron disintegrating tablet 8 mg Q8H PRN    propofol (DIPRIVAN) 10 mg/mL infusion Continuous    rifAXIMin tablet 550 mg BID    simethicone chewable tablet 80 mg TID PRN    sodium chloride 0.9% flush 3 mL PRN       Objective:     Vital Signs (Most Recent):  Temp: 97.7 °F (36.5 °C) (11/28/18 0900)  Pulse: 66 (11/28/18 0900)  Resp: 19 (11/28/18 0900)  BP: (!) 109/53 (11/28/18 0900)  SpO2: 97 % (11/28/18 0900)  O2 Device (Oxygen Therapy): ventilator (11/28/18 0900) Vital Signs (24h Range):  Temp:  [97.6 °F (36.4 °C)-98.2 °F (36.8 °C)] 97.7 °F (36.5 °C)  Pulse:  [] 66  Resp:  [13-30] 19  SpO2:  [89 %-100 %] 97 %  BP: ()/(51-64) 109/53  Arterial Line BP: (104-145)/(34-63) 114/42     Weight: 57.7 kg (127 lb 3.3 oz) (11/28/18 0400)  Body mass index is 26.59 kg/m².  Body surface area is 1.54 meters squared.    I/O last 3 completed shifts:  In: 4081 [I.V.:2852; Blood:229; NG/GT:1000]  Out: 3401 [Urine:200; Other:2651; Stool:550]    Physical Exam   HENT:   Head: Normocephalic and atraumatic.   Eyes: Scleral icterus is present.   Neck: No JVD present.   Cardiovascular: Normal rate and regular rhythm.   Pulmonary/Chest: Effort normal and breath sounds normal. She has no wheezes.   On vent, mechanical breath sounds     Abdominal: Soft. She exhibits no distension.   Musculoskeletal: She exhibits edema (trace pitting to knees). She exhibits no tenderness.   Neurological:   Unresponsive to verbal or tacticle stimuli   Skin: Skin is warm and dry.   jaundiced   Vitals reviewed.          Assessment/Plan:     Acute kidney injury superimposed on CKD    Cryptogenic cirrhosis awaiting liver xplant, presented for AMS and found to have ENZO on CKDIIIb. Cr worsening since admission. Acute component likely from toxic/ischemic ATN (FENa 0.4% suggesting pre-renal component), making urine but oliguric, acid/base and lytes ok, volume status hypervolemic with mild  anasarca and stable diffuse pulmonary consolidation.    Did well on SLED, net negative by 1.4L, acid/base and lytes look good, making urine, but remaining significantly oliguric    Should be able to keep up with volume through 12hr nightly SLED treatments    A/P 11/28  - Diffuse CXR infiltrates persist, no significant change from previous. SBT failed. Weaned off pressors, tolerating well.   - SLED started over the weekend, has developed paroxysmal AF+RVR during sessions, last night was able to tolerate with dilt gtt + BB, converted back to NSR this morning.  - +700mL/24hrs CVP 9 as of 10am today, planned SLED this evening and nightly for continued volume removal PRN  -strict Is & Os         Thank you for your consult. I will follow-up with patient. Please contact us if you have any additional questions.    Kelvin Liriano MD  Nephrology  [facility]

## 2018-11-28 NOTE — ASSESSMENT & PLAN NOTE
Cryptogenic cirrhosis awaiting liver xplant, presented for AMS and found to have ENZO on CKDIIIb. Cr worsening since admission. Acute component likely from toxic/ischemic ATN (FENa 0.4% suggesting pre-renal component), making urine but oliguric, acid/base and lytes ok, volume status stable    Did well on SLED, net negative by 1.4L, acid/base and lytes look good, making urine, but remaining significantly oliguric    Should be able to keep up with volume through 12hr nightly SLED treatments    A/P 11/28  - Diffuse CXR infiltrates persist, no significant change from previous. SBT failed. Weaned off pressors, tolerating well.   - SLED started over the weekend, has developed paroxysmal AF+RVR during sessions, last night was able to tolerate with dilt gtt + BB, converted back to NSR this morning.  - +700mL/24hrs CVP 9 as of 10am today, will continue nightly SLED PRN  -strict Is & Os

## 2018-11-28 NOTE — NURSING
Dr. Duncan notified of morning hgb/hct results in addition to critical platelet. 2u prbc ordered to be given.

## 2018-11-28 NOTE — PROGRESS NOTES
Dr. Tracey at bedside to discuss plan of care.  Shahzad informed patients liver transplant listing being put on hold.  also made aware of seriousness of patients illness and the possible outcome of the patient not recovering. All questions and concerns addressed.

## 2018-11-28 NOTE — SUBJECTIVE & OBJECTIVE
"Interval HPI:   Overnight events: Remains in ICU, intubated. BG below goal overnight on insulin regimen.  TF increased back to goal after being held for residuals.  Low H&H and platelets this am, to receive 2 units PRBCs.  On IV antibiotics.  Eating:   NPO  Nausea: No  Hypoglycemia and intervention: No  Fever: No  TPN and/or TF: Yes  TF and rate: Isosource at 40 cc/hr (goal)    BP (!) 107/57 (BP Location: Left arm, Patient Position: Lying)   Pulse 66   Temp 97.8 °F (36.6 °C) (Oral)   Resp (!) 27   Ht 4' 10" (1.473 m)   Wt 57.7 kg (127 lb 3.3 oz)   SpO2 99%   Breastfeeding? No   BMI 26.59 kg/m²     Labs Reviewed and Include    Recent Labs   Lab 11/28/18  0400   *   CALCIUM 7.7*   ALBUMIN 3.0*   PROT 5.3*      K 4.2   CO2 25      BUN 7*   CREATININE 0.6   ALKPHOS 132   ALT 17   AST 49*   BILITOT 8.1*     Lab Results   Component Value Date    WBC 6.04 11/28/2018    HGB 6.7 (L) 11/28/2018    HCT 20.5 (L) 11/28/2018    MCV 92 11/28/2018    PLT 38 (LL) 11/28/2018     No results for input(s): TSH, FREET4 in the last 168 hours.  Lab Results   Component Value Date    HGBA1C 5.8 (H) 11/10/2018       Nutritional status:   Body mass index is 26.59 kg/m².  Lab Results   Component Value Date    ALBUMIN 3.0 (L) 11/28/2018    ALBUMIN 2.3 (L) 11/27/2018    ALBUMIN 2.2 (L) 11/27/2018     No results found for: PREALBUMIN    Estimated Creatinine Clearance: 82.9 mL/min (based on SCr of 0.6 mg/dL).    Accu-Checks  Recent Labs     11/26/18  1558 11/26/18  1945 11/26/18  2341 11/27/18  0318 11/27/18  0811 11/27/18  1229 11/27/18  1613 11/27/18  2015 11/28/18  0004 11/28/18  0354   POCTGLUCOSE 230* 211* 212* 240* 247* 224* 156* 103 112* 136*       Current Medications and/or Treatments Impacting Glycemic Control  Immunotherapy:    Immunosuppressants     None        Steroids:   Hormones (From admission, onward)    None        Pressors:    Autonomic Drugs (From admission, onward)    Start     Stop Route Frequency " Ordered    11/21/18 1900  norepinephrine 4 mg in dextrose 5% 250 mL infusion (premix) (titrating)     Question Answer Comment   Titrate by: (in mcg/kg/min) 0.02    Titrate interval: (in minutes) 5    Titrate to maintain: (MAP or SBP) MAP    Greater than: (in mmHg) 65    Maximum dose: (in mcg/kg/min) 3        -- IV Continuous 11/21/18 1845        Hyperglycemia/Diabetes Medications:   Antihyperglycemics (From admission, onward)    Start     Stop Route Frequency Ordered    11/26/18 0947  insulin aspart U-100 pen 0-5 Units      -- SubQ Every 4 hours PRN 11/26/18 0848    11/23/18 0900  insulin detemir U-100 pen 14 Units      -- SubQ Daily 11/23/18 0735

## 2018-11-28 NOTE — ASSESSMENT & PLAN NOTE
Acute respiratory failure with hypoxia     68yo F liver-listed, stepped up on 11/21 for respiratory failure with worsening respiratory acidosis      Plan:     Neuro:   -Pain control: prn fentanyl while intubated  -Sedation off  -Following commands      Pulmonary:   -intubated on arrival to SICU 11/21  -CXR with bilateral patchy opacities, concerning for viral pneumonia / ARDS type picture   -ABGs daily and prn  -Minimal Vent Settings  -SBT and Wean to Extubate     Cardiac:  -SBP > 100 goal  - JACINDA 11/20 - elevated PA pressures   -Heart failure following, appreciate recs   - Afib with RVR -- rate improved with Diltiazem gtt      Renal:   -Mendoza in place  -Continue to monitor UOP: oliguria   - CRRT started 11/24  -CRRT overnight; -2.5 L for optimization of extubation  -Nephrology following, appreciate recommendations      Fluids/Electrolytes/Nutrition/GI:   - TFs @ 40 - goal   -replace lytes PRN  -holding Lactulose enemas TID, per primary      Hematology/Oncology:  -Monitor CBC -- Transfuse 2U PRBC today  -PT/INR   -continue to monitor. Transfuse as needed      Infectious Disease:   -Afebrile  -WBC 6.7  -Abx: Diflucan/Zosyn to cover HCAP      Endocrine:  -Glucose goal of 140-180  -Endocrine following     Dispo:  -Continue care in the ICU setting  -Wean to Extubate Today if Tolerating  -Primary: Transplant

## 2018-11-28 NOTE — PROGRESS NOTES
[facility]  Pulmonology  Progress Note    Patient Name: Krystal Hobson  MRN: [id number]  Admission Date: 11/10/2018  Hospital Length of Stay: 18 days  Code Status: Full Code  Attending Provider: Abrahan Montelongo MD  Primary Care Provider: Courtney Joe MD   Principal Problem: Hepatic encephalopathy    Subjective:     Interval History: No overnight events, she is vitally stable, off sedation and levophed. She failed the am SBT.     Objective:     Vital Signs (Most Recent):  Temp: 98.2 °F (36.8 °C) (11/28/18 1500)  Pulse: 62 (11/28/18 1700)  Resp: (!) 21 (11/28/18 1300)  BP: (!) 104/51 (11/28/18 1700)  SpO2: (!) 93 % (11/28/18 1700) Vital Signs (24h Range):  Temp:  [97.6 °F (36.4 °C)-98.2 °F (36.8 °C)] 98.2 °F (36.8 °C)  Pulse:  [] 62  Resp:  [13-30] 21  SpO2:  [89 %-100 %] 93 %  BP: ()/(51-72) 104/51  Arterial Line BP: (103-145)/(34-63) 103/36     Weight: 57.7 kg (127 lb 3.3 oz)  Body mass index is 26.59 kg/m².      Intake/Output Summary (Last 24 hours) at 11/28/2018 1705  Last data filed at 11/28/2018 1700  Gross per 24 hour   Intake 4387 ml   Output 2828 ml   Net 1559 ml       Physical Exam   Constitutional: No distress.   She is intubated, off sedation and levophed    HENT:   Head: Normocephalic.   Eyes: Right eye exhibits no discharge. Left eye exhibits no discharge.   Cardiovascular: Normal rate and regular rhythm.   Murmur heard.  Pulmonary/Chest: Breath sounds normal. She has no wheezes. She exhibits no tenderness.   Abdominal: Soft. Bowel sounds are normal. She exhibits distension. There is no tenderness.   Musculoskeletal: She exhibits no edema, tenderness or deformity.   Neurological: She is alert.   Skin: She is not diaphoretic.       Vents:  Vent Mode: A/C (11/28/18 1517)  Ventilator Initiated: Yes (11/21/18 1839)  Set Rate: 16 bmp (11/28/18 1517)  Vt Set: 300 mL (11/28/18 1517)  Pressure Support: 0 cmH20 (11/28/18 0854)  PEEP/CPAP: 8 cmH20 (11/28/18 1517)  Oxygen Concentration  (%): 30 (11/28/18 1700)  Peak Airway Pressure: 28 cmH2O (11/28/18 1517)  Plateau Pressure: 24 cmH20 (11/28/18 1517)  Total Ve: 6.99 mL (11/28/18 1517)  F/VT Ratio<105 (RSBI): (!) 55.41 (11/28/18 1300)    Lines/Drains/Airways     Central Venous Catheter Line                 Trialysis (Dialysis) Catheter 11/21/18 1851 left internal jugular 6 days          Drain                 NG/OG Tube 11/21/18 1840 [place] sump 16 Fr. Right mouth 6 days          Airway                 Airway - Non-Surgical 11/21/18 1836 Endotracheal Tube 6 days          Arterial Line                 Arterial Line 11/21/18 1900 Right Radial 6 days          Pressure Ulcer                 Pressure Injury 11/27/18 Rectum Deep tissue injury 1 day          Peripheral Intravenous Line                 Peripheral IV - Single Lumen 11/26/18 1500 Left Antecubital 2 days                Significant Labs:    CBC/Anemia Profile:  Recent Labs   Lab 11/27/18  0316 11/28/18  0400 11/28/18  1559   WBC 6.65 6.04 6.17   HGB 7.8* 6.7* 9.5*   HCT 23.6* 20.5* 28.9*   PLT 46* 38*  --    MCV 93 92 92   RDW 26.5* 26.9* 23.0*        Chemistries:  Recent Labs   Lab 11/27/18  0316 11/27/18  1400 11/27/18  1935 11/28/18  0400 11/28/18  1431     137 139 140 136 135*   K 4.3  4.3 4.5 4.1 4.2 4.7     108 110 109 105 107   CO2 22*  22* 22* 25 25 24   BUN 26*  26* 32* 25* 7* 13   CREATININE 1.6*  1.6* 1.8* 1.3 0.6 0.8   CALCIUM 8.3*  8.3* 8.3* 8.2* 7.7* 8.1*   ALBUMIN 2.4*  2.4* 2.2* 2.3* 3.0* 2.8*   PROT 5.1*  --   --  5.3*  --    BILITOT 8.3*  --   --  8.1*  --    ALKPHOS 149*  --   --  132  --    ALT 17  --   --  17  --    AST 44*  --   --  49*  --    MG 2.5 3.0* 2.6 2.0 1.9   PHOS 3.3 3.7 2.8  --  2.2*       BMP:   Recent Labs   Lab 11/28/18  1431   *   *   K 4.7      CO2 24   BUN 13   CREATININE 0.8   CALCIUM 8.1*   MG 1.9     CMP:   Recent Labs   Lab 11/27/18  0316  11/27/18  1935 11/28/18  0400 11/28/18  1431     137   < > 140 136  135*   K 4.3  4.3   < > 4.1 4.2 4.7     108   < > 109 105 107   CO2 22*  22*   < > 25 25 24   *  254*   < > 102 127* 269*   BUN 26*  26*   < > 25* 7* 13   CREATININE 1.6*  1.6*   < > 1.3 0.6 0.8   CALCIUM 8.3*  8.3*   < > 8.2* 7.7* 8.1*   PROT 5.1*  --   --  5.3*  --    ALBUMIN 2.4*  2.4*   < > 2.3* 3.0* 2.8*   BILITOT 8.3*  --   --  8.1*  --    ALKPHOS 149*  --   --  132  --    AST 44*  --   --  49*  --    ALT 17  --   --  17  --    ANIONGAP 7*  7*   < > 6* 6* 4*   EGFRNONAA 33.1*  33.1*   < > 42.6* >60.0 >60.0    < > = values in this interval not displayed.     Cardiac Markers: No results for input(s): CKMB, TROPONINT, MYOGLOBIN in the last 48 hours.      Assessment/Plan:     Pulmonary infiltrates on CXR    Pulmonary edema. Comparing old CXRs, latest CXR = bilateral airspace consolidation, BL fluffiness on both lungs.   - Intubated on arrival to SICU 11/21 due to inability to maintain an airway. She is off sedation, awake and following commands.   - Started CRRT 11/24/18. Lasix was D/C. Yesterday CRRT was done but pt is still net + with fluids.   - On broad-spectrum antibiotics D8  -Recommend titration of FiO2 to maintain SpO2 >90%.  -Daily SBTs as medically appropriate. Failed SBT on 11/28/2018 in the am due to tachypnea. Repeat tomorrow   - U/S chest was done showing B-lines and incompressible IVC    Plan:  - Continue same management  - We would appreciate more aggressive fluid removal if pt can tolerate.     Acute respiratory failure with hypoxia    Acute vent-dependent respiratory failure due to hypoxia and encephalopathy.   - daily SAT & SBT as able- failed SBT today due to tachypnea            Yuval Glover MD  Pulmonology  [facility]

## 2018-11-28 NOTE — PHYSICIAN QUERY
PT Name: Krystal Hobson  MR #: [id number]    Physician Query Form - Nutrition Clarification     CDS/: Calli Gomes               Contact information: [email or web address]    This form is a permanent document in the medical record.     Query Date: November 27, 2018    By submitting this query, we are merely seeking further clarification of documentation.. Please utilize your independent clinical judgment when addressing the question(s) below.    The Medical record contains the following:   Indicators  Supporting Clinical Findings Location in Medical Record   x % of Estimated Energy Intake over a time frame from p.o., TF, or TPN % Intake of Estimated Energy Needs: 50 - 75 %   % Meal Intake: 25 - 50 %    RD pn 11-21    Weight Status over a time frame     x Subcutaneous Fat and/or Muscle Loss Body Fat Depletion: severe depletion of orbitals and triceps   Muscle Mass Depletion: severe depletion of temples, clavicle region and interosseous muscle  RD pn 11-21    Fluid Accumulation or Edema      Reduced  Strength     x Wt / BMI / Usual Body Weight Zf=666  BMI=25.1 RD pn 11-21    Delayed Wound Healing / Failure to Thrive     x Acute or Chronic Illness Protein-calorie malnutrition, moderate   Pneumonia  Hepatic encephalopathy    Severe malnutrition    Liver transplant pn 11-16          Liver transplant pn 11-11    Medication     x Treatment Nutrition Prescription Ordered   Current Diet Order: Diabetic   Oral Nutrition Supplement: Boost Breeze, Boost Glucose Control    RD pn 11-21   x Other Severe Malnutrition in the context of Chronic Illness/Injury       Related to (etiology):   cryptogenic cirrhosis  RD pn 11-21     AND / ASPEN Clinical Characteristics (October 2011)  A minimum of two characteristics is recommended for diagnosing either moderate or severe malnutrition   Mild Malnutrition Moderate Malnutrition Severe Malnutrition   Energy Intake from p.o., TF or TPN. < 75% intake of estimated  energy needs for less than 7 days < 75% intake of estimated energy needs for greater than 7 days < 50% intake of estimated energy needs for > 5 days   Weight Loss 1-2% in 1 month  5% in 3 months  7.5% in 6 months  10% in 1 year 1-2 % in 1 week  5% in 1 month  7.5% in 3 months  10% in 6 months  20% in 1 year > 2% in 1 week  > 5% in 1 month  > 7.5% in 3 months  > 10% in 6 months  > 20% in 1 year   Physical Findings     None *Mild subcutaneous fat and/or muscle loss  *Mild fluid accumulation  *Stage II decubitus  *Surgical wound or non-healing wound *Mod/severe subcutaneous fat and/or muscle loss  *Mod/severe fluid accumulation  *Stage III or IV decubitus  *Non-healing surgical wound     Provider, please specify diagnosis or diagnoses associated with above clinical findings.    Please further clarify the malnutrition diagnoses.     Moderate Protein-Calorie Malnutrition   x Severe Protein-Calorie Malnutrition    Other Nutritional Diagnosis (please specify):     Other:     Clinically Undetermined       Please document in your progress notes daily for the duration of treatment until resolved and include in your discharge summary.

## 2018-11-29 NOTE — ASSESSMENT & PLAN NOTE
Pulmonary edema. Comparing old CXRs, latest CXR = bilateral airspace consolidation, BL fluffiness on both lungs.   - Intubated on arrival to SICU 11/21 due to inability to maintain an airway. She is off sedation, awake and following commands.   - Started CRRT 11/24/18. Lasix was D/C. Yesterday CRRT was done but pt is still net + with fluids.   - On broad-spectrum antibiotics D8  -Recommend titration of FiO2 to maintain SpO2 >90%.  -Daily SBTs as medically appropriate. Failed SBT on 11/28/2018 in the am due to tachypnea. Repeat tomorrow   - U/S chest was done showing B-lines and incompressible IVC    Plan:  - Continue same management  - We would appreciate more aggressive fluid removal if pt can tolerate  - SBT this am [currently in progress]

## 2018-11-29 NOTE — NURSING
Dr. Chamberlain aware of platelet 29 this morning. Notified that Left IJ TLC started sanguinous oozing. She states to continue SQ heparin prophylaxis and notify if bleeding increases.

## 2018-11-29 NOTE — PLAN OF CARE
Patient currently stable  Remains intubated, 50% & 8 PEEP, FIO2 increased for PO2 of 54 on afternoon ABG  Plan for SBT tomorrow afternoon, no SBT in AM  BP stable without pressors  CRRT started at 1700, 2.5 mg metoprolol given before starting per order  TF continued at goal, tolerating well  Dilt gtt held  Pt turned q 2 hours, remains free of new signs of skin breakdown  Pt off sedation, awake and following commands  Plan of care reviewed with pt's  at bedside, spoke extensively with Dr. Pardo about plan of care and prognosis  Will continue to monitor

## 2018-11-29 NOTE — SUBJECTIVE & OBJECTIVE
Interval History: MARILEE. Currently undergoing an SBT.     Objective:     Vital Signs (Most Recent):  Temp: 98.8 °F (37.1 °C) (11/29/18 0700)  Pulse: 80 (11/29/18 0830)  Resp: 18 (11/29/18 0830)  BP: (!) 122/56 (11/29/18 0800)  SpO2: 100 % (11/29/18 0830) Vital Signs (24h Range):  Temp:  [98.1 °F (36.7 °C)-98.8 °F (37.1 °C)] 98.8 °F (37.1 °C)  Pulse:  [62-86] 80  Resp:  [18-22] 18  SpO2:  [90 %-100 %] 100 %  BP: (101-132)/(49-72) 122/56  Arterial Line BP: (103-131)/(36-49) 124/44     Weight: 57.7 kg (127 lb 3.3 oz)  Body mass index is 26.59 kg/m².      Intake/Output Summary (Last 24 hours) at 11/29/2018 0904  Last data filed at 11/29/2018 0900  Gross per 24 hour   Intake 4117 ml   Output 4797 ml   Net -680 ml       Physical Exam   Constitutional: She appears well-developed and well-nourished. She appears cachectic. She appears toxic. She has a sickly appearance. She appears ill. She is intubated.   HENT:   Head: Normocephalic and atraumatic.   Cardiovascular: Normal rate and regular rhythm.   Pulmonary/Chest: She is intubated.   Intubated   Musculoskeletal: She exhibits no edema.   Skin: Skin is warm.       Vents:  Vent Mode: A/C (11/29/18 0811)  Ventilator Initiated: Yes (11/21/18 1839)  Set Rate: 16 bmp (11/29/18 0811)  Vt Set: 300 mL (11/29/18 0811)  Pressure Support: 0 cmH20 (11/28/18 0854)  PEEP/CPAP: 8 cmH20 (11/29/18 0811)  Oxygen Concentration (%): 40 (11/29/18 0811)  Peak Airway Pressure: 18 cmH2O (11/29/18 0811)  Plateau Pressure: 24 cmH20 (11/29/18 0811)  Total Ve: 6.02 mL (11/29/18 0811)  F/VT Ratio<105 (RSBI): (!) 72.13 (11/28/18 1943)    Lines/Drains/Airways     Central Venous Catheter Line                 Trialysis (Dialysis) Catheter 11/21/18 1851 left internal jugular 7 days          Drain                 NG/OG Tube 11/21/18 1840 [place] sump 16 Fr. Right mouth 7 days          Airway                 Airway - Non-Surgical 11/21/18 1836 Endotracheal Tube 7 days          Arterial Line                  Arterial Line 11/21/18 1900 Right Radial 7 days          Pressure Ulcer                 Pressure Injury 11/27/18 Rectum Deep tissue injury 2 days          Peripheral Intravenous Line                 Peripheral IV - Single Lumen 11/26/18 1500 Left Antecubital 2 days                Significant Labs:    CBC/Anemia Profile:  Recent Labs   Lab 11/28/18  0400 11/28/18  1559 11/29/18  0400   WBC 6.04 6.17 4.90   HGB 6.7* 9.5* 8.9*   HCT 20.5* 28.9* 26.7*   PLT 38* 32* 29*   MCV 92 92 92   RDW 26.9* 23.0* 23.1*        Chemistries:  Recent Labs   Lab 11/28/18  0400 11/28/18  1431 11/28/18  2111 11/29/18  0400    135* 137 137   K 4.2 4.7 4.2 4.4    107 109 106   CO2 25 24 24 22*   BUN 7* 13 7* 4*   CREATININE 0.6 0.8 0.6 0.5   CALCIUM 7.7* 8.1* 7.3* 7.6*   ALBUMIN 3.0* 2.8* 2.5* 2.6*   PROT 5.3*  --   --  5.0*   BILITOT 8.1*  --   --  10.5*   ALKPHOS 132  --   --  143*   ALT 17  --   --  17   AST 49*  --   --  48*   MG 2.0 1.9 1.9 2.0   PHOS  --  2.2* 1.8* 1.8*       Recent Lab Results       11/29/18  0849   11/29/18  0400   11/29/18  0359   11/29/18  0351   11/29/18  0014        Immature Granulocytes   0.8           Immature Grans (Abs)   0.04  Comment:  Mild elevation in immature granulocytes is non specific and   can be seen in a variety of conditions including stress response,   acute inflammation, trauma and pregnancy. Correlation with other   laboratory and clinical findings is essential.             Albumin   2.6           Alkaline Phosphatase   143           Allens Test     N/A         ALT   17           Anion Gap   9           Aniso   Moderate           AST   48           Baso #   0.01           Basophil%   0.2           Total Bilirubin   10.5  Comment:  For infants and newborns, interpretation of results should be based  on gestational age, weight and in agreement with clinical  observations.  Premature Infant recommended reference ranges:  Up to 24 hours.............<8.0 mg/dL  Up to 48  hours............<12.0 mg/dL  3-5 days..................<15.0 mg/dL  6-29 days.................<15.0 mg/dL             Site     Bee/UAC         BUN, Bld   4           Calcium   7.6           Chloride   106           CO2   22           Creatinine   0.5           DelSys     Adult Vent         Differential Method   Automated           eGFR if    >60.0           eGFR if non    >60.0  Comment:  Calculation used to obtain the estimated glomerular filtration  rate (eGFR) is the CKD-EPI equation.              Eos #   0.2           Eosinophil%   3.5           FiO2     50         Glucose   171           Gran # (ANC)   3.3           Gran%   67.6           Hematocrit   26.7           Hemoglobin   8.9           Hypo   Occasional           Coumadin Monitoring INR   2.0  Comment:  Coumadin Therapy:  2.0 - 3.0 for INR for all indicators except mechanical heart valves  and antiphospholipid syndromes which should use 2.5 - 3.5.             Lactate, Sudarshan   0.9  Comment:  Falsely low lactic acid results can be found in samples   containing >=13.0 mg/dL total bilirubin and/or >=3.5 mg/dL   direct bilirubin.             Lymph #   0.6           Lymph%   11.4           Magnesium   2.0           MCH   30.8           MCHC   33.3           MCV   92           Min Vol               Mode     AC/PRVC         Mono #   0.8           Mono%   16.5           MPV   12.0           nRBC   0           Ovalocytes   Occasional           PEEP     8         Phosphorus   1.8           PiP               Platelet Estimate               Platelets   29  Comment:  plt  critical result(s) called and verbal readback obtained from   ashley carrasconurse, 11/29/2018 05:49             POC BE     2         POC HCO3     27.4         POC PCO2     45.8         POC PH     7.385         POC PO2     145         POC SATURATED O2     99         POC TCO2     29         POCT Glucose 179     159 186     Poik   Slight           Poly   Occasional            Potassium   4.4           Total Protein   5.0           Protime   19.5           Rate     16         RBC   2.89           RDW   23.1           Sample     ARTERIAL         Sodium   137           Sp02     100         Target Cells               Vt     300         WBC   4.90                            11/28/18  2111   11/28/18 2004 11/28/18  1719   11/28/18  1604   11/28/18  1559        Immature Granulocytes         0.8     Immature Grans (Abs)         0.05  Comment:  Mild elevation in immature granulocytes is non specific and   can be seen in a variety of conditions including stress response,   acute inflammation, trauma and pregnancy. Correlation with other   laboratory and clinical findings is essential.       Albumin 2.5             Alkaline Phosphatase               Allens Test     N/A         ALT               Anion Gap 4             Aniso         Moderate     AST               Baso #         0.02     Basophil%         0.3     Total Bilirubin               Site     Bee/UAC         BUN, Bld 7             Calcium 7.3             Chloride 109             CO2 24             Creatinine 0.6             DelSys     Adult Vent         Differential Method         Automated     eGFR if  >60.0             eGFR if non  >60.0  Comment:  Calculation used to obtain the estimated glomerular filtration  rate (eGFR) is the CKD-EPI equation.                Eos #         0.2     Eosinophil%         3.2     FiO2     30         Glucose 191             Gran # (ANC)         4.5     Gran%         72.2     Hematocrit         28.9     Hemoglobin         9.5     Hypo         Occasional     Coumadin Monitoring INR               Lactate, Sudarshan               Lymph #         0.6     Lymph%         10.0     Magnesium 1.9             MCH         30.3     MCHC         32.9     MCV         92     Min Vol     8.         Mode     AC/PRVC         Mono #         0.8     Mono%         13.5     MPV         11.8      nRBC         0     Ovalocytes         Occasional     PEEP     8         Phosphorus 1.8             PiP     26         Platelet Estimate         Decreased     Platelets         32  Comment:  plt count    critical result(s) called and verbal readback obtained   from Sade Mcwilliams, 11/28/2018 17:08       POC BE     4         POC HCO3     26.9         POC PCO2     33.2         POC PH     7.516         POC PO2     54         POC SATURATED O2     91         POC TCO2     28         POCT Glucose   176   249       Poik         Slight     Poly         Occasional     Potassium 4.2             Total Protein               Protime               Rate     16         RBC         3.14     RDW         23.0     Sample     ARTERIAL         Sodium 137             Sp02     95         Target Cells         Occasional     Vt     300         WBC         6.17                      11/28/18  1431   11/28/18  1238        Immature Granulocytes         Immature Grans (Abs)         Albumin 2.8       Alkaline Phosphatase         Allens Test         ALT         Anion Gap 4       Aniso         AST         Baso #         Basophil%         Total Bilirubin         Site         BUN, Bld 13       Calcium 8.1       Chloride 107       CO2 24       Creatinine 0.8       DelSys         Differential Method         eGFR if  >60.0       eGFR if non  >60.0  Comment:  Calculation used to obtain the estimated glomerular filtration  rate (eGFR) is the CKD-EPI equation.          Eos #         Eosinophil%         FiO2         Glucose 269       Gran # (ANC)         Gran%         Hematocrit         Hemoglobin         Hypo         Coumadin Monitoring INR         Lactate, Sudarshan         Lymph #         Lymph%         Magnesium 1.9       MCH         MCHC         MCV         Min Vol         Mode         Mono #         Mono%         MPV         nRBC         Ovalocytes         PEEP         Phosphorus 2.2       PiP         Platelet Estimate          Platelets         POC BE         POC HCO3         POC PCO2         POC PH         POC PO2         POC SATURATED O2         POC TCO2         POCT Glucose   208     Poik         Poly         Potassium 4.7       Total Protein         Protime         Rate         RBC         RDW         Sample         Sodium 135       Sp02         Target Cells         Vt         WBC             All pertinent labs within the past 24 hours have been reviewed.    Significant Imaging:  I have reviewed all pertinent imaging results/findings within the past 24 hours.

## 2018-11-29 NOTE — ASSESSMENT & PLAN NOTE
Cryptogenic cirrhosis awaiting liver xplant, presented for AMS and found to have ENZO on CKDIIIb. Cr worsening since admission. Acute component likely from toxic/ischemic ATN (FENa 0.4% suggesting pre-renal component), making urine but oliguric, acid/base and lytes ok, volume status stable    Did well on SLED, net negative by 1.4L, acid/base and lytes look good, making urine, but remaining significantly oliguric    Should be able to keep up with volume through 12hr nightly SLED treatments    A/P 11/29  - Diffuse CXR infiltrates persist, no significant change from previous. SBT failed. Weaned off pressors, tolerating well.   - SLED started over the weekend, has developed paroxysmal AF+RVR with CVP of 1 during previous session, last night was able to tolerate well with BB prophylaxis, continues on CRRT today  - CVP decrease to 6-8 today, continuing SLED with gentle volume removal as tolerated  - strict Is & Os

## 2018-11-29 NOTE — SUBJECTIVE & OBJECTIVE
Interval History: NAEO, tolerating SLED well overnight with beta blocker, no change in mental status, continues on vent.     Review of patient's allergies indicates:  No Known Allergies  Current Facility-Administered Medications   Medication Frequency    0.9%  NaCl infusion (CRRT USE ONLY) Continuous    0.9%  NaCl infusion (for blood administration) Q24H PRN    acetaminophen tablet 650 mg Q8H PRN    dextrose 50% injection 12.5 g PRN    diltiaZEM 125 mg in D5W 125 mL infusion Continuous    famotidine tablet 20 mg QHS    fluconazole tablet 200 mg Daily    glucagon (human recombinant) injection 1 mg PRN    guaifenesin 100 mg/5 ml syrup 200 mg Q4H PRN    guaifenesin 100 mg/5 ml syrup 200 mg Q6H    heparin (porcine) injection 5,000 Units Q8H    insulin aspart U-100 pen 0-5 Units Q4H PRN    insulin aspart U-100 pen 2 Units Q24H    insulin aspart U-100 pen 2 Units Q24H    insulin aspart U-100 pen 2 Units Q24H    insulin aspart U-100 pen 2 Units Q24H    insulin aspart U-100 pen 2 Units Q24H    insulin aspart U-100 pen 2 Units Q24H    insulin detemir U-100 pen 14 Units Daily    k phos di & mono-sod phos mono 250 mg tablet 2 tablet TID PRN    lactulose 10 gram/15 mL solution (enema) 200 g TID PRN    lactulose 20 gram/30 mL solution Soln 30 g Q6H PRN    levalbuterol nebulizer solution 1.25 mg Q6H WAKE    magnesium sulfate 2g in water 50mL IVPB (premix) PRN    metoprolol injection 2.5 mg Q5 Min PRN    norepinephrine 4 mg in dextrose 5% 250 mL infusion (premix) (titrating) Continuous    ondansetron disintegrating tablet 8 mg Q8H PRN    propofol (DIPRIVAN) 10 mg/mL infusion Continuous    rifAXIMin tablet 550 mg BID    simethicone chewable tablet 80 mg TID PRN    sodium chloride 0.9% flush 3 mL PRN       Objective:     Vital Signs (Most Recent):  Temp: 98.1 °F (36.7 °C) (11/29/18 1100)  Pulse: (!) 136 (11/29/18 1555)  Resp: (!) 26 (11/29/18 1337)  BP: (!) 120/56 (11/29/18 1515)  SpO2: 96 % (11/29/18  1555)  O2 Device (Oxygen Therapy): ventilator (11/29/18 1500) Vital Signs (24h Range):  Temp:  [98.1 °F (36.7 °C)-98.8 °F (37.1 °C)] 98.1 °F (36.7 °C)  Pulse:  [] 136  Resp:  [18-26] 26  SpO2:  [75 %-100 %] 96 %  BP: (101-132)/(49-67) 120/56  Arterial Line BP: (100-139)/(34-70) 123/42     Weight: 57.7 kg (127 lb 3.3 oz) (11/28/18 0400)  Body mass index is 26.59 kg/m².  Body surface area is 1.54 meters squared.    I/O last 3 completed shifts:  In: 7704 [I.V.:5346; Blood:478; NG/GT:1880]  Out: 7102 [Urine:100; Other:7002]    Physical Exam   HENT:   Head: Normocephalic and atraumatic.   Eyes: Scleral icterus is present.   Neck: No JVD present.   Cardiovascular: Normal rate and regular rhythm.   Pulmonary/Chest: Effort normal and breath sounds normal. She has no wheezes.   On vent, mechanical breath sounds     Abdominal: Soft. She exhibits no distension.   Musculoskeletal: She exhibits edema (trace pitting to knees). She exhibits no tenderness.   Neurological:   Unresponsive to verbal or tacticle stimuli   Skin: Skin is warm and dry.   jaundiced   Vitals reviewed.

## 2018-11-29 NOTE — SUBJECTIVE & OBJECTIVE
Scheduled Meds:   famotidine  20 mg Oral QHS    fluconazole  200 mg Oral Daily    guaifenesin 100 mg/5 ml  200 mg Per OG tube Q6H    heparin (porcine)  5,000 Units Subcutaneous Q8H    [START ON 11/29/2018] insulin aspart U-100  2 Units Subcutaneous Q24H    [START ON 11/29/2018] insulin aspart U-100  2 Units Subcutaneous Q24H    [START ON 11/29/2018] insulin aspart U-100  2 Units Subcutaneous Q24H    [START ON 11/29/2018] insulin aspart U-100  2 Units Subcutaneous Q24H    insulin aspart U-100  2 Units Subcutaneous Q24H    insulin aspart U-100  2 Units Subcutaneous Q24H    insulin detemir U-100  14 Units Subcutaneous Daily    levalbuterol  1.25 mg Nebulization Q6H WAKE    rifAXImin  550 mg Oral BID     Continuous Infusions:   sodium chloride 0.9% 200 mL/hr at 11/28/18 1900    dilTIAZem Stopped (11/28/18 1700)    norepinephrine bitartrate-D5W Stopped (11/26/18 0900)    propofol Stopped (11/26/18 0800)     PRN Meds:sodium chloride, acetaminophen, dextrose 50%, glucagon (human recombinant), guaifenesin 100 mg/5 ml, insulin aspart U-100, k phos di & mono-sod phos mono, lactulose, lactulose, magnesium sulfate IVPB, metoprolol, ondansetron, simethicone, sodium chloride 0.9%    Review of Systems  Objective:     Vital Signs (Most Recent):  Temp: 98.1 °F (36.7 °C) (11/28/18 1900)  Pulse: 70 (11/28/18 1900)  Resp: (!) 21 (11/28/18 1300)  BP: (!) 105/53 (11/28/18 1900)  SpO2: 100 % (11/28/18 1900) Vital Signs (24h Range):  Temp:  [97.6 °F (36.4 °C)-98.2 °F (36.8 °C)] 98.1 °F (36.7 °C)  Pulse:  [] 70  Resp:  [13-30] 21  SpO2:  [89 %-100 %] 100 %  BP: ()/(49-72) 105/53  Arterial Line BP: (103-135)/(34-63) 124/45     Weight: 57.7 kg (127 lb 3.3 oz)  Body mass index is 26.59 kg/m².    Intake/Output - Last 3 Shifts       11/26 0700 - 11/27 0659 11/27 0700 - 11/28 0659 11/28 0700 - 11/29 0659    I.V. (mL/kg) 499 (8.2) 2737 (47.4) 142 (2.5)    Blood   478    NG/ 580 640    Total Intake(mL/kg) 1109  (18.2) 3317 (57.5) 1260 (21.8)    Urine (mL/kg/hr) 103 (0.1) 160 (0.1) 10 (0)    Drains       Other  2651 661    Stool 550 0 0    Total Output 653 2811 671    Net +456 +506 +589           Stool Occurrence  1 x 2 x          Physical Exam   Constitutional: No distress.   She is intubated, off sedation and levophed    HENT:   Head: Normocephalic.   Eyes: Right eye exhibits no discharge. Left eye exhibits no discharge.   Cardiovascular: Normal rate and regular rhythm.   Murmur heard.  Pulmonary/Chest: Breath sounds normal. She has no wheezes. She exhibits no tenderness.   Abdominal: Soft. Bowel sounds are normal. She exhibits distension. There is no tenderness.   Musculoskeletal: She exhibits no edema, tenderness or deformity.   Neurological: She is alert.   Skin: She is not diaphoretic.       Laboratory:  Immunosuppressants     None        Labs within the past 24 hours have been reviewed.    Diagnostic Results:  I have personally reviewed all pertinent imaging studies.

## 2018-11-29 NOTE — ASSESSMENT & PLAN NOTE
Acute respiratory failure with hypoxia     68yo F liver-listed, stepped up on 11/21 for respiratory failure with worsening respiratory acidosis      Plan:     Neuro:   -Pain control: prn fentanyl while intubated  -Sedation off  -Following commands      Pulmonary:   -intubated on arrival to SICU 11/21  -CXR with bilateral patchy opacities, concerning for viral pneumonia -ABGs daily and prn  -Minimal Vent Settings  -PEEP increment for Compliance  -SBT in afternoon     Cardiac:  -SBP > 100 goal  - JACINDA 11/20 - elevated PA pressures   -Heart failure following, appreciate recs   - Afib with RVR -- On Lopressor  No need for drip overnight     Renal:   -Mendoza in place  -Continue to monitor UOP: oliguria   - CRRT started 11/24  -CRRT overnight; -3.0L for optimization of extubation  -Nephrology following, appreciate recommendations      Fluids/Electrolytes/Nutrition/GI:   - TFs @ 40 - goal   -replace lytes PRN  -holding Lactulose enemas TID, per primary      Hematology/Oncology:  -Monitor CBC -- Transfuse 2U PRBC today  -PT/INR -- 2.0 (1.9)  -continue to monitor. Transfuse as needed   -FFP today     Infectious Disease:   -Afebrile  -WBC 6.7  -Abx: Diflucan/Zosyn to cover HCAP      Endocrine:  -Glucose goal of 140-180  -Endocrine following     Dispo:  -Continue care in the ICU setting  -Possible FFP/Platelet Transfusion today  -Wean to Extubate Today if Tolerating SBT  -Primary: Transplant

## 2018-11-29 NOTE — PROGRESS NOTES
[facility]  Pulmonology  Progress Note    Patient Name: Krystal Hobson  MRN: [id number]  Admission Date: 11/10/2018  Hospital Length of Stay: 19 days  Code Status: Full Code  Attending Provider: Abrahan Montelongo MD  Primary Care Provider: Courtney Joe MD   Principal Problem: Hepatic encephalopathy    Subjective:     Interval History: NAEON. Currently undergoing an SBT.     Objective:     Vital Signs (Most Recent):  Temp: 98.8 °F (37.1 °C) (11/29/18 0700)  Pulse: 80 (11/29/18 0830)  Resp: 18 (11/29/18 0830)  BP: (!) 122/56 (11/29/18 0800)  SpO2: 100 % (11/29/18 0830) Vital Signs (24h Range):  Temp:  [98.1 °F (36.7 °C)-98.8 °F (37.1 °C)] 98.8 °F (37.1 °C)  Pulse:  [62-86] 80  Resp:  [18-22] 18  SpO2:  [90 %-100 %] 100 %  BP: (101-132)/(49-72) 122/56  Arterial Line BP: (103-131)/(36-49) 124/44     Weight: 57.7 kg (127 lb 3.3 oz)  Body mass index is 26.59 kg/m².      Intake/Output Summary (Last 24 hours) at 11/29/2018 0904  Last data filed at 11/29/2018 0900  Gross per 24 hour   Intake 4117 ml   Output 4797 ml   Net -680 ml       Physical Exam   Constitutional: She appears well-developed and well-nourished. She appears cachectic. She appears toxic. She has a sickly appearance. She appears ill. She is intubated.   HENT:   Head: Normocephalic and atraumatic.   Cardiovascular: Normal rate and regular rhythm.   Pulmonary/Chest: She is intubated.   Intubated   Musculoskeletal: She exhibits no edema.   Skin: Skin is warm.       Vents:  Vent Mode: A/C (11/29/18 0811)  Ventilator Initiated: Yes (11/21/18 1839)  Set Rate: 16 bmp (11/29/18 0811)  Vt Set: 300 mL (11/29/18 0811)  Pressure Support: 0 cmH20 (11/28/18 0854)  PEEP/CPAP: 8 cmH20 (11/29/18 0811)  Oxygen Concentration (%): 40 (11/29/18 0811)  Peak Airway Pressure: 18 cmH2O (11/29/18 0811)  Plateau Pressure: 24 cmH20 (11/29/18 0811)  Total Ve: 6.02 mL (11/29/18 0811)  F/VT Ratio<105 (RSBI): (!) 72.13 (11/28/18 1943)    Lines/Drains/Airways     Central Venous  Catheter Line                 Trialysis (Dialysis) Catheter 11/21/18 1851 left internal jugular 7 days          Drain                 NG/OG Tube 11/21/18 1840 Newberry sump 16 Fr. Right mouth 7 days          Airway                 Airway - Non-Surgical 11/21/18 1836 Endotracheal Tube 7 days          Arterial Line                 Arterial Line 11/21/18 1900 Right Radial 7 days          Pressure Ulcer                 Pressure Injury 11/27/18 Rectum Deep tissue injury 2 days          Peripheral Intravenous Line                 Peripheral IV - Single Lumen 11/26/18 1500 Left Antecubital 2 days                Significant Labs:    CBC/Anemia Profile:  Recent Labs   Lab 11/28/18  0400 11/28/18  1559 11/29/18  0400   WBC 6.04 6.17 4.90   HGB 6.7* 9.5* 8.9*   HCT 20.5* 28.9* 26.7*   PLT 38* 32* 29*   MCV 92 92 92   RDW 26.9* 23.0* 23.1*        Chemistries:  Recent Labs   Lab 11/28/18  0400 11/28/18  1431 11/28/18  2111 11/29/18  0400    135* 137 137   K 4.2 4.7 4.2 4.4    107 109 106   CO2 25 24 24 22*   BUN 7* 13 7* 4*   CREATININE 0.6 0.8 0.6 0.5   CALCIUM 7.7* 8.1* 7.3* 7.6*   ALBUMIN 3.0* 2.8* 2.5* 2.6*   PROT 5.3*  --   --  5.0*   BILITOT 8.1*  --   --  10.5*   ALKPHOS 132  --   --  143*   ALT 17  --   --  17   AST 49*  --   --  48*   MG 2.0 1.9 1.9 2.0   PHOS  --  2.2* 1.8* 1.8*       Recent Lab Results       11/29/18  0849   11/29/18  0400   11/29/18  0359   11/29/18  0351   11/29/18  0014        Immature Granulocytes   0.8           Immature Grans (Abs)   0.04  Comment:  Mild elevation in immature granulocytes is non specific and   can be seen in a variety of conditions including stress response,   acute inflammation, trauma and pregnancy. Correlation with other   laboratory and clinical findings is essential.             Albumin   2.6           Alkaline Phosphatase   143           Allens Test     N/A         ALT   17           Anion Gap   9           Aniso   Moderate           AST   48           Baso #    0.01           Basophil%   0.2           Total Bilirubin   10.5  Comment:  For infants and newborns, interpretation of results should be based  on gestational age, weight and in agreement with clinical  observations.  Premature Infant recommended reference ranges:  Up to 24 hours.............<8.0 mg/dL  Up to 48 hours............<12.0 mg/dL  3-5 days..................<15.0 mg/dL  6-29 days.................<15.0 mg/dL             Site     [facility]         BUN, Bld   4           Calcium   7.6           Chloride   106           CO2   22           Creatinine   0.5           DelSys     Adult Vent         Differential Method   Automated           eGFR if    >60.0           eGFR if non    >60.0  Comment:  Calculation used to obtain the estimated glomerular filtration  rate (eGFR) is the CKD-EPI equation.              Eos #   0.2           Eosinophil%   3.5           FiO2     50         Glucose   171           Gran # (ANC)   3.3           Gran%   67.6           Hematocrit   26.7           Hemoglobin   8.9           Hypo   Occasional           Coumadin Monitoring INR   2.0  Comment:  Coumadin Therapy:  2.0 - 3.0 for INR for all indicators except mechanical heart valves  and antiphospholipid syndromes which should use 2.5 - 3.5.             Lactate, Sudarshan   0.9  Comment:  Falsely low lactic acid results can be found in samples   containing >=13.0 mg/dL total bilirubin and/or >=3.5 mg/dL   direct bilirubin.             Lymph #   0.6           Lymph%   11.4           Magnesium   2.0           MCH   30.8           MCHC   33.3           MCV   92           Min Vol               Mode     AC/PRVC         Mono #   0.8           Mono%   16.5           MPV   12.0           nRBC   0           Ovalocytes   Occasional           PEEP     8         Phosphorus   1.8           PiP               Platelet Estimate               Platelets   29  Comment:  plt  critical result(s) called and verbal readback obtained  from   ashley carrasco,nurse, 11/29/2018 05:49             POC BE     2         POC HCO3     27.4         POC PCO2     45.8         POC PH     7.385         POC PO2     145         POC SATURATED O2     99         POC TCO2     29         POCT Glucose 179     159 186     Poik   Slight           Poly   Occasional           Potassium   4.4           Total Protein   5.0           Protime   19.5           Rate     16         RBC   2.89           RDW   23.1           Sample     ARTERIAL         Sodium   137           Sp02     100         Target Cells               Vt     300         WBC   4.90                            11/28/18  2111   11/28/18  2004   11/28/18  1719   11/28/18  1604   11/28/18  1559        Immature Granulocytes         0.8     Immature Grans (Abs)         0.05  Comment:  Mild elevation in immature granulocytes is non specific and   can be seen in a variety of conditions including stress response,   acute inflammation, trauma and pregnancy. Correlation with other   laboratory and clinical findings is essential.       Albumin 2.5             Alkaline Phosphatase               Allens Test     N/A         ALT               Anion Gap 4             Aniso         Moderate     AST               Baso #         0.02     Basophil%         0.3     Total Bilirubin               Site     Bee/UAC         BUN, Bld 7             Calcium 7.3             Chloride 109             CO2 24             Creatinine 0.6             DelSys     Adult Vent         Differential Method         Automated     eGFR if  >60.0             eGFR if non  >60.0  Comment:  Calculation used to obtain the estimated glomerular filtration  rate (eGFR) is the CKD-EPI equation.                Eos #         0.2     Eosinophil%         3.2     FiO2     30         Glucose 191             Gran # (ANC)         4.5     Gran%         72.2     Hematocrit         28.9     Hemoglobin         9.5     Hypo         Occasional      Coumadin Monitoring INR               Lactate, Sudarshan               Lymph #         0.6     Lymph%         10.0     Magnesium 1.9             MCH         30.3     MCHC         32.9     MCV         92     Min Vol     8.         Mode     AC/PRVC         Mono #         0.8     Mono%         13.5     MPV         11.8     nRBC         0     Ovalocytes         Occasional     PEEP     8         Phosphorus 1.8             PiP     26         Platelet Estimate         Decreased     Platelets         32  Comment:  plt count    critical result(s) called and verbal readback obtained   from Sade Poppy, 11/28/2018 17:08       POC BE     4         POC HCO3     26.9         POC PCO2     33.2         POC PH     7.516         POC PO2     54         POC SATURATED O2     91         POC TCO2     28         POCT Glucose   176   249       Poik         Slight     Poly         Occasional     Potassium 4.2             Total Protein               Protime               Rate     16         RBC         3.14     RDW         23.0     Sample     ARTERIAL         Sodium 137             Sp02     95         Target Cells         Occasional     Vt     300         WBC         6.17                      11/28/18  1431   11/28/18  1238        Immature Granulocytes         Immature Grans (Abs)         Albumin 2.8       Alkaline Phosphatase         Allens Test         ALT         Anion Gap 4       Aniso         AST         Baso #         Basophil%         Total Bilirubin         Site         BUN, Bld 13       Calcium 8.1       Chloride 107       CO2 24       Creatinine 0.8       DelSys         Differential Method         eGFR if  >60.0       eGFR if non  >60.0  Comment:  Calculation used to obtain the estimated glomerular filtration  rate (eGFR) is the CKD-EPI equation.          Eos #         Eosinophil%         FiO2         Glucose 269       Gran # (ANC)         Gran%         Hematocrit         Hemoglobin         Hypo          Coumadin Monitoring INR         Lactate, Sudarshan         Lymph #         Lymph%         Magnesium 1.9       MCH         MCHC         MCV         Min Vol         Mode         Mono #         Mono%         MPV         nRBC         Ovalocytes         PEEP         Phosphorus 2.2       PiP         Platelet Estimate         Platelets         POC BE         POC HCO3         POC PCO2         POC PH         POC PO2         POC SATURATED O2         POC TCO2         POCT Glucose   208     Poik         Poly         Potassium 4.7       Total Protein         Protime         Rate         RBC         RDW         Sample         Sodium 135       Sp02         Target Cells         Vt         WBC             All pertinent labs within the past 24 hours have been reviewed.    Significant Imaging:  I have reviewed all pertinent imaging results/findings within the past 24 hours.    Assessment/Plan:     Pulmonary infiltrates on CXR    Pulmonary edema. Comparing old CXRs, latest CXR = bilateral airspace consolidation, BL fluffiness on both lungs.   - Intubated on arrival to SICU 11/21 due to inability to maintain an airway. She is off sedation, awake and following commands.   - Started CRRT 11/24/18. Lasix was D/C. Yesterday CRRT was done but pt is still net + with fluids.   - On broad-spectrum antibiotics D8  -Recommend titration of FiO2 to maintain SpO2 >90%.  -Daily SBTs as medically appropriate. Failed SBT on 11/28/2018 in the am due to tachypnea. Repeat tomorrow   - U/S chest was done showing B-lines and incompressible IVC    Plan:  - Continue same management  - We would appreciate more aggressive fluid removal if pt can tolerate  - SBT this am [currently in progress]            Dakota Casanova MD  Pulmonology  [facility]

## 2018-11-29 NOTE — NURSING
CRRT rinsed back and then restarted with new machine. See flow sheet for details. Lines secured and alarms on.

## 2018-11-29 NOTE — PROGRESS NOTES
[facility]  Nephrology  Progress Note    Patient Name: Krystal Hobson  MRN: [id number]  Admission Date: 11/10/2018  Hospital Length of Stay: 19 days  Attending Provider: Abrahan Montelongo MD   Primary Care Physician: Courtney Joe MD  Principal Problem:Hepatic encephalopathy    Subjective:     HPI: Reason for consult: Diuresis in setting of PAP46, pulmonary edema, HRS    Ms. 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  - She presented to [facility] as xfer from [place] under liver transplant team due to encephalopathy and new onset ENZO.   - Per the , she was extremely disoriented @ OSH and her ammonia reached as high as 200, and her Cr level increased to 2.0 She also went into A fib with RVR and was started on a diltiazem gtt at OSH.   - On arrival at [facility] she was disoriented and started on lactulose with noted improvement. During her current admission on CXR they found a concern for HCAP and she was started on broad spectrum ABx and then shifted her on Moxifloxacin 7 D course. On 11/20/2018 found on CXR a concern for Lt lung middle lobe consolidation and started on vancomycin and zosyn.  - Renal fxn baseline 1.6 at admit but trending up    Interval History: NAEO, tolerating SLED well overnight with beta blocker, no change in mental status, continues on vent.     Review of patient's allergies indicates:  No Known Allergies  Current Facility-Administered Medications   Medication Frequency    0.9%  NaCl infusion (CRRT USE ONLY) Continuous    0.9%  NaCl infusion (for blood administration) Q24H PRN    acetaminophen tablet 650 mg Q8H PRN    dextrose 50% injection 12.5 g PRN    diltiaZEM 125 mg in D5W 125 mL infusion Continuous    famotidine tablet 20 mg QHS    fluconazole tablet 200 mg Daily    glucagon (human recombinant) injection 1 mg PRN    guaifenesin 100 mg/5 ml syrup 200 mg Q4H PRN    guaifenesin 100 mg/5 ml syrup 200 mg Q6H    heparin  (porcine) injection 5,000 Units Q8H    insulin aspart U-100 pen 0-5 Units Q4H PRN    insulin aspart U-100 pen 2 Units Q24H    insulin aspart U-100 pen 2 Units Q24H    insulin aspart U-100 pen 2 Units Q24H    insulin aspart U-100 pen 2 Units Q24H    insulin aspart U-100 pen 2 Units Q24H    insulin aspart U-100 pen 2 Units Q24H    insulin detemir U-100 pen 14 Units Daily    k phos di & mono-sod phos mono 250 mg tablet 2 tablet TID PRN    lactulose 10 gram/15 mL solution (enema) 200 g TID PRN    lactulose 20 gram/30 mL solution Soln 30 g Q6H PRN    levalbuterol nebulizer solution 1.25 mg Q6H WAKE    magnesium sulfate 2g in water 50mL IVPB (premix) PRN    metoprolol injection 2.5 mg Q5 Min PRN    norepinephrine 4 mg in dextrose 5% 250 mL infusion (premix) (titrating) Continuous    ondansetron disintegrating tablet 8 mg Q8H PRN    propofol (DIPRIVAN) 10 mg/mL infusion Continuous    rifAXIMin tablet 550 mg BID    simethicone chewable tablet 80 mg TID PRN    sodium chloride 0.9% flush 3 mL PRN       Objective:     Vital Signs (Most Recent):  Temp: 98.1 °F (36.7 °C) (11/29/18 1100)  Pulse: (!) 136 (11/29/18 1555)  Resp: (!) 26 (11/29/18 1337)  BP: (!) 120/56 (11/29/18 1515)  SpO2: 96 % (11/29/18 1555)  O2 Device (Oxygen Therapy): ventilator (11/29/18 1500) Vital Signs (24h Range):  Temp:  [98.1 °F (36.7 °C)-98.8 °F (37.1 °C)] 98.1 °F (36.7 °C)  Pulse:  [] 136  Resp:  [18-26] 26  SpO2:  [75 %-100 %] 96 %  BP: (101-132)/(49-67) 120/56  Arterial Line BP: (100-139)/(34-70) 123/42     Weight: 57.7 kg (127 lb 3.3 oz) (11/28/18 0400)  Body mass index is 26.59 kg/m².  Body surface area is 1.54 meters squared.    I/O last 3 completed shifts:  In: 7704 [I.V.:5346; Blood:478; NG/GT:1880]  Out: 7102 [Urine:100; Other:7002]    Physical Exam   HENT:   Head: Normocephalic and atraumatic.   Eyes: Scleral icterus is present.   Neck: No JVD present.   Cardiovascular: Normal rate and regular rhythm.    Pulmonary/Chest: Effort normal and breath sounds normal. She has no wheezes.   On vent, mechanical breath sounds     Abdominal: Soft. She exhibits no distension.   Musculoskeletal: She exhibits edema (trace pitting to knees). She exhibits no tenderness.   Neurological:   Unresponsive to verbal or tacticle stimuli   Skin: Skin is warm and dry.   jaundiced   Vitals reviewed.          Assessment/Plan:     Acute kidney injury superimposed on CKD    Cryptogenic cirrhosis awaiting liver xplant, presented for AMS and found to have ENZO on CKDIIIb. Cr worsening since admission. Acute component likely from toxic/ischemic ATN (FENa 0.4% suggesting pre-renal component), making urine but oliguric, acid/base and lytes ok, volume status stable    Did well on SLED, net negative by 1.4L, acid/base and lytes look good, making urine, but remaining significantly oliguric    Should be able to keep up with volume through 12hr nightly SLED treatments    A/P 11/29  - Diffuse CXR infiltrates persist, no significant change from previous. SBT failed. Weaned off pressors, tolerating well.   - SLED started over the weekend, has developed paroxysmal AF+RVR with CVP of 1 during previous session, last night was able to tolerate well with BB prophylaxis, continues on CRRT today  - CVP decrease to 6-8 today, continuing SLED with gentle volume removal as tolerated  - strict Is & Os         Thank you for your consult. I will follow-up with patient. Please contact us if you have any additional questions.    Kelvin Liriano MD  Nephrology  [facility]

## 2018-11-29 NOTE — SUBJECTIVE & OBJECTIVE
Interval History/Significant Events:     Remains Intubated  No Afib  No drips  No pressors  CRRT overnight      Objective:     Vital Signs (Most Recent):  Temp: 98.3 °F (36.8 °C) (11/29/18 0300)  Pulse: 81 (11/29/18 0600)  Resp: (!) 22 (11/28/18 1943)  BP: (!) 109/55 (11/29/18 0600)  SpO2: 100 % (11/29/18 0600) Vital Signs (24h Range):  Temp:  [97.6 °F (36.4 °C)-98.3 °F (36.8 °C)] 98.3 °F (36.8 °C)  Pulse:  [62-83] 81  Resp:  [19-30] 22  SpO2:  [90 %-100 %] 100 %  BP: (101-132)/(49-72) 109/55  Arterial Line BP: (103-130)/(34-49) 121/44     Weight: 57.7 kg (127 lb 3.3 oz)  Body mass index is 26.59 kg/m².      Intake/Output Summary (Last 24 hours) at 11/29/2018 0713  Last data filed at 11/29/2018 0600  Gross per 24 hour   Intake 4496 ml   Output 3991 ml   Net 505 ml       Physical Exam   Constitutional: She appears well-developed and well-nourished.   HENT:   Head: Normocephalic and atraumatic.   Eyes: Pupils are equal, round, and reactive to light.   jaundiced   Cardiovascular: Normal rate and regular rhythm.   Pulmonary/Chest:   intubated   Abdominal: Soft.   Neurological: She is alert.   Skin: Skin is warm and dry.       Vents:  Vent Mode: A/C (11/29/18 0515)  Ventilator Initiated: Yes (11/21/18 1839)  Set Rate: 16 bmp (11/29/18 0515)  Vt Set: 300 mL (11/29/18 0515)  Pressure Support: 0 cmH20 (11/28/18 0854)  PEEP/CPAP: 5 cmH20 (11/29/18 0515)  Oxygen Concentration (%): 41 (11/29/18 0600)  Peak Airway Pressure: 16 cmH2O (11/29/18 0515)  Plateau Pressure: 24 cmH20 (11/29/18 0515)  Total Ve: 7.7 mL (11/29/18 0515)  F/VT Ratio<105 (RSBI): (!) 72.13 (11/28/18 1943)    Lines/Drains/Airways     Central Venous Catheter Line                 Trialysis (Dialysis) Catheter 11/21/18 1851 left internal jugular 7 days          Drain                 NG/OG Tube 11/21/18 1840 Raymond sump 16 Fr. Right mouth 7 days          Airway                 Airway - Non-Surgical 11/21/18 1836 Endotracheal Tube 7 days          Arterial Line                  Arterial Line 11/21/18 1900 Right Radial 7 days          Pressure Ulcer                 Pressure Injury 11/27/18 Rectum Deep tissue injury 2 days          Peripheral Intravenous Line                 Peripheral IV - Single Lumen 11/26/18 1500 Left Antecubital 2 days                Significant Labs:    CBC/Anemia Profile:  Recent Labs   Lab 11/28/18  0400 11/28/18  1559 11/29/18  0400   WBC 6.04 6.17 4.90   HGB 6.7* 9.5* 8.9*   HCT 20.5* 28.9* 26.7*   PLT 38* 32* 29*   MCV 92 92 92   RDW 26.9* 23.0* 23.1*        Chemistries:  Recent Labs   Lab 11/28/18  0400 11/28/18  1431 11/28/18  2111 11/29/18  0400    135* 137 137   K 4.2 4.7 4.2 4.4    107 109 106   CO2 25 24 24 22*   BUN 7* 13 7* 4*   CREATININE 0.6 0.8 0.6 0.5   CALCIUM 7.7* 8.1* 7.3* 7.6*   ALBUMIN 3.0* 2.8* 2.5* 2.6*   PROT 5.3*  --   --  5.0*   BILITOT 8.1*  --   --  10.5*   ALKPHOS 132  --   --  143*   ALT 17  --   --  17   AST 49*  --   --  48*   MG 2.0 1.9 1.9 2.0   PHOS  --  2.2* 1.8* 1.8*       All pertinent labs within the past 24 hours have been reviewed.    Significant Imaging:  I have reviewed all pertinent imaging results/findings within the past 24 hours.

## 2018-11-29 NOTE — NURSING
Patient rested calmly overnight. Remains alert to voice, follows commands, moves all extremities, PERRLA, no sedation, denies pain. Restraints maintained safely. Normal sinus rhythm without atrial fibrillation this shift, arterial line BP goals maintained. Patient kept on ACVC+ PEEP 8.0 and 50%, large amount of sputum produced and suctioned, lung sounds remain rhonchus. Anuric, CRRT ran continuously without issue. Incontinent of bowel, BM x2 this shift. TF ran at goal of 40cc/hour, low residuals. Q4 blood sugars covered with scheduled insulin. Patient turned q2 hours to prevent further skin breakdown. Incontinence pads changed frequently to prevent moisture.  remains at bedside, updated of plan of care moving forward, all questions answered. Denies any acute needs at this time.

## 2018-11-29 NOTE — ASSESSMENT & PLAN NOTE
68yo F liver-listed, stepped up on 11/21 for respiratory failure with worsening respiratory acidosis     Plan:    Neuro:   -Pain control: prn fentanyl while intubated  -Sedation holiday since yesterday morning   -Daily sedation vacations  -Followed all commands off sedation     Pulmonary:   -intubated on arrival to SICU 11/21  Vent Mode: A/C  Oxygen Concentration (%):  [] 50  Resp Rate Total:  [14 br/min-69 br/min] 25 br/min  Vt Set:  [300 mL-350 mL] 300 mL  PEEP/CPAP:  [5 cmH20-8 cmH20] 8 cmH20  Pressure Support:  [0 cmH20] 0 cmH20  Mean Airway Pressure:  [9.4 enB96-50 cmH20] 16 cmH20  -daily CXR - evidence of significant bilateral consolidation   -ABGs daily and prn  -continuous pulse ox   - Failed SBT with Pulm 11/25    Cardiac:  -SBP > 100 goal  -Heart failure following, appreciate recs   - Afib with RVR 11/23/18, broke out with 2x 5mg doses of IV metop, her pressure was stable throughout  - Went back into Afib with RVR on 11/25. Given IV metoprolol 5mg q3. Did not resolve, started on diltiazem drip. Converted out of Afib around 3am. dilt drip stopped at that time.   -started back on diltiazem drip overnight. Continue today for rate control    Renal:   -Mendoza in place  -UOP minimal in last 24hrs  -Bun/Cr trending up, continue to monitor  - Had trial of lasix, followed by lasix + diuril yesterday with minimal UOP response  -Nephrology following, appreciate recommendations, likely will require dialysis today  -Started CRRT 11/24/18. Will continue nocturnal CRRT     Fluids/Electrolytes/Nutrition/GI:   -Nutritional status: NPO while intubated  -Continue TF @ 40cc/hour  -replace lytes PRN  -will hold Lactulose enemas TID    Hematology/Oncology:  -Hgb 7.8/23.6. Continue to monitor    -PT/INR: 15.7, 1.6 today   -continue to monitor. Transfuse as needed     Infectious Disease:   -Afebrile  -WBC 6.17, continue to monitor   -Abx: Zosyn.     Endocrine:  -Glucose goal of 140-180  -Endocrine following, see  recommendations    Dispo:  -Continue care in the ICU setting  -continue CRRT   -no SBT today. Optimize vent settings

## 2018-11-29 NOTE — PROGRESS NOTES
[facility]  Critical Care - Surgery  Progress Note    Patient Name: Krystal Hobson  MRN: [id number]  Admission Date: 11/10/2018  Hospital Length of Stay: 19 days  Code Status: Full Code  Attending Provider: Abrahan Montelongo MD  Primary Care Provider: Courtney Joe MD   Principal Problem: Hepatic encephalopathy    Subjective:     Hospital/ICU Course:  No notes on file    Interval History/Significant Events:     Remains Intubated  No Afib  No drips  No pressors  CRRT overnight      Objective:     Vital Signs (Most Recent):  Temp: 98.3 °F (36.8 °C) (11/29/18 0300)  Pulse: 81 (11/29/18 0600)  Resp: (!) 22 (11/28/18 1943)  BP: (!) 109/55 (11/29/18 0600)  SpO2: 100 % (11/29/18 0600) Vital Signs (24h Range):  Temp:  [97.6 °F (36.4 °C)-98.3 °F (36.8 °C)] 98.3 °F (36.8 °C)  Pulse:  [62-83] 81  Resp:  [19-30] 22  SpO2:  [90 %-100 %] 100 %  BP: (101-132)/(49-72) 109/55  Arterial Line BP: (103-130)/(34-49) 121/44     Weight: 57.7 kg (127 lb 3.3 oz)  Body mass index is 26.59 kg/m².      Intake/Output Summary (Last 24 hours) at 11/29/2018 0713  Last data filed at 11/29/2018 0600  Gross per 24 hour   Intake 4496 ml   Output 3991 ml   Net 505 ml       Physical Exam   Constitutional: She appears well-developed and well-nourished.   HENT:   Head: Normocephalic and atraumatic.   Eyes: Pupils are equal, round, and reactive to light.   jaundiced   Cardiovascular: Normal rate and regular rhythm.   Pulmonary/Chest:   intubated   Abdominal: Soft.   Neurological: She is alert.   Skin: Skin is warm and dry.       Vents:  Vent Mode: A/C (11/29/18 0515)  Ventilator Initiated: Yes (11/21/18 1839)  Set Rate: 16 bmp (11/29/18 0515)  Vt Set: 300 mL (11/29/18 0515)  Pressure Support: 0 cmH20 (11/28/18 0854)  PEEP/CPAP: 5 cmH20 (11/29/18 0515)  Oxygen Concentration (%): 41 (11/29/18 0600)  Peak Airway Pressure: 16 cmH2O (11/29/18 0515)  Plateau Pressure: 24 cmH20 (11/29/18 0515)  Total Ve: 7.7 mL (11/29/18 0515)  F/VT Ratio<105 (RSBI):  (!) 72.13 (11/28/18 1943)    Lines/Drains/Airways     Central Venous Catheter Line                 Trialysis (Dialysis) Catheter 11/21/18 1851 left internal jugular 7 days          Drain                 NG/OG Tube 11/21/18 1840 [place] sump 16 Fr. Right mouth 7 days          Airway                 Airway - Non-Surgical 11/21/18 1836 Endotracheal Tube 7 days          Arterial Line                 Arterial Line 11/21/18 1900 Right Radial 7 days          Pressure Ulcer                 Pressure Injury 11/27/18 Rectum Deep tissue injury 2 days          Peripheral Intravenous Line                 Peripheral IV - Single Lumen 11/26/18 1500 Left Antecubital 2 days                Significant Labs:    CBC/Anemia Profile:  Recent Labs   Lab 11/28/18  0400 11/28/18  1559 11/29/18  0400   WBC 6.04 6.17 4.90   HGB 6.7* 9.5* 8.9*   HCT 20.5* 28.9* 26.7*   PLT 38* 32* 29*   MCV 92 92 92   RDW 26.9* 23.0* 23.1*        Chemistries:  Recent Labs   Lab 11/28/18  0400 11/28/18  1431 11/28/18  2111 11/29/18  0400    135* 137 137   K 4.2 4.7 4.2 4.4    107 109 106   CO2 25 24 24 22*   BUN 7* 13 7* 4*   CREATININE 0.6 0.8 0.6 0.5   CALCIUM 7.7* 8.1* 7.3* 7.6*   ALBUMIN 3.0* 2.8* 2.5* 2.6*   PROT 5.3*  --   --  5.0*   BILITOT 8.1*  --   --  10.5*   ALKPHOS 132  --   --  143*   ALT 17  --   --  17   AST 49*  --   --  48*   MG 2.0 1.9 1.9 2.0   PHOS  --  2.2* 1.8* 1.8*       All pertinent labs within the past 24 hours have been reviewed.    Significant Imaging:  I have reviewed all pertinent imaging results/findings within the past 24 hours.    Assessment/Plan:     Acute respiratory failure with hypoxia    Acute respiratory failure with hypoxia     68yo F liver-listed, stepped up on 11/21 for respiratory failure with worsening respiratory acidosis      Plan:     Neuro:   -Pain control: prn fentanyl while intubated  -Sedation off  -Following commands      Pulmonary:   -intubated on arrival to SICU 11/21  -CXR with bilateral patchy  opacities, concerning for viral pneumonia -ABGs daily and prn  -Minimal Vent Settings  -PEEP increment for Compliance  -SBT in afternoon     Cardiac:  -SBP > 100 goal  - JACINDA 11/20 - elevated PA pressures   -Heart failure following, appreciate recs   - Afib with RVR -- On Lopressor  No need for drip overnight     Renal:   -Mendoza in place  -Continue to monitor UOP: oliguria   - CRRT started 11/24  -CRRT overnight; -3.0L for optimization of extubation  -Nephrology following, appreciate recommendations      Fluids/Electrolytes/Nutrition/GI:   - TFs @ 40 - goal   -replace lytes PRN  -holding Lactulose enemas TID, per primary      Hematology/Oncology:  -Monitor CBC -- Transfuse 2U PRBC today  -PT/INR -- 2.0 (1.9)  -continue to monitor. Transfuse as needed   -FFP today     Infectious Disease:   -Afebrile  -WBC 6.7  -Abx: Diflucan/Zosyn to cover HCAP      Endocrine:  -Glucose goal of 140-180  -Endocrine following     Dispo:  -Continue care in the ICU setting  -Possible FFP/Platelet Transfusion today  -Wean to Extubate Today if Tolerating SBT  -Primary: Transplant                  Critical care was time spent personally by me on the following activities: development of treatment plan with patient or surrogate and bedside caregivers, discussions with consultants, evaluation of patient's response to treatment, examination of patient, ordering and performing treatments and interventions, ordering and review of laboratory studies, ordering and review of radiographic studies, pulse oximetry, re-evaluation of patient's condition.  This critical care time did not overlap with that of any other provider or involve time for any procedures.     Minesh Blair MD  Critical Care - Surgery  [facility]

## 2018-11-29 NOTE — PROGRESS NOTES
Notified Dr. Chamberlain that metoprolol 2.5mg prn was not given during CRRT machine exchange and that the patient is currently in afib. Orders to obtain EKG and to give metoprolol 2.5mg IV.

## 2018-11-30 NOTE — SUBJECTIVE & OBJECTIVE
Interval History: AF+RVR resulting in hypotension overnight, resolved with bb+cardizem gtt, continuing on CRRT ~800mL off/24hrs with visible improvement in fluid balance on clinical exam though cxr remains diffusely consolidated.     Review of patient's allergies indicates:  No Known Allergies  Current Facility-Administered Medications   Medication Frequency    0.9%  NaCl infusion (CRRT USE ONLY) Continuous    0.9%  NaCl infusion (for blood administration) Q24H PRN    acetaminophen tablet 650 mg Q8H PRN    dextrose 50% injection 12.5 g PRN    diltiaZEM 125 mg in D5W 125 mL infusion Continuous    famotidine tablet 20 mg QHS    fluconazole tablet 200 mg Daily    glucagon (human recombinant) injection 1 mg PRN    guaifenesin 100 mg/5 ml syrup 200 mg Q6H    heparin (porcine) injection 5,000 Units Q8H    insulin aspart U-100 pen 0-5 Units Q4H PRN    insulin aspart U-100 pen 2 Units Q24H    insulin aspart U-100 pen 2 Units Q24H    insulin aspart U-100 pen 2 Units Q24H    insulin aspart U-100 pen 2 Units Q24H    insulin aspart U-100 pen 2 Units Q24H    insulin aspart U-100 pen 2 Units Q24H    insulin detemir U-100 pen 14 Units Daily    k phos di & mono-sod phos mono 250 mg tablet 2 tablet TID PRN    lactulose 10 gram/15 mL solution (enema) 200 g TID PRN    lactulose 20 gram/30 mL solution Soln 30 g Q6H PRN    levalbuterol nebulizer solution 1.25 mg Q6H WAKE    magnesium sulfate 2g in water 50mL IVPB (premix) PRN    ondansetron disintegrating tablet 8 mg Q8H PRN    propofol (DIPRIVAN) 10 mg/mL infusion Continuous    rifAXIMin tablet 550 mg BID    simethicone chewable tablet 80 mg TID PRN    sodium chloride 0.9% flush 3 mL PRN       Objective:     Vital Signs (Most Recent):  Temp: 97.6 °F (36.4 °C) (11/30/18 1100)  Pulse: 87 (11/30/18 1315)  Resp: 19 (11/30/18 0719)  BP: 132/61 (11/30/18 1300)  SpO2: 100 % (11/30/18 1315)  O2 Device (Oxygen Therapy): ventilator (11/30/18 1312) Vital Signs (24h  Range):  Temp:  [97.6 °F (36.4 °C)-98.8 °F (37.1 °C)] 97.6 °F (36.4 °C)  Pulse:  [] 87  Resp:  [19-28] 19  SpO2:  [95 %-100 %] 100 %  BP: ()/(51-76) 132/61  Arterial Line BP: ()/(34-53) 116/41     Weight: 57.7 kg (127 lb 3.3 oz) (11/28/18 0400)  Body mass index is 26.59 kg/m².  Body surface area is 1.54 meters squared.    I/O last 3 completed shifts:  In: 6942 [I.V.:5322; NG/GT:1620]  Out: 9974 [Drains:20; Other:9954]    Physical Exam   HENT:   Head: Normocephalic and atraumatic.   Eyes: Scleral icterus is present.   Neck: No JVD present.   Cardiovascular: Normal rate and regular rhythm.   Pulmonary/Chest: Effort normal and breath sounds normal. She has no wheezes.   On vent, mechanical breath sounds     Abdominal: Soft. She exhibits no distension.   Musculoskeletal: She exhibits no tenderness. Edema: anasarca almost resolved in limbs.   Neurological:   Pt is more alert today, spontaneously keeping eyes open and tracking.   Skin: Skin is warm and dry.   jaundiced   Vitals reviewed.

## 2018-11-30 NOTE — PROGRESS NOTES
[facility]  Liver Transplant  2Progress Note    Patient Name: Krystal Hobson  MRN: [id number]  Admission Date: 11/10/2018  Hospital Length of Stay: 19 days  Code Status: Full Code  Primary Care Provider: Courtney Joe MD  Post-Op Day      ORGAN:     Disease Etiology: Cirrhosis: Cryptogenic (Idiopathic)  Donor Type:     CDC High Risk:     Donor CMV Status:   Donor CMV Status:   Donor HBcAB:     Donor HCV Status:     Donor HBV MARY: Organ record is missing.  Donor HCV MARY: Organ record is missing.  Whole or Partial:   Biliary Anastomosis:   Arterial Anatomy:     Subjective:   NAEON. Remains intubated. No episodes of afib overnight     Scheduled Meds:   famotidine  20 mg Oral QHS    fluconazole  200 mg Oral Daily    guaifenesin 100 mg/5 ml  200 mg Per OG tube Q6H    heparin (porcine)  5,000 Units Subcutaneous Q8H    [START ON 11/29/2018] insulin aspart U-100  2 Units Subcutaneous Q24H    [START ON 11/29/2018] insulin aspart U-100  2 Units Subcutaneous Q24H    [START ON 11/29/2018] insulin aspart U-100  2 Units Subcutaneous Q24H    [START ON 11/29/2018] insulin aspart U-100  2 Units Subcutaneous Q24H    insulin aspart U-100  2 Units Subcutaneous Q24H    insulin aspart U-100  2 Units Subcutaneous Q24H    insulin detemir U-100  14 Units Subcutaneous Daily    levalbuterol  1.25 mg Nebulization Q6H WAKE    rifAXImin  550 mg Oral BID     Continuous Infusions:   sodium chloride 0.9% 200 mL/hr at 11/28/18 1900    dilTIAZem Stopped (11/28/18 1700)    norepinephrine bitartrate-D5W Stopped (11/26/18 0900)    propofol Stopped (11/26/18 0800)     PRN Meds:sodium chloride, acetaminophen, dextrose 50%, glucagon (human recombinant), guaifenesin 100 mg/5 ml, insulin aspart U-100, k phos di & mono-sod phos mono, lactulose, lactulose, magnesium sulfate IVPB, metoprolol, ondansetron, simethicone, sodium chloride 0.9%    Review of Systems  Objective:     Vital Signs (Most Recent):  Temp: 98.1 °F (36.7 °C)  (11/28/18 1900)  Pulse: 70 (11/28/18 1900)  Resp: (!) 21 (11/28/18 1300)  BP: (!) 105/53 (11/28/18 1900)  SpO2: 100 % (11/28/18 1900) Vital Signs (24h Range):  Temp:  [97.6 °F (36.4 °C)-98.2 °F (36.8 °C)] 98.1 °F (36.7 °C)  Pulse:  [] 70  Resp:  [13-30] 21  SpO2:  [89 %-100 %] 100 %  BP: ()/(49-72) 105/53  Arterial Line BP: (103-135)/(34-63) 124/45     Weight: 57.7 kg (127 lb 3.3 oz)  Body mass index is 26.59 kg/m².    Intake/Output - Last 3 Shifts       11/26 0700 - 11/27 0659 11/27 0700 - 11/28 0659 11/28 0700 - 11/29 0659    I.V. (mL/kg) 499 (8.2) 2737 (47.4) 142 (2.5)    Blood   478    NG/ 580 640    Total Intake(mL/kg) 1109 (18.2) 3317 (57.5) 1260 (21.8)    Urine (mL/kg/hr) 103 (0.1) 160 (0.1) 10 (0)    Drains       Other  2651 661    Stool 550 0 0    Total Output 653 2811 671    Net +456 +506 +589           Stool Occurrence  1 x 2 x          Physical Exam   Constitutional: No distress.   She is intubated, off sedation and levophed    HENT:   Head: Normocephalic.   Eyes: Right eye exhibits no discharge. Left eye exhibits no discharge.   Cardiovascular: Normal rate and regular rhythm.   Murmur heard.  Pulmonary/Chest: Breath sounds normal. She has no wheezes. She exhibits no tenderness.   Abdominal: Soft. Bowel sounds are normal. She exhibits distension. There is no tenderness.   Musculoskeletal: She exhibits no edema, tenderness or deformity.   Neurological: She is alert.   Skin: She is not diaphoretic.       Laboratory:  Immunosuppressants     None        Labs within the past 24 hours have been reviewed.    Diagnostic Results:  I have personally reviewed all pertinent imaging studies.    Assessment/Plan:   Krystal Hobson is a 67 y.o. female     Pulmonary   Acute respiratory failure with hypoxia    68yo F liver-listed, stepped up on 11/21 for respiratory failure with worsening respiratory acidosis     Plan:    Neuro:   -Pain control: prn fentanyl while intubated  -Sedation holiday since  yesterday morning   -Daily sedation vacations  -Followed all commands off sedation     Pulmonary:   -intubated on arrival to SICU 11/21  Vent Mode: A/C  Oxygen Concentration (%):  [] 50  Resp Rate Total:  [14 br/min-69 br/min] 25 br/min  Vt Set:  [300 mL-350 mL] 300 mL  PEEP/CPAP:  [5 cmH20-8 cmH20] 8 cmH20  Pressure Support:  [0 cmH20] 0 cmH20  Mean Airway Pressure:  [9.4 pzV41-64 cmH20] 16 cmH20  -daily CXR - evidence of significant bilateral consolidation   -ABGs daily and prn  -continuous pulse ox   - Failed SBT with Pulm 11/25    Cardiac:  -SBP > 100 goal  -Heart failure following, appreciate recs   - Afib with RVR 11/23/18, broke out with 2x 5mg doses of IV metop, her pressure was stable throughout  - Went back into Afib with RVR on 11/25. Given IV metoprolol 5mg q3. Did not resolve, started on diltiazem drip. Converted out of Afib around 3am. dilt drip stopped at that time.   -started back on diltiazem drip overnight. Continue today for rate control    Renal:   -Mendoza in place  -UOP minimal in last 24hrs  -Bun/Cr trending up, continue to monitor  - Had trial of lasix, followed by lasix + diuril yesterday with minimal UOP response  -Nephrology following, appreciate recommendations, likely will require dialysis today  -Started CRRT 11/24/18. Will continue nocturnal CRRT     Fluids/Electrolytes/Nutrition/GI:   -Nutritional status: NPO while intubated  -Continue TF @ 40cc/hour  -replace lytes PRN  -will hold Lactulose enemas TID    Hematology/Oncology:  -Hgb 7.8/23.6. Continue to monitor    -PT/INR: 15.7, 1.6 today   -continue to monitor. Transfuse as needed     Infectious Disease:   -Afebrile  -WBC 6.17, continue to monitor   -Abx: Zosyn.     Endocrine:  -Glucose goal of 140-180  -Endocrine following, see recommendations    Dispo:  -Continue care in the ICU setting  -continue CRRT   -no SBT today. Optimize vent settings          Cardiac/Vascular   Sinus tachycardia    - Worsened with albuterol, improved  with BB.   - remains in NSR. Continue tele.     Atrial fibrillation    -  Patient with episode of Afib with RVR at OSH and was placed on diltiazem gtt which was discontinued 11/10 as she reverted to NSR.  Cardiology consulted for management.   -  Pt then returned back into Afib w/ RVR on the afternoon of 11/11 and pt placed back on diltiazem for rate control.   -  Pt now rated controlled and diltiazem d/c'd on 11/12 at 0200. Cont with lopressor at this time as pt remains rate controlled.       Renal/   CKD (chronic kidney disease) stage 3, GFR 30-59 ml/min    - See ENZO.  Stable.     Acute kidney injury superimposed on CKD    - CKD 3-4 at baseline creatinine. Urine electrolytes ordered. Etiology possibly due to hepatorenal syndrome.   - HRS protocol started on admission, albumin and octreotide. No midodrine as with Afib.   - Cr since admission has improved  - Dosing lasix daily based on labs -- Lasix 40mg IV x 2 doses today  - Must weigh daily and cont strict I&Os.      Oncology   Anemia of chronic disease    - H/H stable. Continue to monitor with daily cbc.   - no overt signs of bleeding     Endocrine   Type 2 diabetes mellitus with hyperglycemia, with long-term current use of insulin    - Basal and prandial insulin ordered. Endocrine consulted for management. Apprec recs.   - Pt noted with significant hypoglycemic event the evening of 11/14, BG 8. Pt responded well to IV dextrose.   - blood glucose has remained stable and diabetic current regimen.       GI   * Hepatic encephalopathy    - Acute on chronic. PO lactulose ordered, continue rifaximin. Titrate Lactulose to maintain 3-4 BM a day. PRN lactulose enema TID for worsening confusion.  - currently pt is AAOx4, no encephalopathy noted.  Mentation is delayed.       Chronic liver disease           End stage liver disease    -  Listed for liver transplant with MELD 27. Per hepatologist, remains on internal hold 2/2 initially for PNA, but now for frailty and  elevated PA pressure. Continue to monitor.  -  Plan to discuss pt candidacy once resp status, nutrition, and physical mobility improve. Need repeat RHC for elevated PA pressure. Cards following    MELD-Na score: 31 at 11/24/2018  3:25 AM  MELD score: 31 at 11/24/2018  3:25 AM  Calculated from:  Serum Creatinine: 2.6 mg/dL at 11/24/2018  3:25 AM  Serum Sodium: 142 mmol/L (Rounded to 137 mmol/L) at 11/24/2018  3:25 AM  Total Bilirubin: 7.4 mg/dL at 11/24/2018  3:25 AM  INR(ratio): 2.1 at 11/24/2018  3:25 AM  Age: 67 years     Decompensated hepatic cirrhosis    - See hepatic encephalopathy.          The patients clinical status was discussed at multidisplinary rounds, involving transplant surgery, transplant medicine, pharmacy, nursing, nutrition, and social work.    Suzette Chamberlain MD  Liver Transplant  [facility]

## 2018-11-30 NOTE — ASSESSMENT & PLAN NOTE
Acute respiratory failure with hypoxia     66yo F liver-listed, stepped up on 11/21 for respiratory failure with worsening respiratory acidosis      Plan:     Neuro:   -Pain control: prn fentanyl while intubated  -Sedation off  -Following commands      Pulmonary:   -intubated on arrival to SICU 11/21  -CXR with bilateral patchy opacities, concerning for viral pneumonia -ABGs daily and prn  -Minimal Vent Settings  -PEEP increment for Compliance  -SBT this AM      Cardiac:  -SBP > 100 goal  - JACINDA 11/20 - elevated PA pressures   -Heart failure following, appreciate recs   - Afib with RVR -- required metoprolol 2.5 IV pushes and dilt drip overnight,  off all drips this AM     Renal:   -Mendoza in place  -Continue to monitor UOP: oliguria/anuria    - CRRT started 11/24  -CRRT overnight  -Nephrology following, appreciate recommendations      Fluids/Electrolytes/Nutrition/GI:   - TFs @ 40 - goal   -replace lytes PRN  -holding Lactulose enemas TID, per primary      Hematology/Oncology:  -Monitor CBC -- Transfuse 2U PRBC today  -PT/INR -- 2.4(2.0)  -continue to monitor. Transfuse as needed        Infectious Disease:   -Afebrile  -WBC 7.6  -Abx: Diflucan to cover HCAP      Endocrine:  -Glucose goal of 140-180  -Endocrine following     Dispo:  -Continue care in the ICU setting  -Wean to Extubate Today if Tolerating SBT  -Primary: Transplant

## 2018-11-30 NOTE — HPI
68 y.o. lady with DM, cryptogenic liver cirrhosis, breast cancer in remission was admitted for hepatic encephalopathy and currently on CRRT. GI was consulted for blood in stools and drop in H&H. She is currently intubated and as per her  and nurse she developed blood mixed with brown stools. Have not noticed any nausea, vomiting, abdominal pain diarrhea or constipation. She has previously not had any abdominal pathology. Her last colonoscopy from 8/16/17 showed scattered diverticula in the sigmoid junction with mild narrowing, minimal internal hemorrhoids. EGD from May,2018 showed grade I-II distal esophageal varices with no bleeding, mod - severe portal hypertensive gastropathy and antral gastritis. No known history of clon cancer in family.    Her Hb dropped from 8.1 to 7.4 and has required transfusion. She has a Plt of 17 today and INR 2.4. MELD-Na score: 24 at 11/30/2018  1:00 PM  CTAP on 10/30 Abnormal circumferential wall thickening of the cecum.  Questionable adjacent peritoneal nodules versus mildly enlarged lymph nodes.

## 2018-11-30 NOTE — SUBJECTIVE & OBJECTIVE
Interval History/Significant Events:     Remains Intubated  A fib with RVR overnight. Rate in 150s - 2.5 metoprolol given 5 min apart. Diltiazem drip restarted to keep HR <100. She was only on this temporarily - on no drips this AM.   No pressors  CRRT overnight.      Objective:     Vital Signs (Most Recent):  Temp: 97.9 °F (36.6 °C) (11/30/18 0745)  Pulse: 89 (11/30/18 0800)  Resp: 19 (11/30/18 0719)  BP: (!) 90/51 (11/30/18 0800)  SpO2: 97 % (11/30/18 0800) Vital Signs (24h Range):  Temp:  [97.7 °F (36.5 °C)-98.8 °F (37.1 °C)] 97.9 °F (36.6 °C)  Pulse:  [] 89  Resp:  [19-28] 19  SpO2:  [75 %-100 %] 97 %  BP: ()/(51-67) 90/51  Arterial Line BP: ()/(34-70) 111/43     Weight: 57.7 kg (127 lb 3.3 oz)  Body mass index is 26.59 kg/m².      Intake/Output Summary (Last 24 hours) at 11/30/2018 0859  Last data filed at 11/30/2018 0800  Gross per 24 hour   Intake 3317 ml   Output 6392 ml   Net -3075 ml       Physical Exam   Constitutional: She appears well-developed and well-nourished.   HENT:   Head: Normocephalic and atraumatic.   Eyes: Pupils are equal, round, and reactive to light.   jaundiced   Cardiovascular: Normal rate and regular rhythm.   Pulmonary/Chest:   intubated   Abdominal: Soft.   Neurological: She is alert.   Skin: Skin is warm and dry.       Vents:  Vent Mode: A/C (11/30/18 0713)  Ventilator Initiated: Yes (11/21/18 1839)  Set Rate: 16 bmp (11/30/18 0713)  Vt Set: 300 mL (11/30/18 0713)  Pressure Support: 5 cmH20 (11/29/18 1555)  PEEP/CPAP: 5 cmH20 (11/30/18 0713)  Oxygen Concentration (%): 35 (11/30/18 0800)  Peak Airway Pressure: 21 cmH2O (11/30/18 0713)  Plateau Pressure: 24 cmH20 (11/30/18 0713)  Total Ve: 6.41 mL (11/30/18 0713)  F/VT Ratio<105 (RSBI): (!) 79.1 (11/29/18 2009)    Lines/Drains/Airways     Central Venous Catheter Line                 Trialysis (Dialysis) Catheter 11/21/18 1851 left internal jugular 8 days          Drain                 NG/OG Tube 11/21/18 1840 [place]  sump 16 Fr. Right mouth 8 days          Airway                 Airway - Non-Surgical 11/21/18 1836 Endotracheal Tube 8 days          Arterial Line                 Arterial Line 11/21/18 1900 Right Radial 8 days          Pressure Ulcer                 Pressure Injury 11/27/18 Rectum Deep tissue injury 3 days          Peripheral Intravenous Line                 Peripheral IV - Single Lumen 11/26/18 1500 Left Antecubital 3 days                Significant Labs:    CBC/Anemia Profile:  Recent Labs   Lab 11/29/18  0400 11/29/18 2137 11/30/18  0321   WBC 4.90 6.59 7.66   HGB 8.9* 8.1* 7.4*   HCT 26.7* 25.6* 22.6*   PLT 29* 30* 19*   MCV 92 95 94   RDW 23.1* 23.9* 23.8*        Chemistries:  Recent Labs   Lab 11/29/18  0400 11/29/18  1309 11/29/18 2137 11/30/18  0321    137 139 138  138   K 4.4 4.4 4.5 4.4  4.4    107 111* 110  110   CO2 22* 26 25 25  25   BUN 4* 3* 3* 3*  3*   CREATININE 0.5 0.4* 0.5 0.4*  0.4*   CALCIUM 7.6* 7.7* 7.4* 7.3*  7.3*   ALBUMIN 2.6* 2.4* 2.3* 2.3*  2.3*   PROT 5.0*  --   --  4.8*   BILITOT 10.5*  --   --  8.3*   ALKPHOS 143*  --   --  138*   ALT 17  --   --  18   AST 48*  --   --  51*   MG 2.0 1.6 1.4* 2.4  2.4   PHOS 1.8* 1.8* 1.3* 1.3*       All pertinent labs within the past 24 hours have been reviewed.    Significant Imaging:  I have reviewed all pertinent imaging results/findings within the past 24 hours.

## 2018-11-30 NOTE — PROGRESS NOTES
[facility]  Nephrology  Progress Note    Patient Name: Krystal Hobson  MRN: [id number]  Admission Date: 11/10/2018  Hospital Length of Stay: 20 days  Attending Provider: Abrahan Montelongo MD   Primary Care Physician: Courtney Joe MD  Principal Problem:Hepatic encephalopathy    Subjective:     HPI: Reason for consult: Diuresis in setting of PAP46, pulmonary edema, HRS    Ms. 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  - She presented to [facility] [place] under liver transplant team due to encephalopathy and new onset ENZO.   - Per the , she was extremely disoriented @ OSH and her ammonia reached as high as 200, and her Cr level increased to 2.0 She also went into A fib with RVR and was started on a diltiazem gtt at OSH.   - On arrival at [facility] she was disoriented and started on lactulose with noted improvement. During her current admission on CXR they found a concern for HCAP and she was started on broad spectrum ABx and then shifted her on Moxifloxacin 7 D course. On 11/20/2018 found on CXR a concern for Lt lung middle lobe consolidation and started on vancomycin and zosyn.  - Renal fxn baseline 1.6 at admit but trending up    Interval History: AF+RVR resulting in hypotension overnight, resolved with bb+cardizem gtt, continuing on CRRT ~800mL off/24hrs with visible improvement in fluid balance on clinical exam though cxr remains diffusely consolidated.     Review of patient's allergies indicates:  No Known Allergies  Current Facility-Administered Medications   Medication Frequency    0.9%  NaCl infusion (CRRT USE ONLY) Continuous    0.9%  NaCl infusion (for blood administration) Q24H PRN    acetaminophen tablet 650 mg Q8H PRN    dextrose 50% injection 12.5 g PRN    diltiaZEM 125 mg in D5W 125 mL infusion Continuous    famotidine tablet 20 mg QHS    fluconazole tablet 200 mg Daily    glucagon (human recombinant) injection 1 mg PRN     guaifenesin 100 mg/5 ml syrup 200 mg Q6H    heparin (porcine) injection 5,000 Units Q8H    insulin aspart U-100 pen 0-5 Units Q4H PRN    insulin aspart U-100 pen 2 Units Q24H    insulin aspart U-100 pen 2 Units Q24H    insulin aspart U-100 pen 2 Units Q24H    insulin aspart U-100 pen 2 Units Q24H    insulin aspart U-100 pen 2 Units Q24H    insulin aspart U-100 pen 2 Units Q24H    insulin detemir U-100 pen 14 Units Daily    k phos di & mono-sod phos mono 250 mg tablet 2 tablet TID PRN    lactulose 10 gram/15 mL solution (enema) 200 g TID PRN    lactulose 20 gram/30 mL solution Soln 30 g Q6H PRN    levalbuterol nebulizer solution 1.25 mg Q6H WAKE    magnesium sulfate 2g in water 50mL IVPB (premix) PRN    ondansetron disintegrating tablet 8 mg Q8H PRN    propofol (DIPRIVAN) 10 mg/mL infusion Continuous    rifAXIMin tablet 550 mg BID    simethicone chewable tablet 80 mg TID PRN    sodium chloride 0.9% flush 3 mL PRN       Objective:     Vital Signs (Most Recent):  Temp: 97.6 °F (36.4 °C) (11/30/18 1100)  Pulse: 87 (11/30/18 1315)  Resp: 19 (11/30/18 0719)  BP: 132/61 (11/30/18 1300)  SpO2: 100 % (11/30/18 1315)  O2 Device (Oxygen Therapy): ventilator (11/30/18 1312) Vital Signs (24h Range):  Temp:  [97.6 °F (36.4 °C)-98.8 °F (37.1 °C)] 97.6 °F (36.4 °C)  Pulse:  [] 87  Resp:  [19-28] 19  SpO2:  [95 %-100 %] 100 %  BP: ()/(51-76) 132/61  Arterial Line BP: ()/(34-53) 116/41     Weight: 57.7 kg (127 lb 3.3 oz) (11/28/18 0400)  Body mass index is 26.59 kg/m².  Body surface area is 1.54 meters squared.    I/O last 3 completed shifts:  In: 6942 [I.V.:5322; NG/GT:1620]  Out: 9974 [Drains:20; Other:9954]    Physical Exam   HENT:   Head: Normocephalic and atraumatic.   Eyes: Scleral icterus is present.   Neck: No JVD present.   Cardiovascular: Normal rate and regular rhythm.   Pulmonary/Chest: Effort normal and breath sounds normal. She has no wheezes.   On vent, mechanical breath  sounds     Abdominal: Soft. She exhibits no distension.   Musculoskeletal: She exhibits no tenderness. Edema: anasarca almost resolved in limbs.   Neurological:   Pt is more alert today, spontaneously keeping eyes open and tracking.   Skin: Skin is warm and dry.   jaundiced   Vitals reviewed.          Assessment/Plan:     Acute kidney injury superimposed on CKD    Cryptogenic cirrhosis awaiting liver xplant, presented for AMS and found to have ENZO on CKDIIIb. SLED started last weekend with intermittent AF+RVR+hypotensive episodes controlled with bb/cardizem gtt adequately. Now on CRRT with improving overall fluid status.    A/P 11/30  - Diffuse CXR infiltrates persist, no significant change from previous. Overall clinical hypervolemia improved with resolution of anasarca. Continuing SBT trials. Weaned off pressors, tolerating well.   - SLED started over the weekend, has developed paroxysmal AF+RVR with hypotension during some previous sessions, BB + cardizem gtt PRN for rate control  - CVP 8 this morning, continuing CRRT with PRN volume correction (estimate -800mL net fluid balance on CRRT /last 24 hrs, epic chart fluid balance not accurate)  - strict Is & Os         Thank you for your consult. I will follow-up with patient. Please contact us if you have any additional questions.    Kelvin Liriano MD  Nephrology  [facility]

## 2018-11-30 NOTE — ASSESSMENT & PLAN NOTE
Cryptogenic cirrhosis awaiting liver xplant, presented for AMS and found to have ENZO on CKDIIIb. SLED started last weekend with intermittent AF+RVR+hypotensive episodes controlled with bb/cardizem gtt adequately. Now on CRRT with improving overall fluid status.    A/P 11/30  - Diffuse CXR infiltrates persist, no significant change from previous. Overall clinical hypervolemia improved with resolution of anasarca. Continuing SBT trials. Weaned off pressors, tolerating well.   - SLED started over the weekend, has developed paroxysmal AF+RVR with hypotension during some previous sessions, BB + cardizem gtt PRN for rate control  - CVP 8 this morning, continuing CRRT with PRN volume correction (estimate -800mL net fluid balance on CRRT /last 24 hrs, epic chart fluid balance not accurate)  - strict Is & Os

## 2018-11-30 NOTE — PROGRESS NOTES
Pt in a-fib with RVR, rate in the 150-160. Dr. Sol notified. 2.5mg metoprolol given 5 minutes apart. Pt SBP dropped into the 80-90. Verbal orders from Dr. Sol to restart pt on diltiazem drip (titraed per order) to keep HR <100. VSS. Will continue to monitor.

## 2018-11-30 NOTE — PROGRESS NOTES
[facility]  Critical Care - Surgery  Progress Note    Patient Name: Krystal Hobson  MRN: [id number]  Admission Date: 11/10/2018  Hospital Length of Stay: 20 days  Code Status: Full Code  Attending Provider: Abrahan Montelongo MD  Primary Care Provider: Courtney Jeo MD   Principal Problem: Hepatic encephalopathy    Subjective:     Hospital/ICU Course:  No notes on file    Interval History/Significant Events:     Remains Intubated  A fib with RVR overnight. Rate in 150s - 2.5 metoprolol given 5 min apart. Diltiazem drip restarted to keep HR <100. She was only on this temporarily - on no drips this AM.   No pressors  CRRT overnight.      Objective:     Vital Signs (Most Recent):  Temp: 97.9 °F (36.6 °C) (11/30/18 0745)  Pulse: 89 (11/30/18 0800)  Resp: 19 (11/30/18 0719)  BP: (!) 90/51 (11/30/18 0800)  SpO2: 97 % (11/30/18 0800) Vital Signs (24h Range):  Temp:  [97.7 °F (36.5 °C)-98.8 °F (37.1 °C)] 97.9 °F (36.6 °C)  Pulse:  [] 89  Resp:  [19-28] 19  SpO2:  [75 %-100 %] 97 %  BP: ()/(51-67) 90/51  Arterial Line BP: ()/(34-70) 111/43     Weight: 57.7 kg (127 lb 3.3 oz)  Body mass index is 26.59 kg/m².      Intake/Output Summary (Last 24 hours) at 11/30/2018 0859  Last data filed at 11/30/2018 0800  Gross per 24 hour   Intake 3317 ml   Output 6392 ml   Net -3075 ml       Physical Exam   Constitutional: She appears well-developed and well-nourished.   HENT:   Head: Normocephalic and atraumatic.   Eyes: Pupils are equal, round, and reactive to light.   jaundiced   Cardiovascular: Normal rate and regular rhythm.   Pulmonary/Chest:   intubated   Abdominal: Soft.   Neurological: She is alert.   Skin: Skin is warm and dry.       Vents:  Vent Mode: A/C (11/30/18 0713)  Ventilator Initiated: Yes (11/21/18 4609)  Set Rate: 16 bmp (11/30/18 0713)  Vt Set: 300 mL (11/30/18 0713)  Pressure Support: 5 cmH20 (11/29/18 1555)  PEEP/CPAP: 5 cmH20 (11/30/18 0713)  Oxygen Concentration (%): 35 (11/30/18  0800)  Peak Airway Pressure: 21 cmH2O (11/30/18 0713)  Plateau Pressure: 24 cmH20 (11/30/18 0713)  Total Ve: 6.41 mL (11/30/18 0713)  F/VT Ratio<105 (RSBI): (!) 79.1 (11/29/18 2009)    Lines/Drains/Airways     Central Venous Catheter Line                 Trialysis (Dialysis) Catheter 11/21/18 1851 left internal jugular 8 days          Drain                 NG/OG Tube 11/21/18 1840 [place] sump 16 Fr. Right mouth 8 days          Airway                 Airway - Non-Surgical 11/21/18 1836 Endotracheal Tube 8 days          Arterial Line                 Arterial Line 11/21/18 1900 Right Radial 8 days          Pressure Ulcer                 Pressure Injury 11/27/18 Rectum Deep tissue injury 3 days          Peripheral Intravenous Line                 Peripheral IV - Single Lumen 11/26/18 1500 Left Antecubital 3 days                Significant Labs:    CBC/Anemia Profile:  Recent Labs   Lab 11/29/18  0400 11/29/18 2137 11/30/18  0321   WBC 4.90 6.59 7.66   HGB 8.9* 8.1* 7.4*   HCT 26.7* 25.6* 22.6*   PLT 29* 30* 19*   MCV 92 95 94   RDW 23.1* 23.9* 23.8*        Chemistries:  Recent Labs   Lab 11/29/18  0400 11/29/18  1309 11/29/18 2137 11/30/18  0321    137 139 138  138   K 4.4 4.4 4.5 4.4  4.4    107 111* 110  110   CO2 22* 26 25 25  25   BUN 4* 3* 3* 3*  3*   CREATININE 0.5 0.4* 0.5 0.4*  0.4*   CALCIUM 7.6* 7.7* 7.4* 7.3*  7.3*   ALBUMIN 2.6* 2.4* 2.3* 2.3*  2.3*   PROT 5.0*  --   --  4.8*   BILITOT 10.5*  --   --  8.3*   ALKPHOS 143*  --   --  138*   ALT 17  --   --  18   AST 48*  --   --  51*   MG 2.0 1.6 1.4* 2.4  2.4   PHOS 1.8* 1.8* 1.3* 1.3*       All pertinent labs within the past 24 hours have been reviewed.    Significant Imaging:  I have reviewed all pertinent imaging results/findings within the past 24 hours.    Assessment/Plan:     Acute respiratory failure with hypoxia    Acute respiratory failure with hypoxia     68yo F liver-listed, stepped up on 11/21 for respiratory failure with  worsening respiratory acidosis      Plan:     Neuro:   -Pain control: prn fentanyl while intubated  -Sedation off  -Following commands      Pulmonary:   -intubated on arrival to SICU 11/21  -CXR with bilateral patchy opacities, concerning for viral pneumonia -ABGs daily and prn  -Minimal Vent Settings  -PEEP increment for Compliance  -SBT this AM      Cardiac:  -SBP > 100 goal  - JACINDA 11/20 - elevated PA pressures   -Heart failure following, appreciate recs   - Afib with RVR -- required metoprolol 2.5 IV pushes and dilt drip overnight,  off all drips this AM     Renal:   -Mendoza in place  -Continue to monitor UOP: oliguria/anuria    - CRRT started 11/24  -CRRT overnight  -Nephrology following, appreciate recommendations      Fluids/Electrolytes/Nutrition/GI:   - TFs @ 40 - goal   -replace lytes PRN  -holding Lactulose enemas TID, per primary      Hematology/Oncology:  -Monitor CBC -- Transfuse 2U PRBC today  -PT/INR -- 2.4(2.0)  -continue to monitor. Transfuse as needed        Infectious Disease:   -Afebrile  -WBC 7.6  -Abx: Diflucan to cover HCAP      Endocrine:  -Glucose goal of 140-180  -Endocrine following     Dispo:  -Continue care in the ICU setting  -Wean to Extubate Today if Tolerating SBT  -Primary: Transplant               Critical care was time spent personally by me on the following activities: development of treatment plan with patient or surrogate and bedside caregivers, discussions with consultants, evaluation of patient's response to treatment, examination of patient, ordering and performing treatments and interventions, ordering and review of laboratory studies, ordering and review of radiographic studies, pulse oximetry, re-evaluation of patient's condition.  This critical care time did not overlap with that of any other provider or involve time for any procedures.     Alice Doty MD  Critical Care - Surgery  [facility]

## 2018-11-30 NOTE — PLAN OF CARE
Problem: Patient Care Overview  Goal: Plan of Care Review  Outcome: Ongoing (interventions implemented as appropriate)  POC reviewed by pt and spouse. Pt Awake and following commands. Pt on no continuous IV medications. Pt on vent. A/C 35% FiO2 and 5 of PEEP. O2 sat 100% throughout shift. Pt had three bowel movements throughout shift. Pt last BM maroon in color. Labs draw. 1 unit PRBC given. Pt on CRRT. Pressure stable throughout shift. Intermittent a-fib throughout shift with rate in 150s-160. Pt skin free from any new breakdown. VSS. See flow sheets for details. Family to remain at bedside. All questions and concerns addressed. Will continue to monitor.

## 2018-11-30 NOTE — SUBJECTIVE & OBJECTIVE
Subjective:     Review of Systems   Unable to perform ROS: Intubated     Objective:     Vital Signs (Most Recent):  Temp: 97.8 °F (36.6 °C) (11/30/18 1500)  Pulse: 79 (11/30/18 1515)  Resp: (!) 22 (11/30/18 1455)  BP: (!) 163/69 (11/30/18 1500)  SpO2: 100 % (11/30/18 1515) Vital Signs (24h Range):  Temp:  [97.6 °F (36.4 °C)-98.8 °F (37.1 °C)] 97.8 °F (36.6 °C)  Pulse:  [] 79  Resp:  [19-28] 22  SpO2:  [95 %-100 %] 100 %  BP: ()/(51-76) 163/69  Arterial Line BP: ()/(34-53) 113/40     Weight: 57.7 kg (127 lb 3.3 oz) (11/28/18 0400)  Body mass index is 26.59 kg/m².      Intake/Output Summary (Last 24 hours) at 11/30/2018 1539  Last data filed at 11/30/2018 1500  Gross per 24 hour   Intake 3577 ml   Output 6658 ml   Net -3081 ml       Lines/Drains/Airways     Central Venous Catheter Line                 Trialysis (Dialysis) Catheter 11/21/18 1851 left internal jugular 8 days          Drain                 NG/OG Tube 11/21/18 1840 [place] sump 16 Fr. Right mouth 8 days          Airway                 Airway - Non-Surgical 11/21/18 1836 Endotracheal Tube 8 days          Arterial Line                 Arterial Line 11/21/18 1900 Right Radial 8 days          Pressure Ulcer                 Pressure Injury 11/27/18 Rectum Deep tissue injury 3 days          Peripheral Intravenous Line                 Peripheral IV - Single Lumen 11/26/18 1500 Left Antecubital 4 days                Physical Exam   Constitutional: She appears well-developed and well-nourished.   HENT:   Head: Normocephalic and atraumatic.   Eyes: Pupils are equal, round, and reactive to light.   Spontaneously opens eyes  Jaundice+   Neck: Normal range of motion. No JVD present. No tracheal deviation present.   Cardiovascular: Normal rate, regular rhythm and normal heart sounds.   No murmur heard.  Pulmonary/Chest: She has no wheezes. She has no rales.   Intubated   Abdominal: Soft. Bowel sounds are normal. She exhibits no distension. There is no  tenderness.   Umbilical hernia   Musculoskeletal: Normal range of motion. She exhibits no edema.   Neurological: She is alert.       Significant Labs:  All pertinent lab results from the last 24 hours have been reviewed.      Significant Imaging:  Imaging results within the past 24 hours have been reviewed.

## 2018-12-01 NOTE — ASSESSMENT & PLAN NOTE
Caution with insulin stacking  Estimated Creatinine Clearance: 113.3 mL/min (A) (based on SCr of 0.4 mg/dL (L)).

## 2018-12-01 NOTE — SUBJECTIVE & OBJECTIVE
Interval History/Significant Events:   NAEO. Pt tolerated CRRT. Pt did begin to have increased respiratory effort on spontaneous vent settings with pressure support of 5-10 and peep of 5. Placed on SIMV as pt appeared tired on spontaneous pointing to chest. Pt placed back on spontaneous this morning      Objective:     Vital Signs (Most Recent):  Temp: 97.7 °F (36.5 °C) (12/01/18 0700)  Pulse: 86 (12/01/18 0739)  Resp: (!) 24 (12/01/18 0739)  BP: (!) 104/55 (12/01/18 0700)  SpO2: 95 % (12/01/18 0739) Vital Signs (24h Range):  Temp:  [97.6 °F (36.4 °C)-98.3 °F (36.8 °C)] 97.7 °F (36.5 °C)  Pulse:  [] 86  Resp:  [22-30] 24  SpO2:  [76 %-100 %] 95 %  BP: ()/(44-79) 104/55  Arterial Line BP: ()/(37-53) 110/41     Weight: 53.3 kg (117 lb 8.1 oz)  Body mass index is 24.56 kg/m².      Intake/Output Summary (Last 24 hours) at 12/1/2018 0745  Last data filed at 12/1/2018 0700  Gross per 24 hour   Intake 4965 ml   Output 6576 ml   Net -1611 ml       Physical Exam   Constitutional: She appears well-developed and well-nourished.   HENT:   Head: Normocephalic and atraumatic.   Eyes: Pupils are equal, round, and reactive to light.   Cardiovascular: Normal rate and regular rhythm.   Pulmonary/Chest:   intubated on spontaneous PEEP 5 PS 5 FiO2  Abdominal: Soft.   Neurological: She is alert.   Skin: Skin is warm and dry.         Lines/Drains/Airways     Central Venous Catheter Line                 Trialysis (Dialysis) Catheter 11/21/18 1851 left internal jugular 9 days          Drain                 NG/OG Tube 11/21/18 1840 [place] sump 16 Fr. Right mouth 9 days          Airway                 Airway - Non-Surgical 11/21/18 1836 Endotracheal Tube 9 days          Arterial Line                 Arterial Line 11/21/18 1900 Right Radial 9 days          Pressure Ulcer                 Pressure Injury 11/27/18 Rectum Deep tissue injury 4 days          Peripheral Intravenous Line                 Peripheral IV - Single Lumen  11/26/18 1500 Left Antecubital 4 days                Significant Labs:    CBC/Anemia Profile:  Recent Labs   Lab 11/30/18  1706 11/30/18  2054 12/01/18  0024 12/01/18  0400   WBC 10.13 9.41 10.63 10.70   HGB 8.3* 7.7* 7.7* 7.4*   HCT 25.4* 23.7* 23.4* 22.5*   PLT 17* 20* 23*  --    MCV 94 95 94 96   RDW 22.7* 22.9* 23.4* 23.9*        Chemistries:  Recent Labs   Lab 11/30/18  0321 11/30/18  1300 11/30/18  2215 12/01/18  0400     138 142 142 142  142   K 4.4  4.4 4.5 4.8 4.7  4.7     110 113* 112* 112*  112*   CO2 25  25 23 26 27  27   BUN 3*  3* 4* 6* 4*  4*   CREATININE 0.4*  0.4* 0.4* 0.5 0.4*  0.4*   CALCIUM 7.3*  7.3* 7.2* 7.4* 7.6*  7.6*   ALBUMIN 2.3*  2.3* 2.2* 2.1* 2.0*  2.0*   PROT 4.8*  --   --  4.4*   BILITOT 8.3*  --   --  11.5*   ALKPHOS 138*  --   --  125   ALT 18  --   --  16   AST 51*  --   --  51*   MG 2.4  2.4 1.8 1.9 1.6   PHOS 1.3* 2.6* 2.2* 1.5*       All pertinent labs within the past 24 hours have been reviewed.    Significant Imaging:  I have reviewed all pertinent imaging results/findings within the past 24 hours.

## 2018-12-01 NOTE — PROGRESS NOTES
[facility]  Liver Transplant  Progress Note    Patient Name: Krystal Hobson  MRN: [id number]  Admission Date: 11/10/2018  Hospital Length of Stay: 20 days  Code Status: Full Code  Primary Care Provider: Courtney Joe MD    Subjective:   Episode of Afib overnight controlled with diltiazem drip.     History of Present Illness:  Ms. Krystal Hobson is a 67 yr F w/ hx of cryptogenic cirrhosis, listed for liver tx 9/14/18 who presented to ER at outside hospital in [place], [place] with increased abdominal pain and then noted to have AMS on arrival and ENZO on lab work. Per the , she was extremely disoriented and her ammonia reached as high as 200. Her Cr level increased to 2.0 at the OSH. She also went into A fib with RVR and was started on a diltiazem infusion at OSH. She was then transferred to [facility] for further care. On arrival pt was noted with confusion which has now resolved with lactulose. She also was noted with a cough and fatigue.       Hospital Course:  Interval History: no acute events overnight. More lethargic in early evening yest, responded well to lactulose enema. 2D echo yest with PA pressure 46, HTS consulted for RHC end of the week at the earliest. BNP 1892. Plan to diurese, dose lasix daily based on Cr as just recovering from ENZO, lasix 40mg IV x 2 doses today. UOP decreased yesterday, Cr mildly elevated today. Cough and breathing continue to improve, repeat CXR this AM as required 2L O2 overnight, persistent JUAN opacification. Pulmonology consulted. Start treatment for HCAP with vanc/zosyn. RUE edema improving. Remains on internal hold 2/2 frailty and elevated PA pressure. MELD 30 today. Continue lactulose to titrate for 3-4 stools/day. Repeat labs 4pm.     Scheduled Meds:   famotidine  20 mg Oral QHS    fluconazole  200 mg Oral Daily    guaifenesin 100 mg/5 ml  200 mg Per OG tube Q6H    heparin (porcine)  5,000 Units Subcutaneous Q8H    insulin aspart U-100  2 Units Subcutaneous  Q24H    insulin aspart U-100  2 Units Subcutaneous Q24H    insulin aspart U-100  2 Units Subcutaneous Q24H    insulin aspart U-100  2 Units Subcutaneous Q24H    insulin aspart U-100  2 Units Subcutaneous Q24H    insulin aspart U-100  2 Units Subcutaneous Q24H    insulin detemir U-100  14 Units Subcutaneous Daily    levalbuterol  1.25 mg Nebulization Q6H WAKE    rifAXImin  550 mg Oral BID     Continuous Infusions:   sodium chloride 0.9%      dilTIAZem Stopped (11/30/18 0400)    propofol Stopped (11/26/18 0800)     PRN Meds:sodium chloride, acetaminophen, dextrose 50%, glucagon (human recombinant), insulin aspart U-100, k phos di & mono-sod phos mono, lactulose, lactulose, magnesium sulfate IVPB, ondansetron, simethicone, sodium chloride 0.9%    Review of Systems  Objective:     Vital Signs (Most Recent):  Temp: 97.8 °F (36.6 °C) (11/30/18 1500)  Pulse: 97 (11/30/18 1800)  Resp: (!) 22 (11/30/18 1455)  BP: (!) 119/58 (11/30/18 1800)  SpO2: 97 % (11/30/18 1800) Vital Signs (24h Range):  Temp:  [97.6 °F (36.4 °C)-97.9 °F (36.6 °C)] 97.8 °F (36.6 °C)  Pulse:  [] 97  Resp:  [19-28] 22  SpO2:  [95 %-100 %] 97 %  BP: ()/(51-76) 119/58  Arterial Line BP: ()/(34-53) 125/43     Weight: 57.7 kg (127 lb 3.3 oz)  Body mass index is 26.59 kg/m².    Intake/Output - Last 3 Shifts       11/28 0700 - 11/29 0659 11/29 0700 - 11/30 0659 11/30 0700 - 12/01 0659    I.V. (mL/kg) 2867 (49.7) 2597 (45) 1800 (31.2)    Blood 478      NG/GT 1420 740 620    Total Intake(mL/kg) 4765 (82.6) 3337 (57.8) 2420 (41.9)    Urine (mL/kg/hr) 10 (0) 0 (0)     Emesis/NG output  0     Drains   40    Other 3989 6303 3685    Stool 0 0     Total Output 3996 6358 3725    Net +769 -2987 -1305           Urine Occurrence  0 x     Stool Occurrence 4 x 5 x     Emesis Occurrence  0 x           Physical Exam   Constitutional: She appears well-developed and well-nourished.   HENT:   Head: Normocephalic and atraumatic.   Eyes: Pupils are  equal, round, and reactive to light.   Spontaneously opens eyes  Jaundice+   Neck: Normal range of motion. No JVD present. No tracheal deviation present.   Cardiovascular: Normal rate, regular rhythm and normal heart sounds.   No murmur heard.  Pulmonary/Chest: She has no wheezes. She has no rales.   Intubated   Abdominal: Soft. Bowel sounds are normal. She exhibits no distension. There is no tenderness.   Umbilical hernia   Musculoskeletal: Normal range of motion. She exhibits no edema.   Neurological: She is alert.       Laboratory:  Immunosuppressants     None        Labs within the past 24 hours have been reviewed.    Diagnostic Results:  I have personally reviewed all pertinent imaging studies.    Assessment/Plan:     * Hepatic encephalopathy    - Acute on chronic. PO lactulose ordered, continue rifaximin. Titrate Lactulose to maintain 3-4 BM a day. PRN lactulose enema TID for worsening confusion.  - currently pt is AAOx4, no encephalopathy noted.  Mentation is delayed.       Acute respiratory failure with hypoxia    66yo F liver-listed, stepped up on 11/21 for respiratory failure with worsening respiratory acidosis     Plan:    Neuro:   -Pain control: prn fentanyl while intubated  -Sedation holiday since yesterday morning   -Daily sedation vacations  -Followed all commands off sedation     Pulmonary:   -intubated on arrival to SICU 11/21  Vent Mode: Spont  Oxygen Concentration (%):  [30-36] 30  Resp Rate Total:  [15 br/min-43 br/min] 31 br/min  Vt Set:  [300 mL] 300 mL  PEEP/CPAP:  [5 cmH20] 5 cmH20  Pressure Support:  [5 cmH20-10 cmH20] 5 cmH20  Mean Airway Pressure:  [6.4 ijE19-69 cmH20] 6.4 cmH20  -daily CXR - evidence of significant bilateral consolidation   -ABGs daily and prn  -continuous pulse ox   - Failed SBT with Pulm 11/25  -placed on spontaneous today. Extubate if appropriate     Cardiac:  -SBP > 100 goal  -Heart failure following, appreciate recs   - Afib with RVR 11/23/18, broke out with 2x 5mg  doses of IV metop, her pressure was stable throughout  - Went back into Afib with RVR on 11/25. Given IV metoprolol 5mg q3. Did not resolve, started on diltiazem drip. Converted out of Afib around 3am. dilt drip stopped at that time.   -started back on diltiazem drip overnight. Continue today for rate control if needed     Renal:   -Mendoza in place  -UOP minimal in last 24hrs  -Bun/Cr trending up, continue to monitor  - Had trial of lasix, followed by lasix + diuril yesterday with minimal UOP response  -Nephrology following, appreciate recommendations, likely will require dialysis today  -Started CRRT 11/24/18. Will continue nocturnal CRRT     Fluids/Electrolytes/Nutrition/GI:   -Nutritional status: NPO while intubated  -Continue TF @ 40cc/hour  -replace lytes PRN  -will hold Lactulose enemas TID  -bloody bowel movement today. Consult GI    Hematology/Oncology:  -Hgb 7.8/23.6. Continue to monitor    -PT/INR: 15.7, 1.6 today   -continue to monitor. Transfuse as needed     Infectious Disease:   -Afebrile  -WBC 6.17, continue to monitor   -Abx: Zosyn.     Endocrine:  -Glucose goal of 140-180  -Endocrine following, see recommendations    Dispo:  -Continue care in the ICU setting  -continue CRRT   -SBT today, possible extubation          Sinus tachycardia    - Worsened with albuterol, improved with BB.   - remains in NSR. Continue tele.     Atrial fibrillation    -  Patient with episode of Afib with RVR at OSH and was placed on diltiazem gtt which was discontinued 11/10 as she reverted to NSR.  Cardiology consulted for management.   -  Pt then returned back into Afib w/ RVR on the afternoon of 11/11 and pt placed back on diltiazem for rate control.   -  Pt now rated controlled and diltiazem d/c'd on 11/12 at 0200. Cont with lopressor at this time as pt remains rate controlled.       CKD (chronic kidney disease) stage 3, GFR 30-59 ml/min    - See ENZO.  Stable.     Chronic liver disease           Type 2 diabetes mellitus  with hyperglycemia, with long-term current use of insulin    - Basal and prandial insulin ordered. Endocrine consulted for management. Apprec recs.   - Pt noted with significant hypoglycemic event the evening of 11/14, BG 8. Pt responded well to IV dextrose.   - blood glucose has remained stable and diabetic current regimen.       Anemia of chronic disease    - H/H stable. Continue to monitor with daily cbc.   - no overt signs of bleeding     End stage liver disease    -  Listed for liver transplant with MELD 27. Per hepatologist, remains on internal hold 2/2 initially for PNA, but now for frailty and elevated PA pressure. Continue to monitor.  -  Plan to discuss pt candidacy once resp status, nutrition, and physical mobility improve. Need repeat RHC for elevated PA pressure. Cards following    MELD-Na score: 31 at 11/24/2018  3:25 AM  MELD score: 31 at 11/24/2018  3:25 AM  Calculated from:  Serum Creatinine: 2.6 mg/dL at 11/24/2018  3:25 AM  Serum Sodium: 142 mmol/L (Rounded to 137 mmol/L) at 11/24/2018  3:25 AM  Total Bilirubin: 7.4 mg/dL at 11/24/2018  3:25 AM  INR(ratio): 2.1 at 11/24/2018  3:25 AM  Age: 67 years     Acute kidney injury superimposed on CKD    - CKD 3-4 at baseline creatinine. Urine electrolytes ordered. Etiology possibly due to hepatorenal syndrome.   - HRS protocol started on admission, albumin and octreotide. No midodrine as with Afib.   - Cr since admission has improved  - Dosing lasix daily based on labs -- Lasix 40mg IV x 2 doses today  - Must weigh daily and cont strict I&Os.      Decompensated hepatic cirrhosis    - See hepatic encephalopathy.          VTE Risk Mitigation (From admission, onward)        Ordered     IP VTE HIGH RISK PATIENT  Once      11/10/18 0213     heparin (porcine) injection 5,000 Units  Every 8 hours      11/10/18 0213          The patients clinical status was discussed at multidisplinary rounds, involving transplant surgery, transplant medicine, pharmacy, nursing,  nutrition, and social work    Discharge Planning:  No Patient Care Coordination Note on file.      Suzette Chamberlain MD  Liver Transplant  [facility]

## 2018-12-01 NOTE — SUBJECTIVE & OBJECTIVE
Scheduled Meds:   famotidine  20 mg Oral QHS    fluconazole  200 mg Oral Daily    guaifenesin 100 mg/5 ml  200 mg Per OG tube Q6H    heparin (porcine)  5,000 Units Subcutaneous Q8H    insulin aspart U-100  2 Units Subcutaneous Q24H    insulin aspart U-100  2 Units Subcutaneous Q24H    insulin aspart U-100  2 Units Subcutaneous Q24H    insulin aspart U-100  2 Units Subcutaneous Q24H    insulin aspart U-100  2 Units Subcutaneous Q24H    insulin aspart U-100  2 Units Subcutaneous Q24H    insulin detemir U-100  14 Units Subcutaneous Daily    levalbuterol  1.25 mg Nebulization Q6H WAKE    rifAXImin  550 mg Oral BID     Continuous Infusions:   sodium chloride 0.9%      dilTIAZem Stopped (11/30/18 0400)    propofol Stopped (11/26/18 0800)     PRN Meds:sodium chloride, acetaminophen, dextrose 50%, glucagon (human recombinant), insulin aspart U-100, k phos di & mono-sod phos mono, lactulose, lactulose, magnesium sulfate IVPB, ondansetron, simethicone, sodium chloride 0.9%    Review of Systems  Objective:     Vital Signs (Most Recent):  Temp: 97.8 °F (36.6 °C) (11/30/18 1500)  Pulse: 97 (11/30/18 1800)  Resp: (!) 22 (11/30/18 1455)  BP: (!) 119/58 (11/30/18 1800)  SpO2: 97 % (11/30/18 1800) Vital Signs (24h Range):  Temp:  [97.6 °F (36.4 °C)-97.9 °F (36.6 °C)] 97.8 °F (36.6 °C)  Pulse:  [] 97  Resp:  [19-28] 22  SpO2:  [95 %-100 %] 97 %  BP: ()/(51-76) 119/58  Arterial Line BP: ()/(34-53) 125/43     Weight: 57.7 kg (127 lb 3.3 oz)  Body mass index is 26.59 kg/m².    Intake/Output - Last 3 Shifts       11/28 0700 - 11/29 0659 11/29 0700 - 11/30 0659 11/30 0700 - 12/01 0659    I.V. (mL/kg) 2867 (49.7) 2597 (45) 1800 (31.2)    Blood 478      NG/GT 1420 740 620    Total Intake(mL/kg) 4765 (82.6) 3337 (57.8) 2420 (41.9)    Urine (mL/kg/hr) 10 (0) 0 (0)     Emesis/NG output  0     Drains   40    Other 2523 3646 5894    Stool 0 0     Total Output 0540 2772 4215    Net +880 -4510 -0569            Urine Occurrence  0 x     Stool Occurrence 4 x 5 x     Emesis Occurrence  0 x           Physical Exam   Constitutional: She appears well-developed and well-nourished.   HENT:   Head: Normocephalic and atraumatic.   Eyes: Pupils are equal, round, and reactive to light.   Spontaneously opens eyes  Jaundice+   Neck: Normal range of motion. No JVD present. No tracheal deviation present.   Cardiovascular: Normal rate, regular rhythm and normal heart sounds.   No murmur heard.  Pulmonary/Chest: She has no wheezes. She has no rales.   Intubated   Abdominal: Soft. Bowel sounds are normal. She exhibits no distension. There is no tenderness.   Umbilical hernia   Musculoskeletal: Normal range of motion. She exhibits no edema.   Neurological: She is alert.       Laboratory:  Immunosuppressants     None        Labs within the past 24 hours have been reviewed.    Diagnostic Results:  I have personally reviewed all pertinent imaging studies.

## 2018-12-01 NOTE — CONSULTS
[facility]  Gastroenterology  Consult Note    Patient Name: Krystal Hobson  MRN: [id number]  Admission Date: 11/10/2018  Hospital Length of Stay: 20 days  Code Status: Full Code   Attending Provider: Abrahan Montelongo MD   Consulting Provider: Paulette Garcia MD  Primary Care Physician: Courtney Joe MD  Principal Problem:Hepatic encephalopathy    Inpatient consult to Gastroenterology  Consult performed by: Paulette Garcia MD  Consult ordered by: Suzette Chamberlain MD        Subjective:     HPI:  68 y.o. lady with DM, cryptogenic liver cirrhosis, breast cancer in remission was admitted for hepatic encephalopathy and currently on CRRT. GI was consulted for blood in stools and drop in H&H. She is currently intubated and as per her  and nurse she developed blood mixed with brown stools. She also recently had FMS and has lesions around the rectum Have not noticed any nausea, vomiting, abdominal pain diarrhea or constipation. She has previously not had any abdominal pathology. Her last colonoscopy from 8/16/17 showed scattered diverticula in the sigmoid junction with mild narrowing, minimal internal hemorrhoids. EGD from May,2018 showed grade I-II distal esophageal varices with no bleeding, mod - severe portal hypertensive gastropathy and antral gastritis. No known history of clon cancer in family.    Her Hb dropped from 8.1 to 7.4 and has required transfusion. She has a Plt of 17 today and INR 2.4. MELD-Na score: 24 at 11/30/2018  1:00 PM  CTAP on 10/30 Abnormal circumferential wall thickening of the cecum.  Questionable adjacent peritoneal nodules versus mildly enlarged lymph nodes.           Subjective:     Review of Systems   Unable to perform ROS: Intubated     Objective:     Vital Signs (Most Recent):  Temp: 97.8 °F (36.6 °C) (11/30/18 1500)  Pulse: 79 (11/30/18 1515)  Resp: (!) 22 (11/30/18 1455)  BP: (!) 163/69 (11/30/18 1500)  SpO2: 100 % (11/30/18 1515) Vital Signs (24h  Range):  Temp:  [97.6 °F (36.4 °C)-98.8 °F (37.1 °C)] 97.8 °F (36.6 °C)  Pulse:  [] 79  Resp:  [19-28] 22  SpO2:  [95 %-100 %] 100 %  BP: ()/(51-76) 163/69  Arterial Line BP: ()/(34-53) 113/40     Weight: 57.7 kg (127 lb 3.3 oz) (11/28/18 0400)  Body mass index is 26.59 kg/m².      Intake/Output Summary (Last 24 hours) at 11/30/2018 1539  Last data filed at 11/30/2018 1500  Gross per 24 hour   Intake 3577 ml   Output 6658 ml   Net -3081 ml       Lines/Drains/Airways     Central Venous Catheter Line                 Trialysis (Dialysis) Catheter 11/21/18 1851 left internal jugular 8 days          Drain                 NG/OG Tube 11/21/18 1840 Alpine sump 16 Fr. Right mouth 8 days          Airway                 Airway - Non-Surgical 11/21/18 1836 Endotracheal Tube 8 days          Arterial Line                 Arterial Line 11/21/18 1900 Right Radial 8 days          Pressure Ulcer                 Pressure Injury 11/27/18 Rectum Deep tissue injury 3 days          Peripheral Intravenous Line                 Peripheral IV - Single Lumen 11/26/18 1500 Left Antecubital 4 days                Physical Exam   Constitutional: She appears well-developed and well-nourished.   HENT:   Head: Normocephalic and atraumatic.   Eyes: Pupils are equal, round, and reactive to light.   Spontaneously opens eyes  Jaundice+   Neck: Normal range of motion. No JVD present. No tracheal deviation present.   Cardiovascular: Normal rate, regular rhythm and normal heart sounds.   No murmur heard.  Pulmonary/Chest: She has no wheezes. She has no rales.   Intubated   Abdominal: Soft. Bowel sounds are normal. She exhibits no distension. There is no tenderness.   Umbilical hernia   Musculoskeletal: Normal range of motion. She exhibits no edema.   Neurological: She is alert.       Significant Labs:  All pertinent lab results from the last 24 hours have been reviewed.      Significant Imaging:  Imaging results within the past 24 hours  have been reviewed.    Assessment/Plan:     68 y.o. lady with cryptogenic liver cirrhosis was admitted for hepatic encephalopathy and currently on CRRT. She has had brown stool with visible blood and a drop in H&H from 8.1 to 7.4 after a transfusion has improved to 9. She has a Plt of 17 and an INR of 2.4.Her last colonoscopy from 8/16/17 showed scattered diverticula in the sigmoid junction with mild narrowing, minimal internal hemorrhoids. EGD from May,2018 showed grade I-II distal esophageal varices with no bleeding, mod - severe portal hypertensive gastropathy and antral gastritis.     Patient is hemodynamically stable and in view of her low platelets, expect some spontaneous bleeding and currently it is risky to the procedure and no intervention can be done.    -Trend H&H, Keep Hb >7 unless further indicated by primary team  -Will watch her for now  -Start IV PPI 40 mg BID  -Would recommend holding off on Heparin.  - If further significant drop in H&H or unstable than can consider doing a flexibly sigmoidoscopy after platelet transfusion if platelets do not improve.  - Maintain 2 large bore IVs      Thank you for your consult. I will follow-up with patient. Please contact us if you have any additional questions.    Paulette Garcia MD  Gastroenterology  [facility]

## 2018-12-01 NOTE — ASSESSMENT & PLAN NOTE
68yo F liver-listed, stepped up on 11/21 for respiratory failure with worsening respiratory acidosis     Plan:    Neuro:   -Pain control: prn fentanyl while intubated  -Sedation holiday since yesterday morning   -Daily sedation vacations  -Followed all commands off sedation     Pulmonary:   -intubated on arrival to SICU 11/21  Vent Mode: Spont  Oxygen Concentration (%):  [30-36] 30  Resp Rate Total:  [15 br/min-43 br/min] 31 br/min  Vt Set:  [300 mL] 300 mL  PEEP/CPAP:  [5 cmH20] 5 cmH20  Pressure Support:  [5 cmH20-10 cmH20] 5 cmH20  Mean Airway Pressure:  [6.4 hwD37-20 cmH20] 6.4 cmH20  -daily CXR - evidence of significant bilateral consolidation   -ABGs daily and prn  -continuous pulse ox   - Failed SBT with Pulm 11/25  -placed on spontaneous today. Extubate if appropriate     Cardiac:  -SBP > 100 goal  -Heart failure following, appreciate recs   - Afib with RVR 11/23/18, broke out with 2x 5mg doses of IV metop, her pressure was stable throughout  - Went back into Afib with RVR on 11/25. Given IV metoprolol 5mg q3. Did not resolve, started on diltiazem drip. Converted out of Afib around 3am. dilt drip stopped at that time.   -started back on diltiazem drip overnight. Continue today for rate control if needed     Renal:   -Mendoza in place  -UOP minimal in last 24hrs  -Bun/Cr trending up, continue to monitor  - Had trial of lasix, followed by lasix + diuril yesterday with minimal UOP response  -Nephrology following, appreciate recommendations, likely will require dialysis today  -Started CRRT 11/24/18. Will continue nocturnal CRRT     Fluids/Electrolytes/Nutrition/GI:   -Nutritional status: NPO while intubated  -Continue TF @ 40cc/hour  -replace lytes PRN  -will hold Lactulose enemas TID  -bloody bowel movement today. Consult GI    Hematology/Oncology:  -Hgb 7.8/23.6. Continue to monitor    -PT/INR: 15.7, 1.6 today   -continue to monitor. Transfuse as needed     Infectious Disease:   -Afebrile  -WBC 6.17, continue  to monitor   -Abx: Zosyn.     Endocrine:  -Glucose goal of 140-180  -Endocrine following, see recommendations    Dispo:  -Continue care in the ICU setting  -continue CRRT   -SBT today, possible extubation

## 2018-12-01 NOTE — ASSESSMENT & PLAN NOTE
Avoid insulin stacking and hypoglycemia.  Lab Results   Component Value Date    CREATININE 0.4 (L) 12/01/2018    CREATININE 0.4 (L) 12/01/2018

## 2018-12-01 NOTE — ASSESSMENT & PLAN NOTE
66yo F liver-listed, stepped up on 11/21 for respiratory failure with worsening respiratory acidosis     Plan:    Neuro:   -Pain control: prn fentanyl while intubated  -Continue sedation holiday as tolerated, awake and alert without fighting vent   -Followed all commands off sedation     Pulmonary:   -intubated on arrival to SICU 11/21  Vent Mode: A/C  Oxygen Concentration (%):  [] 50  Resp Rate Total:  [16 br/min-44 br/min] 18 br/min  Vt Set:  [300 mL] 300 mL  PEEP/CPAP:  [5 cmH20] 5 cmH20  Pressure Support:  [5 cmH20-10 cmH20] 5 cmH20  Mean Airway Pressure:  [6.1 mpF75-52 cmH20] 12 cmH20  -daily CXR - evidence of significant bilateral consolidation   -ABGs daily and prn  -continuous pulse ox   - Failed SBT with Pulm 11/25  -placed on spontaneous today, will not be able to extubate successfully - c/s placed to gen surg for trach    Cardiac:  -SBP > 100 goal  -Heart failure following, appreciate recs   - Afib with RVR 11/23/18, broke out with 2x 5mg doses of IV metop, her pressure was stable throughout  - Went back into Afib with RVR on 11/25. Given IV metoprolol 5mg q3. Did not resolve, started on diltiazem drip. Converted out of Afib around 3am. dilt drip stopped at that time.   -continue dilt gtt as needed for Afib, try to prevent Afib by administering metoprolol prior to CRRT    Renal:   -Mendoza in place  -UOP minimal  -Nephrology following, appreciate recommendations  -Started CRRT 11/24/18. Will continue nocturnal CRRT     Fluids/Electrolytes/Nutrition/GI:   -Nutritional status: NPO while intubated  -Continue TF @ 40cc/hour  -replace lytes PRN  -will hold Lactulose enemas TID  -bloody bowel movement today, GI following appreciate recs    Hematology/Oncology:  -Hgb 7.4/22.5. Continue to monitor    -INR 3.2 today, will transfuse 2FFP and recheck INR tonight   -continue to monitor. Transfuse as needed     Infectious Disease:   -Afebrile  -WBC 10.7, continue to monitor   -Abx: Zosyn.     Endocrine:  -Glucose  goal of 140-180  -Endocrine following, see recommendations    Dispo:  -Continue care in the ICU setting  -continue CRRT   -c/s for trach

## 2018-12-01 NOTE — PROGRESS NOTES
"[facility]  Endocrinology  Progress Note    Admit Date: 11/10/2018     Reason for Consult: Management of type 2 DM, Hyperglycemia     Surgical Procedure and Date: n/a    Diabetes diagnosis year: 2001    Home Diabetes Medications: Levemir 12 units BID (vial) and Humalog SSI with meals   Lab Results   Component Value Date    HGBA1C 5.8 (H) 11/10/2018         How often checking glucose at home? 1-3  BG readings on regimen: 150-300  Hypoglycemia on the regimen? once 27; required visit to ED; gave Levemir and humalog and patient did not eat  Missed doses on regimen?  No    Diabetes Complications include:     Hyperglycemia, Hypoglycemia  and Diabetic peripheral neuropathy     Complicating diabetes co morbidities:   CIRRHOSIS      HPI:   Patient is a 67 y.o. female with a diagnosis of type 2 DM; well controlled on MDI. Also with   Cryptogenic cirrhosis, rheumatoid arthritis, and GERD. Patient was listed for liver transplant on 9/14/18. Patient presented to ED of hospital in [place] with AMS and ENZO. Endocrinology consulted for BG/ DM management.   Of note, profound hypoglycemic event (BG < 20) the night of 11/14/18.            Interval HPI:   Overnight events: Remains in ICU, intubated. A-fib noted overnight.  BG above goal overnight on  current insulin regimen.  TF remains at goal.  Eating:   NPO  Nausea: No  Hypoglycemia and intervention: No  Fever: No  TPN and/or TF: Yes  TF and rate: Isosource at 40 cc/hr (goal)    BP (!) 104/55 (BP Location: Left arm, Patient Position: Lying)   Pulse 86   Temp 97.7 °F (36.5 °C) (Oral)   Resp (!) 24   Ht 4' 10" (1.473 m)   Wt 53.3 kg (117 lb 8.1 oz)   SpO2 95%   Breastfeeding? No   BMI 24.56 kg/m²      Labs Reviewed and Include    Recent Labs   Lab 12/01/18  0400   *  182*   CALCIUM 7.6*  7.6*   ALBUMIN 2.0*  2.0*   PROT 4.4*     142   K 4.7  4.7   CO2 27  27   *  112*   BUN 4*  4*   CREATININE 0.4*  0.4*   ALKPHOS 125   ALT 16 "   AST 51*   BILITOT 11.5*     Lab Results   Component Value Date    WBC 10.70 12/01/2018    HGB 7.4 (L) 12/01/2018    HCT 22.5 (L) 12/01/2018    MCV 96 12/01/2018    PLT 23 (LL) 12/01/2018     No results for input(s): TSH, FREET4 in the last 168 hours.  Lab Results   Component Value Date    HGBA1C 5.8 (H) 11/10/2018       Nutritional status:   Body mass index is 24.56 kg/m².  Lab Results   Component Value Date    ALBUMIN 2.0 (L) 12/01/2018    ALBUMIN 2.0 (L) 12/01/2018    ALBUMIN 2.1 (L) 11/30/2018     No results found for: PREALBUMIN    Estimated Creatinine Clearance: 113.3 mL/min (A) (based on SCr of 0.4 mg/dL (L)).    Accu-Checks  Recent Labs     11/29/18  2007 11/30/18  0105 11/30/18  0406 11/30/18  0851 11/30/18  1045 11/30/18  1557 11/30/18  2144 12/01/18  0028 12/01/18  0348 12/01/18  0811   POCTGLUCOSE 129* 150* 178* 257* 206* 169* 200* 201* 211* 217*       Current Medications and/or Treatments Impacting Glycemic Control  Immunotherapy:    Immunosuppressants     None        Steroids:   Hormones (From admission, onward)    None        Pressors:    Autonomic Drugs (From admission, onward)    None        Hyperglycemia/Diabetes Medications:   Antihyperglycemics (From admission, onward)    Start     Stop Route Frequency Ordered    11/29/18 1200  insulin aspart U-100 pen 2 Units      -- SubQ Every 24 hours (non-standard times) 11/28/18 1425    11/29/18 0800  insulin aspart U-100 pen 2 Units      -- SubQ Every 24 hours (non-standard times) 11/28/18 1425    11/29/18 0400  insulin aspart U-100 pen 2 Units      -- SubQ Every 24 hours (non-standard times) 11/28/18 1425    11/29/18 0000  insulin aspart U-100 pen 2 Units      -- SubQ Every 24 hours (non-standard times) 11/28/18 1425    11/28/18 2000  insulin aspart U-100 pen 2 Units      -- SubQ Every 24 hours (non-standard times) 11/28/18 1425    11/28/18 1600  insulin aspart U-100 pen 2 Units      -- SubQ Every 24 hours (non-standard times) 11/28/18 1425    11/26/18  0947  insulin aspart U-100 pen 0-5 Units      -- SubQ Every 4 hours PRN 11/26/18 0848    11/23/18 0900  insulin detemir U-100 pen 14 Units      -- SubQ Daily 11/23/18 0735          ASSESSMENT and PLAN    * Hepatic encephalopathy    Managed per primary.        Type 2 diabetes mellitus with hyperglycemia, with long-term current use of insulin    BG goal 140-180    Levemir 14 units daily.  Increase Novolog to 3 units every 4 hours with TF; hold if TF held   Low dose correction scale  BG monitoring every 4 hours    Discharge planning: TBD       Acute kidney injury superimposed on CKD    Avoid insulin stacking and hypoglycemia.  Lab Results   Component Value Date    CREATININE 0.4 (L) 12/01/2018    CREATININE 0.4 (L) 12/01/2018            Decompensated hepatic cirrhosis    Managed per primary.   Listed for liver transplant.  May impact BG readings       On enteral nutrition    On supplemental TF, elevating BG readings       CKD (chronic kidney disease) stage 3, GFR 30-59 ml/min    Caution with insulin stacking  Estimated Creatinine Clearance: 113.3 mL/min (A) (based on SCr of 0.4 mg/dL (L)).             Tristan Hwang NP  Endocrinology  [facility]

## 2018-12-01 NOTE — PLAN OF CARE
Problem: Patient Care Overview  Goal: Plan of Care Review  Pt intubated on spontaneous vent settings 40% FiO2, PEEP 5. Follows commands. 2 episodes of a. Fib overnight, metoprolol given both times with conversion back to NSR. SBP maintained > 100. Afebrile. 3 medium bloody BMs. Anuric. CRRT overnight with UF of 300. Plan for extubation trial today. Will continue to monitor closely.

## 2018-12-01 NOTE — PLAN OF CARE
Problem: Patient Care Overview  Goal: Plan of Care Review  Outcome: Ongoing (interventions implemented as appropriate)  Patient and patient's spouse updated on plan of care. Patient remains intubated. Awake, alert, and follows commands.  HR 80-90's NSR; however, patient did go into Afib with HR increasing to 130's and SBP dropping into the 80's when patient was in Afib. Patient shortly converted back to SR after restarting Diltiazem gtt, which is currently at 7.5 mg/hr. CVP 8 and 11. 100% O2 saturation on ventilator. Anuric. Plan for CRRT to restart tomorrow. 6 dark red and dark brown bowel movements during shift. GI team consulted. 2 units of FFP and 1 unit of PRBC's given during shift. Plan for tracheostomy placement on Monday, December 3rd. All questions and concerns acknowledged and answered. See flow sheet for full assessment details. Will continue to monitor patient closely.

## 2018-12-01 NOTE — SUBJECTIVE & OBJECTIVE
Interval History: Patient evaluated bedside, intubated on mechanical ventilation, alert, awake, currently on SLED.  Night was uneventful.  Patient has had 3 episodes of watery bowel movement this morning.    Review of patient's allergies indicates:  No Known Allergies  Current Facility-Administered Medications   Medication Frequency    0.9%  NaCl infusion (CRRT USE ONLY) Continuous    0.9%  NaCl infusion (for blood administration) Q24H PRN    acetaminophen tablet 650 mg Q8H PRN    dextrose 50% injection 12.5 g PRN    diltiaZEM 125 mg in D5W 125 mL infusion Continuous    famotidine tablet 20 mg QHS    fluconazole tablet 200 mg Daily    glucagon (human recombinant) injection 1 mg PRN    guaifenesin 100 mg/5 ml syrup 200 mg Q6H    heparin (porcine) injection 5,000 Units Q8H    insulin aspart U-100 pen 0-5 Units Q4H PRN    [START ON 12/2/2018] insulin aspart U-100 pen 3 Units Q24H    [START ON 12/2/2018] insulin aspart U-100 pen 3 Units Q24H    [START ON 12/2/2018] insulin aspart U-100 pen 3 Units Q24H    insulin aspart U-100 pen 3 Units Q24H    insulin aspart U-100 pen 3 Units Q24H    insulin aspart U-100 pen 3 Units Q24H    insulin detemir U-100 pen 14 Units Daily    k phos di & mono-sod phos mono 250 mg tablet 2 tablet TID PRN    lactulose 10 gram/15 mL solution (enema) 200 g TID PRN    lactulose 20 gram/30 mL solution Soln 30 g Q6H PRN    levalbuterol nebulizer solution 1.25 mg Q6H WAKE    magnesium sulfate 2g in water 50mL IVPB (premix) PRN    metoprolol injection 2.5 mg Q5 Min PRN    ondansetron disintegrating tablet 8 mg Q8H PRN    potassium, sodium phosphates 280-160-250 mg packet 2 packet QID    propofol (DIPRIVAN) 10 mg/mL infusion Continuous    rifAXIMin tablet 550 mg BID    simethicone chewable tablet 80 mg TID PRN    sodium chloride 0.9% flush 3 mL PRN    vasopressin (PITRESSIN) 0.2 Units/mL in dextrose 5 % 100 mL infusion Continuous       Objective:     Vital Signs (Most  Recent):  Temp: 97.7 °F (36.5 °C) (12/01/18 0700)  Pulse: 86 (12/01/18 0911)  Resp: (!) 24 (12/01/18 0739)  BP: (!) 104/55 (12/01/18 0700)  SpO2: (!) 91 % (12/01/18 0911)  O2 Device (Oxygen Therapy): ventilator (12/01/18 0911) Vital Signs (24h Range):  Temp:  [97.6 °F (36.4 °C)-98.3 °F (36.8 °C)] 97.7 °F (36.5 °C)  Pulse:  [] 86  Resp:  [22-30] 24  SpO2:  [76 %-100 %] 91 %  BP: ()/(44-79) 104/55  Arterial Line BP: ()/(37-53) 110/41     Weight: 53.3 kg (117 lb 8.1 oz) (12/01/18 0400)  Body mass index is 24.56 kg/m².  Body surface area is 1.48 meters squared.    I/O last 3 completed shifts:  In: 5785 [I.V.:4025; NG/GT:1760]  Out: 79620 [Drains:40; Other:39566]    Physical Exam  Constitutional: She appears well-developed and well-nourished.   HENT:   Head: Normocephalic and atraumatic.   Eyes: Pupils are equal, round, and reactive to light.   Cardiovascular: Normal rate and regular rhythm.   Pulmonary/Chest:   intubated on spontaneous  Abdominal: Soft.   Neurological: She is alert.   Skin: Skin is warm and dry.   Nursing notes and vitals reviewed.      Significant Labs:  CBC:   Recent Labs   Lab 12/01/18 0400   WBC 10.70   RBC 2.35*   HGB 7.4*   HCT 22.5*   PLT 28*   MCV 96   MCH 31.5*   MCHC 32.9     CMP:   Recent Labs   Lab 12/01/18 0400   *  182*   CALCIUM 7.6*  7.6*   ALBUMIN 2.0*  2.0*   PROT 4.4*     142   K 4.7  4.7   CO2 27  27   *  112*   BUN 4*  4*   CREATININE 0.4*  0.4*   ALKPHOS 125   ALT 16   AST 51*   BILITOT 11.5*     All labs within the past 24 hours have been reviewed.     Significant Imaging:  CXR personally reviewed.

## 2018-12-01 NOTE — PROGRESS NOTES
[facility]  Liver Transplant  Progress Note    Patient Name: Krystal Hobson  MRN: [id number]  Admission Date: 11/10/2018  Hospital Length of Stay: 21 days  Code Status: Full Code  Primary Care Provider: Courtney Joe MD    Subjective:     Interval History: No acute events overnight, afebrile, vital signs stable. Had to be put back on a rate on the vent overnight. Net negative 3L. Continues to have bloody bowel movements. Briefly went into Afib this morning but self- converted this morning prior to re-starting diltiazem gtt.    Scheduled Meds:   famotidine  20 mg Oral QHS    fluconazole  200 mg Oral Daily    guaifenesin 100 mg/5 ml  200 mg Per OG tube Q6H    [START ON 12/2/2018] insulin aspart U-100  3 Units Subcutaneous Q24H    [START ON 12/2/2018] insulin aspart U-100  3 Units Subcutaneous Q24H    [START ON 12/2/2018] insulin aspart U-100  3 Units Subcutaneous Q24H    insulin aspart U-100  3 Units Subcutaneous Q24H    insulin aspart U-100  3 Units Subcutaneous Q24H    insulin aspart U-100  3 Units Subcutaneous Q24H    insulin detemir U-100  14 Units Subcutaneous Daily    k phos di & mono-sod phos mono  500 mg Per NG tube TID    levalbuterol  1.25 mg Nebulization Q6H WAKE    rifAXImin  550 mg Oral BID     Continuous Infusions:   sodium chloride 0.9% 200 mL/hr at 12/01/18 0914    dilTIAZem 7.5 mg/hr (12/01/18 1600)    propofol Stopped (11/26/18 0800)    vasopressin (PITRESSIN) infusion       PRN Meds:sodium chloride, sodium chloride, sodium chloride, acetaminophen, dextrose 50%, glucagon (human recombinant), insulin aspart U-100, k phos di & mono-sod phos mono, lactulose, lactulose, metoprolol, ondansetron, simethicone, sodium chloride 0.9%    Review of Systems   All other systems reviewed and are negative.    Objective:     Vital Signs (Most Recent):  Temp: 97.8 °F (36.6 °C) (12/01/18 1530)  Pulse: 85 (12/01/18 1551)  Resp: 13 (12/01/18 1550)  BP: (!) 115/56 (12/01/18 1530)  SpO2: 96  % (12/01/18 1551) Vital Signs (24h Range):  Temp:  [97.6 °F (36.4 °C)-98.3 °F (36.8 °C)] 97.8 °F (36.6 °C)  Pulse:  [] 85  Resp:  [13-30] 13  SpO2:  [70 %-100 %] 96 %  BP: ()/(37-79) 115/56  Arterial Line BP: ()/(36-52) 125/42     Weight: 53.3 kg (117 lb 8.1 oz)  Body mass index is 24.56 kg/m².    Intake/Output - Last 3 Shifts       11/29 0700 - 11/30 0659 11/30 0700 - 12/01 0659 12/01 0700 - 12/02 0659    I.V. (mL/kg) 2597 (45) 3745 (70.3)     Blood       NG/ 1280 360    Total Intake(mL/kg) 3337 (57.8) 5025 (94.3) 360 (6.8)    Urine (mL/kg/hr) 0 (0)      Emesis/NG output 0      Drains  40     Other 6324 6861 480    Stool 0 0 0    Total Output 6324 6901 480    Net -2987 -1876 -120           Urine Occurrence 0 x      Stool Occurrence 5 x 1 x 6 x    Emesis Occurrence 0 x            Physical Exam   Constitutional: She appears well-developed and well-nourished.   HENT:   Head: Normocephalic and atraumatic.   Eyes: Pupils are equal, round, and reactive to light.   Spontaneously opens eyes  Jaundice+   Neck: Normal range of motion. No JVD present. No tracheal deviation present.   Cardiovascular: Normal rate, regular rhythm and normal heart sounds.   No murmur heard.  Pulmonary/Chest: She has no wheezes. She has no rales.   Intubated   Abdominal: Soft. Bowel sounds are normal. She exhibits no distension. There is no tenderness.   Umbilical hernia   Musculoskeletal: Normal range of motion. She exhibits no edema.   Neurological: She is alert.       Laboratory:  Immunosuppressants     None        Labs within the past 24 hours have been reviewed.    Diagnostic Results:  I have personally reviewed all pertinent imaging studies.    Assessment/Plan:   Krystal Hobson is a 68 y.o. female     Pulmonary   Acute respiratory failure with hypoxia    66yo F liver-listed, stepped up on 11/21 for respiratory failure with worsening respiratory acidosis     Plan:    Neuro:   -Pain control: prn fentanyl while  intubated  -Continue sedation holiday as tolerated, awake and alert without fighting vent   -Followed all commands off sedation     Pulmonary:   -intubated on arrival to SICU 11/21  Vent Mode: A/C  Oxygen Concentration (%):  [] 50  Resp Rate Total:  [16 br/min-44 br/min] 18 br/min  Vt Set:  [300 mL] 300 mL  PEEP/CPAP:  [5 cmH20] 5 cmH20  Pressure Support:  [5 cmH20-10 cmH20] 5 cmH20  Mean Airway Pressure:  [6.1 ziP17-45 cmH20] 12 cmH20  -daily CXR - evidence of significant bilateral consolidation   -ABGs daily and prn  -continuous pulse ox   - Failed SBT with Pulm 11/25  -placed on spontaneous today, will not be able to extubate successfully - c/s placed to gen surg for trach    Cardiac:  -SBP > 100 goal  -Heart failure following, appreciate recs   - Afib with RVR 11/23/18, broke out with 2x 5mg doses of IV metop, her pressure was stable throughout  - Went back into Afib with RVR on 11/25. Given IV metoprolol 5mg q3. Did not resolve, started on diltiazem drip. Converted out of Afib around 3am. dilt drip stopped at that time.   -continue dilt gtt as needed for Afib, try to prevent Afib by administering metoprolol prior to CRRT    Renal:   -Mendoza in place  -UOP minimal  -Nephrology following, appreciate recommendations  -Started CRRT 11/24/18. Will continue nocturnal CRRT     Fluids/Electrolytes/Nutrition/GI:   -Nutritional status: NPO while intubated  -Continue TF @ 40cc/hour  -replace lytes PRN  -will hold Lactulose enemas TID  -bloody bowel movement today, GI following appreciate recs    Hematology/Oncology:  -Hgb 7.4/22.5. Continue to monitor    -INR 3.2 today, will transfuse 2FFP and recheck INR tonight   -continue to monitor. Transfuse as needed     Infectious Disease:   -Afebrile  -WBC 10.7, continue to monitor   -Abx: Zosyn.     Endocrine:  -Glucose goal of 140-180  -Endocrine following, see recommendations    Dispo:  -Continue care in the ICU setting  -continue CRRT   -c/s for trach             The  patients clinical status was discussed at multidisplinary rounds, involving transplant surgery, transplant medicine, pharmacy, nursing, nutrition, and social work.    Lien Angela MD  Liver Transplant  [facility]

## 2018-12-01 NOTE — PROGRESS NOTES
[facility]  Nephrology  Progress Note    Patient Name: Krystal Hobson  MRN: [id number]  Admission Date: 11/10/2018  Hospital Length of Stay: 21 days  Attending Provider: Abrahan Montelongo MD   Primary Care Physician: Courtney Joe MD  Principal Problem:Hepatic encephalopathy    Subjective:     HPI: Reason for consult: Diuresis in setting of PAP46, pulmonary edema, HRS    Ms. 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  - She presented to [facility] as xfer from [place] under liver transplant team due to encephalopathy and new onset ENZO.   - Per the , she was extremely disoriented @ OSH and her ammonia reached as high as 200, and her Cr level increased to 2.0 She also went into A fib with RVR and was started on a diltiazem gtt at OSH.   - On arrival at [facility] she was disoriented and started on lactulose with noted improvement. During her current admission on CXR they found a concern for HCAP and she was started on broad spectrum ABx and then shifted her on Moxifloxacin 7 D course. On 11/20/2018 found on CXR a concern for Lt lung middle lobe consolidation and started on vancomycin and zosyn.  - Renal fxn baseline 1.6 at admit but trending up    Interval History: Patient evaluated bedside, intubated on mechanical ventilation, alert, awake, currently on SLED.  Night was uneventful.  Patient has had 3 episodes of watery bowel movement this morning.    Review of patient's allergies indicates:  No Known Allergies  Current Facility-Administered Medications   Medication Frequency    0.9%  NaCl infusion (CRRT USE ONLY) Continuous    0.9%  NaCl infusion (for blood administration) Q24H PRN    acetaminophen tablet 650 mg Q8H PRN    dextrose 50% injection 12.5 g PRN    diltiaZEM 125 mg in D5W 125 mL infusion Continuous    famotidine tablet 20 mg QHS    fluconazole tablet 200 mg Daily    glucagon (human recombinant) injection 1 mg PRN    guaifenesin 100 mg/5 ml  syrup 200 mg Q6H    heparin (porcine) injection 5,000 Units Q8H    insulin aspart U-100 pen 0-5 Units Q4H PRN    [START ON 12/2/2018] insulin aspart U-100 pen 3 Units Q24H    [START ON 12/2/2018] insulin aspart U-100 pen 3 Units Q24H    [START ON 12/2/2018] insulin aspart U-100 pen 3 Units Q24H    insulin aspart U-100 pen 3 Units Q24H    insulin aspart U-100 pen 3 Units Q24H    insulin aspart U-100 pen 3 Units Q24H    insulin detemir U-100 pen 14 Units Daily    k phos di & mono-sod phos mono 250 mg tablet 2 tablet TID PRN    lactulose 10 gram/15 mL solution (enema) 200 g TID PRN    lactulose 20 gram/30 mL solution Soln 30 g Q6H PRN    levalbuterol nebulizer solution 1.25 mg Q6H WAKE    magnesium sulfate 2g in water 50mL IVPB (premix) PRN    metoprolol injection 2.5 mg Q5 Min PRN    ondansetron disintegrating tablet 8 mg Q8H PRN    potassium, sodium phosphates 280-160-250 mg packet 2 packet QID    propofol (DIPRIVAN) 10 mg/mL infusion Continuous    rifAXIMin tablet 550 mg BID    simethicone chewable tablet 80 mg TID PRN    sodium chloride 0.9% flush 3 mL PRN    vasopressin (PITRESSIN) 0.2 Units/mL in dextrose 5 % 100 mL infusion Continuous       Objective:     Vital Signs (Most Recent):  Temp: 97.7 °F (36.5 °C) (12/01/18 0700)  Pulse: 86 (12/01/18 0911)  Resp: (!) 24 (12/01/18 0739)  BP: (!) 104/55 (12/01/18 0700)  SpO2: (!) 91 % (12/01/18 0911)  O2 Device (Oxygen Therapy): ventilator (12/01/18 0911) Vital Signs (24h Range):  Temp:  [97.6 °F (36.4 °C)-98.3 °F (36.8 °C)] 97.7 °F (36.5 °C)  Pulse:  [] 86  Resp:  [22-30] 24  SpO2:  [76 %-100 %] 91 %  BP: ()/(44-79) 104/55  Arterial Line BP: ()/(37-53) 110/41     Weight: 53.3 kg (117 lb 8.1 oz) (12/01/18 0400)  Body mass index is 24.56 kg/m².  Body surface area is 1.48 meters squared.    I/O last 3 completed shifts:  In: 5785 [I.V.:4025; NG/GT:1760]  Out: 48508 [Drains:40; Other:31549]    Physical Exam  Constitutional: She appears  well-developed and well-nourished.   HENT:   Head: Normocephalic and atraumatic.   Eyes: Pupils are equal, round, and reactive to light.   Cardiovascular: Normal rate and regular rhythm.   Pulmonary/Chest:   intubated on spontaneous  Abdominal: Soft.   Neurological: She is alert.   Skin: Skin is warm and dry.   Nursing notes and vitals reviewed.      Significant Labs:  CBC:   Recent Labs   Lab 12/01/18  0400   WBC 10.70   RBC 2.35*   HGB 7.4*   HCT 22.5*   PLT 28*   MCV 96   MCH 31.5*   MCHC 32.9     CMP:   Recent Labs   Lab 12/01/18  0400   *  182*   CALCIUM 7.6*  7.6*   ALBUMIN 2.0*  2.0*   PROT 4.4*     142   K 4.7  4.7   CO2 27  27   *  112*   BUN 4*  4*   CREATININE 0.4*  0.4*   ALKPHOS 125   ALT 16   AST 51*   BILITOT 11.5*     All labs within the past 24 hours have been reviewed.     Significant Imaging:  CXR personally reviewed.    Assessment/Plan:     Acute kidney injury superimposed on CKD    Cryptogenic cirrhosis awaiting liver xplant, presented for AMS and found to have ENZO on CKDIIIb. SLED started last weekend with intermittent AF+RVR+hypotensive episodes controlled with bb/cardizem gtt adequately. Now on CRRT with improving overall fluid status.    Plan:  - Diffuse CXR infiltrates persist, no significant change from previous  - Patient has been loosing abundant volume per watery stools (x2 last night and x3 this AM).  No need for clearance right now and volume management significantly improved  - CVP 8 this morning, will reassess to need of RRT/SLED vs SCUF during the night or in AM tomorrow         Thank you for your consult. I will follow-up with patient. Please contact us if you have any additional questions.    Elier Trejo MD  Nephrology  [facility]

## 2018-12-01 NOTE — ASSESSMENT & PLAN NOTE
Cryptogenic cirrhosis awaiting liver xplant, presented for AMS and found to have ENZO on CKDIIIb. SLED started last weekend with intermittent AF+RVR+hypotensive episodes controlled with bb/cardizem gtt adequately. Now on CRRT with improving overall fluid status.    Plan:  - Diffuse CXR infiltrates persist, no significant change from previous  - Patient has been loosing abundant volume per watery stools (x2 last night and x3 this AM).  No need for clearance right now and volume management significantly improved  - CVP 8 this morning, will reassess to need of RRT/SLED vs SCUF during the night or in AM tomorrow

## 2018-12-01 NOTE — PROGRESS NOTES
[facility]  Critical Care - Surgery  Progress Note    Patient Name: Krystal Hobson  MRN: [id number]  Admission Date: 11/10/2018  Hospital Length of Stay: 21 days  Code Status: Full Code  Attending Provider: Abrahan Montelongo MD  Primary Care Provider: Courtney Joe MD   Principal Problem: Hepatic encephalopathy    Subjective:     Hospital/ICU Course:  No notes on file    Interval History/Significant Events:   NAEO. Pt tolerated CRRT. Pt did begin to have increased respiratory effort on spontaneous vent settings with pressure support of 5-10 and peep of 5. Placed on SIMV as pt appeared tired on spontaneous pointing to chest. Pt placed back on spontaneous this morning      Objective:     Vital Signs (Most Recent):  Temp: 97.7 °F (36.5 °C) (12/01/18 0700)  Pulse: 86 (12/01/18 0739)  Resp: (!) 24 (12/01/18 0739)  BP: (!) 104/55 (12/01/18 0700)  SpO2: 95 % (12/01/18 0739) Vital Signs (24h Range):  Temp:  [97.6 °F (36.4 °C)-98.3 °F (36.8 °C)] 97.7 °F (36.5 °C)  Pulse:  [] 86  Resp:  [22-30] 24  SpO2:  [76 %-100 %] 95 %  BP: ()/(44-79) 104/55  Arterial Line BP: ()/(37-53) 110/41     Weight: 53.3 kg (117 lb 8.1 oz)  Body mass index is 24.56 kg/m².      Intake/Output Summary (Last 24 hours) at 12/1/2018 0745  Last data filed at 12/1/2018 0700  Gross per 24 hour   Intake 4965 ml   Output 6576 ml   Net -1611 ml       Physical Exam   Constitutional: She appears well-developed and well-nourished.   HENT:   Head: Normocephalic and atraumatic.   Eyes: Pupils are equal, round, and reactive to light.   Cardiovascular: Normal rate and regular rhythm.   Pulmonary/Chest:   intubated on spontaneous PEEP 5 PS 5 FiO2  Abdominal: Soft.   Neurological: She is alert.   Skin: Skin is warm and dry.         Lines/Drains/Airways     Central Venous Catheter Line                 Trialysis (Dialysis) Catheter 11/21/18 1851 left internal jugular 9 days          Drain                 NG/OG Tube 11/21/18 1840 Yoni rao 16  Fr. Right mouth 9 days          Airway                 Airway - Non-Surgical 11/21/18 1836 Endotracheal Tube 9 days          Arterial Line                 Arterial Line 11/21/18 1900 Right Radial 9 days          Pressure Ulcer                 Pressure Injury 11/27/18 Rectum Deep tissue injury 4 days          Peripheral Intravenous Line                 Peripheral IV - Single Lumen 11/26/18 1500 Left Antecubital 4 days                Significant Labs:    CBC/Anemia Profile:  Recent Labs   Lab 11/30/18  1706 11/30/18  2054 12/01/18  0024 12/01/18  0400   WBC 10.13 9.41 10.63 10.70   HGB 8.3* 7.7* 7.7* 7.4*   HCT 25.4* 23.7* 23.4* 22.5*   PLT 17* 20* 23*  --    MCV 94 95 94 96   RDW 22.7* 22.9* 23.4* 23.9*        Chemistries:  Recent Labs   Lab 11/30/18  0321 11/30/18  1300 11/30/18  2215 12/01/18  0400     138 142 142 142  142   K 4.4  4.4 4.5 4.8 4.7  4.7     110 113* 112* 112*  112*   CO2 25  25 23 26 27  27   BUN 3*  3* 4* 6* 4*  4*   CREATININE 0.4*  0.4* 0.4* 0.5 0.4*  0.4*   CALCIUM 7.3*  7.3* 7.2* 7.4* 7.6*  7.6*   ALBUMIN 2.3*  2.3* 2.2* 2.1* 2.0*  2.0*   PROT 4.8*  --   --  4.4*   BILITOT 8.3*  --   --  11.5*   ALKPHOS 138*  --   --  125   ALT 18  --   --  16   AST 51*  --   --  51*   MG 2.4  2.4 1.8 1.9 1.6   PHOS 1.3* 2.6* 2.2* 1.5*       All pertinent labs within the past 24 hours have been reviewed.    Significant Imaging:  I have reviewed all pertinent imaging results/findings within the past 24 hours.    Assessment/Plan:     Acute respiratory failure with hypoxia    Acute respiratory failure with hypoxia     66yo F liver-listed, stepped up on 11/21 for respiratory failure with worsening respiratory acidosis      Plan:     Neuro:   -Pain control: prn fentanyl while intubated  -Sedation off  -Following commands      Pulmonary:   -intubated on arrival to SICU 11/21  -CXR with bilateral patchy opacities, concerning for viral pneumonia -ABGs daily and prn  -Minimal Vent  Settings  -PEEP increment for Compliance  -SBT this AM   -Will plan for trach if pt unable to be extubated today     Cardiac:  -SBP > 100 goal  -JACINDA 11/20 - elevated PA pressures   -Heart failure following, appreciate recs   - Afib with RVR --off all drips this am    Renal:   -Mendoza in place  -Continue to monitor UOP: oliguria/anuria    - CRRT started 11/24  -CRRT overnight  -Nephrology following, appreciate recommendations      Fluids/Electrolytes/Nutrition/GI:   - TFs @ 40 - goal   -replace lytes PRN  -holding Lactulose enemas TID, per primary   -GI consult 11/30: Rectal bleeding in the setting of plts < 20k, recent bowel management system which could have caused some rectal trauma. Would watch for resolution for now as patient hemodynamically stable currently. .If signs of worsening bleeding could do EGD/flex sig after platelet transfusion      Hematology/Oncology:  -Monitor CBC 7.4/22.5  -PT/INR -- 3.2  -continue to monitor. Transfuse as needed      Infectious Disease:   -Afebrile  -WBC 10  -Abx: Diflucan to cover HCAP      Endocrine:  -Glucose goal of 140-180  -Endocrine following     Dispo:  -Continue care in the ICU setting  -Wean to Extubate Today if Tolerating SBT  -Primary: Transplant                Critical care was time spent personally by me on the following activities: development of treatment plan with patient or surrogate and bedside caregivers, discussions with consultants, evaluation of patient's response to treatment, examination of patient, ordering and performing treatments and interventions, ordering and review of laboratory studies, ordering and review of radiographic studies, pulse oximetry, re-evaluation of patient's condition.  This critical care time did not overlap with that of any other provider or involve time for any procedures.     Jose Wallace MD  Critical Care - Surgery  [facility]

## 2018-12-01 NOTE — SUBJECTIVE & OBJECTIVE
Interval History: No acute events overnight, afebrile, vital signs stable. Had to be put back on a rate on the vent overnight. Net negative 3L. Continues to have bloody bowel movements. Briefly went into Afib this morning but self- converted this morning prior to re-starting diltiazem gtt.    Scheduled Meds:   famotidine  20 mg Oral QHS    fluconazole  200 mg Oral Daily    guaifenesin 100 mg/5 ml  200 mg Per OG tube Q6H    [START ON 12/2/2018] insulin aspart U-100  3 Units Subcutaneous Q24H    [START ON 12/2/2018] insulin aspart U-100  3 Units Subcutaneous Q24H    [START ON 12/2/2018] insulin aspart U-100  3 Units Subcutaneous Q24H    insulin aspart U-100  3 Units Subcutaneous Q24H    insulin aspart U-100  3 Units Subcutaneous Q24H    insulin aspart U-100  3 Units Subcutaneous Q24H    insulin detemir U-100  14 Units Subcutaneous Daily    k phos di & mono-sod phos mono  500 mg Per NG tube TID    levalbuterol  1.25 mg Nebulization Q6H WAKE    rifAXImin  550 mg Oral BID     Continuous Infusions:   sodium chloride 0.9% 200 mL/hr at 12/01/18 0914    dilTIAZem 7.5 mg/hr (12/01/18 1600)    propofol Stopped (11/26/18 0800)    vasopressin (PITRESSIN) infusion       PRN Meds:sodium chloride, sodium chloride, sodium chloride, acetaminophen, dextrose 50%, glucagon (human recombinant), insulin aspart U-100, k phos di & mono-sod phos mono, lactulose, lactulose, metoprolol, ondansetron, simethicone, sodium chloride 0.9%    Review of Systems   All other systems reviewed and are negative.    Objective:     Vital Signs (Most Recent):  Temp: 97.8 °F (36.6 °C) (12/01/18 1530)  Pulse: 85 (12/01/18 1551)  Resp: 13 (12/01/18 1550)  BP: (!) 115/56 (12/01/18 1530)  SpO2: 96 % (12/01/18 1551) Vital Signs (24h Range):  Temp:  [97.6 °F (36.4 °C)-98.3 °F (36.8 °C)] 97.8 °F (36.6 °C)  Pulse:  [] 85  Resp:  [13-30] 13  SpO2:  [70 %-100 %] 96 %  BP: ()/(37-79) 115/56  Arterial Line BP: ()/(36-52) 125/42      Weight: 53.3 kg (117 lb 8.1 oz)  Body mass index is 24.56 kg/m².    Intake/Output - Last 3 Shifts       11/29 0700 - 11/30 0659 11/30 0700 - 12/01 0659 12/01 0700 - 12/02 0659    I.V. (mL/kg) 2597 (45) 3745 (70.3)     Blood       NG/ 1280 360    Total Intake(mL/kg) 3337 (57.8) 5025 (94.3) 360 (6.8)    Urine (mL/kg/hr) 0 (0)      Emesis/NG output 0      Drains  40     Other 6324 6861 480    Stool 0 0 0    Total Output 6324 6901 480    Net -2987 -1876 -120           Urine Occurrence 0 x      Stool Occurrence 5 x 1 x 6 x    Emesis Occurrence 0 x            Physical Exam   Constitutional: She appears well-developed and well-nourished.   HENT:   Head: Normocephalic and atraumatic.   Eyes: Pupils are equal, round, and reactive to light.   Spontaneously opens eyes  Jaundice+   Neck: Normal range of motion. No JVD present. No tracheal deviation present.   Cardiovascular: Normal rate, regular rhythm and normal heart sounds.   No murmur heard.  Pulmonary/Chest: She has no wheezes. She has no rales.   Intubated   Abdominal: Soft. Bowel sounds are normal. She exhibits no distension. There is no tenderness.   Umbilical hernia   Musculoskeletal: Normal range of motion. She exhibits no edema.   Neurological: She is alert.       Laboratory:  Immunosuppressants     None        Labs within the past 24 hours have been reviewed.    Diagnostic Results:  I have personally reviewed all pertinent imaging studies.

## 2018-12-02 NOTE — CONSULTS
[facility]  General Surgery  Consult Note    Patient Name: Krystal Hobson  MRN: [id number]  Code Status: Full Code  Admission Date: 11/10/2018  Hospital Length of Stay: 21 days  Attending Physician: Abrahan Montelongo MD  Primary Care Provider: Courtney Joe MD    Patient information was obtained from spouse/SO, past medical records and ER records.     Inpatient consult to General Surgery  Consult performed by: Amanda Boothe MD  Consult ordered by: Lien Angela MD        Subjective:     Principal Problem: Hepatic encephalopathy    History of Present Illness: 67 yo W with a history of cryptogenic cirrhosis, breast cancer, GERD, and rheumatoid arthritis who has been admitted since 11/12 for AMS due to hepatic encephalopathy. She was being treated for pneumonia and needed a central line on 11/21/18; during the procedure, she became bradycardic with a drop in her oxygen saturations. This eventually caused her to be intubated due to acute respiratory failure. She has been intubated since 11/21/18 and attempts at extubation has failed, likely due to frailty. She is intubated with 7.0 ETT and is on minimal vent setting with FiO2 of 50% and PEEP of 5. General Surgery consulted for tracheostomy tube placement. The team currently does not believe the patient needs a G-tube at this time.    No current facility-administered medications on file prior to encounter.      Current Outpatient Medications on File Prior to Encounter   Medication Sig    ALBUTEROL INHL Inhale 1 puff into the lungs 4 (four) times daily as needed.    ergocalciferol (ERGOCALCIFEROL) 50,000 unit Cap Take 50,000 Units by mouth every 7 days.    fluticasone/vilanterol (BREO ELLIPTA INHL) Inhale into the lungs.    hydrOXYzine HCl (ATARAX) 25 MG tablet Take 25 mg by mouth 3 (three) times daily as needed for Itching.    insulin detemir U-100 (LEVEMIR) 100 unit/mL injection Inject 12 Units into the skin 2 (two) times daily.     insulin  lispro (HUMALOG) 100 unit/mL injection Sliding scale     lactulose (CHRONULAC) 10 gram/15 mL solution Take 23 mLs (15.3333 g total) by mouth 3 (three) times daily. Titrate to 3 BM daily    omeprazole (PRILOSEC) 40 MG capsule Take 40 mg by mouth every morning.    ondansetron (ZOFRAN) 4 MG tablet Take 4 mg by mouth every 8 (eight) hours as needed for Nausea.    ONETOUCH ULTRA BLUE TEST STRIP Strp     propranolol (INDERAL) 10 MG tablet Take 5 mg by mouth 2 (two) times daily.     rifAXIMin (XIFAXAN) 550 mg Tab Take 550 mg by mouth 2 (two) times daily.    SHINGRIX, PF, 50 mcg/0.5 mL injection     sodium bicarbonate 650 MG tablet Take 2 tablets (1,300 mg total) by mouth 3 (three) times daily.    zinc gluconate 50 mg tablet Take 50 mg by mouth once daily.        Review of patient's allergies indicates:  No Known Allergies    Past Medical History:   Diagnosis Date    Cancer     breat Cancer    Cirrhosis     Diabetes mellitus     GERD (gastroesophageal reflux disease)     Rheumatoid arthritis      Past Surgical History:   Procedure Laterality Date    CARPAL TUNNEL RELEASE      left wrist    CHOLECYSTECTOMY      MASTECTOMY      left breast 25  years ago     Family History     Problem Relation (Age of Onset)    COPD Sister    Heart disease Mother    Liver disease Father, Sister        Tobacco Use    Smoking status: Never Smoker    Tobacco comment: patient denies   Substance and Sexual Activity    Alcohol use: No     Comment: stopped 10/17    Drug use: No     Comment: patient denies    Sexual activity: Not on file     Review of Systems   Unable to perform ROS: Intubated     Objective:     Vital Signs (Most Recent):  Temp: 97.8 °F (36.6 °C) (12/01/18 1530)  Pulse: 82 (12/01/18 1740)  Resp: 13 (12/01/18 1550)  BP: (!) 84/59 (12/01/18 1700)  SpO2: 98 % (12/01/18 1740) Vital Signs (24h Range):  Temp:  [97.6 °F (36.4 °C)-98.3 °F (36.8 °C)] 97.8 °F (36.6 °C)  Pulse:  [] 82  Resp:  [13-30] 13  SpO2:  [70  %-100 %] 98 %  BP: ()/(37-79) 84/59  Arterial Line BP: ()/(36-52) 106/40     Weight: 53.3 kg (117 lb 8.1 oz)  Body mass index is 24.56 kg/m².    Physical Exam   Constitutional: She appears well-developed.   Thin appearing patient   HENT:   Head: Normocephalic and atraumatic.   Eyes: Scleral icterus is present.   Neck: Normal range of motion. Neck supple.   Central line in L neck   Cardiovascular: Normal rate.   Pulmonary/Chest: Effort normal. No respiratory distress. She has no wheezes. She has no rales.   Mechanical ventilated   Abdominal: Soft. She exhibits distension. A hernia is present.   Neurological: She is alert.   Skin: Skin is warm and dry.   Psychiatric: She has a normal mood and affect.       Significant Labs:  CBC:   Recent Labs   Lab 12/01/18  1558   WBC 9.46   RBC 2.73*   HGB 8.1*   HCT 26.0*   PLT 39*   MCV 95   MCH 29.7   MCHC 31.2*     CMP:   Recent Labs   Lab 12/01/18  0400   *  182*   CALCIUM 7.6*  7.6*   ALBUMIN 2.0*  2.0*   PROT 4.4*     142   K 4.7  4.7   CO2 27  27   *  112*   BUN 4*  4*   CREATININE 0.4*  0.4*   ALKPHOS 125   ALT 16   AST 51*   BILITOT 11.5*       Significant Diagnostics:  CXR 12/1/18:     FINDINGS:  Interval increase in partially loculated right-sided pleural effusion.  Otherwise no significant change when compared to prior exam.    Tubes and lines are appropriate.  No cardiomegaly.  Hazy opacity scattered throughout the lungs bilaterally which may be seen with multifocal pneumonia, pulmonary edema, or ARDS.      Impression       As above.           Assessment/Plan:     Acute respiratory failure with hypoxia    -Patient currently on minimal vent settings and not requiring support for hemodynamics. Will discuss timing of trach placement with staff. Team requesting for it to be done early next week.   -Discussed risks and benefits with patient and patient's . They are agreeable and Mr. Hobson () has signed surgical  consent.  -Discussed with Dr. Tabor; plan for trach on Tuesday 12/4/18 at bedside.       VTE Risk Mitigation (From admission, onward)        Ordered     IP VTE HIGH RISK PATIENT  Once      11/10/18 0219          Thank you for your consult. I will follow-up with patient. Please contact us if you have any additional questions.    Amanda Boothe MD  General Surgery  [facility]

## 2018-12-02 NOTE — ASSESSMENT & PLAN NOTE
-Patient currently on minimal vent settings and not requiring support for hemodynamics. Will discuss timing of trach placement with staff. Team requesting for it to be done early next week.   -Discussed risks and benefits with patient and patient's . They are agreeable and Mr. Hobson () has signed surgical consent.  -Discussed with Dr. Tabor; plan for trach on Tuesday 12/4/18.

## 2018-12-02 NOTE — ASSESSMENT & PLAN NOTE
68yo F liver-listed, stepped up on 11/21 for respiratory failure with worsening respiratory acidosis     Plan:    Neuro:   -Pain control: prn fentanyl while intubated  -Continue sedation holiday as tolerated, awake and alert without fighting vent   -Followed all commands off sedation     Pulmonary:   -intubated on arrival to SICU 11/21  Vent Mode: A/C  Oxygen Concentration (%):  [40-99] 50  Resp Rate Total:  [14 br/min-41 br/min] 28 br/min  Vt Set:  [300 mL] 300 mL  PEEP/CPAP:  [5 cmH20] 5 cmH20  Pressure Support:  [5 cmH20] 5 cmH20  Mean Airway Pressure:  [6.1 muL75-80 cmH20] 11 cmH20  -daily CXR - evidence of significant bilateral consolidation   -ABGs daily and prn  -continuous pulse ox   - Failed SBT with Pulm 11/25  -placed on spontaneous today, will not be able to extubate successfully - c/s placed to gen surg for trach, plan for tuesday    Cardiac:  -SBP > 100 goal  -Heart failure following, appreciate recs   - Afib with RVR 11/23/18, broke out with 2x 5mg doses of IV metop, her pressure was stable throughout  - Went back into Afib with RVR on 11/25. Given IV metoprolol 5mg q3. Did not resolve, started on diltiazem drip. Converted out of Afib around 3am. dilt drip stopped at that time.   -continue dilt gtt as needed for Afib, try to prevent Afib by administering metoprolol prior to CRRT    Renal:   -Mendoza in place  -UOP minimal  -Nephrology following, appreciate recommendations  -Started CRRT 11/24/18. Will continue nocturnal CRRT     Fluids/Electrolytes/Nutrition/GI:   -Nutritional status: NPO while intubated  -Continue TF @ 40cc/hour, will add fiber today given diarrhea   -replace lytes PRN  -will hold Lactulose enemas TID  -bloody bowel movement today, GI following appreciate recs    Hematology/Oncology:  -Hgb 7.7/23.4 following 1 unit pRBCs, continue to monitor  -INR 1.7 from 3.2 s/p 2FFP   -continue to monitor. Transfuse as needed     Infectious Disease:   -Afebrile  -WBC 6.7, continue to monitor   -Abx:  Zosyn.   -Sending C. Diff given frequent diarrhea    Endocrine:  -Glucose goal of 140-180  -Endocrine following, see recommendations    Dispo:  -Continue care in the ICU setting  -continue CRRT   -f/u C. Diff, if negative consider starting Imodium

## 2018-12-02 NOTE — PROGRESS NOTES
[facility]  Nephrology  Progress Note    Patient Name: Krystal Hobson  MRN: [id number]  Admission Date: 11/10/2018  Hospital Length of Stay: 22 days  Attending Provider: Abrahan Montelongo MD   Primary Care Physician: Courtney Joe MD  Principal Problem:Hepatic encephalopathy    Subjective:     HPI: Reason for consult: Diuresis in setting of PAP46, pulmonary edema, HRS    Ms. 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  - She [place] [facility] [place] under liver transplant team due to encephalopathy and new onset ENZO.   - Per the , she was extremely disoriented @ OSH and her ammonia reached as high as 200, and her Cr level increased to 2.0 She also went into A fib with RVR and was started on a diltiazem gtt at OSH.   - On arrival at [facility] she was disoriented and started on lactulose with noted improvement. During her current admission on CXR they found a concern for HCAP and she was started on broad spectrum ABx and then shifted her on Moxifloxacin 7 D course. On 11/20/2018 found on CXR a concern for Lt lung middle lobe consolidation and started on vancomycin and zosyn.  - Renal fxn baseline 1.6 at admit but trending up    Interval History: Patient evaluated bedside, critically ill, stable vital signs, on mechanical ventilation.  Night was uneventful.  Still has watery bowel stools.  CVP 12 today this AM.    Review of patient's allergies indicates:  No Known Allergies  Current Facility-Administered Medications   Medication Frequency    0.9%  NaCl infusion (for blood administration) Q24H PRN    0.9%  NaCl infusion (for blood administration) Q24H PRN    0.9%  NaCl infusion (for blood administration) Q24H PRN    acetaminophen tablet 650 mg Q8H PRN    dextrose 50% injection 12.5 g PRN    diltiaZEM 125 mg in D5W 125 mL infusion Continuous    famotidine tablet 20 mg QHS    fluconazole tablet 200 mg Daily    glucagon (human  recombinant) injection 1 mg PRN    guaifenesin 100 mg/5 ml syrup 200 mg Q6H    insulin aspart U-100 pen 0-5 Units Q4H PRN    insulin aspart U-100 pen 3 Units Q24H    insulin aspart U-100 pen 3 Units Q24H    insulin aspart U-100 pen 3 Units Q24H    insulin aspart U-100 pen 3 Units Q24H    insulin aspart U-100 pen 3 Units Q24H    insulin aspart U-100 pen 3 Units Q24H    insulin detemir U-100 pen 14 Units Daily    k phos di & mono-sod phos mono 250 mg tablet 2 tablet TID PRN    lactulose 10 gram/15 mL solution (enema) 200 g TID PRN    lactulose 20 gram/30 mL solution Soln 30 g Q6H PRN    levalbuterol nebulizer solution 1.25 mg Q6H WAKE    metoprolol injection 2.5 mg Q5 Min PRN    ondansetron disintegrating tablet 8 mg Q8H PRN    psyllium husk (aspartame) 3.4 gram packet 1 packet Daily    rifAXIMin tablet 550 mg BID    simethicone chewable tablet 80 mg TID PRN    sodium chloride 0.9% flush 3 mL PRN       Objective:     Vital Signs (Most Recent):  Temp: 98.3 °F (36.8 °C) (12/02/18 1100)  Pulse: 75 (12/02/18 1130)  Resp: 16 (12/02/18 0734)  BP: 131/60 (12/02/18 1130)  SpO2: 98 % (12/02/18 1130)  O2 Device (Oxygen Therapy): ventilator (12/01/18 1159) Vital Signs (24h Range):  Temp:  [97.6 °F (36.4 °C)-98.3 °F (36.8 °C)] 98.3 °F (36.8 °C)  Pulse:  [68-98] 75  Resp:  [13-27] 16  SpO2:  [87 %-100 %] 98 %  BP: ()/() 131/60  Arterial Line BP: (101-128)/(35-49) 118/41     Weight: 53.3 kg (117 lb 8.1 oz) (12/01/18 0400)  Body mass index is 24.56 kg/m².  Body surface area is 1.48 meters squared.    I/O last 3 completed shifts:  In: 3635 [I.V.:2362; Blood:253; NG/GT:1020]  Out: 3312 [Other:3312]    Physical Exam  Constitutional: She appears well-developed and well-nourished.   HENT:   Head: Normocephalic and atraumatic.   Eyes: Pupils are equal, round, and reactive to light.   Cardiovascular: Normal rate and regular rhythm.   Pulmonary/Chest:   intubated on spontaneous  Abdominal: Soft.    Neurological: She is alert.   Skin: Skin is warm and dry.   Nursing notes and vitals reviewed.      Significant Labs:  CBC:   Recent Labs   Lab 12/02/18  0401   WBC 6.79  6.79   RBC 2.48*  2.48*   HGB 7.7*  7.7*   HCT 23.4*  23.4*   PLT 40*  40*   MCV 94  94   MCH 31.0  31.0   MCHC 32.9  32.9     CMP:   Recent Labs   Lab 12/02/18  0401   *   CALCIUM 8.7   ALBUMIN 2.3*   PROT 5.0*      K 4.9   CO2 26      BUN 20   CREATININE 1.0   ALKPHOS 126   ALT 21   AST 54*   BILITOT 9.0*     All labs within the past 24 hours have been reviewed.     Significant Imaging:  CXR personally reviewed.    Assessment/Plan:     Acute kidney injury superimposed on CKD    Cryptogenic cirrhosis awaiting liver xplant, presented for AMS and found to have ENZO on CKDIIIb. SLED started last weekend with intermittent AF+RVR+hypotensive episodes controlled with bb/cardizem gtt adequately. Now on CRRT with improving overall fluid status.    Plan:  - Diffuse CXR infiltrates persist, no significant change from previous  - Patient has been continuing to have abundant volume per watery stools  - Will perform SCUF for 8 hrs after CVP higher than yesterday / elevated  - Will reassess tomorrow         Thank you for your consult. I will follow-up with patient. Please contact us if you have any additional questions.    Elier Trejo MD  Nephrology  [facility]

## 2018-12-02 NOTE — PLAN OF CARE
Problem: Patient Care Overview  Goal: Plan of Care Review  Outcome: Ongoing (interventions implemented as appropriate)  Patient and patient's spouse updated on plan of care. Patient remains intubated. Awake, alert, and follows commands.  HR 80-90's SR with PAC's and occasionally patient's HR will drop into 50-60's atrial/ a fib arrhythmia, but convert back into sinus rhythm with PAC's. Dr. Angela notified about patient's heart rhythm and rate changes. Patient remains on Diltiazem gtt, which is currently at 10mg/hr. CVP 11-13. 100% O2 saturation on ventilator. Anuric. Plan for CRRT tonight. 1 small bowel movement and 1 smear during shift. No blood assessed in stool.  Plan for tracheostomy placement on Monday or Tuesday. All questions and concerns acknowledged and answered. See flow sheet for full assessment details. Will continue to monitor patient closely.

## 2018-12-02 NOTE — PROGRESS NOTES
[facility]  Liver Transplant  Progress Note    Patient Name: Krystal Hobson  MRN: [id number]  Admission Date: 11/10/2018  Hospital Length of Stay: 22 days  Code Status: Full Code  Primary Care Provider: Courtney Joe MD    Subjective:     Interval History: No acute events overnight, afebrile, vital signs stable. Remains on vent support overnight. Continues to have bloody bowel movements.     Scheduled Meds:   famotidine  20 mg Oral QHS    fluconazole  200 mg Oral Daily    guaifenesin 100 mg/5 ml  200 mg Per OG tube Q6H    insulin aspart U-100  3 Units Subcutaneous Q24H    insulin aspart U-100  3 Units Subcutaneous Q24H    insulin aspart U-100  3 Units Subcutaneous Q24H    insulin aspart U-100  3 Units Subcutaneous Q24H    insulin aspart U-100  3 Units Subcutaneous Q24H    insulin aspart U-100  3 Units Subcutaneous Q24H    insulin detemir U-100  14 Units Subcutaneous Daily    levalbuterol  1.25 mg Nebulization Q6H WAKE    psyllium husk (aspartame)  1 packet Per NG tube Daily    rifAXImin  550 mg Oral BID     Continuous Infusions:   dilTIAZem 7.5 mg/hr (12/02/18 0900)     PRN Meds:sodium chloride, sodium chloride, sodium chloride, acetaminophen, dextrose 50%, glucagon (human recombinant), insulin aspart U-100, k phos di & mono-sod phos mono, lactulose, lactulose, metoprolol, ondansetron, simethicone, sodium chloride 0.9%    Review of Systems   All other systems reviewed and are negative.    Objective:     Vital Signs (Most Recent):  Temp: 97.8 °F (36.6 °C) (12/02/18 0700)  Pulse: 79 (12/02/18 0945)  Resp: 16 (12/02/18 0734)  BP: (!) 161/103 (12/02/18 0930)  SpO2: (!) 93 % (12/02/18 0945) Vital Signs (24h Range):  Temp:  [97.6 °F (36.4 °C)-98 °F (36.7 °C)] 97.8 °F (36.6 °C)  Pulse:  [] 79  Resp:  [13-27] 16  SpO2:  [87 %-100 %] 93 %  BP: ()/() 161/103  Arterial Line BP: ()/(35-50) 124/44     Weight: 53.3 kg (117 lb 8.1 oz)  Body mass index is 24.56  kg/m².    Intake/Output - Last 3 Shifts       11/30 0700 - 12/01 0659 12/01 0700 - 12/02 0659 12/02 0700 - 12/03 0659    I.V. (mL/kg) 3745 (70.3) 417 (7.8)     Blood  253     NG/GT 1280 360 120    Total Intake(mL/kg) 5025 (94.3) 1030 (19.3) 120 (2.3)    Urine (mL/kg/hr)       Emesis/NG output       Drains 40      Other 6861 480     Stool 0 0     Total Output 6901 480     Net -1876 +550 +120           Stool Occurrence 1 x 6 x           Physical Exam   Constitutional: She appears well-developed and well-nourished.   HENT:   Head: Normocephalic and atraumatic.   Eyes: Pupils are equal, round, and reactive to light.   Spontaneously opens eyes  Jaundice+   Neck: Normal range of motion. No JVD present. No tracheal deviation present.   Cardiovascular: Normal rate, regular rhythm and normal heart sounds.   No murmur heard.  Pulmonary/Chest: She has no wheezes. She has no rales.   Intubated   Abdominal: Soft. Bowel sounds are normal. She exhibits no distension. There is no tenderness.   Umbilical hernia   Musculoskeletal: Normal range of motion. She exhibits no edema.   Neurological: She is alert.       Laboratory:  Immunosuppressants     None        Labs within the past 24 hours have been reviewed.    Diagnostic Results:  I have personally reviewed all pertinent imaging studies.    Assessment/Plan:   Krystal Hobson is a 68 y.o. female     Pulmonary   Acute respiratory failure with hypoxia    66yo F liver-listed, stepped up on 11/21 for respiratory failure with worsening respiratory acidosis     Plan:    Neuro:   -Pain control: prn fentanyl while intubated  -Continue sedation holiday as tolerated, awake and alert without fighting vent   -Followed all commands off sedation     Pulmonary:   -intubated on arrival to SICU 11/21  Vent Mode: A/C  Oxygen Concentration (%):  [40-99] 50  Resp Rate Total:  [14 br/min-41 br/min] 28 br/min  Vt Set:  [300 mL] 300 mL  PEEP/CPAP:  [5 cmH20] 5 cmH20  Pressure Support:  [5 cmH20] 5  cmH20  Mean Airway Pressure:  [6.1 exI34-48 cmH20] 11 cmH20  -daily CXR - evidence of significant bilateral consolidation   -ABGs daily and prn  -continuous pulse ox   - Failed SBT with Pulm 11/25  -placed on spontaneous today, will not be able to extubate successfully - c/s placed to gen surg for trach, plan for tuesday    Cardiac:  -SBP > 100 goal  -Heart failure following, appreciate recs   - Afib with RVR 11/23/18, broke out with 2x 5mg doses of IV metop, her pressure was stable throughout  - Went back into Afib with RVR on 11/25. Given IV metoprolol 5mg q3. Did not resolve, started on diltiazem drip. Converted out of Afib around 3am. dilt drip stopped at that time.   -continue dilt gtt as needed for Afib, try to prevent Afib by administering metoprolol prior to CRRT    Renal:   -Mendoza in place  -UOP minimal  -Nephrology following, appreciate recommendations  -Started CRRT 11/24/18. Will continue nocturnal CRRT     Fluids/Electrolytes/Nutrition/GI:   -Nutritional status: NPO while intubated  -Continue TF @ 40cc/hour, will add fiber today given diarrhea   -replace lytes PRN  -will hold Lactulose enemas TID  -bloody bowel movement today, GI following appreciate recs    Hematology/Oncology:  -Hgb 7.7/23.4 following 1 unit pRBCs, continue to monitor  -INR 1.7 from 3.2 s/p 2FFP   -continue to monitor. Transfuse as needed     Infectious Disease:   -Afebrile  -WBC 6.7, continue to monitor   -Abx: Zosyn.   -Sending C. Diff given frequent diarrhea    Endocrine:  -Glucose goal of 140-180  -Endocrine following, see recommendations    Dispo:  -Continue care in the ICU setting  -continue CRRT   -f/u C. Diff, if negative consider starting Imodium           The patients clinical status was discussed at multidisplinary rounds, involving transplant surgery, transplant medicine, pharmacy, nursing, nutrition, and social work.    Lien Angela MD  Liver Transplant  [facility]

## 2018-12-02 NOTE — ASSESSMENT & PLAN NOTE
Acute respiratory failure with hypoxia     66yo F liver-listed, stepped up on 11/21 for respiratory failure with worsening respiratory acidosis       Neuro:   -Pain control: prn fentanyl while intubated  -Sedation off  -Following commands      Pulmonary:   -intubated on arrival to SICU 11/21  -ABGs daily and prn  -Minimal Vent Settings  -plan for trach 12/4  -duonebs prn     Cardiac:  -SBP > 100 goal  - JACINDA 11/20 - elevated PA pressures   -Heart failure following, appreciate recs   -dilt gtt for A-Fib, wean as tolerated    Renal:   -Mendoza in place  -Continue to monitor UOP: oliguria/anuria    -CRRT overnight  -Nephrology following, appreciate recommendations      Fluids/Electrolytes/Nutrition/GI:    -TFs @ 40 - goal   -replace lytes PRN  -holding Lactulose enemas TID, per primary      Hematology/Oncology:  -Monitor CBC -- H+H to 7.7, rec'd one unit pRBCs   -PT/INR -- 2.4(2.0)-->1.7 this AM  -continue to monitor. Transfuse as needed      Infectious Disease:   -Afebrile  -WBC 7.6-->6.8  -Abx: Diflucan to cover HCAP      Endocrine:  -Glucose goal of 140-180  -Endocrine following     Dispo:  -Continue care in the ICU setting  -Primary: Transplant

## 2018-12-02 NOTE — SUBJECTIVE & OBJECTIVE
No current facility-administered medications on file prior to encounter.      Current Outpatient Medications on File Prior to Encounter   Medication Sig    ALBUTEROL INHL Inhale 1 puff into the lungs 4 (four) times daily as needed.    ergocalciferol (ERGOCALCIFEROL) 50,000 unit Cap Take 50,000 Units by mouth every 7 days.    fluticasone/vilanterol (BREO ELLIPTA INHL) Inhale into the lungs.    hydrOXYzine HCl (ATARAX) 25 MG tablet Take 25 mg by mouth 3 (three) times daily as needed for Itching.    insulin detemir U-100 (LEVEMIR) 100 unit/mL injection Inject 12 Units into the skin 2 (two) times daily.     insulin lispro (HUMALOG) 100 unit/mL injection Sliding scale     lactulose (CHRONULAC) 10 gram/15 mL solution Take 23 mLs (15.3333 g total) by mouth 3 (three) times daily. Titrate to 3 BM daily    omeprazole (PRILOSEC) 40 MG capsule Take 40 mg by mouth every morning.    ondansetron (ZOFRAN) 4 MG tablet Take 4 mg by mouth every 8 (eight) hours as needed for Nausea.    ONETOUCH ULTRA BLUE TEST STRIP Strp     propranolol (INDERAL) 10 MG tablet Take 5 mg by mouth 2 (two) times daily.     rifAXIMin (XIFAXAN) 550 mg Tab Take 550 mg by mouth 2 (two) times daily.    SHINGRIX, PF, 50 mcg/0.5 mL injection     sodium bicarbonate 650 MG tablet Take 2 tablets (1,300 mg total) by mouth 3 (three) times daily.    zinc gluconate 50 mg tablet Take 50 mg by mouth once daily.        Review of patient's allergies indicates:  No Known Allergies    Past Medical History:   Diagnosis Date    Cancer     breat Cancer    Cirrhosis     Diabetes mellitus     GERD (gastroesophageal reflux disease)     Rheumatoid arthritis      Past Surgical History:   Procedure Laterality Date    CARPAL TUNNEL RELEASE      left wrist    CHOLECYSTECTOMY      MASTECTOMY      left breast 25  years ago     Family History     Problem Relation (Age of Onset)    COPD Sister    Heart disease Mother    Liver disease Father, Sister        Tobacco Use     Smoking status: Never Smoker    Tobacco comment: patient denies   Substance and Sexual Activity    Alcohol use: No     Comment: stopped 10/17    Drug use: No     Comment: patient denies    Sexual activity: Not on file     Review of Systems   Unable to perform ROS: Intubated     Objective:     Vital Signs (Most Recent):  Temp: 97.8 °F (36.6 °C) (12/01/18 1530)  Pulse: 82 (12/01/18 1740)  Resp: 13 (12/01/18 1550)  BP: (!) 84/59 (12/01/18 1700)  SpO2: 98 % (12/01/18 1740) Vital Signs (24h Range):  Temp:  [97.6 °F (36.4 °C)-98.3 °F (36.8 °C)] 97.8 °F (36.6 °C)  Pulse:  [] 82  Resp:  [13-30] 13  SpO2:  [70 %-100 %] 98 %  BP: ()/(37-79) 84/59  Arterial Line BP: ()/(36-52) 106/40     Weight: 53.3 kg (117 lb 8.1 oz)  Body mass index is 24.56 kg/m².    Physical Exam   Constitutional: She appears well-developed.   Thin appearing patient   HENT:   Head: Normocephalic and atraumatic.   Eyes: Scleral icterus is present.   Neck: Normal range of motion. Neck supple.   Central line in L neck   Cardiovascular: Normal rate.   Pulmonary/Chest: Effort normal. No respiratory distress. She has no wheezes. She has no rales.   Mechanical ventilated   Abdominal: Soft. She exhibits distension. A hernia is present.   Neurological: She is alert.   Skin: Skin is warm and dry.   Psychiatric: She has a normal mood and affect.       Significant Labs:  CBC:   Recent Labs   Lab 12/01/18  1558   WBC 9.46   RBC 2.73*   HGB 8.1*   HCT 26.0*   PLT 39*   MCV 95   MCH 29.7   MCHC 31.2*     CMP:   Recent Labs   Lab 12/01/18  0400   *  182*   CALCIUM 7.6*  7.6*   ALBUMIN 2.0*  2.0*   PROT 4.4*     142   K 4.7  4.7   CO2 27  27   *  112*   BUN 4*  4*   CREATININE 0.4*  0.4*   ALKPHOS 125   ALT 16   AST 51*   BILITOT 11.5*       Significant Diagnostics:  CXR 12/1/18:     FINDINGS:  Interval increase in partially loculated right-sided pleural effusion.  Otherwise no significant change when compared to prior  exam.    Tubes and lines are appropriate.  No cardiomegaly.  Hazy opacity scattered throughout the lungs bilaterally which may be seen with multifocal pneumonia, pulmonary edema, or ARDS.      Impression       As above.

## 2018-12-02 NOTE — SUBJECTIVE & OBJECTIVE
Interval History: Patient evaluated bedside, critically ill, stable vital signs, on mechanical ventilation.  Night was uneventful.  Still has watery bowel stools.  CVP 12 today this AM.    Review of patient's allergies indicates:  No Known Allergies  Current Facility-Administered Medications   Medication Frequency    0.9%  NaCl infusion (for blood administration) Q24H PRN    0.9%  NaCl infusion (for blood administration) Q24H PRN    0.9%  NaCl infusion (for blood administration) Q24H PRN    acetaminophen tablet 650 mg Q8H PRN    dextrose 50% injection 12.5 g PRN    diltiaZEM 125 mg in D5W 125 mL infusion Continuous    famotidine tablet 20 mg QHS    fluconazole tablet 200 mg Daily    glucagon (human recombinant) injection 1 mg PRN    guaifenesin 100 mg/5 ml syrup 200 mg Q6H    insulin aspart U-100 pen 0-5 Units Q4H PRN    insulin aspart U-100 pen 3 Units Q24H    insulin aspart U-100 pen 3 Units Q24H    insulin aspart U-100 pen 3 Units Q24H    insulin aspart U-100 pen 3 Units Q24H    insulin aspart U-100 pen 3 Units Q24H    insulin aspart U-100 pen 3 Units Q24H    insulin detemir U-100 pen 14 Units Daily    k phos di & mono-sod phos mono 250 mg tablet 2 tablet TID PRN    lactulose 10 gram/15 mL solution (enema) 200 g TID PRN    lactulose 20 gram/30 mL solution Soln 30 g Q6H PRN    levalbuterol nebulizer solution 1.25 mg Q6H WAKE    metoprolol injection 2.5 mg Q5 Min PRN    ondansetron disintegrating tablet 8 mg Q8H PRN    psyllium husk (aspartame) 3.4 gram packet 1 packet Daily    rifAXIMin tablet 550 mg BID    simethicone chewable tablet 80 mg TID PRN    sodium chloride 0.9% flush 3 mL PRN       Objective:     Vital Signs (Most Recent):  Temp: 98.3 °F (36.8 °C) (12/02/18 1100)  Pulse: 75 (12/02/18 1130)  Resp: 16 (12/02/18 0734)  BP: 131/60 (12/02/18 1130)  SpO2: 98 % (12/02/18 1130)  O2 Device (Oxygen Therapy): ventilator (12/01/18 1159) Vital Signs (24h Range):  Temp:  [97.6 °F (36.4  °C)-98.3 °F (36.8 °C)] 98.3 °F (36.8 °C)  Pulse:  [68-98] 75  Resp:  [13-27] 16  SpO2:  [87 %-100 %] 98 %  BP: ()/() 131/60  Arterial Line BP: (101-128)/(35-49) 118/41     Weight: 53.3 kg (117 lb 8.1 oz) (12/01/18 0400)  Body mass index is 24.56 kg/m².  Body surface area is 1.48 meters squared.    I/O last 3 completed shifts:  In: 3635 [I.V.:2362; Blood:253; NG/GT:1020]  Out: 3312 [Other:3312]    Physical Exam  Constitutional: She appears well-developed and well-nourished.   HENT:   Head: Normocephalic and atraumatic.   Eyes: Pupils are equal, round, and reactive to light.   Cardiovascular: Normal rate and regular rhythm.   Pulmonary/Chest:   intubated on spontaneous  Abdominal: Soft.   Neurological: She is alert.   Skin: Skin is warm and dry.   Nursing notes and vitals reviewed.      Significant Labs:  CBC:   Recent Labs   Lab 12/02/18  0401   WBC 6.79  6.79   RBC 2.48*  2.48*   HGB 7.7*  7.7*   HCT 23.4*  23.4*   PLT 40*  40*   MCV 94  94   MCH 31.0  31.0   MCHC 32.9  32.9     CMP:   Recent Labs   Lab 12/02/18  0401   *   CALCIUM 8.7   ALBUMIN 2.3*   PROT 5.0*      K 4.9   CO2 26      BUN 20   CREATININE 1.0   ALKPHOS 126   ALT 21   AST 54*   BILITOT 9.0*     All labs within the past 24 hours have been reviewed.     Significant Imaging:  CXR personally reviewed.

## 2018-12-02 NOTE — HPI
67 yo W with a history of cryptogenic cirrhosis, breast cancer, GERD, and rheumatoid arthritis who has been admitted since 11/12 for AMS due to hepatic encephalopathy. She was being treated for pneumonia and needed a central line on 11/21/18; during the procedure, she became bradycardic with a drop in her oxygen saturations. This eventually caused her to be intubated due to acute respiratory failure. She has been intubated since 11/21/18 and attempts at extubation has failed, likely due to frailty. She is intubated with 7.0 ETT and is on minimal vent setting with FiO2 of 50% and PEEP of 5. General Surgery consulted for tracheostomy tube placement. The team currently does not believe the patient needs a G-tube at this time.

## 2018-12-02 NOTE — ASSESSMENT & PLAN NOTE
Cryptogenic cirrhosis awaiting liver xplant, presented for AMS and found to have ENZO on CKDIIIb. SLED started last weekend with intermittent AF+RVR+hypotensive episodes controlled with bb/cardizem gtt adequately. Now on CRRT with improving overall fluid status.    Plan:  - Diffuse CXR infiltrates persist, no significant change from previous  - Patient has been continuing to have abundant volume per watery stools  - Will perform SCUF for 8 hrs after CVP higher than yesterday / elevated  - Will reassess tomorrow

## 2018-12-02 NOTE — PROGRESS NOTES
[facility]  Critical Care - Surgery  Progress Note    Patient Name: Krystal Hobson  MRN: [id number]  Admission Date: 11/10/2018  Hospital Length of Stay: 22 days  Code Status: Full Code  Attending Provider: Abrahan Montelongo MD  Primary Care Provider: Courtney Joe MD   Principal Problem: Hepatic encephalopathy    Subjective:     Hospital/ICU Course:  No notes on file    Interval History/Significant Events:     HDS overnight. Afebrile.  H+H to 7.7 s/p one unit pRBC, WBC stable  Gen surg consulted for trach; plan for bedside trach 12/4.      Objective:     Vital Signs (Most Recent):  Temp: 98 °F (36.7 °C) (12/02/18 0300)  Pulse: 78 (12/02/18 0600)  Resp: (!) 21 (12/01/18 1949)  BP: (!) 114/53 (12/02/18 0600)  SpO2: 99 % (12/02/18 0600) Vital Signs (24h Range):  Temp:  [97.6 °F (36.4 °C)-98 °F (36.7 °C)] 98 °F (36.7 °C)  Pulse:  [] 78  Resp:  [13-27] 21  SpO2:  [70 %-100 %] 99 %  BP: ()/(37-78) 114/53  Arterial Line BP: ()/(35-50) 122/43     Weight: 53.3 kg (117 lb 8.1 oz)  Body mass index is 24.56 kg/m².      Intake/Output Summary (Last 24 hours) at 12/2/2018 0703  Last data filed at 12/2/2018 0500  Gross per 24 hour   Intake 990 ml   Output 222 ml   Net 768 ml       Physical Exam   Constitutional: She appears well-developed and well-nourished.   HENT:   Head: Normocephalic and atraumatic.   Eyes: Pupils are equal, round, and reactive to light.   Cardiovascular: Normal rate and regular rhythm.   Pulmonary/Chest:   intubated on spontaneous PEEP 5 PS 5 FiO2  Abdominal: Soft.   Neurological: She is alert.   Skin: Skin is warm and dry.         Lines/Drains/Airways     Central Venous Catheter Line                 Trialysis (Dialysis) Catheter 11/21/18 1851 left internal jugular 10 days          Drain                 NG/OG Tube 11/21/18 1840 [place] sump 16 Fr. Right mouth 10 days          Airway                 Airway - Non-Surgical 11/21/18 1836 Endotracheal Tube 10 days          Arterial Line                  Arterial Line 11/21/18 1900 Right Radial 10 days          Pressure Ulcer                 Pressure Injury 11/27/18 Rectum Deep tissue injury 5 days          Peripheral Intravenous Line                 Peripheral IV - Single Lumen 12/02/18 0628 Right Antecubital less than 1 day                Significant Labs:    CBC/Anemia Profile:  Recent Labs   Lab 12/01/18 2012 12/01/18  2349 12/02/18  0401   WBC 8.31 7.22 6.79  6.79   HGB 7.9* 7.5* 7.7*  7.7*   HCT 24.3* 23.0* 23.4*  23.4*   PLT 37* 36* 40*  40*   MCV 94 94 94  94   RDW 22.7* 22.8* 23.4*  23.4*        Chemistries:  Recent Labs   Lab 11/30/18  1300 11/30/18  2215 12/01/18  0400 12/02/18  0401    142 142  142 142   K 4.5 4.8 4.7  4.7 4.9   * 112* 112*  112* 110   CO2 23 26 27  27 26   BUN 4* 6* 4*  4* 20   CREATININE 0.4* 0.5 0.4*  0.4* 1.0   CALCIUM 7.2* 7.4* 7.6*  7.6* 8.7   ALBUMIN 2.2* 2.1* 2.0*  2.0* 2.3*   PROT  --   --  4.4* 5.0*   BILITOT  --   --  11.5* 9.0*   ALKPHOS  --   --  125 126   ALT  --   --  16 21   AST  --   --  51* 54*   MG 1.8 1.9 1.6 2.7*   PHOS 2.6* 2.2* 1.5*  --        All pertinent labs within the past 24 hours have been reviewed.    Significant Imaging:  I have reviewed all pertinent imaging results/findings within the past 24 hours.    Assessment/Plan:     Acute respiratory failure with hypoxia    Acute respiratory failure with hypoxia     66yo F liver-listed, stepped up on 11/21 for respiratory failure with worsening respiratory acidosis       Neuro:   -Pain control: prn fentanyl while intubated  -Sedation off  -Following commands      Pulmonary:   -intubated on arrival to SICU 11/21  -ABGs daily and prn  -Minimal Vent Settings  -plan for trach 12/4  -duonebs prn     Cardiac:  -SBP > 100 goal  - JACINDA 11/20 - elevated PA pressures   -Heart failure following, appreciate recs   -dilt gtt for A-Fib, wean as tolerated    Renal:   -Mendoza in place  -Continue to monitor UOP: oliguria/anuria    -CRRT  overnight  -Nephrology following, appreciate recommendations      Fluids/Electrolytes/Nutrition/GI:    -TFs @ 40 - goal   -replace lytes PRN  -holding Lactulose enemas TID, per primary      Hematology/Oncology:  -Monitor CBC -- H+H to 7.7, rec'd one unit pRBCs   -PT/INR -- 2.4(2.0)-->1.7 this AM  -continue to monitor. Transfuse as needed      Infectious Disease:   -Afebrile  -WBC 7.6-->6.8  -Abx: Diflucan to cover HCAP      Endocrine:  -Glucose goal of 140-180  -Endocrine following     Dispo:  -Continue care in the ICU setting  -Primary: Transplant                Critical care was time spent personally by me on the following activities: development of treatment plan with patient or surrogate and bedside caregivers, discussions with consultants, evaluation of patient's response to treatment, examination of patient, ordering and performing treatments and interventions, ordering and review of laboratory studies, ordering and review of radiographic studies, pulse oximetry, re-evaluation of patient's condition.  This critical care time did not overlap with that of any other provider or involve time for any procedures.     Mina Hayden MD  Critical Care - Surgery  [facility]

## 2018-12-03 NOTE — ASSESSMENT & PLAN NOTE
Acute respiratory failure with hypoxia     66yo F liver-listed, stepped up on 11/21 for respiratory failure with worsening respiratory acidosis       Neuro:   -Pain control: prn fentanyl while intubated  -Sedation off  -Following commands      Pulmonary:   -intubated on arrival to SICU 11/21  -ABGs daily and prn  -Minimal Vent Settings  -plan for trach 12/4  -duonebs prn     Cardiac:  -SBP > 100 goal  -JACINDA 11/20 - elevated PA pressures   -Heart failure following, appreciate recs   -dilt gtt for A-Fib, wean as tolerated    Renal:   -Mendoza in place  -Continue to monitor UOP: oliguria/anuria    -CRRT overnight  -Nephrology following, appreciate recommendations      Fluids/Electrolytes/Nutrition/GI:    -TFs @ 40 - goal   -replace lytes PRN  -holding Lactulose enemas TID, per primary      Hematology/Oncology:  -Monitor CBC -- H+H to 7.1 from 7.7 rec'd one unit pRBCs   -PT/INR -->1.7-->2.2 this AM  -continue to monitor. Transfuse as needed      Infectious Disease:   -Afebrile  -WBC 7.6-->6.8-->11.9  -Abx: Diflucan to cover HCAP      Endocrine:  -Glucose goal of 140-180  -Endocrine following  -3U aspart q4hrs, detemir 14U daily     PPx: famotidine     Dispo:  -Continue care in the ICU setting, still requiring vent, pending trach 12/4  -Primary: Transplant

## 2018-12-03 NOTE — PROGRESS NOTES
[facility]  General Surgery  Progress Note    Subjective:     History of Present Illness:  67 yo W with a history of cryptogenic cirrhosis, breast cancer, GERD, and rheumatoid arthritis who has been admitted since 11/12 for AMS due to hepatic encephalopathy. She was being treated for pneumonia and needed a central line on 11/21/18; during the procedure, she became bradycardic with a drop in her oxygen saturations. This eventually caused her to be intubated due to acute respiratory failure. She has been intubated since 11/21/18 and attempts at extubation has failed, likely due to frailty. She is intubated with 7.0 ETT and is on minimal vent setting with FiO2 of 50% and PEEP of 5. General Surgery consulted for tracheostomy tube placement. The team currently does not believe the patient needs a G-tube at this time.    Post-Op Info:  * No surgery found *         Interval History:     No acute events overnight, patient to have Trach tomorrow. But Platelets of 34 this morning and INR of 2.2.    Medications:  Continuous Infusions:   sodium chloride 0.9%      dilTIAZem Stopped (12/03/18 0800)     Scheduled Meds:   famotidine  20 mg Oral QHS    fluconazole  200 mg Oral Daily    guaifenesin 100 mg/5 ml  200 mg Per OG tube Q6H    [START ON 12/4/2018] insulin aspart U-100  5 Units Subcutaneous Q24H    [START ON 12/4/2018] insulin aspart U-100  5 Units Subcutaneous Q24H    insulin aspart U-100  5 Units Subcutaneous Q24H    insulin aspart U-100  5 Units Subcutaneous Q24H    insulin aspart U-100  5 Units Subcutaneous Q24H    insulin aspart U-100  5 Units Subcutaneous Q24H    insulin detemir U-100  14 Units Subcutaneous Daily    levalbuterol  1.25 mg Nebulization Q6H WAKE    psyllium husk (aspartame)  1 packet Per NG tube Daily    rifAXImin  550 mg Oral BID     PRN Meds:acetaminophen, dextrose 50%, glucagon (human recombinant), insulin aspart U-100, k phos di & mono-sod phos mono, lactulose,  lactulose, magnesium sulfate IVPB, metoprolol, ondansetron, simethicone, sodium chloride 0.9%     Review of patient's allergies indicates:  No Known Allergies  Objective:     Vital Signs (Most Recent):  Temp: (!) 93 °F (33.9 °C)(warming blanket applied) (12/03/18 0700)  Pulse: 67 (12/03/18 0945)  Resp: (!) 25 (12/03/18 0712)  BP: (!) 119/51 (12/03/18 0830)  SpO2: 100 % (12/03/18 0945) Vital Signs (24h Range):  Temp:  [93 °F (33.9 °C)-98.3 °F (36.8 °C)] 93 °F (33.9 °C)  Pulse:  [55-98] 67  Resp:  [25-40] 25  SpO2:  [90 %-100 %] 100 %  BP: ()/(51-69) 119/51  Arterial Line BP: (103-140)/(34-53) 124/41     Weight: 53.3 kg (117 lb 8.1 oz)  Body mass index is 24.56 kg/m².    Intake/Output - Last 3 Shifts       12/01 0700 - 12/02 0659 12/02 0700 - 12/03 0659 12/03 0700 - 12/04 0659    I.V. (mL/kg) 417 (7.8) 2118 (39.7)     Blood 253      NG/ 1195 140    Total Intake(mL/kg) 1030 (19.3) 3313 (62.2) 140 (2.6)    Drains       Other 480 2654 662    Stool 0 0 0    Total Output 480 2654 662    Net +550 +659 -522           Stool Occurrence 6 x 2 x 2 x          Physical Exam     Constitutional: She appears well-developed.   Thin appearing patient   HENT:   Head: Normocephalic and atraumatic.   Eyes: Scleral icterus is present.   Neck: Normal range of motion. Neck supple.   Central line in L neck   Cardiovascular: Normal rate.   Pulmonary/Chest: Effort normal. No respiratory distress. She has no wheezes. She has no rales.   Mechanical ventilated   Abdominal: Soft. She exhibits distension. A hernia is present.   Neurological: She is alert.   Skin: Skin is warm and dry.   Psychiatric: She has a normal mood and affect.      Significant Labs:  CBC:   Recent Labs   Lab 12/03/18  0418 12/03/18  0901   WBC 11.96 10.10   RBC 2.37* 2.26*   HGB 7.1* 7.0*   HCT 23.0* 21.8*   PLT 34*  --    MCV 97 97   MCH 30.0 31.0   MCHC 30.9* 32.1     CMP:   Recent Labs   Lab 12/03/18  0418   *  313*   CALCIUM 8.3*  8.3*   ALBUMIN 2.3*   2.3*   PROT 5.0*     139   K 5.1  5.1   CO2 21*  21*     109   BUN 34*  34*   CREATININE 1.6*  1.6*   ALKPHOS 130   ALT 26   AST 54*   BILITOT 8.9*         Assessment/Plan:     Acute respiratory failure with hypoxia    - Tracheostomy to be done tomorrow at the bedside.   - Discussed risks and benefits with patient and patient's . They are agreeable and Mr. Hobson () has signed surgical consent.  - Patient with INR of 2.2 and Platelets of 34 will likely need FFP and Platelets before procedure.          Angie Camarena MD  General Surgery PGY V  Beeper: [phone]

## 2018-12-03 NOTE — SUBJECTIVE & OBJECTIVE
Interval History:     No acute events overnight, patient to have Trach tomorrow. But Platelets of 34 this morning and INR of 2.2.    Medications:  Continuous Infusions:   sodium chloride 0.9%      dilTIAZem Stopped (12/03/18 0800)     Scheduled Meds:   famotidine  20 mg Oral QHS    fluconazole  200 mg Oral Daily    guaifenesin 100 mg/5 ml  200 mg Per OG tube Q6H    [START ON 12/4/2018] insulin aspart U-100  5 Units Subcutaneous Q24H    [START ON 12/4/2018] insulin aspart U-100  5 Units Subcutaneous Q24H    insulin aspart U-100  5 Units Subcutaneous Q24H    insulin aspart U-100  5 Units Subcutaneous Q24H    insulin aspart U-100  5 Units Subcutaneous Q24H    insulin aspart U-100  5 Units Subcutaneous Q24H    insulin detemir U-100  14 Units Subcutaneous Daily    levalbuterol  1.25 mg Nebulization Q6H WAKE    psyllium husk (aspartame)  1 packet Per NG tube Daily    rifAXImin  550 mg Oral BID     PRN Meds:acetaminophen, dextrose 50%, glucagon (human recombinant), insulin aspart U-100, k phos di & mono-sod phos mono, lactulose, lactulose, magnesium sulfate IVPB, metoprolol, ondansetron, simethicone, sodium chloride 0.9%     Review of patient's allergies indicates:  No Known Allergies  Objective:     Vital Signs (Most Recent):  Temp: (!) 93 °F (33.9 °C)(warming blanket applied) (12/03/18 0700)  Pulse: 67 (12/03/18 0945)  Resp: (!) 25 (12/03/18 0712)  BP: (!) 119/51 (12/03/18 0830)  SpO2: 100 % (12/03/18 0945) Vital Signs (24h Range):  Temp:  [93 °F (33.9 °C)-98.3 °F (36.8 °C)] 93 °F (33.9 °C)  Pulse:  [55-98] 67  Resp:  [25-40] 25  SpO2:  [90 %-100 %] 100 %  BP: ()/(51-69) 119/51  Arterial Line BP: (103-140)/(34-53) 124/41     Weight: 53.3 kg (117 lb 8.1 oz)  Body mass index is 24.56 kg/m².    Intake/Output - Last 3 Shifts       12/01 0700 - 12/02 0659 12/02 0700 - 12/03 0659 12/03 0700 - 12/04 0659    I.V. (mL/kg) 417 (7.8) 2118 (39.7)     Blood 253      NG/ 1195 140    Total Intake(mL/kg) 1030  (19.3) 3313 (62.2) 140 (2.6)    Drains       Other 480 2654 662    Stool 0 0 0    Total Output 480 2654 662    Net +550 +659 -522           Stool Occurrence 6 x 2 x 2 x          Physical Exam     Constitutional: She appears well-developed.   Thin appearing patient   HENT:   Head: Normocephalic and atraumatic.   Eyes: Scleral icterus is present.   Neck: Normal range of motion. Neck supple.   Central line in L neck   Cardiovascular: Normal rate.   Pulmonary/Chest: Effort normal. No respiratory distress. She has no wheezes. She has no rales.   Mechanical ventilated   Abdominal: Soft. She exhibits distension. A hernia is present.   Neurological: She is alert.   Skin: Skin is warm and dry.   Psychiatric: She has a normal mood and affect.      Significant Labs:  CBC:   Recent Labs   Lab 12/03/18  0418 12/03/18  0901   WBC 11.96 10.10   RBC 2.37* 2.26*   HGB 7.1* 7.0*   HCT 23.0* 21.8*   PLT 34*  --    MCV 97 97   MCH 30.0 31.0   MCHC 30.9* 32.1     CMP:   Recent Labs   Lab 12/03/18  0418   *  313*   CALCIUM 8.3*  8.3*   ALBUMIN 2.3*  2.3*   PROT 5.0*     139   K 5.1  5.1   CO2 21*  21*     109   BUN 34*  34*   CREATININE 1.6*  1.6*   ALKPHOS 130   ALT 26   AST 54*   BILITOT 8.9*

## 2018-12-03 NOTE — PROGRESS NOTES
"[facility]  Endocrinology  Progress Note    Admit Date: 11/10/2018     Reason for Consult: Management of type 2 DM, Hyperglycemia     Surgical Procedure and Date: n/a    Diabetes diagnosis year: 2001    Home Diabetes Medications: Levemir 12 units BID (vial) and Humalog SSI with meals   Lab Results   Component Value Date    HGBA1C 5.8 (H) 11/10/2018         How often checking glucose at home? 1-3  BG readings on regimen: 150-300  Hypoglycemia on the regimen? once 27; required visit to ED; gave Levemir and humalog and patient did not eat  Missed doses on regimen?  No    Diabetes Complications include:     Hyperglycemia, Hypoglycemia  and Diabetic peripheral neuropathy     Complicating diabetes co morbidities:   CIRRHOSIS      HPI:   Patient is a 67 y.o. female with a diagnosis of type 2 DM; well controlled on MDI. Also with   Cryptogenic cirrhosis, rheumatoid arthritis, and GERD. Patient was listed for liver transplant on 9/14/18. Patient presented to ED of hospital in [place] with AMS and ENZO. Endocrinology consulted for BG/ DM management.   Of note, profound hypoglycemic event (BG < 20) the night of 11/14/18.            Interval HPI:   Overnight events: Remains in ICU, intubated. Worsening A-fib noted overnight.  BG trending up overnight on current insulin regimen.  TF remains at goal.  Eating:   NPO  Nausea: No  Hypoglycemia and intervention: No  Fever: No  TPN and/or TF: Yes  TF and rate: Isosource at 40 cc/hr (goal), changed to Glucerna 1.5    BP (!) 124/58   Pulse 64   Temp 97.8 °F (36.6 °C) (Oral)   Resp (!) 25   Ht 4' 10" (1.473 m)   Wt 53.3 kg (117 lb 8.1 oz)   SpO2 99%   Breastfeeding? No   BMI 24.56 kg/m²      Labs Reviewed and Include    Recent Labs   Lab 12/03/18  0418   *  313*   CALCIUM 8.3*  8.3*   ALBUMIN 2.3*  2.3*   PROT 5.0*     139   K 5.1  5.1   CO2 21*  21*     109   BUN 34*  34*   CREATININE 1.6*  1.6*   ALKPHOS 130   ALT 26   AST 54* "   BILITOT 8.9*     Lab Results   Component Value Date    WBC 11.96 12/03/2018    HGB 7.1 (L) 12/03/2018    HCT 23.0 (L) 12/03/2018    MCV 97 12/03/2018    PLT 34 (LL) 12/03/2018     No results for input(s): TSH, FREET4 in the last 168 hours.  Lab Results   Component Value Date    HGBA1C 5.8 (H) 11/10/2018       Nutritional status:   Body mass index is 24.56 kg/m².  Lab Results   Component Value Date    ALBUMIN 2.3 (L) 12/03/2018    ALBUMIN 2.3 (L) 12/03/2018    ALBUMIN 2.2 (L) 12/02/2018     No results found for: PREALBUMIN    Estimated Creatinine Clearance: 28.3 mL/min (A) (based on SCr of 1.6 mg/dL (H)).    Accu-Checks  Recent Labs     12/01/18  1557 12/01/18  2014 12/01/18  2346 12/02/18  0404 12/02/18  0754 12/02/18  1210 12/02/18  1609 12/02/18  2002 12/02/18  2334 12/03/18  0435   POCTGLUCOSE 147* 148* 179* 251* 234* 230* 197* 200* 284* 338*       Current Medications and/or Treatments Impacting Glycemic Control  Immunotherapy:    Immunosuppressants     None        Steroids:   Hormones (From admission, onward)    None        Pressors:    Autonomic Drugs (From admission, onward)    None        Hyperglycemia/Diabetes Medications:   Antihyperglycemics (From admission, onward)    Start     Stop Route Frequency Ordered    12/02/18 0800  insulin aspart U-100 pen 3 Units      -- SubQ Every 24 hours (non-standard times) 12/01/18 0901    12/02/18 0400  insulin aspart U-100 pen 3 Units      -- SubQ Every 24 hours (non-standard times) 12/01/18 0901    12/02/18 0000  insulin aspart U-100 pen 3 Units      -- SubQ Every 24 hours (non-standard times) 12/01/18 0901    12/01/18 2000  insulin aspart U-100 pen 3 Units      -- SubQ Every 24 hours (non-standard times) 12/01/18 0901    12/01/18 1600  insulin aspart U-100 pen 3 Units      -- SubQ Every 24 hours (non-standard times) 12/01/18 0901    12/01/18 1200  insulin aspart U-100 pen 3 Units      -- SubQ Every 24 hours (non-standard times) 12/01/18 0901    11/26/18 0947  insulin  aspart U-100 pen 0-5 Units      -- SubQ Every 4 hours PRN 11/26/18 0848    11/23/18 0900  insulin detemir U-100 pen 14 Units      -- SubQ Daily 11/23/18 0735          ASSESSMENT and PLAN    * Hepatic encephalopathy    Managed per primary.        Type 2 diabetes mellitus with hyperglycemia, with long-term current use of insulin    BG goal 140-180    Levemir 14 units daily.  Increase Novolog to 5 units every 4 hours with TF; hold if TF held   Hold scheduled Novolog, pt NPO after MN for trach tomorrow  Low dose correction scale  BG monitoring every 4 hours    Discharge planning: TBD       Acute kidney injury superimposed on CKD    Avoid insulin stacking and hypoglycemia.  Lab Results   Component Value Date    CREATININE 1.6 (H) 12/03/2018    CREATININE 1.6 (H) 12/03/2018            Decompensated hepatic cirrhosis    Managed per primary.   Listed for liver transplant.  May impact BG readings       On enteral nutrition    On supplemental TF, elevating BG readings       CKD (chronic kidney disease) stage 3, GFR 30-59 ml/min    Caution with insulin stacking  Estimated Creatinine Clearance: 28.3 mL/min (A) (based on SCr of 1.6 mg/dL (H)).             Tristan Hwang, NP  Endocrinology  [facility]

## 2018-12-03 NOTE — ASSESSMENT & PLAN NOTE
BG goal 140-180    Levemir 14 units daily.  Increase Novolog to 5 units every 4 hours with TF; hold if TF held   Hold scheduled Novolog, pt NPO after MN for trach tomorrow  Low dose correction scale  BG monitoring every 4 hours    Discharge planning: TBD

## 2018-12-03 NOTE — ASSESSMENT & PLAN NOTE
Avoid insulin stacking and hypoglycemia.  Lab Results   Component Value Date    CREATININE 1.6 (H) 12/03/2018    CREATININE 1.6 (H) 12/03/2018

## 2018-12-03 NOTE — CARE UPDATE
Decrease Novolog to 3 units q 4 hours due to change in TF from isosource to glucerna  Hold scheduled Novolog after MN, NPO after MN for trach tomorrow  Resume scheduled Novolog once TF resumed at goal

## 2018-12-03 NOTE — PLAN OF CARE
Problem: Patient Care Overview  Goal: Plan of Care Review  Outcome: Ongoing (interventions implemented as appropriate)  POC reviewed with patient and spouse. Pt nodded with understanding. Questions and concerns addressed. Pt on CRRT during the night, dilt infusion titrated for pt's Afib. Afib worsening with CRRT. Vital signs otherwise stable. Pt sats  on AC vent settings. Pt complained of an ear ache on the right side, improvements made with prn tylenol and hot packs. Pt progressing toward goals. Will continue to monitor. See flow sheets for full assessment and VS info

## 2018-12-03 NOTE — ASSESSMENT & PLAN NOTE
Nutrition Problem  Malnutrition in the context of Chronic Illness/Injury    Related to (etiology):  Cryptogenic cirrhosis    Signs and Symptoms (as evidenced by):  Energy Intake: <50% of estimated energy requirement for at least 1 month  Body Fat Depletion: moderate and severe depletion of orbitals, triceps and thoracic and lumbar region   Muscle Mass Depletion: moderate and severe depletion of temples, clavicle region, interosseous muscle and lower extremities   Weight Loss: unable to determine if any weight loss 2/2 fluid present  Fluid Accumulation: mild    Interventions/Recommendations (treatment strategy):  Full assessment completed, see RD Note 12/3/2018.    Nutrition Diagnosis Status:  Continues

## 2018-12-03 NOTE — ASSESSMENT & PLAN NOTE
Cryptogenic cirrhosis awaiting liver xplant, presented for AMS and found to have ENZO on CKDIIIb. SLED started last weekend with intermittent AF+RVR+hypotensive episodes controlled with bb/cardizem gtt adequately. Now on CRRT with improving overall fluid status.    Plan:  - Diffuse CXR infiltrates persist, no significant change from previous  - Patient has been continuing to have abundant volume per watery stools  - CVP improved to 9 with PEEP   - Will switch to SLED this am for 8 hrs for metabolic clearance in setting of elevated K and low CO2  - Will reassess tomorrow

## 2018-12-03 NOTE — PROGRESS NOTES
" [facility]  Adult Nutrition  Progress Note    SUMMARY       Recommendations  Recommendation/Intervention:   1. Recommend modifying TF to Glucerna 1.5 at a 40 mL/hr - to provide 1440 kcal/day, 79g protein/day, and 729mL free fluid/day.    -Patient having elevated BG levels and has diabetes.  2. When no longer on vent, ADAT to Diabetic with texture per SLP.   RD to monitor.    Goals: Patient to meet >85% EEN/EPN  Nutrition Goal Status: goal met  Communication of RD Recs: (POC)    Reason for Assessment  Reason for Assessment: RD follow-up  Diagnosis: transplant/postoperative complications(Hepatic encephalopathy, Pre- Liver tx)  Relevant Medical History: cryptogenic cirrhosis, DM, GERD  Interdisciplinary Rounds: did not attend  General Information Comments: Patient still intubated, On CRRT. Tolerating TF at goal rate. Plan for trach tomorrow. Patient has been having bloody BMs, testing for C.diff. Fiber added yesterday for diarrhea. (NFPE completed 11/13, patient with severe malnutrition.)  Nutrition Discharge Planning: Unable to determine at this time    Nutrition Risk Screen  Nutrition Risk Screen: no indicators present    Nutrition/Diet History  Patient Reported Diet/Restrictions/Preferences: general, low salt  Typical Food/Fluid Intake: Decreased po intake  Food Preferences: noted  Do you have any cultural, spiritual, Faith conflicts, given your current situation?: none noted   Supplemental Drinks or Food Habits: Glucerna(4-5/week)  Food Allergies: NKFA  Factors Affecting Nutritional Intake: NPO, on mechanical ventilation    Anthropometrics  Temp: (!) 93 °F (33.9 °C)(warming blanket applied)  Height Method: Stated  Height: 4' 10" (147.3 cm)  Height (inches): 58 in  Weight Method: Bed Scale  Weight: 53.3 kg (117 lb 8.1 oz)  Weight (lb): 117.51 lb  Ideal Body Weight (IBW), Female: 90 lb  % Ideal Body Weight, Female (lb): 149.42 lb  BMI (Calculated): 28.2  BMI Grade: 25 - 29.9 - " overweight  Usual Body Weight (UBW), k.8 kg(2018 per chart review)  % Usual Body Weight: 105.14  % Weight Change From Usual Weight: 4.92 %    Lab/Procedures/Meds  Pertinent Labs Reviewed: reviewed  Pertinent Labs Comments: BUN 34, Creat 1.6, Glu 313, POCT Glu 197-338, HgbA1c 5.8, Ca 8.3, Phos 7.0, Alb 2.3, T. bili 8.9  Pertinent Medications Reviewed: reviewed  Pertinent Medications Comments: famotidine, insulin, psyllium    Physical Findings/Assessment  Overall Physical Appearance: on ventilator support, loss of muscle mass, loss of subcutaneous fat  Tubes: orogastric tube  Oral/Mouth Cavity: tooth/teeth missing  Skin: edema, pressure ulcer(s)(DTI)    Estimated/Assessed Needs  Weight Used For Calorie Calculations: 53.3 kg (117 lb 8.1 oz)  Energy Calorie Requirements (kcal): 1062 kcal/day  Energy Need Method: [facility]  Protein Requirements: 73-92 g/day(1.2-1.5 g/kg)  Weight Used For Protein Calculations: 61 kg (134 lb 7.7 oz)  Fluid Requirements (mL): 1mL/kcal or per MD  Fluid Need Method: RDA Method  RDA Method (mL): 1062  CHO Requirement: 50% total kcal    Nutrition Prescription Ordered  Current Diet Order: NPO  Current Nutrition Support Formula Ordered: Isosource 1.5  Current Nutrition Support Rate Ordered: 40 (ml)  Current Nutrition Support Frequency Ordered: mL/hr    Evaluation of Received Nutrient/Fluid Intake  Enteral Calories (kcal): 1440  Enteral Protein (gm): 65  Enteral (Free Water) Fluid (mL): 733  % Kcal Needs: 136  % Protein Needs: 89  I/O: -1.5L since admit  Energy Calories Required: exceed needs  Protein Required: meeting needs  Fluid Required: (per MD)  Comments: LBM 12/2  Tolerance: tolerating  % Intake of Estimated Energy Needs: Other: > 100%  % Meal Intake: NPO    Nutrition Risk  Level of Risk/Frequency of Follow-up: low(1x week)     Assessment and Plan  Severe malnutrition-MD resolved as of 2018 however RD does not feel this problem is resolved    Nutrition Problem  Malnutrition in  the context of Chronic Illness/Injury    Related to (etiology):  Cryptogenic cirrhosis    Signs and Symptoms (as evidenced by):  Energy Intake: <50% of estimated energy requirement for at least 1 month  Body Fat Depletion: moderate and severe depletion of orbitals, triceps and thoracic and lumbar region   Muscle Mass Depletion: moderate and severe depletion of temples, clavicle region, interosseous muscle and lower extremities   Weight Loss: unable to determine if any weight loss 2/2 fluid present  Fluid Accumulation: mild    Interventions/Recommendations (treatment strategy):  Full assessment completed, see RD Note 12/3/2018.    Nutrition Diagnosis Status:  Continues     Monitor and Evaluation  Food and Nutrient Intake: energy intake, enteral nutrition intake  Food and Nutrient Adminstration: enteral and parenteral nutrition administration  Anthropometric Measurements: weight, weight change  Biochemical Data, Medical Tests and Procedures: electrolyte and renal panel, gastrointestinal profile, glucose/endocrine profile, inflammatory profile, lipid profile  Nutrition-Focused Physical Findings: overall appearance     Nutrition Follow-Up  RD Follow-up?: Yes

## 2018-12-03 NOTE — PLAN OF CARE
"Problem: Patient Care Overview  Goal: Plan of Care Review  Outcome: Ongoing (interventions implemented as appropriate)  Dx: liver failure, respiratory distress    Shift Events: TF formula changed, plan for trach discussed with     Neuro: Arouses to voice, Moves all extremities and Follows commands    Vital Signs: BP (!) 139/59   Pulse 103   Temp 97.4 °F (36.3 °C)   Resp (!) 24   Ht 4' 10" (1.473 m)   Wt 53.3 kg (117 lb 8.1 oz)   SpO2 100%   Breastfeeding? No   BMI 24.56 kg/m²     Intake/Gtts/Diet: Pt tolerating TF at goal rate of 40 cc/hr. Gtts: Cardizem.    CRRT: UF goal 200-250. Renal labs drawn, lytes repleted as necessary     Labs: Accuchecks q4hr, scheduled and prn insulin administered.    Skin: Stage II to sacrum - redness, excoriation noted. Heels and elbows w/o redness or breakdown.         "

## 2018-12-03 NOTE — PROGRESS NOTES
" (SW) presented to the patient's (pt) bedside for follow up and continuity of care.  Pt observed to be intubated and sleeping.  The pt would wake up to participate in the conversation at times but remained sleep for the most part of the assess.  Coping was not assessed.  Pt's  present, actively engaged and communicating effectively.  During rounds, pt's  was advised that the surgeon would like to come back later today to spend some time with him to get an understanding of his long term goals for the patient as well a realistic picture of the care that's currently being provided.  At this time all questions with regards to the pt's prognosis were answered and pt's  verbalized understanding.  The  of the patient appears to be visually tired and disheveled.  Although he appears to have adequate hygiene.  SW inquired about pt's 's coping, family members being able to assist in caregiver duties and his self care.  Pt's  denied any coping issues and explained the pt's sisters live in [place] and they have visited but he is retired and can be locally with the patient but would like to discuss if she's here long term what his plan of lodging will be.     Pt's  reported he has been eating and able to sleep when he can ( remains in pt's room 24/7).  SW encouraged pt's  to take frequent breaks from the pt's room in an attempt to retrieve fresh air and appropriate mental awareness.  Pt's  agreed to do so, but verbalized concerns about leaving the pts side.  Shahzad ( of the pt) reported they celebrated their 49th year wedding anniversary and the pts 68th birthday "I am scared we are reaching the point where the doctors will tell me theres nothing else they can do for her).   SW provided emotional support, reflective listening and normalization.  SW processed the possibility of having surgery with such frail condition and making it out of " "surgery alive.   of the pt became visibly tearful stating, "I just hope they do right by her."  Emotional support.  SW remains available for education, resources, support and discharge planning as appropriate.     SW had the caregiver complete a Financial Profile.  SW will reserve a room at the [facility] for two nights in an attempt to encourage self care.  Check in date 12/4 check out 12/6.    "

## 2018-12-03 NOTE — SUBJECTIVE & OBJECTIVE
"Interval HPI:   Overnight events: Remains in ICU, intubated. Worsening A-fib noted overnight.  BG trending up overnight on current insulin regimen.  TF remains at goal.  Eating:   NPO  Nausea: No  Hypoglycemia and intervention: No  Fever: No  TPN and/or TF: Yes  TF and rate: Isosource at 40 cc/hr (goal), changed to Glucerna 1.5    BP (!) 124/58   Pulse 64   Temp 97.8 °F (36.6 °C) (Oral)   Resp (!) 25   Ht 4' 10" (1.473 m)   Wt 53.3 kg (117 lb 8.1 oz)   SpO2 99%   Breastfeeding? No   BMI 24.56 kg/m²     Labs Reviewed and Include    Recent Labs   Lab 12/03/18  0418   *  313*   CALCIUM 8.3*  8.3*   ALBUMIN 2.3*  2.3*   PROT 5.0*     139   K 5.1  5.1   CO2 21*  21*     109   BUN 34*  34*   CREATININE 1.6*  1.6*   ALKPHOS 130   ALT 26   AST 54*   BILITOT 8.9*     Lab Results   Component Value Date    WBC 11.96 12/03/2018    HGB 7.1 (L) 12/03/2018    HCT 23.0 (L) 12/03/2018    MCV 97 12/03/2018    PLT 34 (LL) 12/03/2018     No results for input(s): TSH, FREET4 in the last 168 hours.  Lab Results   Component Value Date    HGBA1C 5.8 (H) 11/10/2018       Nutritional status:   Body mass index is 24.56 kg/m².  Lab Results   Component Value Date    ALBUMIN 2.3 (L) 12/03/2018    ALBUMIN 2.3 (L) 12/03/2018    ALBUMIN 2.2 (L) 12/02/2018     No results found for: PREALBUMIN    Estimated Creatinine Clearance: 28.3 mL/min (A) (based on SCr of 1.6 mg/dL (H)).    Accu-Checks  Recent Labs     12/01/18  1557 12/01/18 2014 12/01/18  2346 12/02/18  0404 12/02/18  0754 12/02/18  1210 12/02/18  1609 12/02/18 2002 12/02/18  2334 12/03/18  0435   POCTGLUCOSE 147* 148* 179* 251* 234* 230* 197* 200* 284* 338*       Current Medications and/or Treatments Impacting Glycemic Control  Immunotherapy:    Immunosuppressants     None        Steroids:   Hormones (From admission, onward)    None        Pressors:    Autonomic Drugs (From admission, onward)    None        Hyperglycemia/Diabetes Medications: "   Antihyperglycemics (From admission, onward)    Start     Stop Route Frequency Ordered    12/02/18 0800  insulin aspart U-100 pen 3 Units      -- SubQ Every 24 hours (non-standard times) 12/01/18 0901    12/02/18 0400  insulin aspart U-100 pen 3 Units      -- SubQ Every 24 hours (non-standard times) 12/01/18 0901    12/02/18 0000  insulin aspart U-100 pen 3 Units      -- SubQ Every 24 hours (non-standard times) 12/01/18 0901    12/01/18 2000  insulin aspart U-100 pen 3 Units      -- SubQ Every 24 hours (non-standard times) 12/01/18 0901    12/01/18 1600  insulin aspart U-100 pen 3 Units      -- SubQ Every 24 hours (non-standard times) 12/01/18 0901    12/01/18 1200  insulin aspart U-100 pen 3 Units      -- SubQ Every 24 hours (non-standard times) 12/01/18 0901    11/26/18 0947  insulin aspart U-100 pen 0-5 Units      -- SubQ Every 4 hours PRN 11/26/18 0848    11/23/18 0900  insulin detemir U-100 pen 14 Units      -- SubQ Daily 11/23/18 0735

## 2018-12-03 NOTE — ASSESSMENT & PLAN NOTE
- Tracheostomy to be done tomorrow at the bedside.   - Discussed risks and benefits with patient and patient's . They are agreeable and Mr. Hobson () has signed surgical consent.  - Patient with INR of 2.2 and Platelets of 34 will likely need FFP and Platelets before procedure.

## 2018-12-03 NOTE — PROGRESS NOTES
[facility]  Nephrology  Progress Note    Patient Name: Krystal Hobson  MRN: [id number]  Admission Date: 11/10/2018  Hospital Length of Stay: 23 days  Attending Provider: Abrahan Montelongo MD   Primary Care Physician: Courtney Joe MD  Principal Problem:Hepatic encephalopathy    Subjective:     HPI: Reason for consult: Diuresis in setting of PAP46, pulmonary edema, HRS    Ms. 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  - She presented to [facility] as xfer from [facility] under liver transplant team due to encephalopathy and new onset ENZO.   - Per the , she was extremely disoriented @ OSH and her ammonia reached as high as 200, and her Cr level increased to 2.0 She also went into A fib with RVR and was started on a diltiazem gtt at OSH.   - On arrival at [facility] she was disoriented and started on lactulose with noted improvement. During her current admission on CXR they found a concern for HCAP and she was started on broad spectrum ABx and then shifted her on Moxifloxacin 7 D course. On 11/20/2018 found on CXR a concern for Lt lung middle lobe consolidation and started on vancomycin and zosyn.  - Renal fxn baseline 1.6 at admit but trending up    Interval History: Events over the weekend are noted, critically ill, stable vital signs, on mechanical ventilation.  Tolerating SCUF. Net gain 370 ml overnight CVP was 9.  Still has watery bowel stools. K 5.1 this am and CO2 21.    Review of patient's allergies indicates:  No Known Allergies  Current Facility-Administered Medications   Medication Frequency    0.9%  NaCl infusion (CRRT USE ONLY) Continuous    acetaminophen tablet 650 mg Q8H PRN    dextrose 50% injection 12.5 g PRN    diltiaZEM 125 mg in D5W 125 mL infusion Continuous    famotidine tablet 20 mg QHS    fluconazole tablet 200 mg Daily    glucagon (human recombinant) injection 1 mg PRN    guaifenesin 100 mg/5 ml syrup 200 mg Q6H    insulin aspart  U-100 pen 0-5 Units Q4H PRN    [START ON 12/4/2018] insulin aspart U-100 pen 3 Units Q24H    [START ON 12/4/2018] insulin aspart U-100 pen 3 Units Q24H    [START ON 12/4/2018] insulin aspart U-100 pen 3 Units Q24H    [START ON 12/4/2018] insulin aspart U-100 pen 3 Units Q24H    [START ON 12/4/2018] insulin aspart U-100 pen 3 Units Q24H    insulin aspart U-100 pen 3 Units Q24H    insulin detemir U-100 pen 14 Units Daily    k phos di & mono-sod phos mono 250 mg tablet 2 tablet TID PRN    lactulose 10 gram/15 mL solution (enema) 200 g TID PRN    lactulose 20 gram/30 mL solution Soln 30 g Q6H PRN    levalbuterol nebulizer solution 1.25 mg Q6H WAKE    magnesium sulfate 2g in water 50mL IVPB (premix) PRN    metoprolol injection 2.5 mg Q5 Min PRN    ondansetron disintegrating tablet 8 mg Q8H PRN    psyllium husk (aspartame) 3.4 gram packet 1 packet Daily    rifAXIMin tablet 550 mg BID    simethicone chewable tablet 80 mg TID PRN    sodium chloride 0.9% flush 3 mL PRN       Objective:     Vital Signs (Most Recent):  Temp: 97.4 °F (36.3 °C) (12/03/18 1500)  Pulse: (!) 127 (12/03/18 1738)  Resp: (!) 24 (12/03/18 1600)  BP: (!) 117/55 (12/03/18 1600)  SpO2: 100 % (12/03/18 1738)  O2 Device (Oxygen Therapy): ventilator (12/03/18 1738) Vital Signs (24h Range):  Temp:  [93 °F (33.9 °C)-97.9 °F (36.6 °C)] 97.4 °F (36.3 °C)  Pulse:  [] 127  Resp:  [22-40] 24  SpO2:  [90 %-100 %] 100 %  BP: ()/(51-69) 117/55  Arterial Line BP: ()/(32-53) 108/43     Weight: 53.3 kg (117 lb 8.1 oz) (12/01/18 0400)  Body mass index is 24.56 kg/m².  Body surface area is 1.48 meters squared.    I/O last 3 completed shifts:  In: 3630 [I.V.:2395; NG/GT:1235]  Out: 2943 [Other:2943]    Physical Exam    Constitutional: She appears well-developed and well-nourished.   HENT:   Head: Normocephalic and atraumatic.   Eyes: Pupils are equal, round, and reactive to light.   Cardiovascular: Normal rate and regular rhythm.    Pulmonary/Chest:   intubated on spontaneous  Abdominal: Soft.   Neurological: She is alert.   Skin: Skin is warm and dry.   Nursing notes and vitals reviewed.      Significant Labs:  CBC:   Recent Labs   Lab 12/03/18  0901   WBC 10.10   RBC 2.26*   HGB 7.0*   HCT 21.8*   PLT 28*   MCV 97   MCH 31.0   MCHC 32.1     CMP:   Recent Labs   Lab 12/03/18  0418 12/03/18  1456   *  313* 162*   CALCIUM 8.3*  8.3* 8.1*   ALBUMIN 2.3*  2.3* 2.2*   PROT 5.0*  --      139 138   K 5.1  5.1 4.1   CO2 21*  21* 27     109 105   BUN 34*  34* 17   CREATININE 1.6*  1.6* 0.8   ALKPHOS 130  --    ALT 26  --    AST 54*  --    BILITOT 8.9*  --      All labs within the past 24 hours have been reviewed.     Significant Imaging:  CXR personally reviewed.    Assessment/Plan:     Acute kidney injury superimposed on CKD    Cryptogenic cirrhosis awaiting liver xplant, presented for AMS and found to have ENZO on CKDIIIb. SLED started last weekend with intermittent AF+RVR+hypotensive episodes controlled with bb/cardizem gtt adequately. Now on CRRT with improving overall fluid status.    Plan:  - Diffuse CXR infiltrates persist, no significant change from previous  - Patient has been continuing to have abundant volume per watery stools  - CVP improved to 9 with PEEP   - Will switch to SLED this am for 8 hrs for metabolic clearance in setting of elevated K and low CO2  - Will reassess tomorrow         Thank you for your consult. I will follow-up with patient. Please contact us if you have any additional questions.    Haroldo Van MD  Nephrology  [facility]

## 2018-12-03 NOTE — PROGRESS NOTES
[facility]  Critical Care - Surgery  Progress Note    Patient Name: Krystal Hobson  MRN: [id number]  Admission Date: 11/10/2018  Hospital Length of Stay: 23 days  Code Status: Full Code  Attending Provider: Abrahan Montelongo MD  Primary Care Provider: Courtney Joe MD   Principal Problem: Hepatic encephalopathy    Subjective:       Interval History/Significant Events:     HDS overnight. Afebrile. WBC w/ slight uptrend to 11.9.   Gen surg consulted for trach; plan for bedside trach tomorrow 12/4.      Objective:     Vital Signs (Most Recent):  Temp: 97.8 °F (36.6 °C) (12/03/18 0300)  Pulse: 68 (12/03/18 0900)  Resp: (!) 25 (12/03/18 0712)  BP: (!) 119/51 (12/03/18 0830)  SpO2: 100 % (12/03/18 0900) Vital Signs (24h Range):  Temp:  [97.7 °F (36.5 °C)-98.3 °F (36.8 °C)] 97.8 °F (36.6 °C)  Pulse:  [55-98] 68  Resp:  [25-40] 25  SpO2:  [90 %-100 %] 100 %  BP: ()/() 119/51  Arterial Line BP: (108-140)/(35-53) 113/47     Weight: 53.3 kg (117 lb 8.1 oz)  Body mass index is 24.56 kg/m².      Intake/Output Summary (Last 24 hours) at 12/3/2018 0918  Last data filed at 12/3/2018 0900  Gross per 24 hour   Intake 3273 ml   Output 3316 ml   Net -43 ml       Physical Exam   Constitutional: She appears well-developed and well-nourished.   HENT:   Head: Normocephalic and atraumatic.   Eyes: Pupils are equal, round, and reactive to light.   Cardiovascular: Normal rate and regular rhythm.   Pulmonary/Chest:   intubated on AC settings  Abdominal: Soft.   Neurological: She is alert.   Skin: Skin is warm and dry.         Lines/Drains/Airways     Central Venous Catheter Line                 Trialysis (Dialysis) Catheter 11/21/18 1851 left internal jugular 11 days          Drain                 NG/OG Tube 11/21/18 1840 Yoni rao 16 Fr. Right mouth 11 days          Airway                 Airway - Non-Surgical 11/21/18 1836 Endotracheal Tube 11 days          Arterial Line                 Arterial Line 11/21/18 1900  Right Radial 11 days          Pressure Ulcer                 Pressure Injury 11/27/18 Rectum Deep tissue injury 6 days          Peripheral Intravenous Line                 Peripheral IV - Single Lumen 12/02/18 0628 Right Antecubital 1 day                Significant Labs:    CBC/Anemia Profile:  Recent Labs   Lab 12/02/18  0401 12/02/18  1211 12/03/18  0418   WBC 6.79  6.79 10.26 11.96   HGB 7.7*  7.7* 7.6* 7.1*   HCT 23.4*  23.4* 23.6* 23.0*   PLT 40*  40* 54* 34*   MCV 94  94 94 97   RDW 23.4*  23.4* 23.7* 24.7*        Chemistries:  Recent Labs   Lab 12/02/18  0401 12/02/18  2331 12/03/18  0418    139 139  139   K 4.9 5.1 5.1  5.1    109 109  109   CO2 26 21* 21*  21*   BUN 20 32* 34*  34*   CREATININE 1.0 1.6* 1.6*  1.6*   CALCIUM 8.7 8.5* 8.3*  8.3*   ALBUMIN 2.3* 2.2* 2.3*  2.3*   PROT 5.0*  --  5.0*   BILITOT 9.0*  --  8.9*   ALKPHOS 126  --  130   ALT 21  --  26   AST 54*  --  54*   MG 2.7* 2.3 2.5  2.5   PHOS  --  6.8* 7.0*  7.0*       All pertinent labs within the past 24 hours have been reviewed.    Significant Imaging:  I have reviewed all pertinent imaging results/findings within the past 24 hours.     Assessment/Plan:     Acute respiratory failure with hypoxia    Acute respiratory failure with hypoxia     66yo F liver-listed, stepped up on 11/21 for respiratory failure with worsening respiratory acidosis       Neuro:   -Pain control: prn fentanyl while intubated  -Sedation off  -Following commands      Pulmonary:   -intubated on arrival to SICU 11/21  -ABGs daily and prn  -Minimal Vent Settings  -plan for trach 12/4  -duonebs prn     Cardiac:  -SBP > 100 goal  -JACINDA 11/20 - elevated PA pressures   -Heart failure following, appreciate recs   -dilt gtt for A-Fib, wean as tolerated    Renal:   -Mendoza in place  -Continue to monitor UOP: oliguria/anuria    -CRRT overnight  -Nephrology following, appreciate recommendations      Fluids/Electrolytes/Nutrition/GI:    -TFs @ 40 - goal    -replace lytes PRN  -holding Lactulose enemas TID, per primary      Hematology/Oncology:  -Monitor CBC -- H+H to 7.1 from 7.7 rec'd one unit pRBCs   -PT/INR -->1.7-->2.2 this AM  -continue to monitor. Transfuse as needed      Infectious Disease:   -Afebrile  -WBC 7.6-->6.8-->11.9  -Abx: Diflucan to cover HCAP      Endocrine:  -Glucose goal of 140-180  -Endocrine following  -3U aspart q4hrs, detemir 14U daily     PPx: famotidine     Dispo:  -Continue care in the ICU setting, still requiring vent, pending trach 12/4  -Primary: Transplant                    Critical care was time spent personally by me on the following activities: development of treatment plan with patient or surrogate and bedside caregivers, discussions with consultants, evaluation of patient's response to treatment, examination of patient, ordering and performing treatments and interventions, ordering and review of laboratory studies, ordering and review of radiographic studies, pulse oximetry, re-evaluation of patient's condition.  This critical care time did not overlap with that of any other provider or involve time for any procedures.     Jose Wallace MD  Critical Care - Surgery  [facility]

## 2018-12-03 NOTE — SUBJECTIVE & OBJECTIVE
Interval History/Significant Events:     HDS overnight. Afebrile. WBC w/ slight uptrend to 11.9.   Gen surg consulted for trach; plan for bedside trach tomorrow 12/4.      Objective:     Vital Signs (Most Recent):  Temp: 97.8 °F (36.6 °C) (12/03/18 0300)  Pulse: 68 (12/03/18 0900)  Resp: (!) 25 (12/03/18 0712)  BP: (!) 119/51 (12/03/18 0830)  SpO2: 100 % (12/03/18 0900) Vital Signs (24h Range):  Temp:  [97.7 °F (36.5 °C)-98.3 °F (36.8 °C)] 97.8 °F (36.6 °C)  Pulse:  [55-98] 68  Resp:  [25-40] 25  SpO2:  [90 %-100 %] 100 %  BP: ()/() 119/51  Arterial Line BP: (108-140)/(35-53) 113/47     Weight: 53.3 kg (117 lb 8.1 oz)  Body mass index is 24.56 kg/m².      Intake/Output Summary (Last 24 hours) at 12/3/2018 0918  Last data filed at 12/3/2018 0900  Gross per 24 hour   Intake 3273 ml   Output 3316 ml   Net -43 ml       Physical Exam   Constitutional: She appears well-developed and well-nourished.   HENT:   Head: Normocephalic and atraumatic.   Eyes: Pupils are equal, round, and reactive to light.   Cardiovascular: Normal rate and regular rhythm.   Pulmonary/Chest:   intubated on AC settings  Abdominal: Soft.   Neurological: She is alert.   Skin: Skin is warm and dry.         Lines/Drains/Airways     Central Venous Catheter Line                 Trialysis (Dialysis) Catheter 11/21/18 1851 left internal jugular 11 days          Drain                 NG/OG Tube 11/21/18 1840 [place] sump 16 Fr. Right mouth 11 days          Airway                 Airway - Non-Surgical 11/21/18 1836 Endotracheal Tube 11 days          Arterial Line                 Arterial Line 11/21/18 1900 Right Radial 11 days          Pressure Ulcer                 Pressure Injury 11/27/18 Rectum Deep tissue injury 6 days          Peripheral Intravenous Line                 Peripheral IV - Single Lumen 12/02/18 0628 Right Antecubital 1 day                Significant Labs:    CBC/Anemia Profile:  Recent Labs   Lab 12/02/18  0401 12/02/18  1211  12/03/18  0418   WBC 6.79  6.79 10.26 11.96   HGB 7.7*  7.7* 7.6* 7.1*   HCT 23.4*  23.4* 23.6* 23.0*   PLT 40*  40* 54* 34*   MCV 94  94 94 97   RDW 23.4*  23.4* 23.7* 24.7*        Chemistries:  Recent Labs   Lab 12/02/18  0401 12/02/18  2331 12/03/18  0418    139 139  139   K 4.9 5.1 5.1  5.1    109 109  109   CO2 26 21* 21*  21*   BUN 20 32* 34*  34*   CREATININE 1.0 1.6* 1.6*  1.6*   CALCIUM 8.7 8.5* 8.3*  8.3*   ALBUMIN 2.3* 2.2* 2.3*  2.3*   PROT 5.0*  --  5.0*   BILITOT 9.0*  --  8.9*   ALKPHOS 126  --  130   ALT 21  --  26   AST 54*  --  54*   MG 2.7* 2.3 2.5  2.5   PHOS  --  6.8* 7.0*  7.0*       All pertinent labs within the past 24 hours have been reviewed.    Significant Imaging:  I have reviewed all pertinent imaging results/findings within the past 24 hours.

## 2018-12-03 NOTE — SUBJECTIVE & OBJECTIVE
Interval History: Events over the weekend are noted, critically ill, stable vital signs, on mechanical ventilation.  Tolerating SCUF. Net gain 370 ml overnight CVP was 9.  Still has watery bowel stools. K 5.1 this am and CO2 21.    Review of patient's allergies indicates:  No Known Allergies  Current Facility-Administered Medications   Medication Frequency    0.9%  NaCl infusion (CRRT USE ONLY) Continuous    acetaminophen tablet 650 mg Q8H PRN    dextrose 50% injection 12.5 g PRN    diltiaZEM 125 mg in D5W 125 mL infusion Continuous    famotidine tablet 20 mg QHS    fluconazole tablet 200 mg Daily    glucagon (human recombinant) injection 1 mg PRN    guaifenesin 100 mg/5 ml syrup 200 mg Q6H    insulin aspart U-100 pen 0-5 Units Q4H PRN    [START ON 12/4/2018] insulin aspart U-100 pen 3 Units Q24H    [START ON 12/4/2018] insulin aspart U-100 pen 3 Units Q24H    [START ON 12/4/2018] insulin aspart U-100 pen 3 Units Q24H    [START ON 12/4/2018] insulin aspart U-100 pen 3 Units Q24H    [START ON 12/4/2018] insulin aspart U-100 pen 3 Units Q24H    insulin aspart U-100 pen 3 Units Q24H    insulin detemir U-100 pen 14 Units Daily    k phos di & mono-sod phos mono 250 mg tablet 2 tablet TID PRN    lactulose 10 gram/15 mL solution (enema) 200 g TID PRN    lactulose 20 gram/30 mL solution Soln 30 g Q6H PRN    levalbuterol nebulizer solution 1.25 mg Q6H WAKE    magnesium sulfate 2g in water 50mL IVPB (premix) PRN    metoprolol injection 2.5 mg Q5 Min PRN    ondansetron disintegrating tablet 8 mg Q8H PRN    psyllium husk (aspartame) 3.4 gram packet 1 packet Daily    rifAXIMin tablet 550 mg BID    simethicone chewable tablet 80 mg TID PRN    sodium chloride 0.9% flush 3 mL PRN       Objective:     Vital Signs (Most Recent):  Temp: 97.4 °F (36.3 °C) (12/03/18 1500)  Pulse: (!) 127 (12/03/18 1738)  Resp: (!) 24 (12/03/18 1600)  BP: (!) 117/55 (12/03/18 1600)  SpO2: 100 % (12/03/18 1738)  O2 Device  (Oxygen Therapy): ventilator (12/03/18 8584) Vital Signs (24h Range):  Temp:  [93 °F (33.9 °C)-97.9 °F (36.6 °C)] 97.4 °F (36.3 °C)  Pulse:  [] 127  Resp:  [22-40] 24  SpO2:  [90 %-100 %] 100 %  BP: ()/(51-69) 117/55  Arterial Line BP: ()/(32-53) 108/43     Weight: 53.3 kg (117 lb 8.1 oz) (12/01/18 0400)  Body mass index is 24.56 kg/m².  Body surface area is 1.48 meters squared.    I/O last 3 completed shifts:  In: 3630 [I.V.:2395; NG/GT:1235]  Out: 2943 [Other:2943]    Physical Exam    Constitutional: She appears well-developed and well-nourished.   HENT:   Head: Normocephalic and atraumatic.   Eyes: Pupils are equal, round, and reactive to light.   Cardiovascular: Normal rate and regular rhythm.   Pulmonary/Chest:   intubated on spontaneous  Abdominal: Soft.   Neurological: She is alert.   Skin: Skin is warm and dry.   Nursing notes and vitals reviewed.      Significant Labs:  CBC:   Recent Labs   Lab 12/03/18  0901   WBC 10.10   RBC 2.26*   HGB 7.0*   HCT 21.8*   PLT 28*   MCV 97   MCH 31.0   MCHC 32.1     CMP:   Recent Labs   Lab 12/03/18  0418 12/03/18  1456   *  313* 162*   CALCIUM 8.3*  8.3* 8.1*   ALBUMIN 2.3*  2.3* 2.2*   PROT 5.0*  --      139 138   K 5.1  5.1 4.1   CO2 21*  21* 27     109 105   BUN 34*  34* 17   CREATININE 1.6*  1.6* 0.8   ALKPHOS 130  --    ALT 26  --    AST 54*  --    BILITOT 8.9*  --      All labs within the past 24 hours have been reviewed.     Significant Imaging:  CXR personally reviewed.

## 2018-12-04 NOTE — PROGRESS NOTES
(SW) presented to the patient's (pt) bedside for follow up and continuity of care.  Pt observed to be intubated and sleeping.  The pt was having a sterile procedure performed and SW could not enter the room.  The /caregiver would wake up to participate in the conversation at times but remained sleep for the most part of the assess.  Coping was not assessed.  Pt's  present, actively engaged and communicating effectively.  During rounds, pt's  was provided a clear update to the pts health prognosis by Dr. Rueda and Dr. Melissa.   of the patient questioned if the pt is not a transplant candidate could they transport her back home.  The doctors explained to the  of the pt that she most likely would not survive being transported.   At this time all questions with regards to the pt's prognosis were answered and pt's  verbalized understanding. Doctors explained that a meeting will be held today and the pt will be discussed.  It was explained to the caregiver of the patient that once discussed they will present back to him with a clear goal for long term.        The  of the patient is visibly emotional.  SW provided emotional support, reflective listening and normalization.  In an attempt to encourage self care SW reserved a room at the [facility] for two nights and provided a $50.00 meal voucher.  Check in date 12/4 check out 12/6.  SW encouraged the caregiver to attempt to have some normalcy in an abnormal situation.   Patient's caregiver verbalized understanding and agreement with the information reviewed, social work availability, and how to access available resources if needed. SW remains available.

## 2018-12-04 NOTE — SUBJECTIVE & OBJECTIVE
"Interval HPI:   Overnight events: remains in ICU, intubated. Tachycardiac overnight with temp 101. 7 but on elizabeth hugger. BG at goal overnight without correction scale. TF on hold at midnight.   Eating:   NPO  Hypoglycemia and intervention: No  Fever: Yes overnight   TF: glucerna at 40 cc/hr- held at midnight for tentative trach placement    BP (!) 113/57 (BP Location: Left arm, Patient Position: Lying)   Pulse 99   Temp 98.3 °F (36.8 °C) (Oral)   Resp (!) 23   Ht 4' 10" (1.473 m)   Wt 55.5 kg (122 lb 5.7 oz)   SpO2 95%   Breastfeeding? No   BMI 25.57 kg/m²     Labs Reviewed and Include    Recent Labs   Lab 12/04/18  0325   *  178*   CALCIUM 7.9*  7.9*   ALBUMIN 2.1*  2.1*   PROT 4.9*     140   K 4.8  4.8   CO2 24  24     106   BUN 22  22   CREATININE 1.3  1.3   ALKPHOS 145*   ALT 33   AST 68*   BILITOT 9.2*     Lab Results   Component Value Date    WBC 10.87 12/04/2018    HGB 6.9 (L) 12/04/2018    HCT 21.9 (L) 12/04/2018     (H) 12/04/2018    PLT 42 (L) 12/04/2018     No results for input(s): TSH, FREET4 in the last 168 hours.  Lab Results   Component Value Date    HGBA1C 5.8 (H) 11/10/2018       Nutritional status:   Body mass index is 25.57 kg/m².  Lab Results   Component Value Date    ALBUMIN 2.1 (L) 12/04/2018    ALBUMIN 2.1 (L) 12/04/2018    ALBUMIN 1.9 (L) 12/04/2018     No results found for: PREALBUMIN    Estimated Creatinine Clearance: 36.3 mL/min (based on SCr of 1.3 mg/dL).    Accu-Checks  Recent Labs     12/02/18  2334 12/03/18  0435 12/03/18  0816 12/03/18  1217 12/03/18  1627 12/03/18  1956 12/04/18  0003 12/04/18  0324 12/04/18  0431 12/04/18  0829   POCTGLUCOSE 284* 338* 299* 259* 160* 115* 167* 186* 187* 202*       Current Medications and/or Treatments Impacting Glycemic Control  Immunotherapy:    Immunosuppressants     None        Steroids:   Hormones (From admission, onward)    None        Pressors:    Autonomic Drugs (From admission, onward)    None    "     Hyperglycemia/Diabetes Medications:   Antihyperglycemics (From admission, onward)    Start     Stop Route Frequency Ordered    11/26/18 0947  insulin aspart U-100 pen 0-5 Units      -- SubQ Every 4 hours PRN 11/26/18 0848    11/23/18 0900  insulin detemir U-100 pen 14 Units      -- SubQ Daily 11/23/18 0735

## 2018-12-04 NOTE — PROGRESS NOTES
[facility]  Transplant Surgery  Progress Note      Hospital Day Hospital Day: 25  Post-Op Day      ORGAN:     Disease Etiology: Cirrhosis: Cryptogenic (Idiopathic)  Donor Type:     CDC High Risk:     Donor CMV Status:   Donor CMV Status:   Donor HBcAB:     Donor HCV Status:     Whole or Partial:   Biliary Anastomosis:   Arterial Anatomy:       Subjective:     Interval History:   AF HDS   Afib RVR during CRRT     Mentating and nonverbal communication   Remains on AC 14 x 300; 50% 5 peep  H/H stable  Cr 1.6 unchanged      Medications:  Continuous Infusions:   sodium chloride 0.9% 200 mL/hr at 12/03/18 1700    dilTIAZem 5 mg/hr (12/04/18 0200)     Scheduled Meds:   famotidine  20 mg Oral QHS    fluconazole  200 mg Oral Daily    guaifenesin 100 mg/5 ml  200 mg Per OG tube Q6H    insulin detemir U-100  14 Units Subcutaneous Daily    lactulose  15 g Per OG tube TID    levalbuterol  1.25 mg Nebulization Q6H WAKE    psyllium husk (aspartame)  1 packet Per NG tube Daily    rifAXImin  550 mg Oral BID     PRN Meds:acetaminophen, dextrose 50%, glucagon (human recombinant), insulin aspart U-100, k phos di & mono-sod phos mono, lactulose, lactulose, magnesium sulfate IVPB, metoprolol, ondansetron, simethicone, sodium chloride 0.9%     Objective:     Vital Signs (Most Recent):  Temp: 100.2 °F (37.9 °C) (12/04/18 0100)  Pulse: 83 (12/04/18 0215)  Resp: 20 (12/03/18 1938)  BP: (!) 115/55 (12/04/18 0200)  SpO2: 100 % (12/04/18 0215) Vital Signs (24h Range):  Temp:  [93 °F (33.9 °C)-101.7 °F (38.7 °C)] 100.2 °F (37.9 °C)  Pulse:  [] 83  Resp:  [20-36] 20  SpO2:  [90 %-100 %] 100 %  BP: ()/(51-69) 115/55  Arterial Line BP: ()/(32-53) 106/41       Intake/Output Summary (Last 24 hours) at 12/4/2018 0255  Last data filed at 12/4/2018 0000  Gross per 24 hour   Intake 5435.86 ml   Output 4394 ml   Net 1041.86 ml       Neuro:  GCS: AAO - nonverbal (intubated)   Sedation:  no     Restraints:   yes  Neurologic: Alert and oriented. Thought content appropriate    Cardiac:      Temp:  [93 °F (33.9 °C)-101.7 °F (38.7 °C)]   Pulse:  []   Resp:  [20-36]   BP: ()/(51-69)   SpO2:  [90 %-100 %]   Arterial Line BP: ()/(32-53)   Heart: regular rate and rhythm, S1, S2 normal, no murmur, rub or gallop and regular rate and rhythm  Pulses: +2pulses    Pulmonary:  Vent Mode: A/C  Oxygen Concentration (%):  [] 50  Resp Rate Total:  [18 br/min-38 br/min] 28 br/min  Vt Set:  [300 mL] 300 mL  PEEP/CPAP:  [5 cmH20] 5 cmH20  Mean Airway Pressure:  [8.7 fbH87-61 cmH20] 13 cmH20     Recent Labs   Lab 12/03/18  0457   PH 7.314*   PO2 91   PCO2 43.1   HCO3 21.9*   BE -4       Lungs: BL coarse lung sounds  Chest X-ray: Endotracheal tube above the barrera.  Central venous catheter in the SVC.  NG tube in the stomach.     Renal:   I & O (Last 24H):    Intake/Output Summary (Last 24 hours) at 12/4/2018 0255  Last data filed at 12/4/2018 0000  Gross per 24 hour   Intake 5435.86 ml   Output 4394 ml   Net 1041.86 ml     Recent Labs   Lab 12/03/18 0418 12/03/18  1456 12/04/18  0006   BUN 34*  34* 17 18   CREATININE 1.6*  1.6* 0.8 1.1   K 5.1  5.1 4.1 4.6     Recent Labs   Lab 12/03/18 0418 12/03/18  1456 12/04/18  0006     139 138 139   K 5.1  5.1 4.1 4.6     109 105 107   CO2 21*  21* 27 27   CALCIUM 8.3*  8.3* 8.1* 7.9*   *  313* 162* 174*     Recent Labs   Lab 12/03/18 0418 12/03/18  1456 12/04/18  0006   PHOS 7.0*  7.0* 3.8 4.8*   MG 2.5  2.5 2.0 1.8       FEN/GI  Recent Labs   Lab 12/01/18  0400 12/02/18  0401  12/03/18  0418 12/03/18  1456 12/04/18  0006   BILITOT 11.5* 9.0*  --  8.9*  --   --    AST 51* 54*  --  54*  --   --    ALT 16 21  --  26  --   --    ALKPHOS 125 126  --  130  --   --    ALBUMIN 2.0*  2.0* 2.3*   < > 2.3*  2.3* 2.2* 1.9*    < > = values in this interval not displayed.     Recent Labs   Lab 12/01/18  1813 12/02/18  0755 12/03/18 0418   INR 1.7* 1.7*  2.2*      Nutrition: Enteral  OGT  Abdomen: soft, non-tender non-distented; bowel sounds normal; no masses,  no organomegaly  GI ulcer Prophylaxis: yes    HEME:   Recent Labs   Lab 12/03/18  0418 12/03/18  0901 12/03/18  1836   WBC 11.96 10.10 10.31   HGB 7.1* 7.0* 7.0*   HCT 23.0* 21.8* 22.2*   PLT 34* 28* 34*     DVT Prophylaxis: Pharmacologic No  Mechanical: yes     Infectious Disease:  Temp:  [93 °F (33.9 °C)-101.7 °F (38.7 °C)]   Pulse:  []   Resp:  [20-36]   BP: ()/(51-69)   SpO2:  [90 %-100 %]   Arterial Line BP: ()/(32-53)   Antibiotics (From admission, onward)    Start     Stop Route Frequency Ordered    11/10/18 0900  rifAXIMin tablet 550 mg      -- Oral 2 times daily 11/10/18 0321            Assessment/Plan:   Krystal Hobson is a 68 y.o. female Cryptogenic cirrhosis c/b resp failure (intubated 11/21); renal fail (on CRRT 11/24); AF dilt gtt (12/2)      Neuro:   No pain meds needed  Not sedated  Monitor mental status   NH3 61 - lactulose restarted (Less diarrhea, no bloody BM)     Pulmonary:   - intubated since 11/21  - Unable to wean from AC/VC   - breathing treatment / mucinex   - d/w family regarding trach - part of medical optimization prior to liver transplant     Cardiac:  -SBP > 100 goal  - Afib with RVR on dilt gtt (mostly rate controlled)   - metop prn for CRRT      Renal:   Aneuric, on CRRT (started 11/24)   Cr 1.6 -> 1.6     Fluids/Electrolytes/Nutrition/GI:   -Continue TF @ 40cc/hour  - change to glucerna   -replace lytes PRN  - NH3 61 - lactulose restarted (Less diarrhea, no bloody BM)     Hematology/Oncology:  - H/H stable  - continue to monitor      Infectious Disease:   - monitor fever curve   -WBC 6.7, continue to monitor      Endocrine:  -Glucose goal of 140-180  -Endocrine following, see recommendations     Dispo:  -Continue care in the ICU setting  -continue CRRT     Dennis Miller, PGY3 Surgery  3:15 AM 12/04/2018

## 2018-12-04 NOTE — SUBJECTIVE & OBJECTIVE
Interval History/Significant Events:     Pt became tachycardic to 120's overnight. EKG obtained showed sinus tach. Diltiazem drip increased and tapered down overnight. Also had temp measured to 101.7 however elizabeth hugger was in place. Removed with resolution of temperature, however bcx and resp cx obtained. Plan for bedside trach today.      Objective:     Vital Signs (Most Recent):  Temp: 98.3 °F (36.8 °C) (12/04/18 0745)  Pulse: 87 (12/04/18 0745)  Resp: (!) 23 (12/04/18 0706)  BP: (!) 111/54 (12/04/18 0701)  SpO2: 96 % (12/04/18 0745) Vital Signs (24h Range):  Temp:  [95 °F (35 °C)-101.7 °F (38.7 °C)] 98.3 °F (36.8 °C)  Pulse:  [] 87  Resp:  [20-36] 23  SpO2:  [96 %-100 %] 96 %  BP: ()/(51-71) 111/54  Arterial Line BP: ()/(32-53) 109/39     Weight: 55.5 kg (122 lb 5.7 oz)  Body mass index is 25.57 kg/m².      Intake/Output Summary (Last 24 hours) at 12/4/2018 0757  Last data filed at 12/4/2018 0715  Gross per 24 hour   Intake 3741.86 ml   Output 2603 ml   Net 1138.86 ml       Physical Exam   Constitutional: She appears well-developed and well-nourished.   HENT:   Head: Normocephalic and atraumatic.   Eyes: Pupils are equal, round, and reactive to light.   Cardiovascular: Normal rate and regular rhythm.   Pulmonary/Chest:   intubated on AC settings  Abdominal: Soft.   Neurological: She is alert.   Skin: Skin is warm and dry.         Lines/Drains/Airways     Central Venous Catheter Line                 Trialysis (Dialysis) Catheter 11/21/18 1851 left internal jugular 12 days          Drain                 NG/OG Tube 11/21/18 1840 [place] sump 16 Fr. Right mouth 12 days          Airway                 Airway - Non-Surgical 11/21/18 1836 Endotracheal Tube 12 days          Arterial Line                 Arterial Line 11/21/18 1900 Right Radial 12 days          Pressure Ulcer                 Pressure Injury 11/27/18 Rectum Deep tissue injury 7 days                Significant Labs:    CBC/Anemia  Profile:  Recent Labs   Lab 12/03/18  0901 12/03/18  1836 12/04/18  0325   WBC 10.10 10.31 10.87   HGB 7.0* 7.0* 6.9*   HCT 21.8* 22.2* 21.9*   PLT 28* 34* 42*   MCV 97 97 100*   RDW 25.1* 25.6* 26.9*        Chemistries:  Recent Labs   Lab 12/03/18  0418 12/03/18  1456 12/04/18  0006 12/04/18  0325     139 138 139 140  140   K 5.1  5.1 4.1 4.6 4.8  4.8     109 105 107 106  106   CO2 21*  21* 27 27 24  24   BUN 34*  34* 17 18 22  22   CREATININE 1.6*  1.6* 0.8 1.1 1.3  1.3   CALCIUM 8.3*  8.3* 8.1* 7.9* 7.9*  7.9*   ALBUMIN 2.3*  2.3* 2.2* 1.9* 2.1*  2.1*   PROT 5.0*  --   --  4.9*   BILITOT 8.9*  --   --  9.2*   ALKPHOS 130  --   --  145*   ALT 26  --   --  33   AST 54*  --   --  68*   MG 2.5  2.5 2.0 1.8 2.0   PHOS 7.0*  7.0* 3.8 4.8* 5.2*  5.2*       All pertinent labs within the past 24 hours have been reviewed.    Significant Imaging:  I have reviewed all pertinent imaging results/findings within the past 24 hours.

## 2018-12-04 NOTE — PLAN OF CARE
Problem: Spiritual Distress, Risk/Actual (Adult,Obstetrics,Pediatric)  Intervention: Facilitate Personal Exploration/Expression of Spirituality  Provided f/u visit. Offered pastoral support with compassionate presence and prayer. Pt communicative but non-verbal due to intubation. Pt's  away, but 's contact info left bedside for family to follow-up as needed.

## 2018-12-04 NOTE — PLAN OF CARE
Problem: Patient Care Overview  Goal: Plan of Care Review  At beginning of shift patient's temp 101.7F, warming blanket was on at the time and removed. After warming blanket and regular blankets removed temp down to 100.3F. Dr. Miller notified, blood cultures done x2, respiratory cultures done. Temp down to 99.8F this morning without intervention. Patient's HR at beginning of shift in 120s, 12 lead EKG done showing sinus tachycardia, patient was maxed on Cardizem drip. Patient spontaneously converted to Afib/flutter, rate controlled on Cardizem drip with a rate in the 70-90s Dr. Miller aware. cardiem drip down from 15mg/hr to 5mg/hr overnight. Dr. Miller notified of morning labs including lactic acid of 2.9 and H/H of 6.9/21/9, down from 7/22. No signs of bleeding noted. Type and screen sent. Also at beginning of shift patient's  patient reported that had been more drowsy than she had been the night before. Dr. Miller notified, order was received for ammonia level.  Ammonia 61, lactulose restarted per order. Patient more alert this morning. Patient continues on vent AC 50% FiO2 and % of PEEP tolerating well with SaO2 currently at 100%. Patient turned Q2H.  TF held at 0000 for possible procedure today. Patient's  at bedside throughout the night. Will continue to monitor closely.

## 2018-12-04 NOTE — TELEPHONE ENCOUNTER
Received [facility] message from patient's  requesting a callback.  Called him as requested.  He reports that since sending the message, he has spoken w/ Dr. Rueda and all questions/ concerns have been answered and addressed.  He denies the need for any further assistance at present.

## 2018-12-04 NOTE — PROGRESS NOTES
Wound care follow up:  Upon assessment, noted DTI to perianal resolved with no skin damage. Pt has been having frequent liquid bowel movements causing gluteal folds and buttocks to have moisture skin breakdown related to incontinence.    Spoke with nursing recommending to continue with Triad ointment as direct.  Will modify orders to reflect addition sites for application of Triad to provide moisture barrier.       12/04/18 1706       Wound 12/03/18 1901 Moisture associated dermatitis Buttocks   Date First Assessed/Time First Assessed: 12/03/18 1901   Primary Wound Type: Moisture associated dermatitis  Location: (c) Buttocks   Wound Image (media not available)   Wound WDL ex   Dressing Appearance Open to air   Drainage Amount None   Appearance Red;Pink   Red (%), Wound Tissue Color 100 %   Care Cleansed with:;Soap and water       Pressure Injury 11/27/18 Rectum Deep tissue injury   Date First Assessed: 11/27/18   Pressure Injury Present on Admission: no  Location: Rectum  Is this injury device related?: (c) Yes  Staging: Deep tissue injury   Wound Image (media file not available)   Staging (RESOLVED)   Appearance Epithelialization   Wound Length (cm) 0 cm   Wound Width (cm) 0 cm   Wound Depth (cm) 0 cm   Wound Volume (cm^3) 0 cm^3   Wound Surface Area (cm^2) 0 cm^2

## 2018-12-04 NOTE — PROGRESS NOTES
On Call Note:  The on call  (CYNDEE) received a paged at 6:00pm to return the call to ext [phone] Joaquín pts bedside nurse.  Joaquín explained at call back that the caregiver of the patient (pt) has received news that the pt will not receive a trach as the Liver transplant team did not feel medically it will help the pts to improve.  Joaquín and CYNDEE discussed caregiver coping and Joaquín reported he was following up with CYNDEE due to CYNDEE concern of the pts caregiver on morning LTS rounds.  CYNDEE explained CYDNEE was able to secure a room at the [facility] on Mr. Hobson's behalf and that SW will obtain a 50.00 meal voucher for him to eat.  SW has concerns that the caregiver has been occupied with caring for the pt he has neglected to provide himself proper self care.  Joaquín and CYNDEE discussed meeting with the pts caregiver on tomorrow morning.  Pts bedside nurse Joaquín verbalized understanding and agreement with the information reviewed, social work availability, and how to access available resources if needed. CYNDEE remains available.

## 2018-12-04 NOTE — ASSESSMENT & PLAN NOTE
Avoid insulin stacking and hypoglycemia.  Lab Results   Component Value Date    CREATININE 1.3 12/04/2018    CREATININE 1.3 12/04/2018

## 2018-12-04 NOTE — ASSESSMENT & PLAN NOTE
66yo F liver-listed, stepped up on 11/21 for respiratory failure with worsening respiratory acidosis. Failed SBT. Plan for trach.      Neuro:   -Pain control: prn fentanyl while intubated  -Sedation off  -Following commands      Pulmonary:   -intubated on arrival to SICU 11/21, failed SBT  -ABGs daily and prn  -Minimal Vent Settings  -plan for trach today  -duonebs prn     Cardiac:  -SBP > 100 goal  -JACINDA 11/20 - elevated PA pressures   -Heart failure following, appreciate recs   -dilt gtt for A-Fib, wean as tolerated     Renal:   -Mendoza in place  -Continue to monitor UOP: oliguria/anuria    -CRRT per nephrology  -Nephrology following, appreciate recommendations      Fluids/Electrolytes/Nutrition/GI:    -TFs held for tach this morning  -replace lytes PRN  -resumed lactulose enemas for ammonia of 61 last night with increased confusion     Hematology/Oncology:  -Monitor CBC -- Hgb 7.7-->7.1-->6.9   -PT/INR -->1.7-->2.2-->2.4 this AM  -continue to monitor. Transfuse as needed per transplant      Infectious Disease:   -elevated temperature to 101.7 overnight likely related to elizabeth hugger. Improved once removed.   -f/u bcx and resp cx  -WBC 7.6-->6.8-->11.9-->10.9  -Abx: Diflucan to cover HCAP        Endocrine:  -Glucose goal of 140-180  -Endocrine following  -3U aspart q4hrs, detemir 14U daily     PPx: famotidine      Dispo:  -Continue care in the ICU setting, still requiring vent, pending trach 12/4  -Primary: Transplant

## 2018-12-04 NOTE — TELEPHONE ENCOUNTER
Krystalmaikol Hobson's transplant candidacy discussed at conference. Patient is currently status 7 on the list due to medical instability. Currently patient in respiratory failure and in need of tracheostomy.  Remains on Cardizem drip for Afib. Patient remains frail and continues to clinically deteriorate. The Transplant Team has addressed the patient's condition with her  and prognosis including possible delisting. It is this committee's decision to delist Mrs. Hobson.

## 2018-12-04 NOTE — PROGRESS NOTES
"[facility]  Endocrinology  Progress Note    Admit Date: 11/10/2018     Reason for Consult: Management of type 2 DM, Hyperglycemia     Surgical Procedure and Date: n/a    Diabetes diagnosis year: 2001    Home Diabetes Medications: Levemir 12 units BID (vial) and Humalog SSI with meals   Lab Results   Component Value Date    HGBA1C 5.8 (H) 11/10/2018         How often checking glucose at home? 1-3  BG readings on regimen: 150-300  Hypoglycemia on the regimen? once 27; required visit to ED; gave Levemir and humalog and patient did not eat  Missed doses on regimen?  No    Diabetes Complications include:     Hyperglycemia, Hypoglycemia  and Diabetic peripheral neuropathy     Complicating diabetes co morbidities:   CIRRHOSIS      HPI:   Patient is a 67 y.o. female with a diagnosis of type 2 DM; well controlled on MDI. Also with   Cryptogenic cirrhosis, rheumatoid arthritis, and GERD. Patient was listed for liver transplant on 9/14/18. Patient presented to ED of hospital in [place] with AMS and ENZO. Endocrinology consulted for BG/ DM management.   Of note, profound hypoglycemic event (BG < 20) the night of 11/14/18.            Interval HPI:   Overnight events: remains in ICU, intubated. Tachycardiac overnight with temp 101. 7 but on elizabeth hugger. BG at goal overnight without correction scale. TF on hold at midnight.   Eating:   NPO  Hypoglycemia and intervention: No  Fever: Yes overnight   TF: glucerna at 40 cc/hr- held at midnight for tentative trach placement    BP (!) 113/57 (BP Location: Left arm, Patient Position: Lying)   Pulse 99   Temp 98.3 °F (36.8 °C) (Oral)   Resp (!) 23   Ht 4' 10" (1.473 m)   Wt 55.5 kg (122 lb 5.7 oz)   SpO2 95%   Breastfeeding? No   BMI 25.57 kg/m²      Labs Reviewed and Include    Recent Labs   Lab 12/04/18  0325   *  178*   CALCIUM 7.9*  7.9*   ALBUMIN 2.1*  2.1*   PROT 4.9*     140   K 4.8  4.8   CO2 24  24     106   BUN 22  22 "   CREATININE 1.3  1.3   ALKPHOS 145*   ALT 33   AST 68*   BILITOT 9.2*     Lab Results   Component Value Date    WBC 10.87 12/04/2018    HGB 6.9 (L) 12/04/2018    HCT 21.9 (L) 12/04/2018     (H) 12/04/2018    PLT 42 (L) 12/04/2018     No results for input(s): TSH, FREET4 in the last 168 hours.  Lab Results   Component Value Date    HGBA1C 5.8 (H) 11/10/2018       Nutritional status:   Body mass index is 25.57 kg/m².  Lab Results   Component Value Date    ALBUMIN 2.1 (L) 12/04/2018    ALBUMIN 2.1 (L) 12/04/2018    ALBUMIN 1.9 (L) 12/04/2018     No results found for: PREALBUMIN    Estimated Creatinine Clearance: 36.3 mL/min (based on SCr of 1.3 mg/dL).    Accu-Checks  Recent Labs     12/02/18  2334 12/03/18  0435 12/03/18  0816 12/03/18  1217 12/03/18  1627 12/03/18  1956 12/04/18  0003 12/04/18  0324 12/04/18  0431 12/04/18  0829   POCTGLUCOSE 284* 338* 299* 259* 160* 115* 167* 186* 187* 202*       Current Medications and/or Treatments Impacting Glycemic Control  Immunotherapy:    Immunosuppressants     None        Steroids:   Hormones (From admission, onward)    None        Pressors:    Autonomic Drugs (From admission, onward)    None        Hyperglycemia/Diabetes Medications:   Antihyperglycemics (From admission, onward)    Start     Stop Route Frequency Ordered    11/26/18 0947  insulin aspart U-100 pen 0-5 Units      -- SubQ Every 4 hours PRN 11/26/18 0848    11/23/18 0900  insulin detemir U-100 pen 14 Units      -- SubQ Daily 11/23/18 0735          ASSESSMENT and PLAN    * Hepatic encephalopathy    Managed per primary.        Type 2 diabetes mellitus with hyperglycemia, with long-term current use of insulin    BG goal 140-180    Continue Levemir 14 units daily.  restart Novolog to 3 units every 4 hours once TF restarted at 40 cc/hr   Hold scheduled Novolog for now, pt NPO after MN for trach  Low dose correction scale  BG monitoring every 4 hours    Discharge planning: TBD       Decompensated hepatic  cirrhosis    Managed per primary.   Listed for liver transplant.  May impact BG readings       CKD (chronic kidney disease) stage 3, GFR 30-59 ml/min    Caution with insulin stacking  Estimated Creatinine Clearance: 36.3 mL/min (based on SCr of 1.3 mg/dL).         On enteral nutrition    On supplemental TF, elevating BG readings - on hold for trach       Acute kidney injury superimposed on CKD    Avoid insulin stacking and hypoglycemia.  Lab Results   Component Value Date    CREATININE 1.3 12/04/2018    CREATININE 1.3 12/04/2018                Jadyn Hall NP  Endocrinology  [facility]

## 2018-12-04 NOTE — PROGRESS NOTES
[facility]  Critical Care - Surgery  Progress Note    Patient Name: Krystal Hobson  MRN: [id number]  Admission Date: 11/10/2018  Hospital Length of Stay: 24 days  Code Status: Full Code  Attending Provider: Abrahan Montelongo MD  Primary Care Provider: Courtney Joe MD   Principal Problem: Hepatic encephalopathy    Subjective:     Hospital/ICU Course:  No notes on file    Interval History/Significant Events:     Pt became tachycardic to 120's overnight. EKG obtained showed sinus tach. Diltiazem drip increased and tapered down overnight. Also had temp measured to 101.7 however elizabeth hugger was in place. Removed with resolution of temperature, however bcx and resp cx obtained. Plan for trach today. INR 2.4, giving 2U FFP.       Objective:     Vital Signs (Most Recent):  Temp: 98.3 °F (36.8 °C) (12/04/18 0745)  Pulse: 87 (12/04/18 0745)  Resp: (!) 23 (12/04/18 0706)  BP: (!) 111/54 (12/04/18 0701)  SpO2: 96 % (12/04/18 0745) Vital Signs (24h Range):  Temp:  [95 °F (35 °C)-101.7 °F (38.7 °C)] 98.3 °F (36.8 °C)  Pulse:  [] 87  Resp:  [20-36] 23  SpO2:  [96 %-100 %] 96 %  BP: ()/(51-71) 111/54  Arterial Line BP: ()/(32-53) 109/39     Weight: 55.5 kg (122 lb 5.7 oz)  Body mass index is 25.57 kg/m².      Intake/Output Summary (Last 24 hours) at 12/4/2018 0757  Last data filed at 12/4/2018 0715  Gross per 24 hour   Intake 3741.86 ml   Output 2603 ml   Net 1138.86 ml       Physical Exam   Constitutional: She appears well-developed and well-nourished.   HENT:   Head: Normocephalic and atraumatic.   Eyes: Pupils are equal, round, and reactive to light.   Cardiovascular: Normal rate and regular rhythm.   Pulmonary/Chest:   intubated on AC settings  Abdominal: Soft.   Neurological: She is alert.   Skin: Skin is warm and dry.         Lines/Drains/Airways     Central Venous Catheter Line                 Trialysis (Dialysis) Catheter 11/21/18 0265 left internal jugular 12 days          Drain                  NG/OG Tube 11/21/18 1840 Yoni sump 16 Fr. Right mouth 12 days          Airway                 Airway - Non-Surgical 11/21/18 1836 Endotracheal Tube 12 days          Arterial Line                 Arterial Line 11/21/18 1900 Right Radial 12 days          Pressure Ulcer                 Pressure Injury 11/27/18 Rectum Deep tissue injury 7 days                Significant Labs:    CBC/Anemia Profile:  Recent Labs   Lab 12/03/18  0901 12/03/18  1836 12/04/18  0325   WBC 10.10 10.31 10.87   HGB 7.0* 7.0* 6.9*   HCT 21.8* 22.2* 21.9*   PLT 28* 34* 42*   MCV 97 97 100*   RDW 25.1* 25.6* 26.9*        Chemistries:  Recent Labs   Lab 12/03/18  0418 12/03/18  1456 12/04/18  0006 12/04/18  0325     139 138 139 140  140   K 5.1  5.1 4.1 4.6 4.8  4.8     109 105 107 106  106   CO2 21*  21* 27 27 24  24   BUN 34*  34* 17 18 22  22   CREATININE 1.6*  1.6* 0.8 1.1 1.3  1.3   CALCIUM 8.3*  8.3* 8.1* 7.9* 7.9*  7.9*   ALBUMIN 2.3*  2.3* 2.2* 1.9* 2.1*  2.1*   PROT 5.0*  --   --  4.9*   BILITOT 8.9*  --   --  9.2*   ALKPHOS 130  --   --  145*   ALT 26  --   --  33   AST 54*  --   --  68*   MG 2.5  2.5 2.0 1.8 2.0   PHOS 7.0*  7.0* 3.8 4.8* 5.2*  5.2*       All pertinent labs within the past 24 hours have been reviewed.    Significant Imaging:  I have reviewed all pertinent imaging results/findings within the past 24 hours.     Assessment/Plan:     Decompensated hepatic cirrhosis    66yo F liver-listed, stepped up on 11/21 for respiratory failure with worsening respiratory acidosis. Failed SBT. Plan for trach.      Neuro:   -Pain control: prn fentanyl while intubated  -Sedation off  -Following commands      Pulmonary:   -intubated on arrival to SICU 11/21, failed SBT  -ABGs daily and prn  -Minimal Vent Settings  -plan for trach today  -duonebs prn     Cardiac:  -SBP > 100 goal  -JACINDA 11/20 - elevated PA pressures   -Heart failure following, appreciate recs   -dilt gtt for A-Fib, wean as tolerated     Renal:    -Mendoza in place  -Continue to monitor UOP: oliguria/anuria    -CRRT per nephrology  -Nephrology following, appreciate recommendations      Fluids/Electrolytes/Nutrition/GI:    -TFs  held for tach this morning  -replace lytes PRN  -resumed lactulose enemas for ammonia of 61 last night with increased confusion     Hematology/Oncology:  -Monitor CBC -- Hgb 7.7-->7.1-->6.9   -PT/INR -->1.7-->2.2-->2.4 this AM  -continue to monitor. Transfuse as needed per transplant      Infectious Disease:   -elevated temperature to 101.7 overnight likely related to elizabeth hugger. Improved once removed.   -f/u bcx and resp cx  -WBC 7.6-->6.8-->11.9-->10.9  -Abx: Diflucan to cover HCAP        Endocrine:  -Glucose goal of 140-180  -Endocrine following  -3U aspart q4hrs, detemir 14U daily     PPx: famotidine      Dispo:  -Continue care in the ICU setting, still requiring vent, pending trach 12/4  -Primary: Transplant             Critical care was time spent personally by me on the following activities: development of treatment plan with patient or surrogate and bedside caregivers, discussions with consultants, evaluation of patient's response to treatment, examination of patient, ordering and performing treatments and interventions, ordering and review of laboratory studies, ordering and review of radiographic studies, pulse oximetry, re-evaluation of patient's condition.  This critical care time did not overlap with that of any other provider or involve time for any procedures.     Jose Wallace MD  Critical Care - Surgery  [facility]

## 2018-12-04 NOTE — ASSESSMENT & PLAN NOTE
Caution with insulin stacking  Estimated Creatinine Clearance: 36.3 mL/min (based on SCr of 1.3 mg/dL).

## 2018-12-04 NOTE — ASSESSMENT & PLAN NOTE
BG goal 140-180    Continue Levemir 14 units daily.  restart Novolog to 3 units every 4 hours once TF restarted at 40 cc/hr   Hold scheduled Novolog for now, pt NPO after MN for trach  Low dose correction scale  BG monitoring every 4 hours    Discharge planning: TBD

## 2018-12-05 NOTE — ASSESSMENT & PLAN NOTE
Avoid insulin stacking and hypoglycemia.  CRRT per nephrology   Lab Results   Component Value Date    CREATININE 0.6 12/05/2018    CREATININE 0.6 12/05/2018    CREATININE 0.6 12/05/2018

## 2018-12-05 NOTE — PROGRESS NOTES
(CYNDEE) was notified prior to LTS rounds by clinic CYNDEE Amaya that the patient (pt) has been de listed for liver transplant.  SW presented to the pt and family for follow up and continuity of care.  The pt remains intubated requiring vent support and on CRRT.  The pt was not alert on today and did not communicate with SW.  SW did not assess coping.  Pt was accompanied by her  and her baby sister who presented from [place] after being notified by the pts  that the pt is no longer a transplant candidate and that he wanted assistance with trying to get her closer to home.  SW met with both family members on today.  Both were observed to be coping appropriately.  SW provided a space for the pts sister and  to express their emotions and ask all questions.  SW provided emotional support, reflective listening and normalization.     SW discussed with the family possible placement for the patient at a long term acute care facility (LTAC) post discussion with Dr. Montelongo and Dr. Melissa.  SW provided education as the  was thinking she would go in a nursing home.  Once education was provided and questions were answered.  The pts  reported she has been to [facility] and he believes they have an LTAC attached to them and he would like for SW to contact them for placement.  SW also inquired about back up facilities.  The  of the pt and her sister provided [facility] and [facility].  SW provided education about bed availability, acceptance and denial.  SW obtained the miles the family was willing to travel in the event there was no facility close to home to accept.  Per  he would like to have the pt placed with in 20-30 miles from his current zip code.      SW provided education on the referral process and turn around time. The  of the pt reported he is impatient and would like to get this rolling.  CYNDEE explained the pt may not be accepted by a facility  "until next week if one accepts.  Pts family reported understanding.  Throughout SW provided education and providing emotional support the pts  continued to ask questions regarding the pts care.  The  of the pt asked several times "If she was transplanted when she got here would we be in the position.  They keep talking about frailty but she was a small woman.  My wife was born a preemie and weighed 95 lbs."  SW redirected the questions informing providing the answers to that question was outside of SW scope of support; however, SW can facilitate the liver doctor coming to discuss with them. The pts  denied.      SW confirmed that the pts  utilized the [facility] room for self care and the 50.00 meal voucher.  SW will begin to contact facilities at the request of the pts  Shahzad on behalf of the patient.  Pts  request updates once SW locate an LTAC in [place].  Patient's family members verbalized understanding and agreement with the information reviewed, social work availability, and how to access available resources if needed. SW remains available.   "

## 2018-12-05 NOTE — SUBJECTIVE & OBJECTIVE
"Interval HPI:   Overnight events: Remains in ICU. Trach yesterday; on vent. CRRT overnight per nephrology. Hypotension and bradycardia overnight. BG at goal with Levemir and correction scale x1.   Eating:   NPO  Nausea: No  Hypoglycemia and intervention: No  Fever: No; hypothermic overnight- on elizabeth hugger  TF: Yes glucerna at 40 cc/hr     BP (!) 103/51 (BP Location: Left arm, Patient Position: Lying)   Pulse 69   Temp 98.6 °F (37 °C) (Oral)   Resp (!) 23   Ht 4' 10" (1.473 m)   Wt 55.5 kg (122 lb 5.7 oz)   SpO2 100%   Breastfeeding? No   BMI 25.57 kg/m²     Labs Reviewed and Include    Recent Labs   Lab 12/06/18  0210   *  175*   CALCIUM 8.5*  8.5*   ALBUMIN 2.5*  2.5*   PROT 5.7*   *  134*   K 4.7  4.7   CO2 23  23     104   BUN 19  19   CREATININE 1.2  1.2   ALKPHOS 163*   ALT 36   AST 64*   BILITOT 11.4*     Lab Results   Component Value Date    WBC 10.72 12/06/2018    HGB 7.7 (L) 12/06/2018    HCT 25.6 (L) 12/06/2018     (H) 12/06/2018    PLT 85 (L) 12/06/2018     No results for input(s): TSH, FREET4 in the last 168 hours.  Lab Results   Component Value Date    HGBA1C 5.8 (H) 11/10/2018       Nutritional status:   Body mass index is 25.57 kg/m².  Lab Results   Component Value Date    ALBUMIN 2.5 (L) 12/06/2018    ALBUMIN 2.5 (L) 12/06/2018    ALBUMIN 2.5 (L) 12/05/2018     No results found for: PREALBUMIN    Estimated Creatinine Clearance: 39.3 mL/min (based on SCr of 1.2 mg/dL).    Accu-Checks  Recent Labs     12/04/18  1159 12/04/18  1618 12/04/18  1922 12/04/18  2313 12/05/18  0357 12/05/18  0813 12/05/18  1152 12/05/18  1611 12/05/18  1927 12/06/18  0012   POCTGLUCOSE 162* 147* 123* 130* 106 105 128* 159* 166* 235*       Current Medications and/or Treatments Impacting Glycemic Control  Immunotherapy:    Immunosuppressants     None        Steroids:   Hormones (From admission, onward)    None        Pressors:    Autonomic Drugs (From admission, onward)    None    "     Hyperglycemia/Diabetes Medications:   Antihyperglycemics (From admission, onward)    Start     Stop Route Frequency Ordered    12/05/18 0900  insulin detemir U-100 pen 5 Units      -- SubQ 2 times daily 12/05/18 0717    11/26/18 0947  insulin aspart U-100 pen 0-5 Units      -- SubQ Every 4 hours PRN 11/26/18 0848

## 2018-12-05 NOTE — ASSESSMENT & PLAN NOTE
Cryptogenic cirrhosis awaiting liver xplant, presented for AMS and found to have ENZO on CKDIIIb. SLED started last weekend with intermittent AF+RVR+hypotensive episodes controlled with bb/cardizem gtt adequately. Now on CRRT with improving overall fluid status.    Plan:  - Diffuse infiltrates persist on CXR, no significant change from previous  - Patient has been continuing to have abundant volume per watery stools x 5 BM  - CVP increased to 12 with PEEP 5  - Will do SLED x 12 hrs for metabolic clearance and volume management  - -300 ml/hr as tolerated keep MAP > 65  - Will reassess tomorrow

## 2018-12-05 NOTE — TRANSFER OF CARE
"Anesthesia Transfer of Care Note    Patient: Krystal Hobson    Procedure(s) Performed: Procedure(s) (LRB):  CREATION, TRACHEOSTOMY (N/A)    Patient location: ICU    Anesthesia Type: general    Transport from OR: Transported from OR intubated on 100% O2 by AMBU with adequate controlled ventilation. Upon arrival to PACU/ICU, patient attached to ventilator and auscultated to confirm bilateral breath sounds and adequate TV. Continuous ECG monitoring in transport. Continuous SpO2 monitoring in transport. Continuos invasive BP monitoring in transport    Post pain: adequate analgesia    Post assessment: no apparent anesthetic complications and tolerated procedure well    Post vital signs: stable    Level of consciousness: sedated    Nausea/Vomiting: no nausea/vomiting    Complications: none          Last vitals:   Visit Vitals  BP (!) 140/64 (BP Location: Left arm, Patient Position: Lying)   Pulse 110   Temp 37 °C (98.6 °F) (Oral)   Resp (!) 22   Ht 4' 10" (1.473 m)   Wt 55.5 kg (122 lb 5.7 oz)   SpO2 100%   Breastfeeding? No   BMI 25.57 kg/m²     "

## 2018-12-05 NOTE — PROGRESS NOTES
Pt returned from OR with 8.0 shiley. Placed back on vent with settings charted. Extra trach and step down trach at bedside. Will continue to monitor.

## 2018-12-05 NOTE — SUBJECTIVE & OBJECTIVE
Interval History:   Tolerated SLED overnight x 10 hrs. Plans for Trach today. CVP is 13 critically ill, stable vital signs, on mechanical ventilation FiO2 improved to 40% with PEEP 5. Net gain 1146 ml overnight.  Still has watery bowel stools.    Review of patient's allergies indicates:  No Known Allergies  Current Facility-Administered Medications   Medication Frequency    0.9%  NaCl infusion (CRRT USE ONLY) Continuous    0.9%  NaCl infusion (for blood administration) Q24H PRN    acetaminophen tablet 650 mg Q8H PRN    dextrose 50% injection 12.5 g PRN    diltiaZEM 125 mg in D5W 125 mL infusion Continuous    diltiaZEM tablet 30 mg Q8H    famotidine tablet 20 mg QHS    fluconazole tablet 200 mg Daily    glucagon (human recombinant) injection 1 mg PRN    guaifenesin 100 mg/5 ml syrup 200 mg Q6H    HYDROmorphone injection 0.2 mg Q3H PRN    insulin aspart U-100 pen 0-5 Units Q4H PRN    insulin detemir U-100 pen 5 Units BID    k phos di & mono-sod phos mono 250 mg tablet 2 tablet TID PRN    lactulose 10 gram/15 mL solution (enema) 200 g TID PRN    lactulose 20 gram/30 mL solution Soln 15 g BID    lactulose 20 gram/30 mL solution Soln 30 g Q6H PRN    magnesium sulfate 2g in water 50mL IVPB (premix) PRN    ondansetron disintegrating tablet 8 mg Q8H PRN    rifAXIMin tablet 550 mg BID    simethicone chewable tablet 80 mg TID PRN    sodium chloride 0.9% flush 3 mL PRN       Objective:     Vital Signs (Most Recent):  Temp: 96.3 °F (35.7 °C) (12/05/18 1504)  Pulse: 100 (12/05/18 1515)  Resp: (!) 22 (12/05/18 0743)  BP: (!) 99/53 (12/05/18 1504)  SpO2: 100 % (12/05/18 1515)  O2 Device (Oxygen Therapy): ventilator (12/05/18 1230) Vital Signs (24h Range):  Temp:  [96.3 °F (35.7 °C)-98.6 °F (37 °C)] 96.3 °F (35.7 °C)  Pulse:  [] 100  Resp:  [22-28] 22  SpO2:  [95 %-100 %] 100 %  BP: ()/(49-73) 99/53  Arterial Line BP: ()/(31-59) 109/43     Weight: 55.5 kg (122 lb 5.7 oz) (12/04/18 0353)  Body  mass index is 25.57 kg/m².  Body surface area is 1.51 meters squared.    I/O last 3 completed shifts:  In: 4355.6 [I.V.:2569.6; Blood:946; NG/GT:840]  Out: 2511 [Other:2511]    Physical Exam    Constitutional: She appears well-developed and thin and little musc mass intubated on [facility] ventilation. Awake and following simple commands  HENT:   Head: Normocephalic and atraumatic.   Eyes: Pupils are equal, round, and reactive to light.   Cardiovascular: Normal rate and regular rhythm.   Pulmonary/Chest:   intubated on spontaneous  Abdominal: Soft.   Neurological: She is alert.   Skin: Skin is warm and dry.   Nursing notes and vitals reviewed.      Significant Labs:  CBC:   Recent Labs   Lab 12/05/18  1600   WBC 9.40   RBC 2.52*   HGB 7.5*   HCT 24.5*   PLT 83*   MCV 97   MCH 29.8   MCHC 30.6*     CMP:   Recent Labs   Lab 12/05/18  0401 12/05/18  1600     100  100 150*   CALCIUM 7.8*  7.8*  7.8* 8.5*   ALBUMIN 2.3*  2.3*  2.3* 2.7*   PROT 5.3*  --      136  136 135*   K 4.5  4.5  4.5 4.8   CO2 25  25  25 23     106  106 105   BUN 7*  7*  7* 13   CREATININE 0.6  0.6  0.6 0.9   ALKPHOS 163*  --    ALT 37  --    AST 73*  --    BILITOT 10.4*  --      All labs within the past 24 hours have been reviewed.     Significant Imaging:  CXR personally reviewed.

## 2018-12-05 NOTE — PROGRESS NOTES
[facility]  Critical Care - Surgery  Progress Note    Patient Name: Krystal Hobson  MRN: [id number]  Admission Date: 11/10/2018  Hospital Length of Stay: 25 days  Code Status: Full Code  Attending Provider: Abrahan Montelongo MD  Primary Care Provider: Courtney Joe MD   Principal Problem: Hepatic encephalopathy    Subjective:     Hospital/ICU Course:  No notes on file    Interval History/Significant Events:     Afebrile; VSS. HDS, no pressors this AM.  Tolerating CRRT      Objective:     Vital Signs (Most Recent):  Temp: 98.1 °F (36.7 °C) (12/05/18 0700)  Pulse: 79 (12/05/18 0743)  Resp: (!) 22 (12/05/18 0743)  BP: (!) 119/53 (12/05/18 0700)  SpO2: 100 % (12/05/18 0743) Vital Signs (24h Range):  Temp:  [97.2 °F (36.2 °C)-98.5 °F (36.9 °C)] 98.1 °F (36.7 °C)  Pulse:  [] 79  Resp:  [18-28] 22  SpO2:  [88 %-100 %] 100 %  BP: ()/(49-73) 119/53  Arterial Line BP: ()/(33-49) 111/49     Weight: 55.5 kg (122 lb 5.7 oz)  Body mass index is 25.57 kg/m².      Intake/Output Summary (Last 24 hours) at 12/5/2018 0756  Last data filed at 12/5/2018 0700  Gross per 24 hour   Intake 3432.63 ml   Output 2511 ml   Net 921.63 ml       Physical Exam   Constitutional: She appears well-developed and well-nourished.   HENT:   Head: Normocephalic and atraumatic.   Eyes: Pupils are equal, round, and reactive to light.   Cardiovascular: Normal rate and regular rhythm.   Pulmonary/Chest:   intubated on AC settings  Abdominal: Soft.   Neurological: She is alert.   Skin: Skin is warm and dry.         Lines/Drains/Airways     Central Venous Catheter Line                 Trialysis (Dialysis) Catheter 11/21/18 1851 left internal jugular 13 days          Drain                 NG/OG Tube 11/21/18 1840 [place] sump 16 Fr. Right mouth 13 days          Airway                 Airway - Non-Surgical 11/21/18 1836 Endotracheal Tube 13 days          Arterial Line                 Arterial Line 11/21/18 1900 Right Radial 13 days           Peripheral Intravenous Line                 Peripheral IV - Single Lumen 12/04/18 1515 Right Forearm less than 1 day                Significant Labs:    CBC/Anemia Profile:  Recent Labs   Lab 12/04/18  0325 12/04/18  1615 12/05/18  0401   WBC 10.87 9.06 10.11   HGB 6.9* 6.5* 8.2*   HCT 21.9* 20.4* 26.4*   PLT 42* 45* 51*   * 100* 97   RDW 26.9* SEE COMMENT 24.6*        Chemistries:  Recent Labs   Lab 12/04/18  0325 12/04/18  1615 12/04/18 2128 12/05/18  0401     140 138 139  139 136  136  136   K 4.8  4.8 4.5 4.3  4.3 4.5  4.5  4.5     106 106 107  107 106  106  106   CO2 24  24 26 27  27 25  25  25   BUN 22  22 31* 15  15 7*  7*  7*   CREATININE 1.3  1.3 1.6* 0.9  0.9 0.6  0.6  0.6   CALCIUM 7.9*  7.9* 8.3* 7.9*  7.9* 7.8*  7.8*  7.8*   ALBUMIN 2.1*  2.1* 2.3* 2.3*  2.3* 2.3*  2.3*  2.3*   PROT 4.9*  --   --  5.3*   BILITOT 9.2*  --   --  10.4*   ALKPHOS 145*  --   --  163*   ALT 33  --   --  37   AST 68*  --   --  73*   MG 2.0 2.2 1.9  1.9 1.7  1.7  1.7   PHOS 5.2*  5.2* 6.1* 3.5  3.5 2.5*  2.5*  2.5*       All pertinent labs within the past 24 hours have been reviewed.    Significant Imaging:  I have reviewed all pertinent imaging results/findings within the past 24 hours.     Assessment/Plan:     Decompensated hepatic cirrhosis    66yo F liver-listed, stepped up on 11/21 for respiratory failure with worsening respiratory acidosis. Failed SBT. Plan for trach.      Neuro:   -Pain control: prn fentanyl while intubated  -Sedation off  -continue lactulose  -Following commands      Pulmonary:   -intubated on arrival to SICU 11/21, failed SBT  -ABGs daily and prn  -Minimal Vent Settings  -duonebs prn  -continue levoalbuterol     Cardiac:  -SBP > 100 goal  -JACINDA 11/20 - elevated PA pressures   -Heart failure following, appreciate recs   -dilt gtt for A-Fib, wean as tolerated     Renal:   -Mendoza in place  -Continue to monitor UOP: oliguria/anuria    -CRRT per  nephrology  -Nephrology following, appreciate recommendations      Fluids/Electrolytes/Nutrition/GI:   -TFs held  -replace lytes PRN  -resumed lactulose enemas for ammonia of 61 last night with increased confusion  -bowel regimen     Hematology/Oncology:  -Monitor CBC -- Hgb 7.7-->7.1-->6.9 -->8.2  -PT/INR -->1.7-->2.2-->2.4 ->2.3  -continue to monitor. Transfuse as needed per transplant      Infectious Disease:   -Afebrile   -f/u bcx and resp cx  -WBC 7.6-->6.8-->11.9-->10.9->10.1  -Abx: Diflucan to cover HCAP     Endocrine:  -Glucose goal of 140-180  -Endocrine following  -3U aspart q4hrs, detemir 14U daily     PPx: famotidine      Dispo:  -Continue care in the ICU setting, still requiring vent, pending trach   -Primary: Transplant         Critical care was time spent personally by me on the following activities: development of treatment plan with patient or surrogate and bedside caregivers, discussions with consultants, evaluation of patient's response to treatment, examination of patient, ordering and performing treatments and interventions, ordering and review of laboratory studies, ordering and review of radiographic studies, pulse oximetry, re-evaluation of patient's condition.  This critical care time did not overlap with that of any other provider or involve time for any procedures.     Mina Hayden MD  Critical Care - Surgery  [facility]

## 2018-12-05 NOTE — SUBJECTIVE & OBJECTIVE
Interval History/Significant Events:     Afebrile; VSS. HDS, no pressors this AM.  Tolerating CRRT      Objective:     Vital Signs (Most Recent):  Temp: 98.1 °F (36.7 °C) (12/05/18 0700)  Pulse: 79 (12/05/18 0743)  Resp: (!) 22 (12/05/18 0743)  BP: (!) 119/53 (12/05/18 0700)  SpO2: 100 % (12/05/18 0743) Vital Signs (24h Range):  Temp:  [97.2 °F (36.2 °C)-98.5 °F (36.9 °C)] 98.1 °F (36.7 °C)  Pulse:  [] 79  Resp:  [18-28] 22  SpO2:  [88 %-100 %] 100 %  BP: ()/(49-73) 119/53  Arterial Line BP: ()/(33-49) 111/49     Weight: 55.5 kg (122 lb 5.7 oz)  Body mass index is 25.57 kg/m².      Intake/Output Summary (Last 24 hours) at 12/5/2018 0756  Last data filed at 12/5/2018 0700  Gross per 24 hour   Intake 3432.63 ml   Output 2511 ml   Net 921.63 ml       Physical Exam   Constitutional: She appears well-developed and well-nourished.   HENT:   Head: Normocephalic and atraumatic.   Eyes: Pupils are equal, round, and reactive to light.   Cardiovascular: Normal rate and regular rhythm.   Pulmonary/Chest:   intubated on AC settings  Abdominal: Soft.   Neurological: She is alert.   Skin: Skin is warm and dry.         Lines/Drains/Airways     Central Venous Catheter Line                 Trialysis (Dialysis) Catheter 11/21/18 1851 left internal jugular 13 days          Drain                 NG/OG Tube 11/21/18 1840 Waterproof sump 16 Fr. Right mouth 13 days          Airway                 Airway - Non-Surgical 11/21/18 1836 Endotracheal Tube 13 days          Arterial Line                 Arterial Line 11/21/18 1900 Right Radial 13 days          Peripheral Intravenous Line                 Peripheral IV - Single Lumen 12/04/18 1515 Right Forearm less than 1 day                Significant Labs:    CBC/Anemia Profile:  Recent Labs   Lab 12/04/18  0325 12/04/18  1615 12/05/18  0401   WBC 10.87 9.06 10.11   HGB 6.9* 6.5* 8.2*   HCT 21.9* 20.4* 26.4*   PLT 42* 45* 51*   * 100* 97   RDW 26.9* SEE COMMENT 24.6*         Chemistries:  Recent Labs   Lab 12/04/18  0325 12/04/18  1615 12/04/18 2128 12/05/18  0401     140 138 139  139 136  136  136   K 4.8  4.8 4.5 4.3  4.3 4.5  4.5  4.5     106 106 107  107 106  106  106   CO2 24  24 26 27  27 25  25  25   BUN 22  22 31* 15  15 7*  7*  7*   CREATININE 1.3  1.3 1.6* 0.9  0.9 0.6  0.6  0.6   CALCIUM 7.9*  7.9* 8.3* 7.9*  7.9* 7.8*  7.8*  7.8*   ALBUMIN 2.1*  2.1* 2.3* 2.3*  2.3* 2.3*  2.3*  2.3*   PROT 4.9*  --   --  5.3*   BILITOT 9.2*  --   --  10.4*   ALKPHOS 145*  --   --  163*   ALT 33  --   --  37   AST 68*  --   --  73*   MG 2.0 2.2 1.9  1.9 1.7  1.7  1.7   PHOS 5.2*  5.2* 6.1* 3.5  3.5 2.5*  2.5*  2.5*       All pertinent labs within the past 24 hours have been reviewed.    Significant Imaging:  I have reviewed all pertinent imaging results/findings within the past 24 hours.

## 2018-12-05 NOTE — ASSESSMENT & PLAN NOTE
BG goal 140-180    continue Levemir 5 units BID.   Low dose correction scale  BG monitoring every 4 hours    Discharge planning: TBD

## 2018-12-05 NOTE — PLAN OF CARE
Problem: Patient Care Overview  Goal: Plan of Care Review  Outcome: Ongoing (interventions implemented as appropriate)  Patient awake and alert. Plan of care and all safety precautions maintained and discussed. Vital signs stable. HR Afib rate controlled.  at bedside throughout the night. Ventilated AC 5.0 PEEP 50% FiO2. CRRT SLED. Patient remains free from injury/falls. No acute events throughout the night. Will continue to monitor.

## 2018-12-05 NOTE — PROGRESS NOTES
[facility]  Nephrology  Progress Note    Patient Name: Krystal Hobson  MRN: [id number]  Admission Date: 11/10/2018  Hospital Length of Stay: 24 days  Attending Provider: Abrahan Montelongo MD   Primary Care Physician: Courtney Joe MD  Principal Problem:Hepatic encephalopathy    Subjective:     HPI: Reason for consult: Diuresis in setting of PAP46, pulmonary edema, HRS    Ms. 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  - She presented to [facility] as xfer from [facility] under liver transplant team due to encephalopathy and new onset ENZO.   - Per the , she was extremely disoriented @ OSH and her ammonia reached as high as 200, and her Cr level increased to 2.0 She also went into A fib with RVR and was started on a diltiazem gtt at OSH.   - On arrival at [facility] she was disoriented and started on lactulose with noted improvement. During her current admission on CXR they found a concern for HCAP and she was started on broad spectrum ABx and then shifted her on Moxifloxacin 7 D course. On 11/20/2018 found on CXR a concern for Lt lung middle lobe consolidation and started on vancomycin and zosyn.  - Renal fxn baseline 1.6 at admit but trending up    Interval History:   Off SLED since yesterday at 15:00 hrs with good clearance. Plans for Trach where cancelled today. CVP is 12 critically ill, stable vital signs, on mechanical ventilation FiO2 50% with PEEP 5. Net gain 913 ml overnight.  Still has watery bowel stools. K 4.5 this am and CO2 26.    Review of patient's allergies indicates:  No Known Allergies  Current Facility-Administered Medications   Medication Frequency    0.9%  NaCl infusion (CRRT USE ONLY) Continuous    0.9%  NaCl infusion (for blood administration) Q24H PRN    acetaminophen tablet 650 mg Q8H PRN    dextrose 50% injection 12.5 g PRN    diltiaZEM 125 mg in D5W 125 mL infusion Continuous    famotidine tablet 20 mg QHS    fluconazole tablet  200 mg Daily    glucagon (human recombinant) injection 1 mg PRN    guaifenesin 100 mg/5 ml syrup 200 mg Q6H    insulin aspart U-100 pen 0-5 Units Q4H PRN    insulin detemir U-100 pen 14 Units Daily    k phos di & mono-sod phos mono 250 mg tablet 2 tablet TID PRN    lactulose 10 gram/15 mL solution (enema) 200 g TID PRN    lactulose 20 gram/30 mL solution Soln 15 g TID    lactulose 20 gram/30 mL solution Soln 30 g Q6H PRN    levalbuterol nebulizer solution 1.25 mg Q6H WAKE    magnesium sulfate 2g in water 50mL IVPB (premix) PRN    ondansetron disintegrating tablet 8 mg Q8H PRN    psyllium husk (aspartame) 3.4 gram packet 1 packet Daily    rifAXIMin tablet 550 mg BID    simethicone chewable tablet 80 mg TID PRN    sodium chloride 0.9% flush 3 mL PRN       Objective:     Vital Signs (Most Recent):  Temp: 97.8 °F (36.6 °C) (12/04/18 1800)  Pulse: 71 (12/04/18 1800)  Resp: 18 (12/04/18 1332)  BP: (!) 98/49 (12/04/18 1800)  SpO2: 100 % (12/04/18 1800)  O2 Device (Oxygen Therapy): ventilator (12/04/18 1800) Vital Signs (24h Range):  Temp:  [97.8 °F (36.6 °C)-101.7 °F (38.7 °C)] 97.8 °F (36.6 °C)  Pulse:  [] 71  Resp:  [18-23] 18  SpO2:  [88 %-100 %] 100 %  BP: ()/(49-73) 98/49  Arterial Line BP: ()/(33-45) 115/43     Weight: 55.5 kg (122 lb 5.7 oz) (12/04/18 0353)  Body mass index is 25.57 kg/m².  Body surface area is 1.51 meters squared.    I/O last 3 completed shifts:  In: 6243.9 [I.V.:4473.9; Blood:225; NG/GT:1545]  Out: 5546 [Other:5546]    Physical Exam    Constitutional: She appears well-developed and thin and [facility]. Awake and following simple commands  HENT:   Head: Normocephalic and atraumatic.   Eyes: Pupils are equal, round, and reactive to light.   Cardiovascular: Normal rate and regular rhythm.   Pulmonary/Chest:   intubated on spontaneous  Abdominal: Soft.   Neurological: She is alert.   Skin: Skin is warm and dry.   Nursing notes and  vitals reviewed.      Significant Labs:  CBC:   Recent Labs   Lab 12/04/18  1615   WBC 9.06   RBC 2.05*   HGB 6.5*   HCT 20.4*   PLT 45*   *   MCH 31.7*   MCHC 31.9*     CMP:   Recent Labs   Lab 12/04/18  0325 12/04/18  1615   *  178* 146*   CALCIUM 7.9*  7.9* 8.3*   ALBUMIN 2.1*  2.1* 2.3*   PROT 4.9*  --      140 138   K 4.8  4.8 4.5   CO2 24  24 26     106 106   BUN 22  22 31*   CREATININE 1.3  1.3 1.6*   ALKPHOS 145*  --    ALT 33  --    AST 68*  --    BILITOT 9.2*  --      All labs within the past 24 hours have been reviewed.     Significant Imaging:  CXR personally reviewed.    Assessment/Plan:     Acute kidney injury superimposed on CKD    Cryptogenic cirrhosis awaiting liver xplant, presented for AMS and found to have ENZO on CKDIIIb. SLED started last weekend with intermittent AF+RVR+hypotensive episodes controlled with bb/cardizem gtt adequately. Now on CRRT with improving overall fluid status.    Plan:  - Diffuse infiltrates persist on CXR, no significant change from previous  - Patient has been continuing to have abundant volume per watery stools x 5 BM  - CVP increased to 12 with PEEP 5  - Will do SLED x 12 hrs for metabolic clearance and volume management  - -300 ml/hr as tolerated keep MAP > 65  - Will reassess tomorrow         Thank you for your consult. I will follow-up with patient. Please contact us if you have any additional questions.    Haroldo Van MD  Nephrology  [facility]

## 2018-12-05 NOTE — SUBJECTIVE & OBJECTIVE
Interval History:   Off SLED since yesterday at 15:00 hrs with good clearance. Plans for Trach where cancelled today. CVP is 12 critically ill, stable vital signs, on mechanical ventilation FiO2 50% with PEEP 5. Net gain 913 ml overnight.  Still has watery bowel stools. K 4.5 this am and CO2 26.    Review of patient's allergies indicates:  No Known Allergies  Current Facility-Administered Medications   Medication Frequency    0.9%  NaCl infusion (CRRT USE ONLY) Continuous    0.9%  NaCl infusion (for blood administration) Q24H PRN    acetaminophen tablet 650 mg Q8H PRN    dextrose 50% injection 12.5 g PRN    diltiaZEM 125 mg in D5W 125 mL infusion Continuous    famotidine tablet 20 mg QHS    fluconazole tablet 200 mg Daily    glucagon (human recombinant) injection 1 mg PRN    guaifenesin 100 mg/5 ml syrup 200 mg Q6H    insulin aspart U-100 pen 0-5 Units Q4H PRN    insulin detemir U-100 pen 14 Units Daily    k phos di & mono-sod phos mono 250 mg tablet 2 tablet TID PRN    lactulose 10 gram/15 mL solution (enema) 200 g TID PRN    lactulose 20 gram/30 mL solution Soln 15 g TID    lactulose 20 gram/30 mL solution Soln 30 g Q6H PRN    levalbuterol nebulizer solution 1.25 mg Q6H WAKE    magnesium sulfate 2g in water 50mL IVPB (premix) PRN    ondansetron disintegrating tablet 8 mg Q8H PRN    psyllium husk (aspartame) 3.4 gram packet 1 packet Daily    rifAXIMin tablet 550 mg BID    simethicone chewable tablet 80 mg TID PRN    sodium chloride 0.9% flush 3 mL PRN       Objective:     Vital Signs (Most Recent):  Temp: 97.8 °F (36.6 °C) (12/04/18 1800)  Pulse: 71 (12/04/18 1800)  Resp: 18 (12/04/18 1332)  BP: (!) 98/49 (12/04/18 1800)  SpO2: 100 % (12/04/18 1800)  O2 Device (Oxygen Therapy): ventilator (12/04/18 1800) Vital Signs (24h Range):  Temp:  [97.8 °F (36.6 °C)-101.7 °F (38.7 °C)] 97.8 °F (36.6 °C)  Pulse:  [] 71  Resp:  [18-23] 18  SpO2:  [88 %-100 %] 100 %  BP: ()/(49-73)  98/49  Arterial Line BP: ()/(33-45) 115/43     Weight: 55.5 kg (122 lb 5.7 oz) (12/04/18 0353)  Body mass index is 25.57 kg/m².  Body surface area is 1.51 meters squared.    I/O last 3 completed shifts:  In: 6243.9 [I.V.:4473.9; Blood:225; NG/GT:1545]  Out: 5546 [Other:5546]    Physical Exam    Constitutional: She appears well-developed and thin and little musc mass intubated on [facility] ventilation. Awake and following simple commands  HENT:   Head: Normocephalic and atraumatic.   Eyes: Pupils are equal, round, and reactive to light.   Cardiovascular: Normal rate and regular rhythm.   Pulmonary/Chest:   intubated on spontaneous  Abdominal: Soft.   Neurological: She is alert.   Skin: Skin is warm and dry.   Nursing notes and vitals reviewed.      Significant Labs:  CBC:   Recent Labs   Lab 12/04/18  1615   WBC 9.06   RBC 2.05*   HGB 6.5*   HCT 20.4*   PLT 45*   *   MCH 31.7*   MCHC 31.9*     CMP:   Recent Labs   Lab 12/04/18  0325 12/04/18  1615   *  178* 146*   CALCIUM 7.9*  7.9* 8.3*   ALBUMIN 2.1*  2.1* 2.3*   PROT 4.9*  --      140 138   K 4.8  4.8 4.5   CO2 24  24 26     106 106   BUN 22  22 31*   CREATININE 1.3  1.3 1.6*   ALKPHOS 145*  --    ALT 33  --    AST 68*  --    BILITOT 9.2*  --      All labs within the past 24 hours have been reviewed.     Significant Imaging:  CXR personally reviewed.

## 2018-12-05 NOTE — CARE UPDATE
BG goal 140-180. BG at or below goal overnight with basal insulin.     Decrease Levemir 5 units BID.   BG monitoring every 4 hours and low dose correction scale.       ** Please call Endocrine for any BG related issues **

## 2018-12-05 NOTE — ASSESSMENT & PLAN NOTE
68yo F liver-listed, stepped up on 11/21 for respiratory failure with worsening respiratory acidosis. Failed SBT. Plan for trach.      Neuro:   -Pain control: prn fentanyl while intubated  -Sedation off  -continue lactulose  -Following commands      Pulmonary:   -intubated on arrival to SICU 11/21, failed SBT  -ABGs daily and prn  -Minimal Vent Settings  -duonebs prn  -continue levoalbuterol     Cardiac:  -SBP > 100 goal  -JACINDA 11/20 - elevated PA pressures   -Heart failure following, appreciate recs   -dilt gtt for A-Fib, wean as tolerated     Renal:   -Mendoza in place  -Continue to monitor UOP: oliguria/anuria    -CRRT per nephrology  -Nephrology following, appreciate recommendations      Fluids/Electrolytes/Nutrition/GI:   -TFs held  -replace lytes PRN  -resumed lactulose enemas for ammonia of 61 last night with increased confusion  -bowel regimen     Hematology/Oncology:  -Monitor CBC -- Hgb 7.7-->7.1-->6.9 -->8.2  -PT/INR -->1.7-->2.2-->2.4 ->2.3  -continue to monitor. Transfuse as needed per transplant      Infectious Disease:   -Afebrile   -f/u bcx and resp cx  -WBC 7.6-->6.8-->11.9-->10.9->10.1  -Abx: Diflucan to cover HCAP     Endocrine:  -Glucose goal of 140-180  -Endocrine following  -3U aspart q4hrs, detemir 14U daily     PPx: famotidine      Dispo:  -Continue care in the ICU setting, still requiring vent, pending trach   -Primary: Transplant

## 2018-12-05 NOTE — PROGRESS NOTES
[facility]  Transplant Surgery  Progress Note      Hospital Day Hospital Day: 26  Post-Op Day Day of Surgery      Subjective:     Interval History:   No acute events  Afebrile HDS   Normal rates off dilt gtt    Tracheostomy procedure was held off yesterday in order to coordinate discussion and determine goals of care       Medications:  Continuous Infusions:   sodium chloride 0.9%      dilTIAZem Stopped (12/04/18 1000)     Scheduled Meds:   diltiaZEM  30 mg Oral Q8H    famotidine  20 mg Oral QHS    fluconazole  200 mg Oral Daily    guaifenesin 100 mg/5 ml  200 mg Per OG tube Q6H    insulin detemir U-100  5 Units Subcutaneous BID    lactulose  15 g Per OG tube BID    rifAXImin  550 mg Oral BID     PRN Meds:sodium chloride, sodium chloride, acetaminophen, dextrose 50%, glucagon (human recombinant), insulin aspart U-100, k phos di & mono-sod phos mono, lactulose, lactulose, magnesium sulfate IVPB, ondansetron, simethicone, sodium chloride 0.9%     Objective:     Vital Signs (Most Recent):  Temp: (P) 97.7 °F (36.5 °C) (12/05/18 1500)  Pulse: 110 (12/05/18 1338)  Resp: (!) 22 (12/05/18 0743)  BP: (!) 140/64 (12/05/18 1100)  SpO2: 100 % (12/05/18 1338) Vital Signs (24h Range):  Temp:  [97.2 °F (36.2 °C)-98.6 °F (37 °C)] (P) 97.7 °F (36.5 °C)  Pulse:  [] 110  Resp:  [22-28] 22  SpO2:  [95 %-100 %] 100 %  BP: ()/(49-73) 140/64  Arterial Line BP: ()/(31-59) 109/43       Intake/Output Summary (Last 24 hours) at 12/5/2018 1545  Last data filed at 12/5/2018 1444  Gross per 24 hour   Intake 4109.67 ml   Output 2511 ml   Net 1598.67 ml       Neuro:  GCS: nonverbally communicative;   Sedation:  no     Restraints:  no  Neurologic: Alert and oriented. Thought content appropriate    Cardiac:      Temp:  [97.2 °F (36.2 °C)-98.6 °F (37 °C)]   Pulse:  []   Resp:  [22-28]   BP: ()/(49-73)   SpO2:  [95 %-100 %]   Arterial Line BP: ()/(31-59)   Heart: regular rate and rhythm, S1, S2  normal, no murmur, rub or gallop  Pulses: 2+ and symmetric    Pulmonary:  Vent Mode: A/C  Oxygen Concentration (%):  [40-50] 40  Resp Rate Total:  [16 br/min-32 br/min] 24 br/min  Vt Set:  [300 mL] 300 mL  PEEP/CPAP:  [5 cmH20] 5 cmH20  Mean Airway Pressure:  [9.3 eyN81-76 cmH20] 10 cmH20     Recent Labs   Lab 12/05/18  0524   PH 7.383   PO2 115*   PCO2 44.0   HCO3 26.2   BE 1       Lungs:  clear to auscultation bilaterally, normal respiratory effort and rhonchi bilaterally  Chest X-ray: No changes. Significant airspace disease BL    Renal:   I & O (Last 24H):    Intake/Output Summary (Last 24 hours) at 12/5/2018 1545  Last data filed at 12/5/2018 1444  Gross per 24 hour   Intake 4109.67 ml   Output 2511 ml   Net 1598.67 ml     Recent Labs   Lab 12/04/18  1615 12/04/18 2128 12/05/18  0401   BUN 31* 15  15 7*  7*  7*   CREATININE 1.6* 0.9  0.9 0.6  0.6  0.6   K 4.5 4.3  4.3 4.5  4.5  4.5     Recent Labs   Lab 12/04/18 1615 12/04/18 2128 12/05/18  0401    139  139 136  136  136   K 4.5 4.3  4.3 4.5  4.5  4.5    107  107 106  106  106   CO2 26 27  27 25  25  25   CALCIUM 8.3* 7.9*  7.9* 7.8*  7.8*  7.8*   * 113*  113* 100  100  100     Recent Labs   Lab 12/04/18  1615 12/04/18 2128 12/05/18  0401   PHOS 6.1* 3.5  3.5 2.5*  2.5*  2.5*   MG 2.2 1.9  1.9 1.7  1.7  1.7       FEN/GI  Recent Labs   Lab 12/03/18  0418  12/04/18  0325 12/04/18 1615 12/04/18 2128 12/05/18  0401   BILITOT 8.9*  --  9.2*  --   --  10.4*   AST 54*  --  68*  --   --  73*   ALT 26  --  33  --   --  37   ALKPHOS 130  --  145*  --   --  163*   ALBUMIN 2.3*  2.3*   < > 2.1*  2.1* 2.3* 2.3*  2.3* 2.3*  2.3*  2.3*    < > = values in this interval not displayed.     Recent Labs   Lab 12/04/18  0325 12/04/18  0930 12/05/18  0401   INR 2.4* 1.9* 2.3*      Nutrition: Tube feeds  Abdomen: Soft distended umbilical hernia, nontender  GI ulcer Prophylaxis: yes    HEME:   Recent Labs   Lab  12/04/18  0325 12/04/18  1615 12/05/18  0401   WBC 10.87 9.06 10.11   HGB 6.9* 6.5* 8.2*   HCT 21.9* 20.4* 26.4*   PLT 42* 45* 51*     DVT Prophylaxis: Pharmacologic No (high INR) Mechanical: yes     Infectious Disease:  Temp:  [97.2 °F (36.2 °C)-98.6 °F (37 °C)]   Pulse:  []   Resp:  [22-28]   BP: ()/(49-73)   SpO2:  [95 %-100 %]   Arterial Line BP: ()/(31-59)   Antibiotics (From admission, onward)    Start     Stop Route Frequency Ordered    11/10/18 0900  rifAXIMin tablet 550 mg      -- Oral 2 times daily 11/10/18 0321            Assessment/Plan:   Krystal Hobson is a 68F cryptogenic cirrhosis, chronic frailty / deconditioning, c/b resp failure and ongoing ARDS (intubated 11/21) renal failure (on CRRT 11/24)     Neuro:   - No pain meds needed  - Not sedated  - Monitor mental status      Pulmonary:   - Tracheostomy - 12/5  - intubated since 11/21 - unable to wean since     Cardiac:  - SBP > 100 goal  - Transition to po diltiazem (Afib with RVR)  - metop prn for CRRT      Renal:   - Renal failure, aneuric - continue CRRT (started 11/24)   - Monitor Cr   - replete electrolytes prn      GI:  - NPO / NGT    - glucerna TF @ 40cc/hour  - Not transplant candidate   - Decrease lactulose dose      H/I/Endo  - H/H stable -   - monitor fever curve   - Glucose goal of 140-180  - Endocrine following, see recommendations    PPx:   - famotidine   - INR 2.3 (no pharmacologic DVT ppx)     Dispo:   - Continue care in the ICU setting  - LTAC screening    Dennis Miller, PGY3 Surgery  3:52 PM 12/05/2018

## 2018-12-05 NOTE — ASSESSMENT & PLAN NOTE
Cryptogenic cirrhosis awaiting liver xplant, presented for AMS and found to have ENZO on CKDIIIb. SLED started last weekend with intermittent AF+RVR+hypotensive episodes controlled with bb/cardizem gtt adequately. Now on CRRT with improving overall fluid status.    Plan:  - Diffuse infiltrates persist on CXR compatible with ARDS, no significant change from previous  - diarrhea still present  - CVP increased to 13  - Will do SLED x 12 hrs for metabolic clearance and volume management  - Increase -400 ml/hr as tolerated keep MAP > 65  - Will reassess tomorrow

## 2018-12-05 NOTE — PROGRESS NOTES
At the request of the pts   (CYNDEE) contact [facility] in [place], [place]) # [phone] to inquire about whether the hospital provided Long term acute care service (LTAC).  SW was transferred to the case management department and left a message to be contact regarding LTAC facilities. SW remains available.

## 2018-12-05 NOTE — ANESTHESIA PREPROCEDURE EVALUATION
[facility]  Anesthesia Pre-Operative Evaluation         Patient Name: Krystal Hobson  YOB: 1950  MRN: [id number]    SUBJECTIVE:     Pre-operative evaluation for Procedure(s) (LRB):  CREATION, TRACHEOSTOMY (N/A)     12/05/2018    Krystal Hobson is a 68 y.o. female w/ a significant PMHx of history of cryptogenic cirrhosis, breast cancer, GERD, and rheumatoid arthritis who has been admitted since 11/12 for AMS due to hepatic encephalopathy. She was being treated for pneumonia and needed a central line on 11/21/18; during the procedure, she became bradycardic with a drop in her oxygen saturations. This eventually caused her to be intubated due to acute respiratory failure. She has been intubated since 11/21/18 and attempts at extubation has failed, likely due to frailty. She is intubated with 7.0 ETT and is on minimal vent setting with FiO2 of 50% and PEEP of 5.     .    Patient now presents for the above procedure(s).      LDA:       Trialysis (Dialysis) Catheter 11/21/18 1851 left internal jugular (Active)   IV Device Securement sutures 12/5/2018 11:00 AM   Additional Site Signs no erythema;no warmth;no edema;no pain;no palpable cord;no streak formation 12/5/2018 11:00 AM   Patency/Care infusing 12/5/2018 11:00 AM   Waveform normal 12/5/2018 11:00 AM   Site Assessment Clean;Dry;Intact;No redness;No swelling 12/5/2018 11:00 AM   Status Clamped 12/5/2018 11:00 AM   Flows Good 12/4/2018  3:00 AM   Dressing Intervention Dressing reinforced 12/5/2018 11:00 AM   Dressing Status Biopatch in place;Clean;Dry;Intact 12/5/2018 11:00 AM   Dressing Change Due 12/11/18 12/5/2018 11:00 AM   Verification by X-ray Yes 12/5/2018  7:00 AM   Site Condition No complications 12/5/2018 11:00 AM   Dressing Occlusive 12/5/2018 11:00 AM   Drainage Description Serosanguineous 12/5/2018 11:00 AM   Daily Line Review Performed 12/5/2018 11:00 AM   Number of days: 13            Peripheral IV - Single Lumen 12/04/18  1515 Right Forearm (Active)   Site Assessment Clean;Dry;Intact 12/5/2018 11:00 AM   Line Status Saline locked 12/5/2018 11:00 AM   Dressing Status Clean;Dry;Intact 12/5/2018 11:00 AM   Dressing Intervention Dressing reinforced 12/5/2018 11:00 AM   Dressing Change Due 12/08/18 12/5/2018 11:00 AM   Site Change Due 12/08/18 12/5/2018  7:00 AM   Reason Not Rotated Not due 12/5/2018 11:00 AM   Number of days: 0            Arterial Line 11/21/18 1900 Right Radial (Active)   Site Assessment Clean;Dry;Intact 12/5/2018 11:00 AM   Line Status Pulsatile blood flow 12/5/2018 11:00 AM   Art Line Waveform Appropriate 12/5/2018 11:00 AM   Arterial Line Interventions Zeroed and calibrated;Leveled;Connections checked and tightened;Flushed per protocol 12/5/2018 11:00 AM   Color/Movement/Sensation Capillary refill less than 3 sec 12/5/2018 11:00 AM   Dressing Type Transparent 12/5/2018 11:00 AM   Dressing Status Clean;Dry;Intact 12/5/2018 11:00 AM   Dressing Intervention New dressing 12/5/2018 11:00 AM   Dressing Change Due 12/12/18 12/5/2018 11:00 AM   Number of days: 13            NG/OG Tube 11/21/18 1840 Argenta sump 16 Fr. Right mouth (Active)   Placement Check placement verified by x-ray 12/5/2018 11:00 AM   Tube advanced (cm) 53 12/4/2018  7:10 PM   Advancement advanced manually 11/30/2018  3:00 PM   Distal Tube Length (cm) 54 12/3/2018  7:01 AM   Tolerance no signs/symptoms of discomfort 12/5/2018 11:00 AM   Securement taped to commercial device 12/5/2018 11:00 AM   Clamp Status/Tolerance clamped 12/5/2018 11:00 AM   Suction Setting/Drainage Method suction at;low;intermittent setting 12/4/2018 11:03 AM   Insertion Site Appearance no redness, warmth, tenderness, skin breakdown, drainage 12/5/2018 11:00 AM   Drainage Bile 12/4/2018  3:03 PM   Flush/Irrigation flushed w/;water;no resistance met 12/5/2018 11:00 AM   Feeding Method continuous 12/5/2018 11:00 AM   Feeding Action feeding held 12/5/2018 11:00 AM   Current Rate (mL/hr)  10 mL/hr 12/4/2018  7:10 PM   Goal Rate (mL/hr) 40 mL/hr 12/4/2018  7:10 PM   Intake (mL) 60 mL 12/5/2018  7:00 AM   Water Bolus (mL) 40 mL 12/1/2018  2:00 PM   Tube Output(mL)(Include Discarded Residual) 20 mL 11/30/2018 11:00 AM   Rate Formula Tube Feeding (mL/hr) 40 mL/hr 12/1/2018  7:00 PM   Formula Name Glucerna  12/5/2018  5:10 AM   Intake (mL) - Formula Tube Feeding 20 12/4/2018 11:10 PM   Residual Amount (ml) 5 ml 12/3/2018  7:01 PM   Number of days: 13       Prev airway:   Easy intubation, Glidescope 3, Grade I    Drips:    sodium chloride 0.9%      dilTIAZem Stopped (12/04/18 1000)       Patient Active Problem List   Diagnosis    Decompensated hepatic cirrhosis    Portal hypertension    Encounter for pre-transplant evaluation for chronic liver disease    Liver mass    Personal history of breast cancer    Acute kidney injury superimposed on CKD    Hepatic encephalopathy    End stage liver disease    Umbilical hernia    Anemia of chronic disease    Leukopenia    Thrombocytopenia, unspecified    Type 2 diabetes mellitus with hyperglycemia, with long-term current use of insulin    Chronic liver disease    CKD (chronic kidney disease) stage 3, GFR 30-59 ml/min    Atrial fibrillation    Sinus tachycardia    Abnormal echocardiogram    Acute respiratory failure with hypoxia    Pulmonary infiltrates on CXR    On enteral nutrition    Suspected pressure injury of deep tissue       Review of patient's allergies indicates:  No Known Allergies    Current Inpatient Medications:   famotidine  20 mg Oral QHS    fluconazole  200 mg Oral Daily    guaifenesin 100 mg/5 ml  200 mg Per OG tube Q6H    insulin detemir U-100  5 Units Subcutaneous BID    lactulose  15 g Per OG tube TID    levalbuterol  1.25 mg Nebulization Q6H WAKE    psyllium husk (aspartame)  1 packet Per NG tube Daily    rifAXImin  550 mg Oral BID       No current facility-administered medications on file prior to encounter.       Current Outpatient Medications on File Prior to Encounter   Medication Sig Dispense Refill    ALBUTEROL INHL Inhale 1 puff into the lungs 4 (four) times daily as needed.      ergocalciferol (ERGOCALCIFEROL) 50,000 unit Cap Take 50,000 Units by mouth every 7 days.      fluticasone/vilanterol (BREO ELLIPTA INHL) Inhale into the lungs.      hydrOXYzine HCl (ATARAX) 25 MG tablet Take 25 mg by mouth 3 (three) times daily as needed for Itching.      insulin detemir U-100 (LEVEMIR) 100 unit/mL injection Inject 12 Units into the skin 2 (two) times daily.       insulin lispro (HUMALOG) 100 unit/mL injection Sliding scale       lactulose (CHRONULAC) 10 gram/15 mL solution Take 23 mLs (15.3333 g total) by mouth 3 (three) times daily. Titrate to 3 BM daily 2500 mL 1    omeprazole (PRILOSEC) 40 MG capsule Take 40 mg by mouth every morning.      ondansetron (ZOFRAN) 4 MG tablet Take 4 mg by mouth every 8 (eight) hours as needed for Nausea.      ONETOUCH ULTRA BLUE TEST STRIP Strp       propranolol (INDERAL) 10 MG tablet Take 5 mg by mouth 2 (two) times daily.       rifAXIMin (XIFAXAN) 550 mg Tab Take 550 mg by mouth 2 (two) times daily.      SHINGRIX, PF, 50 mcg/0.5 mL injection       sodium bicarbonate 650 MG tablet Take 2 tablets (1,300 mg total) by mouth 3 (three) times daily. 180 tablet 11    zinc gluconate 50 mg tablet Take 50 mg by mouth once daily.          Past Surgical History:   Procedure Laterality Date    CARPAL TUNNEL RELEASE      left wrist    CHOLECYSTECTOMY      MASTECTOMY      left breast 25  years ago       Social History     Socioeconomic History    Marital status:      Spouse name: Not on file    Number of children: Not on file    Years of education: Not on file    Highest education level: Not on file   Social Needs    Financial resource strain: Not on file    Food insecurity - worry: Not on file    Food insecurity - inability: Not on file    Transportation needs - medical:  Not on file    Transportation needs - non-medical: Not on file   Occupational History    Not on file   Tobacco Use    Smoking status: Never Smoker    Tobacco comment: patient denies   Substance and Sexual Activity    Alcohol use: No     Comment: stopped 10/17    Drug use: No     Comment: patient denies    Sexual activity: Not on file   Other Topics Concern    Not on file   Social History Narrative    Not on file       OBJECTIVE:     Vital Signs Range (Last 24H):  Temp:  [36.2 °C (97.2 °F)-37 °C (98.6 °F)]   Pulse:  []   Resp:  [18-28]   BP: ()/(49-73)   SpO2:  [88 %-100 %]   Arterial Line BP: ()/(31-59)       Significant Labs:  Lab Results   Component Value Date    WBC 10.11 12/05/2018    HGB 8.2 (L) 12/05/2018    HCT 26.4 (L) 12/05/2018    PLT 51 (L) 12/05/2018    ALT 37 12/05/2018    AST 73 (H) 12/05/2018     12/05/2018     12/05/2018     12/05/2018    K 4.5 12/05/2018    K 4.5 12/05/2018    K 4.5 12/05/2018     12/05/2018     12/05/2018     12/05/2018    CREATININE 0.6 12/05/2018    CREATININE 0.6 12/05/2018    CREATININE 0.6 12/05/2018    BUN 7 (L) 12/05/2018    BUN 7 (L) 12/05/2018    BUN 7 (L) 12/05/2018    CO2 25 12/05/2018    CO2 25 12/05/2018    CO2 25 12/05/2018    TSH 0.212 (L) 11/11/2018    INR 2.3 (H) 12/05/2018    HGBA1C 5.8 (H) 11/10/2018       Diagnostic Studies: No relevant studies.    EKG: No recent studies available.    2D ECHO:  No results found for this or any previous visit.      ASSESSMENT/PLAN:         Anesthesia Evaluation    I have reviewed the Patient Summary Reports.    I have reviewed the Nursing Notes.   I have reviewed the Medications.     Review of Systems  Anesthesia Hx:  No problems with previous Anesthesia  History of prior surgery of interest to airway management or planning: Denies Family Hx of Anesthesia complications.    Social:  Former Smoker    Hematology/Oncology:         -- Anemia: --  Cancer in past history:   Breast   EENT/Dental:EENT/Dental Normal   Cardiovascular:  Cardiovascular Normal     Pulmonary:   Respiratory failure   Hepatic/GI:   GERD Liver Disease,    Musculoskeletal:  Musculoskeletal Normal    Neurological:  Neurology Normal    Psych:  Psychiatric Normal           Physical Exam  General:  Well nourished    Airway/Jaw/Neck:  Airway Findings: Pre-Existing Airway Tube(s): Oral Endotracheal tube Size: 7.0  General Airway Assessment: Adult       Chest/Lungs:  Chest/Lungs Findings: (ventilator breath sounds)    Heart/Vascular:  Heart Findings: Normal Heart murmur: negative       Mental Status:  Mental Status Findings: (intubated and sedated)         Anesthesia Plan  Type of Anesthesia, risks & benefits discussed:  Anesthesia Type:  general  Patient's Preference:   Intra-op Monitoring Plan: standard ASA monitors, arterial line and central line  Intra-op Monitoring Plan Comments:   Post Op Pain Control Plan: per primary service following discharge from PACU, IV/PO Opioids PRN and multimodal analgesia  Post Op Pain Control Plan Comments:   Induction:   IV  Beta Blocker:         Informed Consent: Patient representative understands risks and agrees with Anesthesia plan.  Questions answered. Anesthesia consent signed with patient representative.  ASA Score: 4     Day of Surgery Review of History & Physical:            Ready For Surgery From Anesthesia Perspective.

## 2018-12-05 NOTE — PROGRESS NOTES
(CYNDEE) has contact three facilities for Long Term Acute Care (LTAC) placement on behalf of the pt at the husbands request. SW contact [facility] ph# [phone] [place], [place] and was referred to [facility] in [place].       SW contact [facility] in [place] at ph# [phone] and was also referred to [facility] by the admission Coordinator.  The coordinator explained this is the only LTAC that is within their region as they have an acute inpatient rehabilitation and they do not provide LTAC services.     SW contact [facility] ph# [phone] and spoke with Queta.  Queta reported the pt may be a candidate; however, due to the hospital location the pt will need to be assessed by the [place], [place] ([place] office and that SW should send the referral there as and they will forward her to [place]. Queta provided CYNDEE with the Business Liaison name Naomi and ph# [phone] or [phone].    SW contact the  of the patient Shahzad ph# [phone] to advise of the referral to a different agency besides the names he provided.  Shahzad reported he was able to find the facility as well via google.  CYNDEE explained that with his permission SW will move forward to complete the referral.  Shahzad provided CYNDEE permission to share information with [facility].  Shahzad also request that CYNDEE submit a referral to [facility] in [place] on behalf of the pt.  CYNDEE will continue to research the facility.        SW contact Naomi Business Liasion for [facility] to discuss their referral process and procedures.  CYNDEE provided pt demographic information and provided access to the pts chart in Epic for review to ensure pt meets criteria. CYNDEE and Naomi discussed the pts family request to get the pt home to [place] as soon as possible.  CYNDEE and Naomi discussed insurance.  Naomi reported with the plan the pt has [employer]/C they will need to review for  approval which could take a few days.  Naomi reported she will review the pts chart via Epic, schedule time to assess and forward to the insurance for approval.  CYNDEE provided all contact information for this SW and the Liver SW.  Shahzad Salgado and CYNDEE both verbalized understanding and agreement with the information reviewed, social work availability, and how to access available resources if needed. SW remains available.

## 2018-12-05 NOTE — PLAN OF CARE
Problem: Patient Care Overview  Goal: Plan of Care Review  Outcome: Ongoing (interventions implemented as appropriate)  Patient remains intubated, 50% O2 5 Peep A/C. No gtts currently infusing at this time. Pt anuric, CRRT started at 1800 for 12hrs; PRN metoprolol given before CRRT start. TF @ 10cc/hr. 2U FFP given this AM and 1U PRBC currently infusing. Trach placement on hold at this time. Pt awake, follows commands and moves all extremities purposefully. Pt and spouse updated with plan of care.

## 2018-12-05 NOTE — ASSESSMENT & PLAN NOTE
Caution with insulin stacking  Estimated Creatinine Clearance: 78.6 mL/min (based on SCr of 0.6 mg/dL).

## 2018-12-05 NOTE — PROGRESS NOTES
[facility]  Nephrology  Progress Note    Patient Name: Krystal Hobson  MRN: [id number]  Admission Date: 11/10/2018  Hospital Length of Stay: 25 days  Attending Provider: Abrahan Montelongo MD   Primary Care Physician: Courtney Joe MD  Principal Problem:Hepatic encephalopathy    Subjective:     HPI: Reason for consult: Diuresis in setting of PAP46, pulmonary edema, HRS    Ms. 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  - [place] [facility] [place] under liver transplant team due to encephalopathy and new onset ENZO.   - Per the , she was extremely disoriented @ OSH and her ammonia reached as high as 200, and her Cr level increased to 2.0 She also went into A fib with RVR and was started on a diltiazem gtt at OSH.   - On [place] [facility] [place] was disoriented and started on lactulose with noted improvement. During her current admission on CXR they found a concern for HCAP and she was started on broad spectrum ABx and then shifted her on Moxifloxacin 7 D course. On 11/20/2018 found on CXR a concern for Lt lung middle lobe consolidation and started on vancomycin and zosyn.  - Renal fxn baseline 1.6 at admit but trending up    Interval History:   Tolerated SLED overnight x 10 hrs. Plans for Trach today. CVP is 13 critically ill, stable vital signs, on mechanical ventilation FiO2 improved to 40% with PEEP 5. Net gain 1146 ml overnight.  Still has watery bowel stools.    Review of patient's allergies indicates:  No Known Allergies  Current Facility-Administered Medications   Medication Frequency    0.9%  NaCl infusion (CRRT USE ONLY) Continuous    0.9%  NaCl infusion (for blood administration) Q24H PRN    acetaminophen tablet 650 mg Q8H PRN    dextrose 50% injection 12.5 g PRN    diltiaZEM 125 mg in D5W 125 mL infusion Continuous    diltiaZEM tablet 30 mg Q8H    famotidine tablet 20 mg QHS    fluconazole tablet 200 mg Daily     glucagon (human recombinant) injection 1 mg PRN    guaifenesin 100 mg/5 ml syrup 200 mg Q6H    HYDROmorphone injection 0.2 mg Q3H PRN    insulin aspart U-100 pen 0-5 Units Q4H PRN    insulin detemir U-100 pen 5 Units BID    k phos di & mono-sod phos mono 250 mg tablet 2 tablet TID PRN    lactulose 10 gram/15 mL solution (enema) 200 g TID PRN    lactulose 20 gram/30 mL solution Soln 15 g BID    lactulose 20 gram/30 mL solution Soln 30 g Q6H PRN    magnesium sulfate 2g in water 50mL IVPB (premix) PRN    ondansetron disintegrating tablet 8 mg Q8H PRN    rifAXIMin tablet 550 mg BID    simethicone chewable tablet 80 mg TID PRN    sodium chloride 0.9% flush 3 mL PRN       Objective:     Vital Signs (Most Recent):  Temp: 96.3 °F (35.7 °C) (12/05/18 1504)  Pulse: 100 (12/05/18 1515)  Resp: (!) 22 (12/05/18 0743)  BP: (!) 99/53 (12/05/18 1504)  SpO2: 100 % (12/05/18 1515)  O2 Device (Oxygen Therapy): ventilator (12/05/18 1230) Vital Signs (24h Range):  Temp:  [96.3 °F (35.7 °C)-98.6 °F (37 °C)] 96.3 °F (35.7 °C)  Pulse:  [] 100  Resp:  [22-28] 22  SpO2:  [95 %-100 %] 100 %  BP: ()/(49-73) 99/53  Arterial Line BP: ()/(31-59) 109/43     Weight: 55.5 kg (122 lb 5.7 oz) (12/04/18 0353)  Body mass index is 25.57 kg/m².  Body surface area is 1.51 meters squared.    I/O last 3 completed shifts:  In: 4355.6 [I.V.:2569.6; Blood:946; NG/GT:840]  Out: 2511 [Other:2511]    Physical Exam    Constitutional: She appears well-developed and thin and little musc mass intubated on mech ventilation. Awake and following simple commands  HENT:   Head: Normocephalic and atraumatic.   Eyes: Pupils are equal, round, and reactive to light.   Cardiovascular: Normal rate and regular rhythm.   Pulmonary/Chest:   intubated on spontaneous  Abdominal: Soft.   Neurological: She is alert.   Skin: Skin is warm and dry.   Nursing notes and vitals reviewed.      Significant Labs:  CBC:   Recent Labs   Lab 12/05/18  1600   WBC 9.40    RBC 2.52*   HGB 7.5*   HCT 24.5*   PLT 83*   MCV 97   MCH 29.8   MCHC 30.6*     CMP:   Recent Labs   Lab 12/05/18  0401 12/05/18  1600     100  100 150*   CALCIUM 7.8*  7.8*  7.8* 8.5*   ALBUMIN 2.3*  2.3*  2.3* 2.7*   PROT 5.3*  --      136  136 135*   K 4.5  4.5  4.5 4.8   CO2 25  25  25 23     106  106 105   BUN 7*  7*  7* 13   CREATININE 0.6  0.6  0.6 0.9   ALKPHOS 163*  --    ALT 37  --    AST 73*  --    BILITOT 10.4*  --      All labs within the past 24 hours have been reviewed.     Significant Imaging:  CXR personally reviewed.    Assessment/Plan:     Acute kidney injury superimposed on CKD    Cryptogenic cirrhosis awaiting liver xplant, presented for AMS and found to have ENZO on CKDIIIb. SLED started last weekend with intermittent AF+RVR+hypotensive episodes controlled with bb/cardizem gtt adequately. Now on CRRT with improving overall fluid status.    Plan:  - Diffuse infiltrates persist on CXR compatible with ARDS, no significant change from previous  - diarrhea still present  - CVP increased to 13  - Will do SLED x 12 hrs for metabolic clearance and volume management  - Increase -400 ml/hr as tolerated keep MAP > 65  - Will reassess tomorrow         Thank you for your consult. I will follow-up with patient. Please contact us if you have any additional questions.    Haroldo Van MD  Nephrology  [facility]

## 2018-12-06 NOTE — SUBJECTIVE & OBJECTIVE
Interval History:   Attempted SLED overnight x 12 hrs but she did not tolerated treatedment was stopped 6 hrs into treatment secondary to hypotension and AFib with RVR. s/pTrach today. CVP is 14 critically ill, stable vital signs, on mechanical ventilation FiO2 improved to 40% with PEEP 5. Net gain 1004 ml overnight.  Still has watery bowel stools.    Review of patient's allergies indicates:  No Known Allergies  Current Facility-Administered Medications   Medication Frequency    0.9%  NaCl infusion (CRRT USE ONLY) Continuous    0.9%  NaCl infusion (for blood administration) Q24H PRN    acetaminophen tablet 650 mg Q8H PRN    dextrose 50% injection 12.5 g PRN    diltiaZEM 125 mg in D5W 125 mL infusion Continuous    diltiaZEM tablet 30 mg Q8H    famotidine tablet 20 mg QHS    fluconazole tablet 200 mg Daily    glucagon (human recombinant) injection 1 mg PRN    guaifenesin 100 mg/5 ml syrup 200 mg Q6H    heparin (porcine) injection 5,000 Units Q12H    HYDROmorphone injection 0.2 mg Q3H PRN    insulin aspart U-100 pen 0-5 Units Q4H PRN    insulin detemir U-100 pen 5 Units BID    k phos di & mono-sod phos mono 250 mg tablet 2 tablet TID PRN    lactulose 10 gram/15 mL solution (enema) 200 g TID PRN    lactulose 20 gram/30 mL solution Soln 15 g BID    lactulose 20 gram/30 mL solution Soln 30 g Q6H PRN    magnesium sulfate 2g in water 50mL IVPB (premix) PRN    metoprolol injection 2.5 mg Q5 Min PRN    ondansetron disintegrating tablet 8 mg Q8H PRN    rifAXIMin tablet 550 mg BID    simethicone chewable tablet 80 mg TID PRN    sodium chloride 0.9% flush 3 mL PRN       Objective:     Vital Signs (Most Recent):  Temp: 98.6 °F (37 °C) (12/06/18 0700)  Pulse: 80 (12/06/18 0901)  Resp: (!) 25 (12/06/18 0901)  BP: (!) 117/58 (12/06/18 0901)  SpO2: 100 % (12/06/18 0901)  O2 Device (Oxygen Therapy): ventilator (12/06/18 0725) Vital Signs (24h Range):  Temp:  [94.5 °F (34.7 °C)-98.6 °F (37 °C)] 98.6 °F (37  °C)  Pulse:  [] 80  Resp:  [14-25] 25  SpO2:  [95 %-100 %] 100 %  BP: ()/(46-73) 117/58  Arterial Line BP: ()/(33-59) 97/33     Weight: 55.5 kg (122 lb 5.7 oz) (12/04/18 0353)  Body mass index is 25.57 kg/m².  Body surface area is 1.51 meters squared.    I/O last 3 completed shifts:  In: 5834.7 [I.V.:3856.7; Blood:1018; NG/GT:960]  Out: 4303 [Other:4303]    Physical Exam    Constitutional: She appears well-developed and thin and little musc mass, [facility]. Awake and following simple commands  HENT:   Head: Normocephalic and atraumatic.   Eyes: Pupils are equal, round, and reactive to light.   Cardiovascular: Normal rate and regular rhythm.   Pulmonary/Chest:   Remains mechanically ventilated  Abdominal: Soft.   Neurological: She is alert.   Skin: Skin is warm and dry.   Nursing notes and vitals reviewed.      Significant Labs:  CBC:   Recent Labs   Lab 12/06/18  0210   WBC 10.72   RBC 2.52*   HGB 7.7*   HCT 25.6*   PLT 85*   *   MCH 30.6   MCHC 30.1*     CMP:   Recent Labs   Lab 12/06/18  0210   *  175*   CALCIUM 8.5*  8.5*   ALBUMIN 2.5*  2.5*   PROT 5.7*   *  134*   K 4.7  4.7   CO2 23  23     104   BUN 19  19   CREATININE 1.2  1.2   ALKPHOS 163*   ALT 36   AST 64*   BILITOT 11.4*     All labs within the past 24 hours have been reviewed.     Significant Imaging:  CXR personally reviewed.

## 2018-12-06 NOTE — PROGRESS NOTES
(CYNDEE) spoke with Naomi with [facility] who reported that she will review patients (pts) admission with administration and follow up due to the pts prognosis she is not sure the pt is an acceptable candidate with out HD due to the need for the length of stay but will escalate for review.  Jarrod Melissa and Reginald have been updated.  Dr. Melissa has agreed to speak with anyone provider on behalf of the pt.  SW remains available.

## 2018-12-06 NOTE — NURSING
Dr. Orozco aware of continued bradycardia and hypotension. Also unable to obtain temp orally or axillary after elizabeth hugger on X2 hrs, and pt still lethargic. CRRT rinsed back, will draw AM labs early and continue to monitor. Pt's  at bedside notified of plan of care.

## 2018-12-06 NOTE — ASSESSMENT & PLAN NOTE
Avoid insulin stacking and hypoglycemia.  CRRT per nephrology   Lab Results   Component Value Date    CREATININE 1.2 12/06/2018    CREATININE 1.2 12/06/2018

## 2018-12-06 NOTE — TELEPHONE ENCOUNTER
Patient's spouse requested to speak with Lillie Martinez LCSW; however, she had left for the day. Mr. Pike provided SW with number for transplant coordinator at [facility]. Thuy Atkinson [phone]. Mr. Pike reported that the coordinator may be helpful in speeding up the approval process for patient. SW remains available.

## 2018-12-06 NOTE — PROGRESS NOTES
"[facility]  Endocrinology  Progress Note    Admit Date: 11/10/2018     Reason for Consult: Management of type 2 DM, Hyperglycemia     Surgical Procedure and Date: n/a    Diabetes diagnosis year: 2001    Home Diabetes Medications: Levemir 12 units BID (vial) and Humalog SSI with meals   Lab Results   Component Value Date    HGBA1C 5.8 (H) 11/10/2018         How often checking glucose at home? 1-3  BG readings on regimen: 150-300  Hypoglycemia on the regimen? once 27; required visit to ED; gave Levemir and humalog and patient did not eat  Missed doses on regimen?  No    Diabetes Complications include:     Hyperglycemia, Hypoglycemia  and Diabetic peripheral neuropathy     Complicating diabetes co morbidities:   CIRRHOSIS      HPI:   Patient is a 67 y.o. female with a diagnosis of type 2 DM; well controlled on MDI. Also with   Cryptogenic cirrhosis, rheumatoid arthritis, and GERD. Patient was listed for liver transplant on 9/14/18. Patient presented to ED of hospital in [place] with AMS and ENZO. Endocrinology consulted for BG/ DM management.   Of note, profound hypoglycemic event (BG < 20) the night of 11/14/18.            Interval HPI:   Overnight events: Remains in ICU. Trach yesterday; on vent. CRRT overnight per nephrology. Hypotension and bradycardia overnight. BG at goal with Levemir and correction scale x1.   Eating:   NPO  Nausea: No  Hypoglycemia and intervention: No  Fever: No; hypothermic overnight- on elizabeth perez  TF: Yes glucerna at 40 cc/hr     BP (!) 103/51 (BP Location: Left arm, Patient Position: Lying)   Pulse 69   Temp 98.6 °F (37 °C) (Oral)   Resp (!) 23   Ht 4' 10" (1.473 m)   Wt 55.5 kg (122 lb 5.7 oz)   SpO2 100%   Breastfeeding? No   BMI 25.57 kg/m²      Labs Reviewed and Include    Recent Labs   Lab 12/06/18  0210   *  175*   CALCIUM 8.5*  8.5*   ALBUMIN 2.5*  2.5*   PROT 5.7*   *  134*   K 4.7  4.7   CO2 23  23     104   BUN 19  19 "   CREATININE 1.2  1.2   ALKPHOS 163*   ALT 36   AST 64*   BILITOT 11.4*     Lab Results   Component Value Date    WBC 10.72 12/06/2018    HGB 7.7 (L) 12/06/2018    HCT 25.6 (L) 12/06/2018     (H) 12/06/2018    PLT 85 (L) 12/06/2018     No results for input(s): TSH, FREET4 in the last 168 hours.  Lab Results   Component Value Date    HGBA1C 5.8 (H) 11/10/2018       Nutritional status:   Body mass index is 25.57 kg/m².  Lab Results   Component Value Date    ALBUMIN 2.5 (L) 12/06/2018    ALBUMIN 2.5 (L) 12/06/2018    ALBUMIN 2.5 (L) 12/05/2018     No results found for: PREALBUMIN    Estimated Creatinine Clearance: 39.3 mL/min (based on SCr of 1.2 mg/dL).    Accu-Checks  Recent Labs     12/04/18  1159 12/04/18  1618 12/04/18  1922 12/04/18  2313 12/05/18  0357 12/05/18  0813 12/05/18  1152 12/05/18  1611 12/05/18  1927 12/06/18  0012   POCTGLUCOSE 162* 147* 123* 130* 106 105 128* 159* 166* 235*       Current Medications and/or Treatments Impacting Glycemic Control  Immunotherapy:    Immunosuppressants     None        Steroids:   Hormones (From admission, onward)    None        Pressors:    Autonomic Drugs (From admission, onward)    None        Hyperglycemia/Diabetes Medications:   Antihyperglycemics (From admission, onward)    Start     Stop Route Frequency Ordered    12/05/18 0900  insulin detemir U-100 pen 5 Units      -- SubQ 2 times daily 12/05/18 0717    11/26/18 0947  insulin aspart U-100 pen 0-5 Units      -- SubQ Every 4 hours PRN 11/26/18 0848          ASSESSMENT and PLAN    * Hepatic encephalopathy    Managed per primary.        Type 2 diabetes mellitus with hyperglycemia, with long-term current use of insulin    BG goal 140-180    continue Levemir 5 units BID.   Low dose correction scale  BG monitoring every 4 hours    Discharge planning: TBD       Decompensated hepatic cirrhosis    Managed per primary.   Listed for liver transplant.  May impact BG readings       CKD (chronic kidney disease) stage 3,  GFR 30-59 ml/min    Caution with insulin stacking  Estimated Creatinine Clearance: 78.6 mL/min (based on SCr of 0.6 mg/dL).         On enteral nutrition    On supplemental TF, elevating BG readings     Acute kidney injury superimposed on CKD    Avoid insulin stacking and hypoglycemia.  CRRT per nephrology   Lab Results   Component Value Date    CREATININE 0.6 12/05/2018    CREATININE 0.6 12/05/2018    CREATININE 0.6 12/05/2018                Jadyn Hall NP  Endocrinology  [facility]

## 2018-12-06 NOTE — NURSING
BP= 90s/40s, MAPs=50s-60s, pt lethargic this shift, after receiving pain meds on dayshift. Now HR=47-50s Afib (had been 80s-100s Afib at beginning of shift). Dr. Orozco and charge RN aware. No further orders at this time. Will continue to monitor closely.

## 2018-12-06 NOTE — ASSESSMENT & PLAN NOTE
Caution with insulin stacking  Estimated Creatinine Clearance: 39.3 mL/min (based on SCr of 1.2 mg/dL).

## 2018-12-06 NOTE — ASSESSMENT & PLAN NOTE
66yo F liver-listed, stepped up on 11/21 for respiratory failure with worsening respiratory acidosis. Failed SBT. Plan for trach.      Neuro:   -Pain control: prn fentanyl while intubated  -Sedation off  -continue lactulose  -Following commands     Pulmonary:   -intubated on arrival to SICU 11/21, failed SBT  - s/p trach 12/5  -ABGs daily and prn  -Minimal Vent Settings --> TC trials when more awake   -duonebs prn  -continue levoalbuterol     Cardiac:  -SBP > 100 goal  -JACINDA 11/20 - elevated PA pressures   -Heart failure following, appreciate recs   -dilt gtt for A-Fib, wean as tolerated     Renal:   -Mendoza in place  -Continue to monitor UOP: oliguria/anuria    -CRRT per nephrology  -Nephrology following, appreciate recommendations      Fluids/Electrolytes/Nutrition/GI:   -Continue TFs  -replace lytes PRN  -continue lactulose   -bowel regimen     Hematology/Oncology:  -Monitor CBC -- Hgb 7.7(7.5)  -PT/INR -->1.7(2.3)  -continue to monitor. Transfuse as needed per transplant      Infectious Disease:   -Afebrile   -f/u bcx and resp cx  -WBC 10.7(9.4)  -Abx: Diflucan to cover HCAP     Endocrine:  -Glucose goal of 140-180  -Endocrine following  -3U aspart q4hrs, detemir 14U daily     PPx: famotidine      Dispo:  -Continue care in the ICU setting, still requiring vent, wean vent as tolerated    -Primary: Transplant

## 2018-12-06 NOTE — PROGRESS NOTES
SCUF x 12 hrs started via left ij temporary catheter, both lumens with good flows. UF started at 300cc/hr as BP on 90's (can uptitrate to 400cc/hr as ordered). BFR at 200cc/hr to aid with clotting, machine pressures acceptable.    See flow sheet.

## 2018-12-06 NOTE — PROGRESS NOTES
No issues with trach, remains on minimal settings.  No bleeding. Site looks good.  Please call if any issues.     Carline Martinez PGY5  [phone]

## 2018-12-06 NOTE — OP NOTE
Surgery Date: 12/5/2018     Staff surgeon: Surgeon(s) and Role:     * Ken Higgins MD - Primary    Surgeon: Carline Martinez    Pre-op Diagnosis:  Acute hypercapnic respiratory failure [J96.02]    Post-op Diagnosis:  Acute hypercapnic respiratory failure [J96.02]    Procedure:  Procedure(s) (LRB):  CREATION, TRACHEOSTOMY (N/A)    Anesthesia: Choice    Specimen:   Specimens (From admission, onward)    None           Estimated Blood Loss: 5    Findings: A #8 Shiley tracheostomy      Complications: None.     INDICATIONS: Krystal Hobson is a 68 y.o. female admitted to [facility] with Acute hypercapnic respiratory failure [J96.02]. The patient has failed attempts to wean from the ventilator. Placement of a tracheostomy tube for the anticipated prolonged need for mechanical ventilation. Informed consent was obtained from the patient's family who expressed understanding of the risks and benefits and gave consent to proceed.      OPERATIVE PROCEDURE: Patient was taken to the operating room and placed supine. The patient was pretreated with 100% FIO2. The patient was positioned in neck extension and prepped and draped in sterile fashion. Local marcaine was injected. A 2 cm midline vertical incision was made between the sternal notch and the cricoid cartilage. The FIO2 was reduced to 30% and bovie cautery was used to dissect down to the trachea. A horizontal incision was made between the 2nd and 3rd tracheal rings with the 11 blade. Two stay sutures of 2-0 prolene were placed around the two bordering rings. The tracheostomy was dilated and a #8 Shiley tracheostomy was inserted. The endotracheal tube was removed and the flange of the tracheostomy tube was secured in place using Velcro tracheostomy tape and two 2-0 silk sutures.     Dr. Higgins was presented and scrubbed for the entire case.

## 2018-12-06 NOTE — PROGRESS NOTES
" (CYNDEE) received a call from the  of the patient (pt) and he was upset stating he wants the patient transferred to her home state ASAP.  SW provided emotional support, reflective listening and normalization.  SW explained that SW was awaiting a call back from [facility] on whether or not they will accept her.  Pts  asked about a hospital to hospital transfer.  This SW explained that SW has not initiated a hospital to hospital transfer as this was not the plan discussed.   Pts  became upset stating "I have been sitting here and nobody came to talk to me.  I asked for her to go back to [facility] and now she is just sitting here with a feeding tube."  SW acknowledged the pts feelings stating that SW has been the constant person of contact and that all lines of communications have been open.  SW again explained that SW was awaiting clearance from Administration with [facility] for acceptance.  Pts  stated he asked that the hospital to hospital transfer be started as well.  SW allowed the pts  a safe space for venting.  The pts  yelled on the phone stating "no one is working on the pts behalf."  SW provided emotional support.  CYNDEE again explained once a follow up is provided SW will contact him to discus further.  CYNDEE also agreed to contact Dr. Huizar office to inquire about him accepting the pt back to [facility] in the event she is denied entrance into the LTAC. CYNDEE remains available.     CYNDEE contact Dennis mccain and provided the pts PCP contact information and request that he calls to inquire about him accepting the patient at their facility ph#[phone].  CYNDEE also left a voicemail for the physician requesting a call back.         "

## 2018-12-06 NOTE — PROGRESS NOTES
CYNDEE received a call back from Naomi # [phone] stating the [place] facility has accepted the patient for transfer.  Naomi explained she would need a referral done in right care for insurance approval.  CYNDEE contact ALFONZO Masters ext [phone] to ask for assistance with the [facility] referral.  Guerrero located the facility and completed the referral.  CYNDEE advised Naomi the right care referral was complete.  Naomi reported once the insurance reviews she will advise when transportation can be arranged.      CYNDEE contact the pts  to advise the pt has been accepted to the LTAC and its is now pending insurance approval.  Pts  reported he called the facility to ask if they accept insurance. CYNDEE explained the company does accept the pts insurance that's not the issues. The approval is for the insurance to cover the stay.  CYNDEE attempt to provide education however, please note the  of the patient is now angry and impatient due to grief.  CYNDEE explained insurance review may take a few days.  CYNDEE advised SW will follow up with the insurance review process on tomorrow morning at this time there was nothing more to be done but wait as the patient has been accepted. Patient's  verbalized understanding and agreement with the information reviewed, social work availability, and how to access available resources if needed. CYNDEE remains available.     CYNDEE notified Jarrod Melissa and LTTS resident of the pts acceptance into the LTAC in [place].  CYNDEE to contact Willian for estimate on transportation.  SW remains available.

## 2018-12-06 NOTE — ANESTHESIA POSTPROCEDURE EVALUATION
"Anesthesia Post Evaluation    Patient: Krystal Hobson    Procedure(s) Performed: Procedure(s) (LRB):  CREATION, TRACHEOSTOMY (N/A)    Final Anesthesia Type: general  Patient location during evaluation: ICU  Patient participation: No - Unable to Participate, Intubation  Level of consciousness: sedated  Post-procedure vital signs: reviewed and stable  Pain management: adequate  Airway patency: patent  PONV status at discharge: No PONV  Anesthetic complications: no      Cardiovascular status: hemodynamically stable  Respiratory status: ventilator  Follow-up not needed.        Visit Vitals  BP (!) 117/58   Pulse 80   Temp 37 °C (98.6 °F) (Oral)   Resp (!) 25   Ht 4' 10" (1.473 m)   Wt 55.5 kg (122 lb 5.7 oz)   SpO2 100%   Breastfeeding? No   BMI 25.57 kg/m²       Pain/Terese Score: Pain Assessment Performed: Yes (12/6/2018  7:00 AM)  Presence of Pain: non-verbal indicators absent (12/6/2018  7:00 AM)  Pain Rating Prior to Med Admin: 6 (12/6/2018 12:22 AM)  Pain Rating Post Med Admin: 0 (12/6/2018  1:22 AM)        "

## 2018-12-06 NOTE — PLAN OF CARE
Problem: Patient Care Overview  Goal: Plan of Care Review  Outcome: Ongoing (interventions implemented as appropriate)  Pt remains free from falls and injuries. Trach/vent, L IJ trialysis, R Elyse, NGT remain. CRRT X6 hrs. Pt hypothermic (T=94.5 rectally with Reilly hugger on X 4 hrs), bradycardic (Afib 40s-60s), hypotensive (SBP=80s-90s; MAPs=50s-60s). Labs stable. Pt intermittently following commands, but mostly lethargic. Blood glucose monitored as ordered; supplemental insulin given as ordered. Pt repositioned frequently. Tylenol X1 for pain. Plan of care discussed with pt's , who remains at bedside.

## 2018-12-06 NOTE — PROGRESS NOTES
TRANSPLANT SURGERY PROGRESS     Hospital Day Hospital Day: 27  Post-Op Day 1 Day Post-Op    ORGAN:     Disease Etiology: Cirrhosis: Cryptogenic (Idiopathic)  Donor Type:     CDC High Risk:     Donor CMV Status:   Donor CMV Status:   Donor HBcAB:     Donor HCV Status:     Whole or Partial:   Biliary Anastomosis:   Arterial Anatomy:       Subjective:   Interval History:   S/p trach yesterday   On AC; was hypercarbic and acidotic   7.26/52/114/23.9   Vent rate increased to 22   Repeat ABG compensated wnl   Overnight, hypothermic 94.5 - elizabeth hugger raised temp  BP in low 100s.   Received po dilt which ca  More lethargic this AM  CRRT total 12hr        Medications:  Continuous Infusions:   sodium chloride 0.9% 200 mL/hr at 12/06/18 0145    dilTIAZem Stopped (12/04/18 1000)     Scheduled Meds:   diltiaZEM  30 mg Oral Q8H    famotidine  20 mg Oral QHS    fluconazole  200 mg Oral Daily    guaifenesin 100 mg/5 ml  200 mg Per OG tube Q6H    insulin detemir U-100  5 Units Subcutaneous BID    lactulose  15 g Per OG tube BID    rifAXImin  550 mg Oral BID     PRN Meds:sodium chloride, acetaminophen, dextrose 50%, glucagon (human recombinant), HYDROmorphone, insulin aspart U-100, k phos di & mono-sod phos mono, lactulose, lactulose, magnesium sulfate IVPB, metoprolol, ondansetron, simethicone, sodium chloride 0.9%     Antibiotics (From admission, onward)    Start     Stop Route Frequency Ordered    11/10/18 0900  rifAXIMin tablet 550 mg      -- Oral 2 times daily 11/10/18 0321          Objective:       Intake/Output Summary (Last 24 hours) at 12/6/2018 0849  Last data filed at 12/6/2018 0700  Gross per 24 hour   Intake 2796 ml   Output 1792 ml   Net 1004 ml       Vital Signs (Most Recent):  Temp: 98.6 °F (37 °C) (12/06/18 0700)  Pulse: 75 (12/06/18 0815)  Resp: (!) 24 (12/06/18 0815)  BP: (!) 97/54 (12/06/18 0800)  SpO2: 100 % (12/06/18 0815) Vital Signs (24h Range):  Temp:  [94.5 °F (34.7 °C)-98.6 °F (37 °C)] 98.6 °F (37  °C)  Pulse:  [] 75  Resp:  [14-24] 24  SpO2:  [95 %-100 %] 100 %  BP: ()/(46-73) 97/54  Arterial Line BP: ()/(33-59) 97/33     Physical exam   GEN: More lethargic this AM.   CV: R/R/R, S1/S2, NO M/R/G  PULM: BL rhonchi   GI: S/NT/distended. Umbilical hernia  EXT/MS/SKIN: No C/C/ +2edema BL; Feet warm.    Recent Labs   Lab 12/06/18  0729   PH 7.335*   PO2 101*   PCO2 40.0   HCO3 21.4*   BE -4     Recent Labs   Lab 12/05/18  0401 12/05/18  1600 12/06/18  0210   WBC 10.11 9.40 10.72   HGB 8.2* 7.5* 7.7*   HCT 26.4* 24.5* 25.6*   PLT 51* 83* 85*     Recent Labs   Lab 12/04/18  0930 12/05/18  0401 12/06/18  0210   INR 1.9* 2.3* 1.7*      Recent Labs   Lab 12/05/18  1600 12/05/18  2145 12/06/18  0210   BUN 13 16 19  19   CREATININE 0.9 1.1 1.2  1.2   K 4.8 5.1 4.7  4.7     Recent Labs   Lab 12/05/18  1600 12/05/18  2145 12/06/18  0210   * 135* 134*  134*   K 4.8 5.1 4.7  4.7    105 104  104   CO2 23 24 23  23   CALCIUM 8.5* 8.7 8.5*  8.5*   * 172* 175*  175*     Recent Labs   Lab 12/05/18  1600 12/05/18  2145 12/06/18  0210   PHOS 4.1 5.3* 5.3*  5.3*   MG 2.4 2.4 2.4     Recent Labs   Lab 12/04/18  0325  12/05/18  0401 12/05/18  1600 12/05/18  2145 12/06/18  0210   BILITOT 9.2*  --  10.4*  --   --  11.4*   AST 68*  --  73*  --   --  64*   ALT 33  --  37  --   --  36   ALKPHOS 145*  --  163*  --   --  163*   ALBUMIN 2.1*  2.1*   < > 2.3*  2.3*  2.3* 2.7* 2.5* 2.5*  2.5*    < > = values in this interval not displayed.       Assessment/Plan:   Krystal Hobson is a 68F cryptogenic cirrhosis, chronic frailty / deconditioning, c/b resp failure and ongoing ARDS (intubated 11/21) renal failure (on CRRT 11/24)     Neuro:   - No pain meds needed  - Not sedated  - Monitor mental status      Pulmonary:   - Tracheostomy - 12/5  - intubated since 11/21 - unable to wean since     Cardiac:  - SBP > 100 goal  - Transition to po diltiazem (Afib with RVR) - will d/w team re: decreasing dose     - metop prn for CRRT      Renal:   - Renal failure, aneuric - continue CRRT (started 11/24)   - Monitor Cr   - replete electrolytes prn      GI:  - NPO / NGT    - glucerna TF @ 40cc/hour  - Not transplant candidate   - Decrease lactulose dose       H/I/Endo  - H/H stable -   - monitor fever curve   - Glucose goal of 140-180  - Endocrine following, see recommendations     PPx:   - famotidine   - INR 1.7 (restart sq heparin)     Dispo:   - Continue care in the ICU setting  - LTAC screening    Dennis Miller, PGY3 Surgery  8:49 AM 12/06/2018

## 2018-12-06 NOTE — SUBJECTIVE & OBJECTIVE
Interval History/Significant Events:     S/p tracheostomy creation yesterday. Hypothermic and bradycardic into 40-50s overnight requiring Reilly hugger to raise temp and HR. Still lethargic this AM. Not on pressors. Tolerated 6 hrs of CRRT overnight.     Objective:     Vital Signs (Most Recent):  Temp: 98.6 °F (37 °C) (12/06/18 0700)  Pulse: 76 (12/06/18 0725)  Resp: (!) 23 (12/06/18 0600)  BP: (!) 115/49 (12/06/18 0725)  SpO2: 100 % (12/06/18 0725) Vital Signs (24h Range):  Temp:  [94.5 °F (34.7 °C)-98.6 °F (37 °C)] 98.6 °F (37 °C)  Pulse:  [] 76  Resp:  [14-23] 23  SpO2:  [95 %-100 %] 100 %  BP: ()/(46-73) 115/49  Arterial Line BP: ()/(35-59) 95/36     Weight: 55.5 kg (122 lb 5.7 oz)  Body mass index is 25.57 kg/m².      Intake/Output Summary (Last 24 hours) at 12/6/2018 0812  Last data filed at 12/6/2018 0700  Gross per 24 hour   Intake 2796 ml   Output 1792 ml   Net 1004 ml       Physical Exam   Constitutional: She appears well-developed and well-nourished.   HENT:   Head: Normocephalic and atraumatic.   Eyes: Pupils are equal, round, and reactive to light.   Cardiovascular: Normal rate and regular rhythm.   Pulmonary/Chest:   Tracheostomy in place   Abdominal: Soft.   Neurological: She is alert.   Skin: Skin is warm and dry.       Lines/Drains/Airways     Central Venous Catheter Line                 Trialysis (Dialysis) Catheter 11/21/18 1851 left internal jugular 14 days          Drain                 NG/OG Tube 12/05/18 1400 Coffey sump Right nostril less than 1 day          Airway                 Surgical Airway 12/05/18 1431 Shiley less than 1 day          Arterial Line                 Arterial Line 11/21/18 1900 Right Radial 14 days          Peripheral Intravenous Line                 Peripheral IV - Single Lumen 12/04/18 1515 Right Forearm 1 day                Significant Labs:    CBC/Anemia Profile:  Recent Labs   Lab 12/05/18  0401 12/05/18  1600 12/06/18  0210   WBC 10.11 9.40 10.72    HGB 8.2* 7.5* 7.7*   HCT 26.4* 24.5* 25.6*   PLT 51* 83* 85*   MCV 97 97 102*   RDW 24.6* 25.2* 25.5*        Chemistries:  Recent Labs   Lab 12/05/18  0401 12/05/18  1600 12/05/18  2145 12/06/18  0210     136  136 135* 135* 134*  134*   K 4.5  4.5  4.5 4.8 5.1 4.7  4.7     106  106 105 105 104  104   CO2 25  25  25 23 24 23  23   BUN 7*  7*  7* 13 16 19  19   CREATININE 0.6  0.6  0.6 0.9 1.1 1.2  1.2   CALCIUM 7.8*  7.8*  7.8* 8.5* 8.7 8.5*  8.5*   ALBUMIN 2.3*  2.3*  2.3* 2.7* 2.5* 2.5*  2.5*   PROT 5.3*  --   --  5.7*   BILITOT 10.4*  --   --  11.4*   ALKPHOS 163*  --   --  163*   ALT 37  --   --  36   AST 73*  --   --  64*   MG 1.7  1.7  1.7 2.4 2.4 2.4   PHOS 2.5*  2.5*  2.5* 4.1 5.3* 5.3*  5.3*       All pertinent labs within the past 24 hours have been reviewed.    Significant Imaging:  I have reviewed all pertinent imaging results/findings within the past 24 hours.

## 2018-12-06 NOTE — PROGRESS NOTES
[facility]  Critical Care - Surgery  Progress Note    Patient Name: Krystal Hobson  MRN: [id number]  Admission Date: 11/10/2018  Hospital Length of Stay: 26 days  Code Status: Full Code  Attending Provider: Abrahan Montelongo MD  Primary Care Provider: Courtney Joe MD   Principal Problem: Hepatic encephalopathy    Subjective:     Hospital/ICU Course:  No notes on file    Interval History/Significant Events:     S/p tracheostomy creation yesterday. Hypothermic and bradycardic into 40-50s overnight requiring Reilly hugger to raise temp and HR. Still lethargic this AM. Not on pressors. Tolerated 6 hrs of CRRT overnight.     Objective:     Vital Signs (Most Recent):  Temp: 98.6 °F (37 °C) (12/06/18 0700)  Pulse: 76 (12/06/18 0725)  Resp: (!) 23 (12/06/18 0600)  BP: (!) 115/49 (12/06/18 0725)  SpO2: 100 % (12/06/18 0725) Vital Signs (24h Range):  Temp:  [94.5 °F (34.7 °C)-98.6 °F (37 °C)] 98.6 °F (37 °C)  Pulse:  [] 76  Resp:  [14-23] 23  SpO2:  [95 %-100 %] 100 %  BP: ()/(46-73) 115/49  Arterial Line BP: ()/(35-59) 95/36     Weight: 55.5 kg (122 lb 5.7 oz)  Body mass index is 25.57 kg/m².      Intake/Output Summary (Last 24 hours) at 12/6/2018 0812  Last data filed at 12/6/2018 0700  Gross per 24 hour   Intake 2796 ml   Output 1792 ml   Net 1004 ml       Physical Exam   Constitutional: She appears well-developed and well-nourished.   HENT:   Head: Normocephalic and atraumatic.   Eyes: Pupils are equal, round, and reactive to light.   Cardiovascular: Normal rate and regular rhythm.   Pulmonary/Chest:   Tracheostomy in place   Abdominal: Soft.   Neurological: She is alert.   Skin: Skin is warm and dry.       Lines/Drains/Airways     Central Venous Catheter Line                 Trialysis (Dialysis) Catheter 11/21/18 1851 left internal jugular 14 days          Drain                 NG/OG Tube 12/05/18 1400 [place] sump Right nostril less than 1 day          Airway                 Surgical Airway  12/05/18 1431 [place] less than 1 day          Arterial Line                 Arterial Line 11/21/18 1900 Right Radial 14 days          Peripheral Intravenous Line                 Peripheral IV - Single Lumen 12/04/18 1515 Right Forearm 1 day                Significant Labs:    CBC/Anemia Profile:  Recent Labs   Lab 12/05/18  0401 12/05/18  1600 12/06/18  0210   WBC 10.11 9.40 10.72   HGB 8.2* 7.5* 7.7*   HCT 26.4* 24.5* 25.6*   PLT 51* 83* 85*   MCV 97 97 102*   RDW 24.6* 25.2* 25.5*        Chemistries:  Recent Labs   Lab 12/05/18  0401 12/05/18  1600 12/05/18  2145 12/06/18  0210     136  136 135* 135* 134*  134*   K 4.5  4.5  4.5 4.8 5.1 4.7  4.7     106  106 105 105 104  104   CO2 25  25  25 23 24 23  23   BUN 7*  7*  7* 13 16 19  19   CREATININE 0.6  0.6  0.6 0.9 1.1 1.2  1.2   CALCIUM 7.8*  7.8*  7.8* 8.5* 8.7 8.5*  8.5*   ALBUMIN 2.3*  2.3*  2.3* 2.7* 2.5* 2.5*  2.5*   PROT 5.3*  --   --  5.7*   BILITOT 10.4*  --   --  11.4*   ALKPHOS 163*  --   --  163*   ALT 37  --   --  36   AST 73*  --   --  64*   MG 1.7  1.7  1.7 2.4 2.4 2.4   PHOS 2.5*  2.5*  2.5* 4.1 5.3* 5.3*  5.3*       All pertinent labs within the past 24 hours have been reviewed.    Significant Imaging:  I have reviewed all pertinent imaging results/findings within the past 24 hours.     Assessment/Plan:     Decompensated hepatic cirrhosis    68yo F liver-listed, stepped up on 11/21 for respiratory failure with worsening respiratory acidosis. Failed SBT. Plan for trach.      Neuro:   -Pain control: prn fentanyl while intubated  -Sedation off  -continue lactulose  -Following commands     Pulmonary:   -intubated on arrival to SICU 11/21, failed SBT  - s/p trach 12/5  -ABGs daily and prn  -Minimal Vent Settings --> TC trials when more awake   -duonebs prn  -continue levoalbuterol     Cardiac:  -SBP > 100 goal  -JACINDA 11/20 - elevated PA pressures   -Heart failure following, appreciate recs   -dilt gtt for A-Fib,  wean as tolerated     Renal:   -Mendoza in place  -Continue to monitor UOP: oliguria/anuria    -CRRT per nephrology  -Nephrology following, appreciate recommendations      Fluids/Electrolytes/Nutrition/GI:   -Continue TFs  -replace lytes PRN  -continue lactulose   -bowel regimen     Hematology/Oncology:  -Monitor CBC -- Hgb 7.7(7.5)  -PT/INR -->1.7(2.3)  -continue to monitor. Transfuse as needed per transplant      Infectious Disease:   -Afebrile   -f/u bcx and resp cx  -WBC 10.7(9.4)  -Abx: Diflucan to cover HCAP     Endocrine:  -Glucose goal of 140-180  -Endocrine following  -3U aspart q4hrs, detemir 14U daily     PPx: famotidine      Dispo:  -Continue care in the ICU setting, still requiring vent, wean vent as tolerated    -Primary: Transplant         Critical care was time spent personally by me on the following activities: development of treatment plan with patient or surrogate and bedside caregivers, discussions with consultants, evaluation of patient's response to treatment, examination of patient, ordering and performing treatments and interventions, ordering and review of laboratory studies, ordering and review of radiographic studies, pulse oximetry, re-evaluation of patient's condition.  This critical care time did not overlap with that of any other provider or involve time for any procedures.     Alice Doty MD  Critical Care - Surgery  [facility]

## 2018-12-06 NOTE — PROGRESS NOTES
(SW) presented to the patient's (pt) bedside for follow up and continuity of care.  Pt observed to be on trach collar with vent assistance. Pt's  present, actively engaged and communicating effectively.  SW provided an update regarding the referrals to LTAC facilities.  SW explained that Naomi with [facility] is awaiting follow up and direction from her administration to determine if the facility can provide care to the patient given her poor prognosis. Pts  reported understanding and asked if there was another facility the pt can transfer too.  SW explained that per SW search and review there aren't many long term acute care facilities that are within the miles radius he provided.  SW provided education regarding the pt discharging on hospice with her being on trach collar and vent support.  Pts  asked in the event the pt does not get accepted what would be the result SW explained the pt would remain local.  SW discussed locating more facilities to submit referrals.  Pts  reported he understands SW is doing what needs to be done and in the event he gets a referral he will contact SW to discuss for assistance.  SW encouraged self care and family involvement.  Patient's caregiver verbalized understanding and agreement with the information reviewed, social work availability, and how to access available resources if needed. SW remains available.

## 2018-12-07 NOTE — PLAN OF CARE
Problem: Patient Care Overview  Goal: Plan of Care Review  Pt remains ventilated through trach. Pt is alert and following commands. TF at goal through NGT. Pt had multiple large loose bowl movements today. CRRT started this afternoon. x1 RBC given prior to CRRT. Pt is resting in bed. No distress noted.  at bedside.

## 2018-12-07 NOTE — ASSESSMENT & PLAN NOTE
68yo F liver-listed, stepped up on 11/21 for respiratory failure with worsening respiratory acidosis. Failed SBT. Plan for trach.      Neuro:   -Pain control: prn fentanyl while intubated  -Sedation off  -continue lactulose/rifaximin  -Following commands     Pulmonary:   -intubated on arrival to SICU 11/21, failed SBT  - s/p trach 12/5  -ABGs daily and prn  -Minimal Vent Settings --> TC trials when more awake   -duonebs prn  -continue levoalbuterol     Cardiac:  -SBP > 100 goal  -JACINDA 11/20 - elevated PA pressures   -Heart failure following, appreciate recs   -dilt gtt as needed for A-Fib, currently on 30mg dilt po q8hr       Renal:   -Mendoza in place  -Continue to monitor UOP: oliguria/anuria    -CRRT per nephrology  -Nephrology following, appreciate recommendations   -Pt deemed not HS for HD trial by nephrology yesterday     Fluids/Electrolytes/Nutrition/GI:   -Continue TFs  -replace lytes PRN  -continue lactulose   -bowel regimen     Hematology/Oncology:  -Monitor CBC -- Hgb 7.7(7.5)-->8.2/26.4  -PT/INR -->1.6  -continue to monitor. Transfuse as needed per transplant      Infectious Disease:   -Afebrile   -f/u bcx and resp cx  -WBC 10.7-->9.2-->10.0  -Abx: Diflucan to cover HCAP       Endocrine:  -Glucose goal of 140-180  -Endocrine following  -levemir 5U bid with low dose correction     PPx: famotidine, SQH     Dispo:  -Continue care in the ICU setting, still requiring vent, wean vent as tolerated. Pt accepted to [facility], insurance approval process ongoing    -Primary: Transplant

## 2018-12-07 NOTE — PROGRESS NOTES
[facility]  Nephrology  Progress Note    Patient Name: Krystal Hobson  MRN: [id number]  Admission Date: 11/10/2018  Hospital Length of Stay: 26 days  Attending Provider: Abrahan Montelongo MD   Primary Care Physician: Courtney Joe MD  Principal Problem:Hepatic encephalopathy    Subjective:     HPI: Reason for consult: Diuresis in setting of PAP46, pulmonary edema, HRS    Ms. 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  - She presented to [facility] [place] xfer from [place] under liver transplant team due to encephalopathy and new onset ENZO.   - Per the , she was extremely disoriented @ OSH and her ammonia reached as high as 200, and her Cr level increased to 2.0 She also went into A fib with RVR and was started on a diltiazem gtt at OSH.   - On arrival at [facility] she was disoriented and started on lactulose with noted improvement. During her current admission on CXR they found a concern for HCAP and she was started on broad spectrum ABx and then shifted her on Moxifloxacin 7 D course. On 11/20/2018 found on CXR a concern for Lt lung middle lobe consolidation and started on vancomycin and zosyn.  - Renal fxn baseline 1.6 at admit but trending up    Interval History:   Attempted SLED overnight x 12 hrs but she did not tolerated treatedment was stopped 6 hrs into treatment secondary to hypotension and AFib with RVR. s/pTrach today. CVP is 14 critically ill, stable vital signs, on mechanical ventilation FiO2 improved to 40% with PEEP 5. Net gain 1004 ml overnight.  Still has watery bowel stools.    Review of patient's allergies indicates:  No Known Allergies  Current Facility-Administered Medications   Medication Frequency    0.9%  NaCl infusion (CRRT USE ONLY) Continuous    0.9%  NaCl infusion (for blood administration) Q24H PRN    acetaminophen tablet 650 mg Q8H PRN    dextrose 50% injection 12.5 g PRN    diltiaZEM 125 mg in D5W 125 mL infusion Continuous     diltiaZEM tablet 30 mg Q8H    famotidine tablet 20 mg QHS    fluconazole tablet 200 mg Daily    glucagon (human recombinant) injection 1 mg PRN    guaifenesin 100 mg/5 ml syrup 200 mg Q6H    heparin (porcine) injection 5,000 Units Q12H    HYDROmorphone injection 0.2 mg Q3H PRN    insulin aspart U-100 pen 0-5 Units Q4H PRN    insulin detemir U-100 pen 5 Units BID    k phos di & mono-sod phos mono 250 mg tablet 2 tablet TID PRN    lactulose 10 gram/15 mL solution (enema) 200 g TID PRN    lactulose 20 gram/30 mL solution Soln 15 g BID    lactulose 20 gram/30 mL solution Soln 30 g Q6H PRN    magnesium sulfate 2g in water 50mL IVPB (premix) PRN    metoprolol injection 2.5 mg Q5 Min PRN    ondansetron disintegrating tablet 8 mg Q8H PRN    rifAXIMin tablet 550 mg BID    simethicone chewable tablet 80 mg TID PRN    sodium chloride 0.9% flush 3 mL PRN       Objective:     Vital Signs (Most Recent):  Temp: 98.6 °F (37 °C) (12/06/18 0700)  Pulse: 80 (12/06/18 0901)  Resp: (!) 25 (12/06/18 0901)  BP: (!) 117/58 (12/06/18 0901)  SpO2: 100 % (12/06/18 0901)  O2 Device (Oxygen Therapy): ventilator (12/06/18 0725) Vital Signs (24h Range):  Temp:  [94.5 °F (34.7 °C)-98.6 °F (37 °C)] 98.6 °F (37 °C)  Pulse:  [] 80  Resp:  [14-25] 25  SpO2:  [95 %-100 %] 100 %  BP: ()/(46-73) 117/58  Arterial Line BP: ()/(33-59) 97/33     Weight: 55.5 kg (122 lb 5.7 oz) (12/04/18 0353)  Body mass index is 25.57 kg/m².  Body surface area is 1.51 meters squared.    I/O last 3 completed shifts:  In: 5834.7 [I.V.:3856.7; Blood:1018; NG/GT:960]  Out: 4303 [Other:4303]    Physical Exam    Constitutional: She appears well-developed and thin and little musc mass, Trach in place on mech ventilation. Awake and following simple commands  HENT:   Head: Normocephalic and atraumatic.   Eyes: Pupils are equal, round, and reactive to light.   Cardiovascular: Normal rate and regular rhythm.   Pulmonary/Chest:   Remains  mechanically ventilated  Abdominal: Soft.   Neurological: She is alert.   Skin: Skin is warm and dry.   Nursing notes and vitals reviewed.      Significant Labs:  CBC:   Recent Labs   Lab 12/06/18  0210   WBC 10.72   RBC 2.52*   HGB 7.7*   HCT 25.6*   PLT 85*   *   MCH 30.6   MCHC 30.1*     CMP:   Recent Labs   Lab 12/06/18  0210   *  175*   CALCIUM 8.5*  8.5*   ALBUMIN 2.5*  2.5*   PROT 5.7*   *  134*   K 4.7  4.7   CO2 23  23     104   BUN 19  19   CREATININE 1.2  1.2   ALKPHOS 163*   ALT 36   AST 64*   BILITOT 11.4*     All labs within the past 24 hours have been reviewed.     Significant Imaging:  CXR personally reviewed.    Assessment/Plan:     Acute kidney injury superimposed on CKD    Cryptogenic cirrhosis awaiting liver xplant, presented for AMS and found to have ENZO on CKDIIIb. SLED started last weekend with intermittent AF+RVR+hypotensive episodes controlled with bb/cardizem gtt adequately. Now on CRRT with improving overall fluid status.    Plan:  - Diffuse infiltrates persist on CXR compatible with ARDS, no significant change from previous  - diarrhea still present  - She received blood and K was rising later this afternoon  - CVP increased to 14  - Will do SLED x 8 hrs for metabolic clearance and volume management  - 3-K bath  -  ml/hr as tolerated keep MAP > 65 no UF  - Will reassess tomorrow         Thank you for your consult. I will follow-up with patient. Please contact us if you have any additional questions.    Haroldo Van MD  Nephrology  [facility]

## 2018-12-07 NOTE — PLAN OF CARE
Problem: Patient Care Overview  Goal: Plan of Care Review  Outcome: Ongoing (interventions implemented as appropriate)  Pt is s/p trached.  Awake and alert, follows commands. No indicators of pain, SOB, or nausea present. Continues to remain on vent: AC 40% 5PEEP, SpO2 >95%.SBP within a goal of >100. HR: Afib: 80s-100s. NG tube replaced this am. Tube feeding infusing @ 40mL/hr (goal). CRRT overnight. CVP 8-11. Accu every 4hrs. Liquid BMs X5. Traid ointment applied on buttocks. Fall risk reviewed with pt. No falls trauma or injury noted.  Plan to transfer to LTAC. Plan of care reviewed with pt and spouse No significant events overnight. Spouse at bedside. Will continue to monitor the patient.

## 2018-12-07 NOTE — PROGRESS NOTES
TRANSPLANT SURGERY PROGRESS     Hospital Day Hospital Day: 28  Post-Op Day 2 Days Post-Op    ORGAN:     Disease Etiology: Cirrhosis: Cryptogenic (Idiopathic)  Donor Type:     CDC High Risk:     Donor CMV Status:   Donor CMV Status:   Donor HBcAB:     Donor HCV Status:     Whole or Partial:   Biliary Anastomosis:   Arterial Anatomy:       Subjective:   Interval History:   LTAC did not accept patient after review   Consulted palliative service to discuss hospice near patient's home - awaiting to hear back     Otherwise, No acute events overnight  Had 8hr CRRT   HDS this morning  Unable to tolerate SBT        Medications:  Continuous Infusions:   sodium chloride 0.9% 200 mL/hr at 12/07/18 0100    dilTIAZem Stopped (12/04/18 1000)     Scheduled Meds:   diltiaZEM  30 mg Oral Q8H    famotidine  20 mg Oral QHS    fluconazole  200 mg Oral Daily    guaifenesin 100 mg/5 ml  200 mg Per OG tube Q6H    heparin (porcine)  5,000 Units Subcutaneous Q12H    insulin detemir U-100  5 Units Subcutaneous BID    lactulose  15 g Per OG tube BID    rifAXImin  550 mg Oral BID     PRN Meds:sodium chloride, sodium chloride, acetaminophen, dextrose 50%, glucagon (human recombinant), HYDROmorphone, insulin aspart U-100, k phos di & mono-sod phos mono, lactulose, lactulose, magnesium sulfate IVPB, metoprolol, ondansetron, simethicone, sodium chloride 0.9%     Antibiotics (From admission, onward)    Start     Stop Route Frequency Ordered    11/10/18 0900  rifAXIMin tablet 550 mg      -- Oral 2 times daily 11/10/18 0321          Objective:       Intake/Output Summary (Last 24 hours) at 12/7/2018 1127  Last data filed at 12/7/2018 1100  Gross per 24 hour   Intake 1036.2 ml   Output 1651 ml   Net -614.8 ml       Vital Signs (Most Recent):  Temp: 97.6 °F (36.4 °C) (12/07/18 0700)  Pulse: (!) 111 (12/07/18 1100)  Resp: (!) 26 (12/07/18 1100)  BP: (!) 130/59 (12/07/18 1100)  SpO2: 98 % (12/07/18 1100) Vital Signs (24h Range):  Temp:  [97.6 °F  (36.4 °C)-98.6 °F (37 °C)] 97.6 °F (36.4 °C)  Pulse:  [] 111  Resp:  [22-46] 26  SpO2:  [87 %-100 %] 98 %  BP: (103-155)/(44-63) 130/59  Arterial Line BP: ()/(36-72) 121/37     Physical exam   GEN: Somewhat more responsive this morning  CV: R/R/R, S1/S2, NO M/R/G  PULM: BL rhonchi   GI: S/NT/distended. Umbilical hernia  EXT/MS/SKIN: No C/C/ +2edema BL; Feet warm.    Recent Labs   Lab 12/06/18  0729   PH 7.335*   PO2 101*   PCO2 40.0   HCO3 21.4*   BE -4     Recent Labs   Lab 12/06/18  0210 12/06/18  1611 12/07/18  0430   WBC 10.72 9.19 10.09   HGB 7.7* 7.7* 8.2*   HCT 25.6* 24.1* 26.4*   PLT 85* 85* 83*     Recent Labs   Lab 12/05/18  0401 12/06/18  0210 12/07/18  0430   INR 2.3* 1.7* 1.6*      Recent Labs   Lab 12/06/18  1415 12/06/18 2212 12/07/18  0430   BUN 27* 13 10  10   CREATININE 1.4 0.8 0.8  0.8   K 4.9 4.2 4.2  4.2     Recent Labs   Lab 12/06/18  1415 12/06/18 2212 12/07/18  0430   * 138 138  138   K 4.9 4.2 4.2  4.2    107 108  108   CO2 20* 26 23  23   CALCIUM 8.3* 8.2* 8.6*  8.6*   * 174* 159*  159*     Recent Labs   Lab 12/06/18  1415 12/06/18 2212 12/07/18  0430   PHOS 5.0* 2.7 2.6*  2.6*   MG 2.6 1.9 2.4     Recent Labs   Lab 12/05/18  0401  12/06/18  0210 12/06/18  1415 12/06/18 2212 12/07/18  0430   BILITOT 10.4*  --  11.4*  --   --  15.3*   AST 73*  --  64*  --   --  72*   ALT 37  --  36  --   --  38   ALKPHOS 163*  --  163*  --   --  205*   ALBUMIN 2.3*  2.3*  2.3*   < > 2.5*  2.5* 2.3* 2.3* 2.3*  2.3*    < > = values in this interval not displayed.       Assessment/Plan:   Krystal Hobson is a 68F cryptogenic cirrhosis, chronic frailty / deconditioning, c/b resp failure and ongoing ARDS (intubated 11/21) renal failure (on CRRT 11/24)     Neuro:   - IV dilaudid prn   - Not sedated  - Monitor mental status      Pulmonary:   - Tracheostomy (12/5); unable to tolerate PSV  - intubated since 11/21      Cardiac:  - SBP > 100 goal  - Transition to po  diltiazem (Afib with RVR) - will d/w team re: decreasing dose    - metop prn for CRRT      Renal:   - Renal failure, aneuric - continue CRRT (started 11/24)   - Monitor Cr   - replete electrolytes prn      GI:  - NPO / NGT    - glucerna TF @ 40cc/hour  - Not transplant candidate       H/I/Endo  - H/H stable -   - monitor fever curve   - Glucose goal of 140-180  - Endocrine following, see recommendations     PPx:   - famotidine   - heparin SC     Dispo:   - Continue care in the ICU setting  - Palliative consult - hospice screen   - LTAC screening    Dennis Miller, PGY3 Surgery  8:49 AM 12/07/2018

## 2018-12-07 NOTE — PROGRESS NOTES
CYNDEE received a call from patient's  this afternoon, he has decided on referral to inpatient hospice with [facility] in [place], [place].   CYNDEE explained to pts  that SW will make the referral and we will need to wait to see if patient accepted.  CYNDEE explained that pts insurance will not cover the full cost of the ambulance due to patient going to inpatient hospice.   Pts  asking for SW to work on a cost for ambulance and to call him before the day's end regardless.   CYNDEE did let pts  know that this SW leaves at 4pm, but we have other liver sw's      CYNDEE spoke with Khushi with [facility], phone [phone], fax [phone] to make referral, CYNDEE faxed clinical and demographics.   Khushi explained once fax received, it will then be reviewed by one of their medical directors.       CYNDEE spoke with Vivian at [facility] ([phone]) in the atact department, who was able to give a price for ambulance services to [place], [place].   Provided pt discharges to inpatient hospice, her insurance will cover the base rate and the first 16 miles, patient and  would then be responsible for the rest of the mileage, which would be $ 1,370.82.    [facility] to make arrangements once patient has acceptance and transfer information.       CYNDEE called patient's  back to update him that the referral has been faxed to [facility] in hospice and we are waiting on their team to review and get back to us.   CYNDEE informed pts  about ambulance services and costs, he verbalized understanding.   CYNDEE will f-up with patient's  as soon as we hear back from [facility].   Pts  denies any other psychosocial needs at this time.   SW remains available for psychosocial support and resources.

## 2018-12-07 NOTE — PROGRESS NOTES
[facility]  Nephrology  Progress Note    Patient Name: Krystal Hobson  MRN: [id number]  Admission Date: 11/10/2018  Hospital Length of Stay: 27 days  Attending Provider: Abrahan Montelongo MD   Primary Care Physician: Courtney Joe MD  Principal Problem:Hepatic encephalopathy    Subjective:     HPI: Reason for consult: Diuresis in setting of PAP46, pulmonary edema, HRS    Ms. 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  - She [place] [facility] [place] under liver transplant team due to encephalopathy and new onset ENZO.   - Per the , she was extremely disoriented @ OSH and her ammonia reached as high as 200, and her Cr level increased to 2.0 She also went into A fib with RVR and was started on a diltiazem gtt at OSH.   - On arrival at [facility] she was disoriented and started on lactulose with noted improvement. During her current admission on CXR they found a concern for HCAP and she was started on broad spectrum ABx and then shifted her on Moxifloxacin 7 D course. On 11/20/2018 found on CXR a concern for Lt lung middle lobe consolidation and started on vancomycin and zosyn.  - Renal fxn baseline 1.6 at admit but trending up    Interval History:   Tolerated SLED x 8 hrs overnight.s/pTrach today still on Mech Ventilation. CVP is 11 critically ill, stable vital signs, on mechanical ventilation FiO2 improved to 40% with PEEP 5. Net neg 734 ml overnight.  Still has watery bowel stools.    Review of patient's allergies indicates:  No Known Allergies  Current Facility-Administered Medications   Medication Frequency    0.9%  NaCl infusion (CRRT USE ONLY) Continuous    0.9%  NaCl infusion (CRRT USE ONLY) Continuous    0.9%  NaCl infusion (for blood administration) Q24H PRN    0.9%  NaCl infusion (for blood administration) Q24H PRN    acetaminophen tablet 650 mg Q8H PRN    dextrose 50% injection 12.5 g PRN    diltiaZEM 125 mg in D5W 125 mL  infusion Continuous    diltiaZEM tablet 30 mg Q8H    famotidine tablet 20 mg QHS    fluconazole tablet 200 mg Daily    glucagon (human recombinant) injection 1 mg PRN    guaifenesin 100 mg/5 ml syrup 200 mg Q6H    heparin (porcine) injection 5,000 Units Q12H    HYDROmorphone injection 0.2 mg Q3H PRN    insulin aspart U-100 pen 0-5 Units Q4H PRN    insulin detemir U-100 pen 5 Units BID    k phos di & mono-sod phos mono 250 mg tablet 2 tablet TID PRN    lactulose 10 gram/15 mL solution (enema) 200 g TID PRN    lactulose 20 gram/30 mL solution Soln 15 g BID    lactulose 20 gram/30 mL solution Soln 30 g Q6H PRN    magnesium sulfate 2g in water 50mL IVPB (premix) PRN    metoprolol injection 2.5 mg Q5 Min PRN    ondansetron disintegrating tablet 8 mg Q8H PRN    rifAXIMin tablet 550 mg BID    simethicone chewable tablet 80 mg TID PRN    sodium chloride 0.9% flush 3 mL PRN       Objective:     Vital Signs (Most Recent):  Temp: 97.6 °F (36.4 °C) (12/07/18 0700)  Pulse: 106 (12/07/18 1723)  Resp: (!) 24 (12/07/18 1723)  BP: (!) 130/59 (12/07/18 1100)  SpO2: 98 % (12/07/18 1723)  O2 Device (Oxygen Therapy): ventilator (12/07/18 1723) Vital Signs (24h Range):  Temp:  [97.6 °F (36.4 °C)-98.4 °F (36.9 °C)] 97.6 °F (36.4 °C)  Pulse:  [] 106  Resp:  [20-42] 24  SpO2:  [91 %-100 %] 98 %  BP: (123-155)/(44-63) 130/59  Arterial Line BP: (118-160)/(35-60) 156/40     Weight: 55.5 kg (122 lb 5.7 oz) (12/04/18 0353)  Body mass index is 25.57 kg/m².  Body surface area is 1.51 meters squared.    I/O last 3 completed shifts:  In: 2591.2 [I.V.:1228.2; Blood:188; NG/GT:1175]  Out: 3443 [Drains:400; Other:3043]    Physical Exam    Constitutional: She appears well-developed and thin and little musc mass, [facility] Awake and following simple commands  HENT:   Head: Normocephalic and atraumatic.   Eyes: Pupils are equal, round, and reactive to light.   Cardiovascular: Normal rate and regular  rhythm.   Pulmonary/Chest:   Remains mechanically ventilated  Abdominal: Soft.   Neurological: She is alert.   Skin: Skin is warm and dry.   Nursing notes and vitals reviewed.      Significant Labs:  CBC:   Recent Labs   Lab 12/07/18  1600   WBC 11.83   RBC 2.77*   HGB 8.5*   HCT 26.5*   PLT 97*   MCV 96   MCH 30.7   MCHC 32.1     CMP:   Recent Labs   Lab 12/07/18  0430   *  159*   CALCIUM 8.6*  8.6*   ALBUMIN 2.3*  2.3*   PROT 5.6*     138   K 4.2  4.2   CO2 23  23     108   BUN 10  10   CREATININE 0.8  0.8   ALKPHOS 205*   ALT 38   AST 72*   BILITOT 15.3*     All labs within the past 24 hours have been reviewed.     Significant Imaging:  CXR personally reviewed.    Assessment/Plan:     Acute kidney injury superimposed on CKD    Cryptogenic cirrhosis awaiting liver xplant, presented for AMS and found to have ENZO on CKDIIIb. SLED started last weekend with intermittent AF+RVR+hypotensive episodes controlled with bb/cardizem gtt adequately. Now on CRRT with improving overall fluid status.    Plan:  - Diffuse infiltrates persist on CXR compatible with ARDS, no significant change from previous  - diarrhea still present  - Electrolyte stable  - CVP improved to 11  - Will do SLED x 10 hrs for metabolic clearance and volume management  - -250 ml/hr as tolerated keep MAP > 65 no UF  - Will reassess tomorrow         Thank you for your consult. I will follow-up with patient. Please contact us if you have any additional questions.    Haroldo Van MD  Nephrology  [facility]

## 2018-12-07 NOTE — ASSESSMENT & PLAN NOTE
Cryptogenic cirrhosis awaiting liver xplant, presented for AMS and found to have ENZO on CKDIIIb. SLED started last weekend with intermittent AF+RVR+hypotensive episodes controlled with bb/cardizem gtt adequately. Now on CRRT with improving overall fluid status.    Plan:  - Diffuse infiltrates persist on CXR compatible with ARDS, no significant change from previous  - diarrhea still present  - Electrolyte stable  - CVP improved to 11  - Will do SLED x 10 hrs for metabolic clearance and volume management  - -250 ml/hr as tolerated keep MAP > 65 no UF  - Will reassess tomorrow

## 2018-12-07 NOTE — SUBJECTIVE & OBJECTIVE
Interval History/Significant Events:     3/4 unit pRBC transfused yesterday prior to being stopped 2/2 clots seen in tubing. Pt failed SBT as well as PAV+ 70%  this morning and placed back on rate. Tube feeds remain at goal.      Objective:     Vital Signs (Most Recent):  Temp: 98.1 °F (36.7 °C) (12/07/18 0515)  Pulse: 93 (12/07/18 0722)  Resp: (!) 27 (12/07/18 0722)  BP: (!) 155/44 (12/07/18 0600)  SpO2: 98 % (12/07/18 0722) Vital Signs (24h Range):  Temp:  [97.7 °F (36.5 °C)-98.6 °F (37 °C)] 98.1 °F (36.7 °C)  Pulse:  [] 93  Resp:  [22-46] 27  SpO2:  [87 %-100 %] 98 %  BP: ()/(44-58) 155/44  Arterial Line BP: ()/(33-72) 138/42     Weight: 55.5 kg (122 lb 5.7 oz)  Body mass index is 25.57 kg/m².      Intake/Output Summary (Last 24 hours) at 12/7/2018 0854  Last data filed at 12/7/2018 0600  Gross per 24 hour   Intake 836.2 ml   Output 1651 ml   Net -814.8 ml       Physical Exam   Constitutional: She appears well-developed and well-nourished.   HENT:   Head: Normocephalic and atraumatic.   Eyes: Pupils are equal, round, and reactive to light.   Cardiovascular: Normal rate and regular rhythm.   Pulmonary/Chest:   Tracheostomy in place   Abdominal: Soft.   Neurological: She is alert.   Skin: Skin is warm and dry.       Lines/Drains/Airways     Central Venous Catheter Line                 Trialysis (Dialysis) Catheter 11/21/18 1851 left internal jugular 15 days          Drain                 NG/OG Tube 12/07/18 0500 [place] sump 12 Fr. Right nostril less than 1 day          Airway                 Surgical Airway 12/05/18 1431 [place] 1 day          Arterial Line                 Arterial Line 11/21/18 1900 Right Radial 15 days          Peripheral Intravenous Line                 Peripheral IV - Single Lumen 12/04/18 1515 Right Forearm 2 days                Significant Labs:    CBC/Anemia Profile:  Recent Labs   Lab 12/06/18  0210 12/06/18  1611 12/07/18  0430   WBC 10.72 9.19 10.09   HGB 7.7* 7.7* 8.2*    HCT 25.6* 24.1* 26.4*   PLT 85* 85* 83*   * 97 95   RDW 25.5* 25.8* 24.6*        Chemistries:  Recent Labs   Lab 12/06/18  0210 12/06/18  1415 12/06/18  2212 12/07/18  0430   *  134* 133* 138 138  138   K 4.7  4.7 4.9 4.2 4.2  4.2     104 106 107 108  108   CO2 23  23 20* 26 23  23   BUN 19  19 27* 13 10  10   CREATININE 1.2  1.2 1.4 0.8 0.8  0.8   CALCIUM 8.5*  8.5* 8.3* 8.2* 8.6*  8.6*   ALBUMIN 2.5*  2.5* 2.3* 2.3* 2.3*  2.3*   PROT 5.7*  --   --  5.6*   BILITOT 11.4*  --   --  15.3*   ALKPHOS 163*  --   --  205*   ALT 36  --   --  38   AST 64*  --   --  72*   MG 2.4 2.6 1.9 2.4   PHOS 5.3*  5.3* 5.0* 2.7 2.6*  2.6*       All pertinent labs within the past 24 hours have been reviewed.    Significant Imaging:  I have reviewed all pertinent imaging results/findings within the past 24 hours.

## 2018-12-07 NOTE — PROGRESS NOTES
Attempted patient on spontaneous trial. 5/5 failed and also PAV+ 70% and failed. Increase respiratory rate and decrease tidal volume.

## 2018-12-07 NOTE — PROGRESS NOTES
Blood transfusion started at 1715. Blood clots noted on the blood tubing. Blood is not being transfused. About 25% of the blood remaining to be transfused. Talked with Shruti from the blood bank. Received an order to discard the remaining blood.

## 2018-12-07 NOTE — PROGRESS NOTES
Placed patient on PAV+ 70% trial again. Able to get good volumes. Will continue to work and monitor.

## 2018-12-07 NOTE — SUBJECTIVE & OBJECTIVE
"Interval HPI:   Overnight events: Remains in ICU. Trach on vent. BG at goal overnight with scheduled insulin.   Eating:   NPO  Nausea: No  Hypoglycemia and intervention: No  Fever: No  TF: glucerna at 40 cc/hr     /61 (BP Location: Left arm, Patient Position: Lying)   Pulse (!) 112   Temp 97.6 °F (36.4 °C) (Oral)   Resp 14   Ht 4' 10" (1.473 m)   Wt 55.5 kg (122 lb 5.7 oz)   SpO2 100%   Breastfeeding? No   BMI 25.57 kg/m²     Labs Reviewed and Include    Recent Labs   Lab 12/08/18  0351   *  166*   CALCIUM 8.3*  8.3*   ALBUMIN 2.0*  2.0*   PROT 5.3*     137   K 4.0  4.0   CO2 24  24     106   BUN 12  12   CREATININE 0.7  0.7   ALKPHOS 209*   ALT 39   AST 65*   BILITOT 12.2*     Lab Results   Component Value Date    WBC 12.04 12/08/2018    HGB 8.0 (L) 12/08/2018    HCT 25.1 (L) 12/08/2018    MCV 96 12/08/2018    PLT 75 (L) 12/08/2018     No results for input(s): TSH, FREET4 in the last 168 hours.  Lab Results   Component Value Date    HGBA1C 5.8 (H) 11/10/2018       Nutritional status:   Body mass index is 25.57 kg/m².  Lab Results   Component Value Date    ALBUMIN 2.0 (L) 12/08/2018    ALBUMIN 2.0 (L) 12/08/2018    ALBUMIN 2.1 (L) 12/07/2018     No results found for: PREALBUMIN    Estimated Creatinine Clearance: 67.4 mL/min (based on SCr of 0.7 mg/dL).    Accu-Checks  Recent Labs     12/06/18  2006 12/07/18  0016 12/07/18  0429 12/07/18  0830 12/07/18  1156 12/07/18  1637 12/07/18  2020 12/07/18  2341 12/08/18  0350 12/08/18  0832   POCTGLUCOSE 207* 161* 156* 176* 228* 220* 192* 172* 150* 183*       Current Medications and/or Treatments Impacting Glycemic Control  Immunotherapy:    Immunosuppressants     None        Steroids:   Hormones (From admission, onward)    None        Pressors:    Autonomic Drugs (From admission, onward)    None        Hyperglycemia/Diabetes Medications:   Antihyperglycemics (From admission, onward)    Start     Stop Route Frequency Ordered    " 12/05/18 0900  insulin detemir U-100 pen 5 Units      -- SubQ 2 times daily 12/05/18 0717    11/26/18 0947  insulin aspart U-100 pen 0-5 Units      -- SubQ Every 4 hours PRN 11/26/18 0848

## 2018-12-07 NOTE — PROGRESS NOTES
(CYNDEE) received a call from Naomi with [facility] reporting they will be submitting for insurance approval.  CYNDEE inquired about how long does it take for insurance to approve and Naomi explained sometimes it will take 24 hours.  CYNDEE inquired if the approval is received over the weekend is it possible for the pt to transfer over the weekend via ambulance transfer.  CYNDEE was advised the facility does accept patients over the weekend.  CYNDEE will work on transportation in the event insurance approval is received over the weekend and advise the on call .     CYNDEE and Dr. Melissa presented to the pt and her  and provided an update regarding acceptance to the [facility] and how transfer may happen.  The pt and her  verbalized understanding.  CYNDEE remains available.

## 2018-12-07 NOTE — CARE UPDATE
BG goal 140-180. BG at or slightly above goal overnight. Required correction scale x2 yesterday.     continue Levemir 5 units BID.   Low dose correction scale  BG monitoring every 4 hours    ** Please call Endocrine for any BG related issues **

## 2018-12-07 NOTE — PROGRESS NOTES
" (CYNDEE) received a call from Naomi with [facility] at 9:41 am stating they have reviewed all clinicals from last night and today via fax #[phone].  Naomi explained after review of the patient (pt) nephrology notes from last night it appears the patient is not stable enough to transport to their facility due to CRRT requirements and her not being a candidate for HD trial.  Naomi reported they cannot provide CRRT to pts and that she will need HD.  Naomi reported at this time the pt will not be able to be admitted to the facility.    CYNDEE explained again that the attempt is to get the pt closer to her home Naomi request the providers name and contact information.  CYNDEE provided Dr. Melissa's contact information and asked for the medical directors information and this CYNDEE will provide with Dr. Melissa as she may not be available for peer to peer later this afternoon.  Naomi reported she requested this information but does not have it to provide to SW.  CYNDEE provided Dr. Melissa with an update and request a palliative care consult.      SW contact the pts  to get his permission to locate hospice facilities as the pt is not stable enough for an LTAC due to CRRT requirements and her not being a candidate for HD due to her stability.  The  of the pt reported "I thought you said the facility can do HD."  CYNDEE clarified the LTAC is able to provide HD; however, the pt is not a candidate for HD at this time.  CYNDEE discussed with the  of the patient that due to the patients poor medical prognosis it appears the best option to get her closer to home would be to transition her to a hospice.  Pts  asked if hospice would continue HD.  CYNDEE explained the pt is not on HD and due to her being on the vent hospice will probably not do CRRT.  Pts  stated so what are my options?  "Keep her here and continue this care and she will walk out of this hospital? Hospice in [place] with no HD and she will die." " " CYNDEE explained to the  of the pt that the pt will not walk out of the hospital here locally even if the medical care here continues as the physician explained the pts prognosis is poor.  The  of the pt appeared to be in shock stating "I need to make some phone calls.  I cannot say whether to stop this and send her home to hospice.  How long do I have to make a decision."  CYNDEE provided the  of the pt free range to make his decision.  Pts  request that SW call the doctor so the doctor can come tell him what SW just explained to him that the pt will not make it.  Pts  reported once he speaks with the pts family he will contact SW to follow up.     SW contact Dr. Melissa per the pts  request.  Dr. Melissa reported she will follow up with the pt and caregiver.     CYNDEE received a call from Dr. Melissa after she met with the pt and caregiver.  Dr. Melissa explained she provided a detailed explanation of the pts care and very poor prognosis and the  verbalized understanding stating that he will follow up with SW after he speaks with the family.  CYNDEE will continue to locate hospice facilities on behalf of the pt in the event the family decides to move forward with a facility in [place]. SW remains available.     "

## 2018-12-07 NOTE — ASSESSMENT & PLAN NOTE
Cryptogenic cirrhosis awaiting liver xplant, presented for AMS and found to have ENZO on CKDIIIb. SLED started last weekend with intermittent AF+RVR+hypotensive episodes controlled with bb/cardizem gtt adequately. Now on CRRT with improving overall fluid status.    Plan:  - Diffuse infiltrates persist on CXR compatible with ARDS, no significant change from previous  - diarrhea still present  - She received blood and K was rising later this afternoon  - CVP increased to 14  - Will do SLED x 8 hrs for metabolic clearance and volume management  - 3-K bath  -  ml/hr as tolerated keep MAP > 65 no UF  - Will reassess tomorrow

## 2018-12-07 NOTE — PROGRESS NOTES
CYNDEE received a call from Tino with [facility], the medical director is not willing to take the patient at this time while on the vent.   If patient extubated, he would look at her again.    CYNDEE provided this information to the resident Dr. Miller, staff surgeon Dr. Montelongo and hepatologist Dr. Melissa.   CYNDEE spoke with patient's  to provide him this information. Pts  asking SW to call LTAC back to see if they would reconsider accepting the patient, however, pts medical status the same at this time.     Dr. Miller discussed a hospital to hospital transfer with CYNDEE, who discussed with Dr. Montelongo.   CYNDEE spoke with palliative care provider Liss who was informed of inpatient hospice decision.   CYNDEE provided support to patient's .   SW attempted to call pts  back in regards to no changes in patient's status from yesterday.   CYNDEE remains available for all transplant resources, education and psychosocial support.

## 2018-12-07 NOTE — PROGRESS NOTES
[facility]  Critical Care - Surgery  Progress Note    Patient Name: Krystal Hobson  MRN: [id number]  Admission Date: 11/10/2018  Hospital Length of Stay: 27 days  Code Status: Full Code  Attending Provider: Abrahan Montelongo MD  Primary Care Provider: Courtney Joe MD   Principal Problem: Hepatic encephalopathy    Subjective:     Hospital/ICU Course:  No notes on file    Interval History/Significant Events:     3/4 unit pRBC transfused yesterday prior to being stopped 2/2 clots seen in tubing. Pt failed SBT as well as PAV+ 70%  this morning and placed back on rate. Tube feeds remain at goal.      Objective:     Vital Signs (Most Recent):  Temp: 98.1 °F (36.7 °C) (12/07/18 0515)  Pulse: 93 (12/07/18 0722)  Resp: (!) 27 (12/07/18 0722)  BP: (!) 155/44 (12/07/18 0600)  SpO2: 98 % (12/07/18 0722) Vital Signs (24h Range):  Temp:  [97.7 °F (36.5 °C)-98.6 °F (37 °C)] 98.1 °F (36.7 °C)  Pulse:  [] 93  Resp:  [22-46] 27  SpO2:  [87 %-100 %] 98 %  BP: ()/(44-58) 155/44  Arterial Line BP: ()/(33-72) 138/42     Weight: 55.5 kg (122 lb 5.7 oz)  Body mass index is 25.57 kg/m².      Intake/Output Summary (Last 24 hours) at 12/7/2018 0854  Last data filed at 12/7/2018 0600  Gross per 24 hour   Intake 836.2 ml   Output 1651 ml   Net -814.8 ml       Physical Exam   Constitutional: She appears well-developed and well-nourished.   HENT:   Head: Normocephalic and atraumatic.   Eyes: Pupils are equal, round, and reactive to light.   Cardiovascular: Normal rate and regular rhythm.   Pulmonary/Chest:   Tracheostomy in place   Abdominal: Soft.   Neurological: She is alert.   Skin: Skin is warm and dry.       Lines/Drains/Airways     Central Venous Catheter Line                 Trialysis (Dialysis) Catheter 11/21/18 1851 left internal jugular 15 days          Drain                 NG/OG Tube 12/07/18 0500 [place] sump 12 Fr. Right nostril less than 1 day          Airway                 Surgical Airway 12/05/18 1438  [place] 1 day          Arterial Line                 Arterial Line 11/21/18 1900 Right Radial 15 days          Peripheral Intravenous Line                 Peripheral IV - Single Lumen 12/04/18 1515 Right Forearm 2 days                Significant Labs:    CBC/Anemia Profile:  Recent Labs   Lab 12/06/18  0210 12/06/18  1611 12/07/18  0430   WBC 10.72 9.19 10.09   HGB 7.7* 7.7* 8.2*   HCT 25.6* 24.1* 26.4*   PLT 85* 85* 83*   * 97 95   RDW 25.5* 25.8* 24.6*        Chemistries:  Recent Labs   Lab 12/06/18  0210 12/06/18  1415 12/06/18  2212 12/07/18  0430   *  134* 133* 138 138  138   K 4.7  4.7 4.9 4.2 4.2  4.2     104 106 107 108  108   CO2 23  23 20* 26 23  23   BUN 19  19 27* 13 10  10   CREATININE 1.2  1.2 1.4 0.8 0.8  0.8   CALCIUM 8.5*  8.5* 8.3* 8.2* 8.6*  8.6*   ALBUMIN 2.5*  2.5* 2.3* 2.3* 2.3*  2.3*   PROT 5.7*  --   --  5.6*   BILITOT 11.4*  --   --  15.3*   ALKPHOS 163*  --   --  205*   ALT 36  --   --  38   AST 64*  --   --  72*   MG 2.4 2.6 1.9 2.4   PHOS 5.3*  5.3* 5.0* 2.7 2.6*  2.6*       All pertinent labs within the past 24 hours have been reviewed.    Significant Imaging:  I have reviewed all pertinent imaging results/findings within the past 24 hours.     Assessment/Plan:     Decompensated hepatic cirrhosis    66yo F liver-listed, stepped up on 11/21 for respiratory failure with worsening respiratory acidosis. Failed SBT. Plan for trach.      Neuro:   -Pain control: prn fentanyl while intubated  -Sedation off  -continue lactulose/rifaximin  -Following commands     Pulmonary:   -intubated on arrival to SICU 11/21, failed SBT  - s/p trach 12/5  -ABGs daily and prn  -Minimal Vent Settings --> TC trials when more awake   -duonebs prn  -continue levoalbuterol     Cardiac:  -SBP > 100 goal  -JACINDA 11/20 - elevated PA pressures   -Heart failure following, appreciate recs   -dilt gtt as needed for A-Fib, currently on 30mg dilt po q8hr       Renal:   -Reji in  place  -Continue to monitor UOP: oliguria/anuria    -CRRT per nephrology  -Nephrology following, appreciate recommendations   -Pt deemed not HS for HD trial by nephrology yesterday     Fluids/Electrolytes/Nutrition/GI:   -Continue TFs  -replace lytes PRN  -continue lactulose   -bowel regimen     Hematology/Oncology:  -Monitor CBC -- Hgb 7.7(7.5)-->8.2/26.4  -PT/INR -->1.6  -continue to monitor. Transfuse as needed per transplant      Infectious Disease:   -Afebrile   -f/u bcx and resp cx  -WBC 10.7-->9.2-->10.0  -Abx: Diflucan to cover HCAP       Endocrine:  -Glucose goal of 140-180  -Endocrine following  -levemir 5U bid with low dose correction     PPx: famotidine, SQH     Dispo:  -Continue care in the ICU setting, still requiring vent, wean vent as tolerated. Pt accepted to [facility], insurance approval process ongoing    -Primary: Transplant           Critical care was time spent personally by me on the following activities: development of treatment plan with patient or surrogate and bedside caregivers, discussions with consultants, evaluation of patient's response to treatment, examination of patient, ordering and performing treatments and interventions, ordering and review of laboratory studies, ordering and review of radiographic studies, pulse oximetry, re-evaluation of patient's condition.  This critical care time did not overlap with that of any other provider or involve time for any procedures.     Jose Wallace MD  Critical Care - Surgery  [facility]

## 2018-12-07 NOTE — CONSULTS
Palliative Care Consult.    Consult received. Reviewed the chart and discussed with Santos [facility]. [facility] palliative care provided [facility] with list of [place] hospices.  LTS social workers contacted [facility] with referral.  [facility] not [facility] on a [facility]ator.  Discussed with Dr. Miller who states LTS team will revisit with family.    Please re-consult if we can be of further assistance.  Thank you for the consult..    Cristal COUCH, ACNS-BC, ACHPN

## 2018-12-08 PROBLEM — J96.02 ACUTE HYPERCAPNIC RESPIRATORY FAILURE: Status: ACTIVE | Noted: 2018-01-01

## 2018-12-08 NOTE — SUBJECTIVE & OBJECTIVE
Interval History:   Patient evaluated at bedside, no significant event overnight. Ran SLED overnight with no complications. NET negative 1.5 L.     Review of patient's allergies indicates:  No Known Allergies  Current Facility-Administered Medications   Medication Frequency    0.9%  NaCl infusion (CRRT USE ONLY) Continuous    0.9%  NaCl infusion (for blood administration) Q24H PRN    0.9%  NaCl infusion (for blood administration) Q24H PRN    acetaminophen tablet 650 mg Q8H PRN    dextrose 50% injection 12.5 g PRN    diltiaZEM 125 mg in D5W 125 mL infusion Continuous    diltiaZEM tablet 30 mg Q8H    famotidine tablet 20 mg QHS    fluconazole tablet 200 mg Daily    glucagon (human recombinant) injection 1 mg PRN    guaifenesin 100 mg/5 ml syrup 200 mg Q6H    heparin (porcine) injection 5,000 Units Q12H    HYDROmorphone injection 0.2 mg Q3H PRN    insulin aspart U-100 pen 0-5 Units Q4H PRN    insulin detemir U-100 pen 5 Units BID    k phos di & mono-sod phos mono 250 mg tablet 2 tablet TID PRN    lactulose 10 gram/15 mL solution (enema) 200 g TID PRN    lactulose 20 gram/30 mL solution Soln 15 g BID    lactulose 20 gram/30 mL solution Soln 30 g Q6H PRN    magnesium sulfate 2g in water 50mL IVPB (premix) PRN    metoprolol injection 2.5 mg Q5 Min PRN    ondansetron disintegrating tablet 8 mg Q8H PRN    rifAXIMin tablet 550 mg BID    simethicone chewable tablet 80 mg TID PRN    sodium chloride 0.9% flush 3 mL PRN       Objective:     Vital Signs (Most Recent):  Temp: 97.6 °F (36.4 °C) (12/08/18 0715)  Pulse: 95 (12/08/18 0715)  Resp: (!) 27 (12/08/18 0715)  BP: (!) 137/45 (12/08/18 0400)  SpO2: 100 % (12/08/18 0715)  O2 Device (Oxygen Therapy): ventilator (12/08/18 0715) Vital Signs (24h Range):  Temp:  [97.6 °F (36.4 °C)-98.7 °F (37.1 °C)] 97.6 °F (36.4 °C)  Pulse:  [] 95  Resp:  [20-37] 27  SpO2:  [96 %-100 %] 100 %  BP: (123-137)/(44-63) 137/45  Arterial Line BP: (117-156)/(35-50) 142/42      Weight: 55.5 kg (122 lb 5.7 oz) (12/04/18 0353)  Body mass index is 25.57 kg/m².  Body surface area is 1.51 meters squared.    I/O last 3 completed shifts:  In: 976.2 [I.V.:68.2; Blood:188; NG/GT:720]  Out: 3740 [Drains:400; Other:3340]    Physical Exam   HENT:   Head: Normocephalic.   Right Ear: External ear normal.   Left Ear: External ear normal.   Eyes: Right eye exhibits no discharge. Left eye exhibits no discharge.   Neck:   Tracheostomy    Cardiovascular: An irregular rhythm present.   Pulmonary/Chest: She has rales.   Musculoskeletal: She exhibits no edema.   Skin: Skin is warm.       Significant Labs:  ABGs:   Recent Labs   Lab 12/08/18 0447   PH 7.397   PCO2 38.8   HCO3 23.9*   POCSATURATED 99   BE -1     BMP:   Recent Labs   Lab 12/08/18 0351   *  166*     106   CO2 24  24   BUN 12  12   CREATININE 0.7  0.7   CALCIUM 8.3*  8.3*   MG 2.1     CBC:   Recent Labs   Lab 12/08/18 0351   WBC 12.04   RBC 2.62*   HGB 8.0*   HCT 25.1*   PLT 75*   MCV 96   MCH 30.5   MCHC 31.9*     CMP:   Recent Labs   Lab 12/08/18 0351   *  166*   CALCIUM 8.3*  8.3*   ALBUMIN 2.0*  2.0*   PROT 5.3*     137   K 4.0  4.0   CO2 24  24     106   BUN 12  12   CREATININE 0.7  0.7   ALKPHOS 209*   ALT 39   AST 65*   BILITOT 12.2*     All labs within the past 24 hours have been reviewed.

## 2018-12-08 NOTE — ASSESSMENT & PLAN NOTE
Avoid insulin stacking and hypoglycemia.  CRRT per nephrology   Lab Results   Component Value Date    CREATININE 0.9 12/08/2018

## 2018-12-08 NOTE — TELEPHONE ENCOUNTER
"On Call Transplant SW paged to [facility] (Lola, [phone]) regarding referral request by Dr. Montelongo today - requested SW to send pt records to [facility] in [place] (ph: [phone] / fax: [phone], MD fa line / Intake Fax [phone] attn: Sabina, [facility]) to "start the review of records process" with no discharge date provided to SW at this time. SW confirmed with [facility] (Sabina) and with pts spouse (Pete Flores, cell [phone]) prior to sending records via [employer]. Spouse stating "great" and appreciates assistance with moving pt closer to home. SW explained this is for record review only at this time, awaiting determination if pt will be accepted, spouse states understanding and agrees to alert bedside nurse of any needs including to call this on slick SW back as needed.    Records sent, SW confirmed fax with Sabina at [facility] who states will call SW personal cell phone back if records not all received today for review. SW requested onsideration for hospital to hospital (for inpt Hospice level of care) transfer.    Records sent to Attn: Sabina at [facility] in [place], [facility], FAX # [phone] attn Sabina (ph: [phone] main hospital line; [phone] Transfer Center's direct line)    Pt: Krystal Hobson, MRN [id number]  Spouse: Pete Hobson, cell [phone]    Requested records being sent to [facility] today: H&P, Progress notes, Meds, [employer]  [facility] On Call Transplant SW: Aiden - [phone]  [facility] Contact: Lola, [phone]  Please call with acceptance/denial or if additional information is needed. Thank you, Kimberley Delaney, LCSW-BCS, MPH.    No other needs reported or indicated at this time. ON CALL Transplant SW remains available.  "

## 2018-12-08 NOTE — ASSESSMENT & PLAN NOTE
Cryptogenic cirrhosis awaiting liver xplant, presented for AMS and found to have ENZO on CKDIIIb. SLED started last weekend with intermittent AF+RVR+hypotensive episodes controlled with bb/cardizem gtt adequately. Now on CRRT with improving overall fluid status.    Plan:  - Will hold on dialysis treatment today and reassess tomorrow   - Continue with mechanical ventilation with FiO2 40 %  - Blood pressures have been stable  - Continues to be anuric

## 2018-12-08 NOTE — ASSESSMENT & PLAN NOTE
68yo F liver-listed, stepped up on 11/21 for respiratory failure with worsening respiratory acidosis. Failed SBT. Plan for trach.      Neuro:   -Pain control: prn fentanyl while intubated  -Sedation off  -continue lactulose/rifaximin  -Following commands     Pulmonary:   -intubated on arrival to SICU 11/21, failed SBT  - s/p trach 12/5  -ABGs daily and prn  -Minimal Vent Settings --> TC trials when more awake   -duonebs prn  -continue levoalbuterol     Cardiac:  -SBP > 100 goal  -JACINDA 11/20 - elevated PA pressures   -Heart failure following, appreciate recs   -dilt gtt as needed for A-Fib, currently on 30mg dilt po q8hr       Renal:   -Mendoza in place  -Continue to monitor UOP: oliguria/anuria    -CRRT per nephrology  -Nephrology following, appreciate recommendations   -Nephrology plans to hold dialysis today     Fluids/Electrolytes/Nutrition/GI:   -Continue TFs  -replace lytes PRN  -continue lactulose   -bowel regimen     Hematology/Oncology:  -Monitor CBC -- Hgb 7.7(7.5)-->8.2/26.4-->8.0/25.1  -PT/INR -->1.6  -continue to monitor. Transfuse as needed per transplant      Infectious Disease:   -Afebrile   -f/u bcx and resp cx  -WBC 10.7-->9.2-->10.0-->12.0  -Abx: Diflucan to cover HCAP       Endocrine:  -Glucose goal of 140-180  -Endocrine following  -levemir 5U bid with low dose correction     PPx: famotidine, SQH     Dispo:  -Continue care in the ICU setting, still requiring vent, wean vent as tolerated. Case management working on placement.  -Primary: Transplant

## 2018-12-08 NOTE — ASSESSMENT & PLAN NOTE
Caution with insulin stacking  Estimated Creatinine Clearance: 52.4 mL/min (based on SCr of 0.9 mg/dL).

## 2018-12-08 NOTE — PROGRESS NOTES
[facility]  Critical Care - Surgery  Progress Note    Patient Name: Krystal Hobson  MRN: [id number]  Admission Date: 11/10/2018  Hospital Length of Stay: 28 days  Code Status: Full Code  Attending Provider: Abrahan Montelongo MD  Primary Care Provider: Courtney Joe MD   Principal Problem: Hepatic encephalopathy    Subjective:     Hospital/ICU Course:  No notes on file    Interval History/Significant Events:     SLED overnight 250cc/hr UFR. Pulled out NGT overnight, was replaced. Failed SBT yesterday, on PAV+ this morning. Case management working on finding placement for patient.         Objective:     Vital Signs (Most Recent):  Temp: 97.6 °F (36.4 °C) (12/08/18 0715)  Pulse: (!) 112 (12/08/18 0900)  Resp: 14 (12/08/18 0900)  BP: 124/61 (12/08/18 0900)  SpO2: 100 % (12/08/18 0900) Vital Signs (24h Range):  Temp:  [97.6 °F (36.4 °C)-98.7 °F (37.1 °C)] 97.6 °F (36.4 °C)  Pulse:  [] 112  Resp:  [14-37] 14  SpO2:  [96 %-100 %] 100 %  BP: (122-137)/(44-81) 124/61  Arterial Line BP: (117-156)/(35-50) 139/48     Weight: 55.5 kg (122 lb 5.7 oz)  Body mass index is 25.57 kg/m².      Intake/Output Summary (Last 24 hours) at 12/8/2018 0953  Last data filed at 12/8/2018 0900  Gross per 24 hour   Intake 630 ml   Output 2219 ml   Net -1589 ml       Physical Exam   Constitutional: She appears well-developed and well-nourished.   HENT:   Head: Normocephalic and atraumatic.   Eyes: Pupils are equal, round, and reactive to light.   Cardiovascular: Normal rate and regular rhythm.   Pulmonary/Chest:   Tracheostomy in place   Abdominal: Soft.   Neurological: She is alert.   Skin: Skin is warm and dry.       Lines/Drains/Airways     Central Venous Catheter Line                 Trialysis (Dialysis) Catheter 11/21/18 1851 left internal jugular 16 days          Drain                 NG/OG Tube 12/07/18 2141 Yoni rao 12 Fr. Right nostril less than 1 day          Airway                 Surgical Airway 12/05/18 1431 Lisa Mcguire  days          Arterial Line                 Arterial Line 11/21/18 1900 Right Radial 16 days                Significant Labs:    CBC/Anemia Profile:  Recent Labs   Lab 12/07/18  1600 12/07/18  1830 12/08/18  0351   WBC 11.83 11.59 12.04   HGB 8.5* 8.4* 8.0*   HCT 26.5* 26.8* 25.1*   PLT 97* 88* 75*   MCV 96 97 96   RDW 25.1* 26.0* 25.6*        Chemistries:  Recent Labs   Lab 12/07/18  0430 12/07/18  1830 12/07/18  2212 12/08/18  0351     138 136 136 137  137   K 4.2  4.2 4.5 4.3 4.0  4.0     108 108 106 106  106   CO2 23  23 21* 25 24  24   BUN 10  10 21 17 12  12   CREATININE 0.8  0.8 1.1 0.9 0.7  0.7   CALCIUM 8.6*  8.6* 8.3* 8.4* 8.3*  8.3*   ALBUMIN 2.3*  2.3* 2.1* 2.1* 2.0*  2.0*   PROT 5.6*  --   --  5.3*   BILITOT 15.3*  --   --  12.2*   ALKPHOS 205*  --   --  209*   ALT 38  --   --  39   AST 72*  --   --  65*   MG 2.4 2.5 2.3 2.1   PHOS 2.6*  2.6* 3.2 2.5* 2.7  2.7       All pertinent labs within the past 24 hours have been reviewed.      Significant Imaging:  I have reviewed all pertinent imaging results/findings within the past 24 hours.    Assessment/Plan:     Decompensated hepatic cirrhosis    66yo F liver-listed, stepped up on 11/21 for respiratory failure with worsening respiratory acidosis. Failed SBT. Plan for trach.      Neuro:   -Pain control: prn fentanyl while intubated  -Sedation off  -continue lactulose/rifaximin  -Following commands     Pulmonary:   -intubated on arrival to SICU 11/21, failed SBT  - s/p trach 12/5  -ABGs daily and prn  -Minimal Vent Settings --> TC trials when more awake   -duonebs prn  -continue levoalbuterol     Cardiac:  -SBP > 100 goal  -JACINDA 11/20 - elevated PA pressures   -Heart failure following, appreciate recs   -dilt gtt as needed for A-Fib, currently on 30mg dilt po q8hr       Renal:   -Mendoza in place  -Continue to monitor UOP: oliguria/anuria    -CRRT per nephrology  -Nephrology following, appreciate recommendations   -Nephrology plans  to hold dialysis today     Fluids/Electrolytes/Nutrition/GI:   -Continue TFs  -replace lytes PRN  -continue lactulose   -bowel regimen     Hematology/Oncology:  -Monitor CBC -- Hgb 7.7(7.5)-->8.2/26.4-->8.0/25.1  -PT/INR -->1.6  -continue to monitor. Transfuse as needed per transplant      Infectious Disease:   -Afebrile   -f/u bcx and resp cx  -WBC 10.7-->9.2-->10.0-->12.0  -Abx: Diflucan to cover HCAP       Endocrine:  -Glucose goal of 140-180  -Endocrine following  -levemir 5U bid with low dose correction     PPx: famotidine, SQH     Dispo:  -Continue care in the ICU setting, still requiring vent, wean vent as tolerated. Case management working on placement.  -Primary: Transplant         Critical care was time spent personally by me on the following activities: development of treatment plan with patient or surrogate and bedside caregivers, discussions with consultants, evaluation of patient's response to treatment, examination of patient, ordering and performing treatments and interventions, ordering and review of laboratory studies, ordering and review of radiographic studies, pulse oximetry, re-evaluation of patient's condition.  This critical care time did not overlap with that of any other provider or involve time for any procedures.     Jose Wallace MD  Critical Care - Surgery  [facility]

## 2018-12-08 NOTE — ASSESSMENT & PLAN NOTE
BG goal 140-180    continue Levemir 5 units BID.   continue novolog 2 units every 4 hours while on TF at 40; hold if TF held or rate decreased  Low dose correction scale  BG monitoring every 4 hours    Discharge planning: TBD

## 2018-12-08 NOTE — PROGRESS NOTES
"[facility]  Endocrinology  Progress Note    Admit Date: 11/10/2018     Reason for Consult: Management of type 2 DM, Hyperglycemia     Surgical Procedure and Date: n/a    Diabetes diagnosis year: 2001    Home Diabetes Medications: Levemir 12 units BID (vial) and Humalog SSI with meals   Lab Results   Component Value Date    HGBA1C 5.8 (H) 11/10/2018         How often checking glucose at home? 1-3  BG readings on regimen: 150-300  Hypoglycemia on the regimen? once 27; required visit to ED; gave Levemir and humalog and patient did not eat  Missed doses on regimen?  No    Diabetes Complications include:     Hyperglycemia, Hypoglycemia  and Diabetic peripheral neuropathy     Complicating diabetes co morbidities:   CIRRHOSIS      HPI:   Patient is a 67 y.o. female with a diagnosis of type 2 DM; well controlled on MDI. Also with   Cryptogenic cirrhosis, rheumatoid arthritis, and GERD. Patient was listed for liver transplant on 9/14/18. Patient presented to ED of hospital in [place] with AMS and ENZO. Endocrinology consulted for BG/ DM management.   Of note, profound hypoglycemic event (BG < 20) the night of 11/14/18.            Interval HPI:   Overnight events: Remains in ICU. Trach on vent. BG at goal overnight with scheduled insulin.   Eating:   NPO  Nausea: No  Hypoglycemia and intervention: No  Fever: No  TF: glucerna at 40 cc/hr     /61 (BP Location: Left arm, Patient Position: Lying)   Pulse (!) 112   Temp 97.6 °F (36.4 °C) (Oral)   Resp 14   Ht 4' 10" (1.473 m)   Wt 55.5 kg (122 lb 5.7 oz)   SpO2 100%   Breastfeeding? No   BMI 25.57 kg/m²      Labs Reviewed and Include    Recent Labs   Lab 12/08/18  0351   *  166*   CALCIUM 8.3*  8.3*   ALBUMIN 2.0*  2.0*   PROT 5.3*     137   K 4.0  4.0   CO2 24  24     106   BUN 12  12   CREATININE 0.7  0.7   ALKPHOS 209*   ALT 39   AST 65*   BILITOT 12.2*     Lab Results   Component Value Date    WBC 12.04 12/08/2018 "    HGB 8.0 (L) 12/08/2018    HCT 25.1 (L) 12/08/2018    MCV 96 12/08/2018    PLT 75 (L) 12/08/2018     No results for input(s): TSH, FREET4 in the last 168 hours.  Lab Results   Component Value Date    HGBA1C 5.8 (H) 11/10/2018       Nutritional status:   Body mass index is 25.57 kg/m².  Lab Results   Component Value Date    ALBUMIN 2.0 (L) 12/08/2018    ALBUMIN 2.0 (L) 12/08/2018    ALBUMIN 2.1 (L) 12/07/2018     No results found for: PREALBUMIN    Estimated Creatinine Clearance: 67.4 mL/min (based on SCr of 0.7 mg/dL).    Accu-Checks  Recent Labs     12/06/18  2006 12/07/18  0016 12/07/18  0429 12/07/18  0830 12/07/18  1156 12/07/18  1637 12/07/18  2020 12/07/18  2341 12/08/18  0350 12/08/18  0832   POCTGLUCOSE 207* 161* 156* 176* 228* 220* 192* 172* 150* 183*       Current Medications and/or Treatments Impacting Glycemic Control  Immunotherapy:    Immunosuppressants     None        Steroids:   Hormones (From admission, onward)    None        Pressors:    Autonomic Drugs (From admission, onward)    None        Hyperglycemia/Diabetes Medications:   Antihyperglycemics (From admission, onward)    Start     Stop Route Frequency Ordered    12/05/18 0900  insulin detemir U-100 pen 5 Units      -- SubQ 2 times daily 12/05/18 0717    11/26/18 0947  insulin aspart U-100 pen 0-5 Units      -- SubQ Every 4 hours PRN 11/26/18 0848          ASSESSMENT and PLAN    * Hepatic encephalopathy    Managed per primary.        Type 2 diabetes mellitus with hyperglycemia, with long-term current use of insulin    BG goal 140-180    continue Levemir 5 units BID.   Low dose correction scale  BG monitoring every 4 hours    Discharge planning: TBD       Decompensated hepatic cirrhosis    Managed per primary.   Listed for liver transplant.  May impact BG readings       CKD (chronic kidney disease) stage 3, GFR 30-59 ml/min    Caution with insulin stacking  Estimated Creatinine Clearance: 39.3 mL/min (based on SCr of 1.2 mg/dL).         On  enteral nutrition    On supplemental TF, elevating BG readings     Acute kidney injury superimposed on CKD    Avoid insulin stacking and hypoglycemia.  CRRT per nephrology   Lab Results   Component Value Date    CREATININE 1.2 12/06/2018    CREATININE 1.2 12/06/2018                Jadyn Hall NP  Endocrinology  [facility]

## 2018-12-08 NOTE — PROGRESS NOTES
Pt pulled out the NG tube. Dr. Duncan notified. Received an order to insert a new one. Will follow the order.

## 2018-12-08 NOTE — SUBJECTIVE & OBJECTIVE
Interval History/Significant Events:     SLED overnight 250cc/hr UFR. Pulled out NGT overnight, was replaced. Failed SBT yesterday, on PAV+ this morning. Case management working on finding placement for patient.         Objective:     Vital Signs (Most Recent):  Temp: 97.6 °F (36.4 °C) (12/08/18 0715)  Pulse: (!) 112 (12/08/18 0900)  Resp: 14 (12/08/18 0900)  BP: 124/61 (12/08/18 0900)  SpO2: 100 % (12/08/18 0900) Vital Signs (24h Range):  Temp:  [97.6 °F (36.4 °C)-98.7 °F (37.1 °C)] 97.6 °F (36.4 °C)  Pulse:  [] 112  Resp:  [14-37] 14  SpO2:  [96 %-100 %] 100 %  BP: (122-137)/(44-81) 124/61  Arterial Line BP: (117-156)/(35-50) 139/48     Weight: 55.5 kg (122 lb 5.7 oz)  Body mass index is 25.57 kg/m².      Intake/Output Summary (Last 24 hours) at 12/8/2018 0953  Last data filed at 12/8/2018 0900  Gross per 24 hour   Intake 630 ml   Output 2219 ml   Net -1589 ml       Physical Exam   Constitutional: She appears well-developed and well-nourished.   HENT:   Head: Normocephalic and atraumatic.   Eyes: Pupils are equal, round, and reactive to light.   Cardiovascular: Normal rate and regular rhythm.   Pulmonary/Chest:   Tracheostomy in place   Abdominal: Soft.   Neurological: She is alert.   Skin: Skin is warm and dry.       Lines/Drains/Airways     Central Venous Catheter Line                 Trialysis (Dialysis) Catheter 11/21/18 1851 left internal jugular 16 days          Drain                 NG/OG Tube 12/07/18 2141 Noble sump 12 Fr. Right nostril less than 1 day          Airway                 Surgical Airway 12/05/18 1431 Shirahel 2 days          Arterial Line                 Arterial Line 11/21/18 1900 Right Radial 16 days                Significant Labs:    CBC/Anemia Profile:  Recent Labs   Lab 12/07/18  1600 12/07/18  1830 12/08/18  0351   WBC 11.83 11.59 12.04   HGB 8.5* 8.4* 8.0*   HCT 26.5* 26.8* 25.1*   PLT 97* 88* 75*   MCV 96 97 96   RDW 25.1* 26.0* 25.6*        Chemistries:  Recent Labs   Lab  12/07/18  0430 12/07/18  1830 12/07/18  2212 12/08/18  0351     138 136 136 137  137   K 4.2  4.2 4.5 4.3 4.0  4.0     108 108 106 106  106   CO2 23  23 21* 25 24  24   BUN 10  10 21 17 12  12   CREATININE 0.8  0.8 1.1 0.9 0.7  0.7   CALCIUM 8.6*  8.6* 8.3* 8.4* 8.3*  8.3*   ALBUMIN 2.3*  2.3* 2.1* 2.1* 2.0*  2.0*   PROT 5.6*  --   --  5.3*   BILITOT 15.3*  --   --  12.2*   ALKPHOS 205*  --   --  209*   ALT 38  --   --  39   AST 72*  --   --  65*   MG 2.4 2.5 2.3 2.1   PHOS 2.6*  2.6* 3.2 2.5* 2.7  2.7       All pertinent labs within the past 24 hours have been reviewed.      Significant Imaging:  I have reviewed all pertinent imaging results/findings within the past 24 hours.

## 2018-12-08 NOTE — PROGRESS NOTES
[facility]  Nephrology  Progress Note    Patient Name: Krystal Hobson  MRN: [id number]  Admission Date: 11/10/2018  Hospital Length of Stay: 28 days  Attending Provider: Abrahan Montelongo MD   Primary Care Physician: Courtney Joe MD  Principal Problem:Hepatic encephalopathy    Subjective:     HPI: Reason for consult: Diuresis in setting of PAP46, pulmonary edema, HRS    Ms. 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  - She presented to [facility] as xfer from [place] under liver transplant team due to encephalopathy and new onset ENZO.   - Per the , she was extremely disoriented @ OSH and her ammonia reached as high as 200, and her Cr level increased to 2.0 She also went into A fib with RVR and was started on a diltiazem gtt at OSH.   - On arrival at [facility] she was disoriented and started on lactulose with noted improvement. During her current admission on CXR they found a concern for HCAP and she was started on broad spectrum ABx and then shifted her on Moxifloxacin 7 D course. On 11/20/2018 found on CXR a concern for Lt lung middle lobe consolidation and started on vancomycin and zosyn.  - Renal fxn baseline 1.6 at admit but trending up    Interval History:   Patient evaluated at bedside, no significant event overnight. Ran SLED overnight with no complications. NET negative 1.5 L.     Review of patient's allergies indicates:  No Known Allergies  Current Facility-Administered Medications   Medication Frequency    0.9%  NaCl infusion (CRRT USE ONLY) Continuous    0.9%  NaCl infusion (for blood administration) Q24H PRN    0.9%  NaCl infusion (for blood administration) Q24H PRN    acetaminophen tablet 650 mg Q8H PRN    dextrose 50% injection 12.5 g PRN    diltiaZEM 125 mg in D5W 125 mL infusion Continuous    diltiaZEM tablet 30 mg Q8H    famotidine tablet 20 mg QHS    fluconazole tablet 200 mg Daily    glucagon (human recombinant) injection 1 mg  PRN    guaifenesin 100 mg/5 ml syrup 200 mg Q6H    heparin (porcine) injection 5,000 Units Q12H    HYDROmorphone injection 0.2 mg Q3H PRN    insulin aspart U-100 pen 0-5 Units Q4H PRN    insulin detemir U-100 pen 5 Units BID    k phos di & mono-sod phos mono 250 mg tablet 2 tablet TID PRN    lactulose 10 gram/15 mL solution (enema) 200 g TID PRN    lactulose 20 gram/30 mL solution Soln 15 g BID    lactulose 20 gram/30 mL solution Soln 30 g Q6H PRN    magnesium sulfate 2g in water 50mL IVPB (premix) PRN    metoprolol injection 2.5 mg Q5 Min PRN    ondansetron disintegrating tablet 8 mg Q8H PRN    rifAXIMin tablet 550 mg BID    simethicone chewable tablet 80 mg TID PRN    sodium chloride 0.9% flush 3 mL PRN       Objective:     Vital Signs (Most Recent):  Temp: 97.6 °F (36.4 °C) (12/08/18 0715)  Pulse: 95 (12/08/18 0715)  Resp: (!) 27 (12/08/18 0715)  BP: (!) 137/45 (12/08/18 0400)  SpO2: 100 % (12/08/18 0715)  O2 Device (Oxygen Therapy): ventilator (12/08/18 0715) Vital Signs (24h Range):  Temp:  [97.6 °F (36.4 °C)-98.7 °F (37.1 °C)] 97.6 °F (36.4 °C)  Pulse:  [] 95  Resp:  [20-37] 27  SpO2:  [96 %-100 %] 100 %  BP: (123-137)/(44-63) 137/45  Arterial Line BP: (117-156)/(35-50) 142/42     Weight: 55.5 kg (122 lb 5.7 oz) (12/04/18 0353)  Body mass index is 25.57 kg/m².  Body surface area is 1.51 meters squared.    I/O last 3 completed shifts:  In: 976.2 [I.V.:68.2; Blood:188; NG/GT:720]  Out: 3740 [Drains:400; Other:3340]    Physical Exam   HENT:   Head: Normocephalic.   Right Ear: External ear normal.   Left Ear: External ear normal.   Eyes: Right eye exhibits no discharge. Left eye exhibits no discharge.   Neck:   Tracheostomy    Cardiovascular: An irregular rhythm present.   Pulmonary/Chest: She has rales.   Musculoskeletal: She exhibits no edema.   Skin: Skin is warm.       Significant Labs:  ABGs:   Recent Labs   Lab 12/08/18  0447   PH 7.397   PCO2 38.8   HCO3 23.9*   POCSATURATED 99   BE -1      BMP:   Recent Labs   Lab 12/08/18  0351   *  166*     106   CO2 24  24   BUN 12  12   CREATININE 0.7  0.7   CALCIUM 8.3*  8.3*   MG 2.1     CBC:   Recent Labs   Lab 12/08/18 0351   WBC 12.04   RBC 2.62*   HGB 8.0*   HCT 25.1*   PLT 75*   MCV 96   MCH 30.5   MCHC 31.9*     CMP:   Recent Labs   Lab 12/08/18 0351   *  166*   CALCIUM 8.3*  8.3*   ALBUMIN 2.0*  2.0*   PROT 5.3*     137   K 4.0  4.0   CO2 24  24     106   BUN 12  12   CREATININE 0.7  0.7   ALKPHOS 209*   ALT 39   AST 65*   BILITOT 12.2*     All labs within the past 24 hours have been reviewed.       Assessment/Plan:     Acute kidney injury superimposed on CKD    Cryptogenic cirrhosis awaiting liver xplant, presented for AMS and found to have ENZO on CKDIIIb. SLED started last weekend with intermittent AF+RVR+hypotensive episodes controlled with bb/cardizem gtt adequately. Now on CRRT with improving overall fluid status.    Plan:  - Will hold on dialysis treatment today and reassess tomorrow   - Continue with mechanical ventilation with FiO2 40 %  - Blood pressures have been stable  - Continues to be anuric          Fawad Henry  Nephrology  Fellow  [facility]    Pager [phone]    ATTENDING PHYSICIAN ATTESTATION  I have personally interviewed and examined the patient. I thoroughly reviewed the demographic, clinical, laboratorial and imaging information available in medical records. I agree with the assessment and recommendations provided by the subspecialty resident. Dr. Ayala was under my supervision.

## 2018-12-08 NOTE — PROGRESS NOTES
CRRT    Patient started on SLED x 10hrs with uf rate of 250cc/hr as ordered. Used 3K bath.    See flow sheet

## 2018-12-09 NOTE — ASSESSMENT & PLAN NOTE
Cryptogenic cirrhosis awaiting liver xplant, presented for AMS and found to have ENZO on CKDIIIb. SLED started last weekend with intermittent AF+RVR+hypotensive episodes controlled with bb/cardizem gtt adequately. Now on CRRT with improving overall fluid status.    Plan:  - Will plan for SCUF today for volume removal   - CVP today at 11  - Continue with mechanical ventilation with FiO2 40 %  - Blood pressures have been stable  - Continues to be anuric, will need bladder scans BID to evaluate for any sign

## 2018-12-09 NOTE — PROGRESS NOTES
[facility]  Critical Care - Surgery  Progress Note    Patient Name: Krystal Hobson  MRN: [id number]  Admission Date: 11/10/2018  Hospital Length of Stay: 29 days  Code Status: Full Code  Attending Provider: Abrahan Montelongo MD  Primary Care Provider: Courtney Joe MD   Principal Problem: Hepatic encephalopathy    Subjective:     Hospital/ICU Course:  No notes on file      Interval History/Significant Events:     No acute events overnight. CRRT held yesterday. Tolerated PAV+ for a few hours during the day before being placed back on rate. Continuing to work on vent weaning and disposition.      Objective:     Vital Signs (Most Recent):  Temp: 98.6 °F (37 °C) (12/09/18 0715)  Pulse: 110 (12/09/18 0901)  Resp: (!) 26 (12/09/18 0901)  BP: (!) 118/58 (12/09/18 0800)  SpO2: 99 % (12/09/18 0901) Vital Signs (24h Range):  Temp:  [98 °F (36.7 °C)-98.6 °F (37 °C)] 98.6 °F (37 °C)  Pulse:  [] 110  Resp:  [16-45] 26  SpO2:  [99 %-100 %] 99 %  BP: (105-129)/(53-75) 118/58  Arterial Line BP: (113-148)/(37-49) 131/38     Weight: 55.5 kg (122 lb 5.7 oz)  Body mass index is 25.57 kg/m².      Intake/Output Summary (Last 24 hours) at 12/9/2018 1007  Last data filed at 12/9/2018 0841  Gross per 24 hour   Intake 1120 ml   Output --   Net 1120 ml       Physical Exam   Constitutional: She appears well-developed and well-nourished.   HENT:   Head: Normocephalic and atraumatic.   Eyes: Pupils are equal, round, and reactive to light.   Cardiovascular: Normal rate and regular rhythm.   Pulmonary/Chest:   Tracheostomy in place   Abdominal: Soft.   Neurological: She is alert.   Skin: Skin is warm and dry.       Lines/Drains/Airways     Central Venous Catheter Line                 Trialysis (Dialysis) Catheter 11/21/18 1851 left internal jugular 17 days          Drain                 NG/OG Tube 12/07/18 2141 Payne sump 12 Fr. Right nostril 1 day          Airway                 Surgical Airway 12/05/18 1431 Lisa 3 days           Arterial Line                 Arterial Line 11/21/18 1900 Right Radial 17 days                Significant Labs:    CBC/Anemia Profile:  Recent Labs   Lab 12/08/18  0351 12/08/18  1815 12/09/18  0310   WBC 12.04 10.82 11.91   HGB 8.0* 8.0* 7.9*   HCT 25.1* 25.0* 25.2*   PLT 75* 76* 86*   MCV 96 98 100*   RDW 25.6* 25.9* 26.7*        Chemistries:  Recent Labs   Lab 12/07/18  2212 12/08/18  0351 12/08/18  1349 12/09/18  0310    137  137 137 137  137   K 4.3 4.0  4.0 4.4 4.9  4.9    106  106 106 106  106   CO2 25 24  24 21* 22*  22*   BUN 17 12  12 18 28*  28*   CREATININE 0.9 0.7  0.7 0.9 1.3  1.3   CALCIUM 8.4* 8.3*  8.3* 8.2* 8.4*  8.4*   ALBUMIN 2.1* 2.0*  2.0* 1.9* 1.9*  1.9*   PROT  --  5.3*  --  5.3*   BILITOT  --  12.2*  --  10.3*   ALKPHOS  --  209*  --  226*   ALT  --  39  --  41   AST  --  65*  --  72*   MG 2.3 2.1  --  2.3   PHOS 2.5* 2.7  2.7 3.4 4.2  4.2       All pertinent labs within the past 24 hours have been reviewed.      Significant Imaging:  I have reviewed all pertinent imaging results/findings within the past 24 hours.        Assessment/Plan:     Decompensated hepatic cirrhosis    68yo F liver-listed, stepped up on 11/21 for respiratory failure with worsening respiratory acidosis. Failed SBT. Plan for trach.      Neuro:   -Pain control: prn fentanyl while intubated  -Sedation off  -continue lactulose/rifaximin  -Following commands     Pulmonary:   -intubated on arrival to SICU 11/21, failed SBT  - s/p trach 12/5  -ABGs daily and prn  -duonebs prn  -continue levoalbuterol  -working on weaning vent settings during day     Cardiac:  -SBP > 100 goal  -JACINDA 11/20 - elevated PA pressures   -Heart failure following, appreciate recs   -dilt gtt as needed for A-Fib, currently on 30mg dilt po q8hr       Renal:   -Mendoza in place  -Continue to monitor UOP: oliguria/anuria    -CRRT per nephrology  -Nephrology following, appreciate recommendations   -Nephrology held RRT  yesterday     Fluids/Electrolytes/Nutrition/GI:   -Continue TFs  -replace lytes PRN  -continue lactulose   -bowel regimen     Hematology/Oncology:  -Monitor CBC -- Hgb 7.7(7.5)-->8.2/26.4-->8.0/25.1-->7.9/25.2  -PT/INR -->1.6  -continue to monitor. Transfuse as needed per transplant      Infectious Disease:   -Afebrile   -f/u bcx and resp cx  -WBC 10.7-->9.2-->10.0-->12.0-->11.9  -Abx: Diflucan to cover HCAP       Endocrine:  Endocrinology following   BG goal 140-180  continue Levemir 5 units BID.   Start novolog 2 units every 4 hours while on TF at 40; hold if TF held or rate decreased  BG monitoring every 4 hours and low dose correction scale.        PPx: famotidine, SQH     Dispo:  -Pending placement at hospice vs LTACH. Appreciate SW/CM assistance.  -Primary: Transplant           Critical care was time spent personally by me on the following activities: development of treatment plan with patient or surrogate and bedside caregivers, discussions with consultants, evaluation of patient's response to treatment, examination of patient, ordering and performing treatments and interventions, ordering and review of laboratory studies, ordering and review of radiographic studies, pulse oximetry, re-evaluation of patient's condition.  This critical care time did not overlap with that of any other provider or involve time for any procedures.     Jose Wallace MD  Critical Care - Surgery  [facility]

## 2018-12-09 NOTE — CARE UPDATE
BG goal 140-180    continue Levemir 5 units BID.   Start novolog 2 units every 4 hours while on TF at 40; hold if TF held or rate decreased  BG monitoring every 4 hours and low dose correction scale.       ** Please call Endocrine for any BG related issues **

## 2018-12-09 NOTE — SUBJECTIVE & OBJECTIVE
Scheduled Meds:   diltiaZEM  30 mg Oral Q8H    famotidine  20 mg Oral QHS    fluconazole  200 mg Oral Daily    guaifenesin 100 mg/5 ml  200 mg Per OG tube Q6H    heparin (porcine)  5,000 Units Subcutaneous Q12H    insulin detemir U-100  5 Units Subcutaneous BID    lactulose  15 g Per OG tube BID    rifAXImin  550 mg Oral BID     Continuous Infusions:   dilTIAZem Stopped (12/04/18 1000)     PRN Meds:sodium chloride, sodium chloride, acetaminophen, dextrose 50%, glucagon (human recombinant), HYDROmorphone, insulin aspart U-100, k phos di & mono-sod phos mono, lactulose, lactulose, metoprolol, ondansetron, simethicone, sodium chloride 0.9%    Review of Systems  Objective:     Vital Signs (Most Recent):  Temp: 98 °F (36.7 °C) (12/08/18 1500)  Pulse: 107 (12/08/18 1717)  Resp: (!) 25 (12/08/18 1717)  BP: 129/65 (12/08/18 1700)  SpO2: 100 % (12/08/18 1717) Vital Signs (24h Range):  Temp:  [97.6 °F (36.4 °C)-98.7 °F (37.1 °C)] 98 °F (36.7 °C)  Pulse:  [] 107  Resp:  [14-34] 25  SpO2:  [97 %-100 %] 100 %  BP: (107-137)/(44-81) 129/65  Arterial Line BP: (117-152)/(36-51) 139/45     Weight: 55.5 kg (122 lb 5.7 oz)  Body mass index is 25.57 kg/m².    Intake/Output - Last 3 Shifts       12/06 0700 - 12/07 0659 12/07 0700 - 12/08 0659 12/08 0700 - 12/09 0659    I.V. (mL/kg) 68.2 (1.2)      Blood 188      NG/ 660 400    Total Intake(mL/kg) 916.2 (16.5) 660 (11.9) 400 (7.2)    Drains 400      Other 1251 2219     Stool 0 0     Total Output 1651 2219     Net -734.8 -1559 +400           Stool Occurrence 6 x 2 x 2 x          Physical Exam   Constitutional: She appears well-developed and well-nourished.   HENT:   Head: Normocephalic and atraumatic.   Eyes: Pupils are equal, round, and reactive to light.   Spontaneously opens eyes  Jaundice+   Neck: Normal range of motion. No JVD present. No tracheal deviation present.   Cardiovascular: Normal rate, regular rhythm and normal heart sounds.   No murmur  heard.  Pulmonary/Chest: She has no wheezes. She has no rales.   On vent, trach in place   Abdominal: Soft. Bowel sounds are normal. She exhibits no distension. There is no tenderness.   Umbilical hernia   Musculoskeletal: Normal range of motion. She exhibits no edema.   Neurological: She is alert.       Laboratory:  Immunosuppressants     None        CBC:   Recent Labs   Lab 12/08/18 0351   WBC 12.04   RBC 2.62*   HGB 8.0*   HCT 25.1*   PLT 75*   MCV 96   MCH 30.5   MCHC 31.9*     CMP:   Recent Labs   Lab 12/08/18 0351 12/08/18  1349   *  166* 184*   CALCIUM 8.3*  8.3* 8.2*   ALBUMIN 2.0*  2.0* 1.9*   PROT 5.3*  --      137 137   K 4.0  4.0 4.4   CO2 24  24 21*     106 106   BUN 12  12 18   CREATININE 0.7  0.7 0.9   ALKPHOS 209*  --    ALT 39  --    AST 65*  --    BILITOT 12.2*  --      Coagulation:   Recent Labs   Lab 12/08/18 0351   INR 1.7*     Labs within the past 24 hours have been reviewed.    Diagnostic Results:  I have personally reviewed all pertinent imaging studies.

## 2018-12-09 NOTE — PROGRESS NOTES
[facility]  Liver Transplant  Progress Note    Patient Name: Krystal Hobson  MRN: [id number]  Admission Date: 11/10/2018  Hospital Length of Stay: 28 days  Code Status: Full Code  Primary Care Provider: Courtney Joe MD    Subjective:     Scheduled Meds:   diltiaZEM  30 mg Oral Q8H    famotidine  20 mg Oral QHS    fluconazole  200 mg Oral Daily    guaifenesin 100 mg/5 ml  200 mg Per OG tube Q6H    heparin (porcine)  5,000 Units Subcutaneous Q12H    insulin detemir U-100  5 Units Subcutaneous BID    lactulose  15 g Per OG tube BID    rifAXImin  550 mg Oral BID     Continuous Infusions:   dilTIAZem Stopped (12/04/18 1000)     PRN Meds:sodium chloride, sodium chloride, acetaminophen, dextrose 50%, glucagon (human recombinant), HYDROmorphone, insulin aspart U-100, k phos di & mono-sod phos mono, lactulose, lactulose, metoprolol, ondansetron, simethicone, sodium chloride 0.9%    Review of Systems  Objective:     Vital Signs (Most Recent):  Temp: 98 °F (36.7 °C) (12/08/18 1500)  Pulse: 107 (12/08/18 1717)  Resp: (!) 25 (12/08/18 1717)  BP: 129/65 (12/08/18 1700)  SpO2: 100 % (12/08/18 1717) Vital Signs (24h Range):  Temp:  [97.6 °F (36.4 °C)-98.7 °F (37.1 °C)] 98 °F (36.7 °C)  Pulse:  [] 107  Resp:  [14-34] 25  SpO2:  [97 %-100 %] 100 %  BP: (107-137)/(44-81) 129/65  Arterial Line BP: (117-152)/(36-51) 139/45     Weight: 55.5 kg (122 lb 5.7 oz)  Body mass index is 25.57 kg/m².    Intake/Output - Last 3 Shifts       12/06 0700 - 12/07 0659 12/07 0700 - 12/08 0659 12/08 0700 - 12/09 0659    I.V. (mL/kg) 68.2 (1.2)      Blood 188      NG/ 660 400    Total Intake(mL/kg) 916.2 (16.5) 660 (11.9) 400 (7.2)    Drains 400      Other 1251 2219     Stool 0 0     Total Output 1651 2219     Net -734.8 -1559 +400           Stool Occurrence 6 x 2 x 2 x          Physical Exam   Constitutional: She appears well-developed and well-nourished.   HENT:   Head: Normocephalic and atraumatic.   Eyes: Pupils  are equal, round, and reactive to light.   Spontaneously opens eyes  Jaundice+   Neck: Normal range of motion. No JVD present. No tracheal deviation present.   Cardiovascular: Normal rate, regular rhythm and normal heart sounds.   No murmur heard.  Pulmonary/Chest: She has no wheezes. She has no rales.   On vent, trach in place   Abdominal: Soft. Bowel sounds are normal. She exhibits no distension. There is no tenderness.   Umbilical hernia   Musculoskeletal: Normal range of motion. She exhibits no edema.   Neurological: She is alert.       Laboratory:  Immunosuppressants     None        CBC:   Recent Labs   Lab 12/08/18 0351   WBC 12.04   RBC 2.62*   HGB 8.0*   HCT 25.1*   PLT 75*   MCV 96   MCH 30.5   MCHC 31.9*     CMP:   Recent Labs   Lab 12/08/18 0351 12/08/18  1349   *  166* 184*   CALCIUM 8.3*  8.3* 8.2*   ALBUMIN 2.0*  2.0* 1.9*   PROT 5.3*  --      137 137   K 4.0  4.0 4.4   CO2 24  24 21*     106 106   BUN 12  12 18   CREATININE 0.7  0.7 0.9   ALKPHOS 209*  --    ALT 39  --    AST 65*  --    BILITOT 12.2*  --      Coagulation:   Recent Labs   Lab 12/08/18 0351   INR 1.7*     Labs within the past 24 hours have been reviewed.    Diagnostic Results:  I have personally reviewed all pertinent imaging studies.    Assessment/Plan:   Krystal Hobson is a 68 y.o. female     Pulmonary   Acute hypercapnic respiratory failure    68yo F liver-listed, stepped up on 11/21 for respiratory failure with worsening respiratory acidosis     Plan:  Neuro:   -Pain control: prn fentanyl while intubated  -Continue sedation holiday as tolerated, awake and alert without fighting vent   -Followed all commands off sedation     Pulmonary:   -intubated on arrival to SICU 11/21  Vent Mode: A/C  Oxygen Concentration (%):  [] 40  Resp Rate Total:  [14 br/min-48 br/min] 22 br/min  Vt Set:  [310 mL] 310 mL  PEEP/CPAP:  [5 cmH20] 5 cmH20  Pressure Support:  [10 cmH20] 10 cmH20  Mean Airway Pressure:  [9.1  jlF14-37 cmH20] 10 cmH20  -daily CXR - evidence of significant bilateral consolidation   -ABGs daily and prn  -continuous pulse ox   - Failed SBT with Pulm 11/25  -Trach in place    Cardiac:  -SBP > 100 goal  -Heart failure following, appreciate recs   - Afib with RVR 11/23/18, broke out with 2x 5mg doses of IV metop, her pressure was stable throughout  - Went back into Afib with RVR on 11/25. Given IV metoprolol 5mg q3. Did not resolve, started on diltiazem drip. Converted out of Afib around 3am. dilt drip stopped at that time.   -continue dilt gtt as needed for Afib, try to prevent Afib by administering metoprolol prior to CRRT    Renal:   -Mendoza in place  -UOP minimal  -Nephrology following, appreciate recommendations  -Started CRRT 11/24/18. Will continue nocturnal CRRT     Fluids/Electrolytes/Nutrition/GI:   -Nutritional status: NPO while intubated  -Continue TF @ 40cc/hour, will add fiber today given diarrhea   -replace lytes PRN  -will hold Lactulose enemas TID  -bloody bowel movement today, GI following appreciate recs    Hematology/Oncology:  -Hgb stable following 1 unit pRBCs, continue to monitor  -continue to monitor. Transfuse as needed     Infectious Disease:   -Afebrile  -Trend WBC  -Abx: Zosyn.   -Sending C. Diff given frequent diarrhea    Endocrine:  -Glucose goal of 140-180  -Endocrine following, see recommendations    Dispo:  -Continue care in the ICU setting - will attempt transfer to a facility closer to home to assist with palliation  -continue CRRT          Cardiac/Vascular   Sinus tachycardia    - Worsened with albuterol, improved with BB.   - remains in NSR. Continue tele.     Atrial fibrillation    -  Patient with episode of Afib with RVR at OSH and was placed on diltiazem gtt which was discontinued 11/10 as she reverted to NSR.  Cardiology consulted for management.   -  Pt then returned back into Afib w/ RVR on the afternoon of 11/11 and pt placed back on diltiazem for rate control.   -  Pt  now rated controlled and diltiazem d/c'd on 11/12 at 0200. Cont with lopressor at this time as pt remains rate controlled.       Renal/   CKD (chronic kidney disease) stage 3, GFR 30-59 ml/min    - See ENZO.  Stable.     Acute kidney injury superimposed on CKD    - CKD 3-4 at baseline creatinine. Urine electrolytes ordered. Etiology possibly due to hepatorenal syndrome.   - HRS protocol started on admission, albumin and octreotide. No midodrine as with Afib.   - Cr since admission has improved  - Dosing lasix daily based on labs -- Lasix 40mg IV x 2 doses today  - Must weigh daily and cont strict I&Os.      Oncology   Anemia of chronic disease    - H/H stable. Continue to monitor with daily cbc.   - no overt signs of bleeding     Endocrine   Type 2 diabetes mellitus with hyperglycemia, with long-term current use of insulin    - Basal and prandial insulin ordered. Endocrine consulted for management. Apprec recs.   - Pt noted with significant hypoglycemic event the evening of 11/14, BG 8. Pt responded well to IV dextrose.   - blood glucose has remained stable and diabetic current regimen.       GI   * Hepatic encephalopathy    - Acute on chronic. PO lactulose ordered, continue rifaximin. Titrate Lactulose to maintain 3-4 BM a day. PRN lactulose enema TID for worsening confusion.  - currently pt is AAOx4, no encephalopathy noted.  Mentation is delayed.       Chronic liver disease           End stage liver disease    -  Listed for liver transplant with MELD 27. Per hepatologist, remains on internal hold 2/2 initially for PNA, but now for frailty and elevated PA pressure. Continue to monitor.  -  Plan to discuss pt candidacy once resp status, nutrition, and physical mobility improve. Need repeat RHC for elevated PA pressure. Cards following    MELD-Na score: 31 at 11/24/2018  3:25 AM  MELD score: 31 at 11/24/2018  3:25 AM  Calculated from:  Serum Creatinine: 2.6 mg/dL at 11/24/2018  3:25 AM  Serum Sodium: 142 mmol/L  (Rounded to 137 mmol/L) at 11/24/2018  3:25 AM  Total Bilirubin: 7.4 mg/dL at 11/24/2018  3:25 AM  INR(ratio): 2.1 at 11/24/2018  3:25 AM  Age: 67 years     Decompensated hepatic cirrhosis    - See hepatic encephalopathy.          The patients clinical status was discussed at multidisplinary rounds, involving transplant surgery, transplant medicine, pharmacy, nursing, nutrition, and social work.    Sergio Ferreira MD  Liver Transplant  [facility]

## 2018-12-09 NOTE — ASSESSMENT & PLAN NOTE
68yo F liver-listed, stepped up on 11/21 for respiratory failure with worsening respiratory acidosis     Plan:  Neuro:   -Pain control: prn fentanyl while intubated  -Continue sedation holiday as tolerated, awake and alert without fighting vent   -Followed all commands off sedation     Pulmonary:   -intubated on arrival to SICU 11/21  Vent Mode: A/C  Oxygen Concentration (%):  [] 40  Resp Rate Total:  [14 br/min-48 br/min] 22 br/min  Vt Set:  [310 mL] 310 mL  PEEP/CPAP:  [5 cmH20] 5 cmH20  Pressure Support:  [10 cmH20] 10 cmH20  Mean Airway Pressure:  [9.1 tdJ66-30 cmH20] 10 cmH20  -daily CXR - evidence of significant bilateral consolidation   -ABGs daily and prn  -continuous pulse ox   - Failed SBT with Pulm 11/25  -Trach in place    Cardiac:  -SBP > 100 goal  -Heart failure following, appreciate recs   - Afib with RVR 11/23/18, broke out with 2x 5mg doses of IV metop, her pressure was stable throughout  - Went back into Afib with RVR on 11/25. Given IV metoprolol 5mg q3. Did not resolve, started on diltiazem drip. Converted out of Afib around 3am. dilt drip stopped at that time.   -continue dilt gtt as needed for Afib, try to prevent Afib by administering metoprolol prior to CRRT    Renal:   -Mendoza in place  -UOP minimal  -Nephrology following, appreciate recommendations  -Started CRRT 11/24/18. Will continue nocturnal CRRT     Fluids/Electrolytes/Nutrition/GI:   -Nutritional status: NPO while intubated  -Continue TF @ 40cc/hour, will add fiber today given diarrhea   -replace lytes PRN  -will hold Lactulose enemas TID  -bloody bowel movement today, GI following appreciate recs    Hematology/Oncology:  -Hgb stable following 1 unit pRBCs, continue to monitor  -continue to monitor. Transfuse as needed     Infectious Disease:   -Afebrile  -Trend WBC  -Abx: Zosyn.   -Sending C. Diff given frequent diarrhea    Endocrine:  -Glucose goal of 140-180  -Endocrine following, see recommendations    Dispo:  -Continue care  in the ICU setting - will attempt transfer to a facility closer to home to assist with palliation  -continue CRRT

## 2018-12-09 NOTE — SUBJECTIVE & OBJECTIVE
Interval History:   Patient evaluated at bedside, no significant event overnight. Continues with mechanical ventilation, placed on spontaneous but didn't tolerated. Still not making urine.     Review of patient's allergies indicates:  No Known Allergies  Current Facility-Administered Medications   Medication Frequency    0.9%  NaCl infusion (CRRT USE ONLY) Continuous    0.9%  NaCl infusion (for blood administration) Q24H PRN    0.9%  NaCl infusion (for blood administration) Q24H PRN    acetaminophen tablet 650 mg Q8H PRN    dextrose 50% injection 12.5 g PRN    diltiaZEM 125 mg in D5W 125 mL infusion Continuous    diltiaZEM tablet 30 mg Q8H    famotidine tablet 20 mg QHS    fluconazole tablet 200 mg Daily    glucagon (human recombinant) injection 1 mg PRN    guaifenesin 100 mg/5 ml syrup 200 mg Q6H    heparin (porcine) injection 5,000 Units Q12H    HYDROmorphone injection 0.2 mg Q3H PRN    insulin aspart U-100 pen 0-5 Units Q4H PRN    insulin detemir U-100 pen 5 Units BID    k phos di & mono-sod phos mono 250 mg tablet 2 tablet TID PRN    lactulose 10 gram/15 mL solution (enema) 200 g TID PRN    lactulose 20 gram/30 mL solution Soln 15 g BID    lactulose 20 gram/30 mL solution Soln 30 g Q6H PRN    magnesium sulfate 2g in water 50mL IVPB (premix) PRN    metoprolol injection 2.5 mg Q5 Min PRN    ondansetron disintegrating tablet 8 mg Q8H PRN    rifAXIMin tablet 550 mg BID    simethicone chewable tablet 80 mg TID PRN    sodium chloride 0.9% flush 3 mL PRN       Objective:     Vital Signs (Most Recent):  Temp: 98.6 °F (37 °C) (12/09/18 0715)  Pulse: 104 (12/09/18 0713)  Resp: (!) 23 (12/09/18 0713)  BP: (!) 114/57 (12/09/18 0700)  SpO2: 100 % (12/09/18 0713)  O2 Device (Oxygen Therapy): ventilator (12/09/18 0715) Vital Signs (24h Range):  Temp:  [98 °F (36.7 °C)-98.6 °F (37 °C)] 98.6 °F (37 °C)  Pulse:  [] 104  Resp:  [14-45] 23  SpO2:  [99 %-100 %] 100 %  BP: (105-129)/(53-75)  114/57  Arterial Line BP: (113-148)/(37-51) 130/38     Weight: 55.5 kg (122 lb 5.7 oz) (12/04/18 0353)  Body mass index is 25.57 kg/m².  Body surface area is 1.51 meters squared.    I/O last 3 completed shifts:  In: 1180 [NG/GT:1180]  Out: 2219 [Other:2219]    Physical Exam   HENT:   Head: Normocephalic.   Right Ear: External ear normal.   Left Ear: External ear normal.   Nose: Nose normal.   Eyes: Right eye exhibits no discharge. Left eye exhibits no discharge.   Neck:   Tracheostomy    Cardiovascular: An irregular rhythm present.   Murmur heard.  Pulmonary/Chest: She has rales.   Musculoskeletal: She exhibits no edema.   Skin: Skin is warm.       Significant Labs:  ABGs:   Recent Labs   Lab 12/09/18 0316   PH 7.407   PCO2 39.8   HCO3 25.1   POCSATURATED 98   BE 0     BMP:   Recent Labs   Lab 12/09/18 0310   *  205*     106   CO2 22*  22*   BUN 28*  28*   CREATININE 1.3  1.3   CALCIUM 8.4*  8.4*   MG 2.3     CBC:   Recent Labs   Lab 12/09/18 0310   WBC 11.91   RBC 2.53*   HGB 7.9*   HCT 25.2*   PLT 86*   *   MCH 31.2*   MCHC 31.3*     CMP:   Recent Labs   Lab 12/09/18 0310   *  205*   CALCIUM 8.4*  8.4*   ALBUMIN 1.9*  1.9*   PROT 5.3*     137   K 4.9  4.9   CO2 22*  22*     106   BUN 28*  28*   CREATININE 1.3  1.3   ALKPHOS 226*   ALT 41   AST 72*   BILITOT 10.3*     All labs within the past 24 hours have been reviewed.

## 2018-12-09 NOTE — NURSING
12 hour CRRT (scuf) initiated as ordered. See flow sheet for details. Lines secured and alarms on.

## 2018-12-09 NOTE — SUBJECTIVE & OBJECTIVE
Interval History/Significant Events:     No acute events overnight. CRRT held yesterday. Tolerated PAV+ for a few hours during the day before being placed back on rate. Continuing to work on vent weaning and disposition.      Objective:     Vital Signs (Most Recent):  Temp: 98.6 °F (37 °C) (12/09/18 0715)  Pulse: 110 (12/09/18 0901)  Resp: (!) 26 (12/09/18 0901)  BP: (!) 118/58 (12/09/18 0800)  SpO2: 99 % (12/09/18 0901) Vital Signs (24h Range):  Temp:  [98 °F (36.7 °C)-98.6 °F (37 °C)] 98.6 °F (37 °C)  Pulse:  [] 110  Resp:  [16-45] 26  SpO2:  [99 %-100 %] 99 %  BP: (105-129)/(53-75) 118/58  Arterial Line BP: (113-148)/(37-49) 131/38     Weight: 55.5 kg (122 lb 5.7 oz)  Body mass index is 25.57 kg/m².      Intake/Output Summary (Last 24 hours) at 12/9/2018 1007  Last data filed at 12/9/2018 0841  Gross per 24 hour   Intake 1120 ml   Output --   Net 1120 ml       Physical Exam   Constitutional: She appears well-developed and well-nourished.   HENT:   Head: Normocephalic and atraumatic.   Eyes: Pupils are equal, round, and reactive to light.   Cardiovascular: Normal rate and regular rhythm.   Pulmonary/Chest:   Tracheostomy in place   Abdominal: Soft.   Neurological: She is alert.   Skin: Skin is warm and dry.       Lines/Drains/Airways     Central Venous Catheter Line                 Trialysis (Dialysis) Catheter 11/21/18 1851 left internal jugular 17 days          Drain                 NG/OG Tube 12/07/18 2141 [place] sump 12 Fr. Right nostril 1 day          Airway                 Surgical Airway 12/05/18 1431 Shiley 3 days          Arterial Line                 Arterial Line 11/21/18 1900 Right Radial 17 days                Significant Labs:    CBC/Anemia Profile:  Recent Labs   Lab 12/08/18  0351 12/08/18  1815 12/09/18  0310   WBC 12.04 10.82 11.91   HGB 8.0* 8.0* 7.9*   HCT 25.1* 25.0* 25.2*   PLT 75* 76* 86*   MCV 96 98 100*   RDW 25.6* 25.9* 26.7*        Chemistries:  Recent Labs   Lab 12/07/18  5280  12/08/18  0351 12/08/18  1349 12/09/18  0310    137  137 137 137  137   K 4.3 4.0  4.0 4.4 4.9  4.9    106  106 106 106  106   CO2 25 24  24 21* 22*  22*   BUN 17 12  12 18 28*  28*   CREATININE 0.9 0.7  0.7 0.9 1.3  1.3   CALCIUM 8.4* 8.3*  8.3* 8.2* 8.4*  8.4*   ALBUMIN 2.1* 2.0*  2.0* 1.9* 1.9*  1.9*   PROT  --  5.3*  --  5.3*   BILITOT  --  12.2*  --  10.3*   ALKPHOS  --  209*  --  226*   ALT  --  39  --  41   AST  --  65*  --  72*   MG 2.3 2.1  --  2.3   PHOS 2.5* 2.7  2.7 3.4 4.2  4.2       All pertinent labs within the past 24 hours have been reviewed.      Significant Imaging:  I have reviewed all pertinent imaging results/findings within the past 24 hours.

## 2018-12-09 NOTE — PLAN OF CARE
Problem: Patient Care Overview  Goal: Plan of Care Review  Outcome: Ongoing (interventions implemented as appropriate)  Pt is s/p trached (12/5).  Awake and alert, follows commands. No indicators of pain, SOB, or nausea present. Continues to remain on vent: AC 40% 5PEEP, SpO2 >95%.SBP within a goal of >100. HR: Afib: 80s-100s. NG tube in place. Tube feeding continued @ 40mL/hr (goal) with no residual. CRRT held overnight. CVP 9-11. Accu every 4hrs. Traid ointment applied on buttocks. Fall risk reviewed with pt. No falls trauma or injury noted.  Plan to transfer to LTAC. Plan of care reviewed with pt and spouse. No significant events overnight. Spouse at bedside. Will continue to monitor the patient.

## 2018-12-09 NOTE — PROGRESS NOTES
[facility]  Nephrology  Progress Note    Patient Name: Krystla Hobson  MRN: [id number]  Admission Date: 11/10/2018  Hospital Length of Stay: 29 days  Attending Provider: Abrahan Montelongo MD   Primary Care Physician: Courtney Joe MD  Principal Problem:Hepatic encephalopathy    Subjective:     HPI: Reason for consult: Diuresis in setting of PAP46, pulmonary edema, HRS    Ms. 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  - She presented to [facility] as xfer from [place] under liver transplant team due to encephalopathy and new onset ENZO.   - Per the , she was extremely disoriented @ OSH and her ammonia reached as high as 200, and her Cr level increased to 2.0 She also went into A fib with RVR and was started on a diltiazem gtt at OSH.   - On arrival at [facility] she was disoriented and started on lactulose with noted improvement. During her current admission on CXR they found a concern for HCAP and she was started on broad spectrum ABx and then shifted her on Moxifloxacin 7 D course. On 11/20/2018 found on CXR a concern for Lt lung middle lobe consolidation and started on vancomycin and zosyn.  - Renal fxn baseline 1.6 at admit but trending up    Interval History:   Patient evaluated at bedside, no significant event overnight. Continues with mechanical ventilation, placed on spontaneous but didn't tolerated. Still not making urine.     Review of patient's allergies indicates:  No Known Allergies  Current Facility-Administered Medications   Medication Frequency    0.9%  NaCl infusion (CRRT USE ONLY) Continuous    0.9%  NaCl infusion (for blood administration) Q24H PRN    0.9%  NaCl infusion (for blood administration) Q24H PRN    acetaminophen tablet 650 mg Q8H PRN    dextrose 50% injection 12.5 g PRN    diltiaZEM 125 mg in D5W 125 mL infusion Continuous    diltiaZEM tablet 30 mg Q8H    famotidine tablet 20 mg QHS    fluconazole tablet 200 mg Daily     glucagon (human recombinant) injection 1 mg PRN    guaifenesin 100 mg/5 ml syrup 200 mg Q6H    heparin (porcine) injection 5,000 Units Q12H    HYDROmorphone injection 0.2 mg Q3H PRN    insulin aspart U-100 pen 0-5 Units Q4H PRN    insulin detemir U-100 pen 5 Units BID    k phos di & mono-sod phos mono 250 mg tablet 2 tablet TID PRN    lactulose 10 gram/15 mL solution (enema) 200 g TID PRN    lactulose 20 gram/30 mL solution Soln 15 g BID    lactulose 20 gram/30 mL solution Soln 30 g Q6H PRN    magnesium sulfate 2g in water 50mL IVPB (premix) PRN    metoprolol injection 2.5 mg Q5 Min PRN    ondansetron disintegrating tablet 8 mg Q8H PRN    rifAXIMin tablet 550 mg BID    simethicone chewable tablet 80 mg TID PRN    sodium chloride 0.9% flush 3 mL PRN       Objective:     Vital Signs (Most Recent):  Temp: 98.6 °F (37 °C) (12/09/18 0715)  Pulse: 104 (12/09/18 0713)  Resp: (!) 23 (12/09/18 0713)  BP: (!) 114/57 (12/09/18 0700)  SpO2: 100 % (12/09/18 0713)  O2 Device (Oxygen Therapy): ventilator (12/09/18 0715) Vital Signs (24h Range):  Temp:  [98 °F (36.7 °C)-98.6 °F (37 °C)] 98.6 °F (37 °C)  Pulse:  [] 104  Resp:  [14-45] 23  SpO2:  [99 %-100 %] 100 %  BP: (105-129)/(53-75) 114/57  Arterial Line BP: (113-148)/(37-51) 130/38     Weight: 55.5 kg (122 lb 5.7 oz) (12/04/18 0353)  Body mass index is 25.57 kg/m².  Body surface area is 1.51 meters squared.    I/O last 3 completed shifts:  In: 1180 [NG/GT:1180]  Out: 2219 [Other:2219]    Physical Exam   HENT:   Head: Normocephalic.   Right Ear: External ear normal.   Left Ear: External ear normal.   Nose: Nose normal.   Eyes: Right eye exhibits no discharge. Left eye exhibits no discharge.   Neck:   Tracheostomy    Cardiovascular: An irregular rhythm present.   Murmur heard.  Pulmonary/Chest: She has rales.   Musculoskeletal: She exhibits no edema.   Skin: Skin is warm.       Significant Labs:  ABGs:   Recent Labs   Lab 12/09/18  0316   PH 7.407   PCO2 39.8    HCO3 25.1   POCSATURATED 98   BE 0     BMP:   Recent Labs   Lab 12/09/18 0310   *  205*     106   CO2 22*  22*   BUN 28*  28*   CREATININE 1.3  1.3   CALCIUM 8.4*  8.4*   MG 2.3     CBC:   Recent Labs   Lab 12/09/18 0310   WBC 11.91   RBC 2.53*   HGB 7.9*   HCT 25.2*   PLT 86*   *   MCH 31.2*   MCHC 31.3*     CMP:   Recent Labs   Lab 12/09/18 0310   *  205*   CALCIUM 8.4*  8.4*   ALBUMIN 1.9*  1.9*   PROT 5.3*     137   K 4.9  4.9   CO2 22*  22*     106   BUN 28*  28*   CREATININE 1.3  1.3   ALKPHOS 226*   ALT 41   AST 72*   BILITOT 10.3*     All labs within the past 24 hours have been reviewed.     Assessment/Plan:     Acute kidney injury superimposed on CKD    Cryptogenic cirrhosis awaiting liver xplant, presented for AMS and found to have ENZO on CKDIIIb. SLED started last weekend with intermittent AF+RVR+hypotensive episodes controlled with bb/cardizem gtt adequately. Now on CRRT with improving overall fluid status.    Plan:  - Will plan for SCUF today for volume removal   - CVP today at 11  - Continue with mechanical ventilation with FiO2 40 %  - Blood pressures have been stable  - Continues to be anuric, will need bladder scans BID to evaluate for any sign           Fawad Henry  Nephrology  Fellow  [facility]    Pager [phone]    ATTENDING PHYSICIAN ATTESTATION  I have personally interviewed and examined the patient. I thoroughly reviewed the demographic, clinical, laboratorial and imaging information available in medical records. I agree with the assessment and recommendations provided by the subspecialty resident. Dr. Ayala was under my supervision.     DIALYSIS NOTE  Patient evaluated while undergoing Slow Low Efficiency Dialysis (SLED) indicated for ENZO and hemodynamic instability. No complications, tolerating session with current UFR. Will continue current support.

## 2018-12-09 NOTE — ASSESSMENT & PLAN NOTE
66yo F liver-listed, stepped up on 11/21 for respiratory failure with worsening respiratory acidosis. Failed SBT. Plan for trach.      Neuro:   -Pain control: prn fentanyl while intubated  -Sedation off  -continue lactulose/rifaximin  -Following commands     Pulmonary:   -intubated on arrival to SICU 11/21, failed SBT  - s/p trach 12/5  -ABGs daily and prn  -duonebs prn  -continue levoalbuterol  -working on weaning vent settings during day     Cardiac:  -SBP > 100 goal  -JACINDA 11/20 - elevated PA pressures   -Heart failure following, appreciate recs   -dilt gtt as needed for A-Fib, currently on 30mg dilt po q8hr       Renal:   -Mendoza in place  -Continue to monitor UOP: oliguria/anuria    -CRRT per nephrology  -Nephrology following, appreciate recommendations   -Nephrology held RRT yesterday     Fluids/Electrolytes/Nutrition/GI:   -Continue TFs  -replace lytes PRN  -continue lactulose   -bowel regimen     Hematology/Oncology:  -Monitor CBC -- Hgb 7.7(7.5)-->8.2/26.4-->8.0/25.1-->7.9/25.2  -PT/INR -->1.6  -continue to monitor. Transfuse as needed per transplant      Infectious Disease:   -Afebrile   -f/u bcx and resp cx  -WBC 10.7-->9.2-->10.0-->12.0-->11.9  -Abx: Diflucan to cover HCAP       Endocrine:  Endocrinology following   BG goal 140-180  continue Levemir 5 units BID.   Start novolog 2 units every 4 hours while on TF at 40; hold if TF held or rate decreased  BG monitoring every 4 hours and low dose correction scale.        PPx: famotidine, SQH     Dispo:  -Pending placement at hospice vs LTACH. Appreciate SW/CM assistance.  -Primary: Transplant

## 2018-12-10 NOTE — PROGRESS NOTES
[facility]  Critical Care - Surgery  Progress Note    Patient Name: Krystal Hobson  MRN: [id number]  Admission Date: 11/10/2018  Hospital Length of Stay: 30 days  Code Status: Full Code  Attending Provider: Abrahan Montelongo MD  Primary Care Provider: Courtney Joe MD   Principal Problem: Hepatic encephalopathy    Subjective:     Hospital/ICU Course:  No notes on file    Interval History/Significant Events:   NAEON.  AF and HDS, minimal vent settings via trach.  Social work continuing to look into inpatient hospice placement.  Continuing CRRT for volume removal.    Follow-up For: Procedure(s) (LRB):  CREATION, TRACHEOSTOMY (N/A)    Post-Operative Day: 5 Days Post-Op    Objective:     Vital Signs (Most Recent):  Temp: 98.7 °F (37.1 °C) (12/10/18 0315)  Pulse: 101 (12/10/18 0600)  Resp: (!) 21 (12/10/18 0600)  BP: (!) 110/59 (12/10/18 0600)  SpO2: 100 % (12/10/18 0600) Vital Signs (24h Range):  Temp:  [97.8 °F (36.6 °C)-98.7 °F (37.1 °C)] 98.7 °F (37.1 °C)  Pulse:  [] 101  Resp:  [20-35] 21  SpO2:  [90 %-100 %] 100 %  BP: (102-122)/(52-61) 110/59  Arterial Line BP: (109-140)/(37-49) 128/39     Weight: 55.5 kg (122 lb 5.7 oz)  Body mass index is 25.57 kg/m².      Intake/Output Summary (Last 24 hours) at 12/10/2018 0700  Last data filed at 12/9/2018 2351  Gross per 24 hour   Intake 3089.27 ml   Output 3679 ml   Net -589.73 ml       Physical Exam  Constitutional: She appears well-developed and well-nourished.   HENT:   Head: Normocephalic and atraumatic.   Eyes: Pupils are equal, round, and reactive to light.   Spontaneously opens eyes  Jaundice+   Neck: Normal range of motion. No JVD present. No tracheal deviation present.   Cardiovascular: Normal rate, regular rhythm and normal heart sounds.   No murmur heard.  Pulmonary/Chest: She has no wheezes. She has no rales.   On vent, trach in place   Abdominal: Soft. Bowel sounds are normal. She exhibits no distension. There is no tenderness.   Umbilical  hernia   Musculoskeletal: Normal range of motion. She exhibits no edema.   Neurological: She is alert.       Vents:  Vent Mode: A/C (12/10/18 0512)  Ventilator Initiated: Yes (11/21/18 1839)  Set Rate: 22 bmp (12/10/18 0512)  Vt Set: 310 mL (12/10/18 0512)  Pressure Support: 10 cmH20 (12/08/18 1153)  PEEP/CPAP: 5 cmH20 (12/10/18 0512)  Oxygen Concentration (%): 40 (12/10/18 0600)  Peak Airway Pressure: 20 cmH2O (12/10/18 0512)  Plateau Pressure: 21 cmH20 (12/10/18 0512)  Total Ve: 7.91 mL (12/10/18 0512)  F/VT Ratio<105 (RSBI): (!) 61.83 (12/10/18 0512)    Lines/Drains/Airways     Central Venous Catheter Line                 Trialysis (Dialysis) Catheter 11/21/18 1851 left internal jugular 18 days          Drain                 NG/OG Tube 12/07/18 2141 [place] sump 12 Fr. Right nostril 2 days          Airway                 Surgical Airway 12/05/18 1431 [place] 4 days          Arterial Line                 Arterial Line 11/21/18 1900 Right Radial 18 days                Significant Labs:    CBC/Anemia Profile:  Recent Labs   Lab 12/09/18  0310 12/09/18  1605 12/10/18  0518   WBC 11.91 15.20* 10.96   HGB 7.9* 7.6* 7.6*   HCT 25.2* 24.7* 24.9*   PLT 86* 87* 67*   * 98 100*   RDW 26.7* 27.3* 27.5*        Chemistries:  Recent Labs   Lab 12/09/18  0310 12/09/18  1456 12/09/18  2155 12/10/18  0518     137 134* 136 132*   K 4.9  4.9 4.9 5.1 5.2*     106 106 106 105   CO2 22*  22* 20* 20* 20*   BUN 28*  28* 35* 39* 46*   CREATININE 1.3  1.3 1.6* 1.8* 1.9*   CALCIUM 8.4*  8.4* 8.5* 8.7 8.7   ALBUMIN 1.9*  1.9* 1.8* 1.8* 1.7*   PROT 5.3*  --   --  5.2*   BILITOT 10.3*  --   --  10.1*   ALKPHOS 226*  --   --  247*   ALT 41  --   --  44   AST 72*  --   --  65*   MG 2.3 2.2 2.2 2.3   PHOS 4.2  4.2 4.7* 5.1* 5.4*       All pertinent labs within the past 24 hours have been reviewed.    Significant Imaging:  I have reviewed and interpreted all pertinent imaging results/findings within the past 24  hours.    Assessment/Plan:     Acute hypercapnic respiratory failure    68yo F liver-listed, stepped up on 11/21 for respiratory failure with worsening respiratory acidosis.      Neuro:   -Pain control: prn fentanyl while intubated  -Sedation off  -Following commands      Pulmonary:   -intubated on arrival to SICU 11/21  -ABGs daily and prn  -Minimal vent settings via trach  -duonebs prn     Cardiac:  -SBP > 100 goal  -JACINDA 11/20 - elevated PA pressures   -Heart failure following, appreciate recs   -dilt per ng for A-Fib    Renal:   -Mendoza in place  -Continue to monitor UOP: oliguria/anuria    -CRRT overnight  -Nephrology following, appreciate recommendations      Fluids/Electrolytes/Nutrition/GI:    -TFs @ 40 - goal   -replace lytes PRN  -holding Lactulose enemas TID, per primary      Hematology/Oncology:  -Monitor H/H; stable   -PT/INR; stable  -continue to monitor. Transfuse as needed      Infectious Disease:   -Afebrile  -WBC; no leukocytosis  -Abx: Diflucan to cover HCAP      Endocrine:  -Glucose goal of 140-180  -Endocrine following for insulin requirements/dosing     PPx: famotidine     Dispo:  -Continue care in the ICU setting, still requiring vent. SW continuing to look into hospice placement in [place]  -Primary: Transplant             Critical care was time spent personally by me on the following activities: development of treatment plan with patient or surrogate and bedside caregivers, discussions with consultants, evaluation of patient's response to treatment, examination of patient, ordering and performing treatments and interventions, ordering and review of laboratory studies, ordering and review of radiographic studies, pulse oximetry, re-evaluation of patient's condition.  This critical care time did not overlap with that of any other provider or involve time for any procedures.     Ori Domingo MD  Critical Care - Surgery  [facility]

## 2018-12-10 NOTE — SUBJECTIVE & OBJECTIVE
Scheduled Meds:   diltiaZEM  30 mg Oral Q8H    famotidine  20 mg Oral QHS    fluconazole  200 mg Oral Daily    guaifenesin 100 mg/5 ml  200 mg Per OG tube Q6H    heparin (porcine)  5,000 Units Subcutaneous Q12H    [START ON 12/10/2018] insulin aspart U-100  2 Units Subcutaneous Q24H    [START ON 12/10/2018] insulin aspart U-100  2 Units Subcutaneous Q24H    [START ON 12/10/2018] insulin aspart U-100  2 Units Subcutaneous Q24H    insulin aspart U-100  2 Units Subcutaneous Q24H    insulin aspart U-100  2 Units Subcutaneous Q24H    insulin aspart U-100  2 Units Subcutaneous Q24H    insulin detemir U-100  5 Units Subcutaneous BID    lactulose  15 g Per OG tube BID    rifAXImin  550 mg Oral BID     Continuous Infusions:   sodium chloride 0.9% 200 mL/hr at 12/09/18 2300    dilTIAZem Stopped (12/04/18 1000)     PRN Meds:sodium chloride, sodium chloride, acetaminophen, dextrose 50%, glucagon (human recombinant), HYDROmorphone, insulin aspart U-100, k phos di & mono-sod phos mono, lactulose, lactulose, magnesium sulfate IVPB, metoprolol, ondansetron, simethicone, sodium chloride 0.9%    Review of Systems  Objective:     Vital Signs (Most Recent):  Temp: 98 °F (36.7 °C) (12/09/18 1930)  Pulse: 77 (12/09/18 2317)  Resp: (!) 22 (12/09/18 2317)  BP: (!) 107/53 (12/09/18 2300)  SpO2: 100 % (12/09/18 2317) Vital Signs (24h Range):  Temp:  [97.8 °F (36.6 °C)-98.6 °F (37 °C)] 98 °F (36.7 °C)  Pulse:  [] 77  Resp:  [20-45] 22  SpO2:  [90 %-100 %] 100 %  BP: (102-123)/(52-75) 107/53  Arterial Line BP: (109-136)/(37-49) 116/41     Weight: 55.5 kg (122 lb 5.7 oz)  Body mass index is 25.57 kg/m².    Intake/Output - Last 3 Shifts       12/08 0700 - 12/09 0659 12/09 0700 - 12/10 0659    I.V. (mL/kg)  2419.3 (43.6)    NG/GT 1140 710    Total Intake(mL/kg) 1140 (20.5) 3129.3 (56.4)    Other  3679    Stool  0    Total Output  3679    Net +1140 -549.7          Stool Occurrence 3 x 6 x          Physical Exam    Constitutional: She appears well-developed and well-nourished.   HENT:   Head: Normocephalic and atraumatic.   Eyes: Pupils are equal, round, and reactive to light.   Spontaneously opens eyes  Jaundice+   Neck: Normal range of motion. No JVD present. No tracheal deviation present.   Cardiovascular: Normal rate, regular rhythm and normal heart sounds.   No murmur heard.  Pulmonary/Chest: She has no wheezes. She has no rales.   On vent, trach in place   Abdominal: Soft. Bowel sounds are normal. She exhibits no distension. There is no tenderness.   Umbilical hernia   Musculoskeletal: Normal range of motion. She exhibits no edema.   Neurological: She is alert.       Laboratory:  Immunosuppressants     None        CBC:   Recent Labs   Lab 12/09/18  1605   WBC 15.20*   RBC 2.51*   HGB 7.6*   HCT 24.7*   PLT 87*   MCV 98   MCH 30.3   MCHC 30.8*     CMP:   Recent Labs   Lab 12/09/18  0310  12/09/18  2155   *  205*   < > 201*   CALCIUM 8.4*  8.4*   < > 8.7   ALBUMIN 1.9*  1.9*   < > 1.8*   PROT 5.3*  --   --      137   < > 136   K 4.9  4.9   < > 5.1   CO2 22*  22*   < > 20*     106   < > 106   BUN 28*  28*   < > 39*   CREATININE 1.3  1.3   < > 1.8*   ALKPHOS 226*  --   --    ALT 41  --   --    AST 72*  --   --    BILITOT 10.3*  --   --     < > = values in this interval not displayed.     Coagulation:   Recent Labs   Lab 12/09/18  0310   INR 1.6*     Labs within the past 24 hours have been reviewed.    Diagnostic Results:  I have personally reviewed all pertinent imaging studies.

## 2018-12-10 NOTE — NURSING
Assuming care of patient for remainder of shift. Report taken from Steffen Randle RN. Patient alert and oriented and sleeping between care. CRRT stopped per dialysis RN. Continuing to monitor closely.

## 2018-12-10 NOTE — PROGRESS NOTES
"[facility]  Adult Nutrition  Progress Note    SUMMARY       Recommendations  Recommendation/Intervention:   Continue current TF - Glucerna 1.5 at 40 mL/hr, meets needs.   RD to monitor.    Goals: Patient to meet >85% EEN/EPN  Nutrition Goal Status: goal met  Communication of RD Recs: (POC)    Reason for Assessment  Reason For Assessment: RD follow-up  Diagnosis: transplant/postoperative complications(Hepatic encephalopathy, Pre- Liver tx)  Relevant Medical History: cryptogenic cirrhosis, DM, GERD  Interdisciplinary Rounds: did not attend  General Information Comments: Patient trach/vent. On CRRT-SCUF. Plan for hospice placement per MD note. On TF, tolerating at goal rate, transitioned to diabetic formula per RD recs. (NFPE completed , patient with severe malnutrition.)  Nutrition Discharge Planning: Unable to determine at this time    Nutrition/Diet History  Patient Reported Diet/Restrictions/Preferences: general, low salt  Typical Food/Fluid Intake: Decreased po intake  Food Preferences: noted  Spiritual, Cultural Beliefs, Lutheran Practices, Values that Affect Care: no  Supplemental Drinks or Food Habits: Glucerna(4-5/week)  Food Allergies: NKFA  Factors Affecting Nutritional Intake: NPO, on mechanical ventilation    Anthropometrics  Temp: 98.1 °F (36.7 °C)  Height Method: Stated  Height: 4' 10" (147.3 cm)  Height (inches): 58 in  Weight Method: Bed Scale  Weight: 55.5 kg (122 lb 5.7 oz)  Weight (lb): 122.36 lb  Ideal Body Weight (IBW), Female: 90 lb  % Ideal Body Weight, Female (lb): 149.42 lb  BMI (Calculated): 28.2  (RETIRED) BMI Grade: 25 - 29.9 - overweight  Usual Body Weight (UBW), k.8 kg(2018 per chart review)  % Usual Body Weight: 105.14  % Weight Change From Usual Weight: 4.92 %    Lab/Procedures/Meds  Pertinent Labs Reviewed: reviewed  Pertinent Labs Comments: Na 132, K 5.2, BUN 46, Creat 1.9, Glu 226, POCT Glu 167-217, HgbA1c 5.8, Phos 5.4, Alb 1.7, T. bili " 10.1  Pertinent Medications Reviewed: reviewed  Pertinent Medications Comments: famotidine, insulin    Estimated/Assessed Needs  Weight Used For Calorie Calculations: 53.3 kg (117 lb 8.1 oz)  Energy Calorie Requirements (kcal): 1114 kcal/day  Energy Need Method: [facility]  Protein Requirements: 73-92 g/day(1.2-1.5 g/kg)  Weight Used For Protein Calculations: 61 kg (134 lb 7.7 oz)  Fluid Requirements (mL): 1mL/kcal or per MD  Estimated Fluid Requirement Method: RDA Method  RDA Method (mL): 1114    Nutrition Prescription Ordered  Current Diet Order: NPO  Current Nutrition Support Formula Ordered: Glucerna 1.5  Current Nutrition Support Rate Ordered: 40 (ml)  Current Nutrition Support Frequency Ordered: mL/hr    Evaluation of Received Nutrient/Fluid Intake  Enteral Calories (kcal): 1440  Enteral Protein (gm): 79  Enteral (Free Water) Fluid (mL): 729  I/O: -1.5L since admit  Energy Calories Required: meeting needs  Protein Required: meeting needs  Fluid Required: (per MD)  Comments: LBM 12/10  Tolerance: tolerating  % Intake of Estimated Energy Needs: 75 - 100 %  % Meal Intake: NPO    Nutrition Risk  Level of Risk/Frequency of Follow-up: low(1x week)     Assessment and Plan  Severe malnutrition-resolved as of 11/16/2018, RD does not feel this is resolved    Nutrition Problem  Malnutrition in the context of Chronic Illness/Injury    Related to (etiology):  Cryptogenic cirrhosis    Signs and Symptoms (as evidenced by):  Energy Intake: <50% of estimated energy requirement for at least 1 month  Body Fat Depletion: moderate and severe depletion of orbitals, triceps and thoracic and lumbar region   Muscle Mass Depletion: moderate and severe depletion of temples, clavicle region, interosseous muscle and lower extremities   Weight Loss: unable to determine if any weight loss 2/2 fluid present  Fluid Accumulation: mild    Interventions/Recommendations (treatment strategy):  Full assessment completed, see RD Note  12/10/2018.    Nutrition Diagnosis Status:  Continues     Monitor and Evaluation  Food and Nutrient Intake: energy intake, enteral nutrition intake  Food and Nutrient Adminstration: enteral and parenteral nutrition administration  Anthropometric Measurements: weight, weight change  Biochemical Data, Medical Tests and Procedures: electrolyte and renal panel, gastrointestinal profile, glucose/endocrine profile, inflammatory profile, lipid profile  Nutrition-Focused Physical Findings: overall appearance     Nutrition Follow-Up  RD Follow-up?: Yes

## 2018-12-10 NOTE — PLAN OF CARE
Problem: Patient Care Overview  Goal: Plan of Care Review  Outcome: Ongoing (interventions implemented as appropriate)  No acute events overnight. Remains vented via trach on 40% FiO2, 5 PEEP. SpO2 > 95%. CRRT stopped before midnight. Remains anuric. HR 80s-low 100s, A-fib. SBP remained > 100. CVP 9-11 (flat). Multiple loose BMs overnight, incontinence care provided. Tolerating tube feeding at goal of 40mL/hr. Accuchecks completed every 4 hours, sliding scale insulin and PRN coverage given. No complaints of pain or nausea. Spouse at bedside, questions/concerns addressed and emotional support provided. Bed in lowest position and brake set. See flowsheets for detailed assessment. Continuing to monitor.

## 2018-12-10 NOTE — PROGRESS NOTES
[facility]  Liver Transplant  Progress Note    Patient Name: Krystal Hobson  MRN: [id number]  Admission Date: 11/10/2018  Hospital Length of Stay: 29 days  Code Status: Full Code  Primary Care Provider: Courtney Joe MD  Post-Op Day 4 Days Post-Op      Subjective:     Scheduled Meds:   diltiaZEM  30 mg Oral Q8H    famotidine  20 mg Oral QHS    fluconazole  200 mg Oral Daily    guaifenesin 100 mg/5 ml  200 mg Per OG tube Q6H    heparin (porcine)  5,000 Units Subcutaneous Q12H    [START ON 12/10/2018] insulin aspart U-100  2 Units Subcutaneous Q24H    [START ON 12/10/2018] insulin aspart U-100  2 Units Subcutaneous Q24H    [START ON 12/10/2018] insulin aspart U-100  2 Units Subcutaneous Q24H    insulin aspart U-100  2 Units Subcutaneous Q24H    insulin aspart U-100  2 Units Subcutaneous Q24H    insulin aspart U-100  2 Units Subcutaneous Q24H    insulin detemir U-100  5 Units Subcutaneous BID    lactulose  15 g Per OG tube BID    rifAXImin  550 mg Oral BID     Continuous Infusions:   sodium chloride 0.9% 200 mL/hr at 12/09/18 2300    dilTIAZem Stopped (12/04/18 1000)     PRN Meds:sodium chloride, sodium chloride, acetaminophen, dextrose 50%, glucagon (human recombinant), HYDROmorphone, insulin aspart U-100, k phos di & mono-sod phos mono, lactulose, lactulose, magnesium sulfate IVPB, metoprolol, ondansetron, simethicone, sodium chloride 0.9%    Review of Systems  Objective:     Vital Signs (Most Recent):  Temp: 98 °F (36.7 °C) (12/09/18 1930)  Pulse: 77 (12/09/18 2317)  Resp: (!) 22 (12/09/18 2317)  BP: (!) 107/53 (12/09/18 2300)  SpO2: 100 % (12/09/18 2317) Vital Signs (24h Range):  Temp:  [97.8 °F (36.6 °C)-98.6 °F (37 °C)] 98 °F (36.7 °C)  Pulse:  [] 77  Resp:  [20-45] 22  SpO2:  [90 %-100 %] 100 %  BP: (102-123)/(52-75) 107/53  Arterial Line BP: (109-136)/(37-49) 116/41     Weight: 55.5 kg (122 lb 5.7 oz)  Body mass index is 25.57 kg/m².    Intake/Output - Last 3 Shifts        12/08 0700 - 12/09 0659 12/09 0700 - 12/10 0659    I.V. (mL/kg)  2419.3 (43.6)    NG/GT 1140 710    Total Intake(mL/kg) 1140 (20.5) 3129.3 (56.4)    Other  3679    Stool  0    Total Output  3679    Net +1140 -549.7          Stool Occurrence 3 x 6 x          Physical Exam   Constitutional: She appears well-developed and well-nourished.   HENT:   Head: Normocephalic and atraumatic.   Eyes: Pupils are equal, round, and reactive to light.   Spontaneously opens eyes  Jaundice+   Neck: Normal range of motion. No JVD present. No tracheal deviation present.   Cardiovascular: Normal rate, regular rhythm and normal heart sounds.   No murmur heard.  Pulmonary/Chest: She has no wheezes. She has no rales.   On vent, trach in place   Abdominal: Soft. Bowel sounds are normal. She exhibits no distension. There is no tenderness.   Umbilical hernia   Musculoskeletal: Normal range of motion. She exhibits no edema.   Neurological: She is alert.       Laboratory:  Immunosuppressants     None        CBC:   Recent Labs   Lab 12/09/18  1605   WBC 15.20*   RBC 2.51*   HGB 7.6*   HCT 24.7*   PLT 87*   MCV 98   MCH 30.3   MCHC 30.8*     CMP:   Recent Labs   Lab 12/09/18  0310  12/09/18  2155   *  205*   < > 201*   CALCIUM 8.4*  8.4*   < > 8.7   ALBUMIN 1.9*  1.9*   < > 1.8*   PROT 5.3*  --   --      137   < > 136   K 4.9  4.9   < > 5.1   CO2 22*  22*   < > 20*     106   < > 106   BUN 28*  28*   < > 39*   CREATININE 1.3  1.3   < > 1.8*   ALKPHOS 226*  --   --    ALT 41  --   --    AST 72*  --   --    BILITOT 10.3*  --   --     < > = values in this interval not displayed.     Coagulation:   Recent Labs   Lab 12/09/18  0310   INR 1.6*     Labs within the past 24 hours have been reviewed.    Diagnostic Results:  I have personally reviewed all pertinent imaging studies.    Assessment/Plan:   Krystalkenya Hobson is a 68 y.o. female     Pulmonary   Acute hypercapnic respiratory failure    66yo F liver-listed, stepped up on  11/21 for respiratory failure with worsening respiratory acidosis     Plan:  Neuro:   -Pain control: prn fentanyl while intubated  -Continue sedation holiday as tolerated, awake and alert without fighting vent   -Followed all commands off sedation     Pulmonary:   -intubated on arrival to SICU 11/21  Vent Mode: A/C  Oxygen Concentration (%):  [] 40  Resp Rate Total:  [21 br/min-37 br/min] 25 br/min  Vt Set:  [310 mL] 310 mL  PEEP/CPAP:  [5 cmH20] 5 cmH20  Mean Airway Pressure:  [7.9 szJ56-54 cmH20] 7.9 cmH20  -daily CXR - evidence of significant bilateral consolidation   -ABGs daily and prn  -continuous pulse ox   - Failed SBT with Pulm 11/25  -Trach in place    Cardiac:  -SBP > 100 goal  -Heart failure following, appreciate recs   - Afib with RVR 11/23/18, broke out with 2x 5mg doses of IV metop, her pressure was stable throughout  - Went back into Afib with RVR on 11/25. Given IV metoprolol 5mg q3. Did not resolve, started on diltiazem drip. Converted out of Afib around 3am. dilt drip stopped at that time.   -continue dilt gtt as needed for Afib, try to prevent Afib by administering metoprolol prior to CRRT    Renal:   -Mendoza in place  -UOP minimal  -Nephrology following, appreciate recommendations  -Started CRRT 11/24/18. Will continue nocturnal CRRT     Fluids/Electrolytes/Nutrition/GI:   -Nutritional status: NPO while intubated  -Continue TF @ 40cc/hour, will add fiber today given diarrhea   -replace lytes PRN  -will hold Lactulose enemas TID  -bloody bowel movement today, GI following appreciate recs    Hematology/Oncology:  -Hgb stable following 1 unit pRBCs, continue to monitor  -continue to monitor. Transfuse as needed     Infectious Disease:   -Afebrile  -Trend WBC  -Abx: Zosyn.   -Sending C. Diff given frequent diarrhea    Endocrine:  -Glucose goal of 140-180  -Endocrine following, see recommendations    Dispo:  -Continue care in the ICU setting - will attempt transfer to a facility closer to home  to assist with palliation  -continue CRRT          Cardiac/Vascular   Sinus tachycardia    - Worsened with albuterol, improved with BB.   - remains in NSR. Continue tele.     Atrial fibrillation    -  Patient with episode of Afib with RVR at OSH and was placed on diltiazem gtt which was discontinued 11/10 as she reverted to NSR.  Cardiology consulted for management.   -  Pt then returned back into Afib w/ RVR on the afternoon of 11/11 and pt placed back on diltiazem for rate control.   -  Pt now rated controlled and diltiazem d/c'd on 11/12 at 0200. Cont with lopressor at this time as pt remains rate controlled.       Renal/   CKD (chronic kidney disease) stage 3, GFR 30-59 ml/min    - See ENZO.  Stable.     Acute kidney injury superimposed on CKD    - CKD 3-4 at baseline creatinine. Urine electrolytes ordered. Etiology possibly due to hepatorenal syndrome.   - HRS protocol started on admission, albumin and octreotide. No midodrine as with Afib.   - Cr since admission has improved  - Dosing lasix daily based on labs -- Lasix 40mg IV x 2 doses today  - Must weigh daily and cont strict I&Os.      Oncology   Anemia of chronic disease    - H/H stable. Continue to monitor with daily cbc.   - no overt signs of bleeding     Endocrine   Type 2 diabetes mellitus with hyperglycemia, with long-term current use of insulin    - Basal and prandial insulin ordered. Endocrine consulted for management. Apprec recs.   - Pt noted with significant hypoglycemic event the evening of 11/14, BG 8. Pt responded well to IV dextrose.   - blood glucose has remained stable and diabetic current regimen.       GI   * Hepatic encephalopathy    - Acute on chronic. PO lactulose ordered, continue rifaximin. Titrate Lactulose to maintain 3-4 BM a day. PRN lactulose enema TID for worsening confusion.  - currently pt is AAOx4, no encephalopathy noted.  Mentation is delayed.       Chronic liver disease           End stage liver disease    -  Listed for  liver transplant with MELD 27. Per hepatologist, remains on internal hold 2/2 initially for PNA, but now for frailty and elevated PA pressure. Continue to monitor.  -  Plan to discuss pt candidacy once resp status, nutrition, and physical mobility improve. Need repeat RHC for elevated PA pressure. Cards following    MELD-Na score: 31 at 11/24/2018  3:25 AM  MELD score: 31 at 11/24/2018  3:25 AM  Calculated from:  Serum Creatinine: 2.6 mg/dL at 11/24/2018  3:25 AM  Serum Sodium: 142 mmol/L (Rounded to 137 mmol/L) at 11/24/2018  3:25 AM  Total Bilirubin: 7.4 mg/dL at 11/24/2018  3:25 AM  INR(ratio): 2.1 at 11/24/2018  3:25 AM  Age: 67 years     Decompensated hepatic cirrhosis    - See hepatic encephalopathy.          The patients clinical status was discussed at multidisplinary rounds, involving transplant surgery, transplant medicine, pharmacy, nursing, nutrition, and social work.    Sergio Ferreira MD  Liver Transplant  [facility]

## 2018-12-10 NOTE — ASSESSMENT & PLAN NOTE
66yo F liver-listed, stepped up on 11/21 for respiratory failure with worsening respiratory acidosis.      Neuro:   -Pain control: prn fentanyl while intubated  -Sedation off  -Following commands      Pulmonary:   -intubated on arrival to SICU 11/21  -ABGs daily and prn  -Minimal vent settings via trach  -duonebs prn     Cardiac:  -SBP > 100 goal  -JACINDA 11/20 - elevated PA pressures   -Heart failure following, appreciate recs   -dilt per ng for A-Fib    Renal:   -Mendoza in place  -Continue to monitor UOP: oliguria/anuria    -CRRT overnight  -Nephrology following, appreciate recommendations      Fluids/Electrolytes/Nutrition/GI:    -TFs @ 40 - goal   -replace lytes PRN  -holding Lactulose enemas TID, per primary      Hematology/Oncology:  -Monitor H/H; stable   -PT/INR; stable  -continue to monitor. Transfuse as needed      Infectious Disease:   -Afebrile  -WBC; no leukocytosis  -Abx: Diflucan to cover HCAP      Endocrine:  -Glucose goal of 140-180  -Endocrine following for insulin requirements/dosing     PPx: famotidine     Dispo:  -Continue care in the ICU setting, still requiring vent. SW continuing to look into hospice placement in [place]  -Primary: Transplant

## 2018-12-10 NOTE — ASSESSMENT & PLAN NOTE
Nutrition Problem  Malnutrition in the context of Chronic Illness/Injury    Related to (etiology):  Cryptogenic cirrhosis    Signs and Symptoms (as evidenced by):  Energy Intake: <50% of estimated energy requirement for at least 1 month  Body Fat Depletion: moderate and severe depletion of orbitals, triceps and thoracic and lumbar region   Muscle Mass Depletion: moderate and severe depletion of temples, clavicle region, interosseous muscle and lower extremities   Weight Loss: unable to determine if any weight loss 2/2 fluid present  Fluid Accumulation: mild    Interventions/Recommendations (treatment strategy):  Full assessment completed, see RD Note 12/10/2018.    Nutrition Diagnosis Status:  Continues

## 2018-12-10 NOTE — PROGRESS NOTES
"[facility]  Endocrinology  Progress Note    Admit Date: 11/10/2018     Reason for Consult: Management of type 2 DM, Hyperglycemia     Surgical Procedure and Date: n/a    Diabetes diagnosis year: 2001    Home Diabetes Medications: Levemir 12 units BID (vial) and Humalog SSI with meals   Lab Results   Component Value Date    HGBA1C 5.8 (H) 11/10/2018         How often checking glucose at home? 1-3  BG readings on regimen: 150-300  Hypoglycemia on the regimen? once 27; required visit to ED; gave Levemir and humalog and patient did not eat  Missed doses on regimen?  No    Diabetes Complications include:     Hyperglycemia, Hypoglycemia  and Diabetic peripheral neuropathy     Complicating diabetes co morbidities:   CIRRHOSIS      HPI:   Patient is a 67 y.o. female with a diagnosis of type 2 DM; well controlled on MDI. Also with   Cryptogenic cirrhosis, rheumatoid arthritis, and GERD. Patient was listed for liver transplant on 9/14/18. Patient presented to ED of hospital in [place] with AMS and ENZO. Endocrinology consulted for BG/ DM management.   Of note, profound hypoglycemic event (BG < 20) the night of 11/14/18.            Interval HPI:   Overnight events: remains in ICU. Trach on vent. BG at or slightly above goal with scheduled insulin and correction scale.   Eating:   NPO  Hypoglycemia and intervention: No  Fever: No  TF: Yes at 40 cc/hr     BP (!) 110/59 (BP Location: Left arm, Patient Position: Lying)   Pulse (!) 122   Temp 98.1 °F (36.7 °C) (Oral)   Resp (!) 27   Ht 4' 10" (1.473 m)   Wt 55.5 kg (122 lb 5.7 oz)   SpO2 98%   Breastfeeding? No   BMI 25.57 kg/m²      Labs Reviewed and Include    Recent Labs   Lab 12/10/18  0518   *   CALCIUM 8.7   ALBUMIN 1.7*   PROT 5.2*   *   K 5.2*   CO2 20*      BUN 46*   CREATININE 1.9*   ALKPHOS 247*   ALT 44   AST 65*   BILITOT 10.1*     Lab Results   Component Value Date    WBC 10.96 12/10/2018    HGB 7.6 (L) 12/10/2018    HCT " 24.9 (L) 12/10/2018     (H) 12/10/2018    PLT 67 (L) 12/10/2018     No results for input(s): TSH, FREET4 in the last 168 hours.  Lab Results   Component Value Date    HGBA1C 5.8 (H) 11/10/2018       Nutritional status:   Body mass index is 25.57 kg/m².  Lab Results   Component Value Date    ALBUMIN 1.7 (L) 12/10/2018    ALBUMIN 1.8 (L) 12/09/2018    ALBUMIN 1.8 (L) 12/09/2018     No results found for: PREALBUMIN    Estimated Creatinine Clearance: 24.8 mL/min (A) (based on SCr of 1.9 mg/dL (H)).    Accu-Checks  Recent Labs     12/08/18  2015 12/08/18  2353 12/09/18  0309 12/09/18  0739 12/09/18  1204 12/09/18  1610 12/09/18  2002 12/10/18  0031 12/10/18  0523 12/10/18  0808   POCTGLUCOSE 230* 195* 218* 192* 220* 193* 187* 167* 217* 221*       Current Medications and/or Treatments Impacting Glycemic Control  Immunotherapy:    Immunosuppressants     None        Steroids:   Hormones (From admission, onward)    None        Pressors:    Autonomic Drugs (From admission, onward)    None        Hyperglycemia/Diabetes Medications:   Antihyperglycemics (From admission, onward)    Start     Stop Route Frequency Ordered    12/10/18 0800  insulin aspart U-100 pen 2 Units      -- SubQ Every 24 hours (non-standard times) 12/09/18 0857    12/10/18 0400  insulin aspart U-100 pen 2 Units      -- SubQ Every 24 hours (non-standard times) 12/09/18 0857    12/10/18 0000  insulin aspart U-100 pen 2 Units      -- SubQ Every 24 hours (non-standard times) 12/09/18 0857    12/09/18 2000  insulin aspart U-100 pen 2 Units      -- SubQ Every 24 hours (non-standard times) 12/09/18 0857    12/09/18 1600  insulin aspart U-100 pen 2 Units      -- SubQ Every 24 hours (non-standard times) 12/09/18 0857    12/09/18 1200  insulin aspart U-100 pen 2 Units      -- SubQ Every 24 hours (non-standard times) 12/09/18 0857    12/05/18 0900  insulin detemir U-100 pen 5 Units      -- SubQ 2 times daily 12/05/18 0717    11/26/18 0947  insulin aspart U-100  pen 0-5 Units      -- SubQ Every 4 hours PRN 11/26/18 0848          ASSESSMENT and PLAN    * Hepatic encephalopathy    Managed per primary.        Type 2 diabetes mellitus with hyperglycemia, with long-term current use of insulin    BG goal 140-180    continue Levemir 5 units BID.   continue novolog 2 units every 4 hours while on TF at 40; hold if TF held or rate decreased  Low dose correction scale  BG monitoring every 4 hours    Discharge planning: TBD       Decompensated hepatic cirrhosis    Managed per primary.   Listed for liver transplant.  May impact BG readings       CKD (chronic kidney disease) stage 3, GFR 30-59 ml/min    Caution with insulin stacking  Estimated Creatinine Clearance: 52.4 mL/min (based on SCr of 0.9 mg/dL).         On enteral nutrition    On supplemental TF, elevating BG readings     Acute kidney injury superimposed on CKD    Avoid insulin stacking and hypoglycemia.  CRRT per nephrology   Lab Results   Component Value Date    CREATININE 0.9 12/08/2018                Jadyn Hall NP  Endocrinology  [facility]

## 2018-12-10 NOTE — SUBJECTIVE & OBJECTIVE
Interval History/Significant Events:   NAEON.  AF and HDS, minimal vent settings via trach.  Social work continuing to look into inpatient hospice placement.  Continuing CRRT for volume removal.    Follow-up For: Procedure(s) (LRB):  CREATION, TRACHEOSTOMY (N/A)    Post-Operative Day: 5 Days Post-Op    Objective:     Vital Signs (Most Recent):  Temp: 98.7 °F (37.1 °C) (12/10/18 0315)  Pulse: 101 (12/10/18 0600)  Resp: (!) 21 (12/10/18 0600)  BP: (!) 110/59 (12/10/18 0600)  SpO2: 100 % (12/10/18 0600) Vital Signs (24h Range):  Temp:  [97.8 °F (36.6 °C)-98.7 °F (37.1 °C)] 98.7 °F (37.1 °C)  Pulse:  [] 101  Resp:  [20-35] 21  SpO2:  [90 %-100 %] 100 %  BP: (102-122)/(52-61) 110/59  Arterial Line BP: (109-140)/(37-49) 128/39     Weight: 55.5 kg (122 lb 5.7 oz)  Body mass index is 25.57 kg/m².      Intake/Output Summary (Last 24 hours) at 12/10/2018 0700  Last data filed at 12/9/2018 2351  Gross per 24 hour   Intake 3089.27 ml   Output 3679 ml   Net -589.73 ml       Physical Exam  Constitutional: She appears well-developed and well-nourished.   HENT:   Head: Normocephalic and atraumatic.   Eyes: Pupils are equal, round, and reactive to light.   Spontaneously opens eyes  Jaundice+   Neck: Normal range of motion. No JVD present. No tracheal deviation present.   Cardiovascular: Normal rate, regular rhythm and normal heart sounds.   No murmur heard.  Pulmonary/Chest: She has no wheezes. She has no rales.   On vent, trach in place   Abdominal: Soft. Bowel sounds are normal. She exhibits no distension. There is no tenderness.   Umbilical hernia   Musculoskeletal: Normal range of motion. She exhibits no edema.   Neurological: She is alert.       Vents:  Vent Mode: A/C (12/10/18 0512)  Ventilator Initiated: Yes (11/21/18 2019)  Set Rate: 22 bmp (12/10/18 0512)  Vt Set: 310 mL (12/10/18 0512)  Pressure Support: 10 cmH20 (12/08/18 1153)  PEEP/CPAP: 5 cmH20 (12/10/18 0512)  Oxygen Concentration (%): 40 (12/10/18 0600)  Peak  Airway Pressure: 20 cmH2O (12/10/18 0512)  Plateau Pressure: 21 cmH20 (12/10/18 0512)  Total Ve: 7.91 mL (12/10/18 0512)  F/VT Ratio<105 (RSBI): (!) 61.83 (12/10/18 0512)    Lines/Drains/Airways     Central Venous Catheter Line                 Trialysis (Dialysis) Catheter 11/21/18 1851 left internal jugular 18 days          Drain                 NG/OG Tube 12/07/18 2141 [place] sump 12 Fr. Right nostril 2 days          Airway                 Surgical Airway 12/05/18 1431 Shiley 4 days          Arterial Line                 Arterial Line 11/21/18 1900 Right Radial 18 days                Significant Labs:    CBC/Anemia Profile:  Recent Labs   Lab 12/09/18  0310 12/09/18  1605 12/10/18  0518   WBC 11.91 15.20* 10.96   HGB 7.9* 7.6* 7.6*   HCT 25.2* 24.7* 24.9*   PLT 86* 87* 67*   * 98 100*   RDW 26.7* 27.3* 27.5*        Chemistries:  Recent Labs   Lab 12/09/18  0310 12/09/18  1456 12/09/18  2155 12/10/18  0518     137 134* 136 132*   K 4.9  4.9 4.9 5.1 5.2*     106 106 106 105   CO2 22*  22* 20* 20* 20*   BUN 28*  28* 35* 39* 46*   CREATININE 1.3  1.3 1.6* 1.8* 1.9*   CALCIUM 8.4*  8.4* 8.5* 8.7 8.7   ALBUMIN 1.9*  1.9* 1.8* 1.8* 1.7*   PROT 5.3*  --   --  5.2*   BILITOT 10.3*  --   --  10.1*   ALKPHOS 226*  --   --  247*   ALT 41  --   --  44   AST 72*  --   --  65*   MG 2.3 2.2 2.2 2.3   PHOS 4.2  4.2 4.7* 5.1* 5.4*       All pertinent labs within the past 24 hours have been reviewed.    Significant Imaging:  I have reviewed and interpreted all pertinent imaging results/findings within the past 24 hours.

## 2018-12-10 NOTE — SUBJECTIVE & OBJECTIVE
"Interval HPI:   Overnight events: remains in ICU. Trach on vent. BG at or slightly above goal with scheduled insulin and correction scale.   Eating:   NPO  Hypoglycemia and intervention: No  Fever: No  TF: Yes at 40 cc/hr     BP (!) 110/59 (BP Location: Left arm, Patient Position: Lying)   Pulse (!) 122   Temp 98.1 °F (36.7 °C) (Oral)   Resp (!) 27   Ht 4' 10" (1.473 m)   Wt 55.5 kg (122 lb 5.7 oz)   SpO2 98%   Breastfeeding? No   BMI 25.57 kg/m²     Labs Reviewed and Include    Recent Labs   Lab 12/10/18  0518   *   CALCIUM 8.7   ALBUMIN 1.7*   PROT 5.2*   *   K 5.2*   CO2 20*      BUN 46*   CREATININE 1.9*   ALKPHOS 247*   ALT 44   AST 65*   BILITOT 10.1*     Lab Results   Component Value Date    WBC 10.96 12/10/2018    HGB 7.6 (L) 12/10/2018    HCT 24.9 (L) 12/10/2018     (H) 12/10/2018    PLT 67 (L) 12/10/2018     No results for input(s): TSH, FREET4 in the last 168 hours.  Lab Results   Component Value Date    HGBA1C 5.8 (H) 11/10/2018       Nutritional status:   Body mass index is 25.57 kg/m².  Lab Results   Component Value Date    ALBUMIN 1.7 (L) 12/10/2018    ALBUMIN 1.8 (L) 12/09/2018    ALBUMIN 1.8 (L) 12/09/2018     No results found for: PREALBUMIN    Estimated Creatinine Clearance: 24.8 mL/min (A) (based on SCr of 1.9 mg/dL (H)).    Accu-Checks  Recent Labs     12/08/18  2015 12/08/18  2353 12/09/18  0309 12/09/18  0739 12/09/18  1204 12/09/18  1610 12/09/18  2002 12/10/18  0031 12/10/18  0523 12/10/18  0808   POCTGLUCOSE 230* 195* 218* 192* 220* 193* 187* 167* 217* 221*       Current Medications and/or Treatments Impacting Glycemic Control  Immunotherapy:    Immunosuppressants     None        Steroids:   Hormones (From admission, onward)    None        Pressors:    Autonomic Drugs (From admission, onward)    None        Hyperglycemia/Diabetes Medications:   Antihyperglycemics (From admission, onward)    Start     Stop Route Frequency Ordered    12/10/18 0800  insulin " aspart U-100 pen 2 Units      -- SubQ Every 24 hours (non-standard times) 12/09/18 0857    12/10/18 0400  insulin aspart U-100 pen 2 Units      -- SubQ Every 24 hours (non-standard times) 12/09/18 0857    12/10/18 0000  insulin aspart U-100 pen 2 Units      -- SubQ Every 24 hours (non-standard times) 12/09/18 0857    12/09/18 2000  insulin aspart U-100 pen 2 Units      -- SubQ Every 24 hours (non-standard times) 12/09/18 0857    12/09/18 1600  insulin aspart U-100 pen 2 Units      -- SubQ Every 24 hours (non-standard times) 12/09/18 0857    12/09/18 1200  insulin aspart U-100 pen 2 Units      -- SubQ Every 24 hours (non-standard times) 12/09/18 0857    12/05/18 0900  insulin detemir U-100 pen 5 Units      -- SubQ 2 times daily 12/05/18 0717    11/26/18 0947  insulin aspart U-100 pen 0-5 Units      -- SubQ Every 4 hours PRN 11/26/18 0848

## 2018-12-10 NOTE — PROGRESS NOTES
CYNDEE contacted Sabian (ph#[phone]) at [facility] for follow-up of medical records that were sent to their facility. Sabina reported that the medical records are being reviewed at this time. In addition, Sabina informed CYNDEE that there are currently no beds available on ICU at their facility. This SW provided Sabina with her direct contact to provide updates. CYNDEE contacted patient's spouse, Shahzad Hobson ph#[phone] and informed him the above information. Mr. Hobson expressed that [facility] is the patient's last hope to return home to [place]. CYNDEE will follow up with Sabina as appropriate. CYNDEE remains available.

## 2018-12-10 NOTE — ASSESSMENT & PLAN NOTE
66yo F liver-listed, stepped up on 11/21 for respiratory failure with worsening respiratory acidosis     Plan:  Neuro:   -Pain control: prn fentanyl while intubated  -Continue sedation holiday as tolerated, awake and alert without fighting vent   -Followed all commands off sedation     Pulmonary:   -intubated on arrival to SICU 11/21  Vent Mode: A/C  Oxygen Concentration (%):  [] 40  Resp Rate Total:  [21 br/min-37 br/min] 25 br/min  Vt Set:  [310 mL] 310 mL  PEEP/CPAP:  [5 cmH20] 5 cmH20  Mean Airway Pressure:  [7.9 rwD98-16 cmH20] 7.9 cmH20  -daily CXR - evidence of significant bilateral consolidation   -ABGs daily and prn  -continuous pulse ox   - Failed SBT with Pulm 11/25  -Trach in place    Cardiac:  -SBP > 100 goal  -Heart failure following, appreciate recs   - Afib with RVR 11/23/18, broke out with 2x 5mg doses of IV metop, her pressure was stable throughout  - Went back into Afib with RVR on 11/25. Given IV metoprolol 5mg q3. Did not resolve, started on diltiazem drip. Converted out of Afib around 3am. dilt drip stopped at that time.   -continue dilt gtt as needed for Afib, try to prevent Afib by administering metoprolol prior to CRRT    Renal:   -Mendoza in place  -UOP minimal  -Nephrology following, appreciate recommendations  -Started CRRT 11/24/18. Will continue nocturnal CRRT     Fluids/Electrolytes/Nutrition/GI:   -Nutritional status: NPO while intubated  -Continue TF @ 40cc/hour, will add fiber today given diarrhea   -replace lytes PRN  -will hold Lactulose enemas TID  -bloody bowel movement today, GI following appreciate recs    Hematology/Oncology:  -Hgb stable following 1 unit pRBCs, continue to monitor  -continue to monitor. Transfuse as needed     Infectious Disease:   -Afebrile  -Trend WBC  -Abx: Zosyn.   -Sending C. Diff given frequent diarrhea    Endocrine:  -Glucose goal of 140-180  -Endocrine following, see recommendations    Dispo:  -Continue care in the ICU setting - will attempt  transfer to a facility closer to home to assist with palliation  -continue CRRT

## 2018-12-10 NOTE — ASSESSMENT & PLAN NOTE
66yo F liver-listed, stepped up on 11/21 for respiratory failure with worsening respiratory acidosis     Plan:  Neuro:   -Pain control: prn fentanyl while intubated  -Continue sedation holiday as tolerated, awake and alert without fighting vent   -Followed all commands off sedation     Pulmonary:   -intubated on arrival to SICU 11/21  Vent Mode: A/C  Oxygen Concentration (%):  [] 40  Resp Rate Total:  [22 br/min-38 br/min] 33 br/min  Vt Set:  [310 mL] 310 mL  PEEP/CPAP:  [5 cmH20] 5 cmH20  Mean Airway Pressure:  [7.9 vrS02-36 cmH20] 14 cmH20  -daily CXR - evidence of significant bilateral consolidation   -ABGs daily and prn  -continuous pulse ox   - Failed SBT with Pulm 11/25    -Trach in place    Cardiac:  -SBP > 100 goal  -Heart failure following, appreciate recs   - Afib with RVR 11/23/18, broke out with 2x 5mg doses of IV metop, her pressure was stable throughout  - Went back into Afib with RVR on 11/25. Given IV metoprolol 5mg q3. Did not resolve, started on diltiazem drip. Converted out of Afib around 3am. dilt drip stopped at that time.   -continue dilt gtt as needed for Afib, try to prevent Afib by administering metoprolol prior to CRRT    Renal:   -Mendoza in place  -UOP minimal  -Nephrology following, appreciate recommendations  -Started CRRT 11/24/18. Will continue nocturnal CRRT     Fluids/Electrolytes/Nutrition/GI:   -Nutritional status: NPO while intubated  -Continue TF @ 40cc/hour, will add fiber given diarrhea   -replace lytes PRN  -will hold Lactulose enemas TID    Hematology/Oncology:  -Hgb stable following 1 unit pRBCs, continue to monitor  -continue to monitor. Transfuse as needed     Infectious Disease:   -Afebrile  -Trend WBC  -Abx: Zosyn.   -Sending C. Diff given frequent diarrhea    Endocrine:  -Glucose goal of 140-180  -Endocrine following, see recommendations    Dispo:  -Continue care in the ICU setting - will attempt transfer to a facility closer to home to assist with  palliation  -continue CRRT

## 2018-12-10 NOTE — PROGRESS NOTES
[facility]  Liver Transplant  Progress Note    Patient Name: Krystal Hobson  MRN: [id number]  Admission Date: 11/10/2018  Hospital Length of Stay: 30 days  Code Status: Full Code  Primary Care Provider: Courtney Joe MD    Subjective:     Interval History:  No acute events overnight, remains afebrile, vital signs stable. Nightly CRRT continues, occasional Afib noted while on CRRT. No new issues, up in cardiac chair this morning.     Scheduled Meds:   diltiaZEM  30 mg Oral Q8H    famotidine  20 mg Oral QHS    fluconazole  200 mg Oral Daily    guaifenesin 100 mg/5 ml  200 mg Per OG tube Q6H    heparin (porcine)  5,000 Units Subcutaneous Q12H    insulin aspart U-100  2 Units Subcutaneous Q24H    insulin aspart U-100  2 Units Subcutaneous Q24H    insulin aspart U-100  2 Units Subcutaneous Q24H    insulin aspart U-100  2 Units Subcutaneous Q24H    insulin aspart U-100  2 Units Subcutaneous Q24H    insulin aspart U-100  2 Units Subcutaneous Q24H    insulin detemir U-100  5 Units Subcutaneous BID    lactulose  15 g Per OG tube BID    rifAXImin  550 mg Oral BID     Continuous Infusions:  PRN Meds:acetaminophen, dextrose 50%, glucagon (human recombinant), HYDROmorphone, insulin aspart U-100, k phos di & mono-sod phos mono, lactulose, lactulose, metoprolol, ondansetron, simethicone, sodium chloride 0.9%    Review of Systems   All other systems reviewed and are negative.    Objective:     Vital Signs (Most Recent):  Temp: 98.1 °F (36.7 °C) (12/10/18 0700)  Pulse: 110 (12/10/18 1054)  Resp: (!) 29 (12/10/18 1054)  BP: (!) 110/59 (12/10/18 0600)  SpO2: 98 % (12/10/18 1054) Vital Signs (24h Range):  Temp:  [97.8 °F (36.6 °C)-98.7 °F (37.1 °C)] 98.1 °F (36.7 °C)  Pulse:  [] 110  Resp:  [20-35] 29  SpO2:  [90 %-100 %] 98 %  BP: (102-122)/(53-61) 110/59  Arterial Line BP: (109-140)/(37-49) 126/39     Weight: 55.5 kg (122 lb 5.7 oz)  Body mass index is 25.57 kg/m².    Intake/Output - Last 3 Shifts        12/08 0700 - 12/09 0659 12/09 0700 - 12/10 0659 12/10 0700 - 12/11 0659    I.V. (mL/kg)  2419.3 (43.6)     NG/GT 1140 710 210    Total Intake(mL/kg) 1140 (20.5) 3129.3 (56.4) 210 (3.8)    Other  3679     Stool  0     Total Output  3679     Net +1140 -549.7 +210           Stool Occurrence 3 x 10 x 1 x          Physical Exam   Constitutional: She appears well-developed and well-nourished.   HENT:   Head: Normocephalic and atraumatic.   Eyes: Pupils are equal, round, and reactive to light.   Spontaneously opens eyes  Jaundice+   Neck: Normal range of motion. No JVD present. No tracheal deviation present.   Cardiovascular: Normal rate, regular rhythm and normal heart sounds.   No murmur heard.  Pulmonary/Chest: She has no wheezes. She has no rales.   On vent, trach in place   Abdominal: Soft. Bowel sounds are normal. She exhibits no distension. There is no tenderness.   Umbilical hernia   Musculoskeletal: Normal range of motion. She exhibits no edema.   Neurological: She is alert.       Laboratory:  Immunosuppressants     None        CBC:   Recent Labs   Lab 12/10/18  0518   WBC 10.96   RBC 2.48*   HGB 7.6*   HCT 24.9*   PLT 67*   *   MCH 30.6   MCHC 30.5*     CMP:   Recent Labs   Lab 12/10/18  0518   *   CALCIUM 8.7   ALBUMIN 1.7*   PROT 5.2*   *   K 5.2*   CO2 20*      BUN 46*   CREATININE 1.9*   ALKPHOS 247*   ALT 44   AST 65*   BILITOT 10.1*     Coagulation:   Recent Labs   Lab 12/10/18  0518   INR 1.7*     ABGs:   Recent Labs   Lab 12/10/18  0418   PH 7.368   PCO2 37.0   HCO3 21.3*   POCSATURATED 99   BE -4     Labs within the past 24 hours have been reviewed.    Diagnostic Results:  I have personally reviewed all pertinent imaging studies.    Assessment/Plan:   Krystal Hobson is a 68 y.o. female     Pulmonary   Acute hypercapnic respiratory failure    66yo F liver-listed, stepped up on 11/21 for respiratory failure with worsening respiratory acidosis     Plan:  Neuro:   -Pain control:  prn fentanyl while intubated  -Continue sedation holiday as tolerated, awake and alert without fighting vent   -Followed all commands off sedation     Pulmonary:   -intubated on arrival to SICU 11/21  Vent Mode: A/C  Oxygen Concentration (%):  [] 40  Resp Rate Total:  [22 br/min-38 br/min] 33 br/min  Vt Set:  [310 mL] 310 mL  PEEP/CPAP:  [5 cmH20] 5 cmH20  Mean Airway Pressure:  [7.9 kgH28-85 cmH20] 14 cmH20  -daily CXR - evidence of significant bilateral consolidation   -ABGs daily and prn  -continuous pulse ox   - Failed SBT with Pulm 11/25    -Trach in place    Cardiac:  -SBP > 100 goal  -Heart failure following, appreciate recs   - Afib with RVR 11/23/18, broke out with 2x 5mg doses of IV metop, her pressure was stable throughout  - Went back into Afib with RVR on 11/25. Given IV metoprolol 5mg q3. Did not resolve, started on diltiazem drip. Converted out of Afib around 3am. dilt drip stopped at that time.   -continue dilt gtt as needed for Afib, try to prevent Afib by administering metoprolol prior to CRRT    Renal:   -Mendoza in place  -UOP minimal  -Nephrology following, appreciate recommendations  -Started CRRT 11/24/18. Will continue nocturnal CRRT     Fluids/Electrolytes/Nutrition/GI:   -Nutritional status: NPO while intubated  -Continue TF @ 40cc/hour, will add fiber given diarrhea   -replace lytes PRN  -will hold Lactulose enemas TID    Hematology/Oncology:  -Hgb stable following 1 unit pRBCs, continue to monitor  -continue to monitor. Transfuse as needed     Infectious Disease:   -Afebrile  -Trend WBC  -Abx: Zosyn.   -Sending C. Diff given frequent diarrhea    Endocrine:  -Glucose goal of 140-180  -Endocrine following, see recommendations    Dispo:  -Continue care in the ICU setting - will attempt transfer to a facility closer to home to assist with palliation  -continue CRRT              The patients clinical status was discussed at multidisplinary rounds, involving transplant surgery, transplant  medicine, pharmacy, nursing, nutrition, and social work.    Lien Angela MD  Liver Transplant  [facility]

## 2018-12-11 NOTE — ASSESSMENT & PLAN NOTE
Avoid insulin stacking and hypoglycemia.  CRRT per nephrology   Lab Results   Component Value Date    CREATININE 1.1 12/11/2018

## 2018-12-11 NOTE — PROGRESS NOTES
CYNDEE contacted [facility] ([facility]'s direct line ph#[phone]) to follow-up on patient transfer. Sabina reported that medical records were still being reviewed and at this time there is no beds available for inpatient to inpatient transfer in ICU. Per, Sabina there are patient's currently on hold in their emergency room that need beds in ICU. Sabina reiterated that case management or the transfer center will follow-up with CYNDEE regarding transfer status. CYNDEE remains available and will continue to follow as appropriate.

## 2018-12-11 NOTE — PROGRESS NOTES
[facility]  Critical Care - Surgery  Progress Note    Patient Name: Krystal Hobosn  MRN: [id number]  Admission Date: 11/10/2018  Hospital Length of Stay: 31 days  Code Status: Full Code  Attending Provider: Abrahan Montelongo MD  Primary Care Provider: Courtney Joe MD   Principal Problem: Hepatic encephalopathy    Subjective:     Hospital/ICU Course:  No notes on file    Interval History/Significant Events:   NAEON.  AF and HDS, minimal vent settings via trach.  Social work spoke with [facility] who is reviewing her records. Remains anuric.  Continuing CRRT for volume removal.    Follow-up For: Procedure(s) (LRB):  CREATION, TRACHEOSTOMY (N/A)        Objective:     Vital Signs (Most Recent):  Temp: 97.8 °F (36.6 °C) (12/11/18 0430)  Pulse: (!) 127 (12/11/18 0810)  Resp: (!) 23 (12/11/18 0810)  BP: (!) 99/48 (12/11/18 0600)  SpO2: 100 % (12/11/18 0810) Vital Signs (24h Range):  Temp:  [97.8 °F (36.6 °C)-98.5 °F (36.9 °C)] 97.8 °F (36.6 °C)  Pulse:  [] 127  Resp:  [22-33] 23  SpO2:  [98 %-100 %] 100 %  BP: ()/(48-58) 99/48  Arterial Line BP: ()/(29-43) 115/41     Weight: 55.5 kg (122 lb 5.7 oz)  Body mass index is 25.57 kg/m².      Intake/Output Summary (Last 24 hours) at 12/11/2018 0843  Last data filed at 12/11/2018 0600  Gross per 24 hour   Intake 2561.67 ml   Output 2242 ml   Net 319.67 ml       Physical Exam    Constitutional: She appears well-developed and well-nourished.   HENT:   Head: Normocephalic and atraumatic.   Eyes: Pupils are equal, round, and reactive to light.   Spontaneously opens eyes  Jaundice+   Neck: Normal range of motion. No JVD present. No tracheal deviation present.   Cardiovascular: Normal rate, regular rhythm and normal heart sounds.   No murmur heard.  Pulmonary/Chest: She has no wheezes. She has no rales.   On vent, trach in place   Abdominal: Soft. Bowel sounds are normal. She exhibits no distension. There is no tenderness.   Umbilical hernia    Musculoskeletal: Normal range of motion. She exhibits no edema.   Neurological: She is alert.       Vents:  Vent Mode: A/C (12/11/18 0810)  Ventilator Initiated: Yes (11/21/18 1839)  Set Rate: 22 bmp (12/11/18 0810)  Vt Set: 310 mL (12/11/18 0810)  Pressure Support: 10 cmH20 (12/08/18 1153)  PEEP/CPAP: 5 cmH20 (12/11/18 0810)  Oxygen Concentration (%): 40 (12/11/18 0810)  Peak Airway Pressure: 22 cmH2O (12/11/18 0810)  Plateau Pressure: 21 cmH20 (12/11/18 0810)  Total Ve: 9.05 mL (12/11/18 0810)  F/VT Ratio<105 (RSBI): (!) 99.57 (12/11/18 0810)    Lines/Drains/Airways     Central Venous Catheter Line                 Trialysis (Dialysis) Catheter 11/21/18 1851 left internal jugular 19 days          Drain                 NG/OG Tube 12/07/18 2141 [place] sump 12 Fr. Right nostril 3 days          Airway                 Surgical Airway 12/05/18 1431 Shiley 5 days          Arterial Line                 Arterial Line 11/21/18 1900 Right Radial 19 days                Significant Labs:    CBC/Anemia Profile:  Recent Labs   Lab 12/10/18  0518 12/10/18  1827 12/11/18  0340   WBC 10.96 12.66 14.27*   HGB 7.6* 7.3* 7.2*   HCT 24.9* 23.9* 22.9*   PLT 67* 78* 79*   * 98 96   RDW 27.5* 27.8* 27.9*        Chemistries:  Recent Labs   Lab 12/10/18  0518 12/10/18  1512 12/10/18  2205 12/11/18  0340   * 135* 137 133*  133*   K 5.2* 5.6* 5.1 4.7  4.7    107 106 103  103   CO2 20* 21* 23 23  23   BUN 46* 53* 26* 12  12   CREATININE 1.9* 2.2* 1.2 0.7  0.7   CALCIUM 8.7 8.7 8.0* 7.6*  7.6*   ALBUMIN 1.7* 1.7* 1.7* 1.7*  1.7*   PROT 5.2*  --   --  5.1*   BILITOT 10.1*  --   --  9.8*   ALKPHOS 247*  --   --  256*   ALT 44  --   --  49*   AST 65*  --   --  82*   MG 2.3 2.6 1.9 2.0   PHOS 5.4* 5.6* 3.4 2.2*  2.2*       All pertinent labs within the past 24 hours have been reviewed.    Significant Imaging:  I have reviewed and interpreted all pertinent imaging results/findings within the past 24  hours.    Assessment/Plan:     Decompensated hepatic cirrhosis    66yo F liver-listed, stepped up on 11/21 for respiratory failure with worsening respiratory acidosis. Failed SBT. Plan for trach.      Neuro:   -Pain control: prn dilaudid while intubated  -Sedation off   -continue lactulose/rifaximin  -Following commands     Pulmonary:   -intubated on arrival to SICU 11/21, failed SBT  - s/p trach 12/5  -ABGs daily and prn  -duonebs prn  -continue levoalbuterol  -working on weaning vent settings during day     Cardiac:  -SBP > 100 goal  -JACINDA 11/20 - elevated PA pressures   -Heart failure following, appreciate recs   -dilt gtt as needed for A-Fib, currently on 30mg dilt po q8hr       Renal:   -Mendoza in place  -Continue to monitor UOP: oliguria/anuria    -CRRT per nephrology  -Nephrology following, appreciate recommendations        Fluids/Electrolytes/Nutrition/GI:   -Continue TFs  -replace lytes PRN  -continue lactulose   -bowel regimen     Hematology/Oncology:  -Monitor CBC -- Hgb 7.7(7.5)-->8.2/26.4-->8.0/25.1-->7.9/25.2-->7.2/22.9  -continue to monitor. Transfuse as needed per transplant      Infectious Disease:   -Afebrile   -f/u bcx and resp cx  -WBC 10.7-->9.2-->10.0-->12.0-->11.9-->14.3  -Abx: Diflucan to cover HCAP       Endocrine:  Endocrinology following   BG goal 140-180  continue Levemir 5 units BID.   Start novolog 2 units every 4 hours while on TF at 40; hold if TF held or rate decreased  BG monitoring every 4 hours and low dose correction scale.        PPx: famotidine, SQH     Dispo:  -Pending placement at hospice vs LTACH. Appreciate SW/CM assistance.  -Primary: Transplant         Critical care was time spent personally by me on the following activities: development of treatment plan with patient or surrogate and bedside caregivers, discussions with consultants, evaluation of patient's response to treatment, examination of patient, ordering and performing treatments and interventions, ordering and  review of laboratory studies, ordering and review of radiographic studies, pulse oximetry, re-evaluation of patient's condition.  This critical care time did not overlap with that of any other provider or involve time for any procedures.     Jose Wallace MD  Critical Care - Surgery  [facility]

## 2018-12-11 NOTE — SUBJECTIVE & OBJECTIVE
Interval History:   Events over the weekend noted, Tolerated SCuff x 12 hrs. K was rising and CO2 was decreasing. Continues with mechanical ventilation, placed on spontaneous but didn't tolerated. Still not making urine.     Review of patient's allergies indicates:  No Known Allergies  Current Facility-Administered Medications   Medication Frequency    0.9%  NaCl infusion (CRRT USE ONLY) Continuous    acetaminophen tablet 650 mg Q8H PRN    dextrose 50% injection 12.5 g PRN    diltiaZEM tablet 30 mg Q8H    famotidine tablet 20 mg QHS    fluconazole tablet 200 mg Daily    glucagon (human recombinant) injection 1 mg PRN    guaifenesin 100 mg/5 ml syrup 200 mg Q6H    heparin (porcine) injection 5,000 Units Q12H    HYDROmorphone injection 0.2 mg Q3H PRN    insulin aspart U-100 pen 0-5 Units Q4H PRN    insulin aspart U-100 pen 2 Units Q24H    insulin aspart U-100 pen 2 Units Q24H    insulin aspart U-100 pen 2 Units Q24H    insulin aspart U-100 pen 2 Units Q24H    insulin aspart U-100 pen 2 Units Q24H    insulin aspart U-100 pen 2 Units Q24H    insulin detemir U-100 pen 5 Units BID    k phos di & mono-sod phos mono 250 mg tablet 2 tablet TID PRN    lactulose 10 gram/15 mL solution (enema) 200 g TID PRN    lactulose 20 gram/30 mL solution Soln 15 g BID    lactulose 20 gram/30 mL solution Soln 30 g Q6H PRN    magnesium sulfate 2g in water 50mL IVPB (premix) PRN    metoprolol injection 2.5 mg Q5 Min PRN    ondansetron disintegrating tablet 8 mg Q8H PRN    rifAXIMin tablet 550 mg BID    simethicone chewable tablet 80 mg TID PRN    sodium chloride 0.9% flush 3 mL PRN       Objective:     Vital Signs (Most Recent):  Temp: 98.1 °F (36.7 °C) (12/10/18 1500)  Pulse: (!) 118 (12/10/18 1800)  Resp: (!) 28 (12/10/18 1800)  BP: (!) 123/58 (12/10/18 1600)  SpO2: 100 % (12/10/18 1800)  O2 Device (Oxygen Therapy): ventilator (12/10/18 1700) Vital Signs (24h Range):  Temp:  [97.9 °F (36.6 °C)-98.7 °F (37.1 °C)]  98.1 °F (36.7 °C)  Pulse:  [] 118  Resp:  [20-35] 28  SpO2:  [90 %-100 %] 100 %  BP: (107-123)/(53-59) 123/58  Arterial Line BP: (109-144)/(35-49) 124/37     Weight: 55.5 kg (122 lb 5.7 oz) (12/04/18 0353)  Body mass index is 25.57 kg/m².  Body surface area is 1.51 meters squared.    I/O last 3 completed shifts:  In: 3639.3 [I.V.:2419.3; NG/GT:1220]  Out: 3679 [Other:3679]    Physical Exam   HENT:   Head: Normocephalic.   Right Ear: External ear normal.   Left Ear: External ear normal.   Nose: Nose normal.   Eyes: Right eye exhibits no discharge. Left eye exhibits no discharge.   Neck:   Tracheostomy    Cardiovascular: An irregular rhythm present.   Murmur heard.  Pulmonary/Chest: She has rales.   Musculoskeletal: She exhibits no edema.   Skin: Skin is warm.       Significant Labs:  ABGs:   Recent Labs   Lab 12/10/18  0418   PH 7.368   PCO2 37.0   HCO3 21.3*   POCSATURATED 99   BE -4     BMP:   Recent Labs   Lab 12/10/18  1512   *      CO2 21*   BUN 53*   CREATININE 2.2*   CALCIUM 8.7   MG 2.6     CBC:   Recent Labs   Lab 12/10/18  0518   WBC 10.96   RBC 2.48*   HGB 7.6*   HCT 24.9*   PLT 67*   *   MCH 30.6   MCHC 30.5*     CMP:   Recent Labs   Lab 12/10/18  0518 12/10/18  1512   * 226*   CALCIUM 8.7 8.7   ALBUMIN 1.7* 1.7*   PROT 5.2*  --    * 135*   K 5.2* 5.6*   CO2 20* 21*    107   BUN 46* 53*   CREATININE 1.9* 2.2*   ALKPHOS 247*  --    ALT 44  --    AST 65*  --    BILITOT 10.1*  --      All labs within the past 24 hours have been reviewed.

## 2018-12-11 NOTE — PROGRESS NOTES
[facility]  Liver Transplant  Progress Note    Patient Name: Krystal Hobson  MRN: [id number]  Admission Date: 11/10/2018  Hospital Length of Stay: 31 days  Code Status: Full Code  Primary Care Provider: Courtney Joe MD    Subjective:     Interval History:  No acute events overnight, remains afebrile, vital signs stable. Nightly CRRT continues, occasional Afib noted while on CRRT. No new issues, up in cardiac chair again this morning.     Scheduled Meds:   diltiaZEM  30 mg Oral Q8H    famotidine  20 mg Oral QHS    fluconazole  200 mg Oral Daily    guaifenesin 100 mg/5 ml  200 mg Per OG tube Q6H    heparin (porcine)  5,000 Units Subcutaneous Q12H    insulin aspart U-100  2 Units Subcutaneous Q24H    insulin aspart U-100  2 Units Subcutaneous Q24H    insulin aspart U-100  2 Units Subcutaneous Q24H    insulin aspart U-100  2 Units Subcutaneous Q24H    insulin aspart U-100  2 Units Subcutaneous Q24H    insulin aspart U-100  2 Units Subcutaneous Q24H    insulin detemir U-100  5 Units Subcutaneous BID    lactulose  15 g Per OG tube BID    rifAXImin  550 mg Oral BID     Continuous Infusions:   sodium chloride 0.9% 200 mL/hr at 12/11/18 0400     PRN Meds:sodium chloride, acetaminophen, dextrose 50%, glucagon (human recombinant), HYDROmorphone, insulin aspart U-100, k phos di & mono-sod phos mono, lactulose, lactulose, magnesium sulfate IVPB, metoprolol, ondansetron, simethicone, sodium chloride 0.9%    Review of Systems   All other systems reviewed and are negative.    Objective:     Vital Signs (Most Recent):  Temp: 99.2 °F (37.3 °C) (12/11/18 0700)  Pulse: (!) 123 (12/11/18 1100)  Resp: (!) 32 (12/11/18 1100)  BP: (!) 113/53 (12/11/18 0800)  SpO2: 100 % (12/11/18 1100) Vital Signs (24h Range):  Temp:  [97.8 °F (36.6 °C)-99.2 °F (37.3 °C)] 99.2 °F (37.3 °C)  Pulse:  [] 123  Resp:  [22-39] 32  SpO2:  [97 %-100 %] 100 %  BP: ()/(48-58) 113/53  Arterial Line BP: ()/(29-43)  102/39     Weight: 55.5 kg (122 lb 5.7 oz)  Body mass index is 25.57 kg/m².    Intake/Output - Last 3 Shifts       12/09 0700 - 12/10 0659 12/10 0700 - 12/11 0659 12/11 0700 - 12/12 0659    I.V. (mL/kg) 2419.3 (43.6) 2016.7 (36.3)     NG/ 625 160    Total Intake(mL/kg) 3129.3 (56.4) 2641.7 (47.6) 160 (2.9)    Other 3679 2242     Stool 0 0     Total Output 3679 2242     Net -549.7 +399.7 +160           Stool Occurrence 10 x 4 x           Physical Exam   Constitutional: She appears well-developed and well-nourished.   HENT:   Head: Normocephalic and atraumatic.   Eyes: Pupils are equal, round, and reactive to light.   Spontaneously opens eyes  Jaundice+   Neck: Normal range of motion. No JVD present. No tracheal deviation present.   Cardiovascular: Normal rate, regular rhythm and normal heart sounds.   No murmur heard.  Pulmonary/Chest: She has no wheezes. She has no rales.   On vent, trach in place   Abdominal: Soft. Bowel sounds are normal. She exhibits no distension. There is no tenderness.   Umbilical hernia   Musculoskeletal: Normal range of motion. She exhibits no edema.   Neurological: She is alert.       Laboratory:  Immunosuppressants     None        CBC:   Recent Labs   Lab 12/11/18  0340   WBC 14.27*   RBC 2.38*   HGB 7.2*   HCT 22.9*   PLT 79*   MCV 96   MCH 30.3   MCHC 31.4*     CMP:   Recent Labs   Lab 12/11/18  0340   *  127*   CALCIUM 7.6*  7.6*   ALBUMIN 1.7*  1.7*   PROT 5.1*   *  133*   K 4.7  4.7   CO2 23  23     103   BUN 12  12   CREATININE 0.7  0.7   ALKPHOS 256*   ALT 49*   AST 82*   BILITOT 9.8*     Coagulation:   Recent Labs   Lab 12/11/18  0340   INR 1.7*     ABGs:   Recent Labs   Lab 12/11/18  0426   PH 7.450   PCO2 35.5   HCO3 24.6   POCSATURATED 99   BE 1     Labs within the past 24 hours have been reviewed.    Diagnostic Results:  I have personally reviewed all pertinent imaging studies.    Assessment/Plan:   Krystal Hobson is a 68 y.o. female      Pulmonary   Acute hypercapnic respiratory failure    68yo F liver-listed, stepped up on 11/21 for respiratory failure with worsening respiratory acidosis     Plan:  Neuro:   -Pain control: prn fentanyl while intubated  -Continue sedation holiday as tolerated, awake and alert without fighting vent   -Followed all commands off sedation     Pulmonary:   -intubated on arrival to SICU 11/21  Vent Mode: SIMV  Oxygen Concentration (%):  [39-41] 40  Resp Rate Total:  [22 br/min-42 br/min] 34 br/min  Vt Set:  [310 mL] 310 mL  PEEP/CPAP:  [5 cmH20] 5 cmH20  Pressure Support:  [10 cmH20] 10 cmH20  Mean Airway Pressure:  [9.1 hbO13-51 cmH20] 9.1 cmH20  -daily CXR - evidence of significant bilateral consolidation   -ABGs daily and prn  -continuous pulse ox    -Trach in place    Cardiac:  -SBP > 100 goal  -Heart failure following, appreciate recs   - Afib with RVR 11/23/18, broke out with 2x 5mg doses of IV metop, her pressure was stable throughout  - Went back into Afib with RVR on 11/25. Given IV metoprolol 5mg q3. Did not resolve, started on diltiazem drip. Converted out of Afib around 3am. dilt drip stopped at that time.   -continue dilt gtt as needed for Afib, try to prevent Afib by administering metoprolol prior to CRRT    Renal:   -Mendoza in place  -UOP minimal  -Nephrology following, appreciate recommendations  -Started CRRT 11/24/18. Will continue nocturnal CRRT     Fluids/Electrolytes/Nutrition/GI:   -Nutritional status: NPO while intubated  -Continue TF @ 40cc/hour, will add fiber given diarrhea   -replace lytes PRN  -will hold Lactulose enemas TID    Hematology/Oncology:  -Hgb decreased again today, will plan for transfusion of 1 unit pRBCs with CRRT  -continue to monitor. Transfuse as needed     Infectious Disease:   -Afebrile  -Trend WBC  -Abx: Zosyn.   -Sending C. Diff given frequent diarrhea    Endocrine:  -Glucose goal of 140-180  -Endocrine following, see recommendations    Dispo:  -Continue care in the ICU  setting - will attempt transfer to a facility closer to home to assist with palliation  -continue CRRT                    The patients clinical status was discussed at multidisplinary rounds, involving transplant surgery, transplant medicine, pharmacy, nursing, nutrition, and social work.    Lien Angela MD  Liver Transplant  [facility]

## 2018-12-11 NOTE — SUBJECTIVE & OBJECTIVE
"Interval HPI:   Overnight events: Remains in ICU, trach on vent. BG within goal overnight with scheduled insulin and correction scale.  Eating:   NPO  Nausea: No  Hypoglycemia and intervention: No  Fever: No  TPN and/or TF: Yes  TF and rate: Novasource Renal at 40 cc/hr (goal)    BP (!) 99/48 (BP Location: Left arm, Patient Position: Lying)   Pulse (!) 127   Temp 97.8 °F (36.6 °C) (Oral)   Resp (!) 23   Ht 4' 10" (1.473 m)   Wt 55.5 kg (122 lb 5.7 oz)   SpO2 100%   Breastfeeding? No   BMI 25.57 kg/m²     Labs Reviewed and Include    Recent Labs   Lab 12/11/18  0340   *  127*   CALCIUM 7.6*  7.6*   ALBUMIN 1.7*  1.7*   PROT 5.1*   *  133*   K 4.7  4.7   CO2 23  23     103   BUN 12  12   CREATININE 0.7  0.7   ALKPHOS 256*   ALT 49*   AST 82*   BILITOT 9.8*     Lab Results   Component Value Date    WBC 14.27 (H) 12/11/2018    HGB 7.2 (L) 12/11/2018    HCT 22.9 (L) 12/11/2018    MCV 96 12/11/2018    PLT 79 (L) 12/11/2018     No results for input(s): TSH, FREET4 in the last 168 hours.  Lab Results   Component Value Date    HGBA1C 5.8 (H) 11/10/2018       Nutritional status:   Body mass index is 25.57 kg/m².  Lab Results   Component Value Date    ALBUMIN 1.7 (L) 12/11/2018    ALBUMIN 1.7 (L) 12/11/2018    ALBUMIN 1.7 (L) 12/10/2018     No results found for: PREALBUMIN    Estimated Creatinine Clearance: 67.4 mL/min (based on SCr of 0.7 mg/dL).    Accu-Checks  Recent Labs     12/09/18  1610 12/09/18  2002 12/10/18  0031 12/10/18  0523 12/10/18  0808 12/10/18  1326 12/10/18  1829 12/10/18  2014 12/10/18  2358 12/11/18  0344   POCTGLUCOSE 193* 187* 167* 217* 221* 213* 222* 173* 152* 123*       Current Medications and/or Treatments Impacting Glycemic Control  Immunotherapy:    Immunosuppressants     None        Steroids:   Hormones (From admission, onward)    None        Pressors:    Autonomic Drugs (From admission, onward)    None        Hyperglycemia/Diabetes Medications: "   Antihyperglycemics (From admission, onward)    Start     Stop Route Frequency Ordered    12/10/18 0800  insulin aspart U-100 pen 2 Units      -- SubQ Every 24 hours (non-standard times) 12/09/18 0857    12/10/18 0400  insulin aspart U-100 pen 2 Units      -- SubQ Every 24 hours (non-standard times) 12/09/18 0857    12/10/18 0000  insulin aspart U-100 pen 2 Units      -- SubQ Every 24 hours (non-standard times) 12/09/18 0857    12/09/18 2000  insulin aspart U-100 pen 2 Units      -- SubQ Every 24 hours (non-standard times) 12/09/18 0857    12/09/18 1600  insulin aspart U-100 pen 2 Units      -- SubQ Every 24 hours (non-standard times) 12/09/18 0857    12/09/18 1200  insulin aspart U-100 pen 2 Units      -- SubQ Every 24 hours (non-standard times) 12/09/18 0857    12/05/18 0900  insulin detemir U-100 pen 5 Units      -- SubQ 2 times daily 12/05/18 0717    11/26/18 0947  insulin aspart U-100 pen 0-5 Units      -- SubQ Every 4 hours PRN 11/26/18 0848

## 2018-12-11 NOTE — ASSESSMENT & PLAN NOTE
Cryptogenic cirrhosis awaiting liver xplant, presented for AMS and found to have ENZO on CKDIIIb. SLED started last weekend with intermittent AF+RVR+hypotensive episodes controlled with bb/cardizem gtt adequately. Now on CRRT with improving overall fluid status.    Plan:  - no evidence of renal recovery dialysis depenedant  - Will plan for SLED x 12 hrs today for metabolic clearance and volume removal   - CVP today at 11  - Continue with mechanical ventilation with FiO2 40 %  - Blood pressures have been stable  - Continues to be anuric, will need bladder scans BID to evaluate for any sign

## 2018-12-11 NOTE — SUBJECTIVE & OBJECTIVE
Interval History/Significant Events:   NAEON.  AF and HDS, minimal vent settings via trach.  Social work spoke with [facility] who is reviewing her records. Remains anuric.  Continuing CRRT for volume removal.    Follow-up For: Procedure(s) (LRB):  CREATION, TRACHEOSTOMY (N/A)        Objective:     Vital Signs (Most Recent):  Temp: 97.8 °F (36.6 °C) (12/11/18 0430)  Pulse: (!) 127 (12/11/18 0810)  Resp: (!) 23 (12/11/18 0810)  BP: (!) 99/48 (12/11/18 0600)  SpO2: 100 % (12/11/18 0810) Vital Signs (24h Range):  Temp:  [97.8 °F (36.6 °C)-98.5 °F (36.9 °C)] 97.8 °F (36.6 °C)  Pulse:  [] 127  Resp:  [22-33] 23  SpO2:  [98 %-100 %] 100 %  BP: ()/(48-58) 99/48  Arterial Line BP: ()/(29-43) 115/41     Weight: 55.5 kg (122 lb 5.7 oz)  Body mass index is 25.57 kg/m².      Intake/Output Summary (Last 24 hours) at 12/11/2018 0843  Last data filed at 12/11/2018 0600  Gross per 24 hour   Intake 2561.67 ml   Output 2242 ml   Net 319.67 ml       Physical Exam    Constitutional: She appears well-developed and well-nourished.   HENT:   Head: Normocephalic and atraumatic.   Eyes: Pupils are equal, round, and reactive to light.   Spontaneously opens eyes  Jaundice+   Neck: Normal range of motion. No JVD present. No tracheal deviation present.   Cardiovascular: Normal rate, regular rhythm and normal heart sounds.   No murmur heard.  Pulmonary/Chest: She has no wheezes. She has no rales.   On vent, trach in place   Abdominal: Soft. Bowel sounds are normal. She exhibits no distension. There is no tenderness.   Umbilical hernia   Musculoskeletal: Normal range of motion. She exhibits no edema.   Neurological: She is alert.       Vents:  Vent Mode: A/C (12/11/18 0810)  Ventilator Initiated: Yes (11/21/18 8709)  Set Rate: 22 bmp (12/11/18 0810)  Vt Set: 310 mL (12/11/18 0810)  Pressure Support: 10 cmH20 (12/08/18 1153)  PEEP/CPAP: 5 cmH20 (12/11/18 0810)  Oxygen Concentration (%): 40 (12/11/18 0810)  Peak Airway  Pressure: 22 cmH2O (12/11/18 0810)  Plateau Pressure: 21 cmH20 (12/11/18 0810)  Total Ve: 9.05 mL (12/11/18 0810)  F/VT Ratio<105 (RSBI): (!) 99.57 (12/11/18 0810)    Lines/Drains/Airways     Central Venous Catheter Line                 Trialysis (Dialysis) Catheter 11/21/18 1851 left internal jugular 19 days          Drain                 NG/OG Tube 12/07/18 2141 [place] sump 12 Fr. Right nostril 3 days          Airway                 Surgical Airway 12/05/18 1431 Shiley 5 days          Arterial Line                 Arterial Line 11/21/18 1900 Right Radial 19 days                Significant Labs:    CBC/Anemia Profile:  Recent Labs   Lab 12/10/18  0518 12/10/18  1827 12/11/18  0340   WBC 10.96 12.66 14.27*   HGB 7.6* 7.3* 7.2*   HCT 24.9* 23.9* 22.9*   PLT 67* 78* 79*   * 98 96   RDW 27.5* 27.8* 27.9*        Chemistries:  Recent Labs   Lab 12/10/18  0518 12/10/18  1512 12/10/18  2205 12/11/18  0340   * 135* 137 133*  133*   K 5.2* 5.6* 5.1 4.7  4.7    107 106 103  103   CO2 20* 21* 23 23  23   BUN 46* 53* 26* 12  12   CREATININE 1.9* 2.2* 1.2 0.7  0.7   CALCIUM 8.7 8.7 8.0* 7.6*  7.6*   ALBUMIN 1.7* 1.7* 1.7* 1.7*  1.7*   PROT 5.2*  --   --  5.1*   BILITOT 10.1*  --   --  9.8*   ALKPHOS 247*  --   --  256*   ALT 44  --   --  49*   AST 65*  --   --  82*   MG 2.3 2.6 1.9 2.0   PHOS 5.4* 5.6* 3.4 2.2*  2.2*       All pertinent labs within the past 24 hours have been reviewed.    Significant Imaging:  I have reviewed and interpreted all pertinent imaging results/findings within the past 24 hours.

## 2018-12-11 NOTE — SUBJECTIVE & OBJECTIVE
Interval History:  No acute events overnight, remains afebrile, vital signs stable. Nightly CRRT continues, occasional Afib noted while on CRRT. No new issues, up in cardiac chair again this morning.     Scheduled Meds:   diltiaZEM  30 mg Oral Q8H    famotidine  20 mg Oral QHS    fluconazole  200 mg Oral Daily    guaifenesin 100 mg/5 ml  200 mg Per OG tube Q6H    heparin (porcine)  5,000 Units Subcutaneous Q12H    insulin aspart U-100  2 Units Subcutaneous Q24H    insulin aspart U-100  2 Units Subcutaneous Q24H    insulin aspart U-100  2 Units Subcutaneous Q24H    insulin aspart U-100  2 Units Subcutaneous Q24H    insulin aspart U-100  2 Units Subcutaneous Q24H    insulin aspart U-100  2 Units Subcutaneous Q24H    insulin detemir U-100  5 Units Subcutaneous BID    lactulose  15 g Per OG tube BID    rifAXImin  550 mg Oral BID     Continuous Infusions:   sodium chloride 0.9% 200 mL/hr at 12/11/18 0400     PRN Meds:sodium chloride, acetaminophen, dextrose 50%, glucagon (human recombinant), HYDROmorphone, insulin aspart U-100, k phos di & mono-sod phos mono, lactulose, lactulose, magnesium sulfate IVPB, metoprolol, ondansetron, simethicone, sodium chloride 0.9%    Review of Systems   All other systems reviewed and are negative.    Objective:     Vital Signs (Most Recent):  Temp: 99.2 °F (37.3 °C) (12/11/18 0700)  Pulse: (!) 123 (12/11/18 1100)  Resp: (!) 32 (12/11/18 1100)  BP: (!) 113/53 (12/11/18 0800)  SpO2: 100 % (12/11/18 1100) Vital Signs (24h Range):  Temp:  [97.8 °F (36.6 °C)-99.2 °F (37.3 °C)] 99.2 °F (37.3 °C)  Pulse:  [] 123  Resp:  [22-39] 32  SpO2:  [97 %-100 %] 100 %  BP: ()/(48-58) 113/53  Arterial Line BP: ()/(29-43) 102/39     Weight: 55.5 kg (122 lb 5.7 oz)  Body mass index is 25.57 kg/m².    Intake/Output - Last 3 Shifts       12/09 0700 - 12/10 0659 12/10 0700 - 12/11 0659 12/11 0700 - 12/12 0659    I.V. (mL/kg) 2419.3 (43.6) 2016.7 (36.3)     NG/ 625 160     Total Intake(mL/kg) 3129.3 (56.4) 2641.7 (47.6) 160 (2.9)    Other 3679 2242     Stool 0 0     Total Output 3679 2242     Net -549.7 +399.7 +160           Stool Occurrence 10 x 4 x           Physical Exam   Constitutional: She appears well-developed and well-nourished.   HENT:   Head: Normocephalic and atraumatic.   Eyes: Pupils are equal, round, and reactive to light.   Spontaneously opens eyes  Jaundice+   Neck: Normal range of motion. No JVD present. No tracheal deviation present.   Cardiovascular: Normal rate, regular rhythm and normal heart sounds.   No murmur heard.  Pulmonary/Chest: She has no wheezes. She has no rales.   On vent, trach in place   Abdominal: Soft. Bowel sounds are normal. She exhibits no distension. There is no tenderness.   Umbilical hernia   Musculoskeletal: Normal range of motion. She exhibits no edema.   Neurological: She is alert.       Laboratory:  Immunosuppressants     None        CBC:   Recent Labs   Lab 12/11/18  0340   WBC 14.27*   RBC 2.38*   HGB 7.2*   HCT 22.9*   PLT 79*   MCV 96   MCH 30.3   MCHC 31.4*     CMP:   Recent Labs   Lab 12/11/18  0340   *  127*   CALCIUM 7.6*  7.6*   ALBUMIN 1.7*  1.7*   PROT 5.1*   *  133*   K 4.7  4.7   CO2 23  23     103   BUN 12  12   CREATININE 0.7  0.7   ALKPHOS 256*   ALT 49*   AST 82*   BILITOT 9.8*     Coagulation:   Recent Labs   Lab 12/11/18  0340   INR 1.7*     ABGs:   Recent Labs   Lab 12/11/18  0426   PH 7.450   PCO2 35.5   HCO3 24.6   POCSATURATED 99   BE 1     Labs within the past 24 hours have been reviewed.    Diagnostic Results:  I have personally reviewed all pertinent imaging studies.

## 2018-12-11 NOTE — ASSESSMENT & PLAN NOTE
Caution with insulin stacking  Estimated Creatinine Clearance: 42.9 mL/min (based on SCr of 1.1 mg/dL).

## 2018-12-11 NOTE — ASSESSMENT & PLAN NOTE
BG goal 140-180    Levemir 5 units BID   Novolog 2 units every 4 hours while on TF at 40; hold if TF held or rate decreased  Low dose correction scale  BG monitoring every 4 hours    Discharge planning: TBD

## 2018-12-11 NOTE — PLAN OF CARE
"Problem: Patient Care Overview  Goal: Plan of Care Review  Outcome: Ongoing (interventions implemented as appropriate)  Dx: liver failure, respiratory distress     Shift Events: Pt up in cardiac chair x8 hours, plan for d/c to hospice or transfer to another facility closer to [place]     Neuro: Arouses to voice, Moves all extremities and Follows commands     Vital Signs: BP (!) 104/51   Pulse (!) 125   Temp 99.5 °F (37.5 °C)   Resp (!) 33   Ht 4' 10" (1.473 m)   Wt 55.5 kg (122 lb 5.7 oz)   SpO2 100%   Breastfeeding? No   BMI 25.57 kg/m²      Intake/Gtts/Diet: Pt tolerating TF at goal rate of 40 cc/hr.     Labs: Accuchecks q4hr, scheduled and prn insulin administered.     Skin: Stage II to sacrum - redness, excoriation noted. Heels and elbows w/o redness or breakdown.      "

## 2018-12-11 NOTE — PROGRESS NOTES
"[facility]  Endocrinology  Progress Note    Admit Date: 11/10/2018     Reason for Consult: Management of type 2 DM, Hyperglycemia     Surgical Procedure and Date: n/a    Diabetes diagnosis year: 2001    Home Diabetes Medications: Levemir 12 units BID (vial) and Humalog SSI with meals   Lab Results   Component Value Date    HGBA1C 5.8 (H) 11/10/2018         How often checking glucose at home? 1-3  BG readings on regimen: 150-300  Hypoglycemia on the regimen? once 27; required visit to ED; gave Levemir and humalog and patient did not eat  Missed doses on regimen?  No    Diabetes Complications include:     Hyperglycemia, Hypoglycemia  and Diabetic peripheral neuropathy     Complicating diabetes co morbidities:   CIRRHOSIS      HPI:   Patient is a 67 y.o. female with a diagnosis of type 2 DM; well controlled on MDI. Also with   Cryptogenic cirrhosis, rheumatoid arthritis, and GERD. Patient was listed for liver transplant on 9/14/18. Patient presented to ED of hospital in [place] with AMS and ENZO. Endocrinology consulted for BG/ DM management.   Of note, profound hypoglycemic event (BG < 20) the night of 11/14/18.            Interval HPI:   Overnight events: Remains in ICU, trach on vent. BG within goal overnight with scheduled insulin and correction scale.  Eating:   NPO  Nausea: No  Hypoglycemia and intervention: No  Fever: No  TPN and/or TF: Yes  TF and rate: Novasource Renal at 40 cc/hr (goal)    BP (!) 99/48 (BP Location: Left arm, Patient Position: Lying)   Pulse (!) 127   Temp 97.8 °F (36.6 °C) (Oral)   Resp (!) 23   Ht 4' 10" (1.473 m)   Wt 55.5 kg (122 lb 5.7 oz)   SpO2 100%   Breastfeeding? No   BMI 25.57 kg/m²      Labs Reviewed and Include    Recent Labs   Lab 12/11/18  0340   *  127*   CALCIUM 7.6*  7.6*   ALBUMIN 1.7*  1.7*   PROT 5.1*   *  133*   K 4.7  4.7   CO2 23  23     103   BUN 12  12   CREATININE 0.7  0.7   ALKPHOS 256*   ALT 49*   AST 82* "   BILITOT 9.8*     Lab Results   Component Value Date    WBC 14.27 (H) 12/11/2018    HGB 7.2 (L) 12/11/2018    HCT 22.9 (L) 12/11/2018    MCV 96 12/11/2018    PLT 79 (L) 12/11/2018     No results for input(s): TSH, FREET4 in the last 168 hours.  Lab Results   Component Value Date    HGBA1C 5.8 (H) 11/10/2018       Nutritional status:   Body mass index is 25.57 kg/m².  Lab Results   Component Value Date    ALBUMIN 1.7 (L) 12/11/2018    ALBUMIN 1.7 (L) 12/11/2018    ALBUMIN 1.7 (L) 12/10/2018     No results found for: PREALBUMIN    Estimated Creatinine Clearance: 67.4 mL/min (based on SCr of 0.7 mg/dL).    Accu-Checks  Recent Labs     12/09/18  1610 12/09/18  2002 12/10/18  0031 12/10/18  0523 12/10/18  0808 12/10/18  1326 12/10/18  1829 12/10/18  2014 12/10/18  2358 12/11/18  0344   POCTGLUCOSE 193* 187* 167* 217* 221* 213* 222* 173* 152* 123*       Current Medications and/or Treatments Impacting Glycemic Control  Immunotherapy:    Immunosuppressants     None        Steroids:   Hormones (From admission, onward)    None        Pressors:    Autonomic Drugs (From admission, onward)    None        Hyperglycemia/Diabetes Medications:   Antihyperglycemics (From admission, onward)    Start     Stop Route Frequency Ordered    12/10/18 0800  insulin aspart U-100 pen 2 Units      -- SubQ Every 24 hours (non-standard times) 12/09/18 0857    12/10/18 0400  insulin aspart U-100 pen 2 Units      -- SubQ Every 24 hours (non-standard times) 12/09/18 0857    12/10/18 0000  insulin aspart U-100 pen 2 Units      -- SubQ Every 24 hours (non-standard times) 12/09/18 0857    12/09/18 2000  insulin aspart U-100 pen 2 Units      -- SubQ Every 24 hours (non-standard times) 12/09/18 0857    12/09/18 1600  insulin aspart U-100 pen 2 Units      -- SubQ Every 24 hours (non-standard times) 12/09/18 0857    12/09/18 1200  insulin aspart U-100 pen 2 Units      -- SubQ Every 24 hours (non-standard times) 12/09/18 0857    12/05/18 0900  insulin  detemir U-100 pen 5 Units      -- SubQ 2 times daily 12/05/18 0717    11/26/18 0947  insulin aspart U-100 pen 0-5 Units      -- SubQ Every 4 hours PRN 11/26/18 0848          ASSESSMENT and PLAN    * Hepatic encephalopathy    Managed per primary.        Type 2 diabetes mellitus with hyperglycemia, with long-term current use of insulin    BG goal 140-180    Levemir 5 units BID   Novolog 2 units every 4 hours while on TF at 40; hold if TF held or rate decreased  Low dose correction scale  BG monitoring every 4 hours    Discharge planning: TBD       Acute kidney injury superimposed on CKD    Avoid insulin stacking and hypoglycemia.  CRRT per nephrology   Lab Results   Component Value Date    CREATININE 1.1 12/11/2018            Decompensated hepatic cirrhosis    Managed per primary  May impact BG readings       On enteral nutrition    On supplemental TF, elevating BG readings     CKD (chronic kidney disease) stage 3, GFR 30-59 ml/min    Caution with insulin stacking  Estimated Creatinine Clearance: 42.9 mL/min (based on SCr of 1.1 mg/dL).             Tristan Hwang NP  Endocrinology  [facility]

## 2018-12-11 NOTE — ASSESSMENT & PLAN NOTE
66yo F liver-listed, stepped up on 11/21 for respiratory failure with worsening respiratory acidosis. Failed SBT. Plan for trach.      Neuro:   -Pain control: prn dilaudid while intubated  -Sedation off   -continue lactulose/rifaximin  -Following commands     Pulmonary:   -intubated on arrival to SICU 11/21, failed SBT  - s/p trach 12/5  -ABGs daily and prn  -duonebs prn  -continue levoalbuterol  -working on weaning vent settings during day     Cardiac:  -SBP > 100 goal  -JACINDA 11/20 - elevated PA pressures   -Heart failure following, appreciate recs   -dilt gtt as needed for A-Fib, currently on 30mg dilt po q8hr       Renal:   -Mendoza in place  -Continue to monitor UOP: oliguria/anuria    -CRRT per nephrology  -Nephrology following, appreciate recommendations        Fluids/Electrolytes/Nutrition/GI:   -Continue TFs  -replace lytes PRN  -continue lactulose   -bowel regimen     Hematology/Oncology:  -Monitor CBC -- Hgb 7.7(7.5)-->8.2/26.4-->8.0/25.1-->7.9/25.2-->7.2/22.9  -continue to monitor. Transfuse as needed per transplant      Infectious Disease:   -Afebrile   -f/u bcx and resp cx  -WBC 10.7-->9.2-->10.0-->12.0-->11.9-->14.3  -Abx: Diflucan to cover HCAP       Endocrine:  Endocrinology following   BG goal 140-180  continue Levemir 5 units BID.   Start novolog 2 units every 4 hours while on TF at 40; hold if TF held or rate decreased  BG monitoring every 4 hours and low dose correction scale.        PPx: famotidine, SQH     Dispo:  -Pending placement at hospice vs LTACH. Appreciate SW/CM assistance.  -Primary: Transplant

## 2018-12-11 NOTE — PROGRESS NOTES
[facility]  Nephrology  Progress Note    Patient Name: Krystal Hobson  MRN: [id number]  Admission Date: 11/10/2018  Hospital Length of Stay: 30 days  Attending Provider: Abrahan Montelongo MD   Primary Care Physician: Courtney Joe MD  Principal Problem:Hepatic encephalopathy    Subjective:     HPI: Reason for consult: Diuresis in setting of PAP46, pulmonary edema, HRS    Ms. 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  - She presented to [facility] as xfer from May under liver transplant team due to encephalopathy and new onset ENZO.   - Per the , she was extremely disoriented @ OSH and her ammonia reached as high as 200, and her Cr level increased to 2.0 She also went into A fib with RVR and was started on a diltiazem gtt at OSH.   - On arrival at [facility] she was disoriented and started on lactulose with noted improvement. During her current admission on CXR they found a concern for HCAP and she was started on broad spectrum ABx and then shifted her on Moxifloxacin 7 D course. On 11/20/2018 found on CXR a concern for Lt lung middle lobe consolidation and started on vancomycin and zosyn.  - Renal fxn baseline 1.6 at admit but trending up    Interval History:   Events over the weekend noted, Tolerated SCuff x 12 hrs. K was rising and CO2 was decreasing. Continues with mechanical ventilation, placed on spontaneous but didn't tolerated. Still not making urine.     Review of patient's allergies indicates:  No Known Allergies  Current Facility-Administered Medications   Medication Frequency    0.9%  NaCl infusion (CRRT USE ONLY) Continuous    acetaminophen tablet 650 mg Q8H PRN    dextrose 50% injection 12.5 g PRN    diltiaZEM tablet 30 mg Q8H    famotidine tablet 20 mg QHS    fluconazole tablet 200 mg Daily    glucagon (human recombinant) injection 1 mg PRN    guaifenesin 100 mg/5 ml syrup 200 mg Q6H    heparin (porcine) injection 5,000 Units Q12H     HYDROmorphone injection 0.2 mg Q3H PRN    insulin aspart U-100 pen 0-5 Units Q4H PRN    insulin aspart U-100 pen 2 Units Q24H    insulin aspart U-100 pen 2 Units Q24H    insulin aspart U-100 pen 2 Units Q24H    insulin aspart U-100 pen 2 Units Q24H    insulin aspart U-100 pen 2 Units Q24H    insulin aspart U-100 pen 2 Units Q24H    insulin detemir U-100 pen 5 Units BID    k phos di & mono-sod phos mono 250 mg tablet 2 tablet TID PRN    lactulose 10 gram/15 mL solution (enema) 200 g TID PRN    lactulose 20 gram/30 mL solution Soln 15 g BID    lactulose 20 gram/30 mL solution Soln 30 g Q6H PRN    magnesium sulfate 2g in water 50mL IVPB (premix) PRN    metoprolol injection 2.5 mg Q5 Min PRN    ondansetron disintegrating tablet 8 mg Q8H PRN    rifAXIMin tablet 550 mg BID    simethicone chewable tablet 80 mg TID PRN    sodium chloride 0.9% flush 3 mL PRN       Objective:     Vital Signs (Most Recent):  Temp: 98.1 °F (36.7 °C) (12/10/18 1500)  Pulse: (!) 118 (12/10/18 1800)  Resp: (!) 28 (12/10/18 1800)  BP: (!) 123/58 (12/10/18 1600)  SpO2: 100 % (12/10/18 1800)  O2 Device (Oxygen Therapy): ventilator (12/10/18 1700) Vital Signs (24h Range):  Temp:  [97.9 °F (36.6 °C)-98.7 °F (37.1 °C)] 98.1 °F (36.7 °C)  Pulse:  [] 118  Resp:  [20-35] 28  SpO2:  [90 %-100 %] 100 %  BP: (107-123)/(53-59) 123/58  Arterial Line BP: (109-144)/(35-49) 124/37     Weight: 55.5 kg (122 lb 5.7 oz) (12/04/18 0353)  Body mass index is 25.57 kg/m².  Body surface area is 1.51 meters squared.    I/O last 3 completed shifts:  In: 3639.3 [I.V.:2419.3; NG/GT:1220]  Out: 3679 [Other:3679]    Physical Exam   HENT:   Head: Normocephalic.   Right Ear: External ear normal.   Left Ear: External ear normal.   Nose: Nose normal.   Eyes: Right eye exhibits no discharge. Left eye exhibits no discharge.   Neck:   Tracheostomy    Cardiovascular: An irregular rhythm present.   Murmur heard.  Pulmonary/Chest: She has rales.    Musculoskeletal: She exhibits no edema.   Skin: Skin is warm.       Significant Labs:  ABGs:   Recent Labs   Lab 12/10/18  0418   PH 7.368   PCO2 37.0   HCO3 21.3*   POCSATURATED 99   BE -4     BMP:   Recent Labs   Lab 12/10/18  1512   *      CO2 21*   BUN 53*   CREATININE 2.2*   CALCIUM 8.7   MG 2.6     CBC:   Recent Labs   Lab 12/10/18  0518   WBC 10.96   RBC 2.48*   HGB 7.6*   HCT 24.9*   PLT 67*   *   MCH 30.6   MCHC 30.5*     CMP:   Recent Labs   Lab 12/10/18  0518 12/10/18  1512   * 226*   CALCIUM 8.7 8.7   ALBUMIN 1.7* 1.7*   PROT 5.2*  --    * 135*   K 5.2* 5.6*   CO2 20* 21*    107   BUN 46* 53*   CREATININE 1.9* 2.2*   ALKPHOS 247*  --    ALT 44  --    AST 65*  --    BILITOT 10.1*  --      All labs within the past 24 hours have been reviewed.     Assessment/Plan:     Acute kidney injury superimposed on CKD    Cryptogenic cirrhosis awaiting liver xplant, presented for AMS and found to have ENZO on CKDIIIb. SLED started last weekend with intermittent AF+RVR+hypotensive episodes controlled with bb/cardizem gtt adequately. Now on CRRT with improving overall fluid status.    Plan:  - no evidence of renal recovery dialysis depenedant  - Will plan for SLED x 12 hrs today for metabolic clearance and volume removal   - CVP today at 11  - Continue with mechanical ventilation with FiO2 40 %  - Blood pressures have been stable  - Continues to be anuric, will need bladder scans BID to evaluate for any sign           Thank you for your consult. I will follow-up with patient. Please contact us if you have any additional questions.    Haroldo Van MD  Nephrology  [facility]

## 2018-12-11 NOTE — ASSESSMENT & PLAN NOTE
68yo F liver-listed, stepped up on 11/21 for respiratory failure with worsening respiratory acidosis     Plan:  Neuro:   -Pain control: prn fentanyl while intubated  -Continue sedation holiday as tolerated, awake and alert without fighting vent   -Followed all commands off sedation     Pulmonary:   -intubated on arrival to SICU 11/21  Vent Mode: SIMV  Oxygen Concentration (%):  [39-41] 40  Resp Rate Total:  [22 br/min-42 br/min] 34 br/min  Vt Set:  [310 mL] 310 mL  PEEP/CPAP:  [5 cmH20] 5 cmH20  Pressure Support:  [10 cmH20] 10 cmH20  Mean Airway Pressure:  [9.1 [id number] cmH20] 9.1 cmH20  -daily CXR - evidence of significant bilateral consolidation   -ABGs daily and prn  -continuous pulse ox    -Trach in place    Cardiac:  -SBP > 100 goal  -Heart failure following, appreciate recs   - Afib with RVR 11/23/18, broke out with 2x 5mg doses of IV metop, her pressure was stable throughout  - Went back into Afib with RVR on 11/25. Given IV metoprolol 5mg q3. Did not resolve, started on diltiazem drip. Converted out of Afib around 3am. dilt drip stopped at that time.   -continue dilt gtt as needed for Afib, try to prevent Afib by administering metoprolol prior to CRRT    Renal:   -Mendoza in place  -UOP minimal  -Nephrology following, appreciate recommendations  -Started CRRT 11/24/18. Will continue nocturnal CRRT     Fluids/Electrolytes/Nutrition/GI:   -Nutritional status: NPO while intubated  -Continue TF @ 40cc/hour, will add fiber given diarrhea   -replace lytes PRN  -will hold Lactulose enemas TID    Hematology/Oncology:  -Hgb decreased again today, will plan for transfusion of 1 unit pRBCs with CRRT  -continue to monitor. Transfuse as needed     Infectious Disease:   -Afebrile  -Trend WBC  -Abx: Zosyn.   -Sending C. Diff given frequent diarrhea    Endocrine:  -Glucose goal of 140-180  -Endocrine following, see recommendations    Dispo:  -Continue care in the ICU setting - will attempt transfer to a facility closer to  home to assist with palliation  -continue CRRT

## 2018-12-11 NOTE — PLAN OF CARE
"Problem: Patient Care Overview  Goal: Plan of Care Review  Outcome: Ongoing (interventions implemented as appropriate)  Dx: liver failure, respiratory distress     Shift Events: Pt up in cardiac chair x5 hours, plan for d/c to hospice or transfer to another facility closer to [place]     Neuro: Arouses to voice, Moves all extremities and Follows commands     Vital Signs: BP (!) 123/58   Pulse (!) 118   Temp 98.1 °F (36.7 °C)   Resp (!) 28   Ht 4' 10" (1.473 m)   Wt 55.5 kg (122 lb 5.7 oz)   SpO2 100%   Breastfeeding? No   BMI 25.57 kg/m²      Intake/Gtts/Diet: Pt tolerating TF at goal rate of 40 cc/hr.     CRRT: Plan for CRRT this evening for 10 hours     Labs: Accuchecks q4hr, scheduled and prn insulin administered.     Skin: Stage II to sacrum - redness, excoriation noted. Heels and elbows w/o redness or breakdown.      "

## 2018-12-12 NOTE — PLAN OF CARE
Problem: Patient Care Overview  Goal: Plan of Care Review  Outcome: Ongoing (interventions implemented as appropriate)  Pt is trached.  Awake and alert, follows commands. No indicators of pain, SOB, or nausea present. Continues to remain on vent: SIMV 40% 5PEEP, SpO2 >95%.SBP within a goal of >100. HR: Afib: 80s-110s. Tube feeding continued @ 40mL/hr (goal). CRRT overnight with UF of 250. CVP 8. Accu every 4hrs, scheduled + sliding scale administered. Ammonia 51 this am. H/H: 7.1/23.2, Dr. Goldberg notified. Liquid BMs X4 overnight. Electrolytes replaced.Traid ointment applied on buttocks. Fall risk reviewed with pt. No falls trauma or injury noted. Awaiting transfer to LTAC in [facility]. Plan of care reviewed with pt and spouse.  No significant events overnight. Spouse at bedside. Will continue to monitor the patient.

## 2018-12-12 NOTE — ASSESSMENT & PLAN NOTE
Cryptogenic cirrhosis awaiting liver xplant, presented for AMS and found to have ENZO on CKDIIIb. SLED started last weekend with intermittent AF+RVR+hypotensive episodes controlled with bb/cardizem gtt adequately. Now on CRRT with improving overall fluid status.    Plan:  - no evidence of renal recovery dialysis depenedant  - Initially had plan for SCUF  Nocturnal but latest RFP show K 5.1. Will plan for SLED x 12 hrs today for metabolic clearance and volume removal   - -300 ml/hr as tolerated   - please change formula to Renal   - CVP today at 8  - Continue with mechanical ventilation with FiO2 40 % PEEP 5  - Blood pressures have been stable  - Continues to be anuric, will need bladder scans BID to evaluate for any sign

## 2018-12-12 NOTE — PROGRESS NOTES
[facility]  Nephrology  Progress Note    Patient Name: Krystal Hobson  MRN: [id number]  Admission Date: 11/10/2018  Hospital Length of Stay: 31 days  Attending Provider: Abrahan Montelongo MD   Primary Care Physician: Courtney Joe MD  Principal Problem:Hepatic encephalopathy    Subjective:     HPI: Reason for consult: Diuresis in setting of PAP46, pulmonary edema, HRS    Ms. 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  - She presented to [facility] as xfer from [place] under liver transplant team due to encephalopathy and new onset ENZO.   - Per the , she was extremely disoriented @ OSH and her ammonia reached as high as 200, and her Cr level increased to 2.0 She also went into A fib with RVR and was started on a diltiazem gtt at OSH.   - On arrival at [facility] she was disoriented and started on lactulose with noted improvement. During her current admission on CXR they found a concern for HCAP and she was started on broad spectrum ABx and then shifted her on Moxifloxacin 7 D course. On 11/20/2018 found on CXR a concern for Lt lung middle lobe consolidation and started on vancomycin and zosyn.  - Renal fxn baseline 1.6 at admit but trending up    Interval History:   Seen today siting up in cardiac chair. Tolerated SLED x 12 hrs overnight but unable to UF last 6 hrs of treatment secondary to AFib-RVR this am CVP 8. Continues with mechanical ventilation, placed on spontaneous but didn't tolerated. Still not making urine.     Review of patient's allergies indicates:  No Known Allergies  Current Facility-Administered Medications   Medication Frequency    0.9%  NaCl infusion (CRRT USE ONLY) Continuous    0.9%  NaCl infusion (for blood administration) Q24H PRN    acetaminophen tablet 650 mg Q8H PRN    dextrose 50% injection 12.5 g PRN    diltiaZEM tablet 30 mg Q8H    famotidine tablet 20 mg QHS    fluconazole tablet 200 mg Daily    glucagon (human  recombinant) injection 1 mg PRN    guaifenesin 100 mg/5 ml syrup 200 mg Q6H    heparin (porcine) injection 5,000 Units Q12H    HYDROmorphone injection 0.2 mg Q3H PRN    insulin aspart U-100 pen 0-5 Units Q4H PRN    insulin aspart U-100 pen 2 Units Q24H    insulin aspart U-100 pen 2 Units Q24H    insulin aspart U-100 pen 2 Units Q24H    insulin aspart U-100 pen 2 Units Q24H    insulin aspart U-100 pen 2 Units Q24H    insulin aspart U-100 pen 2 Units Q24H    insulin detemir U-100 pen 5 Units BID    k phos di & mono-sod phos mono 250 mg tablet 2 tablet TID PRN    lactulose 10 gram/15 mL solution (enema) 200 g TID PRN    lactulose 20 gram/30 mL solution Soln 15 g BID    lactulose 20 gram/30 mL solution Soln 30 g Q6H PRN    magnesium sulfate 2g in water 50mL IVPB (premix) PRN    metoprolol injection 2.5 mg Q5 Min PRN    ondansetron disintegrating tablet 8 mg Q8H PRN    rifAXIMin tablet 550 mg BID    simethicone chewable tablet 80 mg TID PRN    sodium chloride 0.9% flush 3 mL PRN       Objective:     Vital Signs (Most Recent):  Temp: 99.5 °F (37.5 °C) (12/11/18 1500)  Pulse: (!) 125 (12/11/18 1800)  Resp: (!) 35 (12/11/18 1800)  BP: 107/60 (12/11/18 1800)  SpO2: 100 % (12/11/18 1800)  O2 Device (Oxygen Therapy): ventilator (12/11/18 1602) Vital Signs (24h Range):  Temp:  [97.8 °F (36.6 °C)-99.5 °F (37.5 °C)] 99.5 °F (37.5 °C)  Pulse:  [] 125  Resp:  [22-49] 35  SpO2:  [97 %-100 %] 100 %  BP: ()/(48-60) 107/60  Arterial Line BP: ()/(29-43) 127/39     Weight: 55.5 kg (122 lb 5.7 oz) (12/04/18 0353)  Body mass index is 25.57 kg/m².  Body surface area is 1.51 meters squared.    I/O last 3 completed shifts:  In: 3186.7 [I.V.:2016.7; NG/GT:1170]  Out: 2242 [Other:2242]    Physical Exam   Constitutional:   Sitting in chair with trach in place and Vent connected to Trach   HENT:   Head: Normocephalic.   Right Ear: External ear normal.   Left Ear: External ear normal.   Nose: Nose normal.    Eyes: Right eye exhibits no discharge. Left eye exhibits no discharge.   Neck:   Tracheostomy    Cardiovascular: An irregular rhythm present.   Murmur heard.  Pulmonary/Chest: She has rales (Zambrano on the Left > right).   Musculoskeletal: She exhibits no edema.   Skin: Skin is warm.       Significant Labs:  ABGs:   Recent Labs   Lab 12/11/18  0426   PH 7.450   PCO2 35.5   HCO3 24.6   POCSATURATED 99   BE 1     BMP:   Recent Labs   Lab 12/11/18  1356   *      CO2 23   BUN 20   CREATININE 1.1   CALCIUM 8.4*   MG 2.3     CBC:   Recent Labs   Lab 12/11/18  1732   WBC 16.02*   RBC 2.45*   HGB 7.6*   HCT 24.1*   PLT 98*   MCV 98   MCH 31.0   MCHC 31.5*     CMP:   Recent Labs   Lab 12/11/18  0340 12/11/18  1356   *  127* 232*   CALCIUM 7.6*  7.6* 8.4*   ALBUMIN 1.7*  1.7* 1.7*   PROT 5.1*  --    *  133* 133*   K 4.7  4.7 5.1   CO2 23  23 23     103 104   BUN 12  12 20   CREATININE 0.7  0.7 1.1   ALKPHOS 256*  --    ALT 49*  --    AST 82*  --    BILITOT 9.8*  --      All labs within the past 24 hours have been reviewed.     Assessment/Plan:     Acute kidney injury superimposed on CKD    Cryptogenic cirrhosis awaiting liver xplant, presented for AMS and found to have ENZO on CKDIIIb. SLED started last weekend with intermittent AF+RVR+hypotensive episodes controlled with bb/cardizem gtt adequately. Now on CRRT with improving overall fluid status.    Plan:  - no evidence of renal recovery dialysis depenedant  - Initially had plan for SCUF  Nocturnal but latest RFP show K 5.1. Will plan for SLED x 12 hrs today for metabolic clearance and volume removal   - -300 ml/hr as tolerated   - please change formula to Renal   - CVP today at 8  - Continue with mechanical ventilation with FiO2 40 % PEEP 5  - Blood pressures have been stable  - Continues to be anuric, will need bladder scans BID to evaluate for any sign           Thank you for your consult. I will follow-up with patient. Please  contact us if you have any additional questions.    Haroldo Van MD  Nephrology  [facility]

## 2018-12-12 NOTE — ASSESSMENT & PLAN NOTE
68yo F liver-listed, stepped up on 11/21 for respiratory failure with worsening respiratory acidosis. Failed SBT. Plan for trach.      Neuro:   -Pain control: prn dilaudid while intubated  -Sedation off   -continue lactulose/rifaximin  -Following commands     Pulmonary:   -intubated on arrival to SICU 11/21, failed SBT  - s/p trach 12/5  -ABGs daily and prn  -duonebs prn  -continue levoalbuterol  -working on weaning vent settings during day  -daily CXR stable appearing with diffuse right sided opacity     Cardiac:  -SBP > 100 goal  -JACINDA 11/20 - elevated PA pressures   -Heart failure following, appreciate recs   -dilt gtt as needed for A-Fib, currently on 30mg dilt po q8hr       Renal:   -Mendoza in place  -Continue to monitor UOP: oliguria/anuria    -CRRT per nephrology  -Nephrology following, appreciate recommendations        Fluids/Electrolytes/Nutrition/GI:   -Continue TFs  -replace lytes PRN  -continue lactulose   -bowel regimen     Hematology/Oncology:  -Monitor CBC -- Hgb 7.7(7.5)-->8.2/26.4-->8.0/25.1-->7.9/25.2-->7.2/22.9-->7.1/23.2  -continue to monitor. Transfuse as needed per transplant   -plan to transfuse 1 U pRBC this morning     Infectious Disease:   -Afebrile   -f/u bcx and resp cx  -WBC 12.0-->11.9-->14.3-->16.0-->14.3  -Abx: Diflucan to cover HCAP         Endocrine:  Endocrinology following   BG goal 140-180  continue Levemir 5 units BID.   Start novolog 2 units every 4 hours while on TF at 40; hold if TF held or rate decreased  BG monitoring every 4 hours and low dose correction scale.        PPx: famotidine, SQH     Dispo:  -Pending placement at hospice vs LTACH. Appreciate SW/CM assistance.  -Primary: Transplant

## 2018-12-12 NOTE — PROGRESS NOTES
[facility]  Liver Transplant  Progress Note    Patient Name: Krystal Hobson  MRN: [id number]  Admission Date: 11/10/2018  Hospital Length of Stay: 32 days  Code Status: Full Code  Primary Care Provider: Courtney Joe MD    Subjective:     Interval History:  No acute events overnight, remains afebrile, vital signs stable. Nightly CRRT continues, occasional Afib noted while on CRRT. No new issues, up in cardiac chair again this morning.     Scheduled Meds:   diltiaZEM  30 mg Oral Q8H    famotidine  20 mg Oral QHS    fluconazole  200 mg Oral Daily    guaifenesin 100 mg/5 ml  200 mg Per OG tube Q6H    heparin (porcine)  5,000 Units Subcutaneous Q12H    insulin aspart U-100  2 Units Subcutaneous Q24H    insulin aspart U-100  2 Units Subcutaneous Q24H    insulin aspart U-100  2 Units Subcutaneous Q24H    insulin aspart U-100  2 Units Subcutaneous Q24H    insulin aspart U-100  2 Units Subcutaneous Q24H    insulin aspart U-100  2 Units Subcutaneous Q24H    insulin detemir U-100  5 Units Subcutaneous BID    lactulose  15 g Per OG tube BID    rifAXImin  550 mg Oral BID     Continuous Infusions:    PRN Meds:sodium chloride, acetaminophen, dextrose 50%, glucagon (human recombinant), HYDROmorphone, insulin aspart U-100, k phos di & mono-sod phos mono, lactulose, lactulose, metoprolol, metoprolol, ondansetron, simethicone, sodium chloride 0.9%    Review of Systems   All other systems reviewed and are negative.    Objective:     Vital Signs (Most Recent):  Temp: 97.9 °F (36.6 °C) (12/12/18 1100)  Pulse: (!) 120 (12/12/18 1335)  Resp: (!) 25 (12/12/18 1335)  BP: (!) 93/53 (12/12/18 1300)  SpO2: 97 % (12/12/18 1335) Vital Signs (24h Range):  Temp:  [97.9 °F (36.6 °C)-99.5 °F (37.5 °C)] 97.9 °F (36.6 °C)  Pulse:  [] 120  Resp:  [18-40] 25  SpO2:  [92 %-100 %] 97 %  BP: ()/(48-60) 93/53  Arterial Line BP: ()/(29-51) 112/39     Weight: 55.5 kg (122 lb 5.7 oz)  Body mass index is 25.57  kg/m².    Intake/Output - Last 3 Shifts       12/10 0700 - 12/11 0659 12/11 0700 - 12/12 0659 12/12 0700 - 12/13 0659    I.V. (mL/kg) 2016.7 (36.3) 1527.2 (27.5) 477 (8.6)    Blood   300    NG/ 585 450    Total Intake(mL/kg) 2641.7 (47.6) 2112.2 (38.1) 1227 (22.1)    Other 2242 1836 591    Stool 0  0    Total Output 2242 1836 591    Net +399.7 +276.2 +636           Stool Occurrence 4 x 3 x 1 x          Physical Exam   Constitutional: She appears well-developed and well-nourished.   HENT:   Head: Normocephalic and atraumatic.   Eyes: Pupils are equal, round, and reactive to light.   Spontaneously opens eyes  Jaundice+   Neck: Normal range of motion. No JVD present. No tracheal deviation present.   Cardiovascular: Normal rate, regular rhythm and normal heart sounds.   No murmur heard.  Pulmonary/Chest: She has no wheezes. She has no rales.   On vent, trach in place   Abdominal: Soft. Bowel sounds are normal. She exhibits no distension. There is no tenderness.   Umbilical hernia   Musculoskeletal: Normal range of motion. She exhibits no edema.   Neurological: She is alert.       Laboratory:  Immunosuppressants     None        CBC:   Recent Labs   Lab 12/12/18 0232   WBC 14.33*   RBC 2.29*   HGB 7.1*   HCT 23.2*   PLT 80*   *   MCH 31.0   MCHC 30.6*     CMP:   Recent Labs   Lab 12/12/18 0232   *  170*   CALCIUM 8.0*  8.0*   ALBUMIN 1.7*  1.7*   PROT 5.4*   *  135*   K 4.1  4.1   CO2 25  25     104   BUN 13  13   CREATININE 0.8  0.8   ALKPHOS 246*   ALT 66*   *   BILITOT 10.5*     Coagulation:   Recent Labs   Lab 12/12/18 0232   INR 1.7*     ABGs:   Recent Labs   Lab 12/12/18 0336   PH 7.403   PCO2 38.9   HCO3 24.2   POCSATURATED 99   BE -1     Labs within the past 24 hours have been reviewed.    Diagnostic Results:  I have personally reviewed all pertinent imaging studies.    Assessment/Plan:   Krystal Hobson is a 68 y.o. female     Pulmonary   Acute hypercapnic  respiratory failure    66yo F liver-listed, stepped up on 11/21 for respiratory failure with worsening respiratory acidosis     Plan:  Neuro:   -Pain control: prn fentanyl while intubated  -Continue sedation holiday as tolerated, awake and alert without fighting vent   -Followed all commands off sedation     Pulmonary:   -intubated on arrival to SICU 11/21  Vent Mode: Spont  Oxygen Concentration (%):  [40-42] 41  Resp Rate Total:  [18 br/min-42 br/min] 28 br/min  Vt Set:  [310 mL] 310 mL  PEEP/CPAP:  [5 cmH20] 5 cmH20  Pressure Support:  [10 cmH20] 10 cmH20  Mean Airway Pressure:  [7.7 cmH20-10 cmH20] 10 cmH20  -daily CXR - evidence of significant bilateral consolidation   -ABGs daily and prn  -continuous pulse ox    -Trach in place    Cardiac:  -SBP > 100 goal  -Heart failure following, appreciate recs   - Afib with RVR 11/23/18, broke out with 2x 5mg doses of IV metop, her pressure was stable throughout  - Went back into Afib with RVR on 11/25. Given IV metoprolol 5mg q3. Did not resolve, started on diltiazem drip. Converted out of Afib around 3am. dilt drip stopped at that time.   -continue dilt gtt as needed for Afib, try to prevent Afib by administering metoprolol prior to CRRT    Renal:   -Mendoza in place  -UOP minimal  -Nephrology following, appreciate recommendations  -Started CRRT 11/24/18. Will continue nocturnal CRRT     Fluids/Electrolytes/Nutrition/GI:   -Nutritional status: NPO while intubated  -Continue TF @ 40cc/hour, will add fiber given diarrhea   -replace lytes PRN  -will hold Lactulose enemas TID    Hematology/Oncology:  -Hgb decreased again today, will plan for transfusion of 1 unit pRBCs with CRRT  -continue to monitor. Transfuse as needed     Infectious Disease:   -Afebrile  -Trend WBC  -Abx: Zosyn.   -Sending C. Diff given frequent diarrhea    Endocrine:  -Glucose goal of 140-180  -Endocrine following, see recommendations    Dispo:  -Continue care in the ICU setting - will attempt transfer to  a facility closer to home to assist with palliation  -continue CRRT            The patients clinical status was discussed at multidisplinary rounds, involving transplant surgery, transplant medicine, pharmacy, nursing, nutrition, and social work.    Lien Angela MD  Liver Transplant  [facility]

## 2018-12-12 NOTE — SUBJECTIVE & OBJECTIVE
Interval History/Significant Events:   NAEON. SLED x 12 hours overnight.   AF and HDS, on minimal vent settings via trach. Plan to receive 1U pRBC yesterday but will give now today.         Follow-up For: Procedure(s) (LRB):  CREATION, TRACHEOSTOMY (N/A)        Objective:     Vital Signs (Most Recent):  Temp: 98.5 °F (36.9 °C) (12/12/18 0700)  Pulse: (!) 119 (12/12/18 0702)  Resp: (!) 30 (12/12/18 0702)  BP: (!) 108/50 (12/12/18 0702)  SpO2: 100 % (12/12/18 0702) Vital Signs (24h Range):  Temp:  [98.5 °F (36.9 °C)-99.5 °F (37.5 °C)] 98.5 °F (36.9 °C)  Pulse:  [] 119  Resp:  [18-49] 30  SpO2:  [92 %-100 %] 100 %  BP: ()/(48-60) 108/50  Arterial Line BP: ()/(29-47) 132/47     Weight: 55.5 kg (122 lb 5.7 oz)  Body mass index is 25.57 kg/m².      Intake/Output Summary (Last 24 hours) at 12/12/2018 0746  Last data filed at 12/12/2018 0700  Gross per 24 hour   Intake 2072.2 ml   Output 2048 ml   Net 24.2 ml       Physical Exam    Constitutional: She appears well-developed and well-nourished.   HENT:   Head: Normocephalic and atraumatic.   Eyes: Pupils are equal, round, and reactive to light.   Spontaneously opens eyes  Jaundice+   Neck: Normal range of motion. No JVD present. No tracheal deviation present.   Cardiovascular: Normal rate, regular rhythm and normal heart sounds.   No murmur heard.  Pulmonary/Chest: She has no wheezes. She has no rales.   On vent, trach in place   Abdominal: Soft. Bowel sounds are normal. She exhibits no distension. There is no tenderness.   Umbilical hernia   Musculoskeletal: Normal range of motion. She exhibits no edema.   Neurological: She is alert.       Vents:  Vent Mode: SIMV (12/12/18 0702)  Ventilator Initiated: Yes (11/21/18 1839)  Set Rate: 10 bmp (12/12/18 0702)  Vt Set: 310 mL (12/12/18 0702)  Pressure Support: 10 cmH20 (12/12/18 0702)  PEEP/CPAP: 5 cmH20 (12/12/18 0702)  Oxygen Concentration (%): 40 (12/12/18 0702)  Peak Airway Pressure: 17 cmH2O (12/12/18  0702)  Plateau Pressure: 21 cmH20 (12/12/18 0702)  Total Ve: 10.5 mL (12/12/18 0702)  F/VT Ratio<105 (RSBI): (!) 92.02 (12/12/18 0702)    Lines/Drains/Airways     Central Venous Catheter Line                 Trialysis (Dialysis) Catheter 11/21/18 1851 left internal jugular 20 days          Drain                 NG/OG Tube 12/07/18 2141 [place] sump 12 Fr. Right nostril 4 days          Airway                 Surgical Airway 12/05/18 1431 [place] 6 days          Arterial Line                 Arterial Line 11/21/18 1900 Right Radial 20 days          Pressure Ulcer                 Pressure Injury 12/10/18 1600 medial Sacral spine Stage 2 1 day                Significant Labs:    CBC/Anemia Profile:  Recent Labs   Lab 12/11/18  0340 12/11/18  1732 12/12/18  0232   WBC 14.27* 16.02* 14.33*   HGB 7.2* 7.6* 7.1*   HCT 22.9* 24.1* 23.2*   PLT 79* 98* 80*   MCV 96 98 101*   RDW 27.9* 28.3* 27.8*        Chemistries:  Recent Labs   Lab 12/11/18  0340 12/11/18  1356 12/11/18  2155 12/12/18  0232   *  133* 133* 134* 135*  135*   K 4.7  4.7 5.1 5.4* 4.1  4.1     103 104 106 104  104   CO2 23  23 23 21* 25  25   BUN 12  12 20 25* 13  13   CREATININE 0.7  0.7 1.1 1.3 0.8  0.8   CALCIUM 7.6*  7.6* 8.4* 7.8* 8.0*  8.0*   ALBUMIN 1.7*  1.7* 1.7* 1.6* 1.7*  1.7*   PROT 5.1*  --   --  5.4*   BILITOT 9.8*  --   --  10.5*   ALKPHOS 256*  --   --  246*   ALT 49*  --   --  66*   AST 82*  --   --  110*   MG 2.0 2.3 2.6 1.8   PHOS 2.2*  2.2* 4.0 4.5 2.6*  2.6*       All pertinent labs within the past 24 hours have been reviewed.    Significant Imaging:  I have reviewed and interpreted all pertinent imaging results/findings within the past 24 hours.

## 2018-12-12 NOTE — ASSESSMENT & PLAN NOTE
66yo F liver-listed, stepped up on 11/21 for respiratory failure with worsening respiratory acidosis     Plan:  Neuro:   -Pain control: prn fentanyl while intubated  -Continue sedation holiday as tolerated, awake and alert without fighting vent   -Followed all commands off sedation     Pulmonary:   -intubated on arrival to SICU 11/21  Vent Mode: Spont  Oxygen Concentration (%):  [40-42] 41  Resp Rate Total:  [18 br/min-42 br/min] 28 br/min  Vt Set:  [310 mL] 310 mL  PEEP/CPAP:  [5 cmH20] 5 cmH20  Pressure Support:  [10 cmH20] 10 cmH20  Mean Airway Pressure:  [7.7 cmH20-10 cmH20] 10 cmH20  -daily CXR - evidence of significant bilateral consolidation   -ABGs daily and prn  -continuous pulse ox    -Trach in place    Cardiac:  -SBP > 100 goal  -Heart failure following, appreciate recs   - Afib with RVR 11/23/18, broke out with 2x 5mg doses of IV metop, her pressure was stable throughout  - Went back into Afib with RVR on 11/25. Given IV metoprolol 5mg q3. Did not resolve, started on diltiazem drip. Converted out of Afib around 3am. dilt drip stopped at that time.   -continue dilt gtt as needed for Afib, try to prevent Afib by administering metoprolol prior to CRRT    Renal:   -Mendoza in place  -UOP minimal  -Nephrology following, appreciate recommendations  -Started CRRT 11/24/18. Will continue nocturnal CRRT     Fluids/Electrolytes/Nutrition/GI:   -Nutritional status: NPO while intubated  -Continue TF @ 40cc/hour, will add fiber given diarrhea   -replace lytes PRN  -will hold Lactulose enemas TID    Hematology/Oncology:  -Hgb decreased again today, will plan for transfusion of 1 unit pRBCs with CRRT  -continue to monitor. Transfuse as needed     Infectious Disease:   -Afebrile  -Trend WBC  -Abx: Zosyn.   -Sending C. Diff given frequent diarrhea    Endocrine:  -Glucose goal of 140-180  -Endocrine following, see recommendations    Dispo:  -Continue care in the ICU setting - will attempt transfer to a facility closer to  home to assist with palliation  -continue CRRT

## 2018-12-12 NOTE — PLAN OF CARE
Problem: Physical Therapy Goal  Goal: Physical Therapy Goal  Goals to be met by: 12/21/2018    Patient will increase functional independence with mobility by performing:    Supine <> sit with Moderate Assistance.  Sit <> stand transfer with Moderate Assistance using Rolling Walker.  Bed <> chair transfer via Squat Pivot with Moderate Assistance using No Assistive Device.  Gait  x 10 feet with Moderate Assistance using Rolling Walker to prepare for community ambulation and endurance activities.  Dynamic sitting at edge of bed x 10 minutes with Minimal Assistance to prepare for functional tasks in sitting.  Able to tolerate exercise for 15-20 reps with assistance as needed.          Outcome: Ongoing (interventions implemented as appropriate)    Pt would benefit from LTAC vs. SNF upon discharge.  Appropriate transfer level with nursing staff: Bed <> Chair:  Drawsheet to cardiac chair.    Viktoriya Amado PT, DPT  12/12/2018  Pager: [phone]

## 2018-12-12 NOTE — PROGRESS NOTES
SW contacted [facility] (# [phone]) in [place], [place] for updates regarding inpatient to inpatient transfer. At this time, there is still no beds available. [facility] remains available and will continue to follow-up as appropriate.

## 2018-12-12 NOTE — PROGRESS NOTES
[facility]  Critical Care - Surgery  Progress Note    Patient Name: Krystal Hobson  MRN: [id number]  Admission Date: 11/10/2018  Hospital Length of Stay: 32 days  Code Status: Full Code  Attending Provider: Abrahan Montelongo MD  Primary Care Provider: Courtney Joe MD   Principal Problem: Hepatic encephalopathy    Subjective:     Hospital/ICU Course:  No notes on file    Interval History/Significant Events:   NAEON. SLED x 12 hours overnight.   AF and HDS, on minimal vent settings via trach. Plan to receive 1U pRBC yesterday but will give now today.         Follow-up For: Procedure(s) (LRB):  CREATION, TRACHEOSTOMY (N/A)        Objective:     Vital Signs (Most Recent):  Temp: 98.5 °F (36.9 °C) (12/12/18 0700)  Pulse: (!) 119 (12/12/18 0702)  Resp: (!) 30 (12/12/18 0702)  BP: (!) 108/50 (12/12/18 0702)  SpO2: 100 % (12/12/18 0702) Vital Signs (24h Range):  Temp:  [98.5 °F (36.9 °C)-99.5 °F (37.5 °C)] 98.5 °F (36.9 °C)  Pulse:  [] 119  Resp:  [18-49] 30  SpO2:  [92 %-100 %] 100 %  BP: ()/(48-60) 108/50  Arterial Line BP: ()/(29-47) 132/47     Weight: 55.5 kg (122 lb 5.7 oz)  Body mass index is 25.57 kg/m².      Intake/Output Summary (Last 24 hours) at 12/12/2018 0746  Last data filed at 12/12/2018 0700  Gross per 24 hour   Intake 2072.2 ml   Output 2048 ml   Net 24.2 ml       Physical Exam    Constitutional: She appears well-developed and well-nourished.   HENT:   Head: Normocephalic and atraumatic.   Eyes: Pupils are equal, round, and reactive to light.   Spontaneously opens eyes  Jaundice+   Neck: Normal range of motion. No JVD present. No tracheal deviation present.   Cardiovascular: Normal rate, regular rhythm and normal heart sounds.   No murmur heard.  Pulmonary/Chest: She has no wheezes. She has no rales.   On vent, trach in place   Abdominal: Soft. Bowel sounds are normal. She exhibits no distension. There is no tenderness.   Umbilical hernia   Musculoskeletal: Normal range of  motion. She exhibits no edema.   Neurological: She is alert.       Vents:  Vent Mode: SIMV (12/12/18 0702)  Ventilator Initiated: Yes (11/21/18 1839)  Set Rate: 10 bmp (12/12/18 0702)  Vt Set: 310 mL (12/12/18 0702)  Pressure Support: 10 cmH20 (12/12/18 0702)  PEEP/CPAP: 5 cmH20 (12/12/18 0702)  Oxygen Concentration (%): 40 (12/12/18 0702)  Peak Airway Pressure: 17 cmH2O (12/12/18 0702)  Plateau Pressure: 21 cmH20 (12/12/18 0702)  Total Ve: 10.5 mL (12/12/18 0702)  F/VT Ratio<105 (RSBI): (!) 92.02 (12/12/18 0702)    Lines/Drains/Airways     Central Venous Catheter Line                 Trialysis (Dialysis) Catheter 11/21/18 1851 left internal jugular 20 days          Drain                 NG/OG Tube 12/07/18 2141 Emery sump 12 Fr. Right nostril 4 days          Airway                 Surgical Airway 12/05/18 1431 Shiley 6 days          Arterial Line                 Arterial Line 11/21/18 1900 Right Radial 20 days          Pressure Ulcer                 Pressure Injury 12/10/18 1600 medial Sacral spine Stage 2 1 day                Significant Labs:    CBC/Anemia Profile:  Recent Labs   Lab 12/11/18  0340 12/11/18  1732 12/12/18  0232   WBC 14.27* 16.02* 14.33*   HGB 7.2* 7.6* 7.1*   HCT 22.9* 24.1* 23.2*   PLT 79* 98* 80*   MCV 96 98 101*   RDW 27.9* 28.3* 27.8*        Chemistries:  Recent Labs   Lab 12/11/18  0340 12/11/18  1356 12/11/18  2155 12/12/18  0232   *  133* 133* 134* 135*  135*   K 4.7  4.7 5.1 5.4* 4.1  4.1     103 104 106 104  104   CO2 23  23 23 21* 25  25   BUN 12  12 20 25* 13  13   CREATININE 0.7  0.7 1.1 1.3 0.8  0.8   CALCIUM 7.6*  7.6* 8.4* 7.8* 8.0*  8.0*   ALBUMIN 1.7*  1.7* 1.7* 1.6* 1.7*  1.7*   PROT 5.1*  --   --  5.4*   BILITOT 9.8*  --   --  10.5*   ALKPHOS 256*  --   --  246*   ALT 49*  --   --  66*   AST 82*  --   --  110*   MG 2.0 2.3 2.6 1.8   PHOS 2.2*  2.2* 4.0 4.5 2.6*  2.6*       All pertinent labs within the past 24 hours have been  reviewed.    Significant Imaging:  I have reviewed and interpreted all pertinent imaging results/findings within the past 24 hours.    Assessment/Plan:     Decompensated hepatic cirrhosis    66yo F liver-listed, stepped up on 11/21 for respiratory failure with worsening respiratory acidosis. Failed SBT. Plan for trach.      Neuro:   -Pain control: prn dilaudid while intubated  -Sedation off   -continue lactulose/rifaximin  -Following commands     Pulmonary:   -intubated on arrival to SICU 11/21, failed SBT  - s/p trach 12/5  -ABGs daily and prn  -duonebs prn  -continue levoalbuterol  -working on weaning vent settings during day  -daily CXR stable appearing with diffuse right sided opacity     Cardiac:  -SBP > 100 goal  -JACINDA 11/20 - elevated PA pressures   -Heart failure following, appreciate recs   -dilt gtt as needed for A-Fib, currently on 30mg dilt po q8hr       Renal:   -Mendoza in place  -Continue to monitor UOP: oliguria/anuria    -CRRT per nephrology  -Nephrology following, appreciate recommendations        Fluids/Electrolytes/Nutrition/GI:   -Continue TFs  -replace lytes PRN  -continue lactulose   -bowel regimen     Hematology/Oncology:  -Monitor CBC -- Hgb 7.7(7.5)-->8.2/26.4-->8.0/25.1-->7.9/25.2-->7.2/22.9-->7.1/23.2  -continue to monitor. Transfuse as needed per transplant   -plan to transfuse 1 U pRBC this morning     Infectious Disease:   -Afebrile   -f/u bcx and resp cx  -WBC 12.0-->11.9-->14.3-->16.0-->14.3  -Abx: Diflucan to cover HCAP         Endocrine:  Endocrinology following   BG goal 140-180  continue Levemir 5 units BID.   Start novolog 2 units every 4 hours while on TF at 40; hold if TF held or rate decreased  BG monitoring every 4 hours and low dose correction scale.        PPx: famotidine, SQH     Dispo:  -Pending placement at hospice vs LTACH. Appreciate SW/CM assistance.  -Primary: Transplant        Critical care was time spent personally by me on the following activities: development of  treatment plan with patient or surrogate and bedside caregivers, discussions with consultants, evaluation of patient's response to treatment, examination of patient, ordering and performing treatments and interventions, ordering and review of laboratory studies, ordering and review of radiographic studies, pulse oximetry, re-evaluation of patient's condition.  This critical care time did not overlap with that of any other provider or involve time for any procedures.     Jose Wallace MD  Critical Care - Surgery  [facility]

## 2018-12-12 NOTE — ASSESSMENT & PLAN NOTE
68yo F liver-listed, stepped up on 11/21 for respiratory failure with worsening respiratory acidosis. Trached 12/05.      Neuro:   -Pain control: prn dilaudid while intubated  -Sedation off   -continue lactulose/rifaximin  -Following commands     Pulmonary:   -intubated on arrival to SICU 11/21, failed SBT  - s/p trach 12/5  -ABGs daily and prn  -duonebs prn  -continue levoalbuterol  -working on weaning vent settings during day  -daily CXR stable appearing with diffuse bilateral opacification     Cardiac:  -SBP > 100 goal  -JACINDA 11/20 - elevated PA pressures   -Heart failure following, appreciate recs   -dilt gtt as needed for A-Fib, currently on 30mg dilt po q8hr       Renal:   -Mendoza in place  -Continue to monitor UOP: oliguria/anuria    -CRRT per nephrology  -Nephrology following, appreciate recommendations        Fluids/Electrolytes/Nutrition/GI:   -Continue TFs  -replace lytes PRN  -continue lactulose   -bowel regimen     Hematology/Oncology:  -Monitor CBC -- Hgb 9.6/29.1 (post-transfusion)-->8.5/25.9  -continue to monitor. Transfuse as needed per transplant   -1 U pRBC (12/12)      Infectious Disease:   -Afebrile   -f/u bcx and resp cx  -WBC 12.0-->11.9-->14.3-->16.0-->14.3  -Abx: Diflucan to cover HCAP     Endocrine:  Endocrinology following   BG goal 140-180  continue Levemir 5 units BID.   Start novolog 2 units every 4 hours while on TF at 40; hold if TF held or rate decreased  BG monitoring every 4 hours and low dose correction scale.        PPx: famotidine, SQH     Dispo:  -Pending placement at hospice vs LTACH. Appreciate SW/CM assistance.  -Primary: Transplant

## 2018-12-12 NOTE — NURSING
PT at bedside, assisted patient to seated position at side of bed. Patient tolerated well.  at bedside happy with her progress. Patient assisted back to bed. CHG bath, shampoo, and oral care completed. Patient resting quietly at this time, no acute needs verbalized by patient or .

## 2018-12-12 NOTE — SUBJECTIVE & OBJECTIVE
Interval History:  No acute events overnight, remains afebrile, vital signs stable. Nightly CRRT continues, occasional Afib noted while on CRRT. No new issues, up in cardiac chair again this morning.     Scheduled Meds:   diltiaZEM  30 mg Oral Q8H    famotidine  20 mg Oral QHS    fluconazole  200 mg Oral Daily    guaifenesin 100 mg/5 ml  200 mg Per OG tube Q6H    heparin (porcine)  5,000 Units Subcutaneous Q12H    insulin aspart U-100  2 Units Subcutaneous Q24H    insulin aspart U-100  2 Units Subcutaneous Q24H    insulin aspart U-100  2 Units Subcutaneous Q24H    insulin aspart U-100  2 Units Subcutaneous Q24H    insulin aspart U-100  2 Units Subcutaneous Q24H    insulin aspart U-100  2 Units Subcutaneous Q24H    insulin detemir U-100  5 Units Subcutaneous BID    lactulose  15 g Per OG tube BID    rifAXImin  550 mg Oral BID     Continuous Infusions:    PRN Meds:sodium chloride, acetaminophen, dextrose 50%, glucagon (human recombinant), HYDROmorphone, insulin aspart U-100, k phos di & mono-sod phos mono, lactulose, lactulose, metoprolol, metoprolol, ondansetron, simethicone, sodium chloride 0.9%    Review of Systems   All other systems reviewed and are negative.    Objective:     Vital Signs (Most Recent):  Temp: 97.9 °F (36.6 °C) (12/12/18 1100)  Pulse: (!) 120 (12/12/18 1335)  Resp: (!) 25 (12/12/18 1335)  BP: (!) 93/53 (12/12/18 1300)  SpO2: 97 % (12/12/18 1335) Vital Signs (24h Range):  Temp:  [97.9 °F (36.6 °C)-99.5 °F (37.5 °C)] 97.9 °F (36.6 °C)  Pulse:  [] 120  Resp:  [18-40] 25  SpO2:  [92 %-100 %] 97 %  BP: ()/(48-60) 93/53  Arterial Line BP: ()/(29-51) 112/39     Weight: 55.5 kg (122 lb 5.7 oz)  Body mass index is 25.57 kg/m².    Intake/Output - Last 3 Shifts       12/10 0700 - 12/11 0659 12/11 0700 - 12/12 0659 12/12 0700 - 12/13 0659    I.V. (mL/kg) 2016.7 (36.3) 1527.2 (27.5) 477 (8.6)    Blood   300    NG/ 585 450    Total Intake(mL/kg) 2641.7 (47.6) 2112.2 (38.1)  1227 (22.1)    Other 2242 1836 591    Stool 0  0    Total Output 2242 1836 591    Net +399.7 +276.2 +636           Stool Occurrence 4 x 3 x 1 x          Physical Exam   Constitutional: She appears well-developed and well-nourished.   HENT:   Head: Normocephalic and atraumatic.   Eyes: Pupils are equal, round, and reactive to light.   Spontaneously opens eyes  Jaundice+   Neck: Normal range of motion. No JVD present. No tracheal deviation present.   Cardiovascular: Normal rate, regular rhythm and normal heart sounds.   No murmur heard.  Pulmonary/Chest: She has no wheezes. She has no rales.   On vent, trach in place   Abdominal: Soft. Bowel sounds are normal. She exhibits no distension. There is no tenderness.   Umbilical hernia   Musculoskeletal: Normal range of motion. She exhibits no edema.   Neurological: She is alert.       Laboratory:  Immunosuppressants     None        CBC:   Recent Labs   Lab 12/12/18 0232   WBC 14.33*   RBC 2.29*   HGB 7.1*   HCT 23.2*   PLT 80*   *   MCH 31.0   MCHC 30.6*     CMP:   Recent Labs   Lab 12/12/18 0232   *  170*   CALCIUM 8.0*  8.0*   ALBUMIN 1.7*  1.7*   PROT 5.4*   *  135*   K 4.1  4.1   CO2 25  25     104   BUN 13  13   CREATININE 0.8  0.8   ALKPHOS 246*   ALT 66*   *   BILITOT 10.5*     Coagulation:   Recent Labs   Lab 12/12/18 0232   INR 1.7*     ABGs:   Recent Labs   Lab 12/12/18  0336   PH 7.403   PCO2 38.9   HCO3 24.2   POCSATURATED 99   BE -1     Labs within the past 24 hours have been reviewed.    Diagnostic Results:  I have personally reviewed all pertinent imaging studies.

## 2018-12-12 NOTE — SUBJECTIVE & OBJECTIVE
Interval History:   Seen today siting up in cardiac chair. Tolerated SLED x 12 hrs overnight but unable to UF last 6 hrs of treatment secondary to AFib-RVR this am CVP 8. Continues with mechanical ventilation, placed on spontaneous but didn't tolerated. Still not making urine.     Review of patient's allergies indicates:  No Known Allergies  Current Facility-Administered Medications   Medication Frequency    0.9%  NaCl infusion (CRRT USE ONLY) Continuous    0.9%  NaCl infusion (for blood administration) Q24H PRN    acetaminophen tablet 650 mg Q8H PRN    dextrose 50% injection 12.5 g PRN    diltiaZEM tablet 30 mg Q8H    famotidine tablet 20 mg QHS    fluconazole tablet 200 mg Daily    glucagon (human recombinant) injection 1 mg PRN    guaifenesin 100 mg/5 ml syrup 200 mg Q6H    heparin (porcine) injection 5,000 Units Q12H    HYDROmorphone injection 0.2 mg Q3H PRN    insulin aspart U-100 pen 0-5 Units Q4H PRN    insulin aspart U-100 pen 2 Units Q24H    insulin aspart U-100 pen 2 Units Q24H    insulin aspart U-100 pen 2 Units Q24H    insulin aspart U-100 pen 2 Units Q24H    insulin aspart U-100 pen 2 Units Q24H    insulin aspart U-100 pen 2 Units Q24H    insulin detemir U-100 pen 5 Units BID    k phos di & mono-sod phos mono 250 mg tablet 2 tablet TID PRN    lactulose 10 gram/15 mL solution (enema) 200 g TID PRN    lactulose 20 gram/30 mL solution Soln 15 g BID    lactulose 20 gram/30 mL solution Soln 30 g Q6H PRN    magnesium sulfate 2g in water 50mL IVPB (premix) PRN    metoprolol injection 2.5 mg Q5 Min PRN    ondansetron disintegrating tablet 8 mg Q8H PRN    rifAXIMin tablet 550 mg BID    simethicone chewable tablet 80 mg TID PRN    sodium chloride 0.9% flush 3 mL PRN       Objective:     Vital Signs (Most Recent):  Temp: 99.5 °F (37.5 °C) (12/11/18 1500)  Pulse: (!) 125 (12/11/18 1800)  Resp: (!) 35 (12/11/18 1800)  BP: 107/60 (12/11/18 1800)  SpO2: 100 % (12/11/18 1800)  O2 Device  (Oxygen Therapy): ventilator (12/11/18 1602) Vital Signs (24h Range):  Temp:  [97.8 °F (36.6 °C)-99.5 °F (37.5 °C)] 99.5 °F (37.5 °C)  Pulse:  [] 125  Resp:  [22-49] 35  SpO2:  [97 %-100 %] 100 %  BP: ()/(48-60) 107/60  Arterial Line BP: ()/(29-43) 127/39     Weight: 55.5 kg (122 lb 5.7 oz) (12/04/18 0353)  Body mass index is 25.57 kg/m².  Body surface area is 1.51 meters squared.    I/O last 3 completed shifts:  In: 3186.7 [I.V.:2016.7; NG/GT:1170]  Out: 2242 [Other:2242]    Physical Exam   Constitutional:   Sitting in chair with trach in place and Vent connected to Trach   HENT:   Head: Normocephalic.   Right Ear: External ear normal.   Left Ear: External ear normal.   Nose: Nose normal.   Eyes: Right eye exhibits no discharge. Left eye exhibits no discharge.   Neck:   Tracheostomy    Cardiovascular: An irregular rhythm present.   Murmur heard.  Pulmonary/Chest: She has rales (Zambrano on the Left > right).   Musculoskeletal: She exhibits no edema.   Skin: Skin is warm.       Significant Labs:  ABGs:   Recent Labs   Lab 12/11/18  0426   PH 7.450   PCO2 35.5   HCO3 24.6   POCSATURATED 99   BE 1     BMP:   Recent Labs   Lab 12/11/18  1356   *      CO2 23   BUN 20   CREATININE 1.1   CALCIUM 8.4*   MG 2.3     CBC:   Recent Labs   Lab 12/11/18  1732   WBC 16.02*   RBC 2.45*   HGB 7.6*   HCT 24.1*   PLT 98*   MCV 98   MCH 31.0   MCHC 31.5*     CMP:   Recent Labs   Lab 12/11/18  0340 12/11/18  1356   *  127* 232*   CALCIUM 7.6*  7.6* 8.4*   ALBUMIN 1.7*  1.7* 1.7*   PROT 5.1*  --    *  133* 133*   K 4.7  4.7 5.1   CO2 23  23 23     103 104   BUN 12  12 20   CREATININE 0.7  0.7 1.1   ALKPHOS 256*  --    ALT 49*  --    AST 82*  --    BILITOT 9.8*  --      All labs within the past 24 hours have been reviewed.

## 2018-12-12 NOTE — PT/OT/SLP RE-EVAL
Physical Therapy Re-Assessment / Treatment    Patient Name:  Krystal Hobson   MRN:  [id number]  Admitting Diagnosis:  Hepatic encephalopathy   Recent Surgery: Procedure(s) (LRB):  CREATION, TRACHEOSTOMY (N/A) 7 Days Post-Op    Hospital Course:  11/10/18: Admit date  11/12/18: PT initial evaluation  11/21/18: Pt transferred to ICU due to respiratory failure  12/5/18: Trachesostomy performed  12/11/18: PT re-consulted    Please refer to PT initial evaluation for PLOF and social history.  Recommendations:     Discharge Recommendations:  nursing facility, skilled, LTACH ([facility]term acute [facility])   Discharge Equipment Recommendations: commode, walker, rolling, wheelchair, manual, hospital bed   Barriers to discharge: Decreased caregiver support (significant assistance required)    Plan:     During this hospitalization, patient to be seen 4 x/week to address the above listed problems via gait training, therapeutic activities, therapeutic exercises, neuromuscular re-education  · Plan of Care Expires:  01/11/19   Plan of Care Reviewed with: patient, spouse    This Plan of care has been discussed with the patient who was involved in its development and understands and is in agreement with the identified goals and treatment plan    Subjective     Communicated with RN prior to session.  Patient found supine upon PT entry to room, agreeable to evaluation.  Pt's  present throughout session.  Pt nonverbal, smiling to all questions. Pt with good eye contact. Pt nodding head yes/not appropriately.  Chief Complaint: weakness  Patient comments/goals: to get better  Pain/Comfort:  · Pain Rating 1: 0/10  · Pain Rating Post-Intervention 1: 0/10    Objective:     Patient found with: blood pressure cuff, peripheral IV, PICC line, pulse ox (continuous), SCD, telemetry, tracheostomy, NG tube     General Precautions: Standard, Cardiac fall, NPO   Orthopedic Precautions:N/A   Braces: N/A   Respiratory Status: trach to vent  Vent  Data:    Vent Mode: Spont  Oxygen Concentration (%):  [40-42] 40  Resp Rate Total:  [18 br/min-42 br/min] 31 br/min  Vt Set:  [310 mL] 310 mL  PEEP/CPAP:  [5 cmH20] 5 cmH20  Pressure Support:  [10 cmH20] 10 cmH20  Mean Airway Pressure:  [7.7 cmH20-10 cmH20] 10 cmH20  Vital Signs (Most Recent):    Temp: 97.9 °F (36.6 °C) (12/12/18 1100)  Pulse: 101 (12/12/18 1500)  Resp: (!) 34 (12/12/18 1500)  BP: (!) 101/58 (12/12/18 1500)  SpO2: 100 % (12/12/18 1500)    Exam:  · Mental Status: Patient is AxOx1(person) and follows all single-step verbal commands. Pt is Alert and Cooperative during session.  · Skin Integrity: Visible skin intact  · Edema: moderate of BLE, mild of R hand  · Sensation: Intact  · Hearing: Intact  · Vision:  Intact  · Range of Motion:  · RUE: AAROM was WFL  · LUE: AAROM was WFL  · RLE: AAROM was wFL  · LLE: AAROM was WFL  · Strength Exam:  · Lower Extremity Strength: grossly 2+/5    Functional Mobility:  Bed Mobility:   · Rolling/Turning to Right: moderate assistance  · Scooting to HOB via supine bridge: dependent x2 people via drawsheet  · Scooting to EOB to have both feet planted on floor: moderate assistance  · Supine to Sit: maximal assistance; from right side of bed  · Sit to Supine: maximal assistance; to right side of bed    Sitting Balance at Edge of Bed:   Assistance Level Required: Moderate Assistance and Maximum Assistance   Time: 8 minutes   Postural deviations noted: posterior lean, APT   Encouraged: BUE and BLE weight bearing for support   Comments: due to pt's height vs. Height of bed pt sliding anteriorly. Pt with good endurance to sit EOB and noted abdominal control.     Transfers:   · Sit <> Stand Transfer: maximal assistance with hand-held assist   · Stand <> Sit Transfer: maximal assistance with hand-held assist   · x1 squat from EOB, pt required assist with anterior weight shift and lifting bottom from bed.      Gait:   NT due to poor standing balance & endurance    Therapeutic  Activities & Exercises:     Education:  Patient provided with daily orientation and goals of this PT session. Patient and family agreed to participate in session. Patient and family aware of patient's deficits and therapy progression. They were educated to transfer to bedside chair/bedside commode/bathroom with RN/PCT present. Encouraged patient to perform daily exercises & mobility to increase endurance and decrease effects of bedrest. Time provided for therapeutic counseling and discussion of health disposition. All questions answered to patient's and family's satisfaction, within scope of PT practice; voiced no other concerns. White board updated in patient's room, RN notified of session.    Patient left supine, with head in midline, neutral pelvis & heels floated for skin protection with all lines intact, call button in reach and RN notified.    AM-PAC 6 CLICK MOBILITY  Total Score:10     Assessment:     Krystal Hobson is a 68 y.o. female admitted with a medical diagnosis of Hepatic encephalopathy.  She presents with the following impairments/functional limitations: decreased functional mobility, balance and endurance.. Pt with increased impairments and disability due to generalized weakness. Pt engaged & interactive throughout session. Krystal Gustafsons deficits effect their ability to safely and independently participate in self-care tasks and functional mobility which increases their caregiver burden. Due to her physical therapy diagnosis of debility and deconditioning, they continue to benefit from acute PT services to address the following limitations: high fall risk, weakness, instability, and the need for supervised instruction of exercise. Krystal Gustafsons deficits effect their roles and responsibilities in which they were able to complete prior to admit. Education was provided to patient & family regarding importance of continued participation in therapy, patient's progress and discharge disposition. Pt  would benefit from an intensive and collaborative PT/OT/SLP therapy program to improve quality of life and focus on recovery of impairments.     Problem List: weakness, impaired self care skills, impaired endurance, impaired functional mobilty, gait instability, impaired balance, decreased upper extremity function, decreased lower extremity function, impaired cardiopulmonary response to activity, edema  Rehab Prognosis: fair; patient would benefit from acute skilled PT services to address these deficits and reach maximum level of function.      GOALS:   Multidisciplinary Problems     Physical Therapy Goals        Problem: Physical Therapy Goal    Goal Priority Disciplines Outcome Goal Variances Interventions   Physical Therapy Goal     PT, PT/OT Ongoing (interventions implemented as appropriate)     Description:  Goals to be met by: 12/21/2018    Patient will increase functional independence with mobility by performing:    Supine <> sit with Moderate Assistance.  Sit <> stand transfer with Moderate Assistance using Rolling Walker.  Bed <> chair transfer via Squat Pivot with Moderate Assistance using No Assistive Device.  Gait  x 10 feet with Moderate Assistance using Rolling Walker to prepare for community ambulation and endurance activities.  Dynamic sitting at edge of bed x 10 minutes with Minimal Assistance to prepare for functional tasks in sitting.  Able to tolerate exercise for 15-20 reps with assistance as needed.                      Multidisciplinary Problems (Resolved)        Problem: Physical Therapy Goal    Goal Priority Disciplines Outcome Goal Variances Interventions   Physical Therapy Goal   (Resolved)     PT, PT/OT  Error                      Time Tracking:     PT Received On: 12/12/18  PT Start Time: 0901     PT Stop Time: 0924  PT Total Time (min): 23 min     Billable Minutes: Re-eval 10 and Therapeutic Activity 13    Viktoriya Amado PT, DPT  12/12/2018  Pager:[phone]

## 2018-12-13 PROBLEM — J95.03: Status: ACTIVE | Noted: 2018-01-01

## 2018-12-13 PROBLEM — L30.8 DERMATITIS ASSOCIATED WITH MOISTURE: Status: ACTIVE | Noted: 2018-01-01

## 2018-12-13 NOTE — PROGRESS NOTES
[facility]  Liver Transplant  Progress Note    Patient Name: Krystal Hobson  MRN: [id number]  Admission Date: 11/10/2018  Hospital Length of Stay: 33 days  Code Status: Full Code  Primary Care Provider: Courtney Joe MD    Subjective:     Interval History:  No acute events overnight, remains afebrile, vital signs stable. Nightly CRRT continues, occasional Afib noted while on CRRT. No new issues, up in cardiac chair again this morning.     Scheduled Meds:   diltiaZEM  30 mg Oral Q8H    famotidine  20 mg Oral QHS    fluconazole  200 mg Oral Daily    guaifenesin 100 mg/5 ml  200 mg Per OG tube Q6H    heparin (porcine)  5,000 Units Subcutaneous Q12H    insulin aspart U-100  2 Units Subcutaneous Q24H    insulin aspart U-100  2 Units Subcutaneous Q24H    insulin aspart U-100  2 Units Subcutaneous Q24H    insulin aspart U-100  2 Units Subcutaneous Q24H    insulin aspart U-100  2 Units Subcutaneous Q24H    insulin aspart U-100  2 Units Subcutaneous Q24H    insulin detemir U-100  5 Units Subcutaneous BID    lactulose  15 g Per OG tube BID    miconazole nitrate 2%   Topical (Top) BID    rifAXImin  550 mg Oral BID     Continuous Infusions:   sodium chloride 0.9%       PRN Meds:sodium chloride, acetaminophen, dextrose 50%, glucagon (human recombinant), HYDROmorphone, insulin aspart U-100, k phos di & mono-sod phos mono, lactulose, lactulose, magnesium sulfate IVPB, metoprolol, ondansetron, simethicone, sodium chloride 0.9%    Review of Systems   All other systems reviewed and are negative.    Objective:     Vital Signs (Most Recent):  Temp: 98.9 °F (37.2 °C) (12/13/18 1500)  Pulse: 93 (12/13/18 1716)  Resp: (!) 30 (12/13/18 1716)  BP: (!) 109/57 (12/13/18 1600)  SpO2: 99 % (12/13/18 1716) Vital Signs (24h Range):  Temp:  [97.9 °F (36.6 °C)-98.9 °F (37.2 °C)] 98.9 °F (37.2 °C)  Pulse:  [] 93  Resp:  [16-37] 30  SpO2:  [96 %-100 %] 99 %  BP: ()/(46-83) 109/57  Arterial Line BP:  (104-131)/(35-49) 124/40     Weight: 55.5 kg (122 lb 5.7 oz)  Body mass index is 25.57 kg/m².    Intake/Output - Last 3 Shifts       12/11 0700 - 12/12 0659 12/12 0700 - 12/13 0659 12/13 0700 - 12/14 0659    I.V. (mL/kg) 1527.2 (27.5) 1748 (31.5) 756.6 (13.6)    Blood  300     NG/ 1230 460    Total Intake(mL/kg) 2112.2 (38.1) 3278 (59.1) 1216.6 (21.9)    Other 1836 2910 558    Stool  0 0    Total Output 1836 2910 558    Net +276.2 +368 +658.6           Stool Occurrence 3 x 5 x 2 x          Physical Exam   Constitutional: She appears well-developed and well-nourished.   HENT:   Head: Normocephalic and atraumatic.   Eyes: Pupils are equal, round, and reactive to light.   Spontaneously opens eyes  Jaundice+   Neck: Normal range of motion. No JVD present. No tracheal deviation present.   Cardiovascular: Normal rate, regular rhythm and normal heart sounds.   No murmur heard.  Pulmonary/Chest: She has no wheezes. She has no rales.   On vent, trach in place   Abdominal: Soft. Bowel sounds are normal. She exhibits no distension. There is no tenderness.   Umbilical hernia   Musculoskeletal: Normal range of motion. She exhibits no edema.   Neurological: She is alert.       Laboratory:  Immunosuppressants     None        CBC:   Recent Labs   Lab 12/13/18 0358   WBC 14.04*   RBC 2.69*   HGB 8.5*   HCT 25.9*   PLT 67*   MCV 96   MCH 31.6*   MCHC 32.8     CMP:   Recent Labs   Lab 12/13/18 0358   *  161*   CALCIUM 7.4*  7.4*   ALBUMIN 1.6*  1.6*   PROT 5.2*     136   K 4.0  4.0   CO2 26  26     105   BUN 7*  7*   CREATININE 0.6  0.6   ALKPHOS 252*   ALT 66*   AST 94*   BILITOT 11.4*     Coagulation:   Recent Labs   Lab 12/13/18 0358   INR 1.7*     ABGs:   Recent Labs   Lab 12/12/18  0336   PH 7.403   PCO2 38.9   HCO3 24.2   POCSATURATED 99   BE -1     Labs within the past 24 hours have been reviewed.    Diagnostic Results:  I have personally reviewed all pertinent imaging  studies.    Assessment/Plan:   Krystal Hobson is a 68 y.o. female     Pulmonary   Acute respiratory failure with hypoxia    66yo F liver-listed, stepped up on 11/21 for respiratory failure with worsening respiratory acidosis     Plan:  Neuro:   -Pain control: prn fentanyl while intubated  -Continue sedation holiday as tolerated, awake and alert without fighting vent   -Followed all commands off sedation     Pulmonary:   -intubated on arrival to SICU 11/21  Vent Mode: Spont  Oxygen Concentration (%):  [40-42] 40  Resp Rate Total:  [17 br/min-40 br/min] 26 br/min  Vt Set:  [310 mL] 310 mL  PEEP/CPAP:  [5 cmH20] 5 cmH20  Pressure Support:  [5 cmH20] 5 cmH20  Mean Airway Pressure:  [7 liY78-47 cmH20] 9.9 cmH20  -daily CXR - evidence of significant bilateral consolidation   -ABGs daily and prn  -continuous pulse ox    -Trach in place    Cardiac:  -SBP > 100 goal  -Heart failure following, appreciate recs   - Afib with RVR 11/23/18, broke out with 2x 5mg doses of IV metop, her pressure was stable throughout  - Went back into Afib with RVR on 11/25. Given IV metoprolol 5mg q3. Did not resolve, started on diltiazem drip. Converted out of Afib around 3am. dilt drip stopped at that time.   -continue dilt gtt as needed for Afib, try to prevent Afib by administering metoprolol prior to CRRT    Renal:   -Mendoza in place  -UOP minimal  -Nephrology following, appreciate recommendations  -Started CRRT 11/24/18. Will continue nocturnal CRRT     Fluids/Electrolytes/Nutrition/GI:   -Nutritional status: NPO while intubated  -Continue TF @ 40cc/hour, will add fiber given diarrhea   -replace lytes PRN  -will hold Lactulose enemas TID    Hematology/Oncology:  -Hgb stable following transfusion yesterday  -continue to monitor. Transfuse as needed     Infectious Disease:   -Afebrile  -Trend WBC  -Abx: Zosyn.   -Sending C. Diff given frequent diarrhea    Endocrine:  -Glucose goal of 140-180  -Endocrine following, see  recommendations    Dispo:  -Continue care in the ICU setting - will attempt transfer to a facility closer to home to assist with palliation  -continue CRRT              The patients clinical status was discussed at multidisplinary rounds, involving transplant surgery, transplant medicine, pharmacy, nursing, nutrition, and social work.    Lien Angela MD  Liver Transplant  [facility]

## 2018-12-13 NOTE — PT/OT/SLP PROGRESS
"Physical Therapy  Consult    Patient Name:  Krystal Hobson   MRN:  [id number]  Admitting Diagnosis:  Hepatic encephalopathy   Recent Surgery: Procedure(s) (LRB):  CREATION, TRACHEOSTOMY (N/A) 8 Days Post-Op    PT attempted pt in am with evaluating OT. Per chart review, pt & family is pursing hospice care. PT discussed goals of therapy with pt and pt's . Pt's  very emotional about pt's health disposition.  states wanting to get pt closer to home to be close to family. Provided education that therapy could continue progressive mobility vs. providing them with exercises and positioning techniques to keep pt comfortable.  asked "if we sit up on the bed, could that lead to her walking again?" PT informed  that due to her current debility, walking with RW could be a goal, but it would be a long-term goal that we could not put a time frame on.  verbalized understanding. PT, OT and  decided that we shall purse comfortable exercise and positioning in bed until a more clear medical picture is evident.   OT performed re-eval.   PT will perform continued education 12/14/18.    No billable units.  Viktoriya Amado PT, DPT  12/13/2018  Pager: [phone]            "

## 2018-12-13 NOTE — PROGRESS NOTES
[facility]  Nephrology  Progress Note    Patient Name: Krystal Hobson  MRN: [id number]  Admission Date: 11/10/2018  Hospital Length of Stay: 32 days  Attending Provider: Abrahan Montelongo MD   Primary Care Physician: Courtney Joe MD  Principal Problem:Hepatic encephalopathy    Subjective:     HPI: Reason for consult: Diuresis in setting of PAP46, pulmonary edema, HRS    Ms. 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  - She presented to [facility] as xfer from [place] under liver transplant team due to encephalopathy and new onset ENZO.   - Per the , she was extremely disoriented @ OSH and her ammonia reached as high as 200, and her Cr level increased to 2.0 She also went into A fib with RVR and was started on a diltiazem gtt at OSH.   - On arrival at [facility] she was disoriented and started on lactulose with noted improvement. During her current admission on CXR they found a concern for HCAP and she was started on broad spectrum ABx and then shifted her on Moxifloxacin 7 D course. On 11/20/2018 found on CXR a concern for Lt lung middle lobe consolidation and started on vancomycin and zosyn.  - Renal fxn baseline 1.6 at admit but trending up    Interval History:   Seen today siting up in cardiac chair. Tolerated SLED x 10 hrs overnight  ml/hr, CVP 8. Continues with mechanical ventilation, placed on spontaneous but didn't tolerated. Still not making urine. Fluid balance net gain 84 ml plus insensible losses she would be about 1 lt negative.    Review of patient's allergies indicates:  No Known Allergies  Current Facility-Administered Medications   Medication Frequency    0.9%  NaCl infusion (CRRT USE ONLY) Continuous    0.9%  NaCl infusion (for blood administration) Q24H PRN    acetaminophen tablet 650 mg Q8H PRN    dextrose 50% injection 12.5 g PRN    diltiaZEM tablet 30 mg Q8H    famotidine tablet 20 mg QHS    fluconazole tablet 200 mg Daily     glucagon (human recombinant) injection 1 mg PRN    guaifenesin 100 mg/5 ml syrup 200 mg Q6H    heparin (porcine) injection 5,000 Units Q12H    HYDROmorphone injection 0.2 mg Q3H PRN    insulin aspart U-100 pen 0-5 Units Q4H PRN    insulin aspart U-100 pen 2 Units Q24H    insulin aspart U-100 pen 2 Units Q24H    insulin aspart U-100 pen 2 Units Q24H    insulin aspart U-100 pen 2 Units Q24H    insulin aspart U-100 pen 2 Units Q24H    insulin aspart U-100 pen 2 Units Q24H    insulin detemir U-100 pen 5 Units BID    k phos di & mono-sod phos mono 250 mg tablet 2 tablet TID PRN    lactulose 10 gram/15 mL solution (enema) 200 g TID PRN    lactulose 20 gram/30 mL solution Soln 15 g BID    lactulose 20 gram/30 mL solution Soln 30 g Q6H PRN    magnesium sulfate 2g in water 50mL IVPB (premix) PRN    metoprolol injection 2.5 mg Q5 Min PRN    metoprolol injection 5 mg Q5 Min PRN    ondansetron disintegrating tablet 8 mg Q8H PRN    rifAXIMin tablet 550 mg BID    simethicone chewable tablet 80 mg TID PRN    sodium chloride 0.9% flush 3 mL PRN       Objective:     Vital Signs (Most Recent):  Temp: 98.5 °F (36.9 °C) (12/12/18 1500)  Pulse: 98 (12/12/18 1800)  Resp: (!) 21 (12/12/18 1800)  BP: (!) 97/54 (12/12/18 1800)  SpO2: 100 % (12/12/18 1800)  O2 Device (Oxygen Therapy): ventilator (12/12/18 1800) Vital Signs (24h Range):  Temp:  [97.9 °F (36.6 °C)-98.8 °F (37.1 °C)] 98.5 °F (36.9 °C)  Pulse:  [] 98  Resp:  [18-38] 21  SpO2:  [92 %-100 %] 100 %  BP: ()/(48-58) 97/54  Arterial Line BP: ()/(28-51) 107/38     Weight: 55.5 kg (122 lb 5.7 oz) (12/04/18 0353)  Body mass index is 25.57 kg/m².  Body surface area is 1.51 meters squared.    I/O last 3 completed shifts:  In: 3529.2 [I.V.:2004.2; Blood:300; NG/GT:1225]  Out: 2427 [Other:2427]    Physical Exam   Constitutional:   Sitting in chair with trach in place and Vent connected to Trach   HENT:   Head: Normocephalic.   Right Ear: External  ear normal.   Left Ear: External ear normal.   Nose: Nose normal.   Eyes: Right eye exhibits no discharge. Left eye exhibits no discharge.   Neck:   Tracheostomy    Cardiovascular: An irregular rhythm present.   Murmur heard.  Pulmonary/Chest: She has rales (Zambrano on the Left > right).   Musculoskeletal: She exhibits no edema.   Skin: Skin is warm.       Significant Labs:  ABGs:   Recent Labs   Lab 12/12/18  0336   PH 7.403   PCO2 38.9   HCO3 24.2   POCSATURATED 99   BE -1     BMP:   Recent Labs   Lab 12/12/18  1442   *      CO2 25   BUN 14   CREATININE 0.9   CALCIUM 8.3*   MG 2.2     CBC:   Recent Labs   Lab 12/12/18  1442   WBC 17.95*   RBC 3.02*   HGB 9.6*   HCT 29.1*   PLT 66*   MCV 96   MCH 31.8*   MCHC 33.0     CMP:   Recent Labs   Lab 12/12/18  0232 12/12/18  1442   *  170* 203*   CALCIUM 8.0*  8.0* 8.3*   ALBUMIN 1.7*  1.7* 1.7*   PROT 5.4*  --    *  135* 134*   K 4.1  4.1 3.9   CO2 25  25 25     104 105   BUN 13  13 14   CREATININE 0.8  0.8 0.9   ALKPHOS 246*  --    ALT 66*  --    *  --    BILITOT 10.5*  --      All labs within the past 24 hours have been reviewed.     Assessment/Plan:     Acute kidney injury superimposed on CKD    Cryptogenic cirrhosis awaiting liver xplant, presented for AMS and found to have ENZO on CKDIIIb. SLED started last weekend with intermittent AF+RVR+hypotensive episodes controlled with bb/cardizem gtt adequately. Now on CRRT with improving overall fluid status.    Plan:  - no evidence of renal recovery dialysis depenedant  - tolerated SLED overnight willplan for SLED x 10 hrs overnight again  - -300 ml/hr as tolerated   - please change formula to Renal   - CVP today at 8  - Continue with mechanical ventilation with FiO2 40 % PEEP 5  - Blood pressures have been stable  - Continues to be anuric, will need bladder scans BID to evaluate for any sign           Thank you for your consult. I will follow-up with patient. Please contact  us if you have any additional questions.    Haroldo Van MD  Nephrology  [facility]

## 2018-12-13 NOTE — PLAN OF CARE
[facility]    FACILITY TRANSFER ORDERS      Patient Name: Krystal Hobson  YOB: 1950    PCP: Courtney Joe MD   PCP Address: [address] [facility] [address]  PCP Phone Number: [phone]  PCP Fax: None    Encounter Date: 12/13/2018    Admit to: [facility]    Vital Signs:  Routine    Diagnoses:   Active Hospital Problems    Diagnosis  POA    *Hepatic encephalopathy [K72.90]  Yes    Breakdown of tracheostomy site [J95.03]  Yes    Dermatitis associated with moisture [L30.8]  Yes    Difficulty weaning from ventilator [Z99.11]  Not Applicable    Suspected pressure injury of deep tissue [R68.89]  Yes    On enteral nutrition [Z78.9]  Unknown    Acute respiratory failure with hypoxia [J96.01]  Unknown    Pulmonary infiltrates on CXR [R91.8]  Unknown    CKD (chronic kidney disease) stage 3, GFR 30-59 ml/min [N18.3]  Yes    Atrial fibrillation with rapid ventricular response [I48.91]  Unknown    Sinus tachycardia [R00.0]  Yes    Chronic liver disease [K76.9]  Yes    Type 2 diabetes mellitus with hyperglycemia, with long-term current use of insulin [E11.65, Z79.4]  Not Applicable    Anemia of chronic disease [D63.8]  Yes    End stage liver disease [K72.90]  Yes    Acute kidney injury superimposed on CKD [N17.9, N18.9]  Yes    Decompensated hepatic cirrhosis [K72.90]  Yes      Resolved Hospital Problems    Diagnosis Date Resolved POA    Hypokalemia [E87.6] 11/27/2018 No    Hypervolemia [E87.70] 11/27/2018 Yes    Ear pain [H92.09] 11/27/2018 Yes    Swelling in right armpit [M79.89] 11/16/2018 Unknown    Swelling of joint of upper arm, right [M25.421] 11/27/2018 Unknown    Physical deconditioning [R53.81] 11/27/2018 Unknown    Shortness of breath [R06.02] 11/27/2018 Unknown    Severe malnutrition [E43] 11/16/2018 Yes    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 , Occupational Therapy to evaluate and treat. and Speech Therapy to evaluate and treat for Language and Swallowing.    MISCELLANEOUS CARE:  Routine Skin for Bedridden Patients: Apply moisture barrier cream to all skin folds and wet areas in perineal area daily and after baths and all bowel movements. and Diabetes Care:   SN to perform and educate Diabetic management with blood glucose monitoring:, Fingerstick blood sugar AC and HS and Report CBG < 60 or > 350 to physician.    WOUND CARE ORDERS  None    Medications: Review discharge medications with patient and family and provide education.      Current Discharge Medication List      CONTINUE these medications which have NOT CHANGED    Details   ALBUTEROL INHL Inhale 1 puff into the lungs 4 (four) times daily as needed.      ergocalciferol (ERGOCALCIFEROL) 50,000 unit Cap Take 50,000 Units by mouth every 7 days.      fluticasone/vilanterol (BREO ELLIPTA INHL) Inhale into the lungs.      hydrOXYzine HCl (ATARAX) 25 MG tablet Take 25 mg by mouth 3 (three) times daily as needed for Itching.      insulin detemir U-100 (LEVEMIR) 100 unit/mL injection Inject 12 Units into the skin 2 (two) times daily.       insulin lispro (HUMALOG) 100 unit/mL injection Sliding scale       lactulose (CHRONULAC) 10 gram/15 mL solution Take 23 mLs (15.3333 g total) by mouth 3 (three) times daily. Titrate to 3 BM daily  Qty: 2500 mL, Refills: 1      omeprazole (PRILOSEC) 40 MG capsule Take 40 mg by mouth every morning.      ondansetron (ZOFRAN) 4 MG tablet Take 4 mg by mouth every  8 (eight) hours as needed for Nausea.      ONETOUCH ULTRA BLUE TEST STRIP Strp       propranolol (INDERAL) 10 MG tablet Take 5 mg by mouth 2 (two) times daily.       rifAXIMin (XIFAXAN) 550 mg Tab Take 550 mg by mouth 2 (two) times daily.      SHINGRIX, PF, 50 mcg/0.5 mL injection       sodium bicarbonate 650 MG tablet Take 2 tablets (1,300 mg total) by mouth 3 (three) times daily.  Qty: 180 tablet, Refills: 11      zinc gluconate 50 mg tablet Take 50 mg by mouth once daily.                   _________________________________  Lien Angela MD  12/13/2018

## 2018-12-13 NOTE — NURSING
SHIFT EVENTS: CRRT completed, PT consult, up to cardiac chair, spontaneous breathing trial      NEUROLOGICAL: Patient remains alert and neurologically intact, moves all extremities, PERRLA      CARDIOVASCULAR: Afib with various rates, Cardizem and Metoprolol for rate control <110bpm as blood pressure tolerates, SBP remains >100 on arterial line       PULMONARY: PAV+ SBT completed without issue, thick purulent sputum discharge around trach, sputum culture ordered and sent      GENITOURINARY: Anuric, CRRT ordered for tonight      GASTROINTESTINAL: BMx2 this shift, incontinent, lactulose given for ammonia control      NUTRITION/ENDOCRINE: Tube feeding continued at goal rate of 40ml/hour, no residuals, q4 blood sugars covered with scheduled and PRN insulin      SKIN/WOUNDS: Wound care consult ordered for stage 2 under trach flange, triad paste to perianal and gluteal folds for moisture associated breakdown      ACTIVITY: Stand at side of bed with assistance of PT, sat in cardiac chair for much of the day      SOCIAL SUPPORT:  at bedside, updated of plan of care throughout the day, all questions answered and denies further needs at this time

## 2018-12-13 NOTE — SUBJECTIVE & OBJECTIVE
Interval History:   Seen today siting up in cardiac chair. Tolerated SLED x 10 hrs overnight  ml/hr, CVP 8. Continues with mechanical ventilation, placed on spontaneous but didn't tolerated. Still not making urine. Fluid balance net gain 84 ml plus insensible losses she would be about 1 lt negative.    Review of patient's allergies indicates:  No Known Allergies  Current Facility-Administered Medications   Medication Frequency    0.9%  NaCl infusion (CRRT USE ONLY) Continuous    0.9%  NaCl infusion (for blood administration) Q24H PRN    acetaminophen tablet 650 mg Q8H PRN    dextrose 50% injection 12.5 g PRN    diltiaZEM tablet 30 mg Q8H    famotidine tablet 20 mg QHS    fluconazole tablet 200 mg Daily    glucagon (human recombinant) injection 1 mg PRN    guaifenesin 100 mg/5 ml syrup 200 mg Q6H    heparin (porcine) injection 5,000 Units Q12H    HYDROmorphone injection 0.2 mg Q3H PRN    insulin aspart U-100 pen 0-5 Units Q4H PRN    insulin aspart U-100 pen 2 Units Q24H    insulin aspart U-100 pen 2 Units Q24H    insulin aspart U-100 pen 2 Units Q24H    insulin aspart U-100 pen 2 Units Q24H    insulin aspart U-100 pen 2 Units Q24H    insulin aspart U-100 pen 2 Units Q24H    insulin detemir U-100 pen 5 Units BID    k phos di & mono-sod phos mono 250 mg tablet 2 tablet TID PRN    lactulose 10 gram/15 mL solution (enema) 200 g TID PRN    lactulose 20 gram/30 mL solution Soln 15 g BID    lactulose 20 gram/30 mL solution Soln 30 g Q6H PRN    magnesium sulfate 2g in water 50mL IVPB (premix) PRN    metoprolol injection 2.5 mg Q5 Min PRN    metoprolol injection 5 mg Q5 Min PRN    ondansetron disintegrating tablet 8 mg Q8H PRN    rifAXIMin tablet 550 mg BID    simethicone chewable tablet 80 mg TID PRN    sodium chloride 0.9% flush 3 mL PRN       Objective:     Vital Signs (Most Recent):  Temp: 98.5 °F (36.9 °C) (12/12/18 1500)  Pulse: 98 (12/12/18 1800)  Resp: (!) 21 (12/12/18 1800)  BP:  (!) 97/54 (12/12/18 1800)  SpO2: 100 % (12/12/18 1800)  O2 Device (Oxygen Therapy): ventilator (12/12/18 1800) Vital Signs (24h Range):  Temp:  [97.9 °F (36.6 °C)-98.8 °F (37.1 °C)] 98.5 °F (36.9 °C)  Pulse:  [] 98  Resp:  [18-38] 21  SpO2:  [92 %-100 %] 100 %  BP: ()/(48-58) 97/54  Arterial Line BP: ()/(28-51) 107/38     Weight: 55.5 kg (122 lb 5.7 oz) (12/04/18 0353)  Body mass index is 25.57 kg/m².  Body surface area is 1.51 meters squared.    I/O last 3 completed shifts:  In: 3529.2 [I.V.:2004.2; Blood:300; NG/GT:1225]  Out: 2427 [Other:2427]    Physical Exam   Constitutional:   Sitting in chair with trach in place and Vent connected to Trach   HENT:   Head: Normocephalic.   Right Ear: External ear normal.   Left Ear: External ear normal.   Nose: Nose normal.   Eyes: Right eye exhibits no discharge. Left eye exhibits no discharge.   Neck:   Tracheostomy    Cardiovascular: An irregular rhythm present.   Murmur heard.  Pulmonary/Chest: She has rales (Zambrano on the Left > right).   Musculoskeletal: She exhibits no edema.   Skin: Skin is warm.       Significant Labs:  ABGs:   Recent Labs   Lab 12/12/18  0336   PH 7.403   PCO2 38.9   HCO3 24.2   POCSATURATED 99   BE -1     BMP:   Recent Labs   Lab 12/12/18  1442   *      CO2 25   BUN 14   CREATININE 0.9   CALCIUM 8.3*   MG 2.2     CBC:   Recent Labs   Lab 12/12/18  1442   WBC 17.95*   RBC 3.02*   HGB 9.6*   HCT 29.1*   PLT 66*   MCV 96   MCH 31.8*   MCHC 33.0     CMP:   Recent Labs   Lab 12/12/18  0232 12/12/18  1442   *  170* 203*   CALCIUM 8.0*  8.0* 8.3*   ALBUMIN 1.7*  1.7* 1.7*   PROT 5.4*  --    *  135* 134*   K 4.1  4.1 3.9   CO2 25  25 25     104 105   BUN 13  13 14   CREATININE 0.8  0.8 0.9   ALKPHOS 246*  --    ALT 66*  --    *  --    BILITOT 10.5*  --      All labs within the past 24 hours have been reviewed.

## 2018-12-13 NOTE — SUBJECTIVE & OBJECTIVE
Interval History:  No acute events overnight, remains afebrile, vital signs stable. Nightly CRRT continues, occasional Afib noted while on CRRT. No new issues, up in cardiac chair again this morning.     Scheduled Meds:   diltiaZEM  30 mg Oral Q8H    famotidine  20 mg Oral QHS    fluconazole  200 mg Oral Daily    guaifenesin 100 mg/5 ml  200 mg Per OG tube Q6H    heparin (porcine)  5,000 Units Subcutaneous Q12H    insulin aspart U-100  2 Units Subcutaneous Q24H    insulin aspart U-100  2 Units Subcutaneous Q24H    insulin aspart U-100  2 Units Subcutaneous Q24H    insulin aspart U-100  2 Units Subcutaneous Q24H    insulin aspart U-100  2 Units Subcutaneous Q24H    insulin aspart U-100  2 Units Subcutaneous Q24H    insulin detemir U-100  5 Units Subcutaneous BID    lactulose  15 g Per OG tube BID    miconazole nitrate 2%   Topical (Top) BID    rifAXImin  550 mg Oral BID     Continuous Infusions:   sodium chloride 0.9%       PRN Meds:sodium chloride, acetaminophen, dextrose 50%, glucagon (human recombinant), HYDROmorphone, insulin aspart U-100, k phos di & mono-sod phos mono, lactulose, lactulose, magnesium sulfate IVPB, metoprolol, ondansetron, simethicone, sodium chloride 0.9%    Review of Systems   All other systems reviewed and are negative.    Objective:     Vital Signs (Most Recent):  Temp: 98.9 °F (37.2 °C) (12/13/18 1500)  Pulse: 93 (12/13/18 1716)  Resp: (!) 30 (12/13/18 1716)  BP: (!) 109/57 (12/13/18 1600)  SpO2: 99 % (12/13/18 1716) Vital Signs (24h Range):  Temp:  [97.9 °F (36.6 °C)-98.9 °F (37.2 °C)] 98.9 °F (37.2 °C)  Pulse:  [] 93  Resp:  [16-37] 30  SpO2:  [96 %-100 %] 99 %  BP: ()/(46-83) 109/57  Arterial Line BP: (104-131)/(35-49) 124/40     Weight: 55.5 kg (122 lb 5.7 oz)  Body mass index is 25.57 kg/m².    Intake/Output - Last 3 Shifts       12/11 0700 - 12/12 0659 12/12 0700 - 12/13 0659 12/13 0700 - 12/14 0659    I.V. (mL/kg) 1527.2 (27.5) 1748 (31.5) 756.6 (13.6)     Blood  300     NG/ 1230 460    Total Intake(mL/kg) 2112.2 (38.1) 3278 (59.1) 1216.6 (21.9)    Other 1836 2910 558    Stool  0 0    Total Output 1836 2910 558    Net +276.2 +368 +658.6           Stool Occurrence 3 x 5 x 2 x          Physical Exam   Constitutional: She appears well-developed and well-nourished.   HENT:   Head: Normocephalic and atraumatic.   Eyes: Pupils are equal, round, and reactive to light.   Spontaneously opens eyes  Jaundice+   Neck: Normal range of motion. No JVD present. No tracheal deviation present.   Cardiovascular: Normal rate, regular rhythm and normal heart sounds.   No murmur heard.  Pulmonary/Chest: She has no wheezes. She has no rales.   On vent, trach in place   Abdominal: Soft. Bowel sounds are normal. She exhibits no distension. There is no tenderness.   Umbilical hernia   Musculoskeletal: Normal range of motion. She exhibits no edema.   Neurological: She is alert.       Laboratory:  Immunosuppressants     None        CBC:   Recent Labs   Lab 12/13/18 0358   WBC 14.04*   RBC 2.69*   HGB 8.5*   HCT 25.9*   PLT 67*   MCV 96   MCH 31.6*   MCHC 32.8     CMP:   Recent Labs   Lab 12/13/18 0358   *  161*   CALCIUM 7.4*  7.4*   ALBUMIN 1.6*  1.6*   PROT 5.2*     136   K 4.0  4.0   CO2 26  26     105   BUN 7*  7*   CREATININE 0.6  0.6   ALKPHOS 252*   ALT 66*   AST 94*   BILITOT 11.4*     Coagulation:   Recent Labs   Lab 12/13/18 0358   INR 1.7*     ABGs:   Recent Labs   Lab 12/12/18  0336   PH 7.403   PCO2 38.9   HCO3 24.2   POCSATURATED 99   BE -1     Labs within the past 24 hours have been reviewed.    Diagnostic Results:  I have personally reviewed all pertinent imaging studies.

## 2018-12-13 NOTE — PROGRESS NOTES
10-hr CRRT iniitiated via [facility] trialysis. Good blood flows noted. Alarms set and lines secured. UF goal 250-300.

## 2018-12-13 NOTE — ASSESSMENT & PLAN NOTE
68yo F liver-listed, stepped up on 11/21 for respiratory failure with worsening respiratory acidosis     Plan:  Neuro:   -Pain control: prn fentanyl while intubated  -Continue sedation holiday as tolerated, awake and alert without fighting vent   -Followed all commands off sedation     Pulmonary:   -intubated on arrival to SICU 11/21  Vent Mode: Spont  Oxygen Concentration (%):  [40-42] 40  Resp Rate Total:  [17 br/min-40 br/min] 26 br/min  Vt Set:  [310 mL] 310 mL  PEEP/CPAP:  [5 cmH20] 5 cmH20  Pressure Support:  [5 cmH20] 5 cmH20  Mean Airway Pressure:  [7 uqE28-53 cmH20] 9.9 cmH20  -daily CXR - evidence of significant bilateral consolidation   -ABGs daily and prn  -continuous pulse ox    -Trach in place    Cardiac:  -SBP > 100 goal  -Heart failure following, appreciate recs   - Afib with RVR 11/23/18, broke out with 2x 5mg doses of IV metop, her pressure was stable throughout  - Went back into Afib with RVR on 11/25. Given IV metoprolol 5mg q3. Did not resolve, started on diltiazem drip. Converted out of Afib around 3am. dilt drip stopped at that time.   -continue dilt gtt as needed for Afib, try to prevent Afib by administering metoprolol prior to CRRT    Renal:   -Mendoza in place  -UOP minimal  -Nephrology following, appreciate recommendations  -Started CRRT 11/24/18. Will continue nocturnal CRRT     Fluids/Electrolytes/Nutrition/GI:   -Nutritional status: NPO while intubated  -Continue TF @ 40cc/hour, will add fiber given diarrhea   -replace lytes PRN  -will hold Lactulose enemas TID    Hematology/Oncology:  -Hgb stable following transfusion yesterday  -continue to monitor. Transfuse as needed     Infectious Disease:   -Afebrile  -Trend WBC  -Abx: Zosyn.   -Sending C. Diff given frequent diarrhea    Endocrine:  -Glucose goal of 140-180  -Endocrine following, see recommendations    Dispo:  -Continue care in the ICU setting - will attempt transfer to a facility closer to home to assist with palliation  -continue  CRRT

## 2018-12-13 NOTE — ASSESSMENT & PLAN NOTE
Cryptogenic cirrhosis awaiting liver xplant, presented for AMS and found to have ENZO on CKDIIIb. SLED started last weekend with intermittent AF+RVR+hypotensive episodes controlled with bb/cardizem gtt adequately. Now on CRRT with improving overall fluid status.    Plan:  - no evidence of renal recovery dialysis depenedant  - tolerated SLED overnight willplan for SLED x 10 hrs overnight again  - -300 ml/hr as tolerated   - please change formula to Renal   - CVP today at 8  - Continue with mechanical ventilation with FiO2 40 % PEEP 5  - Blood pressures have been stable  - Continues to be anuric, will need bladder scans BID to evaluate for any sign

## 2018-12-13 NOTE — PROGRESS NOTES
Per Dr. Marcelo do not place patient on SIMV. Place on PAV+ throughout day and AC at night per vent orders.

## 2018-12-13 NOTE — PROGRESS NOTES
Update:    SW contacted [facility] and spoke to Elena. Elena reported that she was informed by case management that patient would not be accepted based off of insurance coverage. In addition, [facility] is on bypass and unable to accept inpatient to inpatient transfers at this time. Per Elena, patient's insurance covers transfers to an inpatient hospice or a long term acute facility. Elena recommended that SW look into [facility] ph#[phone]. Per previous notes, [facility] reported that they would not be able to accept the patient unless she is on hemodialysis. This SW contacted facility once more and was informed the same information. SW then contacted [facility] in [place], [place] ph# [phone]. SW was informed by Neli that patient still could not be accepted due to being on a trach.     When rounding, patient requested that SW attempt to have patient placed at a facility in [place], [place]. SW contacted [facility] facility, [phone]. SW was informed that their facility would not be able to accept patient; however reported that [facility] [place] [facility] at [facility] in [place], [place] may be an option. SW contacted [facility] ph# [phone] and patient was connected to [facility] (direct line [phone]). CYNDEE spoke to , Polly, and she requested that SW fax patient's medical records for review ph# [phone]. Polly reported being surprised that [facility] in [place] denied patient. CYNDEE received a called from Marcelina, medical director at [facility],and was informed that patient would not be accepted by facility. Per Marcelina, patient can only be accepted if caregiver allows vent to be removed once pt arrives to their facility.    CYNDEE informed patient's spouse of the above information. Mr. Hobson reported that he also contacted [facility] and was  informed that their facility was not accepting inpatient to inpatient transfers at this time. Again, patient's spouse reported that he is attempting to do what he can in order to get patient close to home. Mr. Hobson also provided SW with contact informed for pt's insurance for authorization for pt transfer via ambulance #[phone].     CYNDEE contacted [facility] and informed , Albania, that caregiver reported that patient's insurance reported that transfer should be covered. Albania reported that patient's insurance does not cover a hospital to hospital transfer for the same level of care. Per Albania, their facility will not be reimbursed if that was to occur. She went on to say that is why it is recommended patient transfer to [facility] on inpt hospice. Albania recommended that an order be placed for patient to have a hospital to hospital transfer and she would attempt to assist patient with a transfer to their facility. According to Albania, having the referral may be helpful for when she communicates to patient's insurance company.     CYNDEE remains available and will follow-up as appropriate. CYNDEE has updated resident and Mr. Hobson on the above information. Per Albania's recommendations, CYNDEE also informed patient's spouse to talk to patient's insurance company and discuss his case to see if they would be willing to make an exception. Mr. Hobson reported that he would contact the insurance company on 12/14/18. Patient's spouse will also provide [facility] with CYNDEE, Tato Osborne, contact information, given that he will be covering for this SW.

## 2018-12-13 NOTE — PT/OT/SLP RE-EVAL
Occupational Therapy   Re-evaluation    Name: Krystal Hobson  MRN: [id number]  Admitting Diagnosis:  Hepatic encephalopathy 8 Days Post-Op  Pt had been d/c'd from OT following t/f to ICU.   New orders received.   Recommendations:     Discharge Recommendations: TBD; Pt initially thought to be d/c to LTAC. Medical chart indicates consult for Hospice.     History:     Past Medical History:   Diagnosis Date    Cancer     breat Cancer    Cirrhosis     Diabetes mellitus     GERD (gastroesophageal reflux disease)     Rheumatoid arthritis        Past Surgical History:   Procedure Laterality Date    CARPAL TUNNEL RELEASE      left wrist    CHOLECYSTECTOMY      CREATION, TRACHEOSTOMY N/A 12/5/2018    Performed by Ken Higgins MD at [facility] OR [address] [place] [address]    MASTECTOMY      left breast 25  years ago    TRACHEOSTOMY N/A 12/5/2018    Procedure: CREATION, TRACHEOSTOMY;  Surgeon: Ken Higgins MD;  Location: [facility] OR [address] [place] [address];  Service: General;  Laterality: N/A;       Subjective     Pt moving hands and  gesturing she could not longer participate with therapy.     Communicated with: nsg  prior to session.  Pain/Comfort:  · Pain Rating 1: 0/10    Objective:     Patient found supine in bed with trach to vent. Spouse at b/s and emotional re: current medical situation. :      General Precautions: Standard, fall, NPO   Orthopedic Precautions:N/A       Occupational Performance:    Bed Mobility:    · Rolling right<>left with TOTAL A     Activities of Daily Living:  · TOTAL A for ADL's.     Cognitive/Visual Perceptual  Pt awake and follows occasional simple commands.     Physical Exam:  Pt with right hand dominant and demo Poor B hand .  Pt moves left UE more freely than R UE. Pt demo 2-/5 right UE and 2+/5 left UE.     Patient left supine with all lines intact    AMPAC 6 Click:  AMPAC Total Score: 6    Treatment & Education:  Education provided re: therapy purpose. Spouse present in room and tearful re: pt's current medical  situation.  Education:    Assessment:     Krystal Hobson is a 68 y.o. female with a medical diagnosis of Hepatic encephalopathy.  Pt appropriate for one discipline at this time due to limited tolerance for activity. PT to follow at this time with OT to d/c services. No OT goals in place at this time.     Plan:     D/C inpatient OT services.     This Plan of care has been discussed with the patient who was involved in its development and understands and is in agreement with the identified goals and treatment plan    GOALS:   Multidisciplinary Problems     Occupational Therapy Goals        Problem: Occupational Therapy Goal           Problem: Occupational Therapy Goal    Goal Priority Disciplines Outcome Interventions   Occupational Therapy Goal     OT, PT/OT                     Time Tracking:     OT Date of Treatment: 12/13/18  OT Start Time: 1000  OT Stop Time: 1015  OT Total Time (min): 15 min    Billable Minutes:Re-eval 15    BILL Lerner  12/13/2018

## 2018-12-13 NOTE — PROGRESS NOTES
[facility]  Critical Care - Surgery  Progress Note    Patient Name: Krystal Hobson  MRN: [id number]  Admission Date: 11/10/2018  Hospital Length of Stay: 33 days  Code Status: Full Code  Attending Provider: Abrahan Montelongo MD  Primary Care Provider: Courtney Joe MD   Principal Problem: Hepatic encephalopathy    Subjective:     Hospital/ICU Course:  No notes on file    Interval History/Significant Events:   NAEON.  AF and HDS (in AFib). Trached on minimal vent settings.    Follow-up For: Procedure(s) (LRB):  CREATION, TRACHEOSTOMY (N/A)    Post-Operative Day: 8 Days Post-Op    Objective:     Vital Signs (Most Recent):  Temp: 98.5 °F (36.9 °C) (12/13/18 0715)  Pulse: (!) 120 (12/13/18 0700)  Resp: (!) 33 (12/13/18 0715)  BP: (!) 96/51 (12/13/18 0700)  SpO2: 98 % (12/13/18 0715) Vital Signs (24h Range):  Temp:  [97.9 °F (36.6 °C)-98.8 °F (37.1 °C)] 98.5 °F (36.9 °C)  Pulse:  [] 120  Resp:  [20-37] 33  SpO2:  [92 %-100 %] 98 %  BP: ()/(46-58) 96/51  Arterial Line BP: ()/(28-51) 104/40     Weight: 55.5 kg (122 lb 5.7 oz)  Body mass index is 25.57 kg/m².      Intake/Output Summary (Last 24 hours) at 12/13/2018 0730  Last data filed at 12/13/2018 0700  Gross per 24 hour   Intake 3258 ml   Output 3087 ml   Net 171 ml       Physical Exam  Constitutional: She appears well-developed and well-nourished.   HENT:   Head: Normocephalic and atraumatic.   Eyes: Pupils are equal, round, and reactive to light.   Spontaneously opens eyes  Jaundice+   Neck: Normal range of motion. No JVD present. No tracheal deviation present.   Cardiovascular: Normal rate, regular rhythm and normal heart sounds.   No murmur heard.  Pulmonary/Chest: She has no wheezes. She has no rales.   On vent, trach in place   Abdominal: Soft. Bowel sounds are normal. She exhibits no distension. There is no tenderness.   Umbilical hernia   Musculoskeletal: Normal range of motion. She exhibits no edema.   Neurological: She is alert.      Vents:  Vent Mode: SIMV (12/13/18 0520)  Ventilator Initiated: Yes (11/21/18 1839)  Set Rate: 10 bmp (12/13/18 0520)  Vt Set: 310 mL (12/13/18 0520)  Pressure Support: 5 cmH20 (12/13/18 0520)  PEEP/CPAP: 5 cmH20 (12/13/18 0520)  Oxygen Concentration (%): 40 (12/13/18 0715)  Peak Airway Pressure: 12 cmH2O (12/13/18 0520)  Plateau Pressure: 21 cmH20 (12/13/18 0520)  Total Ve: 8.69 mL (12/13/18 0520)  F/VT Ratio<105 (RSBI): 143.97 (12/13/18 0520)    Lines/Drains/Airways     Central Venous Catheter Line                 Trialysis (Dialysis) Catheter 11/21/18 1851 left internal jugular 21 days          Drain                 NG/OG Tube 12/07/18 2141 [place] sump 12 Fr. Right nostril 5 days          Airway                 Surgical Airway 12/05/18 1431 Shiley 7 days          Arterial Line                 Arterial Line 11/21/18 1900 Right Radial 21 days          Pressure Ulcer                 Pressure Injury 12/10/18 1600 medial Sacral spine Stage 2 2 days                Significant Labs:    CBC/Anemia Profile:  Recent Labs   Lab 12/12/18 0232 12/12/18  1442 12/13/18  0358   WBC 14.33* 17.95* 14.04*   HGB 7.1* 9.6* 8.5*   HCT 23.2* 29.1* 25.9*   PLT 80* 66* 67*   * 96 96   RDW 27.8* 24.8* 25.0*        Chemistries:  Recent Labs   Lab 12/12/18  0232 12/12/18  1442 12/13/18  0358   *  135* 134* 136  136   K 4.1  4.1 3.9 4.0  4.0     104 105 105  105   CO2 25  25 25 26  26   BUN 13  13 14 7*  7*   CREATININE 0.8  0.8 0.9 0.6  0.6   CALCIUM 8.0*  8.0* 8.3* 7.4*  7.4*   ALBUMIN 1.7*  1.7* 1.7* 1.6*  1.6*   PROT 5.4*  --  5.2*   BILITOT 10.5*  --  11.4*   ALKPHOS 246*  --  252*   ALT 66*  --  66*   *  --  94*   MG 1.8 2.2 1.6   PHOS 2.6*  2.6* 2.9 1.6*  1.6*       All pertinent labs within the past 24 hours have been reviewed.    Significant Imaging:  I have reviewed and interpreted all pertinent imaging results/findings within the past 24 hours.    Assessment/Plan:     Decompensated  hepatic cirrhosis    68yo F liver-listed, stepped up on 11/21 for respiratory failure with worsening respiratory acidosis. Trached 12/05.      Neuro:   -Pain control: prn dilaudid while intubated  -Sedation off   -continue lactulose/rifaximin  -Following commands     Pulmonary:   -intubated on arrival to SICU 11/21, failed SBT  - s/p trach 12/5  -ABGs daily and prn  -duonebs prn  -continue levoalbuterol  -working on weaning vent settings during day  -daily CXR stable appearing with diffuse bilateral opacification     Cardiac:  -SBP > 100 goal  -JACINDA 11/20 - elevated PA pressures   -Heart failure following, appreciate recs   -dilt gtt as needed for A-Fib, currently on 30mg dilt po q8hr       Renal:   -Mendoza in place  -Continue to monitor UOP: oliguria/anuria    -CRRT per nephrology  -Nephrology following, appreciate recommendations        Fluids/Electrolytes/Nutrition/GI:   -Continue TFs  -replace lytes PRN  -continue lactulose   -bowel regimen     Hematology/Oncology:  -Monitor CBC -- Hgb 9.6/29.1 (post-transfusion)-->8.5/25.9  -continue to monitor. Transfuse as needed per transplant   -1 U pRBC (12/12)      Infectious Disease:   -Afebrile   -f/u bcx and resp cx  -WBC 12.0-->11.9-->14.3-->16.0-->14.3  -Abx: Diflucan to cover HCAP     Endocrine:  Endocrinology following   BG goal 140-180  continue Levemir 5 units BID.   Start novolog 2 units every 4 hours while on TF at 40; hold if TF held or rate decreased  BG monitoring every 4 hours and low dose correction scale.        PPx: famotidine, SQH     Dispo:  -Pending placement at hospice vs LTACH. Appreciate SW/CM assistance.  -Primary: Transplant           Critical care was time spent personally by me on the following activities: development of treatment plan with patient or surrogate and bedside caregivers, discussions with consultants, evaluation of patient's response to treatment, examination of patient, ordering and performing treatments and interventions, ordering  and review of laboratory studies, ordering and review of radiographic studies, pulse oximetry, re-evaluation of patient's condition.  This critical care time did not overlap with that of any other provider or involve time for any procedures.     Ori Domingo MD  Critical Care - Surgery  [facility]

## 2018-12-13 NOTE — SUBJECTIVE & OBJECTIVE
Interval History/Significant Events:   NAEON.  AF and HDS (in AFib). Trached on minimal vent settings.    Follow-up For: Procedure(s) (LRB):  CREATION, TRACHEOSTOMY (N/A)    Post-Operative Day: 8 Days Post-Op    Objective:     Vital Signs (Most Recent):  Temp: 98.5 °F (36.9 °C) (12/13/18 0715)  Pulse: (!) 120 (12/13/18 0700)  Resp: (!) 33 (12/13/18 0715)  BP: (!) 96/51 (12/13/18 0700)  SpO2: 98 % (12/13/18 0715) Vital Signs (24h Range):  Temp:  [97.9 °F (36.6 °C)-98.8 °F (37.1 °C)] 98.5 °F (36.9 °C)  Pulse:  [] 120  Resp:  [20-37] 33  SpO2:  [92 %-100 %] 98 %  BP: ()/(46-58) 96/51  Arterial Line BP: ()/(28-51) 104/40     Weight: 55.5 kg (122 lb 5.7 oz)  Body mass index is 25.57 kg/m².      Intake/Output Summary (Last 24 hours) at 12/13/2018 0730  Last data filed at 12/13/2018 0700  Gross per 24 hour   Intake 3258 ml   Output 3087 ml   Net 171 ml       Physical Exam  Constitutional: She appears well-developed and well-nourished.   HENT:   Head: Normocephalic and atraumatic.   Eyes: Pupils are equal, round, and reactive to light.   Spontaneously opens eyes  Jaundice+   Neck: Normal range of motion. No JVD present. No tracheal deviation present.   Cardiovascular: Normal rate, regular rhythm and normal heart sounds.   No murmur heard.  Pulmonary/Chest: She has no wheezes. She has no rales.   On vent, trach in place   Abdominal: Soft. Bowel sounds are normal. She exhibits no distension. There is no tenderness.   Umbilical hernia   Musculoskeletal: Normal range of motion. She exhibits no edema.   Neurological: She is alert.     Vents:  Vent Mode: SIMV (12/13/18 0520)  Ventilator Initiated: Yes (11/21/18 0199)  Set Rate: 10 bmp (12/13/18 0520)  Vt Set: 310 mL (12/13/18 0520)  Pressure Support: 5 cmH20 (12/13/18 0520)  PEEP/CPAP: 5 cmH20 (12/13/18 0520)  Oxygen Concentration (%): 40 (12/13/18 0715)  Peak Airway Pressure: 12 cmH2O (12/13/18 0520)  Plateau Pressure: 21 cmH20 (12/13/18 0520)  Total Ve: 8.69  mL (12/13/18 0520)  F/VT Ratio<105 (RSBI): 143.97 (12/13/18 0520)    Lines/Drains/Airways     Central Venous Catheter Line                 Trialysis (Dialysis) Catheter 11/21/18 1851 left internal jugular 21 days          Drain                 NG/OG Tube 12/07/18 2141 Yoni griffithsp 12 Fr. Right nostril 5 days          Airway                 Surgical Airway 12/05/18 1431 Shiley 7 days          Arterial Line                 Arterial Line 11/21/18 1900 Right Radial 21 days          Pressure Ulcer                 Pressure Injury 12/10/18 1600 medial Sacral spine Stage 2 2 days                Significant Labs:    CBC/Anemia Profile:  Recent Labs   Lab 12/12/18 0232 12/12/18  1442 12/13/18  0358   WBC 14.33* 17.95* 14.04*   HGB 7.1* 9.6* 8.5*   HCT 23.2* 29.1* 25.9*   PLT 80* 66* 67*   * 96 96   RDW 27.8* 24.8* 25.0*        Chemistries:  Recent Labs   Lab 12/12/18 0232 12/12/18  1442 12/13/18  0358   *  135* 134* 136  136   K 4.1  4.1 3.9 4.0  4.0     104 105 105  105   CO2 25  25 25 26  26   BUN 13  13 14 7*  7*   CREATININE 0.8  0.8 0.9 0.6  0.6   CALCIUM 8.0*  8.0* 8.3* 7.4*  7.4*   ALBUMIN 1.7*  1.7* 1.7* 1.6*  1.6*   PROT 5.4*  --  5.2*   BILITOT 10.5*  --  11.4*   ALKPHOS 246*  --  252*   ALT 66*  --  66*   *  --  94*   MG 1.8 2.2 1.6   PHOS 2.6*  2.6* 2.9 1.6*  1.6*       All pertinent labs within the past 24 hours have been reviewed.    Significant Imaging:  I have reviewed and interpreted all pertinent imaging results/findings within the past 24 hours.

## 2018-12-14 NOTE — PROGRESS NOTES
"CYNDEE met with pt's spouse Shahzad Hobson in room today during liver transplant ICU rounds.  Pt found lying supine in bed with ventilator in use and appeared somnolent.  Shahzad presented a&ox4 and standing at pt's bedside upon arrival.  Shahzad given update on pt's medical status by rounding team including surgeon Dr. Bryan and hepatologist Dr. Santos.  Shahzad became slightly tearful and expressed difficulty understanding how pt has declined through hospital course.  Shahzad seemed to express possible acknowledgement of pt's poor prognosis stating to team "You've done all you can for her."  SW provided reflective listening, normalization, validation, and supportive counseling following team's exit from pt's room.  Shahzad reports having "decisions to make" regarding pt's care.  Shahzad reports having his son come into town from [place] on Sunday 12/16/18 and pt's 2 sisters visiting from [place], [place] tomorrow.  Shahzad stated plans of discussing possible decision to place pt in inpatient hospice with family over the weekend as [id number] transfer is proving difficult and not likely.  Shahzad reported having given [facility] representatives SW's contact information to discuss authorization for [id number] transfer and transportation.  Shahzad reported contacting [facility] in [place], [place] multiple times to follow-up on bed availability for pt's transfer.  CYNDEE contacted by [facility] Escalation Dept representative Naheed (ph [phone], ext. [phone]) who reported [facility] will authorize transportation and transfer to any facility willing to accept pt.  Per Naheed's request and Shahzad's permission, CYNDEE faxed clinical update packet (including facility transfer orders, facesheet, H&P, provider progress notes, med list, and labs) to Naheed (fx [phone]).  Naheed reports having spoken to [facility] personnel and confirming transportation can be made for pt to [place] if " needed.  Naheed requested SW notify her when transfer destination is identified to provide proper authorization to [facility].  Naheed also reports having spoken with transfer department representatives for [facility] and [facility] in the [place], [place] area.  Naheed reports both facilities informing that pt's transfer will not be accepted (in spite of [facility] authorization) due to pt's medical status and poor prognosis.  CYNDEE informed Shahzad of Naheed's reports.  Shahzad reiterated plans of discussing pt's status with family over the weekend and considering changing pt's plan of care.  SW again provided reflective listening, validation, and supportive counseling.  SW informed Shahzad of availability of on-call SW support and contact information.  Shahzad expressed understanding regarding same.  SW remains available for further psychosocial support and stated plans of f/u on Monday.

## 2018-12-14 NOTE — PT/OT/SLP PROGRESS
"Physical Therapy Treatment    Patient Name:  Krystal Hobson   MRN:  [id number]  Admitting Diagnosis: Hepatic encephalopathy  Recent Surgery: Procedure(s) (LRB):  CREATION, TRACHEOSTOMY (N/A) 9 Days Post-Op    Recommendations:     Discharge Recommendations:  LTACH (long-term acute care hospital), hospice facility   Discharge Equipment Recommendations: hospital bed, lift device, wheelchair, manual   Barriers to discharge: Decreased caregiver support    Plan:     During this hospitalization, patient to be seen 3 x/week to address the listed problems via gait training, therapeutic activities, therapeutic exercises, neuromuscular re-education  · Plan of Care Expires:  01/11/19   Plan of Care Reviewed with: patient, spouse    This Plan of care has been discussed with the patient who was involved in its development and understands and is in agreement with the identified goals and treatment plan    Subjective     Communicated with RN prior to session.  Patient found supine upon PT entry to room. Pt's  present during session.  "I need to move up." Pt mouthing. Pt nonverbal due to trach to vent; smiling at therapist and .    Pain/Comfort:  · Pain Rating 1: (pt with facial grimacing with ROM therex at end range.)    Objective:     Patient found with: arterial line, blood pressure cuff, bowel management system, central line, cuadra catheter, NG tube, pressure relief boots, pulse ox (continuous), SCD, telemetry, ventilator, tracheostomy   Mental Status: Patient is oriented to AxOx1( responds to name called) and follows all verbal, single-step commands. Patient is Alert and Cooperative during session.    General Precautions: Standard, Cardiac fall, NPO   Orthopedic Precautions:N/A   Braces: N/A   Respiratory Status: trach to vent  Vent Data:    Vent Mode: A/C  Oxygen Concentration (%):  [] 40  Resp Rate Total:  [14 br/min-39 br/min] 39 br/min  Vt Set:  [300 mL] 300 mL  PEEP/CPAP:  [5 cmH20-5.5 cmH20] 5 " cmH20  Pressure Support:  [10 gaJ89-46 cmH20] 12 cmH20  Mean Airway Pressure:  [6.6 pkR02-43 cmH20] 10 cmH20  Vital Signs (Most Recent):    Temp: 97.8 °F (36.6 °C) (12/14/18 1500)  Pulse: 88 (12/14/18 1602)  Resp: (!) 40 (12/14/18 1602)  BP: (!) 109/55 (12/14/18 1500)  SpO2: 99 % (12/14/18 1602)    Functional Mobility:  Bed Mobility:   · Scooting to HOB via supine bridge: dependent x2 people via drawsheet    Therapeutic Activities & Exercises:   *Supine: BUE and BLE AAROM x 10 reps in all planes through available ROM. Exercises performed to develop and maintain pt's  ROM and flexibility.   *Provided  with printed handout of all exercises and encouraged him and pt to perform 2xdaily 10x each exercise.  verbalized understanding.    Education:  Patient provided with daily orientation and goals of this PT session. Patient and family agreed to participate in session. Patient and family aware of patient's deficits and therapy progression. They were educated to transfer to bedside chair/bedside commode/bathroom with RN/PCT present. Encouraged patient to perform daily exercises & mobility to increase endurance and decrease effects of bedrest.Time provided for therapeutic counseling and discussion of health disposition. All questions answered to patient's and family's satisfaction, within scope of PT practice; voiced no other concerns. White board updated in patient's room, RN notified of session.    Patient left supine, with head in midline, neutral pelvis & heels floated for skin protection with all lines intact, call button in reach and RN notified    AM-PAC 6 CLICK MOBILITY  Turning over in bed (including adjusting bedclothes, sheets and blankets)?: 1  Sitting down on and standing up from a chair with arms (e.g., wheelchair, bedside commode, etc.): 1  Moving from lying on back to sitting on the side of the bed?: 1  Moving to and from a bed to a chair (including a wheelchair)?: 1  Need to walk in hospital  room?: 1  Climbing 3-5 steps with a railing?: 1  Basic Mobility Total Score: 6     Assessment:     Krystal Hobson is a 68 y.o. female admitted with a medical diagnosis of Hepatic encephalopathy.   Patient is making progress towards goals, participating well in this session. Pt and  participatory in session. Pt fatigued at end of therex, sleeping soundly at end of session. Krystal Hobson's deficits effect their ability to safely and independently participate in self-care tasks and functional mobility which increases their caregiver burden. Due to her physical therapy diagnosis of debility and deconditioning, they continue to benefit from acute PT services to address the following limitations: high fall risk, weakness, instability, the need for supervised instruction of exercise. Krystal Gustafsons deficits effect their roles and responsibilities in which they were able to complete prior to admit. Education was provided to patient & family regarding importance of continued participation in therapy, patient's progress and discharge disposition. Pt would benefit from a collaborative PT/OT/SLP program to improve quality of life and focus on recovery of impairments.     Problem List: weakness, impaired functional mobilty, gait instability, impaired self care skills, impaired endurance, impaired balance, decreased upper extremity function, decreased lower extremity function, edema, impaired cardiopulmonary response to activity, decreased safety awareness, impaired cognition. weakness, impaired functional mobilty, gait instability, impaired self care skills, impaired endurance, impaired balance, decreased upper extremity function, decreased lower extremity function, edema, impaired cardiopulmonary response to activity, decreased safety awareness, impaired cognition  Rehab Prognosis: poor; patient would benefit from acute skilled PT services to address these deficits and reach maximum level of function.      GOALS:    Multidisciplinary Problems     Physical Therapy Goals        Problem: Physical Therapy Goal    Goal Priority Disciplines Outcome Goal Variances Interventions   Physical Therapy Goal     PT, PT/OT Ongoing (interventions implemented as appropriate)     Description:  Goals to be met by: 2018    Patient will increase functional independence with mobility by performin. Pt and family demonstrate independence with poistioning for joint integrity and comfort.  2. Pt and family demonstrate independent with therex for joint integrity & comfort.                     Multidisciplinary Problems (Resolved)        Problem: Physical Therapy Goal    Goal Priority Disciplines Outcome Goal Variances Interventions   Physical Therapy Goal   (Resolved)     PT, PT/OT  Error                     Time Tracking:     PT Received On: 18  PT Start Time: 1506     PT Stop Time: 1529  PT Total Time (min): 23 min     Billable Minutes:   · Therapeutic Exercise 23    Treatment Type: Treatment  PT/PTA: PT       Viktoriya Amado PT, DPT  2018   Pager: [phone]

## 2018-12-14 NOTE — PROGRESS NOTES
[facility]  Nephrology  Progress Note    Patient Name: Krystal Hobson  MRN: [id number]  Admission Date: 11/10/2018  Hospital Length of Stay: 33 days  Attending Provider: Abrahan Montelongo MD   Primary Care Physician: Courtney Joe MD  Principal Problem:Hepatic encephalopathy    Subjective:     HPI: Reason for consult: Diuresis in setting of PAP46, pulmonary edema, HRS    Ms. 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  - She presented to [facility] as xfer from [place] under liver transplant team due to encephalopathy and new onset ENZO.   - Per the , she was extremely disoriented @ OSH and her ammonia reached as high as 200, and her Cr level increased to 2.0 She also went into A fib with RVR and was started on a diltiazem gtt at OSH.   - On arrival at [facility] she was disoriented and started on lactulose with noted improvement. During her current admission on CXR they found a concern for HCAP and she was started on broad spectrum ABx and then shifted her on Moxifloxacin 7 D course. On 11/20/2018 found on CXR a concern for Lt lung middle lobe consolidation and started on vancomycin and zosyn.  - Renal fxn baseline 1.6 at admit but trending up    Interval History:   NAEON. Tolerated SLED x 10 hrs overnight  ml/hr, CVP 5. Continues with mechanical ventilation. Still not making urine. Fluid balance net gain 171 ml plus insensible losses she would be about 1 lt negative. Hg stable overnight.    Review of patient's allergies indicates:  No Known Allergies  Current Facility-Administered Medications   Medication Frequency    0.9%  NaCl infusion (CRRT USE ONLY) Continuous    0.9%  NaCl infusion (for blood administration) Q24H PRN    acetaminophen tablet 650 mg Q8H PRN    dextrose 50% injection 12.5 g PRN    diltiaZEM tablet 30 mg Q8H    famotidine tablet 20 mg QHS    fluconazole tablet 200 mg Daily    glucagon (human recombinant) injection 1 mg PRN     guaifenesin 100 mg/5 ml syrup 200 mg Q6H    heparin (porcine) injection 5,000 Units Q12H    HYDROmorphone injection 0.2 mg Q3H PRN    insulin aspart U-100 pen 0-5 Units Q4H PRN    insulin aspart U-100 pen 2 Units Q24H    insulin aspart U-100 pen 2 Units Q24H    insulin aspart U-100 pen 2 Units Q24H    insulin aspart U-100 pen 2 Units Q24H    insulin aspart U-100 pen 2 Units Q24H    insulin aspart U-100 pen 2 Units Q24H    insulin detemir U-100 pen 5 Units BID    k phos di & mono-sod phos mono 250 mg tablet 2 tablet TID PRN    lactulose 10 gram/15 mL solution (enema) 200 g TID PRN    lactulose 20 gram/30 mL solution Soln 15 g BID    lactulose 20 gram/30 mL solution Soln 30 g Q6H PRN    magnesium sulfate 2g in water 50mL IVPB (premix) PRN    metoprolol injection 2.5 mg Q5 Min PRN    miconazole nitrate 2% ointment BID    ondansetron disintegrating tablet 8 mg Q8H PRN    rifAXIMin tablet 550 mg BID    simethicone chewable tablet 80 mg TID PRN    sodium chloride 0.9% flush 3 mL PRN       Objective:     Vital Signs (Most Recent):  Temp: 98.9 °F (37.2 °C) (12/13/18 1500)  Pulse: 93 (12/13/18 1716)  Resp: (!) 30 (12/13/18 1716)  BP: (!) 109/57 (12/13/18 1600)  SpO2: 99 % (12/13/18 1716)  O2 Device (Oxygen Therapy): ventilator (12/13/18 1716) Vital Signs (24h Range):  Temp:  [97.9 °F (36.6 °C)-98.9 °F (37.2 °C)] 98.9 °F (37.2 °C)  Pulse:  [] 93  Resp:  [16-37] 30  SpO2:  [96 %-100 %] 99 %  BP: ()/(46-83) 109/57  Arterial Line BP: (104-131)/(35-49) 124/40     Weight: 55.5 kg (122 lb 5.7 oz) (12/04/18 0353)  Body mass index is 25.57 kg/m².  Body surface area is 1.51 meters squared.    I/O last 3 completed shifts:  In: 4845.2 [I.V.:3275.2; Blood:300; NG/GT:1270]  Out: 5135 [Other:5135]    Physical Exam   Constitutional:   Sitting in chair with trach in place and Vent connected to Trach   HENT:   Head: Normocephalic.   Right Ear: External ear normal.   Left Ear: External ear normal.   Nose: Nose  normal.   Eyes: Right eye exhibits no discharge. Left eye exhibits no discharge.   Neck:   Tracheostomy    Cardiovascular: An irregular rhythm present.   Murmur heard.  Pulmonary/Chest: She has rales (Zambrano on the Left > right).   Musculoskeletal: She exhibits no edema.   Skin: Skin is warm.       Significant Labs:  ABGs:    Recent Labs   Lab 12/12/18  0336   PH 7.403   PCO2 38.9   HCO3 24.2   POCSATURATED 99   BE -1     BMP:   Recent Labs   Lab 12/13/18  0358   *  161*     105   CO2 26  26   BUN 7*  7*   CREATININE 0.6  0.6   CALCIUM 7.4*  7.4*   MG 1.6     CBC:   Recent Labs   Lab 12/13/18 0358   WBC 14.04*   RBC 2.69*   HGB 8.5*   HCT 25.9*   PLT 67*   MCV 96   MCH 31.6*   MCHC 32.8     CMP:   Recent Labs   Lab 12/13/18  0358   *  161*   CALCIUM 7.4*  7.4*   ALBUMIN 1.6*  1.6*   PROT 5.2*     136   K 4.0  4.0   CO2 26  26     105   BUN 7*  7*   CREATININE 0.6  0.6   ALKPHOS 252*   ALT 66*   AST 94*   BILITOT 11.4*     All labs within the past 24 hours have been reviewed.     Assessment/Plan:     Acute kidney injury superimposed on CKD    Cryptogenic cirrhosis awaiting liver xplant, presented for AMS and found to have ENZO on CKDIIIb. SLED started last weekend with intermittent AF+RVR+hypotensive episodes controlled with bb/cardizem gtt adequately. Now on CRRT with improving overall fluid status.    Plan:  - no evidence of renal recovery dialysis depenedant  - tolerated SLED overnight willplan for SCUF x 10 hrs overnight again  - -300 ml/hr as tolerated if hymodynamically unstable would recommend to rinse her back  - appreciate tube feeds formula changed to [employer] renal   - CVP today at 5  - Continue with mechanical ventilation with FiO2 40 % PEEP 5  - Blood pressures have been stable  - Continues to be anuric, will need bladder scans BID to evaluate for any sign           Thank you for your consult. I will follow-up with patient. Please contact us if you  have any additional questions.    Haroldo Van MD  Nephrology  [facility]

## 2018-12-14 NOTE — ASSESSMENT & PLAN NOTE
BG goal 140-180    Levemir 5 units BID   Increase Novolog to 3 units every 4 hours while on TF at 40; hold if TF held or rate decreased  Low dose correction scale  BG monitoring every 4 hours    ADDENDUM: Increase Novolog to 4 units q 4 hours while on TF    Discharge planning: TBD

## 2018-12-14 NOTE — SUBJECTIVE & OBJECTIVE
Interval History:   NAEON. Tolerated SLED but clotted after about 6 hrs and was not restarted.  overnight  ml/hr, CVP 6. Continues with mechanical ventilation. Still not making urine. Fluid balance net gain 102 ml plus insensible losses she would be about 500 ml negative. Hg stable overnight.    Review of patient's allergies indicates:  No Known Allergies  Current Facility-Administered Medications   Medication Frequency    0.9%  NaCl infusion (CRRT USE ONLY) Continuous    0.9%  NaCl infusion (for blood administration) Q24H PRN    acetaminophen tablet 650 mg Q8H PRN    dextrose 50% injection 12.5 g PRN    diltiaZEM tablet 30 mg Q8H    famotidine tablet 20 mg QHS    fluconazole tablet 200 mg Daily    glucagon (human recombinant) injection 1 mg PRN    guaifenesin 100 mg/5 ml syrup 200 mg Q6H    heparin (porcine) injection 5,000 Units Q12H    HYDROmorphone injection 0.2 mg Q3H PRN    insulin aspart U-100 pen 0-5 Units Q4H PRN    [START ON 12/15/2018] insulin aspart U-100 pen 3 Units Q24H    [START ON 12/15/2018] insulin aspart U-100 pen 3 Units Q24H    insulin aspart U-100 pen 3 Units Q24H    insulin aspart U-100 pen 3 Units Q24H    insulin aspart U-100 pen 3 Units Q24H    insulin aspart U-100 pen 3 Units Q24H    insulin detemir U-100 pen 5 Units BID    k phos di & mono-sod phos mono 250 mg tablet 2 tablet TID PRN    lactulose 10 gram/15 mL solution (enema) 200 g TID PRN    lactulose 20 gram/30 mL solution Soln 15 g TID    lactulose 20 gram/30 mL solution Soln 30 g Q6H PRN    magnesium sulfate 2g in water 50mL IVPB (premix) PRN    metoprolol injection 2.5 mg Q5 Min PRN    miconazole nitrate 2% ointment BID    ondansetron disintegrating tablet 8 mg Q8H PRN    rifAXIMin tablet 550 mg BID    simethicone chewable tablet 80 mg TID PRN    sodium chloride 0.9% flush 3 mL PRN       Objective:     Vital Signs (Most Recent):  Temp: 97.6 °F (36.4 °C) (12/14/18 1100)  Pulse: 104 (12/14/18  1145)  Resp: (!) 23 (12/14/18 1145)  BP: (!) 103/51 (12/14/18 1100)  SpO2: 100 % (12/14/18 1145)  O2 Device (Oxygen Therapy): ventilator (12/14/18 1137) Vital Signs (24h Range):  Temp:  [97.5 °F (36.4 °C)-98.9 °F (37.2 °C)] 97.6 °F (36.4 °C)  Pulse:  [] 104  Resp:  [16-37] 23  SpO2:  [91 %-100 %] 100 %  BP: ()/(50-57) 103/51  Arterial Line BP: (105-160)/() 114/42     Weight: 55.5 kg (122 lb 5.7 oz) (12/04/18 0353)  Body mass index is 25.57 kg/m².  Body surface area is 1.51 meters squared.    I/O last 3 completed shifts:  In: 5467.6 [I.V.:3367.6; NG/GT:2100]  Out: 6252 [Other:5452; Stool:800]    Physical Exam   Constitutional:   Sitting in chair with trach in place and Vent connected to Trach   HENT:   Head: Normocephalic.   Right Ear: External ear normal.   Left Ear: External ear normal.   Nose: Nose normal.   Eyes: Right eye exhibits no discharge. Left eye exhibits no discharge.   Neck:   Tracheostomy    Cardiovascular: An irregular rhythm present.   Murmur heard.  Pulmonary/Chest: She has rales (Zambrano on the Left > right).   Musculoskeletal: She exhibits no edema.   Skin: Skin is warm.       Significant Labs:  ABGs:    Recent Labs   Lab 12/12/18  0336   PH 7.403   PCO2 38.9   HCO3 24.2   POCSATURATED 99   BE -1     BMP:   Recent Labs   Lab 12/14/18  0409   *  244*     107   CO2 22*  22*   BUN 19  19   CREATININE 1.1  1.1   CALCIUM 7.8*  7.8*   MG 2.3     CBC:   Recent Labs   Lab 12/14/18  0409   WBC 18.79*   RBC 2.80*   HGB 8.8*   HCT 27.7*   PLT 65*   MCV 99*   MCH 31.4*   MCHC 31.8*     CMP:   Recent Labs   Lab 12/14/18  0409   *  244*   CALCIUM 7.8*  7.8*   ALBUMIN 1.5*  1.5*   PROT 5.3*     137   K 3.5  3.5   CO2 22*  22*     107   BUN 19  19   CREATININE 1.1  1.1   ALKPHOS 268*   ALT 73*   AST 95*   BILITOT 11.9*     All labs within the past 24 hours have been reviewed.

## 2018-12-14 NOTE — ASSESSMENT & PLAN NOTE
Cryptogenic cirrhosis awaiting liver xplant, presented for AMS and found to have ENZO on CKDIIIb. SLED started last weekend with intermittent AF+RVR+hypotensive episodes controlled with bb/cardizem gtt adequately. Now on CRRT with improving overall fluid status.    Plan:  - no evidence of renal recovery dialysis depenedant  - tolerated SLED x 6 hrs then clotted   - will plan for SLED overnight 10 hrs metabolic clearance and volume management.  - -300 ml/hr as tolerated if hymodynamically unstable would recommend to rinse her back  - appreciate tube feeds formula changed to [employer] renal   - CVP today at 6  - Continue with mechanical ventilation with FiO2 40 % PEEP 5  - Blood pressures have been stable  - Continues to be anuric, will need bladder scans BID to evaluate for any sign

## 2018-12-14 NOTE — ASSESSMENT & PLAN NOTE
68yo F liver-listed, stepped up on 11/21 for respiratory failure with worsening respiratory acidosis     Plan:  Neuro:   -Pain control: prn fentanyl while intubated  -Continue sedation holiday as tolerated, awake and alert without fighting vent   -Followed all commands off sedation     Pulmonary:   -intubated on arrival to SICU 11/21  Vent Mode: A/C  Oxygen Concentration (%):  [40-99] 40  Resp Rate Total:  [14 br/min-35 br/min] 22 br/min  Vt Set:  [300 mL] 300 mL  PEEP/CPAP:  [5 cmH20-5.5 cmH20] 5 cmH20  Mean Airway Pressure:  [6.6 obB05-28 cmH20] 9 cmH20  -daily CXR - evidence of significant bilateral consolidation   -ABGs daily and prn  -continuous pulse ox    -Trach in place    Cardiac:  -SBP > 100 goal  -Heart failure following, appreciate recs   - Afib with RVR 11/23/18, broke out with 2x 5mg doses of IV metop, her pressure was stable throughout  - Went back into Afib with RVR on 11/25. Given IV metoprolol 5mg q3. Did not resolve, started on diltiazem drip. Converted out of Afib around 3am. dilt drip stopped at that time.   -continue scheduled dilt & dilt gtt as needed for Afib, try to prevent Afib by administering metoprolol prior to CRRT    Renal:   -Mendoza in place  -Nephrology following, appreciate recommendations  -Started CRRT 11/24/18. Will continue nocturnal CRRT     Fluids/Electrolytes/Nutrition/GI:   -Nutritional status: NPO while intubated  -Continue TF @ 40cc/hour, will add fiber given diarrhea   -replace lytes PRN  -will hold Lactulose enemas TID    Hematology/Oncology:  -Hgb stable following transfusion 12/12  -continue to monitor. Transfuse as needed     Infectious Disease:   -Afebrile  -Trend WBC, increasing leukocytosis today  -Abx: none    Endocrine:  -Glucose goal of 140-180  -Endocrine following, see recommendations    Dispo:  -Continue care in the ICU setting - will attempt transfer to a facility closer to home to assist with palliation  -continue CRRT

## 2018-12-14 NOTE — PLAN OF CARE
Problem: Physical Therapy Goal  Goal: Physical Therapy Goal  Goals to be met by: 2018    Patient will increase functional independence with mobility by performin. Pt and family demonstrate independence with poistioning for joint integrity and comfort.  2. Pt and family demonstrate independent with therex for joint integrity & comfort.         Outcome: Ongoing (interventions implemented as appropriate)    Pt would benefit from LTAC vs. hospice upon discharge.  Appropriate transfer level with nursing staff: Bed <> Chair:  Drawsheet with Total Assistance with 2 people to cardiac chair..    Viktoriya Amado PT, DPT  2018  Pager: [phone]

## 2018-12-14 NOTE — ASSESSMENT & PLAN NOTE
68yo F liver-listed, stepped up on 11/21 for respiratory failure with worsening respiratory acidosis. Trached 12/05.      Neuro:   -Pain control: prn dilaudid while intubated  -Sedation off   -continue lactulose/rifaximin  -Following commands     Pulmonary:   -intubated on arrival to SICU 11/21, failed SBT  - s/p trach 12/5  -ABGs daily and prn  -duonebs prn  -continue levoalbuterol  -working on weaning vent settings during day  -daily CXR stable appearing with diffuse bilateral opacification     Cardiac:  -SBP > 100 goal  -JACINDA 11/20 - elevated PA pressures   -Heart failure following, appreciate recs   -dilt gtt as needed for A-Fib, currently on 30mg dilt po q8hr       Renal:   -Mendoza in place  -Continue to monitor UOP: oliguria/anuria    -CRRT per nephrology  -Nephrology following, appreciate recommendations        Fluids/Electrolytes/Nutrition/GI:   -Continue TFs  -replace lytes PRN  -continue lactulose   -bowel regimen     Hematology/Oncology:  -Monitor CBC -- Hgb 9.6/29.1 (post-transfusion)-->8.8/27.7  -continue to monitor. Transfuse as needed per transplant   -1 U pRBC (12/12)      Infectious Disease:   -Afebrile  -Leukocytosis to 18.7  -BCx NG  -RespCx (tracheal aspirate) Klebsiella - susceptibility pending  -Trend WBC  -Abx: Diflucan to cover HCAP, abx management per liver    Endocrine:  Endocrinology following   BG goal 140-180  continue Levemir 5 units BID.   Start novolog 2 units every 4 hours while on TF at 40; hold if TF held or rate decreased  BG monitoring every 4 hours and low dose correction scale.        PPx: famotidine, SQH     Dispo:  -Pending placement at hospice vs LTACH. Appreciate SW/CM assistance.  -Primary: Transplant

## 2018-12-14 NOTE — PROGRESS NOTES
[facility]  Liver Transplant  Progress Note    Patient Name: Krystal Hobson  MRN: [id number]  Admission Date: 11/10/2018  Hospital Length of Stay: 34 days  Code Status: Full Code  Primary Care Provider: Courtney Joe MD  Post-Op Day 9 Days Post-Op    ORGAN:     Disease Etiology: Cirrhosis: Cryptogenic (Idiopathic)  Donor Type:     CDC High Risk:     Donor CMV Status:   Donor CMV Status:   Donor HBcAB:     Donor HCV Status:     Donor HBV MARY: Organ record is missing.  Donor HCV MARY: Organ record is missing.  Whole or Partial:   Biliary Anastomosis:   Arterial Anatomy:     Subjective:     Interval History:  No acute events overnight, remains afebrile, vital signs stable. More somnolent this morning, leukocytosis increasing. Remains in AFib.       Scheduled Meds:   diltiaZEM  30 mg Oral Q8H    famotidine  20 mg Oral QHS    fluconazole  200 mg Oral Daily    guaifenesin 100 mg/5 ml  200 mg Per OG tube Q6H    heparin (porcine)  5,000 Units Subcutaneous Q12H    [START ON 12/15/2018] insulin aspart U-100  3 Units Subcutaneous Q24H    [START ON 12/15/2018] insulin aspart U-100  3 Units Subcutaneous Q24H    insulin aspart U-100  3 Units Subcutaneous Q24H    insulin aspart U-100  3 Units Subcutaneous Q24H    insulin aspart U-100  3 Units Subcutaneous Q24H    insulin aspart U-100  3 Units Subcutaneous Q24H    insulin detemir U-100  5 Units Subcutaneous BID    lactulose  15 g Per OG tube TID    miconazole nitrate 2%   Topical (Top) BID    rifAXImin  550 mg Oral BID     Continuous Infusions:   sodium chloride 0.9% 200 mL/hr at 12/13/18 2027     PRN Meds:sodium chloride, acetaminophen, dextrose 50%, glucagon (human recombinant), HYDROmorphone, insulin aspart U-100, k phos di & mono-sod phos mono, lactulose, lactulose, magnesium sulfate IVPB, metoprolol, ondansetron, simethicone, sodium chloride 0.9%    Review of Systems   All other systems reviewed and are negative.    Objective:     Vital Signs  (Most Recent):  Temp: 97.6 °F (36.4 °C) (12/14/18 1100)  Pulse: 104 (12/14/18 1145)  Resp: (!) 23 (12/14/18 1145)  BP: (!) 103/51 (12/14/18 1100)  SpO2: 100 % (12/14/18 1145) Vital Signs (24h Range):  Temp:  [97.5 °F (36.4 °C)-98.9 °F (37.2 °C)] 97.6 °F (36.4 °C)  Pulse:  [] 104  Resp:  [16-37] 23  SpO2:  [91 %-100 %] 100 %  BP: ()/(50-57) 103/51  Arterial Line BP: (105-160)/() 114/42     Weight: 55.5 kg (122 lb 5.7 oz)  Body mass index is 25.57 kg/m².    Intake/Output - Last 3 Shifts       12/12 0700 - 12/13 0659 12/13 0700 - 12/14 0659 12/14 0700 - 12/15 0659    I.V. (mL/kg) 1748 (31.5) 2096.6 (37.8)     Blood 300      NG/GT 1230 1550 120    Total Intake(mL/kg) 3278 (59.1) 3646.6 (65.7) 120 (2.2)    Other 2910 3133     Stool 0 800     Total Output 2910 3933     Net +368 -286.4 +120           Stool Occurrence 5 x 2 x           Physical Exam   Constitutional: She appears well-developed and well-nourished.   HENT:   Head: Normocephalic and atraumatic.   Eyes: Pupils are equal, round, and reactive to light.   Spontaneously opens eyes  Jaundice+   Neck: Normal range of motion. No JVD present. No tracheal deviation present.   Cardiovascular: Normal rate, regular rhythm and normal heart sounds.   No murmur heard.  Pulmonary/Chest: She has no wheezes. She has no rales.   On vent, trach in place   Abdominal: Soft. Bowel sounds are normal. She exhibits no distension. There is no tenderness.   Umbilical hernia   Musculoskeletal: Normal range of motion. She exhibits no edema.   Neurological: She is alert.       Laboratory:  Immunosuppressants     None        CBC:   Recent Labs   Lab 12/14/18  0409   WBC 18.79*   RBC 2.80*   HGB 8.8*   HCT 27.7*   PLT 65*   MCV 99*   MCH 31.4*   MCHC 31.8*     CMP:   Recent Labs   Lab 12/14/18  0409   *  244*   CALCIUM 7.8*  7.8*   ALBUMIN 1.5*  1.5*   PROT 5.3*     137   K 3.5  3.5   CO2 22*  22*     107   BUN 19  19   CREATININE 1.1  1.1    ALKPHOS 268*   ALT 73*   AST 95*   BILITOT 11.9*     Coagulation:   Recent Labs   Lab 12/14/18  0409   INR 1.7*     ABGs:   Recent Labs   Lab 12/12/18  0336   PH 7.403   PCO2 38.9   HCO3 24.2   POCSATURATED 99   BE -1     Labs within the past 24 hours have been reviewed.    Diagnostic Results:  I have personally reviewed all pertinent imaging studies.    Assessment/Plan:   Krystal Hobson is a 68 y.o. female     Pulmonary   Acute respiratory failure with hypoxia    68yo F liver-listed, stepped up on 11/21 for respiratory failure with worsening respiratory acidosis     Plan:  Neuro:   -Pain control: prn fentanyl while intubated  -Continue sedation holiday as tolerated, awake and alert without fighting vent   -Followed all commands off sedation     Pulmonary:   -intubated on arrival to SICU 11/21  Vent Mode: A/C  Oxygen Concentration (%):  [40-99] 40  Resp Rate Total:  [14 br/min-35 br/min] 22 br/min  Vt Set:  [300 mL] 300 mL  PEEP/CPAP:  [5 cmH20-5.5 cmH20] 5 cmH20  Mean Airway Pressure:  [6.6 tvO18-35 cmH20] 9 cmH20  -daily CXR - evidence of significant bilateral consolidation   -ABGs daily and prn  -continuous pulse ox    -Trach in place    Cardiac:  -SBP > 100 goal  -Heart failure following, appreciate recs   - Afib with RVR 11/23/18, broke out with 2x 5mg doses of IV metop, her pressure was stable throughout  - Went back into Afib with RVR on 11/25. Given IV metoprolol 5mg q3. Did not resolve, started on diltiazem drip. Converted out of Afib around 3am. dilt drip stopped at that time.   -continue scheduled dilt & dilt gtt as needed for Afib, try to prevent Afib by administering metoprolol prior to CRRT    Renal:   -Mendoza in place  -Nephrology following, appreciate recommendations  -Started CRRT 11/24/18. Will continue nocturnal CRRT     Fluids/Electrolytes/Nutrition/GI:   -Nutritional status: NPO while intubated  -Continue TF @ 40cc/hour, will add fiber given diarrhea   -replace lytes PRN  -will hold Lactulose  enemas TID    Hematology/Oncology:  -Hgb stable following transfusion 12/12  -continue to monitor. Transfuse as needed     Infectious Disease:   -Afebrile  -Trend WBC, increasing leukocytosis today  -Abx: none    Endocrine:  -Glucose goal of 140-180  -Endocrine following, see recommendations    Dispo:  -Continue care in the ICU setting - will attempt transfer to a facility closer to home to assist with palliation  -continue CRRT              The patients clinical status was discussed at multidisplinary rounds, involving transplant surgery, transplant medicine, pharmacy, nursing, nutrition, and social work.    Lien Angela MD  Liver Transplant  [facility]

## 2018-12-14 NOTE — SUBJECTIVE & OBJECTIVE
Interval History:   NAEON. Tolerated SLED x 10 hrs overnight  ml/hr, CVP 5. Continues with mechanical ventilation. Still not making urine. Fluid balance net gain 171 ml plus insensible losses she would be about 1 lt negative. Hg stable overnight.    Review of patient's allergies indicates:  No Known Allergies  Current Facility-Administered Medications   Medication Frequency    0.9%  NaCl infusion (CRRT USE ONLY) Continuous    0.9%  NaCl infusion (for blood administration) Q24H PRN    acetaminophen tablet 650 mg Q8H PRN    dextrose 50% injection 12.5 g PRN    diltiaZEM tablet 30 mg Q8H    famotidine tablet 20 mg QHS    fluconazole tablet 200 mg Daily    glucagon (human recombinant) injection 1 mg PRN    guaifenesin 100 mg/5 ml syrup 200 mg Q6H    heparin (porcine) injection 5,000 Units Q12H    HYDROmorphone injection 0.2 mg Q3H PRN    insulin aspart U-100 pen 0-5 Units Q4H PRN    insulin aspart U-100 pen 2 Units Q24H    insulin aspart U-100 pen 2 Units Q24H    insulin aspart U-100 pen 2 Units Q24H    insulin aspart U-100 pen 2 Units Q24H    insulin aspart U-100 pen 2 Units Q24H    insulin aspart U-100 pen 2 Units Q24H    insulin detemir U-100 pen 5 Units BID    k phos di & mono-sod phos mono 250 mg tablet 2 tablet TID PRN    lactulose 10 gram/15 mL solution (enema) 200 g TID PRN    lactulose 20 gram/30 mL solution Soln 15 g BID    lactulose 20 gram/30 mL solution Soln 30 g Q6H PRN    magnesium sulfate 2g in water 50mL IVPB (premix) PRN    metoprolol injection 2.5 mg Q5 Min PRN    miconazole nitrate 2% ointment BID    ondansetron disintegrating tablet 8 mg Q8H PRN    rifAXIMin tablet 550 mg BID    simethicone chewable tablet 80 mg TID PRN    sodium chloride 0.9% flush 3 mL PRN       Objective:     Vital Signs (Most Recent):  Temp: 98.9 °F (37.2 °C) (12/13/18 1500)  Pulse: 93 (12/13/18 1716)  Resp: (!) 30 (12/13/18 1716)  BP: (!) 109/57 (12/13/18 1600)  SpO2: 99 % (12/13/18 1716)  O2  Device (Oxygen Therapy): ventilator (12/13/18 7948) Vital Signs (24h Range):  Temp:  [97.9 °F (36.6 °C)-98.9 °F (37.2 °C)] 98.9 °F (37.2 °C)  Pulse:  [] 93  Resp:  [16-37] 30  SpO2:  [96 %-100 %] 99 %  BP: ()/(46-83) 109/57  Arterial Line BP: (104-131)/(35-49) 124/40     Weight: 55.5 kg (122 lb 5.7 oz) (12/04/18 0353)  Body mass index is 25.57 kg/m².  Body surface area is 1.51 meters squared.    I/O last 3 completed shifts:  In: 4845.2 [I.V.:3275.2; Blood:300; NG/GT:1270]  Out: 5135 [Other:5135]    Physical Exam   Constitutional:   Sitting in chair with trach in place and Vent connected to Trach   HENT:   Head: Normocephalic.   Right Ear: External ear normal.   Left Ear: External ear normal.   Nose: Nose normal.   Eyes: Right eye exhibits no discharge. Left eye exhibits no discharge.   Neck:   Tracheostomy    Cardiovascular: An irregular rhythm present.   Murmur heard.  Pulmonary/Chest: She has rales (Zambrano on the Left > right).   Musculoskeletal: She exhibits no edema.   Skin: Skin is warm.       Significant Labs:  ABGs:    Recent Labs   Lab 12/12/18  0336   PH 7.403   PCO2 38.9   HCO3 24.2   POCSATURATED 99   BE -1     BMP:   Recent Labs   Lab 12/13/18 0358   *  161*     105   CO2 26  26   BUN 7*  7*   CREATININE 0.6  0.6   CALCIUM 7.4*  7.4*   MG 1.6     CBC:   Recent Labs   Lab 12/13/18 0358   WBC 14.04*   RBC 2.69*   HGB 8.5*   HCT 25.9*   PLT 67*   MCV 96   MCH 31.6*   MCHC 32.8     CMP:   Recent Labs   Lab 12/13/18 0358   *  161*   CALCIUM 7.4*  7.4*   ALBUMIN 1.6*  1.6*   PROT 5.2*     136   K 4.0  4.0   CO2 26  26     105   BUN 7*  7*   CREATININE 0.6  0.6   ALKPHOS 252*   ALT 66*   AST 94*   BILITOT 11.4*     All labs within the past 24 hours have been reviewed.

## 2018-12-14 NOTE — SUBJECTIVE & OBJECTIVE
Interval History/Significant Events:   NAEON. Tolerating PAV qD and A/C qHS.  AF and HDS (in AFib).    Follow-up For: Procedure(s) (LRB):  CREATION, TRACHEOSTOMY (N/A)    Post-Operative Day: 9 Days Post-Op    Objective:     Vital Signs (Most Recent):  Temp: 98.4 °F (36.9 °C) (12/13/18 2300)  Pulse: 89 (12/14/18 0600)  Resp: (!) 21 (12/14/18 0600)  BP: (!) 101/55 (12/14/18 0600)  SpO2: 99 % (12/14/18 0600) Vital Signs (24h Range):  Temp:  [97.9 °F (36.6 °C)-98.9 °F (37.2 °C)] 98.4 °F (36.9 °C)  Pulse:  [] 89  Resp:  [16-35] 21  SpO2:  [91 %-100 %] 99 %  BP: ()/(49-83) 101/55  Arterial Line BP: (104-131)/(35-49) 118/44     Weight: 55.5 kg (122 lb 5.7 oz)  Body mass index is 25.57 kg/m².      Intake/Output Summary (Last 24 hours) at 12/14/2018 0740  Last data filed at 12/14/2018 0600  Gross per 24 hour   Intake 3606.57 ml   Output 3544 ml   Net 62.57 ml       Physical Exam  Constitutional: She appears well-developed and well-nourished.   HENT:   Head: Normocephalic and atraumatic.   Eyes: Pupils are equal, round, and reactive to light.   Spontaneously opens eyes  Jaundice+   Neck: Normal range of motion. No JVD present. No tracheal deviation present.   Cardiovascular: Normal rate, regular rhythm and normal heart sounds.   No murmur heard.  Pulmonary/Chest: She has no wheezes. She has no rales.   On vent, trach in place   Abdominal: Soft. Bowel sounds are normal. She exhibits no distension. There is no tenderness.   Umbilical hernia   Musculoskeletal: Normal range of motion. She exhibits no edema.   Neurological: She is alert.     Vents:  Vent Mode: A/C (12/14/18 0542)  Ventilator Initiated: Yes (11/21/18 1839)  Set Rate: 10 bmp (12/14/18 0542)  Vt Set: 300 mL (12/14/18 0542)  Pressure Support: 5 cmH20 (12/13/18 0840)  PEEP/CPAP: 5.5 cmH20 (12/14/18 0542)  Oxygen Concentration (%): 40 (12/14/18 0600)  Peak Airway Pressure: 14 cmH2O (12/14/18 0542)  Plateau Pressure: 21 cmH20 (12/14/18 0542)  Total Ve: 7.03  mL (12/14/18 0542)  F/VT Ratio<105 (RSBI): (!) 87.77 (12/14/18 0542)    Lines/Drains/Airways     Central Venous Catheter Line                 Trialysis (Dialysis) Catheter 11/21/18 1851 left internal jugular 22 days          Drain                 NG/OG Tube 12/07/18 2141 Yoni sump 12 Fr. Right nostril 6 days         Rectal Tube 12/13/18 1005 fecal management system less than 1 day          Airway                 Surgical Airway 12/05/18 1431 Shiley 8 days          Arterial Line                 Arterial Line 11/21/18 1900 Right Radial 22 days                Significant Labs:    CBC/Anemia Profile:  Recent Labs   Lab 12/13/18 0358 12/13/18 1817 12/14/18  0409   WBC 14.04* 12.82* 18.79*   HGB 8.5* 8.7* 8.8*   HCT 25.9* 26.6* 27.7*   PLT 67* 77* 65*   MCV 96 95 99*   RDW 25.0* 25.2* 25.3*        Chemistries:  Recent Labs   Lab 12/13/18 0358 12/13/18 1817 12/13/18  2200 12/14/18  0409     136 133* 136 137  137   K 4.0  4.0 3.9 3.6 3.5  3.5     105 105 107 107  107   CO2 26  26 22* 24 22*  22*   BUN 7*  7* 13 16 19  19   CREATININE 0.6  0.6 0.7 0.9 1.1  1.1   CALCIUM 7.4*  7.4* 7.9* 7.6* 7.8*  7.8*   ALBUMIN 1.6*  1.6* 1.4* 1.4* 1.5*  1.5*   PROT 5.2*  --   --  5.3*   BILITOT 11.4*  --   --  11.9*   ALKPHOS 252*  --   --  268*   ALT 66*  --   --  73*   AST 94*  --   --  95*   MG 1.6 2.0 1.7 2.3   PHOS 1.6*  1.6* 2.5* 2.6* 3.0  3.0       All pertinent labs within the past 24 hours have been reviewed.    Significant Imaging:  I have reviewed and interpreted all pertinent imaging results/findings within the past 24 hours.

## 2018-12-14 NOTE — PROGRESS NOTES
[facility]  Critical Care - Surgery  Progress Note    Patient Name: Krystal Hobson  MRN: [id number]  Admission Date: 11/10/2018  Hospital Length of Stay: 34 days  Code Status: Full Code  Attending Provider: Abrahan Montelongo MD  Primary Care Provider: Courtney Joe MD   Principal Problem: Hepatic encephalopathy    Subjective:     Hospital/ICU Course:  No notes on file    Interval History/Significant Events:   NAEON. Tolerating PAV qD and A/C qHS.  AF and HDS (in AFib).    Follow-up For: Procedure(s) (LRB):  CREATION, TRACHEOSTOMY (N/A)    Post-Operative Day: 9 Days Post-Op    Objective:     Vital Signs (Most Recent):  Temp: 98.4 °F (36.9 °C) (12/13/18 2300)  Pulse: 89 (12/14/18 0600)  Resp: (!) 21 (12/14/18 0600)  BP: (!) 101/55 (12/14/18 0600)  SpO2: 99 % (12/14/18 0600) Vital Signs (24h Range):  Temp:  [97.9 °F (36.6 °C)-98.9 °F (37.2 °C)] 98.4 °F (36.9 °C)  Pulse:  [] 89  Resp:  [16-35] 21  SpO2:  [91 %-100 %] 99 %  BP: ()/(49-83) 101/55  Arterial Line BP: (104-131)/(35-49) 118/44     Weight: 55.5 kg (122 lb 5.7 oz)  Body mass index is 25.57 kg/m².      Intake/Output Summary (Last 24 hours) at 12/14/2018 0740  Last data filed at 12/14/2018 0600  Gross per 24 hour   Intake 3606.57 ml   Output 3544 ml   Net 62.57 ml       Physical Exam  Constitutional: She appears well-developed and well-nourished.   HENT:   Head: Normocephalic and atraumatic.   Eyes: Pupils are equal, round, and reactive to light.   Spontaneously opens eyes  Jaundice+   Neck: Normal range of motion. No JVD present. No tracheal deviation present.   Cardiovascular: Normal rate, regular rhythm and normal heart sounds.   No murmur heard.  Pulmonary/Chest: She has no wheezes. She has no rales.   On vent, trach in place   Abdominal: Soft. Bowel sounds are normal. She exhibits no distension. There is no tenderness.   Umbilical hernia   Musculoskeletal: Normal range of motion. She exhibits no edema.   Neurological: She  is alert.     Vents:  Vent Mode: A/C (12/14/18 0542)  Ventilator Initiated: Yes (11/21/18 1839)  Set Rate: 10 bmp (12/14/18 0542)  Vt Set: 300 mL (12/14/18 0542)  Pressure Support: 5 cmH20 (12/13/18 0840)  PEEP/CPAP: 5.5 cmH20 (12/14/18 0542)  Oxygen Concentration (%): 40 (12/14/18 0600)  Peak Airway Pressure: 14 cmH2O (12/14/18 0542)  Plateau Pressure: 21 cmH20 (12/14/18 0542)  Total Ve: 7.03 mL (12/14/18 0542)  F/VT Ratio<105 (RSBI): (!) 87.77 (12/14/18 0542)    Lines/Drains/Airways     Central Venous Catheter Line                 Trialysis (Dialysis) Catheter 11/21/18 1851 left internal jugular 22 days          Drain                 NG/OG Tube 12/07/18 2141 Bremer sump 12 Fr. Right nostril 6 days         Rectal Tube 12/13/18 1005 fecal management system less than 1 day          Airway                 Surgical Airway 12/05/18 1431 Shiley 8 days          Arterial Line                 Arterial Line 11/21/18 1900 Right Radial 22 days                Significant Labs:    CBC/Anemia Profile:  Recent Labs   Lab 12/13/18 0358 12/13/18 1817 12/14/18  0409   WBC 14.04* 12.82* 18.79*   HGB 8.5* 8.7* 8.8*   HCT 25.9* 26.6* 27.7*   PLT 67* 77* 65*   MCV 96 95 99*   RDW 25.0* 25.2* 25.3*        Chemistries:  Recent Labs   Lab 12/13/18  0358 12/13/18 1817 12/13/18  2200 12/14/18  0409     136 133* 136 137  137   K 4.0  4.0 3.9 3.6 3.5  3.5     105 105 107 107  107   CO2 26  26 22* 24 22*  22*   BUN 7*  7* 13 16 19  19   CREATININE 0.6  0.6 0.7 0.9 1.1  1.1   CALCIUM 7.4*  7.4* 7.9* 7.6* 7.8*  7.8*   ALBUMIN 1.6*  1.6* 1.4* 1.4* 1.5*  1.5*   PROT 5.2*  --   --  5.3*   BILITOT 11.4*  --   --  11.9*   ALKPHOS 252*  --   --  268*   ALT 66*  --   --  73*   AST 94*  --   --  95*   MG 1.6 2.0 1.7 2.3   PHOS 1.6*  1.6* 2.5* 2.6* 3.0  3.0       All pertinent labs within the past 24 hours have been reviewed.    Significant Imaging:  I have reviewed and interpreted all pertinent imaging results/findings  within the past 24 hours.    Assessment/Plan:     Decompensated hepatic cirrhosis    68yo F liver-listed, stepped up on 11/21 for respiratory failure with worsening respiratory acidosis. Trached 12/05.      Neuro:   -Pain control: prn dilaudid while intubated  -Sedation off   -continue lactulose/rifaximin  -Following commands     Pulmonary:   -intubated on arrival to SICU 11/21, failed SBT  - s/p trach 12/5  -ABGs daily and prn  -duonebs prn  -continue levoalbuterol  -working on weaning vent settings during day  -daily CXR stable appearing with diffuse bilateral opacification     Cardiac:  -SBP > 100 goal  -JACINDA 11/20 - elevated PA pressures   -Heart failure following, appreciate recs   -dilt gtt as needed for A-Fib, currently on 30mg dilt po q8hr       Renal:   -Mendoza in place  -Continue to monitor UOP: oliguria/anuria    -CRRT per nephrology  -Nephrology following, appreciate recommendations        Fluids/Electrolytes/Nutrition/GI:   -Continue TFs  -replace lytes PRN  -continue lactulose   -bowel regimen     Hematology/Oncology:  -Monitor CBC -- Hgb 9.6/29.1 (post-transfusion)-->8.8/27.7  -continue to monitor. Transfuse as needed per transplant   -1 U pRBC (12/12)      Infectious Disease:   -Afebrile  -Leukocytosis to 18.7  -BCx NG  -RespCx (tracheal aspirate) Klebsiella - susceptibility pending  -Trend WBC  -Abx: Diflucan to cover HCAP, abx management per liver    Endocrine:  Endocrinology following   BG goal 140-180  continue Levemir 5 units BID.   Start novolog 2 units every 4 hours while on TF at 40; hold if TF held or rate decreased  BG monitoring every 4 hours and low dose correction scale.        PPx: famotidine, SQH     Dispo:  -Pending placement at hospice vs LTACH. Appreciate SW/CM assistance.  -Primary: Transplant           Critical care was time spent personally by me on the following activities: development of treatment plan with patient or surrogate and bedside caregivers, discussions with  consultants, evaluation of patient's response to treatment, examination of patient, ordering and performing treatments and interventions, ordering and review of laboratory studies, ordering and review of radiographic studies, pulse oximetry, re-evaluation of patient's condition.  This critical care time did not overlap with that of any other provider or involve time for any procedures.     Ori Domingo MD  Critical Care - Surgery  [facility]

## 2018-12-14 NOTE — SUBJECTIVE & OBJECTIVE
Interval History:  No acute events overnight, remains afebrile, vital signs stable. More somnolent this morning, leukocytosis increasing. Remains in AFib.       Scheduled Meds:   diltiaZEM  30 mg Oral Q8H    famotidine  20 mg Oral QHS    fluconazole  200 mg Oral Daily    guaifenesin 100 mg/5 ml  200 mg Per OG tube Q6H    heparin (porcine)  5,000 Units Subcutaneous Q12H    [START ON 12/15/2018] insulin aspart U-100  3 Units Subcutaneous Q24H    [START ON 12/15/2018] insulin aspart U-100  3 Units Subcutaneous Q24H    insulin aspart U-100  3 Units Subcutaneous Q24H    insulin aspart U-100  3 Units Subcutaneous Q24H    insulin aspart U-100  3 Units Subcutaneous Q24H    insulin aspart U-100  3 Units Subcutaneous Q24H    insulin detemir U-100  5 Units Subcutaneous BID    lactulose  15 g Per OG tube TID    miconazole nitrate 2%   Topical (Top) BID    rifAXImin  550 mg Oral BID     Continuous Infusions:   sodium chloride 0.9% 200 mL/hr at 12/13/18 2027     PRN Meds:sodium chloride, acetaminophen, dextrose 50%, glucagon (human recombinant), HYDROmorphone, insulin aspart U-100, k phos di & mono-sod phos mono, lactulose, lactulose, magnesium sulfate IVPB, metoprolol, ondansetron, simethicone, sodium chloride 0.9%    Review of Systems   All other systems reviewed and are negative.    Objective:     Vital Signs (Most Recent):  Temp: 97.6 °F (36.4 °C) (12/14/18 1100)  Pulse: 104 (12/14/18 1145)  Resp: (!) 23 (12/14/18 1145)  BP: (!) 103/51 (12/14/18 1100)  SpO2: 100 % (12/14/18 1145) Vital Signs (24h Range):  Temp:  [97.5 °F (36.4 °C)-98.9 °F (37.2 °C)] 97.6 °F (36.4 °C)  Pulse:  [] 104  Resp:  [16-37] 23  SpO2:  [91 %-100 %] 100 %  BP: ()/(50-57) 103/51  Arterial Line BP: (105-160)/() 114/42     Weight: 55.5 kg (122 lb 5.7 oz)  Body mass index is 25.57 kg/m².    Intake/Output - Last 3 Shifts       12/12 0700 - 12/13 0659 12/13 0700 - 12/14 0659 12/14 0700 - 12/15 0659    I.V. (mL/kg) 1748 (31.5)  2096.6 (37.8)     Blood 300      NG/GT 1230 1550 120    Total Intake(mL/kg) 3278 (59.1) 3646.6 (65.7) 120 (2.2)    Other 2910 3133     Stool 0 800     Total Output 2910 3933     Net +368 -286.4 +120           Stool Occurrence 5 x 2 x           Physical Exam   Constitutional: She appears well-developed and well-nourished.   HENT:   Head: Normocephalic and atraumatic.   Eyes: Pupils are equal, round, and reactive to light.   Spontaneously opens eyes  Jaundice+   Neck: Normal range of motion. No JVD present. No tracheal deviation present.   Cardiovascular: Normal rate, regular rhythm and normal heart sounds.   No murmur heard.  Pulmonary/Chest: She has no wheezes. She has no rales.   On vent, trach in place   Abdominal: Soft. Bowel sounds are normal. She exhibits no distension. There is no tenderness.   Umbilical hernia   Musculoskeletal: Normal range of motion. She exhibits no edema.   Neurological: She is alert.       Laboratory:  Immunosuppressants     None        CBC:   Recent Labs   Lab 12/14/18  0409   WBC 18.79*   RBC 2.80*   HGB 8.8*   HCT 27.7*   PLT 65*   MCV 99*   MCH 31.4*   MCHC 31.8*     CMP:   Recent Labs   Lab 12/14/18  0409   *  244*   CALCIUM 7.8*  7.8*   ALBUMIN 1.5*  1.5*   PROT 5.3*     137   K 3.5  3.5   CO2 22*  22*     107   BUN 19  19   CREATININE 1.1  1.1   ALKPHOS 268*   ALT 73*   AST 95*   BILITOT 11.9*     Coagulation:   Recent Labs   Lab 12/14/18  0409   INR 1.7*     ABGs:   Recent Labs   Lab 12/12/18  0336   PH 7.403   PCO2 38.9   HCO3 24.2   POCSATURATED 99   BE -1     Labs within the past 24 hours have been reviewed.    Diagnostic Results:  I have personally reviewed all pertinent imaging studies.

## 2018-12-14 NOTE — PROGRESS NOTES
Attempted to switch pt to PAV mode with 70% of support level, 40% FiO2. Shortly after switch, pt became tachypneic, spon RR in the 40s, spon -200 ml. Increased to 80% support level on PAV, no significant improvement on the RR and VT. Pt was coughing violently, trach suctioned. Switched pt to CPAP/PS, 10/5, 40%FiO2, pt verbalized that she felt easier to breath. Spon RR 20-30 breaths/min, -350 ml. Trach suctioned. Will continue to monitor. Dr. Angela notified.

## 2018-12-14 NOTE — PROGRESS NOTES
"[facility]  Endocrinology  Progress Note    Admit Date: 11/10/2018     Reason for Consult: Management of type 2 DM, Hyperglycemia     Surgical Procedure and Date: n/a    Diabetes diagnosis year: 2001    Home Diabetes Medications: Levemir 12 units BID (vial) and Humalog SSI with meals   Lab Results   Component Value Date    HGBA1C 5.8 (H) 11/10/2018         How often checking glucose at home? 1-3  BG readings on regimen: 150-300  Hypoglycemia on the regimen? once 27; required visit to ED; gave Levemir and humalog and patient did not eat  Missed doses on regimen?  No    Diabetes Complications include:     Hyperglycemia, Hypoglycemia  and Diabetic peripheral neuropathy     Complicating diabetes co morbidities:   CIRRHOSIS      HPI:   Patient is a 67 y.o. female with a diagnosis of type 2 DM; well controlled on MDI. Also with   Cryptogenic cirrhosis, rheumatoid arthritis, and GERD. Patient was listed for liver transplant on 9/14/18. Patient presented to ED of hospital in [place] with AMS and ENZO. Endocrinology consulted for BG/ DM management.   Of note, profound hypoglycemic event (BG < 20) the night of 11/14/18.            Interval HPI:   Overnight events: Remains in ICU, trach on vent. BG elevated overnight with scheduled insulin and correction scale.  CRRT per nephrology.  Eating:   NPO  Nausea: No  Hypoglycemia and intervention: No  Fever: No  TPN and/or TF: Yes  TF and rate: Novasource Renal at 40 cc/hr (goal)    BP (!) 101/55   Pulse 89   Temp 98.4 °F (36.9 °C) (Oral)   Resp (!) 21   Ht 4' 10" (1.473 m)   Wt 55.5 kg (122 lb 5.7 oz)   SpO2 99%   Breastfeeding? No   BMI 25.57 kg/m²      Labs Reviewed and Include    Recent Labs   Lab 12/14/18  0409   *  244*   CALCIUM 7.8*  7.8*   ALBUMIN 1.5*  1.5*   PROT 5.3*     137   K 3.5  3.5   CO2 22*  22*     107   BUN 19  19   CREATININE 1.1  1.1   ALKPHOS 268*   ALT 73*   AST 95*   BILITOT 11.9*     Lab Results "   Component Value Date    WBC 18.79 (H) 12/14/2018    HGB 8.8 (L) 12/14/2018    HCT 27.7 (L) 12/14/2018    MCV 99 (H) 12/14/2018    PLT 65 (L) 12/14/2018     No results for input(s): TSH, FREET4 in the last 168 hours.  Lab Results   Component Value Date    HGBA1C 5.8 (H) 11/10/2018       Nutritional status:   Body mass index is 25.57 kg/m².  Lab Results   Component Value Date    ALBUMIN 1.5 (L) 12/14/2018    ALBUMIN 1.5 (L) 12/14/2018    ALBUMIN 1.4 (L) 12/13/2018     No results found for: PREALBUMIN    Estimated Creatinine Clearance: 42.9 mL/min (based on SCr of 1.1 mg/dL).    Accu-Checks  Recent Labs     12/12/18  1727 12/12/18  2048 12/12/18  2353 12/13/18  0400 12/13/18  0725 12/13/18  1321 12/13/18  1623 12/13/18  2038 12/13/18  2342 12/14/18  0407   POCTGLUCOSE 235* 242* 197* 165* 181* 194* 219* 270* 256* 256*       Current Medications and/or Treatments Impacting Glycemic Control  Immunotherapy:    Immunosuppressants     None        Steroids:   Hormones (From admission, onward)    None        Pressors:    Autonomic Drugs (From admission, onward)    None        Hyperglycemia/Diabetes Medications:   Antihyperglycemics (From admission, onward)    Start     Stop Route Frequency Ordered    12/10/18 0800  insulin aspart U-100 pen 2 Units      -- SubQ Every 24 hours (non-standard times) 12/09/18 0857    12/10/18 0400  insulin aspart U-100 pen 2 Units      -- SubQ Every 24 hours (non-standard times) 12/09/18 0857    12/10/18 0000  insulin aspart U-100 pen 2 Units      -- SubQ Every 24 hours (non-standard times) 12/09/18 0857    12/09/18 2000  insulin aspart U-100 pen 2 Units      -- SubQ Every 24 hours (non-standard times) 12/09/18 0857    12/09/18 1600  insulin aspart U-100 pen 2 Units      -- SubQ Every 24 hours (non-standard times) 12/09/18 0857    12/09/18 1200  insulin aspart U-100 pen 2 Units      -- SubQ Every 24 hours (non-standard times) 12/09/18 0857    12/05/18 0900  insulin detemir U-100 pen 5 Units       -- SubQ 2 times daily 12/05/18 0717    11/26/18 0947  insulin aspart U-100 pen 0-5 Units      -- SubQ Every 4 hours PRN 11/26/18 0848          ASSESSMENT and PLAN    * Hepatic encephalopathy    Managed per primary.        Type 2 diabetes mellitus with hyperglycemia, with long-term current use of insulin    BG goal 140-180    Levemir 5 units BID   Increase Novolog to 3 units every 4 hours while on TF at 40; hold if TF held or rate decreased  Low dose correction scale  BG monitoring every 4 hours    ADDENDUM: Increase Novolog to 4 units q 4 hours while on TF    Discharge planning: TBD       Acute kidney injury superimposed on CKD    Avoid insulin stacking and hypoglycemia.  CRRT per nephrology   Lab Results   Component Value Date    CREATININE 1.1 12/14/2018    CREATININE 1.1 12/14/2018            Decompensated hepatic cirrhosis    Managed per primary  May impact BG readings       On enteral nutrition    On supplemental TF, elevating BG readings     CKD (chronic kidney disease) stage 3, GFR 30-59 ml/min    Caution with insulin stacking  Estimated Creatinine Clearance: 42.9 mL/min (based on SCr of 1.1 mg/dL).             Tristan Hwang NP  Endocrinology  [facility]

## 2018-12-14 NOTE — ASSESSMENT & PLAN NOTE
Avoid insulin stacking and hypoglycemia.  CRRT per nephrology   Lab Results   Component Value Date    CREATININE 1.1 12/14/2018    CREATININE 1.1 12/14/2018

## 2018-12-14 NOTE — SUBJECTIVE & OBJECTIVE
"Interval HPI:   Overnight events: Remains in ICU, trach on vent. BG elevated overnight with scheduled insulin and correction scale.  CRRT per nephrology.  Eating:   NPO  Nausea: No  Hypoglycemia and intervention: No  Fever: No  TPN and/or TF: Yes  TF and rate: Novasource Renal at 40 cc/hr (goal)    BP (!) 101/55   Pulse 89   Temp 98.4 °F (36.9 °C) (Oral)   Resp (!) 21   Ht 4' 10" (1.473 m)   Wt 55.5 kg (122 lb 5.7 oz)   SpO2 99%   Breastfeeding? No   BMI 25.57 kg/m²     Labs Reviewed and Include    Recent Labs   Lab 12/14/18  0409   *  244*   CALCIUM 7.8*  7.8*   ALBUMIN 1.5*  1.5*   PROT 5.3*     137   K 3.5  3.5   CO2 22*  22*     107   BUN 19  19   CREATININE 1.1  1.1   ALKPHOS 268*   ALT 73*   AST 95*   BILITOT 11.9*     Lab Results   Component Value Date    WBC 18.79 (H) 12/14/2018    HGB 8.8 (L) 12/14/2018    HCT 27.7 (L) 12/14/2018    MCV 99 (H) 12/14/2018    PLT 65 (L) 12/14/2018     No results for input(s): TSH, FREET4 in the last 168 hours.  Lab Results   Component Value Date    HGBA1C 5.8 (H) 11/10/2018       Nutritional status:   Body mass index is 25.57 kg/m².  Lab Results   Component Value Date    ALBUMIN 1.5 (L) 12/14/2018    ALBUMIN 1.5 (L) 12/14/2018    ALBUMIN 1.4 (L) 12/13/2018     No results found for: PREALBUMIN    Estimated Creatinine Clearance: 42.9 mL/min (based on SCr of 1.1 mg/dL).    Accu-Checks  Recent Labs     12/12/18  1727 12/12/18  2048 12/12/18  2353 12/13/18  0400 12/13/18  0725 12/13/18  1321 12/13/18  1623 12/13/18  2038 12/13/18  2342 12/14/18  0407   POCTGLUCOSE 235* 242* 197* 165* 181* 194* 219* 270* 256* 256*       Current Medications and/or Treatments Impacting Glycemic Control  Immunotherapy:    Immunosuppressants     None        Steroids:   Hormones (From admission, onward)    None        Pressors:    Autonomic Drugs (From admission, onward)    None        Hyperglycemia/Diabetes Medications:   Antihyperglycemics (From admission, onward) "    Start     Stop Route Frequency Ordered    12/10/18 0800  insulin aspart U-100 pen 2 Units      -- SubQ Every 24 hours (non-standard times) 12/09/18 0857    12/10/18 0400  insulin aspart U-100 pen 2 Units      -- SubQ Every 24 hours (non-standard times) 12/09/18 0857    12/10/18 0000  insulin aspart U-100 pen 2 Units      -- SubQ Every 24 hours (non-standard times) 12/09/18 0857    12/09/18 2000  insulin aspart U-100 pen 2 Units      -- SubQ Every 24 hours (non-standard times) 12/09/18 0857    12/09/18 1600  insulin aspart U-100 pen 2 Units      -- SubQ Every 24 hours (non-standard times) 12/09/18 0857    12/09/18 1200  insulin aspart U-100 pen 2 Units      -- SubQ Every 24 hours (non-standard times) 12/09/18 0857    12/05/18 0900  insulin detemir U-100 pen 5 Units      -- SubQ 2 times daily 12/05/18 0717    11/26/18 0947  insulin aspart U-100 pen 0-5 Units      -- SubQ Every 4 hours PRN 11/26/18 0848

## 2018-12-14 NOTE — PROGRESS NOTES
[facility]  Nephrology  Progress Note    Patient Name: Krystal Hobson  MRN: [id number]  Admission Date: 11/10/2018  Hospital Length of Stay: 34 days  Attending Provider: Gera Bryan MD   Primary Care Physician: Courtney Joe MD  Principal Problem:Hepatic encephalopathy    Subjective:     HPI: Reason for consult: Diuresis in setting of PAP46, pulmonary edema, HRS    Ms. 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  - [place] [facility] [place] liver transplant team due to encephalopathy and new onset ENZO.   - Per the , she was extremely disoriented @ OSH and her ammonia reached as high as 200, and her Cr level increased to 2.0 She also went into A fib with RVR and was started on a diltiazem gtt at OSH.   - [place] [facility] [place] and started on lactulose with noted improvement. During her current admission on CXR they found a concern for HCAP and she was started on broad spectrum ABx and then shifted her on Moxifloxacin 7 D course. On 11/20/2018 found on CXR a concern for Lt lung middle lobe consolidation and started on vancomycin and zosyn.  - Renal fxn baseline 1.6 at admit but trending up    Interval History:   NAEON. Tolerated SLED but clotted after about 6 hrs and was not restarted.  overnight  ml/hr, CVP 6. Continues with mechanical ventilation. Still not making urine. Fluid balance net gain 102 ml plus insensible losses she would be about 500 ml negative. Hg stable overnight.    Review of patient's allergies indicates:  No Known Allergies  Current Facility-Administered Medications   Medication Frequency    0.9%  NaCl infusion (CRRT USE ONLY) Continuous    0.9%  NaCl infusion (for blood administration) Q24H PRN    acetaminophen tablet 650 mg Q8H PRN    dextrose 50% injection 12.5 g PRN    diltiaZEM tablet 30 mg Q8H    famotidine tablet 20 mg QHS    fluconazole tablet 200 mg Daily     glucagon (human recombinant) injection 1 mg PRN    guaifenesin 100 mg/5 ml syrup 200 mg Q6H    heparin (porcine) injection 5,000 Units Q12H    HYDROmorphone injection 0.2 mg Q3H PRN    insulin aspart U-100 pen 0-5 Units Q4H PRN    [START ON 12/15/2018] insulin aspart U-100 pen 3 Units Q24H    [START ON 12/15/2018] insulin aspart U-100 pen 3 Units Q24H    insulin aspart U-100 pen 3 Units Q24H    insulin aspart U-100 pen 3 Units Q24H    insulin aspart U-100 pen 3 Units Q24H    insulin aspart U-100 pen 3 Units Q24H    insulin detemir U-100 pen 5 Units BID    k phos di & mono-sod phos mono 250 mg tablet 2 tablet TID PRN    lactulose 10 gram/15 mL solution (enema) 200 g TID PRN    lactulose 20 gram/30 mL solution Soln 15 g TID    lactulose 20 gram/30 mL solution Soln 30 g Q6H PRN    magnesium sulfate 2g in water 50mL IVPB (premix) PRN    metoprolol injection 2.5 mg Q5 Min PRN    miconazole nitrate 2% ointment BID    ondansetron disintegrating tablet 8 mg Q8H PRN    rifAXIMin tablet 550 mg BID    simethicone chewable tablet 80 mg TID PRN    sodium chloride 0.9% flush 3 mL PRN       Objective:     Vital Signs (Most Recent):  Temp: 97.6 °F (36.4 °C) (12/14/18 1100)  Pulse: 104 (12/14/18 1145)  Resp: (!) 23 (12/14/18 1145)  BP: (!) 103/51 (12/14/18 1100)  SpO2: 100 % (12/14/18 1145)  O2 Device (Oxygen Therapy): ventilator (12/14/18 1137) Vital Signs (24h Range):  Temp:  [97.5 °F (36.4 °C)-98.9 °F (37.2 °C)] 97.6 °F (36.4 °C)  Pulse:  [] 104  Resp:  [16-37] 23  SpO2:  [91 %-100 %] 100 %  BP: ()/(50-57) 103/51  Arterial Line BP: (105-160)/() 114/42     Weight: 55.5 kg (122 lb 5.7 oz) (12/04/18 0353)  Body mass index is 25.57 kg/m².  Body surface area is 1.51 meters squared.    I/O last 3 completed shifts:  In: 5467.6 [I.V.:3367.6; NG/GT:2100]  Out: 6252 [Other:5452; Stool:800]    Physical Exam   Constitutional:   Sitting in chair with trach in place and Vent connected to Trach   HENT:    Head: Normocephalic.   Right Ear: External ear normal.   Left Ear: External ear normal.   Nose: Nose normal.   Eyes: Right eye exhibits no discharge. Left eye exhibits no discharge.   Neck:   Tracheostomy    Cardiovascular: An irregular rhythm present.   Murmur heard.  Pulmonary/Chest: She has rales (Zambrano on the Left > right).   Musculoskeletal: She exhibits no edema.   Skin: Skin is warm.       Significant Labs:  ABGs:    Recent Labs   Lab 12/12/18  0336   PH 7.403   PCO2 38.9   HCO3 24.2   POCSATURATED 99   BE -1     BMP:   Recent Labs   Lab 12/14/18  0409   *  244*     107   CO2 22*  22*   BUN 19  19   CREATININE 1.1  1.1   CALCIUM 7.8*  7.8*   MG 2.3     CBC:   Recent Labs   Lab 12/14/18  0409   WBC 18.79*   RBC 2.80*   HGB 8.8*   HCT 27.7*   PLT 65*   MCV 99*   MCH 31.4*   MCHC 31.8*     CMP:   Recent Labs   Lab 12/14/18  0409   *  244*   CALCIUM 7.8*  7.8*   ALBUMIN 1.5*  1.5*   PROT 5.3*     137   K 3.5  3.5   CO2 22*  22*     107   BUN 19  19   CREATININE 1.1  1.1   ALKPHOS 268*   ALT 73*   AST 95*   BILITOT 11.9*     All labs within the past 24 hours have been reviewed.     Assessment/Plan:     Acute kidney injury superimposed on CKD    Cryptogenic cirrhosis awaiting liver xplant, presented for AMS and found to have ENZO on CKDIIIb. SLED started last weekend with intermittent AF+RVR+hypotensive episodes controlled with bb/cardizem gtt adequately. Now on CRRT with improving overall fluid status.    Plan:  - no evidence of renal recovery dialysis depenedant  - tolerated SLED x 6 hrs then clotted   - will plan for SLED overnight 10 hrs metabolic clearance and volume management.  - -300 ml/hr as tolerated if hymodynamically unstable would recommend to rinse her back  - appreciate tube feeds formula changed to [employer] renal   - CVP today at 6  - Continue with mechanical ventilation with FiO2 40 % PEEP 5  - Blood pressures have been stable  - Continues to  be anuric, will need bladder scans BID to evaluate for any sign           Thank you for your consult. I will follow-up with patient. Please contact us if you have any additional questions.    Haroldo Van MD  Nephrology  [facility]

## 2018-12-15 NOTE — ASSESSMENT & PLAN NOTE
68yo F liver-listed, stepped up on 11/21 for respiratory failure with worsening respiratory acidosis     Plan:  Neuro:   -Pain control: prn fentanyl while intubated  -Continue sedation holiday as tolerated, more drowsy, not very responsive  -Ammonia level re-check yesterday and elevated to 79    Pulmonary:   -intubated on arrival to SICU 11/21, trach placed on 12/5  Vent Mode: PAV+  Oxygen Concentration (%):  [] 40  Resp Rate Total:  [14 br/min-60 br/min] 26 br/min  Vt Set:  [300 mL] 300 mL  PEEP/CPAP:  [5 cmH20] 5 cmH20  Pressure Support:  [10 tpS00-03 cmH20] 10 cmH20  Mean Airway Pressure:  [6.5 cmH20-10 cmH20] 8.1 cmH20  -daily CXR - evidence of significant bilateral consolidation   -ABGs daily and prn  -continuous pulse ox    -Trach in place - remains vent dependent    Cardiac:  -SBP > 100 goal  -Heart failure following, appreciate recs   - Went back into Afib with RVR on 11/25. Given IV metoprolol 5mg q3. Did not resolve, started on diltiazem drip. Converted out of Afib around 3am. dilt drip stopped at that time.   -continue scheduled dilt & dilt gtt as needed for Afib, try to prevent Afib by administering metoprolol prior to CRRT    Renal:   -Mendoza in place  -Nephrology following, appreciate recommendations  -Started CRRT 11/24/18. Will continue nocturnal CRRT - holding tonight per nephrology given hypotension    Fluids/Electrolytes/Nutrition/GI:   -Nutritional status: NPO while intubated  -Continue TF @ 40cc/hour, will add fiber given diarrhea   -replace lytes PRN  -Lactulose enemas TID    Hematology/Oncology:  -Hgb stable following transfusion 12/12  -continue to monitor. Transfuse as needed     Infectious Disease:   -Afebrile  -Trend WBC, increasing leukocytosis today  -Abx: none    Endocrine:  -Glucose goal of 140-180  -Endocrine following, see recommendations    Dispo:  -Continue care in the ICU setting - will attempt transfer to a facility closer to home to assist with palliation  -continue CRRT

## 2018-12-15 NOTE — ASSESSMENT & PLAN NOTE
BG goal 140-180    Levemir 5 units BID   Increase Novolog to 5 units every 4 hours while on TF at 40; hold if TF held or rate decreased  Low dose correction scale  BG monitoring every 4 hours    ADDENDUM: Increase Novolog to 6 units every 4 hours while on TF    Discharge planning: TBD     no

## 2018-12-15 NOTE — PROGRESS NOTES
"[facility]  Endocrinology  Progress Note    Admit Date: 11/10/2018     Reason for Consult: Management of type 2 DM, Hyperglycemia     Surgical Procedure and Date: n/a    Diabetes diagnosis year: 2001    Home Diabetes Medications: Levemir 12 units BID (vial) and Humalog SSI with meals   Lab Results   Component Value Date    HGBA1C 5.8 (H) 11/10/2018         How often checking glucose at home? 1-3  BG readings on regimen: 150-300  Hypoglycemia on the regimen? once 27; required visit to ED; gave Levemir and humalog and patient did not eat  Missed doses on regimen?  No    Diabetes Complications include:     Hyperglycemia, Hypoglycemia  and Diabetic peripheral neuropathy     Complicating diabetes co morbidities:   CIRRHOSIS      HPI:   Patient is a 67 y.o. female with a diagnosis of type 2 DM; well controlled on MDI. Also with   Cryptogenic cirrhosis, rheumatoid arthritis, and GERD. Patient was listed for liver transplant on 9/14/18. Patient presented to ED of hospital in [place] with AMS and ENZO. Endocrinology consulted for BG/ DM management.   Of note, profound hypoglycemic event (BG < 20) the night of 11/14/18.            Interval HPI:   Overnight events: Remains in ICU, trach on vent.  Uncontrolled a-fib noted overnight.  BG elevated overnight with scheduled insulin and correction scale.  CRRT per nephrology.  Eating:   NPO  Nausea: No  Hypoglycemia and intervention: No  Fever: No  TPN and/or TF: Yes  TF and rate: Novasource Renal at 40 cc/hr (goal)    BP (!) 101/49   Pulse 89   Temp 98.6 °F (37 °C) (Oral)   Resp (!) 24   Ht 4' 10" (1.473 m)   Wt 55.5 kg (122 lb 5.7 oz)   SpO2 97%   Breastfeeding? No   BMI 25.57 kg/m²      Labs Reviewed and Include    Recent Labs   Lab 12/15/18  0341   *  286*  286*  286*   CALCIUM 8.4*  8.4*  8.4*  8.4*   ALBUMIN 2.4*  2.4*  2.4*  2.4*   PROT 5.4*     138  138  138   K 3.8  3.8  3.8  3.8   CO2 20*  20*  20*  20*     " 107  107  107   BUN 16  16  16  16   CREATININE 0.9  0.9  0.9  0.9   ALKPHOS 210*   ALT 57*   AST 74*   BILITOT 12.3*     Lab Results   Component Value Date    WBC 13.90 (H) 12/15/2018    HGB 7.3 (L) 12/15/2018    HCT 23.0 (L) 12/15/2018     (H) 12/15/2018    PLT 57 (L) 12/15/2018     No results for input(s): TSH, FREET4 in the last 168 hours.  Lab Results   Component Value Date    HGBA1C 5.8 (H) 11/10/2018       Nutritional status:   Body mass index is 25.57 kg/m².  Lab Results   Component Value Date    ALBUMIN 2.4 (L) 12/15/2018    ALBUMIN 2.4 (L) 12/15/2018    ALBUMIN 2.4 (L) 12/15/2018    ALBUMIN 2.4 (L) 12/15/2018     No results found for: PREALBUMIN    Estimated Creatinine Clearance: 52.4 mL/min (based on SCr of 0.9 mg/dL).    Accu-Checks  Recent Labs     12/13/18  2038 12/13/18  2342 12/14/18  0407 12/14/18  0904 12/14/18  1230 12/14/18  1655 12/14/18  2015 12/14/18  2320 12/15/18  0006 12/15/18  0346   POCTGLUCOSE 270* 256* 256* 289* 320* 340* 278* 219* 217* 297*       Current Medications and/or Treatments Impacting Glycemic Control  Immunotherapy:    Immunosuppressants     None        Steroids:   Hormones (From admission, onward)    None        Pressors:    Autonomic Drugs (From admission, onward)    None        Hyperglycemia/Diabetes Medications:   Antihyperglycemics (From admission, onward)    Start     Stop Route Frequency Ordered    12/15/18 1600  insulin aspart U-100 pen 4 Units      -- SubQ Every 24 hours (non-standard times) 12/14/18 1721    12/15/18 1200  insulin aspart U-100 pen 4 Units      -- SubQ Every 24 hours (non-standard times) 12/14/18 1721    12/15/18 0800  insulin aspart U-100 pen 4 Units      -- SubQ Every 24 hours (non-standard times) 12/14/18 1721    12/15/18 0400  insulin aspart U-100 pen 4 Units      -- SubQ Every 24 hours (non-standard times) 12/14/18 1721    12/15/18 0000  insulin aspart U-100 pen 4 Units      -- SubQ Every 24 hours (non-standard times) 12/14/18 1721     12/14/18 2000  insulin aspart U-100 pen 4 Units      -- SubQ Every 24 hours (non-standard times) 12/14/18 1721    12/05/18 0900  insulin detemir U-100 pen 5 Units      -- SubQ 2 times daily 12/05/18 0717    11/26/18 0947  insulin aspart U-100 pen 0-5 Units      -- SubQ Every 4 hours PRN 11/26/18 0848          ASSESSMENT and PLAN    * Hepatic encephalopathy    Managed per primary.        Type 2 diabetes mellitus with hyperglycemia, with long-term current use of insulin    BG goal 140-180    Levemir 5 units BID   Increase Novolog to 5 units every 4 hours while on TF at 40; hold if TF held or rate decreased  Low dose correction scale  BG monitoring every 4 hours    ADDENDUM: Increase Novolog to 6 units every 4 hours while on TF    Discharge planning: TBD       Acute kidney injury superimposed on CKD    Avoid insulin stacking and hypoglycemia.  CRRT per nephrology   Lab Results   Component Value Date    CREATININE 0.9 12/15/2018    CREATININE 0.9 12/15/2018    CREATININE 0.9 12/15/2018    CREATININE 0.9 12/15/2018            Decompensated hepatic cirrhosis    Managed per primary  May impact BG readings       On enteral nutrition    On supplemental TF, elevating BG readings     CKD (chronic kidney disease) stage 3, GFR 30-59 ml/min    Caution with insulin stacking  Estimated Creatinine Clearance: 52.4 mL/min (based on SCr of 0.9 mg/dL).             Tristan Hwang NP  Endocrinology  [facility]

## 2018-12-15 NOTE — SUBJECTIVE & OBJECTIVE
"Interval HPI:   Overnight events: Remains in ICU, trach on vent.  Uncontrolled a-fib noted overnight.  BG elevated overnight with scheduled insulin and correction scale.  CRRT per nephrology.  Eating:   NPO  Nausea: No  Hypoglycemia and intervention: No  Fever: No  TPN and/or TF: Yes  TF and rate: Novasource Renal at 40 cc/hr (goal)    BP (!) 101/49   Pulse 89   Temp 98.6 °F (37 °C) (Oral)   Resp (!) 24   Ht 4' 10" (1.473 m)   Wt 55.5 kg (122 lb 5.7 oz)   SpO2 97%   Breastfeeding? No   BMI 25.57 kg/m²     Labs Reviewed and Include    Recent Labs   Lab 12/15/18  0341   *  286*  286*  286*   CALCIUM 8.4*  8.4*  8.4*  8.4*   ALBUMIN 2.4*  2.4*  2.4*  2.4*   PROT 5.4*     138  138  138   K 3.8  3.8  3.8  3.8   CO2 20*  20*  20*  20*     107  107  107   BUN 16  16  16  16   CREATININE 0.9  0.9  0.9  0.9   ALKPHOS 210*   ALT 57*   AST 74*   BILITOT 12.3*     Lab Results   Component Value Date    WBC 13.90 (H) 12/15/2018    HGB 7.3 (L) 12/15/2018    HCT 23.0 (L) 12/15/2018     (H) 12/15/2018    PLT 57 (L) 12/15/2018     No results for input(s): TSH, FREET4 in the last 168 hours.  Lab Results   Component Value Date    HGBA1C 5.8 (H) 11/10/2018       Nutritional status:   Body mass index is 25.57 kg/m².  Lab Results   Component Value Date    ALBUMIN 2.4 (L) 12/15/2018    ALBUMIN 2.4 (L) 12/15/2018    ALBUMIN 2.4 (L) 12/15/2018    ALBUMIN 2.4 (L) 12/15/2018     No results found for: PREALBUMIN    Estimated Creatinine Clearance: 52.4 mL/min (based on SCr of 0.9 mg/dL).    Accu-Checks  Recent Labs     12/13/18 2038 12/13/18  2342 12/14/18  0407 12/14/18  0904 12/14/18  1230 12/14/18  1655 12/14/18  2015 12/14/18  2320 12/15/18  0006 12/15/18  0346   POCTGLUCOSE 270* 256* 256* 289* 320* 340* 278* 219* 217* 297*       Current Medications and/or Treatments Impacting Glycemic Control  Immunotherapy:    Immunosuppressants     None        Steroids:   Hormones (From " admission, onward)    None        Pressors:    Autonomic Drugs (From admission, onward)    None        Hyperglycemia/Diabetes Medications:   Antihyperglycemics (From admission, onward)    Start     Stop Route Frequency Ordered    12/15/18 1600  insulin aspart U-100 pen 4 Units      -- SubQ Every 24 hours (non-standard times) 12/14/18 1721    12/15/18 1200  insulin aspart U-100 pen 4 Units      -- SubQ Every 24 hours (non-standard times) 12/14/18 1721    12/15/18 0800  insulin aspart U-100 pen 4 Units      -- SubQ Every 24 hours (non-standard times) 12/14/18 1721    12/15/18 0400  insulin aspart U-100 pen 4 Units      -- SubQ Every 24 hours (non-standard times) 12/14/18 1721    12/15/18 0000  insulin aspart U-100 pen 4 Units      -- SubQ Every 24 hours (non-standard times) 12/14/18 1721 12/14/18 2000  insulin aspart U-100 pen 4 Units      -- SubQ Every 24 hours (non-standard times) 12/14/18 1721 12/05/18 0900  insulin detemir U-100 pen 5 Units      -- SubQ 2 times daily 12/05/18 0717    11/26/18 0947  insulin aspart U-100 pen 0-5 Units      -- SubQ Every 4 hours PRN 11/26/18 0848

## 2018-12-15 NOTE — PROGRESS NOTES
CRRT:    Patient  is supposed to have CRRT for 10 hours but only had 6 hours due to hypotension. Rinsed back by primary nurse @1am.

## 2018-12-15 NOTE — PROGRESS NOTES
Comfort care discussed with Dr. Angela and agreed upon with  and family. Will transition to comfort care when children arrive later this afternoon.

## 2018-12-15 NOTE — PLAN OF CARE
Pt opens eyes spontaneously and follows commands intermittently. Afib with HR 120s-130s. Vent settings changed as per previous notes upon shift. UF rate decreased to 200- pt hypotensive. Nephrology notified regarding status of CRRt. 750cc albumin given total this shift. Diltiazem drip started as per MD orders. Flexiseal with adequate output. Pt remains anuric. TF at goal of 40 cc/hr. Pain medication given this shift. Repositioned q2h per wedge and pillows. No new skin breakdown noted. Spouse updated on plan of care. MD notified regarding AM labs- no new orders placed. See flowsheet for detailed assessment.

## 2018-12-15 NOTE — ASSESSMENT & PLAN NOTE
68yo F liver-listed, stepped up on 11/21 for respiratory failure with worsening respiratory acidosis. Trached 12/05.      Neuro:   -Pain control: prn dilaudid while intubated  -Sedation off   -continue lactulose/rifaximin  -Following commands     Pulmonary:   -intubated on arrival to SICU 11/21, failed SBT  - s/p trach 12/5  -ABGs daily and prn  -duonebs prn  -continue levoalbuterol  -working on weaning vent settings during day  -daily CXR stable appearing with diffuse bilateral opacification     Cardiac:  -SBP > 100 goal  -JACINDA 11/20 - elevated PA pressures   -Heart failure following, appreciate recs   -dilt gtt as needed for A-Fib, currently on 30mg dilt po q8hr       Renal:   -Mendoza in place  -Continue to monitor UOP: oliguria/anuria    -CRRT per nephrology  -Nephrology following, appreciate recommendations        Fluids/Electrolytes/Nutrition/GI:   -Continue TFs  -replace lytes PRN  -continue lactulose   -bowel regimen     Hematology/Oncology:  -Monitor CBC -- Hgb 9.6/29.1 (post-transfusion)-->8.8/27.7  -continue to monitor. Transfuse as needed per transplant   -1 U pRBC (12/12)      Infectious Disease:   -Afebrile  -BCx NG  -RespCx (tracheal aspirate) Klebsiella - susceptibility pending  -Trend WBC 18.7-->13.9  -Abx: none    Endocrine:  Endocrinology following   BG goal 140-180  continue Levemir 5 units BID.   novolog 4 units every 4 hours while on TF at 40; hold if TF held or rate decreased  BG monitoring every 4 hours and low dose correction scale.        PPx: famotidine, SQH     Dispo:  -Pending placement at hospice vs LTACH. Appreciate SW/CM assistance.  -Primary: Transplant

## 2018-12-15 NOTE — PROGRESS NOTES
[facility]  Critical Care - Surgery  Progress Note    Patient Name: Krystal Hobson  MRN: [id number]  Admission Date: 11/10/2018  Hospital Length of Stay: 35 days  Code Status: Full Code  Attending Provider: Gera Bryan MD  Primary Care Provider: Courtney Joe MD   Principal Problem: Hepatic encephalopathy    Subjective:     Hospital/ICU Course:  No notes on file    Interval History/Significant Events:   NAEON. Became tachypneic on PAV+ yesterday during the day and switched to spontaneous with brief improvement. Placed on AC/VC+ for comfort later that evening. Pt developed hypotension and given 750cc albumin. UF rate decreased to 200 then subsequently turned off due to BP. Pt also went into afib RVR necessitating being placed back on diltiazem drip.       Follow-up For: Procedure(s) (LRB):  CREATION, TRACHEOSTOMY (N/A)        Objective:     Vital Signs (Most Recent):  Temp: 99.2 °F (37.3 °C) (12/15/18 0705)  Pulse: 90 (12/15/18 0835)  Resp: (!) 23 (12/15/18 0835)  BP: (!) 103/51 (12/15/18 0715)  SpO2: 97 % (12/15/18 0835) Vital Signs (24h Range):  Temp:  [97.6 °F (36.4 °C)-99.2 °F (37.3 °C)] 99.2 °F (37.3 °C)  Pulse:  [] 90  Resp:  [20-43] 23  SpO2:  [90 %-100 %] 97 %  BP: ()/(44-55) 103/51  Arterial Line BP: ()/(25-53) 110/27     Weight: 55.5 kg (122 lb 5.7 oz)  Body mass index is 25.57 kg/m².      Intake/Output Summary (Last 24 hours) at 12/15/2018 0853  Last data filed at 12/15/2018 0700  Gross per 24 hour   Intake 1981.69 ml   Output 1205 ml   Net 776.69 ml       Physical Exam    Constitutional: She appears well-developed and well-nourished.   HENT:   Head: Normocephalic and atraumatic.   Eyes: Pupils are equal, round, and reactive to light.   Spontaneously opens eyes  Jaundice+   Neck: Normal range of motion. No JVD present. No tracheal deviation present.   Cardiovascular: Normal rate, regular rhythm and normal heart sounds.   No murmur heard.  Pulmonary/Chest: She has no  wheezes. She has no rales.   On vent, trach in place   Abdominal: Soft. Bowel sounds are normal. She exhibits no distension. There is no tenderness.   Umbilical hernia   Musculoskeletal: Normal range of motion. She exhibits no edema.   Neurological: She is alert.     Vents:  Vent Mode: PAV+ (12/15/18 0835)  Ventilator Initiated: Yes (11/21/18 1839)  Set Rate: 12 bmp (12/15/18 0835)  Vt Set: 300 mL (12/15/18 0835)  Pressure Support: 10 cmH20 (12/15/18 0835)  PEEP/CPAP: 5 cmH20 (12/15/18 0835)  Oxygen Concentration (%): 40 (12/15/18 0835)  Peak Airway Pressure: 27 cmH2O (12/15/18 0835)  Plateau Pressure: 19 cmH20 (12/15/18 0835)  Total Ve: 9.18 mL (12/15/18 0835)  F/VT Ratio<105 (RSBI): (!) 93.12 (12/15/18 0835)    Lines/Drains/Airways     Central Venous Catheter Line                 Trialysis (Dialysis) Catheter 11/21/18 1851 left internal jugular 23 days          Drain                 NG/OG Tube 12/07/18 2141 Epps sump 12 Fr. Right nostril 7 days         Rectal Tube 12/13/18 1005 fecal management system 1 day          Airway                 Surgical Airway 12/05/18 1431 Shiley 9 days          Arterial Line                 Arterial Line 11/21/18 1900 Right Radial 23 days                Significant Labs:    CBC/Anemia Profile:  Recent Labs   Lab 12/14/18  1654 12/14/18  2312 12/15/18  0341   WBC 17.98* 12.81* 13.90*   HGB 8.4* 7.3* 7.3*   HCT 27.4* 23.1* 23.0*   PLT 73* 51* 57*   MCV 98 100* 100*   RDW 25.3* 24.9* 25.0*        Chemistries:  Recent Labs   Lab 12/14/18  0409 12/14/18  2217 12/14/18  2312 12/15/18  0341     137 139  139  139 140 138  138  138  138   K 3.5  3.5 3.7  3.7  3.7 3.7 3.8  3.8  3.8  3.8     107 110  110  110 108 107  107  107  107   CO2 22*  22* 23  23  23 22* 20*  20*  20*  20*   BUN 19  19 13  13  13 12 16  16  16  16   CREATININE 1.1  1.1 0.8  0.8  0.8 0.7 0.9  0.9  0.9  0.9   CALCIUM 7.8*  7.8* 7.8*  7.8*  7.8* 7.7* 8.4*  8.4*  8.4*   8.4*   ALBUMIN 1.5*  1.5* 1.8*  1.8*  1.8* 2.2* 2.4*  2.4*  2.4*  2.4*   PROT 5.3*  --  5.3* 5.4*   BILITOT 11.9*  --  10.4* 12.3*   ALKPHOS 268*  --  217* 210*   ALT 73*  --  59* 57*   AST 95*  --  74* 74*   MG 2.3 1.8  1.8  1.8 1.8 2.1  2.1  2.1   PHOS 3.0  3.0 2.2*  2.2*  2.2* 2.0* 2.4*  2.4*  2.4*  2.4*       All pertinent labs within the past 24 hours have been reviewed.    Significant Imaging:  I have reviewed and interpreted all pertinent imaging results/findings within the past 24 hours.    Assessment/Plan:     Decompensated hepatic cirrhosis    68yo F liver-listed, stepped up on 11/21 for respiratory failure with worsening respiratory acidosis. Trached 12/05.      Neuro:   -Pain control: prn dilaudid while intubated  -Sedation off   -continue lactulose/rifaximin  -Following commands     Pulmonary:   -intubated on arrival to SICU 11/21, failed SBT  - s/p trach 12/5  -ABGs daily and prn  -duonebs prn  -continue levoalbuterol  -working on weaning vent settings during day  -daily CXR stable appearing with diffuse bilateral opacification     Cardiac:  -SBP > 100 goal  -JACINDA 11/20 - elevated PA pressures   -Heart failure following, appreciate recs   -dilt gtt as needed for A-Fib, currently on 30mg dilt po q8hr       Renal:   -Mendoza in place  -Continue to monitor UOP: oliguria/anuria    -CRRT per nephrology  -Nephrology following, appreciate recommendations        Fluids/Electrolytes/Nutrition/GI:   -Continue TFs  -replace lytes PRN  -continue lactulose   -bowel regimen     Hematology/Oncology:  -Monitor CBC -- Hgb 9.6/29.1 (post-transfusion)-->8.8/27.7  -continue to monitor. Transfuse as needed per transplant   -1 U pRBC (12/12)      Infectious Disease:   -Afebrile  -BCx NG  -RespCx (tracheal aspirate) Klebsiella - susceptibility pending  -Trend WBC 18.7-->13.9  -Abx: none    Endocrine:  Endocrinology following   BG goal 140-180  continue Levemir 5 units BID.   novolog 4 units every 4 hours while  on TF at 40; hold if TF held or rate decreased  BG monitoring every 4 hours and low dose correction scale.        PPx: famotidine, SQH     Dispo:  -Pending placement at hospice vs LTACH. Appreciate SW/CM assistance.  -Primary: Transplant           Critical care was time spent personally by me on the following activities: development of treatment plan with patient or surrogate and bedside caregivers, discussions with consultants, evaluation of patient's response to treatment, examination of patient, ordering and performing treatments and interventions, ordering and review of laboratory studies, ordering and review of radiographic studies, pulse oximetry, re-evaluation of patient's condition.  This critical care time did not overlap with that of any other provider or involve time for any procedures.       Jose Wallace MD  Critical Care - Surgery  [facility]

## 2018-12-15 NOTE — SUBJECTIVE & OBJECTIVE
Interval History:   NAEON. Hemodynamically unstable overnight and AFib with RVR Diltiazem gtt started. SLED was stopped before completing treatmentsecondary to hemodynamically unstable.  CVP 14. Continues with mechanical ventilation. Still not making urine. Fluid balance net gain 816 ml plus insensible losses she would be about 500 ml positive.    Review of patient's allergies indicates:  No Known Allergies  Current Facility-Administered Medications   Medication Frequency    0.9%  NaCl infusion (CRRT USE ONLY) Continuous    0.9%  NaCl infusion (for blood administration) Q24H PRN    acetaminophen tablet 650 mg Q8H PRN    dextrose 50% injection 12.5 g PRN    diltiaZEM 125 mg in D5W 125 mL infusion Continuous    diltiaZEM tablet 30 mg Q8H    famotidine tablet 20 mg QHS    fluconazole tablet 200 mg Daily    glucagon (human recombinant) injection 1 mg PRN    guaifenesin 100 mg/5 ml syrup 200 mg Q6H    heparin (porcine) injection 5,000 Units Q12H    HYDROmorphone injection 0.2 mg Q3H PRN    insulin aspart U-100 pen 0-5 Units Q4H PRN    [START ON 12/16/2018] insulin aspart U-100 pen 5 Units Q24H    [START ON 12/16/2018] insulin aspart U-100 pen 5 Units Q24H    insulin aspart U-100 pen 5 Units Q24H    insulin aspart U-100 pen 5 Units Q24H    insulin aspart U-100 pen 5 Units Q24H    insulin aspart U-100 pen 5 Units Q24H    insulin detemir U-100 pen 5 Units BID    k phos di & mono-sod phos mono 250 mg tablet 2 tablet TID PRN    lactulose 10 gram/15 mL solution (enema) 200 g TID PRN    lactulose 20 gram/30 mL solution Soln 15 g TID    lactulose 20 gram/30 mL solution Soln 30 g Q6H PRN    magnesium sulfate 2g in water 50mL IVPB (premix) PRN    metoprolol injection 2.5 mg Q5 Min PRN    miconazole nitrate 2% ointment BID    ondansetron disintegrating tablet 8 mg Q8H PRN    rifAXIMin tablet 550 mg BID    simethicone chewable tablet 80 mg TID PRN    sodium chloride 0.9% flush 3 mL PRN       Objective:      Vital Signs (Most Recent):  Temp: 99.2 °F (37.3 °C) (12/15/18 0705)  Pulse: 86 (12/15/18 0930)  Resp: (!) 22 (12/15/18 0930)  BP: (!) 99/48 (12/15/18 0900)  SpO2: 97 % (12/15/18 0930)  O2 Device (Oxygen Therapy): ventilator (12/15/18 0900) Vital Signs (24h Range):  Temp:  [97.6 °F (36.4 °C)-99.2 °F (37.3 °C)] 99.2 °F (37.3 °C)  Pulse:  [] 86  Resp:  [20-43] 22  SpO2:  [90 %-100 %] 97 %  BP: ()/(44-55) 99/48  Arterial Line BP: ()/(25-53) 98/25     Weight: 55.5 kg (122 lb 5.7 oz) (12/04/18 0353)  Body mass index is 25.57 kg/m².  Body surface area is 1.51 meters squared.    I/O last 3 completed shifts:  In: 4181.7 [I.V.:2331.7; NG/GT:1850]  Out: 4180 [Other:3380; Stool:800]    Physical Exam   Constitutional:   Sitting in chair with trach in place and Vent connected to Trach   HENT:   Head: Normocephalic.   Right Ear: External ear normal.   Left Ear: External ear normal.   Nose: Nose normal.   Eyes: Right eye exhibits no discharge. Left eye exhibits no discharge.   Neck:   Tracheostomy    Cardiovascular: An irregular rhythm present.   Murmur heard.  Pulmonary/Chest: She has rales (Zambrano on the Left > right).   Musculoskeletal: She exhibits no edema.   Skin: Skin is warm.       Significant Labs:  ABGs:    Recent Labs   Lab 12/15/18  0007   PH 7.407   PCO2 32.7*   HCO3 20.6*   POCSATURATED 98   BE -4     BMP:   Recent Labs   Lab 12/15/18  0341   *  286*  286*  286*     107  107  107   CO2 20*  20*  20*  20*   BUN 16  16  16  16   CREATININE 0.9  0.9  0.9  0.9   CALCIUM 8.4*  8.4*  8.4*  8.4*   MG 2.1  2.1  2.1     CBC:   Recent Labs   Lab 12/15/18  0341   WBC 13.90*   RBC 2.29*   HGB 7.3*   HCT 23.0*   PLT 57*   *   MCH 31.9*   MCHC 31.7*     CMP:   Recent Labs   Lab 12/15/18  0341   *  286*  286*  286*   CALCIUM 8.4*  8.4*  8.4*  8.4*   ALBUMIN 2.4*  2.4*  2.4*  2.4*   PROT 5.4*     138  138  138   K 3.8  3.8  3.8  3.8   CO2 20*   20*  20*  20*     107  107  107   BUN 16  16  16  16   CREATININE 0.9  0.9  0.9  0.9   ALKPHOS 210*   ALT 57*   AST 74*   BILITOT 12.3*     All labs within the past 24 hours have been reviewed.

## 2018-12-15 NOTE — PROGRESS NOTES
Pt became tachypneic while taking the bath. RR in the 40s, VT dropped to 200s. Switched pt to AC/VC at documented settings. Trach suctioned. Will continue to monitor. Dr. Anglea notified.

## 2018-12-15 NOTE — PROGRESS NOTES
[facility]  Nephrology  Progress Note    Patient Name: Krystal Hobson  MRN: [id number]  Admission Date: 11/10/2018  Hospital Length of Stay: 35 days  Attending Provider: Gera Bryan MD   Primary Care Physician: Courtney Joe MD  Principal Problem:Hepatic encephalopathy    Subjective:     HPI: Reason for consult: Diuresis in setting of PAP46, pulmonary edema, HRS    Ms. 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  - She presented to [facility] [place] under liver transplant team due to encephalopathy and new onset ENZO.   - Per the , she was extremely disoriented @ OSH and her ammonia reached as high as 200, and her Cr level increased to 2.0 She also went into A fib with RVR and was started on a diltiazem gtt at OSH.   - On arrival at [facility] she was disoriented and started on lactulose with noted improvement. During her current admission on CXR they found a concern for HCAP and she was started on broad spectrum ABx and then shifted her on Moxifloxacin 7 D course. On 11/20/2018 found on CXR a concern for Lt lung middle lobe consolidation and started on vancomycin and zosyn.  - Renal fxn baseline 1.6 at admit but trending up    Interval History:   NAEON. Hemodynamically unstable overnight and AFib with RVR Diltiazem gtt started. SLED was stopped before completing treatmentsecondary to hemodynamically unstable.  CVP 14. Continues with mechanical ventilation. Still not making urine. Fluid balance net gain 816 ml plus insensible losses she would be about 500 ml positive.    Review of patient's allergies indicates:  No Known Allergies  Current Facility-Administered Medications   Medication Frequency    0.9%  NaCl infusion (CRRT USE ONLY) Continuous    0.9%  NaCl infusion (for blood administration) Q24H PRN    acetaminophen tablet 650 mg Q8H PRN    dextrose 50% injection 12.5 g PRN    diltiaZEM 125 mg in D5W 125 mL infusion Continuous     diltiaZEM tablet 30 mg Q8H    famotidine tablet 20 mg QHS    fluconazole tablet 200 mg Daily    glucagon (human recombinant) injection 1 mg PRN    guaifenesin 100 mg/5 ml syrup 200 mg Q6H    heparin (porcine) injection 5,000 Units Q12H    HYDROmorphone injection 0.2 mg Q3H PRN    insulin aspart U-100 pen 0-5 Units Q4H PRN    [START ON 12/16/2018] insulin aspart U-100 pen 5 Units Q24H    [START ON 12/16/2018] insulin aspart U-100 pen 5 Units Q24H    insulin aspart U-100 pen 5 Units Q24H    insulin aspart U-100 pen 5 Units Q24H    insulin aspart U-100 pen 5 Units Q24H    insulin aspart U-100 pen 5 Units Q24H    insulin detemir U-100 pen 5 Units BID    k phos di & mono-sod phos mono 250 mg tablet 2 tablet TID PRN    lactulose 10 gram/15 mL solution (enema) 200 g TID PRN    lactulose 20 gram/30 mL solution Soln 15 g TID    lactulose 20 gram/30 mL solution Soln 30 g Q6H PRN    magnesium sulfate 2g in water 50mL IVPB (premix) PRN    metoprolol injection 2.5 mg Q5 Min PRN    miconazole nitrate 2% ointment BID    ondansetron disintegrating tablet 8 mg Q8H PRN    rifAXIMin tablet 550 mg BID    simethicone chewable tablet 80 mg TID PRN    sodium chloride 0.9% flush 3 mL PRN       Objective:     Vital Signs (Most Recent):  Temp: 99.2 °F (37.3 °C) (12/15/18 0705)  Pulse: 86 (12/15/18 0930)  Resp: (!) 22 (12/15/18 0930)  BP: (!) 99/48 (12/15/18 0900)  SpO2: 97 % (12/15/18 0930)  O2 Device (Oxygen Therapy): ventilator (12/15/18 0900) Vital Signs (24h Range):  Temp:  [97.6 °F (36.4 °C)-99.2 °F (37.3 °C)] 99.2 °F (37.3 °C)  Pulse:  [] 86  Resp:  [20-43] 22  SpO2:  [90 %-100 %] 97 %  BP: ()/(44-55) 99/48  Arterial Line BP: ()/(25-53) 98/25     Weight: 55.5 kg (122 lb 5.7 oz) (12/04/18 0353)  Body mass index is 25.57 kg/m².  Body surface area is 1.51 meters squared.    I/O last 3 completed shifts:  In: 4181.7 [I.V.:2331.7; NG/GT:1850]  Out: 4180 [Other:3380; Stool:800]    Physical Exam    Constitutional:   Sitting in chair with trach in place and Vent connected to Trach   HENT:   Head: Normocephalic.   Right Ear: External ear normal.   Left Ear: External ear normal.   Nose: Nose normal.   Eyes: Right eye exhibits no discharge. Left eye exhibits no discharge.   Neck:   Tracheostomy    Cardiovascular: An irregular rhythm present.   Murmur heard.  Pulmonary/Chest: She has rales (Zambrano on the Left > right).   Musculoskeletal: She exhibits no edema.   Skin: Skin is warm.       Significant Labs:  ABGs:    Recent Labs   Lab 12/15/18  0007   PH 7.407   PCO2 32.7*   HCO3 20.6*   POCSATURATED 98   BE -4     BMP:   Recent Labs   Lab 12/15/18  0341   *  286*  286*  286*     107  107  107   CO2 20*  20*  20*  20*   BUN 16  16  16  16   CREATININE 0.9  0.9  0.9  0.9   CALCIUM 8.4*  8.4*  8.4*  8.4*   MG 2.1  2.1  2.1     CBC:   Recent Labs   Lab 12/15/18  0341   WBC 13.90*   RBC 2.29*   HGB 7.3*   HCT 23.0*   PLT 57*   *   MCH 31.9*   MCHC 31.7*     CMP:   Recent Labs   Lab 12/15/18  0341   *  286*  286*  286*   CALCIUM 8.4*  8.4*  8.4*  8.4*   ALBUMIN 2.4*  2.4*  2.4*  2.4*   PROT 5.4*     138  138  138   K 3.8  3.8  3.8  3.8   CO2 20*  20*  20*  20*     107  107  107   BUN 16  16  16  16   CREATININE 0.9  0.9  0.9  0.9   ALKPHOS 210*   ALT 57*   AST 74*   BILITOT 12.3*     All labs within the past 24 hours have been reviewed.     Assessment/Plan:     Acute kidney injury superimposed on CKD    Cryptogenic cirrhosis awaiting liver xplant, presented for AMS and found to have ENZO on CKDIIIb. SLED started last weekend with intermittent AF+RVR+hypotensive episodes controlled with bb/cardizem gtt adequately. Now on CRRT with improving overall fluid status.    Plan:  - no evidence of renal recovery dialysis depenedant  - Unstable overnight and did not tolerated SLED treatment stopped early   - If volume removal is desire by primary  team will need pressors for SLED/SCUF  - CVP today at 14  - Continue with mechanical ventilation stable O2 needs  - Afib with RVR on Diltiazem tolerating weaning  - Continues to be anuric, will need bladder scans BID to evaluate for any sign           Thank you for your consult. I will follow-up with patient. Please contact us if you have any additional questions.    Haroldo Van MD  Nephrology  [facility]

## 2018-12-15 NOTE — SUBJECTIVE & OBJECTIVE
Interval History:  Unable to tolerate dialysis overnight given hypotension, received total of 750 albumin throughout the night. Required re-starting of dilt gtt to control Afib as HR was maintaining in the 120s-130s. Vent settings required adjustment to VC+ mode.       Scheduled Meds:   diltiaZEM  30 mg Oral Q8H    famotidine  20 mg Oral QHS    fluconazole  200 mg Oral Daily    guaifenesin 100 mg/5 ml  200 mg Per OG tube Q6H    heparin (porcine)  5,000 Units Subcutaneous Q12H    [START ON 12/16/2018] insulin aspart U-100  5 Units Subcutaneous Q24H    [START ON 12/16/2018] insulin aspart U-100  5 Units Subcutaneous Q24H    insulin aspart U-100  5 Units Subcutaneous Q24H    insulin aspart U-100  5 Units Subcutaneous Q24H    insulin aspart U-100  5 Units Subcutaneous Q24H    insulin aspart U-100  5 Units Subcutaneous Q24H    insulin detemir U-100  5 Units Subcutaneous BID    lactulose  15 g Per OG tube TID    miconazole nitrate 2%   Topical (Top) BID    rifAXImin  550 mg Oral BID     Continuous Infusions:   sodium chloride 0.9% 200 mL/hr at 12/14/18 2200    dilTIAZem 2.5 mg/hr (12/15/18 1300)     PRN Meds:sodium chloride, acetaminophen, dextrose 50%, glucagon (human recombinant), HYDROmorphone, insulin aspart U-100, k phos di & mono-sod phos mono, lactulose, lactulose, magnesium sulfate IVPB, metoprolol, ondansetron, simethicone, sodium chloride 0.9%    Review of Systems   Unable to perform ROS: Intubated   All other systems reviewed and are negative.    Objective:     Vital Signs (Most Recent):  Temp: 99 °F (37.2 °C) (12/15/18 1100)  Pulse: 91 (12/15/18 1315)  Resp: (!) 29 (12/15/18 1315)  BP: (!) 109/53 (12/15/18 1300)  SpO2: 98 % (12/15/18 1315) Vital Signs (24h Range):  Temp:  [97.8 °F (36.6 °C)-99.2 °F (37.3 °C)] 99 °F (37.2 °C)  Pulse:  [] 91  Resp:  [20-43] 29  SpO2:  [90 %-100 %] 98 %  BP: ()/(44-55) 109/53  Arterial Line BP: ()/(24-50) 109/30     Weight: 55.5 kg (122 lb 5.7  oz)  Body mass index is 25.57 kg/m².    Intake/Output - Last 3 Shifts       12/13 0700 - 12/14 0659 12/14 0700 - 12/15 0659 12/15 0700 - 12/16 0659    I.V. (mL/kg) 2096.6 (37.8) 991.7 (17.9)     Blood       NG/GT 1550 1110 420    Total Intake(mL/kg) 3646.6 (65.7) 2101.7 (37.9) 420 (7.6)    Other 3133 805     Stool 800 400     Total Output 3933 1205     Net -286.4 +896.7 +420           Stool Occurrence 2 x            Physical Exam   Constitutional: She appears well-developed and well-nourished.   HENT:   Head: Normocephalic and atraumatic.   Eyes: Pupils are equal, round, and reactive to light.   Spontaneously opens eyes, though now more sluggish  Jaundice+   Neck: Normal range of motion. No JVD present. No tracheal deviation present.   Cardiovascular: Normal rate, regular rhythm and normal heart sounds.   No murmur heard.  Pulmonary/Chest: She has no wheezes. She has no rales.   On vent, trach in place   Abdominal: Soft. Bowel sounds are normal. She exhibits no distension. There is no tenderness.   Umbilical hernia   Musculoskeletal: Normal range of motion. She exhibits no edema.   Neurological: She is alert.       Laboratory:  Immunosuppressants     None        CBC:   Recent Labs   Lab 12/15/18  0341   WBC 13.90*   RBC 2.29*   HGB 7.3*   HCT 23.0*   PLT 57*   *   MCH 31.9*   MCHC 31.7*     CMP:   Recent Labs   Lab 12/15/18  0341   *  286*  286*  286*   CALCIUM 8.4*  8.4*  8.4*  8.4*   ALBUMIN 2.4*  2.4*  2.4*  2.4*   PROT 5.4*     138  138  138   K 3.8  3.8  3.8  3.8   CO2 20*  20*  20*  20*     107  107  107   BUN 16  16  16  16   CREATININE 0.9  0.9  0.9  0.9   ALKPHOS 210*   ALT 57*   AST 74*   BILITOT 12.3*     Coagulation:   Recent Labs   Lab 12/15/18  0341   INR 2.0*     ABGs:   Recent Labs   Lab 12/15/18  0007   PH 7.407   PCO2 32.7*   HCO3 20.6*   POCSATURATED 98   BE -4     Labs within the past 24 hours have been reviewed.    Diagnostic Results:  I have  personally reviewed all pertinent imaging studies.

## 2018-12-15 NOTE — ASSESSMENT & PLAN NOTE
Avoid insulin stacking and hypoglycemia.  CRRT per nephrology   Lab Results   Component Value Date    CREATININE 0.9 12/15/2018    CREATININE 0.9 12/15/2018    CREATININE 0.9 12/15/2018    CREATININE 0.9 12/15/2018

## 2018-12-15 NOTE — PLAN OF CARE
Vent settings changed to a/c vc + per Dr. Orozco. Pt rinsed  Back and crrt stopped- pt hypotensive. Afebrile. afib with HR 120s-130s. 750cc albumin given total. Diltiazem drip started. See flowsheet for detailed assessment.

## 2018-12-15 NOTE — SUBJECTIVE & OBJECTIVE
Interval History/Significant Events:   BRISEYDAON. Became tachypneic on PAV+ yesterday during the day and switched to spontaneous with brief improvement. Placed on AC/VC+ for comfort later that evening. Pt developed hypotension and given 750cc albumin. UF rate decreased to 200 then subsequently turned off due to BP. Pt also went into afib RVR necessitating being placed back on diltiazem drip.       Follow-up For: Procedure(s) (LRB):  CREATION, TRACHEOSTOMY (N/A)        Objective:     Vital Signs (Most Recent):  Temp: 99.2 °F (37.3 °C) (12/15/18 0705)  Pulse: 90 (12/15/18 0835)  Resp: (!) 23 (12/15/18 0835)  BP: (!) 103/51 (12/15/18 0715)  SpO2: 97 % (12/15/18 0835) Vital Signs (24h Range):  Temp:  [97.6 °F (36.4 °C)-99.2 °F (37.3 °C)] 99.2 °F (37.3 °C)  Pulse:  [] 90  Resp:  [20-43] 23  SpO2:  [90 %-100 %] 97 %  BP: ()/(44-55) 103/51  Arterial Line BP: ()/(25-53) 110/27     Weight: 55.5 kg (122 lb 5.7 oz)  Body mass index is 25.57 kg/m².      Intake/Output Summary (Last 24 hours) at 12/15/2018 0853  Last data filed at 12/15/2018 0700  Gross per 24 hour   Intake 1981.69 ml   Output 1205 ml   Net 776.69 ml       Physical Exam    Constitutional: She appears well-developed and well-nourished.   HENT:   Head: Normocephalic and atraumatic.   Eyes: Pupils are equal, round, and reactive to light.   Spontaneously opens eyes  Jaundice+   Neck: Normal range of motion. No JVD present. No tracheal deviation present.   Cardiovascular: Normal rate, regular rhythm and normal heart sounds.   No murmur heard.  Pulmonary/Chest: She has no wheezes. She has no rales.   On vent, trach in place   Abdominal: Soft. Bowel sounds are normal. She exhibits no distension. There is no tenderness.   Umbilical hernia   Musculoskeletal: Normal range of motion. She exhibits no edema.   Neurological: She is alert.     Vents:  Vent Mode: PAV+ (12/15/18 0835)  Ventilator Initiated: Yes (11/21/18 1839)  Set Rate: 12 bmp (12/15/18 0835)  Vt  Set: 300 mL (12/15/18 0835)  Pressure Support: 10 cmH20 (12/15/18 0835)  PEEP/CPAP: 5 cmH20 (12/15/18 0835)  Oxygen Concentration (%): 40 (12/15/18 0835)  Peak Airway Pressure: 27 cmH2O (12/15/18 0835)  Plateau Pressure: 19 cmH20 (12/15/18 0835)  Total Ve: 9.18 mL (12/15/18 0835)  F/VT Ratio<105 (RSBI): (!) 93.12 (12/15/18 0835)    Lines/Drains/Airways     Central Venous Catheter Line                 Trialysis (Dialysis) Catheter 11/21/18 1851 left internal jugular 23 days          Drain                 NG/OG Tube 12/07/18 2141 [place] sump 12 Fr. Right nostril 7 days         Rectal Tube 12/13/18 1005 fecal management system 1 day          Airway                 Surgical Airway 12/05/18 1431 [place] 9 days          Arterial Line                 Arterial Line 11/21/18 1900 Right Radial 23 days                Significant Labs:    CBC/Anemia Profile:  Recent Labs   Lab 12/14/18  1654 12/14/18  2312 12/15/18  0341   WBC 17.98* 12.81* 13.90*   HGB 8.4* 7.3* 7.3*   HCT 27.4* 23.1* 23.0*   PLT 73* 51* 57*   MCV 98 100* 100*   RDW 25.3* 24.9* 25.0*        Chemistries:  Recent Labs   Lab 12/14/18  0409 12/14/18  2217 12/14/18  2312 12/15/18  0341     137 139  139  139 140 138  138  138  138   K 3.5  3.5 3.7  3.7  3.7 3.7 3.8  3.8  3.8  3.8     107 110  110  110 108 107  107  107  107   CO2 22*  22* 23  23  23 22* 20*  20*  20*  20*   BUN 19  19 13  13  13 12 16  16  16  16   CREATININE 1.1  1.1 0.8  0.8  0.8 0.7 0.9  0.9  0.9  0.9   CALCIUM 7.8*  7.8* 7.8*  7.8*  7.8* 7.7* 8.4*  8.4*  8.4*  8.4*   ALBUMIN 1.5*  1.5* 1.8*  1.8*  1.8* 2.2* 2.4*  2.4*  2.4*  2.4*   PROT 5.3*  --  5.3* 5.4*   BILITOT 11.9*  --  10.4* 12.3*   ALKPHOS 268*  --  217* 210*   ALT 73*  --  59* 57*   AST 95*  --  74* 74*   MG 2.3 1.8  1.8  1.8 1.8 2.1  2.1  2.1   PHOS 3.0  3.0 2.2*  2.2*  2.2* 2.0* 2.4*  2.4*  2.4*  2.4*       All pertinent labs within the past 24 hours have been  reviewed.    Significant Imaging:  I have reviewed and interpreted all pertinent imaging results/findings within the past 24 hours.

## 2018-12-15 NOTE — CARE UPDATE
Patient's clinical status discussed with family. It appears from her labs and vital signs that she continues to decline and it seems unlikely she would be able to tolerate transfer to facility in [place] even if one were to become available. Prognosis discussed with  and sisters who are in agreement that they would like to make the patient a DNR if she were to experience a cardiac event. In further discussion with the family, given prognosis and feeling that she is currently suffering they would like to proceed with transition to comfort care measures later this evening after the rest of her family arrives.     Constance Angela MD  PGY-2 General Surgery   [phone]

## 2018-12-15 NOTE — ASSESSMENT & PLAN NOTE
Cryptogenic cirrhosis awaiting liver xplant, presented for AMS and found to have ENZO on CKDIIIb. SLED started last weekend with intermittent AF+RVR+hypotensive episodes controlled with bb/cardizem gtt adequately. Now on CRRT with improving overall fluid status.    Plan:  - no evidence of renal recovery dialysis depenedant  - Unstable overnight and did not tolerated SLED treatment stopped early   - If volume removal is desire by primary team will need pressors for SLED/SCUF  - CVP today at 14  - Continue with mechanical ventilation stable O2 needs  - Afib with RVR on Diltiazem tolerating weaning  - Continues to be anuric, will need bladder scans BID to evaluate for any sign

## 2018-12-15 NOTE — SUBJECTIVE & OBJECTIVE
Interval History: ***    ROS  Medications:  Continuous Infusions:   sodium chloride 0.9% 200 mL/hr at 12/14/18 2200    dilTIAZem 2.5 mg/hr (12/15/18 1000)     Scheduled Meds:   diltiaZEM  30 mg Oral Q8H    famotidine  20 mg Oral QHS    fluconazole  200 mg Oral Daily    guaifenesin 100 mg/5 ml  200 mg Per OG tube Q6H    heparin (porcine)  5,000 Units Subcutaneous Q12H    [START ON 12/16/2018] insulin aspart U-100  5 Units Subcutaneous Q24H    [START ON 12/16/2018] insulin aspart U-100  5 Units Subcutaneous Q24H    insulin aspart U-100  5 Units Subcutaneous Q24H    insulin aspart U-100  5 Units Subcutaneous Q24H    insulin aspart U-100  5 Units Subcutaneous Q24H    insulin aspart U-100  5 Units Subcutaneous Q24H    insulin detemir U-100  5 Units Subcutaneous BID    lactulose  15 g Per OG tube TID    miconazole nitrate 2%   Topical (Top) BID    rifAXImin  550 mg Oral BID     PRN Meds:sodium chloride, acetaminophen, dextrose 50%, glucagon (human recombinant), HYDROmorphone, insulin aspart U-100, k phos di & mono-sod phos mono, lactulose, lactulose, magnesium sulfate IVPB, metoprolol, ondansetron, simethicone, sodium chloride 0.9%     Objective:     Vital Signs (Most Recent):  Temp: 99.2 °F (37.3 °C) (12/15/18 0705)  Pulse: 91 (12/15/18 1100)  Resp: (!) 26 (12/15/18 1100)  BP: (!) 95/45 (12/15/18 1000)  SpO2: 98 % (12/15/18 1100) Vital Signs (24h Range):  Temp:  [97.8 °F (36.6 °C)-99.2 °F (37.3 °C)] 99.2 °F (37.3 °C)  Pulse:  [] 91  Resp:  [20-43] 26  SpO2:  [90 %-100 %] 98 %  BP: ()/(44-55) 95/45  Arterial Line BP: ()/(24-53) 110/27     Weight: 55.5 kg (122 lb 5.7 oz)  Body mass index is 25.57 kg/m².    SpO2: 98 %  O2 Device (Oxygen Therapy): ventilator    Intake/Output - Last 3 Shifts       12/13 0700 - 12/14 0659 12/14 0700 - 12/15 0659 12/15 0700 - 12/16 0659    I.V. (mL/kg) 2096.6 (37.8) 991.7 (17.9)     Blood       NG/GT 1550 1110 310    Total Intake(mL/kg) 3646.6 (65.7) 2101.7  (37.9) 310 (5.6)    Other 3133 805     Stool 800 400     Total Output 3933 1205     Net -286.4 +896.7 +310           Stool Occurrence 2 x            Lines/Drains/Airways     Central Venous Catheter Line                 Trialysis (Dialysis) Catheter 11/21/18 1851 left internal jugular 23 days          Drain                 NG/OG Tube 12/07/18 2141 [place] sump 12 Fr. Right nostril 7 days         Rectal Tube 12/13/18 1005 fecal management system 2 days          Airway                 Surgical Airway 12/05/18 1431 [place] 9 days          Arterial Line                 Arterial Line 11/21/18 1900 Right Radial 23 days                Physical Exam    Significant Labs:  {Results:43059}    Significant Diagnostics:  {Imaging Review:67343}

## 2018-12-15 NOTE — PLAN OF CARE
Problem: Patient Care Overview  Goal: Plan of Care Review  Outcome: Ongoing (interventions implemented as appropriate)  Spouse, siblings, and children present @ bedside. To be transitioned to comfort care this afternoon. Pt intermittently alert, on the vent. PRN dilaudid administered for pain. NG tube dcd per family request. Diltiazem gtt remains on for rate control, maintaining HR <100. Pt and family updated on plan of care. Will continue to monitor.

## 2018-12-15 NOTE — PROGRESS NOTES
[facility]  Liver Transplant  Progress Note    Patient Name: Krystal Hobson  MRN: [id number]  Admission Date: 11/10/2018  Hospital Length of Stay: 35 days  Code Status: DNR  Primary Care Provider: Courtney Joe MD    Subjective:     Interval History:  Unable to tolerate dialysis overnight given hypotension, received total of 750 albumin throughout the night. Required re-starting of dilt gtt to control Afib as HR was maintaining in the 120s-130s. Vent settings required adjustment to VC+ mode.       Scheduled Meds:   diltiaZEM  30 mg Oral Q8H    famotidine  20 mg Oral QHS    fluconazole  200 mg Oral Daily    guaifenesin 100 mg/5 ml  200 mg Per OG tube Q6H    heparin (porcine)  5,000 Units Subcutaneous Q12H    [START ON 12/16/2018] insulin aspart U-100  5 Units Subcutaneous Q24H    [START ON 12/16/2018] insulin aspart U-100  5 Units Subcutaneous Q24H    insulin aspart U-100  5 Units Subcutaneous Q24H    insulin aspart U-100  5 Units Subcutaneous Q24H    insulin aspart U-100  5 Units Subcutaneous Q24H    insulin aspart U-100  5 Units Subcutaneous Q24H    insulin detemir U-100  5 Units Subcutaneous BID    lactulose  15 g Per OG tube TID    miconazole nitrate 2%   Topical (Top) BID    rifAXImin  550 mg Oral BID     Continuous Infusions:   sodium chloride 0.9% 200 mL/hr at 12/14/18 2200    dilTIAZem 2.5 mg/hr (12/15/18 1300)     PRN Meds:sodium chloride, acetaminophen, dextrose 50%, glucagon (human recombinant), HYDROmorphone, insulin aspart U-100, k phos di & mono-sod phos mono, lactulose, lactulose, magnesium sulfate IVPB, metoprolol, ondansetron, simethicone, sodium chloride 0.9%    Review of Systems   Unable to perform ROS: Intubated   All other systems reviewed and are negative.    Objective:     Vital Signs (Most Recent):  Temp: 99 °F (37.2 °C) (12/15/18 1100)  Pulse: 91 (12/15/18 1315)  Resp: (!) 29 (12/15/18 1315)  BP: (!) 109/53 (12/15/18 1300)  SpO2: 98 % (12/15/18 1315) Vital  Signs (24h Range):  Temp:  [97.8 °F (36.6 °C)-99.2 °F (37.3 °C)] 99 °F (37.2 °C)  Pulse:  [] 91  Resp:  [20-43] 29  SpO2:  [90 %-100 %] 98 %  BP: ()/(44-55) 109/53  Arterial Line BP: ()/(24-50) 109/30     Weight: 55.5 kg (122 lb 5.7 oz)  Body mass index is 25.57 kg/m².    Intake/Output - Last 3 Shifts       12/13 0700 - 12/14 0659 12/14 0700 - 12/15 0659 12/15 0700 - 12/16 0659    I.V. (mL/kg) 2096.6 (37.8) 991.7 (17.9)     Blood       NG/GT 1550 1110 420    Total Intake(mL/kg) 3646.6 (65.7) 2101.7 (37.9) 420 (7.6)    Other 3133 805     Stool 800 400     Total Output 3933 1205     Net -286.4 +896.7 +420           Stool Occurrence 2 x            Physical Exam   Constitutional: She appears well-developed and well-nourished.   HENT:   Head: Normocephalic and atraumatic.   Eyes: Pupils are equal, round, and reactive to light.   Spontaneously opens eyes, though now more sluggish  Jaundice+   Neck: Normal range of motion. No JVD present. No tracheal deviation present.   Cardiovascular: Normal rate, regular rhythm and normal heart sounds.   No murmur heard.  Pulmonary/Chest: She has no wheezes. She has no rales.   On vent, trach in place   Abdominal: Soft. Bowel sounds are normal. She exhibits no distension. There is no tenderness.   Umbilical hernia   Musculoskeletal: Normal range of motion. She exhibits no edema.   Neurological: She is alert.       Laboratory:  Immunosuppressants     None        CBC:   Recent Labs   Lab 12/15/18  0341   WBC 13.90*   RBC 2.29*   HGB 7.3*   HCT 23.0*   PLT 57*   *   MCH 31.9*   MCHC 31.7*     CMP:   Recent Labs   Lab 12/15/18  0341   *  286*  286*  286*   CALCIUM 8.4*  8.4*  8.4*  8.4*   ALBUMIN 2.4*  2.4*  2.4*  2.4*   PROT 5.4*     138  138  138   K 3.8  3.8  3.8  3.8   CO2 20*  20*  20*  20*     107  107  107   BUN 16  16  16  16   CREATININE 0.9  0.9  0.9  0.9   ALKPHOS 210*   ALT 57*   AST 74*   BILITOT 12.3*      Coagulation:   Recent Labs   Lab 12/15/18  0341   INR 2.0*     ABGs:   Recent Labs   Lab 12/15/18  0007   PH 7.407   PCO2 32.7*   HCO3 20.6*   POCSATURATED 98   BE -4     Labs within the past 24 hours have been reviewed.    Diagnostic Results:  I have personally reviewed all pertinent imaging studies.    Assessment/Plan:   Krystal Hobson is a 68 y.o. female     Pulmonary   Acute respiratory failure with hypoxia    68yo F liver-listed, stepped up on 11/21 for respiratory failure with worsening respiratory acidosis     Plan:  Neuro:   -Pain control: prn fentanyl while intubated  -Continue sedation holiday as tolerated, more drowsy, not very responsive  -Ammonia level re-check yesterday and elevated to 79    Pulmonary:   -intubated on arrival to SICU 11/21, trach placed on 12/5  Vent Mode: PAV+  Oxygen Concentration (%):  [] 40  Resp Rate Total:  [14 br/min-60 br/min] 26 br/min  Vt Set:  [300 mL] 300 mL  PEEP/CPAP:  [5 cmH20] 5 cmH20  Pressure Support:  [10 hkW62-68 cmH20] 10 cmH20  Mean Airway Pressure:  [6.5 cmH20-10 cmH20] 8.1 cmH20  -daily CXR - evidence of significant bilateral consolidation   -ABGs daily and prn  -continuous pulse ox    -Trach in place - remains vent dependent    Cardiac:  -SBP > 100 goal  -Heart failure following, appreciate recs   - Went back into Afib with RVR on 11/25. Given IV metoprolol 5mg q3. Did not resolve, started on diltiazem drip. Converted out of Afib around 3am. dilt drip stopped at that time.   -continue scheduled dilt & dilt gtt as needed for Afib, try to prevent Afib by administering metoprolol prior to CRRT    Renal:   -Mendoza in place  -Nephrology following, appreciate recommendations  -Started CRRT 11/24/18. Will continue nocturnal CRRT - holding tonight per nephrology given hypotension    Fluids/Electrolytes/Nutrition/GI:   -Nutritional status: NPO while intubated  -Continue TF @ 40cc/hour, will add fiber given diarrhea   -replace lytes PRN  -Lactulose enemas  TID    Hematology/Oncology:  -Hgb stable following transfusion 12/12  -continue to monitor. Transfuse as needed     Infectious Disease:   -Afebrile  -Trend WBC, increasing leukocytosis today  -Abx: none    Endocrine:  -Glucose goal of 140-180  -Endocrine following, see recommendations    Dispo:  -Continue care in the ICU setting - will attempt transfer to a facility closer to home to assist with palliation  -continue CRRT              The patients clinical status was discussed at multidisplinary rounds, involving transplant surgery, transplant medicine, pharmacy, nursing, nutrition, and social work.    Lien Angela MD  Liver Transplant  [facility]

## 2018-12-16 NOTE — PLAN OF CARE
Asystole at 1956. MD Angela called to bedside and assessed patient. Time of death 1957. Family, spouse and  at bedside. [facility] and eye bank notified.

## 2018-12-16 NOTE — PROGRESS NOTES
Withdrawal of care continued. Family at bedside. Patient actively decompensating. Morphine drip initiated. Ativan and morphine given for comfort.  at bedside.

## 2018-12-16 NOTE — PROGRESS NOTES
Pt transitioned to comfort care. 1mg ativan administered, morphine gtt started @ 1 mg/hr, 5 mg bolus morphine administered. Pt taken off the vent. Family at bedside. Will continue to monitor.

## 2018-12-16 NOTE — DISCHARGE SUMMARY
Death Note  Critical Care      Admit Date: 11/10/2018    Date of Death: 12/15/2018    Attending Physician: Gera Bryan MD    Principal Diagnoses: Hepatic encephalopathy    Preliminary Cause of Death: Liver Failure    Secondary Diagnoses:   Active Hospital Problems    Diagnosis  POA    *Hepatic encephalopathy [K72.90]  Yes    Breakdown of tracheostomy site [J95.03]  Yes    Dermatitis associated with moisture [L30.8]  Yes    Difficulty weaning from ventilator [Z99.11]  Not Applicable    Acute kidney failure with lesion of tubular necrosis [N17.0]  Yes    Suspected pressure injury of deep tissue [R68.89]  Yes    On enteral nutrition [Z78.9]  Unknown    Acute respiratory failure with hypoxia [J96.01]  Yes    Pulmonary infiltrates on CXR [R91.8]  Unknown    CKD (chronic kidney disease) stage 3, GFR 30-59 ml/min [N18.3]  Yes    Atrial fibrillation with rapid ventricular response [I48.91]  Yes    Sinus tachycardia [R00.0]  Yes    Chronic liver disease [K76.9]  Yes    Type 2 diabetes mellitus with hyperglycemia, with long-term current use of insulin [E11.65, Z79.4]  Not Applicable    Anemia of chronic disease [D63.8]  Yes    End stage liver disease [K72.90]  Yes    Acute kidney injury superimposed on CKD [N17.9, N18.9]  Yes    Decompensated hepatic cirrhosis [K72.90]  Yes      Resolved Hospital Problems    Diagnosis Date Resolved POA    Hypokalemia [E87.6] 11/27/2018 No    Hypervolemia [E87.70] 11/27/2018 Yes    Ear pain [H92.09] 11/27/2018 Yes    Swelling in right armpit [M79.89] 11/16/2018 Unknown    Swelling of joint of upper arm, right [M25.421] 11/27/2018 Unknown    Physical deconditioning [R53.81] 11/27/2018 Unknown    Shortness of breath [R06.02] 11/27/2018 Unknown    Severe malnutrition [E43] 11/16/2018 Yes    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 Hobson is a 68 yr F w/ hx of cryptogenic cirrhosis, listed for liver tx 18, who was admitted from outpatient clinic visit on 10/30 after found to have elevated Cr (3.0, baseline 1.6) in addition to umbilical hernia pain. She reported 1 episode of emesis 2 days prior to presentation. Denied fever, chills, sick contacts. Concerns for incarcerated hernia. Abdominal X ray and lab work w/ no evidence of obstruction or ischemic bowel. CT scan showed no bowel involvement but had findings of abnormal thickening of the cecum, likely 2/2 cirrhosis. Previous colonoscopy in 2018 w/ no concerned findings. On admit to LTS, pt placed NPO and started on empiric IV Zosyn/Vanc (10/30) due to concern for incarcerated hernia. Diuretics held at this time for ENZO. She was discharged home on  from that stay but re-admitted as a transfer from [place] on 11/10 given increasing disorientation, ammonia levels as high as the 200s and increased creatinine up to 2.0. She was also in Afib with RVR requiring initiation of diltiazem gtt.    Her mental status was improved with lactulose administration in house and she was found to have a pneumonia which was treated with moxifloxacin. She qwas able to maintain her sats and her CXR on 11/15 was notable for improvement. She was undergoing diuresis with lasix. Cardiology obtained 2D ECHO with 65% EF and elevated PA pressures of 55. She was placed on internal hold 2/2 to PNA and frailty. On  she was found to be in increasing respiratory distress with worsening encephalopathy and ABG with hypoxia and hypercarbic respiratory acidosis and as such she was transferred to the SICU where she was emergently intubated. Central and arterial line were placed for hemodynamic monitoring. CXR demonstrated persistent JUAN consolidation. Required levophed for pressure support initially and was noted to have  minimal urine output. Nephrology was following and initiated CRRT on 11/24 for volume removal and metabolic clearance. She was slowly able to come off of levophed for some time.     On 11/30 GI was consulted given GI bleed with Hgb drop from 8.1 to 7.4, requiring transfusion of products. She also was found to be increasingly coagulopathic. GI did not find indication for scope at that time and we continued with transfusions as needed. She did remain on the vent however, despite increasing alertness from a mental status standpoint. As such she underwent tracheostomy with general surgery on 12/6 which she tolerated well. She was able to maintain on the spontaneous PAV+ mode intermittently but in the last week required the vent for complete support. At this time it was realized that she would not be stable for a transplant, as such she was de-listed on 12/4 and the process of getting her back to [place] to be closer to family was initiated. This however, proved quite difficult and she remained here on vent support and with CRRT. Her liver failure continued to progress and on 12/15 consultations were held with the family regarding the patient's expected poor prognosis. At the direction of the family, the patient was extubated and measures to ensure the comfort of the patient including, but not limited to, morphine as needed for pain and air hunger as well as benzodiazepines as needed for agitation. The patient was subsequently declared dead at 19:57.     Constance Angela MD  PGY-2 General Surgery   [phone]

## 2018-12-18 NOTE — PHYSICIAN QUERY
PT Name: Krystal Hobson  MR #: [id number]    Physician Query Form - Cause and Effect Relationship Clarification      CDS/: Calli Gomes               Contact information:[email or web address]    This form is a permanent document in the medical record.     Query Date: December 18, 2018    By submitting this query, we are merely seeking further clarification of documentation. Please utilize your independent clinical judgment when addressing the question(s) below.    The Medical record contains the following:  Supporting Clinical Findings   Location in record   Pneumonia of right lung due to infectious organism    - Noted on recent chest xray.   - Placed on BS antibxs for treatment.   - Transition to PO Moxi (11/13) to complete a 7 day course.                                                                     No S aureus or Pseudomonas isolated.  Klebsiella pneumoniae   Many  Few WBC's  Moderate Gram negative rods  Rare Gram positive cocci                                                                                                                   Liver transplant pn 11-13              Respiratory culture 12-12                                                                                                                                                                                                       Provider, please clarify if there is any correlation between Pneumoniae and Klebsiella pneumoniae.           Are the conditions:      [  ] Due to or associated with each other   [ x ] Unrelated to each other   [  ] Other (Please Specify): _________________________   [  ] Clinically Undetermined

## 2018-12-19 NOTE — PT/OT/SLP DISCHARGE
Physical Therapy Discharge Summary    Name: Krystal Hobson  MRN: [id number]   Principal Problem: Decompensated hepatic cirrhosis     Patient Discharged from acute Physical Therapy on 18.  Please refer to prior PT noted date on 18 for functional status.     Assessment:     Patient is no longer making progress. Patient has not met goals.    Objective:     GOALS:   Multidisciplinary Problems     Physical Therapy Goals        Problem: Physical Therapy Goal    Goal Priority Disciplines Outcome Goal Variances Interventions   Physical Therapy Goal     PT, PT/OT Ongoing (interventions implemented as appropriate)     Description:  Goals to be met by: 2018    Patient will increase functional independence with mobility by performin. Pt and family demonstrate independence with poistioning for joint integrity and comfort.  2. Pt and family demonstrate independent with therex for joint integrity & comfort.                     Multidisciplinary Problems (Resolved)        Problem: Physical Therapy Goal    Goal Priority Disciplines Outcome Goal Variances Interventions   Physical Therapy Goal   (Resolved)     PT, PT/OT  Error                     Reasons for Discontinuation of Therapy Services  Patient declines to continue care.      Plan:     Pt .    Viktoriya Amado, PT  2018

## 2019-05-18 NOTE — PROGRESS NOTES
(CYNDEE) received a call from Naomi with [facility] ph# [phone] with a follow up regarding patient admission review.  Naomi reported after review there is one thing that will hold the patient (pt) from being an acceptable candidate and that is her receiving CRRT instead of Hemodialysis.  SW discussed the pt possibly being trailed and that SW will discussed further on LTS rounds today and follow up.  SW remains available.    Fracture    A fracture is a break in one of your bones. This can occur from a variety of injuries, especially traumatic ones. Symptoms include pain, bruising, or swelling. Do not use the injured limb. If a fracture is in one of the bones below your waist, do not put weight on that limb unless instructed to do so by your healthcare provider. Crutches or a cane may have been provided. A splint or cast may have been applied by your health care provider. Make sure to keep it dry and follow up with an orthopedist as instructed.    SEEK IMMEDIATE MEDICAL CARE IF YOU HAVE ANY OF THE FOLLOWING SYMPTOMS: numbness, tingling, increasing pain, or weakness in any part of the injured limb.     Keep splint in place however if thumb becomes cool, blue, or more painful than you think is normal, loosed dressing and/or remove splint and call.    Please follow up with Dr. Gaines on Monday.  His office will contact you.  Please return immediately if you have any problems or concerns at all.
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
## 2020-09-10 NOTE — PROGRESS NOTES
(CYNDEE) received a call from the Resident stating Nephrology does not want to start HD on the pt.  SW remains available.    Otezla Counseling: The side effects of Otezla were discussed with the patient, including but not limited to worsening or new depression, weight loss, diarrhea, nausea, upper respiratory tract infection, and headache. Patient instructed to call the office should any adverse effect occur.  The patient verbalized understanding of the proper use and possible adverse effects of Otezla.  All the patient's questions and concerns were addressed.

## 2021-05-13 NOTE — SUBJECTIVE & OBJECTIVE
Interval History:   Tolerated SLED x 8 hrs overnight.s/pTrach today still on [facility] Ventilation. CVP is 11 critically ill, stable vital signs, on mechanical ventilation FiO2 improved to 40% with PEEP 5. Net neg 734 ml overnight.  Still has watery bowel stools.    Review of patient's allergies indicates:  No Known Allergies  Current Facility-Administered Medications   Medication Frequency    0.9%  NaCl infusion (CRRT USE ONLY) Continuous    0.9%  NaCl infusion (CRRT USE ONLY) Continuous    0.9%  NaCl infusion (for blood administration) Q24H PRN    0.9%  NaCl infusion (for blood administration) Q24H PRN    acetaminophen tablet 650 mg Q8H PRN    dextrose 50% injection 12.5 g PRN    diltiaZEM 125 mg in D5W 125 mL infusion Continuous    diltiaZEM tablet 30 mg Q8H    famotidine tablet 20 mg QHS    fluconazole tablet 200 mg Daily    glucagon (human recombinant) injection 1 mg PRN    guaifenesin 100 mg/5 ml syrup 200 mg Q6H    heparin (porcine) injection 5,000 Units Q12H    HYDROmorphone injection 0.2 mg Q3H PRN    insulin aspart U-100 pen 0-5 Units Q4H PRN    insulin detemir U-100 pen 5 Units BID    k phos di & mono-sod phos mono 250 mg tablet 2 tablet TID PRN    lactulose 10 gram/15 mL solution (enema) 200 g TID PRN    lactulose 20 gram/30 mL solution Soln 15 g BID    lactulose 20 gram/30 mL solution Soln 30 g Q6H PRN    magnesium sulfate 2g in water 50mL IVPB (premix) PRN    metoprolol injection 2.5 mg Q5 Min PRN    ondansetron disintegrating tablet 8 mg Q8H PRN    rifAXIMin tablet 550 mg BID    simethicone chewable tablet 80 mg TID PRN    sodium chloride 0.9% flush 3 mL PRN       Objective:     Vital Signs (Most Recent):  Temp: 97.6 °F (36.4 °C) (12/07/18 0700)  Pulse: 106 (12/07/18 1723)  Resp: (!) 24 (12/07/18 1723)  BP: (!) 130/59 (12/07/18 1100)  SpO2: 98 % (12/07/18 1723)  O2 Device (Oxygen Therapy): ventilator (12/07/18 7107) Vital Signs (24h Range):  Temp:  [97.6 °F (36.4 °C)-98.4 °F (36.9  Pt rescheduled her appt for charanjit, 5/14/21  at 8:40 AM with Dr Segura./YOLI   °C)] 97.6 °F (36.4 °C)  Pulse:  [] 106  Resp:  [20-42] 24  SpO2:  [91 %-100 %] 98 %  BP: (123-155)/(44-63) 130/59  Arterial Line BP: (118-160)/(35-60) 156/40     Weight: 55.5 kg (122 lb 5.7 oz) (12/04/18 0353)  Body mass index is 25.57 kg/m².  Body surface area is 1.51 meters squared.    I/O last 3 completed shifts:  In: 2591.2 [I.V.:1228.2; Blood:188; NG/GT:1175]  Out: 3443 [Drains:400; Other:3043]    Physical Exam    Constitutional: She appears well-developed and thin and little musc mass, Trach in place on mech ventilation. Awake and following simple commands  HENT:   Head: Normocephalic and atraumatic.   Eyes: Pupils are equal, round, and reactive to light.   Cardiovascular: Normal rate and regular rhythm.   Pulmonary/Chest:   Remains mechanically ventilated  Abdominal: Soft.   Neurological: She is alert.   Skin: Skin is warm and dry.   Nursing notes and vitals reviewed.      Significant Labs:  CBC:   Recent Labs   Lab 12/07/18  1600   WBC 11.83   RBC 2.77*   HGB 8.5*   HCT 26.5*   PLT 97*   MCV 96   MCH 30.7   MCHC 32.1     CMP:   Recent Labs   Lab 12/07/18  0430   *  159*   CALCIUM 8.6*  8.6*   ALBUMIN 2.3*  2.3*   PROT 5.6*     138   K 4.2  4.2   CO2 23  23     108   BUN 10  10   CREATININE 0.8  0.8   ALKPHOS 205*   ALT 38   AST 72*   BILITOT 15.3*     All labs within the past 24 hours have been reviewed.     Significant Imaging:  CXR personally reviewed.

## 2021-05-13 NOTE — ASSESSMENT & PLAN NOTE
Appears to have CKD3/4 at baseline creatinine  Urine electrolytes ordered  Etiology possibly due to hepatorenal syndrome, awaiting final urine electrolytes    HRS protocol started, albumin and octreotide. No midodrine as with Afib.      15

## 2021-09-21 NOTE — PLAN OF CARE
Problem: Physical Therapy Goal  Goal: Physical Therapy Goal  Goals to be met by: 18     Patient will increase functional independence with mobility by performin. Supine to sit with Stand-by Assistance  2. Sit to stand transfer with Stand-by Assistance  3. Gait  x 150 feet with Contact Guard Assistance using AD as needed.   4. Lower extremity exercise program x15 reps, with supervision, in order to increase LE strength and (I) with functional mobility.     Outcome: Ongoing (interventions implemented as appropriate)  PT evaluation complete and appropriate goals established.    Citlali Mallory, PT, DPT   2018  [phone]         Statement Selected

## 2022-10-26 NOTE — PLAN OF CARE
Problem: Patient Care Overview  Goal: Plan of Care Review  Outcome: Ongoing (interventions implemented as appropriate)  Pt awake and alert. Following commands and moving all extremities. #8 Shiley trach in place; on ventilator support at 40% FiO2 40% and 5 PEEP.  Left nare NGT in place and tolerating tube feedings at 40 cc/hr. Pt had 3 loose BMs this shift; incontinence care provided. Breakdown noted to sacrum; pt cleansed multiple times throughout shift and triad cream applied per wound care orders. Spouse at bedside throughout shift and updated on plan of care.        Orbicularis Oris Muscle Flap Text: The defect edges were debeveled with a #15 scalpel blade.  Given that the defect affected the competency of the oral sphincter an orbicularis oris muscle flap was deemed most appropriate to restore this competency and normal muscle function.  Using a sterile surgical marker, an appropriate flap was drawn incorporating the defect. The area thus outlined was incised with a #15 scalpel blade.

## 2023-10-25 NOTE — SUBJECTIVE & OBJECTIVE
Interval History: No acute events overnight, afebrile, vital signs stable. Remains on vent support overnight. Continues to have bloody bowel movements.     Scheduled Meds:   famotidine  20 mg Oral QHS    fluconazole  200 mg Oral Daily    guaifenesin 100 mg/5 ml  200 mg Per OG tube Q6H    insulin aspart U-100  3 Units Subcutaneous Q24H    insulin aspart U-100  3 Units Subcutaneous Q24H    insulin aspart U-100  3 Units Subcutaneous Q24H    insulin aspart U-100  3 Units Subcutaneous Q24H    insulin aspart U-100  3 Units Subcutaneous Q24H    insulin aspart U-100  3 Units Subcutaneous Q24H    insulin detemir U-100  14 Units Subcutaneous Daily    levalbuterol  1.25 mg Nebulization Q6H WAKE    psyllium husk (aspartame)  1 packet Per NG tube Daily    rifAXImin  550 mg Oral BID     Continuous Infusions:   dilTIAZem 7.5 mg/hr (12/02/18 0900)     PRN Meds:sodium chloride, sodium chloride, sodium chloride, acetaminophen, dextrose 50%, glucagon (human recombinant), insulin aspart U-100, k phos di & mono-sod phos mono, lactulose, lactulose, metoprolol, ondansetron, simethicone, sodium chloride 0.9%    Review of Systems   All other systems reviewed and are negative.    Objective:     Vital Signs (Most Recent):  Temp: 97.8 °F (36.6 °C) (12/02/18 0700)  Pulse: 79 (12/02/18 0945)  Resp: 16 (12/02/18 0734)  BP: (!) 161/103 (12/02/18 0930)  SpO2: (!) 93 % (12/02/18 0945) Vital Signs (24h Range):  Temp:  [97.6 °F (36.4 °C)-98 °F (36.7 °C)] 97.8 °F (36.6 °C)  Pulse:  [] 79  Resp:  [13-27] 16  SpO2:  [87 %-100 %] 93 %  BP: ()/() 161/103  Arterial Line BP: ()/(35-50) 124/44     Weight: 53.3 kg (117 lb 8.1 oz)  Body mass index is 24.56 kg/m².    Intake/Output - Last 3 Shifts       11/30 0700 - 12/01 0659 12/01 0700 - 12/02 0659 12/02 0700 - 12/03 0659    I.V. (mL/kg) 3745 (70.3) 417 (7.8)     Blood  253     NG/GT 1280 360 120    Total Intake(mL/kg) 5025 (94.3) 1030 (19.3) 120 (2.3)    Urine (mL/kg/hr)        Emesis/NG output       Drains 40      Other 6861 480     Stool 0 0     Total Output 6901 480     Net -1876 +550 +120           Stool Occurrence 1 x 6 x           Physical Exam   Constitutional: She appears well-developed and well-nourished.   HENT:   Head: Normocephalic and atraumatic.   Eyes: Pupils are equal, round, and reactive to light.   Spontaneously opens eyes  Jaundice+   Neck: Normal range of motion. No JVD present. No tracheal deviation present.   Cardiovascular: Normal rate, regular rhythm and normal heart sounds.   No murmur heard.  Pulmonary/Chest: She has no wheezes. She has no rales.   Intubated   Abdominal: Soft. Bowel sounds are normal. She exhibits no distension. There is no tenderness.   Umbilical hernia   Musculoskeletal: Normal range of motion. She exhibits no edema.   Neurological: She is alert.       Laboratory:  Immunosuppressants     None        Labs within the past 24 hours have been reviewed.    Diagnostic Results:  I have personally reviewed all pertinent imaging studies.   Postop Diagnosis: same

## 2024-04-22 NOTE — ASSESSMENT & PLAN NOTE
Caution with insulin stacking  Estimated Creatinine Clearance: 31.3 mL/min (A) (based on SCr of 1.5 mg/dL (H)).       To meet nutrition needs

## 2025-01-07 NOTE — PLAN OF CARE
Problem: Patient Care Overview  Goal: Plan of Care Review  Outcome: Ongoing (interventions implemented as appropriate)  Patient very lethargic today.  Given lactulose enema, 2 bms noted.  Patients UO poor, cuadra placed, minimal urine noted, UA UC and urine studies sent.  Lungs very coarse, antibiotics restarted.  Scheduled breathing treatments given.  Patient sats 96% on RA.  ABG drawn.  Pulmonology, cardiology, and nephrology following.  Except for RR being shallow and elevated, vitals stable.Atarax dcd this am, due to sedation. Lactic acid elevated.  Patient unable to swallow medication this am, meds crushed and placed in pudding with success.  Speech consult placed, unable to see due to encephalopathy.  BC sent.  Chest xray done today with opacity resembling fluid.  K 3.2, orally replaced.  BG elevated although patient has zero appetite, ss insulin given.  Patients  at bedside attentive to patient, involved in patients care.        Subjective:      Patient ID: Tong Rojas is a 57 y.o. male.    HPI    Patient is following up on his chronic medical problems.  Has history of dyslipidemia, prediabetes.  Metabolic labs reviewed with patient.  Noted to have A1c of 6.1.  LDL improved since patient was started on Crestor 10 mg.  Patient noted to have coronary artery calcification on the most recent CAT scan of the lung which was ordered as a routine screening.  CAT scan also reveals bilateral mild basilar atelectasis which has been attributed to his smoking.  Patient is asymptomatic and also plans to quit smoking.  Has not experienced any symptoms of chest pain or shortness of breath or chronic productive cough.     Past Medical History:   Diagnosis Date    Colon polyp     Depression     Diverticulitis of large intestine with abscess without bleeding        Family History   Problem Relation Age of Onset    Stroke Mother     Hypertension Father        Past Surgical History:   Procedure Laterality Date    COLONOSCOPY      FL RETROGRADE PYELOGRAM  09/22/2020    DC CYSTO W/REMOVAL OF TUMORS SMALL N/A 09/22/2020    Procedure: TRANSURETHRAL RESECTION OF BLADDER TUMOR (TURBT);  Surgeon: Tarun Santoro MD;  Location: [facility];  Service: Urology    DC CYSTOURETHROSCOPY W/URETERAL CATHETERIZATION Bilateral 09/22/2020    Procedure: CYSTOSCOPY RETROGRADE PYELOGRAM;  Surgeon: Tarun Santoro MD;  Location: [facility];  Service: Urology    TRANSURETHRAL RESECTION OF PROSTATE N/A 09/22/2020    Procedure: Transurethral incision of prostate;  Surgeon: Tarun Santoro MD;  Location: [facility];  Service: Urology    WISDOM TOOTH EXTRACTION          reports that he has been smoking cigarettes. He has a 45 pack-year smoking history. He has never used smokeless tobacco. He reports that he does not drink alcohol and does not use drugs.      Current Outpatient Medications:     rosuvastatin (CRESTOR) 5 mg tablet, Take 1 tablet (5 mg total) by mouth daily,  "Disp: , Rfl:     The following portions of the patient's history were reviewed and updated as appropriate: allergies, current medications, past family history, past medical history, past social history, past surgical history and problem list.    Review of Systems   Respiratory: Negative.     Cardiovascular: Negative.            Objective:    /74   Pulse 78   Ht 5' 4\" (1.626 m)   Wt 78.5 kg (173 lb)   SpO2 98%   BMI 29.70 kg/m²      Physical Exam  Constitutional:       Appearance: Normal appearance.   Cardiovascular:      Heart sounds: Normal heart sounds.   Pulmonary:      Breath sounds: Normal breath sounds.           Recent Results (from the past 6 weeks)   Tissue Exam    Collection Time: 12/06/24 11:07 AM   Result Value Ref Range    Case Report       Surgical Pathology Report                         Case: [id number]                                  Authorizing Provider:  Ian Stanford MD           Collected:           12/06/2024 1107              Ordering Location:     [facility]   Received:            12/06/2024 1309                                     Endoscopy                                                                    Pathologist:           Karma Riggins MD                                                         Specimens:   A) - Large Intestine, Right/Ascending Colon, cold bx, ascending colon polyps x3                     B) - Large Intestine, Sigmoid Colon, cold bx, sigmoid polyp                                Final Diagnosis       A. Ascending colon polyps (biopsy):  - Fragments of tubular adenoma(s)  - No high grade dysplasia     B. Sigmoid colon polyp (biopsy):  - Fragments of tubular adenoma  - No high grade dysplasia       Additional Information       All reported additional testing was performed with appropriately reactive controls.  These tests were developed and their performance characteristics determined by [facility] or appropriate " "performing facility, though some tests may be performed on tissues which have not been validated for performance characteristics (such as staining performed on alcohol exposed cell blocks and decalcified tissues).  Results should be interpreted with caution and in the context of the patients’ clinical condition. These tests may not be cleared or approved by the U.S. Food and Drug Administration, though the FDA has determined that such clearance or approval is not necessary. These tests are used for clinical purposes and they should not be regarded as investigational or for research. This laboratory has been approved by [place] 88, designated as a high-complexity laboratory and is qualified to perform these tests.  .      Synoptic Checklist          COLON/RECTUM POLYP FORM - GI - All Specimens          :    Adenoma(s)      Gross Description       A. The specimen is received in formalin, labeled with the patient's name and hospital number, and is designated \" cold biopsy, ascending colon polyps\".  The specimen consists of multiple tan-red, rubbery, friable soft tissue fragments in aggregate measuring 1.3 x 0.7 x 0.2 cm.  Entirely submitted. Screened cassette.  B. The specimen is received in formalin, labeled with the patient's name and hospital number, and is designated \" cold biopsy, sigmoid polyp\".  The specimen consists of 3 tan-red, rubbery, friable soft tissue fragments ranging in greatest dimension from 0.1 to 0.6 cm.  Entirely submitted. Screened cassette.    Note: The estimated total formalin fixation time based upon information provided by the submitting clinician and the standard processing schedule is under 72 hours.    [facility]     Comprehensive metabolic panel    Collection Time: 12/24/24  9:25 AM   Result Value Ref Range    Sodium 141 135 - 147 mmol/L    Potassium 4.4 3.5 - 5.3 mmol/L    Chloride 101 96 - 108 mmol/L    CO2 34 (H) 21 - 32 mmol/L    ANION GAP 6 4 - 13 mmol/L    BUN 17 5 - 25 mg/dL    " Creatinine 0.76 0.60 - 1.30 mg/dL    Glucose, Fasting 99 65 - 99 mg/dL    Calcium 9.9 8.4 - 10.2 mg/dL    AST 29 13 - 39 U/L    ALT 53 (H) 7 - 52 U/L    Alkaline Phosphatase 70 34 - 104 U/L    Total Protein 6.8 6.4 - 8.4 g/dL    Albumin 4.5 3.5 - 5.0 g/dL    Total Bilirubin 1.04 (H) 0.20 - 1.00 mg/dL    eGFR 101 ml/min/1.73sq m   Hemoglobin A1C    Collection Time: 12/24/24  9:25 AM   Result Value Ref Range    Hemoglobin A1C 6.1 (H) Normal 4.0-5.6%; PreDiabetic 5.7-6.4%; Diabetic >=6.5%; Glycemic control for adults with diabetes <7.0% %     mg/dl   Lipid panel    Collection Time: 12/24/24  9:25 AM   Result Value Ref Range    Cholesterol 175 See Comment mg/dL    Triglycerides 90 See Comment mg/dL    HDL, Direct 55 >=40 mg/dL    LDL Calculated 102 (H) 0 - 100 mg/dL    Non-HDL-Chol (CHOL-HDL) 120 mg/dl       Assessment/Plan:    No problem-specific Assessment & Plan notes found for this encounter.           Problem List Items Addressed This Visit          Other    Prediabetes - Primary    Patient has history of prediabetes, A1c 6.1.  Encouraged to continue with low-carb diet/exercise/monitoring.         Relevant Orders    Comprehensive metabolic panel    Hemoglobin A1C    Dyslipidemia    Cholesterol panel improved significantly since patient started Crestor.  Noted to have borderline high ALT, AST is normal.  Patient advised to reduce the dose of Crestor to 5 mg.  Repeat metabolic panel including liver enzymes at 4 months interval.  Overall patient is asymptomatic.  Advised to avoid hepatotoxins.         Relevant Medications    rosuvastatin (CRESTOR) 5 mg tablet    Other Relevant Orders    Lipid panel    Elevated ALT measurement    Patient noted to have an increase in ALT, corresponding to the use of statin.  Advised to reduce the dose of Crestor from 10 to 5 mg.  Patient cautioned about other hepatotoxins risk factors.  Will monitor his liver enzymes at 4 months interval.

## 2025-06-20 NOTE — SUBJECTIVE & OBJECTIVE
[facility] History & Physical Note     Patient: Samara JUAN Rehse Date: 2025   : 1963 Attending: Yoko Paige*         Admission date: 2025    ICU admit date:  2025  Intubation date:  N/a    Chief Complaint: hyponatremia, dizziness, fatigue     Samara Gutiérrez is a 62 year old female admitted on 2025 for septic shock and hyponatremia.    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  Patient underwent ex lap with open hysterectomy, bilateral salpingo-oophorectomy, tumor debulking with low anterior resection, ostomy creation and omental flap advancement.  Her hospital course was complicated by acute renal failure with obstructive uropathy and ureteral stenting, incision site dehiscence with anastomotic leak for which she underwent percutaneous catheter drainage placement.  She was eventually discharged rehab at the end of that admission.  Since then she has had subsequent admissions for emphysematous cystitis.    Patient krysten coming in because she felt very tired and very shaky.  She was told that she has abnormal lab values and that is why her rehab center sent her to the emergency department to be evaluated.  She notices that she has been nauseous and vomiting over the last 2 days.  She was treated with Zofran at her rehab facility.  Patient reports that she has had progressive weakness since her initial admission this year back in January.  She denies any fevers, chills, shortness of breath, abdominal pain, dysuria, hematuria, back pain.  She does endorse flank pain along with some headaches.    In the ED laboratory workup found the patient to have a sodium of 122, creatinine of 4.71 and a lactic acid of 7.  Patient was resuscitated with 2.5 L of LR but was still found to be hypotensive and eventually required Levophed.  Patient was endorsing flank pain so she  Interval History/Significant Events:     HDS overnight. Afebrile.  H+H to 7.7 s/p one unit pRBC, WBC stable  Gen surg consulted for trach; plan for bedside trach 12/4.      Objective:     Vital Signs (Most Recent):  Temp: 98 °F (36.7 °C) (12/02/18 0300)  Pulse: 78 (12/02/18 0600)  Resp: (!) 21 (12/01/18 1949)  BP: (!) 114/53 (12/02/18 0600)  SpO2: 99 % (12/02/18 0600) Vital Signs (24h Range):  Temp:  [97.6 °F (36.4 °C)-98 °F (36.7 °C)] 98 °F (36.7 °C)  Pulse:  [] 78  Resp:  [13-27] 21  SpO2:  [70 %-100 %] 99 %  BP: ()/(37-78) 114/53  Arterial Line BP: ()/(35-50) 122/43     Weight: 53.3 kg (117 lb 8.1 oz)  Body mass index is 24.56 kg/m².      Intake/Output Summary (Last 24 hours) at 12/2/2018 0703  Last data filed at 12/2/2018 0500  Gross per 24 hour   Intake 990 ml   Output 222 ml   Net 768 ml       Physical Exam   Constitutional: She appears well-developed and well-nourished.   HENT:   Head: Normocephalic and atraumatic.   Eyes: Pupils are equal, round, and reactive to light.   Cardiovascular: Normal rate and regular rhythm.   Pulmonary/Chest:   intubated on spontaneous PEEP 5 PS 5 FiO2  Abdominal: Soft.   Neurological: She is alert.   Skin: Skin is warm and dry.         Lines/Drains/Airways     Central Venous Catheter Line                 Trialysis (Dialysis) Catheter 11/21/18 1851 left internal jugular 10 days          Drain                 NG/OG Tube 11/21/18 1840 [place] sump 16 Fr. Right mouth 10 days          Airway                 Airway - Non-Surgical 11/21/18 1836 Endotracheal Tube 10 days          Arterial Line                 Arterial Line 11/21/18 1900 Right Radial 10 days          Pressure Ulcer                 Pressure Injury 11/27/18 Rectum Deep tissue injury 5 days          Peripheral Intravenous Line                 Peripheral IV - Single Lumen 12/02/18 0628 Right Antecubital less than 1 day                Significant Labs:    CBC/Anemia Profile:  Recent Labs   Lab 12/01/18 2012  12/01/18  2349 12/02/18  0401   WBC 8.31 7.22 6.79  6.79   HGB 7.9* 7.5* 7.7*  7.7*   HCT 24.3* 23.0* 23.4*  23.4*   PLT 37* 36* 40*  40*   MCV 94 94 94  94   RDW 22.7* 22.8* 23.4*  23.4*        Chemistries:  Recent Labs   Lab 11/30/18  1300 11/30/18  2215 12/01/18  0400 12/02/18  0401    142 142  142 142   K 4.5 4.8 4.7  4.7 4.9   * 112* 112*  112* 110   CO2 23 26 27  27 26   BUN 4* 6* 4*  4* 20   CREATININE 0.4* 0.5 0.4*  0.4* 1.0   CALCIUM 7.2* 7.4* 7.6*  7.6* 8.7   ALBUMIN 2.2* 2.1* 2.0*  2.0* 2.3*   PROT  --   --  4.4* 5.0*   BILITOT  --   --  11.5* 9.0*   ALKPHOS  --   --  125 126   ALT  --   --  16 21   AST  --   --  51* 54*   MG 1.8 1.9 1.6 2.7*   PHOS 2.6* 2.2* 1.5*  --        All pertinent labs within the past 24 hours have been reviewed.    Significant Imaging:  I have reviewed all pertinent imaging results/findings within the past 24 hours.   underwent a CT of her abdomen/pelvis which demonstrated some bilateral perinephric stranding.  Patient was given a dose of Fortaz and vancomycin.  She was admitted to the ICU for septic shock.    Pertinent History  Past Medical History:   Diagnosis Date    A-fib  (CMD)     Acid reflux     Alcohol intoxication (CMD)     Allergy     Anxiety disorder 06/05/2014    Arthritis     Attention to ileostomy (CMD)     Cervical spondylosis without myelopathy     Cervical strain     Chronic kidney disease, stage 3a  (CMD)     stage 3    Cognitive impairment     Colostomy status  (CMD)     Controlled type 2 diabetes mellitus with diabetic polyneuropathy, without long-term current use of insulin (CMD) 10/04/2021    Depressive disorder     Diabetes mellitus, type II  (CMD) 06/05/2014    Diabetic neuropathy  (CMD) 06/05/2014    Dizziness     Dysplastic nevus     Essential tremor 07/29/2015    Exertional dyspnea     Eye disorder     bilateral vision loss, legally blind, does not drive    Failure to thrive in adult     Fall at home 01/01/2025    Face Abrasion    Family history of premature CAD 04/14/2015    Fibromyalgia     Loredo catheter in place     Former smoker     Gastroesophageal reflux disease     Glaucoma     inflammatory and angle closure    Herpes     Hyperlipidemia 07/01/2014    Irregular heart rhythm     Legally blind in left eye, as defined in [place]     and poor vision in right eye - only can read with a magnifying glass    Lichen sclerosus et atrophicus     Long term (current) use of anticoagulants     ELIQUIS    Low grade serous carcinoma (CMD)     Memory loss     Microalbuminuria 10/21/2014    Nuclear senile cataract of left eye     Obesity, unspecified 06/06/2014    Ovarian mass     PAF (paroxysmal atrial fibrillation)  (CMD)     Pars planitis of both eyes     PCO (posterior capsular opacification), right     Personal history of other colon polyps     Physical deconditioning, severe     Stage 3a chronic kidney disease  (CKD)  (CMD)     STD (female)     Suicidal ideation     Tremor     Type 2 diabetes mellitus with diabetic polyneuropathy, with long-term current use of insulin (CMD) 2014    Unable to walk       Past Surgical History:   Procedure Laterality Date    Colon surgery  2025    LAR with proctective ileostomy per Dr. Pagan    Colonoscopy  2017    with 5 yr recall     Colonoscopy  2024    Cystoscopy w/ ureteral stent placement Bilateral 2025    Extracapsular cataract removal w insert io lens prosth wo ecp      IOL (James [id number] 24.0D) OD, Dr Tami Golden (and LPI 2 wks earlier)    Foot surgery      glass removed    Gallbladder surgery      Joint replacement      Laparoscopy gastrc restrictive longitudinal  10/12/2021    Brendan; Robotic sleeve with hiatal hernia repair    Total abdominal hysterectomy w/ bilateral salpingoophorectomy  2025    laparoscopy, SCOTT BSO debulking per Rivas    Total knee replacement Left 10/21/2019     Social History     Tobacco Use    Smoking status: Former     Current packs/day: 0.00     Average packs/day: 0.3 packs/day for 27.0 years (6.8 ttl pk-yrs)     Types: Cigarettes     Start date:      Quit date:      Years since quittin.4    Smokeless tobacco: Never    Tobacco comments:     quit in - restarted 2019-quit again in 2019 started again    Substance Use Topics    Alcohol use: Yes     Alcohol/week: 2.0 standard drinks of alcohol     Types: 2 Standard drinks or equivalent per week     Comment: occ     Family History   Problem Relation Age of Onset    Depression Mother     Cancer, Breast Mother     Myocardial Infarction Father         heart attack    Genitourinary Father         kidney stones    Diabetes Brother     Alcohol Abuse Brother     Cancer, Lung Brother     Cancer Brother         Bone marrow Ca    Patient is unaware of any medical problems Maternal Grandmother     Patient is unaware of any medical problems  Maternal Grandfather     Diabetes Paternal Grandmother     Myocardial Infarction Paternal Grandmother     Patient is unaware of any medical problems Paternal Grandfather     Bipolar disorder Maternal Aunt     Cancer, Thyroid Maternal Aunt     Cancer, Thyroid Maternal Cousin     Cancer, Thyroid Maternal Cousin     Cancer, Thyroid Maternal Cousin        Allergies  ALLERGIES:   Allergen Reactions    Cefuroxime DIZZINESS, NAUSEA and Other (See Comments)     Dehydration    1/11/2025 received cefazolin in 10/2021 Wintson Tejeda, [facility]   3/18/2025 tolerated ceftazidime course Nikkie ELROY Lopes, [facility]    Amoxicillin RASH     1/11/2025 received cefazolin in 10/2021 Winston Tejeda, [facility]     Bactrim Ds HIVES    Celexa RASH    Ciprofloxacin RASH     Severe yeast infection    Lisinopril Cough    Melatonin Other (See Comments)     Night terrors    Oxycodone Other (See Comments)     Terrible nightmares, has used Hydrocodone in past with no issues per pt    Oxycodone Hcl Other (See Comments)     Terrible nightmares    Penicillins RASH and Other (See Comments)     Hives  *tolerated Cefazolin during total knee procedure on 10/21/19    Sulfa Antibiotics RASH    Tramadol PRURITUS       Home Medications  Medications Prior to Admission   Medication Sig Dispense Refill    anastrozole (ARIMIDEX) 1 MG tablet Take 1 tablet by mouth daily. 0      magnesium oxide (MAG-OX) 400 MG tablet Take 1 tablet by mouth daily. (Patient taking differently: Take 400 mg by mouth daily. Indications: Disorder with Low Magnesium Levels) 30 tablet 0    acetaminophen (TYLENOL) 325 MG tablet Take 2 tablets by mouth every 6 hours as needed for Pain or Fever. 30 tablet 0    midodrine (PROAMATINE) 2.5 MG tablet Take 4 tablets by mouth 3 times daily (before meals). (Patient taking differently: Take 10 mg by mouth 3 times daily (before meals). Indications: Disorder of Low Blood Pressure) 360 tablet 0    Multiple Vitamin (Multivitamin) Tab Take 1 tablet by mouth daily.      trazodone  (DESYREL) 50 MG tablet Take 1 tablet by mouth nightly. (Patient taking differently: Take 50 mg by mouth nightly. Indications: Major Depressive Disorder) 30 tablet 2    metoPROLOL succinate (TOPROL-XL) 50 MG 24 hr tablet Take 1 tablet by mouth in the morning and 1 tablet in the evening. 60 tablet 0    atorvastatin (LIPITOR) 10 MG tablet TAKE 1 TABLET BY MOUTH EVERY DAY AT NIGHT 90 tablet 1    apixaBAN (Eliquis) 5 MG Tab Take 1 tablet by mouth every 12 hours. Begin taking on April 23, 2025. (Patient taking differently: Take 5 mg by mouth every 12 hours. Indications: Cerebrovascular Accident or Stroke) 180 tablet 3    aluminum-magnesium hydroxide-simethicone (MAALOX) 200-200-20 MG/5ML Suspension Take 15 mLs by mouth 4 times daily as needed (GI upset).      bisacodyl (DULCOLAX) 10 MG suppository Place 10 mg rectally daily as needed for Constipation.      magnesium hydroxide (MILK OF MAGNESIA) 400 MG/5ML suspension Take 15 mLs by mouth daily as needed for Constipation.      omeprazole (PrilOSEC) 20 MG capsule Take 20 mg by mouth daily. Indications: Gastroesophageal Reflux Disease      ondansetron (ZOFRAN ODT) 4 MG disintegrating tablet Place 4 mg onto the tongue 2 times daily as needed for Nausea.      docusate sodium-sennosides (SENOKOT S) 50-8.6 MG per tablet Take 1 tablet by mouth daily as needed for Constipation.      insulin glargine (LANTUS) 100 UNIT/ML vial solution Inject 10 Units into the skin nightly. 10 mL 12    HYDROcodone-acetaminophen (NORCO) 5-325 MG per tablet Take 1 tablet by mouth every 4 hours as needed for Pain (pain). 10 tablet 0    ALPRAZolam (XANAX) 0.25 MG tablet Take 1-2 tablets by mouth 3 times daily as needed for Anxiety. 90 tablet 2    buPROPion XL (WELLBUTRIN XL) 300 MG 24 hr tablet Take 1 tablet by mouth daily. (Patient taking differently: Take 300 mg by mouth daily. Indications: Depression) 90 tablet 3    gabapentin (NEURONTIN) 600 MG tablet Take 1 tablet by mouth in the morning and 1  tablet in the evening. (Patient taking differently: Take 600 mg by mouth in the morning and 600 mg in the evening. Indications: Neuropathic Pain, Peripheral Nerve Disease.) 180 tablet 1    metformin (GLUCOPHAGE) 1000 MG tablet Take 1 tablet by mouth in the morning and 1 tablet in the evening. Take with meals. (Patient taking differently: Take 1,000 mg by mouth in the morning and 1,000 mg in the evening. Take with meals. Indications: Type 2 Diabetes.) 180 tablet 1    mirabegron ER (Myrbetriq) 50 MG 24 hr tablet Take 1 tablet by mouth daily. (Patient taking differently: Take 50 mg by mouth daily. Indications: Urinary Retention) 90 tablet 3    MAGNESIUM PO Take 250 mg by mouth in the morning and 250 mg in the evening.      albuterol 108 (90 Base) MCG/ACT inhaler Inhale 2 puffs into the lungs every 4 hours as needed for Shortness of Breath or Wheezing. Fill at pt request when due 18 g 3    Cholecalciferol (Vitamin D3) 50 mcg (2,000 units) capsule Take 2,000 Units by mouth daily.      Alcohol Swabs (Pharmacist Choice Alcohol) Pads Use bid to check bs 100 each 3       Review of Systems  Pertinent items are noted in the HPI      Physical Examination  Neurologic: A&O x2 (name, year), intact speech, CN II-XII intact, moving all 4 extremities.   Neck:supple, full ROM.; RIJ CVC in place   Chest: lungs clear to auscultation, no wheezing or cough.  Heart:RRR, no murmurs  Abdomen: Soft, non-tender, non-distended.  Normal bowel sounds.  Ostomy with healthy pink tissue.  Ostomy output seems normal consistency without any stigmata of bleeding.  Extremities: warm and dry, no edema   : lucas present   Skin: intact     DISCUSSION/PLAN  Septic shock  UTI  Hyponatremia  Severe anion gap metabolic acidosis  Lactic acidosis  Endometrioid adenocarcinoma status post resection and ostomy creation.  Hx of afib  DM2    Neuro:   Recent CVA with hemorrhagic transformation in 03/25  Depression    -Continue home Wellbutrin and nightly  trazodone  - Sedation: None  - Pain: Tylenol   - Nausea: Zofran/Ondansetron    Pulmonary:    No acute concerns    - Titrate supplemental oxygen as tolerated for goal SpO2 > greater than or equal to 90%  - IS    CV:   Septic shock  A-fib  Hyperlipidemia    - Hold Eliquis in light of possible procedures.  - Continue statin.  - Hold home Toprol  - continuous telemetry whilst in ICU, prn EKG  - On Levophed.  Titrate to maintain MAP goal> 65  - K>4, Mg>2    Renal:  DARWIN  Hyponatremia  Lactic acidosis  Severe metabolic acidosis    Baseline Cr 0.7-0.9.  CrCl cannot be calculated (Unknown ideal weight.).    Creatinine (mg/dL)   Date Value   06/19/2025 4.71 (H)     CREATININE - POINT OF CARE (mg/dL)   Date Value   06/20/2025 5.00 (H)   06/19/2025 5.20 (H)     Weight    06/19/25 2312   Weight: 77 kg (169 lb 12.1 oz)     Net IO Since Admission: 100 mL [06/20/25 0509]    Intake/Output Summary (Last 24 hours) at 6/20/2025 0509  Last data filed at 6/20/2025 0105  Gross per 24 hour   Intake 100 ml   Output --   Net 100 ml     - monitor Cr, UOPs  - daily weights, standing if possible  - Chronic Loredo.  Exchanged in the emergency department on 6/20/2025  - No improvement in serum bicarb with fluid resuscitation., will give 2 amps of sodium bicarb.  Start sodium bicarb infusion given pH less than 7.2.  - BMPs given hyponatremia.  Suspect this is secondary to multiple days of poor p.o. intake likely hypovolemic hyponatremia. Goal sodium 130 by midnight. DDAVP as needed to keep Na within goal   - check serum osm   - Electrolyte replacement per ICU protocol    GI:   Endometrial adenocarcinoma status post colostomy    - Diet: NPO  - GI ppx: Home PPI    Heme:   Anemia of chronic disease    Lab Results   Component Value Date    HGB 11.4 (L) 06/19/2025    HGB 10.3 (L) 05/29/2025    HGB 9.6 (L) 10/28/2019    HGB 9.5 (L) 10/27/2019    HCT 37.4 06/19/2025    HCT 32.4 (L) 05/29/2025    HCT 29.7 (L) 10/28/2019    HCT 29.0 (L) 10/27/2019     -  Transfuse for Hgb goal >7.0 g/dL    Endocrine:  Type 2 diabetes    Hemoglobin A1C (%)   Date Value   03/11/2025 5.8 (H)      Glucose (mg/dL)   Date Value   06/19/2025 72   05/29/2025 105 (H)   05/06/2025 156 (H)      - Blood sugar goal 140-180   - Currently hypoglycemic.  Hold Lantus.    ID:   History of MDRO E. coli UTIs  Concern for urosepsis    Temp  Min: 97.3 °F (36.3 °C)  Max: 97.8 °F (36.6 °C),   WBC (K/mcL)   Date Value   06/19/2025 11.1 (H)   05/29/2025 6.3   05/06/2025 4.0 (L)     - trend WBCs and fever curve   - follow up blood, urine, sputum cultures  - MRSA nares: Pending  - UA consistent with UTI.  Given history of MDRO E. coli UTIs, continue on Fortaz and vancomycin    Disposition: ICU    PERTINENT DIAGNOSTICS/PROCEDURES:  6/20 -right IJ CVC placement    BEST PRACTICES:  - VTE: SCDs, Heparin TID  - SUP: PPI (proton pump inhibitor)  - Glycemic control: insulin subcutaneous  - LDA assessment and Removal:   Continue Central venous line and Loredo  Remove none  - Nutrition:  NPO  - Last BM: PTA  - Therapy/mobilization: pt/ot  - Goals of care note documented: need to verify DNR with family     ================================================================    No intake/output data recorded.  I/O this shift:  In: 100 [IV Piggyback:100]  Out: -     Vital Last Value 24 Hour Range   Temperature 97.3 °F (36.3 °C) (06/20/25 0219) Temp  Min: 97.3 °F (36.3 °C)  Max: 97.8 °F (36.6 °C)   Pulse 85 (06/20/25 0455) Pulse  Min: 64  Max: 157   Respiratory 14 (06/20/25 0455) Resp  Min: 12  Max: 22   Non-Invasive  Blood Pressure 91/50 (06/20/25 0455) BP  Min: 81/39  Max: 137/60   Pulse Oximetry 100 % (06/20/25 5580) SpO2  Min: 87 %  Max: 100 %   Arterial   Blood Pressure   No data recorded      Pertinent Reviewed: Allergies, Medications, Labs, Imaging, and Physician and Nursing Notes    Length of Stay: Greater than 2 midnight stay is anticipated.    Patient is critically ill as documented. I evaluated the patient and  reviewed imaging and laboratory data. I discussed events and interventions with Yoko Paige* who has reviewed the diagnostic and treatment strategy.     Pavan Stephen DO  Critical Care Medicine Fellow  06/20/25 5:09 AM

## (undated) DEVICE — SUT SILK 2-0 SH 18IN BLACK

## (undated) DEVICE — TRACH TUBE CUFF FLEX DISP 8.5

## (undated) DEVICE — ELECTRODE BLADE INSULATED 1 IN

## (undated) DEVICE — NDL HYPO REG 25G X 1 1/2

## (undated) DEVICE — SEE MEDLINE ITEM 152622

## (undated) DEVICE — ELECTRODE REM PLYHSV RETURN 9

## (undated) DEVICE — GOWN SURGICAL X-LARGE

## (undated) DEVICE — CORD BIPOLAR 12 FOOT

## (undated) DEVICE — SUT VICRYL 3-0 27 SH

## (undated) DEVICE — SEE MEDLINE ITEM 154981

## (undated) DEVICE — GAUZE SPONGE PEANUT STRL

## (undated) DEVICE — TRAY MINOR GEN SURG

## (undated) DEVICE — SHEET EENT SPLIT